# Patient Record
Sex: FEMALE | Race: WHITE | Employment: OTHER | URBAN - METROPOLITAN AREA
[De-identification: names, ages, dates, MRNs, and addresses within clinical notes are randomized per-mention and may not be internally consistent; named-entity substitution may affect disease eponyms.]

---

## 2017-01-02 DIAGNOSIS — E03.8 OTHER SPECIFIED HYPOTHYROIDISM: ICD-10-CM

## 2017-01-02 DIAGNOSIS — E03.9 HYPOTHYROIDISM, UNSPECIFIED TYPE: Primary | ICD-10-CM

## 2017-01-05 DIAGNOSIS — C10.9 MALIGNANT NEOPLASM OF OROPHARYNX (H): Primary | ICD-10-CM

## 2017-03-08 ENCOUNTER — ONCOLOGY VISIT (OUTPATIENT)
Dept: ONCOLOGY | Facility: CLINIC | Age: 72
End: 2017-03-08
Attending: INTERNAL MEDICINE
Payer: MEDICARE

## 2017-03-08 ENCOUNTER — OFFICE VISIT (OUTPATIENT)
Dept: OTOLARYNGOLOGY | Facility: CLINIC | Age: 72
End: 2017-03-08

## 2017-03-08 ENCOUNTER — APPOINTMENT (OUTPATIENT)
Dept: LAB | Facility: CLINIC | Age: 72
End: 2017-03-08
Attending: INTERNAL MEDICINE
Payer: MEDICARE

## 2017-03-08 VITALS
WEIGHT: 174.3 LBS | OXYGEN SATURATION: 94 % | BODY MASS INDEX: 29.04 KG/M2 | SYSTOLIC BLOOD PRESSURE: 119 MMHG | HEIGHT: 65 IN | DIASTOLIC BLOOD PRESSURE: 79 MMHG | HEART RATE: 76 BPM

## 2017-03-08 VITALS
SYSTOLIC BLOOD PRESSURE: 121 MMHG | HEART RATE: 64 BPM | WEIGHT: 174 LBS | DIASTOLIC BLOOD PRESSURE: 72 MMHG | TEMPERATURE: 97.8 F | OXYGEN SATURATION: 95 % | BODY MASS INDEX: 29.34 KG/M2

## 2017-03-08 DIAGNOSIS — C09.9 MALIGNANT NEOPLASM OF TONSIL (H): Chronic | ICD-10-CM

## 2017-03-08 DIAGNOSIS — E03.9 HYPOTHYROIDISM, UNSPECIFIED TYPE: ICD-10-CM

## 2017-03-08 DIAGNOSIS — E03.8 OTHER SPECIFIED HYPOTHYROIDISM: ICD-10-CM

## 2017-03-08 DIAGNOSIS — C10.9 MALIGNANT NEOPLASM OF OROPHARYNX (H): Primary | ICD-10-CM

## 2017-03-08 DIAGNOSIS — E78.5 HYPERLIPIDEMIA LDL GOAL <130: Chronic | ICD-10-CM

## 2017-03-08 LAB
ALBUMIN SERPL-MCNC: 3.6 G/DL (ref 3.4–5)
ALP SERPL-CCNC: 83 U/L (ref 40–150)
ALT SERPL W P-5'-P-CCNC: 37 U/L (ref 0–50)
ANION GAP SERPL CALCULATED.3IONS-SCNC: 8 MMOL/L (ref 3–14)
AST SERPL W P-5'-P-CCNC: 30 U/L (ref 0–45)
BASOPHILS # BLD AUTO: 0 10E9/L (ref 0–0.2)
BASOPHILS NFR BLD AUTO: 0.7 %
BILIRUB SERPL-MCNC: 0.5 MG/DL (ref 0.2–1.3)
BUN SERPL-MCNC: 13 MG/DL (ref 7–30)
CALCIUM SERPL-MCNC: 8.5 MG/DL (ref 8.5–10.1)
CHLORIDE SERPL-SCNC: 107 MMOL/L (ref 94–109)
CO2 SERPL-SCNC: 28 MMOL/L (ref 20–32)
CREAT SERPL-MCNC: 0.62 MG/DL (ref 0.52–1.04)
DIFFERENTIAL METHOD BLD: NORMAL
EOSINOPHIL # BLD AUTO: 0.2 10E9/L (ref 0–0.7)
EOSINOPHIL NFR BLD AUTO: 4 %
ERYTHROCYTE [DISTWIDTH] IN BLOOD BY AUTOMATED COUNT: 14.7 % (ref 10–15)
GFR SERPL CREATININE-BSD FRML MDRD: ABNORMAL ML/MIN/1.7M2
GLUCOSE SERPL-MCNC: 78 MG/DL (ref 70–99)
HCT VFR BLD AUTO: 42.2 % (ref 35–47)
HGB BLD-MCNC: 13.4 G/DL (ref 11.7–15.7)
IMM GRANULOCYTES # BLD: 0 10E9/L (ref 0–0.4)
IMM GRANULOCYTES NFR BLD: 0.5 %
LYMPHOCYTES # BLD AUTO: 0.8 10E9/L (ref 0.8–5.3)
LYMPHOCYTES NFR BLD AUTO: 18.4 %
MCH RBC QN AUTO: 29 PG (ref 26.5–33)
MCHC RBC AUTO-ENTMCNC: 31.8 G/DL (ref 31.5–36.5)
MCV RBC AUTO: 91 FL (ref 78–100)
MONOCYTES # BLD AUTO: 0.4 10E9/L (ref 0–1.3)
MONOCYTES NFR BLD AUTO: 8.7 %
NEUTROPHILS # BLD AUTO: 2.9 10E9/L (ref 1.6–8.3)
NEUTROPHILS NFR BLD AUTO: 67.7 %
NRBC # BLD AUTO: 0 10*3/UL
NRBC BLD AUTO-RTO: 0 /100
PLATELET # BLD AUTO: 188 10E9/L (ref 150–450)
POTASSIUM SERPL-SCNC: 4 MMOL/L (ref 3.4–5.3)
PROT SERPL-MCNC: 6.7 G/DL (ref 6.8–8.8)
RBC # BLD AUTO: 4.62 10E12/L (ref 3.8–5.2)
SODIUM SERPL-SCNC: 142 MMOL/L (ref 133–144)
T4 FREE SERPL-MCNC: 1.06 NG/DL (ref 0.76–1.46)
TSH SERPL DL<=0.005 MIU/L-ACNC: 4.72 MU/L (ref 0.4–4)
TSH SERPL DL<=0.05 MIU/L-ACNC: 4.72 MU/L (ref 0.4–4)
WBC # BLD AUTO: 4.3 10E9/L (ref 4–11)

## 2017-03-08 PROCEDURE — 80053 COMPREHEN METABOLIC PANEL: CPT | Performed by: NURSE PRACTITIONER

## 2017-03-08 PROCEDURE — 84443 ASSAY THYROID STIM HORMONE: CPT | Performed by: NURSE PRACTITIONER

## 2017-03-08 PROCEDURE — 84443 ASSAY THYROID STIM HORMONE: CPT | Mod: 91 | Performed by: NURSE PRACTITIONER

## 2017-03-08 PROCEDURE — 85025 COMPLETE CBC W/AUTO DIFF WBC: CPT | Performed by: NURSE PRACTITIONER

## 2017-03-08 PROCEDURE — 99212 OFFICE O/P EST SF 10 MIN: CPT

## 2017-03-08 PROCEDURE — 99214 OFFICE O/P EST MOD 30 MIN: CPT | Mod: ZP | Performed by: INTERNAL MEDICINE

## 2017-03-08 PROCEDURE — 84439 ASSAY OF FREE THYROXINE: CPT | Performed by: NURSE PRACTITIONER

## 2017-03-08 PROCEDURE — 36415 COLL VENOUS BLD VENIPUNCTURE: CPT

## 2017-03-08 RX ORDER — LEVOTHYROXINE SODIUM 75 UG/1
75 TABLET ORAL DAILY
Qty: 90 TABLET | Refills: 3 | Status: SHIPPED | OUTPATIENT
Start: 2017-03-08 | End: 2018-01-19

## 2017-03-08 ASSESSMENT — PAIN SCALES - GENERAL
PAINLEVEL: NO PAIN (0)
PAINLEVEL: NO PAIN (0)

## 2017-03-08 NOTE — NURSING NOTE
Chief Complaint   Patient presents with     Blood Draw     labs collected and sent, vitals taken and pt checked in for next appt.     Jamee Ledbetter

## 2017-03-08 NOTE — LETTER
3/8/2017       RE: Ingrid Powell  [address]  [place] [address]     Dear Colleague,    Thank you for referring your patient, Ingrid Powell, to the [facility] EAR NOSE AND THROAT at [facility]. Please see a copy of my visit note below.    Dear Dr. Shepherd:    I had the pleasure of meeting Ingrid Powell in follow-up today at the [facility] Ot[facility].     History of Present Illness:   Ms Powell is a 71 year old woman with a history of [id number] p16+ SCC of the oropharynx. She initially presented with a right neck mass and then found to have a mass of the right palatine tonsil. She was treated with concurrent chemoradiation from February 2012 to April 2012. She was last seen 11/9/2016 and was doing well; I had checked her TSH which was high and her synthroid was increased. Since that time her PCP did again decrease her synthroid; she doesn't think her level has been checked recently. She is otherwise doing well with no new complaints. She continues to have dry mouth. She continues to have dry eyes which she treats with rystasis and eye drops. She has stable dysphagia. She has no otalgia, hemoptysis, weight loss, hoarseness, sore throat. She is not currently smoking. She did have a neck and chest CT done today with formal read pending.    MEDICATIONS:     Current Outpatient Prescriptions   Medication Sig Dispense Refill     gabapentin (NEURONTIN) 300 MG capsule Take 1-2 capsules (300-600 mg) by mouth every evening as needed 180 capsule 3     MAGNESIUM OXIDE PO Take 250 mg by mouth daily        zinc sulfate (ZINCATE) 220 MG capsule Take 220 mg by mouth daily       guaiFENesin-codeine (ROBITUSSIN AC) 100-10 MG/5ML SOLN solution Take 5 mLs by mouth every 4 hours as needed for cough 120 mL 0     benzonatate (TESSALON) 200 MG capsule Take 1 capsule (200 mg) by mouth 3 times daily as needed for cough 21 capsule 0     levothyroxine  (SYNTHROID, LEVOTHROID) 88 MCG tablet Take 1 tablet (88 mcg) by mouth daily 30 tablet 3     simvastatin (ZOCOR) 40 MG tablet Take 1 tablet (40 mg) by mouth At Bedtime 90 tablet 3     PARoxetine (PAXIL) 20 MG tablet Take 1 tablet (20 mg) by mouth At Bedtime 90 tablet 3     traZODone (DESYREL) 100 MG tablet Take 1 tablet (100 mg) by mouth nightly as needed for sleep 90 tablet 2     RESTASIS 0.05 % ophthalmic emulsion Place 1 drop into both eyes 2 times daily       ORDER FOR DME Respironics Remstar 60 series Auto CPAP 6-15 cm H2O       B Complex Vitamins (VITAMIN  B COMPLEX) tablet Take 1 tablet by mouth daily.         VITAMIN D, CHOLECALCIFEROL, PO Take 2,000 Units by mouth daily.           ALLERGIES:    Allergies   Allergen Reactions     Dilaudid [Hydromorphone Hcl] Other (See Comments)     hallucinations     Fosamax [Alendronate Sodium] Rash            Norvasc [Amlodipine Besylate] Hives and Rash     Tegretol [Carbamazepine] Hives and Rash       HABITS/SOCIAL HISTORY:   Quit smoking    PAST MEDICAL HISTORY:   Past Medical History   Diagnosis Date     Arthritis      Depressive disorder      Gastroesophageal reflux disease      History of lumbar surgery 2015     Hoarseness      Oxygen dependent      at night     Reduced vision      Sleep apnea         FAMILY HISTORY:    Family History   Problem Relation Age of Onset     Breast Cancer Mother      Arthritis Mother      CANCER Mother      HEART DISEASE Father       at 72 of heart failure     Arthritis Brother      Arthritis Sister      CANCER Other      uncle pancreatic/grandmother- colon/aunt ? mat or pat  breast cancer     CANCER Sister        REVIEW OF SYSTEMS:  12 point ROS was negative other than the symptoms noted above in the HPI.  Patient Supplied Answers to Review of Systems  [facility] ROS 3/8/2017   Constitutional Problems with sleep   Neurology Numbness   Eyes -   Ears, Nose, Throat Trouble swallowing, Hoarseness   Cardiopulmonary -  "  Gastrointestinal/Genitourinary Heartburn/indigestion   Musculoskeletal Sore or stiff joints, Back pain, Neck pain   Endocrine Frequent urination         PHYSICAL EXAMINATION:   /79  Pulse 76  Ht 1.64 m (5' 4.57\")  Wt 79.1 kg (174 lb 4.8 oz)  SpO2 94%  BMI 29.4 kg/m2   Appearance:   normal; NAD, age-appropriate appearance, well-developed, normal habitus   Communication:   normal; communicates verbally, normal voice quality   Head/Face:   inspection -  Normal; no scars or visible lesions   Salivary glands -  Normal size, no tenderness, swelling, or palpable masses   Facial strength -  Normal and symmetric bilateral; H/B I/VI   Skin:  normal, no rash   Ocular Motility:  normal occular movements   Ears:  auricle (AD) -  normal  EAC (AD) -  normal  TM (AD) -  No effusion  auricle (AS) -  normal  EAC (AS) -  normal  TM (AS) -  Normal, no effusion  Normal clinical speech reception   Nose:  Ext. inspection -  Normal  Internal Inspection -  Normal mucosa, septum, and turbinates   Nasopharynx:  normal mucosa, no masses; prominent telengectasias   Oral Cavity:  lips -  Normal mucosa, oral competence, and stoma size   Age-appropriate dentition, healthy gingival mucosa   Hard palate, buccal, floor of mouth mucosa dry    Tongue - normal movement, no lesions, normal sensation; no palpable masses in BOT   Oropharynx:  mucosa -  Normal, no lesions  soft palate -  No asymmetry or masses; radiation changes to the mucosa  No palpable masses in tonsillar fossa, no mucosal lesions   Hypopharynx:  Pyriform sinus and pharyngeal wall mucosa with post radiation changes, no masses   No pooled secretions   Larynx:  Epiglottis, false vocal cord, true vocal cord normal in appearance, bilaterally mobile cords   Telengectasias along the true vocal cord   Neck: No visible mass or asymmetry   Radiation fibrosis  Normal range of motion   Lymphatic:  no abnormal nodes   Cardiovascular:  warm, pink, well-perfused extremities without swelling, " tenderness, or edema   Respiratory:  Normal respiratory effort, no stridor   Neuro/Psych.:  mood/affect -  normal  mental status -  normal  cranial nerves -  normal        PROCEDURES:   Flexible fiberoptic laryngoscopy: Verbal consent was obtained. The nasal cavity was prepped with an aerosolized solution of topical anesthetic and vasoconstrictive agent. The scope was passed through the anterior nasal cavity and advanced. Inspection of the nasopharynx revealed no gross abnormality with telengectasias along the mucosa. Inspection of the larynx revealed bilaterally mobile vocal cords with prominent vasculature along the cords. Pyriform sinuses are symmetric. No intra-arytenoid irregularities noted. The epiglottis, aryepiglottic folds, vallecula and base of tongue are unremarkable with just radiation changes to the mucosa. The airway is patent. Procedure tolerated well with no immediate complications noted.    IMPRESSION AND PLAN:   Ms Powell is a 71 year old woman with a history of T2N2a SCC of the oropharynx. She is just under 5 years from her treatment and is doing well. I reviewed the images from her CT today and her neck appears clear. There is a small nodule near the fissure on the right lung that may be insignificant; we will await final read from the radiologist and contact her with the results. I will see her back in a year pending her final imaging. We will recheck her TSH today.     Thank you very much for the opportunity to participate in the care of your patient.      Mayda Jack M.D.  Otolaryngology- Head & Neck Surgery      CC:  Baldomero Mosqueda MD  Department of Hematology & Oncology      Bessie Shepherd MD  Department of [facility]    Tejinder Tracy MD  Department of Oncology  [facility]

## 2017-03-08 NOTE — LETTER
3/8/2017       RE: Ingrid Powell  [address]  [place] [address]     Dear Colleague,    Thank you for referring your patient, Ingrid Powell, to the [facility] CANCER [facility]. Please see a copy of my visit note below.    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 Presents for annual follow up    HPI:  She was diagnosed with R tonsillar SCC with associated R neck node, p16+. She was given weekly carbo/taxol and XRT and completed therapy in 4/2012.     Interval Hx:   Ingrid returns to clinic today for f/u. She is doing quite well. She still has dry mouth and dry eyes. For her xerostomia, she has tried a number of different things and nothing seems to help outside of water.  Her taste is permanently affected, but she is able to chew and eat with minimal limitation. No new lumps/bumps, mouth lesions, mouth or throat pain.  Denies HA, n/t, cough, congestion, chest pain, SOB, or LOZADA. Her voice is usually a bit hoarse at baseline. She does not drink alcohol or smoke.      ROS: See interval hx. Denies fevers, chills, HA, congestion, cough, sore throat, CP, SOB, abdominal pain, N/V, diarrhea, changes in urination, bleeding, bruising, rash, dizziness.     Medications:  Current Outpatient Prescriptions   Medication Sig     levothyroxine (SYNTHROID/LEVOTHROID) 75 MCG tablet Take 1 tablet (75 mcg) by mouth daily     gabapentin (NEURONTIN) 300 MG capsule Take 1-2 capsules (300-600 mg) by mouth every evening as needed     MAGNESIUM OXIDE PO Take 250 mg by mouth daily      zinc sulfate (ZINCATE) 220 MG capsule Take 220 mg by mouth daily     simvastatin (ZOCOR) 40 MG tablet Take 1 tablet (40 mg) by mouth At Bedtime     PARoxetine (PAXIL) 20 MG tablet Take 1 tablet (20 mg) by mouth At Bedtime     traZODone (DESYREL) 100 MG tablet Take 1 tablet (100 mg) by mouth nightly as needed for sleep     RESTASIS 0.05 % ophthalmic emulsion Place 1 drop into both eyes 2 times  daily     ORDER FOR [facility] Respironics Remstar 60 series Auto CPAP 6-15 cm H2O     B Complex Vitamins (VITAMIN  B COMPLEX) tablet Take 1 tablet by mouth daily.       VITAMIN D, CHOLECALCIFEROL, PO Take 2,000 Units by mouth daily.       [DISCONTINUED] levothyroxine (SYNTHROID, LEVOTHROID) 88 MCG tablet Take 1 tablet (88 mcg) by mouth daily     No current facility-administered medications for this visit.       Allergies, PMHx, PSx, and Social Hx reviewed per EPIC.    Physical Exam:  /72  Pulse 64  Temp 97.8  F (36.6  C) (Oral)  Wt 78.9 kg (174 lb)  SpO2 95%  BMI 29.34 kg/m2  Wt Readings from Last 10 Encounters:   03/08/17 78.9 kg (174 lb)   03/08/17 79.1 kg (174 lb 4.8 oz)   12/30/16 78.9 kg (174 lb)   11/09/16 79.4 kg (175 lb)   10/24/16 79.2 kg (174 lb 9.6 oz)   09/07/16 78.7 kg (173 lb 9.6 oz)   04/27/16 77.6 kg (171 lb)   03/22/16 77.3 kg (170 lb 6.4 oz)   02/01/16 78.2 kg (172 lb 6.4 oz)   10/23/15 78.4 kg (172 lb 12.8 oz)     General: AAOx3, pleasant, no acute distress  HEENT: PERRL, EOMI, oropharynx moist and pink, without lesions or thrush.   Neck: no cervical or suprclavicular adenopathy  Heart: RRR, S1 and S2, no murmurs, clicks, or rubs  Lungs: CTA bilaterally, no wheezes, crackles, rhonchi, no use of accessory muscles  Abdomen: BS present, soft, non-tender, non-distended, no hepatosplenomegaly, no masses  Neuro: CN2-12 grossly intact, motor and sensation grossly intact  Skin: a few bruises on her lower legs  Extremities: trace lower extremity edema    Labs:  Recent Labs   Lab Test  03/08/17   1253  01/29/16   1219  10/12/15   0650  10/11/15   0630  10/10/15   0135  08/24/15   1225  07/23/15   0945   NA  142  143   --    --   139  140  142   POTASSIUM  4.0  4.2   --    --   3.7  3.8  3.9   CHLORIDE  107  108   --    --   107  107  109   CO2  28  29   --    --   27  31  29   ANIONGAP  8  6   --    --   5  2*  5   BUN  13  19   --    --   17  16  25   CR  0.62  0.68   --    --   0.71  0.76  0.80   GLC  78   81  86  110*  121*  79  79   JOSÉ MIGUEL  8.5  9.0   --    --   9.1  8.8  9.2     Recent Labs   Lab Test  03/25/13   1626  07/30/12   1253  06/26/12   1141  05/30/12   1331  05/21/12   0730   05/18/12   1010  05/17/12   1949   02/13/12   0737  02/11/12   0355  02/10/12   0656   MAG  1.3*  1.8  1.7  1.4*  1.6   < >  1.6  1.6   < >  1.3*  1.6  1.4*   PHOS   --    --    --    --    --    --   4.2  4.2   --   4.0  2.0*  5.6*    < > = values in this interval not displayed.     Recent Labs   Lab Test  03/08/17   1253  01/29/16   1219  10/12/15   0650  10/11/15   0630  10/10/15   0135  08/24/15   1225   WBC  4.3  5.0  7.4  9.3  6.3  3.5*   HGB  13.4  13.2  11.2*  10.7*  13.5  13.5   PLT  188  218  164  155  207  185   MCV  91  91  93  93  91  92   NEUTROPHIL  67.7  59.4   --   86.0  78.6  63.8     Recent Labs   Lab Test  03/08/17   1253  01/29/16   1219  10/10/15   0135   BILITOTAL  0.5  0.3  0.5   ALKPHOS  83  80  70   ALT  37  22  17   AST  30  17  14   ALBUMIN  3.6  3.9  3.6     TSH   Date Value Ref Range Status   03/08/2017 4.72 (H) 0.40 - 4.00 mU/L Final   03/08/2017 4.72 (H) 0.40 - 4.00 mU/L Final   12/30/2016 3.25 0.40 - 4.00 mU/L Final     No results for input(s): CEA in the last 09748 hours.  Results for orders placed or performed during the hospital encounter of 10/26/16   US Breast Left    Narrative    [facility] DIAGNOSTIC DIGITAL BILATERAL, US BREAST LEFT LIMITED 1-3 QUADRANTS  10/26/2016 2:07 PM    HISTORY:  Pain and lump in upper outer left breast.    COMPARISON:  9/23/2015, 8/27/2014, 8/29/2013, 8/13/2012    TECHNIQUE:  Bilateral digital mammography with CAD is performed as  well as directed left breast ultrasound.    BREAST DENSITY: Heterogeneously dense.    BILATERAL MAMMOGRAM: Stable. No new or suspicious findings of  malignancy.    LEFT BREAST ULTRASOUND:  Directed sonography to the upper outer  quadrant of the left breast from 12:00 to 3:00 shows no focal finding.  Any clinically significant palpable finding should be  treated on its  own merit.      Impression    IMPRESSION: BI-RADS CATEGORY: 1 -  NEGATIVE.    RECOMMENDED FOLLOW-UP: Annual Mammography.    JARED HOLT MD           Assessment/Plan:  1. Hx of R tonsillar basaloid SCC: with R neck mass as well. P16+. She was treated with weekly carbo/taxol followed by XRT and completed therapy in 4/2012. She has been without recurrence since that time. This past January there was suspicious enhancement on her scan in the L posterior hypopharynx.     I have reviewed actual images from her CT scans. The official read was pending at the time of visit. I see a new lesion in her right lung posteriorly. I spoke to the radiology resident who has been reviewing the scan and he agrees. Obviously our concern would be a new lung primary, metastasis or infectious etiology. I would favor repeat imaging in 3 months.     She is nearing 5 years from treatment completion - metastatic disease would be extremely unlikely and this is good news as both of the other options would be easily manageable.     2. Hypothyroidism: remains on levothyroxine. TSH is wnl today.     3. Lymphedema: encouraged continued exercises.    4. Dry eyes: on restasis.    5. Dry mouth: continue hydration.      Again, thank you for allowing me to participate in the care of your patient.      Sincerely,    Tejinder Tracy MD

## 2017-03-08 NOTE — PROGRESS NOTES
Dear Dr. Shepherd:    I had the pleasure of meeting Ingrid Powell in follow-up today at the [facility] Otolaryngology Clinic.     History of Present Illness:   Ms Powell is a 71 year old woman with a history of [id number] p16+ SCC of the oropharynx. She initially presented with a right neck mass and then found to have a mass of the right palatine tonsil. She was treated with concurrent chemoradiation from February 2012 to April 2012. She was last seen 11/9/2016 and was doing well; I had checked her TSH which was high and her synthroid was increased. Since that time her PCP did again decrease her synthroid; she doesn't think her level has been checked recently. She is otherwise doing well with no new complaints. She continues to have dry mouth. She continues to have dry eyes which she treats with rystasis and eye drops. She has stable dysphagia. She has no otalgia, hemoptysis, weight loss, hoarseness, sore throat. She is not currently smoking. She did have a neck and chest CT done today with formal read pending.    MEDICATIONS:     Current Outpatient Prescriptions   Medication Sig Dispense Refill     gabapentin (NEURONTIN) 300 MG capsule Take 1-2 capsules (300-600 mg) by mouth every evening as needed 180 capsule 3     MAGNESIUM OXIDE PO Take 250 mg by mouth daily        zinc sulfate (ZINCATE) 220 MG capsule Take 220 mg by mouth daily       guaiFENesin-codeine (ROBITUSSIN AC) 100-10 MG/5ML SOLN solution Take 5 mLs by mouth every 4 hours as needed for cough 120 mL 0     benzonatate (TESSALON) 200 MG capsule Take 1 capsule (200 mg) by mouth 3 times daily as needed for cough 21 capsule 0     levothyroxine (SYNTHROID, LEVOTHROID) 88 MCG tablet Take 1 tablet (88 mcg) by mouth daily 30 tablet 3     simvastatin (ZOCOR) 40 MG tablet Take 1 tablet (40 mg) by mouth At Bedtime 90 tablet 3     PARoxetine (PAXIL) 20 MG tablet Take 1 tablet (20 mg) by mouth At Bedtime 90 tablet 3     traZODone (DESYREL) 100 MG tablet  "Take 1 tablet (100 mg) by mouth nightly as needed for sleep 90 tablet 2     RESTASIS 0.05 % ophthalmic emulsion Place 1 drop into both eyes 2 times daily       ORDER FOR DME Respironics Remstar 60 series Auto CPAP 6-15 cm H2O       B Complex Vitamins (VITAMIN  B COMPLEX) tablet Take 1 tablet by mouth daily.         VITAMIN D, CHOLECALCIFEROL, PO Take 2,000 Units by mouth daily.           ALLERGIES:    Allergies   Allergen Reactions     Dilaudid [Hydromorphone Hcl] Other (See Comments)     hallucinations     Fosamax [Alendronate Sodium] Rash            Norvasc [Amlodipine Besylate] Hives and Rash     Tegretol [Carbamazepine] Hives and Rash       HABITS/SOCIAL HISTORY:   Quit smoking    PAST MEDICAL HISTORY:   Past Medical History   Diagnosis Date     Arthritis      Depressive disorder      Gastroesophageal reflux disease      History of lumbar surgery 2015     Hoarseness      Oxygen dependent      at night     Reduced vision      Sleep apnea         FAMILY HISTORY:    Family History   Problem Relation Age of Onset     Breast Cancer Mother      Arthritis Mother      CANCER Mother      HEART DISEASE Father       at 72 of heart failure     Arthritis Brother      Arthritis Sister      CANCER Other      uncle pancreatic/grandmother- colon/aunt ? mat or pat  breast cancer     CANCER Sister        REVIEW OF SYSTEMS:  12 point ROS was negative other than the symptoms noted above in the HPI.  Patient Supplied Answers to Review of Systems  [facility] ROS 3/8/2017   Constitutional Problems with sleep   Neurology Numbness   Eyes -   Ears, Nose, Throat Trouble swallowing, Hoarseness   Cardiopulmonary -   Gastrointestinal/Genitourinary Heartburn/indigestion   Musculoskeletal Sore or stiff joints, Back pain, Neck pain   Endocrine Frequent urination         PHYSICAL EXAMINATION:   /79  Pulse 76  Ht 1.64 m (5' 4.57\")  Wt 79.1 kg (174 lb 4.8 oz)  SpO2 94%  BMI 29.4 kg/m2   Appearance:   normal; NAD, age-appropriate " appearance, well-developed, normal habitus   Communication:   normal; communicates verbally, normal voice quality   Head/Face:   inspection -  Normal; no scars or visible lesions   Salivary glands -  Normal size, no tenderness, swelling, or palpable masses   Facial strength -  Normal and symmetric bilateral; H/B I/VI   Skin:  normal, no rash   Ocular Motility:  normal occular movements   Ears:  auricle (AD) -  normal  EAC (AD) -  normal  TM (AD) -  No effusion  auricle (AS) -  normal  EAC (AS) -  normal  TM (AS) -  Normal, no effusion  Normal clinical speech reception   Nose:  Ext. inspection -  Normal  Internal Inspection -  Normal mucosa, septum, and turbinates   Nasopharynx:  normal mucosa, no masses; prominent telengectasias   Oral Cavity:  lips -  Normal mucosa, oral competence, and stoma size   Age-appropriate dentition, healthy gingival mucosa   Hard palate, buccal, floor of mouth mucosa dry    Tongue - normal movement, no lesions, normal sensation; no palpable masses in BOT   Oropharynx:  mucosa -  Normal, no lesions  soft palate -  No asymmetry or masses; radiation changes to the mucosa  No palpable masses in tonsillar fossa, no mucosal lesions   Hypopharynx:  Pyriform sinus and pharyngeal wall mucosa with post radiation changes, no masses   No pooled secretions   Larynx:  Epiglottis, false vocal cord, true vocal cord normal in appearance, bilaterally mobile cords   Telengectasias along the true vocal cord   Neck: No visible mass or asymmetry   Radiation fibrosis  Normal range of motion   Lymphatic:  no abnormal nodes   Cardiovascular:  warm, pink, well-perfused extremities without swelling, tenderness, or edema   Respiratory:  Normal respiratory effort, no stridor   Neuro/Psych.:  mood/affect -  normal  mental status -  normal  cranial nerves -  normal        PROCEDURES:   Flexible fiberoptic laryngoscopy: Verbal consent was obtained. The nasal cavity was prepped with an aerosolized solution of topical  anesthetic and vasoconstrictive agent. The scope was passed through the anterior nasal cavity and advanced. Inspection of the nasopharynx revealed no gross abnormality with telengectasias along the mucosa. Inspection of the larynx revealed bilaterally mobile vocal cords with prominent vasculature along the cords. Pyriform sinuses are symmetric. No intra-arytenoid irregularities noted. The epiglottis, aryepiglottic folds, vallecula and base of tongue are unremarkable with just radiation changes to the mucosa. The airway is patent. Procedure tolerated well with no immediate complications noted.    IMPRESSION AND PLAN:   Ms Powell is a 71 year old woman with a history of T2N2a SCC of the oropharynx. She is just under 5 years from her treatment and is doing well. I reviewed the images from her CT today and her neck appears clear. There is a small nodule near the fissure on the right lung that may be insignificant; we will await final read from the radiologist and contact her with the results. I will see her back in a year pending her final imaging. We will recheck her TSH today.     Thank you very much for the opportunity to participate in the care of your patient.      Mayda Jack M.D.  Otolaryngology- Head & Neck Surgery      CC:  Baldomero Mosqueda MD  Department of Hematology & Oncology      Bessie Shepherd MD  Department of Family Practice    Tejinder Tracy MD  Department of Oncology  [facility]

## 2017-03-08 NOTE — PROGRESS NOTES
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 Presents for annual follow up    HPI:  She was diagnosed with R tonsillar SCC with associated R neck node, p16+. She was given weekly carbo/taxol and XRT and completed therapy in 4/2012.     Interval Hx:   Ingrid returns to clinic today for f/u. She is doing quite well. She still has dry mouth and dry eyes. For her xerostomia, she has tried a number of different things and nothing seems to help outside of water.  Her taste is permanently affected, but she is able to chew and eat with minimal limitation. No new lumps/bumps, mouth lesions, mouth or throat pain.  Denies HA, n/t, cough, congestion, chest pain, SOB, or LOZADA. Her voice is usually a bit hoarse at baseline. She does not drink alcohol or smoke.      ROS: See interval hx. Denies fevers, chills, HA, congestion, cough, sore throat, CP, SOB, abdominal pain, N/V, diarrhea, changes in urination, bleeding, bruising, rash, dizziness.     Medications:  Current Outpatient Prescriptions   Medication Sig     levothyroxine (SYNTHROID/LEVOTHROID) 75 MCG tablet Take 1 tablet (75 mcg) by mouth daily     gabapentin (NEURONTIN) 300 MG capsule Take 1-2 capsules (300-600 mg) by mouth every evening as needed     MAGNESIUM OXIDE PO Take 250 mg by mouth daily      zinc sulfate (ZINCATE) 220 MG capsule Take 220 mg by mouth daily     simvastatin (ZOCOR) 40 MG tablet Take 1 tablet (40 mg) by mouth At Bedtime     PARoxetine (PAXIL) 20 MG tablet Take 1 tablet (20 mg) by mouth At Bedtime     traZODone (DESYREL) 100 MG tablet Take 1 tablet (100 mg) by mouth nightly as needed for sleep     RESTASIS 0.05 % ophthalmic emulsion Place 1 drop into both eyes 2 times daily     ORDER FOR DME Respironics Remstar 60 series Auto CPAP 6-15 cm H2O     B Complex Vitamins (VITAMIN  B COMPLEX) tablet Take 1 tablet by mouth daily.       VITAMIN D, CHOLECALCIFEROL, PO Take 2,000 Units by mouth daily.       [DISCONTINUED]  levothyroxine (SYNTHROID, LEVOTHROID) 88 MCG tablet Take 1 tablet (88 mcg) by mouth daily     No current facility-administered medications for this visit.       Allergies, PMHx, PSx, and Social Hx reviewed per EPIC.    Physical Exam:  /72  Pulse 64  Temp 97.8  F (36.6  C) (Oral)  Wt 78.9 kg (174 lb)  SpO2 95%  BMI 29.34 kg/m2  Wt Readings from Last 10 Encounters:   03/08/17 78.9 kg (174 lb)   03/08/17 79.1 kg (174 lb 4.8 oz)   12/30/16 78.9 kg (174 lb)   11/09/16 79.4 kg (175 lb)   10/24/16 79.2 kg (174 lb 9.6 oz)   09/07/16 78.7 kg (173 lb 9.6 oz)   04/27/16 77.6 kg (171 lb)   03/22/16 77.3 kg (170 lb 6.4 oz)   02/01/16 78.2 kg (172 lb 6.4 oz)   10/23/15 78.4 kg (172 lb 12.8 oz)     General: AAOx3, pleasant, no acute distress  HEENT: PERRL, EOMI, oropharynx moist and pink, without lesions or thrush.   Neck: no cervical or suprclavicular adenopathy  Heart: RRR, S1 and S2, no murmurs, clicks, or rubs  Lungs: CTA bilaterally, no wheezes, crackles, rhonchi, no use of accessory muscles  Abdomen: BS present, soft, non-tender, non-distended, no hepatosplenomegaly, no masses  Neuro: CN2-12 grossly intact, motor and sensation grossly intact  Skin: a few bruises on her lower legs  Extremities: trace lower extremity edema    Labs:  Recent Labs   Lab Test  03/08/17   1253  01/29/16   1219  10/12/15   0650  10/11/15   0630  10/10/15   0135  08/24/15   1225  07/23/15   0945   NA  142  143   --    --   139  140  142   POTASSIUM  4.0  4.2   --    --   3.7  3.8  3.9   CHLORIDE  107  108   --    --   107  107  109   CO2  28  29   --    --   27  31  29   ANIONGAP  8  6   --    --   5  2*  5   BUN  13  19   --    --   17  16  25   CR  0.62  0.68   --    --   0.71  0.76  0.80   GLC  78  81  86  110*  121*  79  79   JOSÉ MIGUEL  8.5  9.0   --    --   9.1  8.8  9.2     Recent Labs   Lab Test  03/25/13   1626  07/30/12   1253  06/26/12   1141  05/30/12   1331  05/21/12   0730   05/18/12   1010  05/17/12   1949   02/13/12   0737  02/11/12    0355  02/10/12   0656   MAG  1.3*  1.8  1.7  1.4*  1.6   < >  1.6  1.6   < >  1.3*  1.6  1.4*   PHOS   --    --    --    --    --    --   4.2  4.2   --   4.0  2.0*  5.6*    < > = values in this interval not displayed.     Recent Labs   Lab Test  03/08/17   1253  01/29/16   1219  10/12/15   0650  10/11/15   0630  10/10/15   0135  08/24/15   1225   WBC  4.3  5.0  7.4  9.3  6.3  3.5*   HGB  13.4  13.2  11.2*  10.7*  13.5  13.5   PLT  188  218  164  155  207  185   MCV  91  91  93  93  91  92   NEUTROPHIL  67.7  59.4   --   86.0  78.6  63.8     Recent Labs   Lab Test  03/08/17   1253  01/29/16   1219  10/10/15   0135   BILITOTAL  0.5  0.3  0.5   ALKPHOS  83  80  70   ALT  37  22  17   AST  30  17  14   ALBUMIN  3.6  3.9  3.6     TSH   Date Value Ref Range Status   03/08/2017 4.72 (H) 0.40 - 4.00 mU/L Final   03/08/2017 4.72 (H) 0.40 - 4.00 mU/L Final   12/30/2016 3.25 0.40 - 4.00 mU/L Final     No results for input(s): CEA in the last 05101 hours.  Results for orders placed or performed during the hospital encounter of 10/26/16   US Breast Left    Narrative    MA DIAGNOSTIC DIGITAL BILATERAL, US BREAST LEFT LIMITED 1-3 QUADRANTS  10/26/2016 2:07 PM    HISTORY:  Pain and lump in upper outer left breast.    COMPARISON:  9/23/2015, 8/27/2014, 8/29/2013, 8/13/2012    TECHNIQUE:  Bilateral digital mammography with CAD is performed as  well as directed left breast ultrasound.    BREAST DENSITY: Heterogeneously dense.    BILATERAL MAMMOGRAM: Stable. No new or suspicious findings of  malignancy.    LEFT BREAST ULTRASOUND:  Directed sonography to the upper outer  quadrant of the left breast from 12:00 to 3:00 shows no focal finding.  Any clinically significant palpable finding should be treated on its  own merit.      Impression    IMPRESSION: BI-RADS CATEGORY: 1 -  NEGATIVE.    RECOMMENDED FOLLOW-UP: Annual Mammography.    JARED HOLT MD           Assessment/Plan:  1. Hx of R tonsillar basaloid SCC: with R neck mass as well.  P16+. She was treated with weekly carbo/taxol followed by XRT and completed therapy in 4/2012. She has been without recurrence since that time. This past January there was suspicious enhancement on her scan in the L posterior hypopharynx.     I have reviewed actual images from her CT scans. The official read was pending at the time of visit. I see a new lesion in her right lung posteriorly. I spoke to the radiology resident who has been reviewing the scan and he agrees. Obviously our concern would be a new lung primary, metastasis or infectious etiology. I would favor repeat imaging in 3 months.     She is nearing 5 years from treatment completion - metastatic disease would be extremely unlikely and this is good news as both of the other options would be easily manageable.     2. Hypothyroidism: remains on levothyroxine. TSH is wnl today.     3. Lymphedema: encouraged continued exercises.    4. Dry eyes: on restasis.    5. Dry mouth: continue hydration.

## 2017-03-08 NOTE — MR AVS SNAPSHOT
After Visit Summary   3/8/2017    Ingrid Powell    MRN: [id number]           Patient Information     Date Of Birth          1945        Visit Information        Provider Department      3/8/2017 1:45 PM Tejinder Tracy MD [facility]        Today's Diagnoses     Hypothyroidism, unspecified type        HX of MALIGNANT NEOPL TONSIL        Hyperlipidemia LDL goal <130        Other specified hypothyroidism           Follow-ups after your visit        Follow-up notes from your care team     Return in about 3 months (around 6/8/2017).      Your next 10 appointments already scheduled     Jun 07, 2017 11:20 AM CDT   (Arrive by 11:05 AM)   CT CHEST W CONTRAST with UCCT1   [facility] [place] [facility] ([facility])    [address]  [address] [facility] [address] [phone]           Please bring any scans or X-rays taken at other hospitals, if similar tests were done. Also bring a list of your medicines, including vitamins, minerals and over-the-counter drugs. It is safest to leave personal items at home.  Be sure to tell your doctor:   If you have any allergies.   If there s any chance you are pregnant.   If you are breastfeeding.   If you have any special needs.  You will have contrast for this exam. To prepare:   Do not eat or drink for 2 hours before your exam. If you need to take medicine, you may take it with small sips of water. (We may ask you to take liquid medicine as well.)   The day before your exam, drink extra fluids at least six 8-ounce glasses (unless your doctor tells you to restrict your fluids).  Patients over 70 or patients with diabetes or kidney problems:   If you haven t had a blood test (creatinine test) within the last 30 days, go to your clinic or Diagnostic Imaging Department for this test.  If you have diabetes:   If your kidney function is normal, continue taking your metformin (Avandamet, Glucophage, Glucovance,  Metaglip) on the day of your exam.   If your kidney function is abnormal, wait 48 hours before restarting this medicine.  Please wear loose clothing, such as a sweat suit or jogging clothes. Avoid snaps, zippers and other metal. We may ask you to undress and put on a hospital gown.  If you have any questions, please call the Imaging Department where you will have your exam.            Jun 07, 2017 11:40 AM CDT   (Arrive by 11:25 AM)   CT SOFT TISSUE NECK W CONTRAST with UCCT1   [facility] [place] [address] ([facility])    [address]  1st [address]  [facility] [address] [phone]           Please bring any scans or X-rays taken at other hospitals, if similar tests were done. Also bring a list of your medicines, including vitamins, minerals and over-the-counter drugs. It is safest to leave personal items at home.  Be sure to tell your doctor:   If you have any allergies.   If there s any chance you are pregnant.   If you are breastfeeding.   If you have any special needs.  You will have contrast for this exam. To prepare:   Do not eat or drink for 2 hours before your exam. If you need to take medicine, you may take it with small sips of water. (We may ask you to take liquid medicine as well.)   The day before your exam, drink extra fluids at least six 8-ounce glasses (unless your doctor tells you to restrict your fluids).  Patients over 70 or patients with diabetes or kidney problems:   If you haven t had a blood test (creatinine test) within the last 30 days, go to your clinic or Diagnostic Imaging Department for this test.  If you have diabetes:   If your kidney function is normal, continue taking your metformin (Avandamet, Glucophage, Glucovance, Metaglip) on the day of your exam.   If your kidney function is abnormal, wait 48 hours before restarting this medicine.  Please wear loose clothing, such as a sweat suit or jogging clothes. Avoid snaps, zippers and other metal. We  may ask you to undress and put on a hospital gown.  If you have any questions, please call the Imaging Department where you will have your exam.            Jun 07, 2017  2:15 PM CDT   [facility] Lab Draw with  [facility] LAB DRAW   [facility] ([facility])    [address] [id number] [phone]            Jun 07, 2017  2:45 PM CDT   (Arrive by 2:30 PM)   Return Visit with Tejinder Tracy MD   [facility] ([facility])    [address] [place] [address]  [address] [facility] [address] [phone]              Future tests that were ordered for you today     Open Future Orders        Priority Expected Expires Ordered    CBC with platelets differential Routine 5/3/2017 9/8/2017 3/8/2017    Comprehensive metabolic panel Routine 5/3/2017 9/8/2017 3/8/2017    CT Chest w Contrast Routine 5/3/2017 9/8/2017 3/8/2017    TSH with free T4 reflex Routine 5/3/2017 9/8/2017 3/8/2017    CT Soft Tissue Neck w Contrast Routine 5/3/2017 9/8/2017 3/8/2017            Who to contact     If you have questions or need follow up information about today's clinic visit or your schedule please contact [facility] directly at [phone].  Normal or non-critical lab and imaging results will be communicated to you by [employer], letter or phone within 4 business days after the clinic has received the results. If you do not hear from us within 7 days, please contact the clinic through [employer] or phone. If you have a critical or abnormal lab result, we will notify you by phone as soon as possible.  Submit refill requests through [employer] or call your pharmacy and they will forward the refill request to us. Please allow 3 business days for your refill to be completed.          Additional Information About Your Visit        Footwayhart Information     [employer] lets you send messages to your doctor, view your test results,  "renew your prescriptions, schedule appointments and more. To sign up, go to [email or web address] . Click on \"Log in\" on the left side of the screen, which will take you to the Welcome page. Then click on \"Sign up Now\" on the right side of the page.     You will be asked to enter the access code listed below, as well as some personal information. Please follow the directions to create your username and password.     Your access code is: [email or web address]  Expires: 3/30/2017 10:47 AM     Your access code will  in 90 days. If you need help or a new code, please call your [place] clinic or [phone].        Care EveryWhere ID     This is your Care EveryWhere ID. This could be used by other organizations to access your [place] medical records  [id number]        Your Vitals Were     Pulse Temperature Pulse Oximetry BMI (Body Mass Index)          64 97.8  F (36.6  C) (Oral) 95% 29.34 kg/m2         Blood Pressure from Last 3 Encounters:   17 121/72   17 119/79   16 124/72    Weight from Last 3 Encounters:   17 78.9 kg (174 lb)   17 79.1 kg (174 lb 4.8 oz)   16 78.9 kg (174 lb)              We Performed the Following     CBC with platelets differential     Comprehensive metabolic panel     T4 free     TSH with free T4 reflex     TSH          Today's Medication Changes          These changes are accurate as of: 3/8/17  2:47 PM.  If you have any questions, ask your nurse or doctor.               These medicines have changed or have updated prescriptions.        Dose/Directions    levothyroxine 75 MCG tablet   Commonly known as:  SYNTHROID/LEVOTHROID   This may have changed:    - medication strength  - how much to take   Used for:  Other specified hypothyroidism, Hypothyroidism, unspecified type, Malignant neoplasm of tonsil (H), Hyperlipidemia LDL goal <130   Changed by:  Tejinder Tracy MD        Dose:  75 mcg   Take 1 tablet (75 mcg) by mouth daily   Quantity:  90 tablet "   Refills:  3         Stop taking these medicines if you haven't already. Please contact your care team if you have questions.     benzonatate 200 MG capsule   Commonly known as:  TESSALON   Stopped by:  Tejinder Tracy MD           guaiFENesin-codeine 100-10 MG/5ML Soln solution   Commonly known as:  ROBITUSSIN AC   Stopped by:  Tejinder Tracy MD                Where to get your medicines      These medications were sent to [facility] - [place], [place] - [address]  [address] [facility] [address]     Phone:  [phone]     levothyroxine 75 MCG tablet                Primary Care Provider Office Phone # Fax #    Tila GuillermoNORA [facility] [phone]       [place] [address] [address] [place] [id number]        Thank you!     Thank you for choosing [facility]  for your care. Our goal is always to provide you with excellent care. Hearing back from our patients is one way we can continue to improve our services. Please take a few minutes to complete the written survey that you may receive in the mail after your visit with us. Thank you!             Your Updated Medication List - Protect others around you: Learn how to safely use, store and throw away your medicines at www.disposemymeds.org.          This list is accurate as of: 3/8/17  2:47 PM.  Always use your most recent med list.                   Brand Name Dispense Instructions for use    gabapentin 300 MG capsule    NEURONTIN    180 capsule    Take 1-2 capsules (300-600 mg) by mouth every evening as needed       levothyroxine 75 MCG tablet    SYNTHROID/LEVOTHROID    90 tablet    Take 1 tablet (75 mcg) by mouth daily       MAGNESIUM OXIDE PO      Take 250 mg by mouth daily       order for DME      Respironics Remstar 60 series Auto CPAP 6-15 cm H2O       PARoxetine 20 MG tablet    PAXIL    90 tablet    Take 1 tablet (20 mg) by mouth At Bedtime       RESTASIS 0.05 %  ophthalmic emulsion   Generic drug:  cycloSPORINE      Place 1 drop into both eyes 2 times daily       simvastatin 40 MG tablet    ZOCOR    90 tablet    Take 1 tablet (40 mg) by mouth At Bedtime       traZODone 100 MG tablet    DESYREL    90 tablet    Take 1 tablet (100 mg) by mouth nightly as needed for sleep       vitamin B complex with vitamin C Tabs tablet    STRESS TAB     Take 1 tablet by mouth daily.       VITAMIN D (CHOLECALCIFEROL) PO      Take 2,000 Units by mouth daily.       zinc sulfate 220 MG capsule    ZINCATE     Take 220 mg by mouth daily

## 2017-03-08 NOTE — MR AVS SNAPSHOT
After Visit Summary   3/8/2017    Ingrid Powell    MRN: [id number]           Patient Information     Date Of Birth          1945        Visit Information        Provider Department      3/8/2017 11:40 AM Mayda Jack MD [facility]        Today's Diagnoses     Malignant neoplasm of oropharynx (H)    -  1      Care Instructions    Follow up with Dr. Jack in 1 year  Call me if ?'s arise [phone]  Yamel Noland RN        Follow-ups after your visit        Your next 10 appointments already scheduled     Jun 07, 2017 11:20 AM CDT   (Arrive by 11:05 AM)   CT CHEST W CONTRAST with UCCT1   [facility] [place] [facility] ([facility])    [address]  1st Floor  [facility] [address] [phone]           Please bring any scans or X-rays taken at other hospitals, if similar tests were done. Also bring a list of your medicines, including vitamins, minerals and over-the-counter drugs. It is safest to leave personal items at home.  Be sure to tell your doctor:   If you have any allergies.   If there s any chance you are pregnant.   If you are breastfeeding.   If you have any special needs.  You will have contrast for this exam. To prepare:   Do not eat or drink for 2 hours before your exam. If you need to take medicine, you may take it with small sips of water. (We may ask you to take liquid medicine as well.)   The day before your exam, drink extra fluids at least six 8-ounce glasses (unless your doctor tells you to restrict your fluids).  Patients over 70 or patients with diabetes or kidney problems:   If you haven t had a blood test (creatinine test) within the last 30 days, go to your clinic or Diagnostic Imaging Department for this test.  If you have diabetes:   If your kidney function is normal, continue taking your metformin (Avandamet, Glucophage, Glucovance, Metaglip) on the day of your exam.   If your kidney function is abnormal, wait 48  hours before restarting this medicine.  Please wear loose clothing, such as a sweat suit or jogging clothes. Avoid snaps, zippers and other metal. We may ask you to undress and put on a hospital gown.  If you have any questions, please call the Imaging Department where you will have your exam.            Jun 07, 2017 11:40 AM CDT   (Arrive by 11:25 AM)   CT SOFT TISSUE NECK W CONTRAST with UCCT1   [facility] [place] ([facility] [place])    [address]  1st Floor  [facility] [address] [phone]           Please bring any scans or X-rays taken at other hospitals, if similar tests were done. Also bring a list of your medicines, including vitamins, minerals and over-the-counter drugs. It is safest to leave personal items at home.  Be sure to tell your doctor:   If you have any allergies.   If there s any chance you are pregnant.   If you are breastfeeding.   If you have any special needs.  You will have contrast for this exam. To prepare:   Do not eat or drink for 2 hours before your exam. If you need to take medicine, you may take it with small sips of water. (We may ask you to take liquid medicine as well.)   The day before your exam, drink extra fluids at least six 8-ounce glasses (unless your doctor tells you to restrict your fluids).  Patients over 70 or patients with diabetes or kidney problems:   If you haven t had a blood test (creatinine test) within the last 30 days, go to your clinic or Diagnostic Imaging Department for this test.  If you have diabetes:   If your kidney function is normal, continue taking your metformin (Avandamet, Glucophage, Glucovance, Metaglip) on the day of your exam.   If your kidney function is abnormal, wait 48 hours before restarting this medicine.  Please wear loose clothing, such as a sweat suit or jogging clothes. Avoid snaps, zippers and other metal. We may ask you to undress and put on a hospital gown.  If you have any questions,  please call the Imaging Department where you will have your exam.            Jun 07, 2017  2:15 PM CDT   [facility] Draw with  [facility] LAB DRAW   [facility] [facility])    [address] [place] [address] [phone]            Jun 07, 2017  2:45 PM CDT   (Arrive by 2:30 PM)   Return Visit with Tejinder Tracy MD   [facility] ([place] [facility])    [address] [place] [address] [phone]              Future tests that were ordered for you today     Open Future Orders        Priority Expected Expires Ordered    CBC with platelets differential Routine 5/3/2017 9/8/2017 3/8/2017    Comprehensive metabolic panel Routine 5/3/2017 9/8/2017 3/8/2017    CT Chest w Contrast Routine 5/3/2017 9/8/2017 3/8/2017    TSH with free T4 reflex Routine 5/3/2017 9/8/2017 3/8/2017    CT Soft Tissue Neck w Contrast Routine 5/3/2017 9/8/2017 3/8/2017            Who to contact     Please call your clinic at [phone] to:    Ask questions about your health    Make or cancel appointments    Discuss your medicines    Learn about your test results    Speak to your doctor   If you have compliments or concerns about an experience at your clinic, or if you wish to file a complaint, please contact [facility] Patient Relations at [phone] or email us at [email or web address]         Additional Information About Your Visit        [employer] Information     [employer] is an electronic gateway that provides easy, online access to your medical records. With [employer], you can request a clinic appointment, read your test results, renew a prescription or communicate with your care team.     To sign up for [employer] visit the website at [email or web address]   You will be asked to enter the access code listed below, as well as some personal information. Please follow  "the directions to create your username and password.     Your access code is: [email or web address]  Expires: 3/30/2017 10:47 AM     Your access code will  in 90 days. If you need help or a new code, please contact your [facility] or call [phone] for assistance.        Care EveryWhere ID     This is your Care EveryWhere ID. This could be used by other organizations to access your [place] medical records  [id number]        Your Vitals Were     Pulse Height Pulse Oximetry BMI (Body Mass Index)          76 1.64 m (5' 4.57\") 94% 29.4 kg/m2         Blood Pressure from Last 3 Encounters:   17 121/72   17 119/79   16 124/72    Weight from Last 3 Encounters:   17 78.9 kg (174 lb)   17 79.1 kg (174 lb 4.8 oz)   16 78.9 kg (174 lb)              Today, you had the following     No orders found for display         Today's Medication Changes          These changes are accurate as of: 3/8/17  9:06 PM.  If you have any questions, ask your nurse or doctor.               These medicines have changed or have updated prescriptions.        Dose/Directions    levothyroxine 75 MCG tablet   Commonly known as:  SYNTHROID/LEVOTHROID   This may have changed:    - medication strength  - how much to take   Used for:  Other specified hypothyroidism, Hypothyroidism, unspecified type, Malignant neoplasm of tonsil (H), Hyperlipidemia LDL goal <130   Changed by:  Tejinder Tracy MD        Dose:  75 mcg   Take 1 tablet (75 mcg) by mouth daily   Quantity:  90 tablet   Refills:  3         Stop taking these medicines if you haven't already. Please contact your care team if you have questions.     benzonatate 200 MG capsule   Commonly known as:  TESSALON   Stopped by:  Tejinder Tracy MD           guaiFENesin-codeine 100-10 MG/5ML Soln solution   Commonly known as:  ROBITUSSIN AC   Stopped by:  Tejinder Tracy MD                Where to get your medicines      These " medications were sent to [facility] - [place], [place] - [address]  [address] [address]     Phone:  [phone]     levothyroxine 75 MCG tablet                Primary Care Provider Office Phone # Fax #    NORA Kaplan [facility] [phone]       [place] [address] [address] [place] [address]        Thank you!     Thank you for choosing [facility]  for your care. Our goal is always to provide you with excellent care. Hearing back from our patients is one way we can continue to improve our services. Please take a few minutes to complete the written survey that you may receive in the mail after your visit with us. Thank you!             Your Updated Medication List - Protect others around you: Learn how to safely use, store and throw away your medicines at www.disposemymeds.org.          This list is accurate as of: 3/8/17  9:06 PM.  Always use your most recent med list.                   Brand Name Dispense Instructions for use    gabapentin 300 MG capsule    NEURONTIN    180 capsule    Take 1-2 capsules (300-600 mg) by mouth every evening as needed       levothyroxine 75 MCG tablet    SYNTHROID/LEVOTHROID    90 tablet    Take 1 tablet (75 mcg) by mouth daily       MAGNESIUM OXIDE PO      Take 250 mg by mouth daily       order for DME      Respironics Remstar 60 series Auto CPAP 6-15 cm H2O       PARoxetine 20 MG tablet    PAXIL    90 tablet    Take 1 tablet (20 mg) by mouth At Bedtime       RESTASIS 0.05 % ophthalmic emulsion   Generic drug:  cycloSPORINE      Place 1 drop into both eyes 2 times daily       simvastatin 40 MG tablet    ZOCOR    90 tablet    Take 1 tablet (40 mg) by mouth At Bedtime       traZODone 100 MG tablet    DESYREL    90 tablet    Take 1 tablet (100 mg) by mouth nightly as needed for sleep       vitamin B complex with vitamin C Tabs tablet    STRESS TAB     Take 1 tablet by mouth daily.        VITAMIN D (CHOLECALCIFEROL) PO      Take 2,000 Units by mouth daily.       zinc sulfate 220 MG capsule    ZINCATE     Take 220 mg by mouth daily

## 2017-03-08 NOTE — NURSING NOTE
"Ingrid Powell is a 71 year old female who presents for:  Chief Complaint   Patient presents with     Blood Draw     labs collected and sent, vitals taken and pt checked in for next appt.     Oncology Clinic Visit     Return for Tonsil Ca , scan lab results        Initial Vitals:  /72  Pulse 64  Temp 97.8  F (36.6  C) (Oral)  Wt 78.9 kg (174 lb)  SpO2 95%  BMI 29.34 kg/m2 Estimated body mass index is 29.34 kg/(m^2) as calculated from the following:    Height as of an earlier encounter on 3/8/17: 1.64 m (5' 4.57\").    Weight as of this encounter: 78.9 kg (174 lb).. Body surface area is 1.9 meters squared. BP completed using cuff size: NA (Not Taken)  No Pain (0) No LMP recorded. Patient is postmenopausal. Allergies and medications reviewed.     Medications: Medication refills not needed today.  Pharmacy name entered into [employer]:    [facility] - [address] PHARMACY MAIL DELIVERY - [facility] [address]    Comments:     6  minutes for nursing intake (face to face time)   Raisa Zimmer MA        "

## 2017-06-07 ENCOUNTER — APPOINTMENT (OUTPATIENT)
Dept: LAB | Facility: CLINIC | Age: 72
End: 2017-06-07
Attending: INTERNAL MEDICINE
Payer: MEDICARE

## 2017-06-07 ENCOUNTER — ONCOLOGY VISIT (OUTPATIENT)
Dept: ONCOLOGY | Facility: CLINIC | Age: 72
End: 2017-06-07
Attending: INTERNAL MEDICINE
Payer: MEDICARE

## 2017-06-07 VITALS
RESPIRATION RATE: 16 BRPM | HEIGHT: 65 IN | TEMPERATURE: 97.4 F | DIASTOLIC BLOOD PRESSURE: 70 MMHG | OXYGEN SATURATION: 99 % | WEIGHT: 172.8 LBS | SYSTOLIC BLOOD PRESSURE: 118 MMHG | BODY MASS INDEX: 28.79 KG/M2 | HEART RATE: 65 BPM

## 2017-06-07 DIAGNOSIS — E03.8 OTHER SPECIFIED HYPOTHYROIDISM: ICD-10-CM

## 2017-06-07 DIAGNOSIS — E03.9 HYPOTHYROIDISM, UNSPECIFIED TYPE: ICD-10-CM

## 2017-06-07 DIAGNOSIS — E78.5 HYPERLIPIDEMIA LDL GOAL <130: Chronic | ICD-10-CM

## 2017-06-07 DIAGNOSIS — C09.9 MALIGNANT NEOPLASM OF TONSIL (H): Chronic | ICD-10-CM

## 2017-06-07 LAB
ALBUMIN SERPL-MCNC: 3.5 G/DL (ref 3.4–5)
ALP SERPL-CCNC: 86 U/L (ref 40–150)
ALT SERPL W P-5'-P-CCNC: 32 U/L (ref 0–50)
ANION GAP SERPL CALCULATED.3IONS-SCNC: 5 MMOL/L (ref 3–14)
AST SERPL W P-5'-P-CCNC: 25 U/L (ref 0–45)
BASOPHILS # BLD AUTO: 0 10E9/L (ref 0–0.2)
BASOPHILS NFR BLD AUTO: 0.7 %
BILIRUB SERPL-MCNC: 0.5 MG/DL (ref 0.2–1.3)
BUN SERPL-MCNC: 26 MG/DL (ref 7–30)
CALCIUM SERPL-MCNC: 8.4 MG/DL (ref 8.5–10.1)
CHLORIDE SERPL-SCNC: 104 MMOL/L (ref 94–109)
CO2 SERPL-SCNC: 28 MMOL/L (ref 20–32)
CREAT SERPL-MCNC: 0.7 MG/DL (ref 0.52–1.04)
DIFFERENTIAL METHOD BLD: ABNORMAL
EOSINOPHIL # BLD AUTO: 0.2 10E9/L (ref 0–0.7)
EOSINOPHIL NFR BLD AUTO: 4.6 %
ERYTHROCYTE [DISTWIDTH] IN BLOOD BY AUTOMATED COUNT: 14.1 % (ref 10–15)
GFR SERPL CREATININE-BSD FRML MDRD: 82 ML/MIN/1.7M2
GLUCOSE SERPL-MCNC: 77 MG/DL (ref 70–99)
HCT VFR BLD AUTO: 38.5 % (ref 35–47)
HGB BLD-MCNC: 12 G/DL (ref 11.7–15.7)
IMM GRANULOCYTES # BLD: 0 10E9/L (ref 0–0.4)
IMM GRANULOCYTES NFR BLD: 0.5 %
LYMPHOCYTES # BLD AUTO: 0.8 10E9/L (ref 0.8–5.3)
LYMPHOCYTES NFR BLD AUTO: 20.5 %
MCH RBC QN AUTO: 29.1 PG (ref 26.5–33)
MCHC RBC AUTO-ENTMCNC: 31.2 G/DL (ref 31.5–36.5)
MCV RBC AUTO: 93 FL (ref 78–100)
MONOCYTES # BLD AUTO: 0.4 10E9/L (ref 0–1.3)
MONOCYTES NFR BLD AUTO: 9.5 %
NEUTROPHILS # BLD AUTO: 2.6 10E9/L (ref 1.6–8.3)
NEUTROPHILS NFR BLD AUTO: 64.2 %
NRBC # BLD AUTO: 0 10*3/UL
NRBC BLD AUTO-RTO: 0 /100
PLATELET # BLD AUTO: 167 10E9/L (ref 150–450)
POTASSIUM SERPL-SCNC: 3.9 MMOL/L (ref 3.4–5.3)
PROT SERPL-MCNC: 6.7 G/DL (ref 6.8–8.8)
RBC # BLD AUTO: 4.13 10E12/L (ref 3.8–5.2)
SODIUM SERPL-SCNC: 137 MMOL/L (ref 133–144)
TSH SERPL DL<=0.005 MIU/L-ACNC: 1.72 MU/L (ref 0.4–4)
WBC # BLD AUTO: 4.1 10E9/L (ref 4–11)

## 2017-06-07 PROCEDURE — 80053 COMPREHEN METABOLIC PANEL: CPT | Performed by: INTERNAL MEDICINE

## 2017-06-07 PROCEDURE — 36415 COLL VENOUS BLD VENIPUNCTURE: CPT | Performed by: INTERNAL MEDICINE

## 2017-06-07 PROCEDURE — 85025 COMPLETE CBC W/AUTO DIFF WBC: CPT | Performed by: INTERNAL MEDICINE

## 2017-06-07 PROCEDURE — 99212 OFFICE O/P EST SF 10 MIN: CPT | Mod: ZF

## 2017-06-07 PROCEDURE — 84443 ASSAY THYROID STIM HORMONE: CPT | Performed by: INTERNAL MEDICINE

## 2017-06-07 PROCEDURE — 99214 OFFICE O/P EST MOD 30 MIN: CPT | Mod: ZP | Performed by: INTERNAL MEDICINE

## 2017-06-07 ASSESSMENT — PAIN SCALES - GENERAL: PAINLEVEL: NO PAIN (0)

## 2017-06-07 NOTE — MR AVS SNAPSHOT
After Visit Summary   6/7/2017    Ingrid Powell    MRN: [id number]           Patient Information     Date Of Birth          1945        Visit Information        Provider Department      6/7/2017 2:45 PM Tejinder Tracy MD [facility]        Today's Diagnoses     Hypothyroidism, unspecified type        HX of MALIGNANT NEOPL TONSIL        Hyperlipidemia LDL goal <130        Other specified hypothyroidism           Follow-ups after your visit        Your next 10 appointments already scheduled     Jun 07, 2017  2:45 PM CDT   (Arrive by 2:30 PM)   Return Visit with Tejinder Tracy MD   [facility] ([facility])    [address]  2nd Floor  [facility] [id number] [phone]            Mar 07, 2018 10:20 AM CST   (Arrive by 10:05 AM)   CT SOFT TISSUE NECK W CONTRAST with UCCT1   [facility] CT ([facility])    [address]  [address] [facility] [id number] [phone]           Please bring any scans or X-rays taken at other hospitals, if similar tests were done. Also bring a list of your medicines, including vitamins, minerals and over-the-counter drugs. It is safest to leave personal items at home.  Be sure to tell your doctor:   If you have any allergies.   If there s any chance you are pregnant.   If you are breastfeeding.   If you have any special needs.  You will have contrast for this exam. To prepare:   Do not eat or drink for 2 hours before your exam. If you need to take medicine, you may take it with small sips of water. (We may ask you to take liquid medicine as well.)   The day before your exam, drink extra fluids at least six 8-ounce glasses (unless your doctor tells you to restrict your fluids).  Patients over 70 or patients with diabetes or kidney problems:   If you haven t had a blood test (creatinine test) within the last 30 days, go to your clinic or  Diagnostic Imaging Department for this test.  If you have diabetes:   If your kidney function is normal, continue taking your metformin (Avandamet, Glucophage, Glucovance, Metaglip) on the day of your exam.   If your kidney function is abnormal, wait 48 hours before restarting this medicine.  Please wear loose clothing, such as a sweat suit or jogging clothes. Avoid snaps, zippers and other metal. We may ask you to undress and put on a hospital gown.  If you have any questions, please call the Imaging Department where you will have your exam.            Mar 07, 2018 10:40 AM CST   (Arrive by 10:25 AM)   CT CHEST W CONTRAST with UCCT1   [facility] [place] [facility] ([facility] [place])    [address]  1st Floor  [facility] [address] [phone]           Please bring any scans or X-rays taken at other hospitals, if similar tests were done. Also bring a list of your medicines, including vitamins, minerals and over-the-counter drugs. It is safest to leave personal items at home.  Be sure to tell your doctor:   If you have any allergies.   If there s any chance you are pregnant.   If you are breastfeeding.   If you have any special needs.  You will have contrast for this exam. To prepare:   Do not eat or drink for 2 hours before your exam. If you need to take medicine, you may take it with small sips of water. (We may ask you to take liquid medicine as well.)   The day before your exam, drink extra fluids at least six 8-ounce glasses (unless your doctor tells you to restrict your fluids).  Patients over 70 or patients with diabetes or kidney problems:   If you haven t had a blood test (creatinine test) within the last 30 days, go to your clinic or Diagnostic Imaging Department for this test.  If you have diabetes:   If your kidney function is normal, continue taking your metformin (Avandamet, Glucophage, Glucovance, Metaglip) on the day of your exam.   If your kidney function is abnormal,  wait 48 hours before restarting this medicine.  Please wear loose clothing, such as a sweat suit or jogging clothes. Avoid snaps, zippers and other metal. We may ask you to undress and put on a hospital gown.  If you have any questions, please call the Imaging Department where you will have your exam.            Mar 07, 2018 11:00 AM CST   Lab with  LAB   [facility] ([facility])    [address] [place] [address]  1st Floor  [facility] [address] [phone]            Mar 07, 2018  1:15 PM CST   (Arrive by 1:00 PM)   Return Visit with Tejinder Tracy MD   [facility] ([facility])    [address] [place] [address] [phone]              Future tests that were ordered for you today     Open Future Orders        Priority Expected Expires Ordered    CBC with platelets differential Routine 2/7/2018 6/7/2018 6/7/2017    Comprehensive metabolic panel Routine 2/7/2018 6/7/2018 6/7/2017    CT Chest w Contrast Routine 2/7/2018 6/7/2018 6/7/2017    TSH with free T4 reflex Routine 2/7/2018 6/7/2018 6/7/2017    CT Soft Tissue Neck w Contrast Routine 2/7/2018 6/7/2018 6/7/2017            Who to contact     If you have questions or need follow up information about today's clinic visit or your schedule please contact [facility] directly at [phone].  Normal or non-critical lab and imaging results will be communicated to you by MyChart, letter or phone within 4 business days after the clinic has received the results. If you do not hear from us within 7 days, please contact the clinic through [email or web address] or phone. If you have a critical or abnormal lab result, we will notify you by phone as soon as possible.  Submit refill requests through [employer] or call your pharmacy and they will forward the refill request to us. Please allow 3 business days for your refill to be completed.          Additional Information  "About Your Visit        MyChart Information     [employer] lets you send messages to your doctor, view your test results, renew your prescriptions, schedule appointments and more. To sign up, go to [email or web address] . Click on \"Log in\" on the left side of the screen, which will take you to the Welcome page. Then click on \"Sign up Now\" on the right side of the page.     You will be asked to enter the access code listed below, as well as some personal information. Please follow the directions to create your username and password.     Your access code is: [email or web address]  Expires: 2017  6:30 AM     Your access code will  in 90 days. If you need help or a new code, please call your [place] clinic or [phone].        Care EveryWhere ID     This is your Care EveryWhere ID. This could be used by other organizations to access your [place] medical records  [id number]        Your Vitals Were     Pulse Temperature Respirations Height Pulse Oximetry BMI (Body Mass Index)    65 97.4  F (36.3  C) (Oral) 16 1.64 m (5' 4.57\") 99% 29.14 kg/m2       Blood Pressure from Last 3 Encounters:   17 118/70   17 121/72   17 119/79    Weight from Last 3 Encounters:   17 78.4 kg (172 lb 12.8 oz)   17 78.9 kg (174 lb)   17 79.1 kg (174 lb 4.8 oz)              We Performed the Following     CBC with platelets differential     Comprehensive metabolic panel     TSH with free T4 reflex        Primary Care Provider Office Phone # Fax #    NORA Kaplan [facility] [phone]       [place] [address] [address] [place] [address]        Thank you!     Thank you for choosing [facility]  for your care. Our goal is always to provide you with excellent care. Hearing back from our patients is one way we can continue to improve our services. Please take a few minutes to complete the written survey that you may receive in the mail after your visit with " us. Thank you!             Your Updated Medication List - Protect others around you: Learn how to safely use, store and throw away your medicines at www.disposemymeds.org.          This list is accurate as of: 6/7/17  2:01 PM.  Always use your most recent med list.                   Brand Name Dispense Instructions for use    gabapentin 300 MG capsule    NEURONTIN    180 capsule    Take 1-2 capsules (300-600 mg) by mouth every evening as needed       levothyroxine 75 MCG tablet    SYNTHROID/LEVOTHROID    90 tablet    Take 1 tablet (75 mcg) by mouth daily       MAGNESIUM OXIDE PO      Take 250 mg by mouth daily       order for DME      Respironics Remstar 60 series Auto CPAP 6-15 cm H2O       PARoxetine 20 MG tablet    PAXIL    90 tablet    Take 1 tablet (20 mg) by mouth At Bedtime       RESTASIS 0.05 % ophthalmic emulsion   Generic drug:  cycloSPORINE      Place 1 drop into both eyes 2 times daily       simvastatin 40 MG tablet    ZOCOR    90 tablet    Take 1 tablet (40 mg) by mouth At Bedtime       traZODone 100 MG tablet    DESYREL    90 tablet    Take 1 tablet (100 mg) by mouth nightly as needed for sleep       vitamin B complex with vitamin C Tabs tablet    STRESS TAB     Take 1 tablet by mouth daily.       VITAMIN D (CHOLECALCIFEROL) PO      Take 2,000 Units by mouth daily.       zinc sulfate 220 (50 ZN) MG capsule    ZINCATE     Take 220 mg by mouth daily

## 2017-06-07 NOTE — NURSING NOTE
Chief Complaint   Patient presents with     Blood Draw     labs drawn from IV by rn.  vs taken.     Labs drawn from IV by rn.  vs taken.  Pt checked in to next appointment.  Mandy Melgoza RN

## 2017-06-07 NOTE — LETTER
6/7/2017       RE: Ingrid Powell  [address], [place] [address]  [facility] [address]     Dear Colleague,    Thank you for referring your patient, Ingrid Powell, to the [facility]. Please see a copy of my visit note below.    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 Presents for annual follow up    HPI:  She was diagnosed with R tonsillar SCC with associated R neck node, p16+. She was given weekly carbo/taxol and XRT and completed therapy in 4/2012.     Interval Hx:   Ingrid returns to clinic today for f/u. She is accompanied by her sister.    She has some discomfort in her scalp on the right side in the temple area. She does have neuropathic pain on touch.     She is doing quite well otherwise. She still has dry mouth and dry eyes. For her xerostomia, she has tried a number of different things and nothing seems to help outside of water.  Her taste is permanently affected, but she is able to chew and eat with minimal limitation. No new lumps/bumps, mouth lesions, mouth or throat pain.  Denies HA, n/t, cough, congestion, chest pain, SOB, or LOZADA. Her voice is usually a bit hoarse at baseline. She does not drink alcohol or smoke.      ROS: See interval hx. Denies fevers, chills, HA, congestion, cough, sore throat, CP, SOB, abdominal pain, N/V, diarrhea, changes in urination, bleeding, bruising, rash, dizziness.     Medications:  Current Outpatient Prescriptions   Medication Sig     levothyroxine (SYNTHROID/LEVOTHROID) 75 MCG tablet Take 1 tablet (75 mcg) by mouth daily     gabapentin (NEURONTIN) 300 MG capsule Take 1-2 capsules (300-600 mg) by mouth every evening as needed     MAGNESIUM OXIDE PO Take 250 mg by mouth daily      zinc sulfate (ZINCATE) 220 MG capsule Take 220 mg by mouth daily     simvastatin (ZOCOR) 40 MG tablet Take 1 tablet (40 mg) by mouth At Bedtime     PARoxetine (PAXIL) 20 MG tablet Take 1 tablet (20 mg) by mouth At Bedtime      "traZODone (DESYREL) 100 MG tablet Take 1 tablet (100 mg) by mouth nightly as needed for sleep     RESTASIS 0.05 % ophthalmic emulsion Place 1 drop into both eyes 2 times daily     ORDER FOR DME Respironics Remstar 60 series Auto CPAP 6-15 cm H2O     B Complex Vitamins (VITAMIN  B COMPLEX) tablet Take 1 tablet by mouth daily.       VITAMIN D, CHOLECALCIFEROL, PO Take 2,000 Units by mouth daily.       No current facility-administered medications for this visit.       Allergies, PMHx, PSx, and Social Hx reviewed per EPIC.    Physical Exam:  /70 (BP Location: Right arm, Patient Position: Chair, Cuff Size: Adult Regular)  Pulse 65  Temp 97.4  F (36.3  C) (Oral)  Resp 16  Ht 1.64 m (5' 4.57\")  Wt 78.4 kg (172 lb 12.8 oz)  SpO2 99%  BMI 29.14 kg/m2  Wt Readings from Last 10 Encounters:   06/07/17 78.4 kg (172 lb 12.8 oz)   03/08/17 78.9 kg (174 lb)   03/08/17 79.1 kg (174 lb 4.8 oz)   12/30/16 78.9 kg (174 lb)   11/09/16 79.4 kg (175 lb)   10/24/16 79.2 kg (174 lb 9.6 oz)   09/07/16 78.7 kg (173 lb 9.6 oz)   04/27/16 77.6 kg (171 lb)   03/22/16 77.3 kg (170 lb 6.4 oz)   02/01/16 78.2 kg (172 lb 6.4 oz)     General: AAOx3, pleasant, no acute distress  HEENT: PERRL, EOMI, oropharynx moist and pink, without lesions or thrush.   Neck: no cervical or suprclavicular adenopathy  Heart: RRR, S1 and S2, no murmurs, clicks, or rubs  Lungs: CTA bilaterally, no wheezes, crackles, rhonchi, no use of accessory muscles  Abdomen: BS present, soft, non-tender, non-distended, no hepatosplenomegaly, no masses  Neuro: CN2-12 grossly intact, motor and sensation grossly intact  Skin: a few bruises on her lower legs  Extremities: trace lower extremity edema    Labs:  Recent Labs   Lab Test  06/07/17   1141  03/08/17   1253  01/29/16   1219  10/12/15   0650  10/11/15   0630  10/10/15   0135  08/24/15   1225   NA  137  142  143   --    --   139  140   POTASSIUM  3.9  4.0  4.2   --    --   3.7  3.8   CHLORIDE  104  107  108   --    --  "  107  107   CO2  28  28  29   --    --   27  31   ANIONGAP  5  8  6   --    --   5  2*   BUN  26  13  19   --    --   17  16   CR  0.70  0.62  0.68   --    --   0.71  0.76   GLC  77  78  81  86  110*  121*  79   JOSÉ MIGUEL  8.4*  8.5  9.0   --    --   9.1  8.8     Recent Labs   Lab Test  03/25/13   1626  07/30/12   1253  06/26/12   1141  05/30/12   1331  05/21/12   0730   05/18/12   1010  05/17/12   1949   02/13/12   0737  02/11/12   0355  02/10/12   0656   MAG  1.3*  1.8  1.7  1.4*  1.6   < >  1.6  1.6   < >  1.3*  1.6  1.4*   PHOS   --    --    --    --    --    --   4.2  4.2   --   4.0  2.0*  5.6*    < > = values in this interval not displayed.     Recent Labs   Lab Test  06/07/17   1141  03/08/17   1253  01/29/16   1219  10/12/15   0650  10/11/15   0630  10/10/15   0135   WBC  4.1  4.3  5.0  7.4  9.3  6.3   HGB  12.0  13.4  13.2  11.2*  10.7*  13.5   PLT  167  188  218  164  155  207   MCV  93  91  91  93  93  91   NEUTROPHIL  64.2  67.7  59.4   --   86.0  78.6     Recent Labs   Lab Test  06/07/17   1141  03/08/17   1253  01/29/16   1219   BILITOTAL  0.5  0.5  0.3   ALKPHOS  86  83  80   ALT  32  37  22   AST  25  30  17   ALBUMIN  3.5  3.6  3.9     TSH   Date Value Ref Range Status   06/07/2017 1.72 0.40 - 4.00 mU/L Final   03/08/2017 4.72 (H) 0.40 - 4.00 mU/L Final   03/08/2017 4.72 (H) 0.40 - 4.00 mU/L Final     No results for input(s): CEA in the last 56410 hours.  Results for orders placed or performed in visit on 06/07/17   CT Soft Tissue Neck w Contrast    Narrative    CT SOFT TISSUE NECK W CONTRAST 6/7/2017 11:25 AM    History:  Hypothyroidism, unspecified, Malignant neoplasm of tonsil,  unspecified, Hyperlipidemia, unspecified, Other specified  hypothyroidism      Comparison:  CT neck 3/8/2017     Technique: Following intravenous administration of nonionic iodinated  contrast medium, thin section helical CT images were obtained from the  skull base down to the level of the aortic arch.  Axial, coronal  and  sagittal reformations were performed with 2-3 mm slice thickness  reconstruction. Images were reviewed in soft tissue, lung and bone  windows.    Contrast: Isovue 370 85cc    Findings:   Stable postradiation changes in the neck with edema within the deep  neck tissues with loss of fascial planes, thickening of the epiglottis  and aryepiglottic folds, thickening of the platsyma muscle.     Post radiation atrophy of the submandibular glands. The parotid glands  are normal.     The thyroid gland appears normal.    There is no evident cervical lymphadenopathy. The fascial spaces in  the neck are intact bilaterally. The major vascular structures in the  neck appear unremarkable.    Evaluation of the osseous structures demonstrate no worrisome lytic or  sclerotic lesion. Multilevel cervical spondylosis with most pronounced  finding being at C5-6 with mild to moderate bilateral neural foraminal  stenosis. Mild scattered paranasal sinus mucosal thickening. The  mastoid air cells are clear.     Unchanged 4 mm nodule in the right upper lung lobe.      Impression    Impression:  Stable post treatment changes in the neck with no definite mass  recurrence. No cervical lymphadenopathy.    I have personally reviewed the examination and initial interpretation  and I agree with the findings.    TEMO SLOAN MD           Assessment/Plan:  1. Hx of R tonsillar basaloid SCC: with R neck mass as well. P16+. She was treated with weekly carbo/taxol followed by XRT and completed therapy in 4/2012. She has been without recurrence since that time.    I have reviewed actual images from her CT scans with her. The official read was pending at the time of visit. The new lesion in her right lung posteriorly from last evaluation is significantly improved. Her CT neck appears to be stable as in the past. Essentially she has nothing to suggest her disease.     I do not see anything obvious lesion in her scalp in the area of her symptoms. It is  possible that some of the region would be higher up from the level of CT neck. However there is nothing in area evalauted. I have asked her to call us back or her PCP if she has persistent symptoms.     She is 5 years from treatment completion - metastatic disease would be extremely unlikely.     I will see her in 9 months with labs and restaging scans. I will check with Dr. Jack as to when she would like to follow up with Ingrid.     2. Hypothyroidism: remains on levothyroxine. TSH is wnl today.     3. Lymphedema: encouraged continued exercises.    4. Dry eyes: on restasis.    5. Dry mouth: continue hydration.      Again, thank you for allowing me to participate in the care of your patient.      Sincerely,    Tejinder Tracy MD

## 2017-06-07 NOTE — PROGRESS NOTES
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 Presents for annual follow up    HPI:  She was diagnosed with R tonsillar SCC with associated R neck node, p16+. She was given weekly carbo/taxol and XRT and completed therapy in 4/2012.     Interval Hx:   Ingrid returns to clinic today for f/u. She is accompanied by her sister.    She has some discomfort in her scalp on the right side in the temple area. She does have neuropathic pain on touch.     She is doing quite well otherwise. She still has dry mouth and dry eyes. For her xerostomia, she has tried a number of different things and nothing seems to help outside of water.  Her taste is permanently affected, but she is able to chew and eat with minimal limitation. No new lumps/bumps, mouth lesions, mouth or throat pain.  Denies HA, n/t, cough, congestion, chest pain, SOB, or LOZADA. Her voice is usually a bit hoarse at baseline. She does not drink alcohol or smoke.      ROS: See interval hx. Denies fevers, chills, HA, congestion, cough, sore throat, CP, SOB, abdominal pain, N/V, diarrhea, changes in urination, bleeding, bruising, rash, dizziness.     Medications:  Current Outpatient Prescriptions   Medication Sig     levothyroxine (SYNTHROID/LEVOTHROID) 75 MCG tablet Take 1 tablet (75 mcg) by mouth daily     gabapentin (NEURONTIN) 300 MG capsule Take 1-2 capsules (300-600 mg) by mouth every evening as needed     MAGNESIUM OXIDE PO Take 250 mg by mouth daily      zinc sulfate (ZINCATE) 220 MG capsule Take 220 mg by mouth daily     simvastatin (ZOCOR) 40 MG tablet Take 1 tablet (40 mg) by mouth At Bedtime     PARoxetine (PAXIL) 20 MG tablet Take 1 tablet (20 mg) by mouth At Bedtime     traZODone (DESYREL) 100 MG tablet Take 1 tablet (100 mg) by mouth nightly as needed for sleep     RESTASIS 0.05 % ophthalmic emulsion Place 1 drop into both eyes 2 times daily     ORDER FOR DME Respironics Remstar 60 series Auto CPAP 6-15 cm H2O     B  "Complex Vitamins (VITAMIN  B COMPLEX) tablet Take 1 tablet by mouth daily.       VITAMIN D, CHOLECALCIFEROL, PO Take 2,000 Units by mouth daily.       No current facility-administered medications for this visit.       Allergies, PMHx, PSx, and Social Hx reviewed per EPIC.    Physical Exam:  /70 (BP Location: Right arm, Patient Position: Chair, Cuff Size: Adult Regular)  Pulse 65  Temp 97.4  F (36.3  C) (Oral)  Resp 16  Ht 1.64 m (5' 4.57\")  Wt 78.4 kg (172 lb 12.8 oz)  SpO2 99%  BMI 29.14 kg/m2  Wt Readings from Last 10 Encounters:   06/07/17 78.4 kg (172 lb 12.8 oz)   03/08/17 78.9 kg (174 lb)   03/08/17 79.1 kg (174 lb 4.8 oz)   12/30/16 78.9 kg (174 lb)   11/09/16 79.4 kg (175 lb)   10/24/16 79.2 kg (174 lb 9.6 oz)   09/07/16 78.7 kg (173 lb 9.6 oz)   04/27/16 77.6 kg (171 lb)   03/22/16 77.3 kg (170 lb 6.4 oz)   02/01/16 78.2 kg (172 lb 6.4 oz)     General: AAOx3, pleasant, no acute distress  HEENT: PERRL, EOMI, oropharynx moist and pink, without lesions or thrush.   Neck: no cervical or suprclavicular adenopathy  Heart: RRR, S1 and S2, no murmurs, clicks, or rubs  Lungs: CTA bilaterally, no wheezes, crackles, rhonchi, no use of accessory muscles  Abdomen: BS present, soft, non-tender, non-distended, no hepatosplenomegaly, no masses  Neuro: CN2-12 grossly intact, motor and sensation grossly intact  Skin: a few bruises on her lower legs  Extremities: trace lower extremity edema    Labs:  Recent Labs   Lab Test  06/07/17   1141  03/08/17   1253  01/29/16   1219  10/12/15   0650  10/11/15   0630  10/10/15   0135  08/24/15   1225   NA  137  142  143   --    --   139  140   POTASSIUM  3.9  4.0  4.2   --    --   3.7  3.8   CHLORIDE  104  107  108   --    --   107  107   CO2  28  28  29   --    --   27  31   ANIONGAP  5  8  6   --    --   5  2*   BUN  26  13  19   --    --   17  16   CR  0.70  0.62  0.68   --    --   0.71  0.76   GLC  77  78  81  86  110*  121*  79   JOSÉ MIGUEL  8.4*  8.5  9.0   --    --   9.1  " 8.8     Recent Labs   Lab Test  03/25/13   1626  07/30/12   1253  06/26/12   1141  05/30/12   1331  05/21/12   0730   05/18/12   1010  05/17/12   1949   02/13/12   0737  02/11/12   0355  02/10/12   0656   MAG  1.3*  1.8  1.7  1.4*  1.6   < >  1.6  1.6   < >  1.3*  1.6  1.4*   PHOS   --    --    --    --    --    --   4.2  4.2   --   4.0  2.0*  5.6*    < > = values in this interval not displayed.     Recent Labs   Lab Test  06/07/17   1141  03/08/17   1253  01/29/16   1219  10/12/15   0650  10/11/15   0630  10/10/15   0135   WBC  4.1  4.3  5.0  7.4  9.3  6.3   HGB  12.0  13.4  13.2  11.2*  10.7*  13.5   PLT  167  188  218  164  155  207   MCV  93  91  91  93  93  91   NEUTROPHIL  64.2  67.7  59.4   --   86.0  78.6     Recent Labs   Lab Test  06/07/17   1141  03/08/17   1253  01/29/16   1219   BILITOTAL  0.5  0.5  0.3   ALKPHOS  86  83  80   ALT  32  37  22   AST  25  30  17   ALBUMIN  3.5  3.6  3.9     TSH   Date Value Ref Range Status   06/07/2017 1.72 0.40 - 4.00 mU/L Final   03/08/2017 4.72 (H) 0.40 - 4.00 mU/L Final   03/08/2017 4.72 (H) 0.40 - 4.00 mU/L Final     No results for input(s): CEA in the last 50978 hours.  Results for orders placed or performed in visit on 06/07/17   CT Soft Tissue Neck w Contrast    Narrative    CT SOFT TISSUE NECK W CONTRAST 6/7/2017 11:25 AM    History:  Hypothyroidism, unspecified, Malignant neoplasm of tonsil,  unspecified, Hyperlipidemia, unspecified, Other specified  hypothyroidism      Comparison:  CT neck 3/8/2017     Technique: Following intravenous administration of nonionic iodinated  contrast medium, thin section helical CT images were obtained from the  skull base down to the level of the aortic arch.  Axial, coronal and  sagittal reformations were performed with 2-3 mm slice thickness  reconstruction. Images were reviewed in soft tissue, lung and bone  windows.    Contrast: Isovue 370 85cc    Findings:   Stable postradiation changes in the neck with edema within the  deep  neck tissues with loss of fascial planes, thickening of the epiglottis  and aryepiglottic folds, thickening of the platsyma muscle.     Post radiation atrophy of the submandibular glands. The parotid glands  are normal.     The thyroid gland appears normal.    There is no evident cervical lymphadenopathy. The fascial spaces in  the neck are intact bilaterally. The major vascular structures in the  neck appear unremarkable.    Evaluation of the osseous structures demonstrate no worrisome lytic or  sclerotic lesion. Multilevel cervical spondylosis with most pronounced  finding being at C5-6 with mild to moderate bilateral neural foraminal  stenosis. Mild scattered paranasal sinus mucosal thickening. The  mastoid air cells are clear.     Unchanged 4 mm nodule in the right upper lung lobe.      Impression    Impression:  Stable post treatment changes in the neck with no definite mass  recurrence. No cervical lymphadenopathy.    I have personally reviewed the examination and initial interpretation  and I agree with the findings.    TEMO SLOAN MD           Assessment/Plan:  1. Hx of R tonsillar basaloid SCC: with R neck mass as well. P16+. She was treated with weekly carbo/taxol followed by XRT and completed therapy in 4/2012. She has been without recurrence since that time.    I have reviewed actual images from her CT scans with her. The official read was pending at the time of visit. The new lesion in her right lung posteriorly from last evaluation is significantly improved. Her CT neck appears to be stable as in the past. Essentially she has nothing to suggest her disease.     I do not see anything obvious lesion in her scalp in the area of her symptoms. It is possible that some of the region would be higher up from the level of CT neck. However there is nothing in area evalauted. I have asked her to call us back or her PCP if she has persistent symptoms.     She is 5 years from treatment completion -  metastatic disease would be extremely unlikely.     I will see her in 9 months with labs and restaging scans. I will check with Dr. Jack as to when she would like to follow up with Ingrid.     2. Hypothyroidism: remains on levothyroxine. TSH is wnl today.     3. Lymphedema: encouraged continued exercises.    4. Dry eyes: on restasis.    5. Dry mouth: continue hydration.

## 2017-06-07 NOTE — NURSING NOTE
"Oncology Rooming Note    June 7, 2017 1:19 PM   Ingrid Powell is a 71 year old female who presents for:    Chief Complaint   Patient presents with     Blood Draw     labs drawn from IV by rn.  vs taken.     Oncology Clinic Visit     return patient visit CT scan results for follow up related to HX of MALIGNANT NEOPL TONSIL     Initial Vitals: /70 (BP Location: Right arm, Patient Position: Chair, Cuff Size: Adult Regular)  Pulse 65  Temp 97.4  F (36.3  C) (Oral)  Resp 16  Ht 1.64 m (5' 4.57\")  Wt 78.4 kg (172 lb 12.8 oz)  SpO2 99%  BMI 29.14 kg/m2 Estimated body mass index is 29.14 kg/(m^2) as calculated from the following:    Height as of this encounter: 1.64 m (5' 4.57\").    Weight as of this encounter: 78.4 kg (172 lb 12.8 oz). Body surface area is 1.89 meters squared.  No Pain (0) Comment: Data Unavailable   No LMP recorded. Patient is postmenopausal.  Allergies reviewed: Yes  Medications reviewed: Yes    Medications: Medication refills not needed today.  Pharmacy name entered into [facility]:    [id number] - [place], [place] - [address] [place] [address] PHARMACY MAIL DELIVERY - [facility] [id number]    Clinical concerns: none charlene was notified.    5 minutes for nursing intake (face to face time)     Yaniv Alejandro CMA              "

## 2017-06-28 DIAGNOSIS — F51.01 PRIMARY INSOMNIA: ICD-10-CM

## 2017-06-28 RX ORDER — TRAZODONE HYDROCHLORIDE 100 MG/1
TABLET ORAL
Qty: 90 TABLET | Refills: 1 | Status: SHIPPED | OUTPATIENT
Start: 2017-06-28 | End: 2017-12-20

## 2017-06-28 NOTE — TELEPHONE ENCOUNTER
traZODone (DESYREL) 100 MG tablet       Last Written Prescription Date: 09/07/2016  Last Fill Quantity: 90 tablet; # refills: 2  Last Office Visit with [facility], [facility] or [facility] prescribing provider:  12/30/2016        Last PHQ-9 score on record=   PHQ-9 SCORE 9/7/2016   Total Score -   Total Score 2       Lab Results   Component Value Date    AST 25 06/07/2017     Lab Results   Component Value Date    ALT 32 06/07/2017

## 2017-07-19 ENCOUNTER — OFFICE VISIT (OUTPATIENT)
Dept: FAMILY MEDICINE | Facility: CLINIC | Age: 72
End: 2017-07-19
Payer: COMMERCIAL

## 2017-07-19 VITALS
HEART RATE: 82 BPM | WEIGHT: 169 LBS | TEMPERATURE: 97.3 F | OXYGEN SATURATION: 94 % | BODY MASS INDEX: 28.5 KG/M2 | RESPIRATION RATE: 14 BRPM | SYSTOLIC BLOOD PRESSURE: 132 MMHG | DIASTOLIC BLOOD PRESSURE: 72 MMHG

## 2017-07-19 DIAGNOSIS — B34.9 VIRAL SYNDROME: Primary | ICD-10-CM

## 2017-07-19 PROCEDURE — 99213 OFFICE O/P EST LOW 20 MIN: CPT | Performed by: FAMILY MEDICINE

## 2017-07-19 NOTE — PROGRESS NOTES
SUBJECTIVE:                                                    Ingrid Powell is a 71 year old female who presents to clinic today for the following health issues:      RESPIRATORY SYMPTOMS      Duration: 3 days     Description  nasal congestion, rhinorrhea, facial pain/pressure, cough, wheezing and stomach ache    Severity: moderate    Accompanying signs and symptoms: None    History (predisposing factors):  COPD    Precipitating or alleviating factors: None    Therapies tried and outcome:  None              S: Ingrid Powell is a 71 year old female with cough, ST, diarrhea.  2 days.  No fever.  No urine changes, no rash or CP/SOB.     No contacts or travel.  Lives alone    Problem list, med list, additional histories reviewed and updated, as indicated.      O:/72  Pulse 82  Temp 97.3  F (36.3  C) (Tympanic)  Resp 14  Wt 169 lb (76.7 kg)  SpO2 94%  BMI 28.5 kg/m2  GEN: Alert and oriented, in no acute distress  CV: RRR, no murmur  RESP: lungs clear bilaterally, good effort  EXT: no edema or lesions noted in lower extremities  ENT: oropharynx clear, no exudate or palate/tonsil asymmetry    A; viral syndrome    P: should improve over next few days.  Fluids, rest, time.  Will f/u if worsening.

## 2017-07-19 NOTE — MR AVS SNAPSHOT
After Visit Summary   7/19/2017    Ingrid Powell    MRN: [id number]           Patient Information     Date Of Birth          1945        Visit Information        Provider Department      7/19/2017 4:20 PM Jose Brothers MD [facility]        Today's Diagnoses     Viral syndrome    -  1       Follow-ups after your visit        Your next 10 appointments already scheduled     Aug 01, 2017 11:00 AM CDT   SHORT with NORA Kaplan CNP   [facility] ([facility]    [address] [facility] [address]   [phone]            Mar 07, 2018 10:20 AM CST   (Arrive by 10:05 AM)   CT SOFT TISSUE NECK W CONTRAST with UCCT1   [facility] ([facility])    [address]  1st Floor  [facility] [address] [phone]           Please bring any scans or X-rays taken at other hospitals, if similar tests were done. Also bring a list of your medicines, including vitamins, minerals and over-the-counter drugs. It is safest to leave personal items at home.  Be sure to tell your doctor:   If you have any allergies.   If there s any chance you are pregnant.   If you are breastfeeding.   If you have any special needs.  You will have contrast for this exam. To prepare:   Do not eat or drink for 2 hours before your exam. If you need to take medicine, you may take it with small sips of water. (We may ask you to take liquid medicine as well.)   The day before your exam, drink extra fluids at least six 8-ounce glasses (unless your doctor tells you to restrict your fluids).  Patients over 70 or patients with diabetes or kidney problems:   If you haven t had a blood test (creatinine test) within the last 30 days, go to your clinic or Diagnostic Imaging Department for this test.  If you have diabetes:   If your kidney function is normal, continue taking your metformin (Avandamet, Glucophage, Glucovance, Metaglip) on the  day of your exam.   If your kidney function is abnormal, wait 48 hours before restarting this medicine.  Please wear loose clothing, such as a sweat suit or jogging clothes. Avoid snaps, zippers and other metal. We may ask you to undress and put on a hospital gown.  If you have any questions, please call the Imaging Department where you will have your exam.            Mar 07, 2018 10:40 AM CST   (Arrive by 10:25 AM)   CT CHEST W CONTRAST with UCCT1   [facility] ([facility])    [address]  1st Floor  [facility] [address] [phone]           Please bring any scans or X-rays taken at other hospitals, if similar tests were done. Also bring a list of your medicines, including vitamins, minerals and over-the-counter drugs. It is safest to leave personal items at home.  Be sure to tell your doctor:   If you have any allergies.   If there s any chance you are pregnant.   If you are breastfeeding.   If you have any special needs.  You will have contrast for this exam. To prepare:   Do not eat or drink for 2 hours before your exam. If you need to take medicine, you may take it with small sips of water. (We may ask you to take liquid medicine as well.)   The day before your exam, drink extra fluids at least six 8-ounce glasses (unless your doctor tells you to restrict your fluids).  Patients over 70 or patients with diabetes or kidney problems:   If you haven t had a blood test (creatinine test) within the last 30 days, go to your clinic or Diagnostic Imaging Department for this test.  If you have diabetes:   If your kidney function is normal, continue taking your metformin (Avandamet, Glucophage, Glucovance, Metaglip) on the day of your exam.   If your kidney function is abnormal, wait 48 hours before restarting this medicine.  Please wear loose clothing, such as a sweat suit or jogging clothes. Avoid snaps, zippers and other metal. We may ask you to undress and put  "on a hospital gown.  If you have any questions, please call the Imaging Department where you will have your exam.            Mar 07, 2018 11:00 AM CST   Lab with  LAB   [facility])    [address] [place] [address]  [address] [facility] [id number] [phone]            Mar 07, 2018  1:15 PM CST   (Arrive by 1:00 PM)   Return Visit with Tejinder Tracy MD   [facility] ([facility])    [address] [place] [id number] [phone]              Who to contact     If you have questions or need follow up information about today's clinic visit or your schedule please contact [facility] directly at [phone].  Normal or non-critical lab and imaging results will be communicated to you by [employer], letter or phone within 4 business days after the clinic has received the results. If you do not hear from us within 7 days, please contact the clinic through MyChart or phone. If you have a critical or abnormal lab result, we will notify you by phone as soon as possible.  Submit refill requests through [employer] or call your pharmacy and they will forward the refill request to us. Please allow 3 business days for your refill to be completed.          Additional Information About Your Visit        [employer] Information     Sp[employer]it lets you send messages to your doctor, view your test results, renew your prescriptions, schedule appointments and more. To sign up, go to [email or web address] . Click on \"Log in\" on the left side of the screen, which will take you to the Welcome page. Then click on \"Sign up Now\" on the right side of the page.     You will be asked to enter the access code listed below, as well as some personal information. Please follow the directions to create your username and password.     Your access code is: [email or web address]  Expires: 2017  6:30 AM     Your access code will  in " 90 days. If you need help or a new code, please call your [facility] clinic or [phone].        Care EveryWhere ID     This is your Care EveryWhere ID. This could be used by other organizations to access your [facility] medical records  [id number]        Your Vitals Were     Pulse Temperature Respirations Pulse Oximetry BMI (Body Mass Index)       82 97.3  F (36.3  C) (Tympanic) 14 94% 28.5 kg/m2        Blood Pressure from Last 3 Encounters:   07/19/17 132/72   06/07/17 118/70   03/08/17 121/72    Weight from Last 3 Encounters:   07/19/17 169 lb (76.7 kg)   06/07/17 172 lb 12.8 oz (78.4 kg)   03/08/17 174 lb (78.9 kg)              Today, you had the following     No orders found for display       Primary Care Provider Office Phone # Fax #    Tila Dooley NORA Tyson [facility] [phone]       [place] [address] [place] [address]        Equal Access to Services     [facility]: Hadii aad ku hadasho Soomaali, waaxda luqadaha, qaybta kaalmada adeegyada, waxay idiin hayjean-claude rodriguez . So [facility] [phone].    ATENCIÓN: Si habla español, tiene a ervin disposición servicios gratuitos de asistencia lingüística. Llame al [phone].    We comply with applicable federal civil rights laws and [place] laws. We do not discriminate on the basis of race, color, national origin, age, disability sex, sexual orientation or gender identity.            Thank you!     Thank you for choosing [facility]  for your care. Our goal is always to provide you with excellent care. Hearing back from our patients is one way we can continue to improve our services. Please take a few minutes to complete the written survey that you may receive in the mail after your visit with us. Thank you!             Your Updated Medication List - Protect others around you: Learn how to safely use, store and throw away your medicines at www.disposemymeds.org.          This list is accurate as of:  7/19/17  4:48 PM.  Always use your most recent med list.                   Brand Name Dispense Instructions for use Diagnosis    gabapentin 300 MG capsule    NEURONTIN    180 capsule    Take 1-2 capsules (300-600 mg) by mouth every evening as needed    Chronic bilateral low back pain, with sciatica presence unspecified       levothyroxine 75 MCG tablet    SYNTHROID/LEVOTHROID    90 tablet    Take 1 tablet (75 mcg) by mouth daily    Other specified hypothyroidism, Hypothyroidism, unspecified type, Malignant neoplasm of tonsil (H), Hyperlipidemia LDL goal <130       MAGNESIUM OXIDE PO      Take 250 mg by mouth daily        order for [place]      Respironics Remstar 60 series Auto CPAP 6-15 cm H2O    MARGIE (obstructive sleep apnea)       PARoxetine 20 MG tablet    PAXIL    90 tablet    Take 1 tablet (20 mg) by mouth At Bedtime    Major depressive disorder, recurrent episode, moderate (H)       RESTASIS 0.05 % ophthalmic emulsion   Generic drug:  cycloSPORINE      Place 1 drop into both eyes 2 times daily        simvastatin 40 MG tablet    ZOCOR    90 tablet    Take 1 tablet (40 mg) by mouth At Bedtime    Hyperlipidemia LDL goal <130       traZODone 100 MG tablet    DESYREL    90 tablet    TAKE 1 TABLET (100 MG) NIGHTLY AS NEEDED FOR SLEEP    Primary insomnia       vitamin B complex with vitamin C Tabs tablet    STRESS TAB     Take 1 tablet by mouth daily.        VITAMIN D (CHOLECALCIFEROL) PO      Take 2,000 Units by mouth daily.        zinc sulfate 220 (50 ZN) MG capsule    ZINCATE     Take 220 mg by mouth daily

## 2017-07-19 NOTE — NURSING NOTE
"Chief Complaint   Patient presents with     Sinus Problem     3 days     Choking     off and on        Initial There were no vitals taken for this visit. Estimated body mass index is 29.14 kg/(m^2) as calculated from the following:    Height as of 6/7/17: 5' 4.57\" (1.64 m).    Weight as of 6/7/17: 172 lb 12.8 oz (78.4 kg).  Medication Reconciliation: complete    Health Maintenance that is potentially due pending provider review:  NONE    n/a    "

## 2017-08-01 ENCOUNTER — OFFICE VISIT (OUTPATIENT)
Dept: FAMILY MEDICINE | Facility: CLINIC | Age: 72
End: 2017-08-01
Payer: COMMERCIAL

## 2017-08-01 VITALS
TEMPERATURE: 96.5 F | OXYGEN SATURATION: 97 % | SYSTOLIC BLOOD PRESSURE: 124 MMHG | BODY MASS INDEX: 28.13 KG/M2 | WEIGHT: 166.8 LBS | RESPIRATION RATE: 24 BRPM | DIASTOLIC BLOOD PRESSURE: 66 MMHG | HEART RATE: 87 BPM

## 2017-08-01 DIAGNOSIS — R10.30 LOWER ABDOMINAL PAIN: ICD-10-CM

## 2017-08-01 DIAGNOSIS — G47.33 OSA (OBSTRUCTIVE SLEEP APNEA): ICD-10-CM

## 2017-08-01 DIAGNOSIS — F33.1 MAJOR DEPRESSIVE DISORDER, RECURRENT EPISODE, MODERATE (H): Primary | ICD-10-CM

## 2017-08-01 DIAGNOSIS — R32 URINARY INCONTINENCE, UNSPECIFIED TYPE: ICD-10-CM

## 2017-08-01 LAB
ALBUMIN UR-MCNC: 30 MG/DL
APPEARANCE UR: CLEAR
BASOPHILS # BLD AUTO: 0 10E9/L (ref 0–0.2)
BASOPHILS NFR BLD AUTO: 0.3 %
BILIRUB UR QL STRIP: ABNORMAL
COLOR UR AUTO: ABNORMAL
DIFFERENTIAL METHOD BLD: ABNORMAL
EOSINOPHIL # BLD AUTO: 0.1 10E9/L (ref 0–0.7)
EOSINOPHIL NFR BLD AUTO: 0.9 %
ERYTHROCYTE [DISTWIDTH] IN BLOOD BY AUTOMATED COUNT: 14.2 % (ref 10–15)
GLUCOSE UR STRIP-MCNC: NEGATIVE MG/DL
HCT VFR BLD AUTO: 41.6 % (ref 35–47)
HGB BLD-MCNC: 13.2 G/DL (ref 11.7–15.7)
HGB UR QL STRIP: NEGATIVE
HYALINE CASTS #/AREA URNS LPF: ABNORMAL /LPF (ref 0–2)
KETONES UR STRIP-MCNC: 15 MG/DL
LEUKOCYTE ESTERASE UR QL STRIP: NEGATIVE
LYMPHOCYTES # BLD AUTO: 0.8 10E9/L (ref 0.8–5.3)
LYMPHOCYTES NFR BLD AUTO: 7.9 %
MCH RBC QN AUTO: 29.3 PG (ref 26.5–33)
MCHC RBC AUTO-ENTMCNC: 31.7 G/DL (ref 31.5–36.5)
MCV RBC AUTO: 92 FL (ref 78–100)
MONOCYTES # BLD AUTO: 0.5 10E9/L (ref 0–1.3)
MONOCYTES NFR BLD AUTO: 5.1 %
MUCOUS THREADS #/AREA URNS LPF: PRESENT /LPF
NEUTROPHILS # BLD AUTO: 9 10E9/L (ref 1.6–8.3)
NEUTROPHILS NFR BLD AUTO: 85.8 %
NITRATE UR QL: NEGATIVE
NON-SQ EPI CELLS #/AREA URNS LPF: ABNORMAL /LPF
PH UR STRIP: 6 PH (ref 5–7)
PLATELET # BLD AUTO: 237 10E9/L (ref 150–450)
RBC # BLD AUTO: 4.51 10E12/L (ref 3.8–5.2)
RBC #/AREA URNS AUTO: ABNORMAL /HPF (ref 0–2)
SP GR UR STRIP: 1.02 (ref 1–1.03)
URN SPEC COLLECT METH UR: ABNORMAL
UROBILINOGEN UR STRIP-ACNC: 1 EU/DL (ref 0.2–1)
WBC # BLD AUTO: 10.5 10E9/L (ref 4–11)
WBC #/AREA URNS AUTO: ABNORMAL /HPF (ref 0–2)

## 2017-08-01 PROCEDURE — 81001 URINALYSIS AUTO W/SCOPE: CPT | Performed by: NURSE PRACTITIONER

## 2017-08-01 PROCEDURE — 99214 OFFICE O/P EST MOD 30 MIN: CPT | Performed by: NURSE PRACTITIONER

## 2017-08-01 PROCEDURE — 36415 COLL VENOUS BLD VENIPUNCTURE: CPT | Performed by: NURSE PRACTITIONER

## 2017-08-01 PROCEDURE — 80053 COMPREHEN METABOLIC PANEL: CPT | Performed by: NURSE PRACTITIONER

## 2017-08-01 PROCEDURE — 85025 COMPLETE CBC W/AUTO DIFF WBC: CPT | Performed by: NURSE PRACTITIONER

## 2017-08-01 RX ORDER — PAROXETINE 20 MG/1
20 TABLET, FILM COATED ORAL AT BEDTIME
Qty: 90 TABLET | Refills: 3 | Status: SHIPPED | OUTPATIENT
Start: 2017-08-01 | End: 2018-08-05

## 2017-08-01 ASSESSMENT — ANXIETY QUESTIONNAIRES
2. NOT BEING ABLE TO STOP OR CONTROL WORRYING: SEVERAL DAYS
3. WORRYING TOO MUCH ABOUT DIFFERENT THINGS: MORE THAN HALF THE DAYS
GAD7 TOTAL SCORE: 4
5. BEING SO RESTLESS THAT IT IS HARD TO SIT STILL: NOT AT ALL
1. FEELING NERVOUS, ANXIOUS, OR ON EDGE: NOT AT ALL
7. FEELING AFRAID AS IF SOMETHING AWFUL MIGHT HAPPEN: NOT AT ALL
6. BECOMING EASILY ANNOYED OR IRRITABLE: NOT AT ALL

## 2017-08-01 ASSESSMENT — PATIENT HEALTH QUESTIONNAIRE - PHQ9: 5. POOR APPETITE OR OVEREATING: SEVERAL DAYS

## 2017-08-01 NOTE — PATIENT INSTRUCTIONS
Schedule your abdominal US by calling the main clinic number- they will transfer you to radiology    Schedule your appointment with the Sleep clinic.

## 2017-08-01 NOTE — PROGRESS NOTES
SUBJECTIVE:                                                    Ingrid Powell is a 71 year old female who presents to clinic today for the following health issues:      Medication Followup of Cpap    Taking Medication as prescribed: NO-patient can't stand to wear it so has not been using    Side Effects:  Not using    Medication Helping Symptoms:  not applicable       Depression Followup    Status since last visit: Stable     See PHQ-9 for current symptoms.  Other associated symptoms: fatigue and constipation(per patient the constipation has been a lifetime)    Complicating factors:   Significant life event:  No   Current substance abuse:  None  Anxiety or Panic symptoms:  No    PHQ-9  English  PHQ-9   Any Language      Amount of exercise or physical activity: occasional walking    Problems taking medications regularly: No    Medication side effects: none  Diet: regular (no restrictions)    Gastrointestinal symptoms  Vitals:    08/01/17 1058   Weight: 166 lb 12.8 oz (75.7 kg)     Wt Readings from Last 4 Encounters:   08/01/17 166 lb 12.8 oz (75.7 kg)   07/19/17 169 lb (76.7 kg)   06/07/17 172 lb 12.8 oz (78.4 kg)   03/08/17 174 lb (78.9 kg)       Duration: 2 months    Description:   ABDOMINAL PAIN - mid upper abd, right and left  lower quadrant and ishan-umbilical area.          Intensity:  moderate    Accompanying signs and symptoms:  constipation and urine leakage    History  Previous {similar problem: YES- chronic constipation most of time.   Never has regular bowel movement , small amts when urinates. If takes laxative, then empties.   Previous evaluation:  colonoscopy    Aggravating factors: none    Alleviating factors: takes a laxative and then has more cramping but has BM    Other Therapies tried: laxative after 1 week of no good BM    Pain is like menstrual cramps, aching. intermittent during day. Does not find it tied to any specific foods,   Pain upon arising, then resolves with breakfast. Then prior to  lunch acts up. Doesn't always resolve with food.   Wonders if some stool comes out through urethra- urine brown color.   Having urinary incontinence on occasion  Choked on meat x2 this summer. Needed heimlich to dislodge.   Grandmother rip colon and rectal ca    Musculoskeletal problem/pain      Duration: 6 months    Description  Location: all of joints    Intensity:  moderate    Accompanying signs and symptoms: none    History  Previous similar problem: YES- had osteoarthritis in knees before has kinee replacements  Previous evaluation:  none    Precipitating or alleviating factors:  Trauma or overuse: no   Aggravating factors include: lifting  Therapies tried and outcome: heat only some help      Problem list and histories reviewed & adjusted, as indicated.  Additional history: as documented    Reviewed and updated as needed this visit by clinical staffTobacco  Allergies  Meds  Problems       Reviewed and updated as needed this visit by Provider  Allergies  Meds  Problems         ROS:  Constitutional, HEENT, cardiovascular, pulmonary, GI, , musculoskeletal, neuro, skin, endocrine and psych systems are negative, except as otherwise noted.      OBJECTIVE:   /66 (BP Location: Right arm, Patient Position: Chair, Cuff Size: Adult Regular)  Pulse 87  Temp 96.5  F (35.8  C)  Resp 24  Wt 166 lb 12.8 oz (75.7 kg)  SpO2 97%  BMI 28.13 kg/m2  Body mass index is 28.13 kg/(m^2).  GENERAL: healthy, alert and no distress  NECK: no adenopathy, no asymmetry, masses, or scars and thyroid normal to palpation  RESP: lungs clear to auscultation - no rales, rhonchi or wheezes  CV: regular rate and rhythm, normal S1 S2, no S3 or S4, no murmur, click or rub, no peripheral edema and peripheral pulses strong  ABDOMEN: no pain with deep palpation. soft, nontender, no hepatosplenomegaly, no masses and bowel sounds normal  MS: no gross musculoskeletal defects noted, no edema  PSYCH: mentation appears normal, affect  normal/bright    Diagnostic Test Results:  Results for orders placed or performed in visit on 08/01/17   *UA reflex to Microscopic and Culture ([place] and [facility] (except [place] and [place])   Result Value Ref Range    Color Urine Orange     Appearance Urine Clear     Glucose Urine Negative NEG mg/dL    Bilirubin Urine (A) NEG     Small  This is an unconfirmed screening test result. A positive result may be false.      Ketones Urine 15 (A) NEG mg/dL    Specific Gravity Urine 1.025 1.003 - 1.035    Blood Urine Negative NEG    pH Urine 6.0 5.0 - 7.0 pH    Protein Albumin Urine 30 (A) NEG mg/dL    Urobilinogen Urine 1.0 0.2 - 1.0 EU/dL    Nitrite Urine Negative NEG    Leukocyte Esterase Urine Negative NEG    Source Midstream Urine    Urine Microscopic   Result Value Ref Range    WBC Urine O - 2 0 - 2 /HPF    RBC Urine O - 2 0 - 2 /HPF    Hyaline Casts 5-10 (A) 0 - 2 /LPF    Squamous Epithelial /LPF Urine Few FEW /LPF    Mucous Urine Present (A) NEG /LPF   CBC with platelets differential   Result Value Ref Range    WBC 10.5 4.0 - 11.0 10e9/L    RBC Count 4.51 3.8 - 5.2 10e12/L    Hemoglobin 13.2 11.7 - 15.7 g/dL    Hematocrit 41.6 35.0 - 47.0 %    MCV 92 78 - 100 fl    MCH 29.3 26.5 - 33.0 pg    MCHC 31.7 31.5 - 36.5 g/dL    RDW 14.2 10.0 - 15.0 %    Platelet Count 237 150 - 450 10e9/L    Diff Method Automated Method     % Neutrophils 85.8 %    % Lymphocytes 7.9 %    % Monocytes 5.1 %    % Eosinophils 0.9 %    % Basophils 0.3 %    Absolute Neutrophil 9.0 (H) 1.6 - 8.3 10e9/L    Absolute Lymphocytes 0.8 0.8 - 5.3 10e9/L    Absolute Monocytes 0.5 0.0 - 1.3 10e9/L    Absolute Eosinophils 0.1 0.0 - 0.7 10e9/L    Absolute Basophils 0.0 0.0 - 0.2 10e9/L   Comprehensive metabolic panel (BMP + Alb, Alk Phos, ALT, AST, Total. Bili, TP)   Result Value Ref Range    Sodium 138 133 - 144 mmol/L    Potassium 4.8 3.4 - 5.3 mmol/L    Chloride 103 94 - 109 mmol/L    Carbon Dioxide 30 20 - 32 mmol/L    Anion Gap 5 3 - 14  mmol/L    Glucose 89 70 - 99 mg/dL    Urea Nitrogen 18 7 - 30 mg/dL    Creatinine 0.78 0.52 - 1.04 mg/dL    GFR Estimate 73 >60 mL/min/1.7m2    GFR Estimate If Black 88 >60 mL/min/1.7m2    Calcium 9.7 8.5 - 10.1 mg/dL    Bilirubin Total 0.3 0.2 - 1.3 mg/dL    Albumin 3.8 3.4 - 5.0 g/dL    Protein Total 7.3 6.8 - 8.8 g/dL    Alkaline Phosphatase 95 40 - 150 U/L    ALT 27 0 - 50 U/L    AST 25 0 - 45 U/L       ASSESSMENT/PLAN:     ASSESSMENT:  1. Major depressive disorder, recurrent episode, moderate (H)    2. Urinary incontinence    3. Lower abdominal pain    4. MARGIE (obstructive sleep apnea)        PLAN:  Orders Placed This Encounter     US Abdomen Complete     *UA reflex to Microscopic and Culture ([place] and [facility] (except [place] and [place])     Urine Microscopic     CBC with platelets differential     Comprehensive metabolic panel (BMP + Alb, Alk Phos, ALT, AST, Total. Bili, TP)     SLEEP EVALUATION & MANAGEMENT REFERRAL - ADULT     PARoxetine (PAXIL) 20 MG tablet       Patient Instructions   Schedule your abdominal US by calling the main clinic number- they will transfer you to radiology    Schedule your appointment with the Sleep clinic.    Tila Tyson, NP, APRN [facility]

## 2017-08-01 NOTE — MR AVS SNAPSHOT
After Visit Summary   8/1/2017    Ingrid Powell    MRN: [id number]           Patient Information     Date Of Birth          1945        Visit Information        Provider Department      8/1/2017 11:00 AM Tila Tyson APRN CNP [facility]        Today's Diagnoses     Urinary incontinence    -  1    Major depressive disorder, recurrent episode, moderate (H)        Lower abdominal pain        MARGIE (obstructive sleep apnea)          Care Instructions    Schedule your abdominal US by calling the main clinic number- they will transfer you to radiology    Schedule your appointment with the Sleep clinic.          Follow-ups after your visit        Additional Services     SLEEP EVALUATION & MANAGEMENT REFERRAL - ADULT       Please be aware that coverage of these services is subject to the terms and limitations of your health insurance plan.  Call member services at your health plan with any benefit or coverage questions.      Please bring the following to your appointment:    >>   List of current medications   >>   This referral request   >>   Any documents/labs given to you for this referral    [facility] / Lab  Ph [phone] (Age 2 and up)                  Your next 10 appointments already scheduled     Mar 07, 2018 10:20 AM CST   (Arrive by 10:05 AM)   CT SOFT TISSUE NECK W CONTRAST with UCCT1   [facility] ([facility])    [address] [address] [phone]           Please bring any scans or X-rays taken at other hospitals, if similar tests were done. Also bring a list of your medicines, including vitamins, minerals and over-the-counter drugs. It is safest to leave personal items at home.  Be sure to tell your doctor:   If you have any allergies.   If there s any chance you are pregnant.   If you are breastfeeding.   If you have any special needs.  You will have contrast for this  exam. To prepare:   Do not eat or drink for 2 hours before your exam. If you need to take medicine, you may take it with small sips of water. (We may ask you to take liquid medicine as well.)   The day before your exam, drink extra fluids at least six 8-ounce glasses (unless your doctor tells you to restrict your fluids).  Patients over 70 or patients with diabetes or kidney problems:   If you haven t had a blood test (creatinine test) within the last 30 days, go to your clinic or Diagnostic Imaging Department for this test.  If you have diabetes:   If your kidney function is normal, continue taking your metformin (Avandamet, Glucophage, Glucovance, Metaglip) on the day of your exam.   If your kidney function is abnormal, wait 48 hours before restarting this medicine.  Please wear loose clothing, such as a sweat suit or jogging clothes. Avoid snaps, zippers and other metal. We may ask you to undress and put on a hospital gown.  If you have any questions, please call the Imaging Department where you will have your exam.            Mar 07, 2018 10:40 AM CST   (Arrive by 10:25 AM)   CT CHEST W CONTRAST with UCCT1   [facility] CT ([facility])    [address] [facility]  [address] [facility] [address] [phone]           Please bring any scans or X-rays taken at other hospitals, if similar tests were done. Also bring a list of your medicines, including vitamins, minerals and over-the-counter drugs. It is safest to leave personal items at home.  Be sure to tell your doctor:   If you have any allergies.   If there s any chance you are pregnant.   If you are breastfeeding.   If you have any special needs.  You will have contrast for this exam. To prepare:   Do not eat or drink for 2 hours before your exam. If you need to take medicine, you may take it with small sips of water. (We may ask you to take liquid medicine as well.)   The day before your exam, drink extra fluids at  least six 8-ounce glasses (unless your doctor tells you to restrict your fluids).  Patients over 70 or patients with diabetes or kidney problems:   If you haven t had a blood test (creatinine test) within the last 30 days, go to your clinic or Diagnostic Imaging Department for this test.  If you have diabetes:   If your kidney function is normal, continue taking your metformin (Avandamet, Glucophage, Glucovance, Metaglip) on the day of your exam.   If your kidney function is abnormal, wait 48 hours before restarting this medicine.  Please wear loose clothing, such as a sweat suit or jogging clothes. Avoid snaps, zippers and other metal. We may ask you to undress and put on a hospital gown.  If you have any questions, please call the Imaging Department where you will have your exam.            Mar 07, 2018 11:00 AM CST   Lab with  LAB   [facility] ([facility])    [address] [place] [id number] [phone]            Mar 07, 2018  1:15 PM CST   (Arrive by 1:00 PM)   Return Visit with Tejinder Tracy MD   [facility] ([facility])    [address] [place] [id number] [phone]              Future tests that were ordered for you today     Open Future Orders        Priority Expected Expires Ordered    SLEEP EVALUATION & MANAGEMENT REFERRAL - ADULT Routine  8/1/2018 8/1/2017    US Abdomen Complete Routine  8/1/2018 8/1/2017            Who to contact     If you have questions or need follow up information about today's clinic visit or your schedule please contact [facility] directly at [phone].  Normal or non-critical lab and imaging results will be communicated to you by MyChart, letter or phone within 4 business days after the clinic has received the results. If you do not hear from us within 7 days, please contact the clinic through MyChart or phone. If you have a  "critical or abnormal lab result, we will notify you by phone as soon as possible.  Submit refill requests through [employer] or call your pharmacy and they will forward the refill request to us. Please allow 3 business days for your refill to be completed.          Additional Information About Your Visit        MyChart Information     [employer] lets you send messages to your doctor, view your test results, renew your prescriptions, schedule appointments and more. To sign up, go to [email or web address] . Click on \"Log in\" on the left side of the screen, which will take you to the Welcome page. Then click on \"Sign up Now\" on the right side of the page.     You will be asked to enter the access code listed below, as well as some personal information. Please follow the directions to create your username and password.     Your access code is: [email or web address]  Expires: 2017  6:30 AM     Your access code will  in 90 days. If you need help or a new code, please call your [facility] clinic or [phone].        Care EveryWhere ID     This is your Care EveryWhere ID. This could be used by other organizations to access your [facility] medical records  [id number]        Your Vitals Were     Pulse Temperature Respirations Pulse Oximetry BMI (Body Mass Index)       87 96.5  F (35.8  C) 24 97% 28.13 kg/m2        Blood Pressure from Last 3 Encounters:   17 124/66   17 132/72   17 118/70    Weight from Last 3 Encounters:   17 166 lb 12.8 oz (75.7 kg)   17 169 lb (76.7 kg)   17 172 lb 12.8 oz (78.4 kg)              We Performed the Following     *UA reflex to Microscopic and Culture ([facility] and [facility] (except [place] and [place])     CBC with platelets differential     Comprehensive metabolic panel (BMP + Alb, Alk Phos, ALT, AST, Total. Bili, TP)     Urine Microscopic          Where to get your medicines      These medications were sent to [facility] [place], [place] - [address]  [address], [facility] [address]     Phone:  [phone]     PARoxetine 20 MG tablet          Primary Care Provider Office Phone # Fax #    NORA Kaplan [facility] [phone]       [facility] [address] [facility] [address]        Equal Access to Services     KO CHAVEZ : Hadii aad ku hadasho Soomaali, waaxda luqadaha, qaybta kaalmada adeegyada, waxay idiin hayaan adeeg kharash la'aan ah. So [facility] [phone].    ATENCIÓN: Si habla español, tiene a ervin disposición servicios gratuitos de asistencia lingüística. Scot al [phone].    We comply with applicable federal civil rights laws and [place] laws. We do not discriminate on the basis of race, color, national origin, age, disability sex, sexual orientation or gender identity.            Thank you!     Thank you for choosing [facility]  for your care. Our goal is always to provide you with excellent care. Hearing back from our patients is one way we can continue to improve our services. Please take a few minutes to complete the written survey that you may receive in the mail after your visit with us. Thank you!             Your Updated Medication List - Protect others around you: Learn how to safely use, store and throw away your medicines at www.disposemymeds.org.          This list is accurate as of: 8/1/17 12:27 PM.  Always use your most recent med list.                   Brand Name Dispense Instructions for use Diagnosis    gabapentin 300 MG capsule    NEURONTIN    180 capsule    Take 1-2 capsules (300-600 mg) by mouth every evening as needed    Chronic bilateral low back pain, with sciatica presence unspecified       levothyroxine 75 MCG tablet    SYNTHROID/LEVOTHROID    90 tablet    Take 1 tablet (75 mcg) by mouth daily    Other specified hypothyroidism, Hypothyroidism, unspecified type, Malignant neoplasm of tonsil (H), Hyperlipidemia LDL goal <130        MAGNESIUM OXIDE PO      Take 250 mg by mouth daily        order for DME      Respironics Remstar 60 series Auto CPAP 6-15 cm H2O    MARGIE (obstructive sleep apnea)       PARoxetine 20 MG tablet    PAXIL    90 tablet    Take 1 tablet (20 mg) by mouth At Bedtime    Major depressive disorder, recurrent episode, moderate (H)       RESTASIS 0.05 % ophthalmic emulsion   Generic drug:  cycloSPORINE      Place 1 drop into both eyes 2 times daily        simvastatin 40 MG tablet    ZOCOR    90 tablet    Take 1 tablet (40 mg) by mouth At Bedtime    Hyperlipidemia LDL goal <130       traZODone 100 MG tablet    DESYREL    90 tablet    TAKE 1 TABLET (100 MG) NIGHTLY AS NEEDED FOR SLEEP    Primary insomnia       vitamin B complex with vitamin C Tabs tablet    STRESS TAB     Take 1 tablet by mouth daily.        VITAMIN D (CHOLECALCIFEROL) PO      Take 2,000 Units by mouth daily.        zinc sulfate 220 (50 ZN) MG capsule    ZINCATE     Take 220 mg by mouth daily

## 2017-08-01 NOTE — NURSING NOTE
"Chief Complaint   Patient presents with     Gastrointestinal Problem     Joint Pain       Initial There were no vitals taken for this visit. Estimated body mass index is 28.5 kg/(m^2) as calculated from the following:    Height as of 6/7/17: 5' 4.57\" (1.64 m).    Weight as of 7/19/17: 169 lb (76.7 kg).  Medication Reconciliation: complete      Health Maintenance that is potentially due pending provider review:  PHQ9    Possibly completing today per provider review.  "

## 2017-08-02 LAB
ALBUMIN SERPL-MCNC: 3.8 G/DL (ref 3.4–5)
ALP SERPL-CCNC: 95 U/L (ref 40–150)
ALT SERPL W P-5'-P-CCNC: 27 U/L (ref 0–50)
ANION GAP SERPL CALCULATED.3IONS-SCNC: 5 MMOL/L (ref 3–14)
AST SERPL W P-5'-P-CCNC: 25 U/L (ref 0–45)
BILIRUB SERPL-MCNC: 0.3 MG/DL (ref 0.2–1.3)
BUN SERPL-MCNC: 18 MG/DL (ref 7–30)
CALCIUM SERPL-MCNC: 9.7 MG/DL (ref 8.5–10.1)
CHLORIDE SERPL-SCNC: 103 MMOL/L (ref 94–109)
CO2 SERPL-SCNC: 30 MMOL/L (ref 20–32)
CREAT SERPL-MCNC: 0.78 MG/DL (ref 0.52–1.04)
GFR SERPL CREATININE-BSD FRML MDRD: 73 ML/MIN/1.7M2
GLUCOSE SERPL-MCNC: 89 MG/DL (ref 70–99)
POTASSIUM SERPL-SCNC: 4.8 MMOL/L (ref 3.4–5.3)
PROT SERPL-MCNC: 7.3 G/DL (ref 6.8–8.8)
SODIUM SERPL-SCNC: 138 MMOL/L (ref 133–144)

## 2017-08-02 ASSESSMENT — PATIENT HEALTH QUESTIONNAIRE - PHQ9: SUM OF ALL RESPONSES TO PHQ QUESTIONS 1-9: 10

## 2017-08-02 ASSESSMENT — ANXIETY QUESTIONNAIRES: GAD7 TOTAL SCORE: 4

## 2017-08-03 ENCOUNTER — RADIANT APPOINTMENT (OUTPATIENT)
Dept: ULTRASOUND IMAGING | Facility: CLINIC | Age: 72
End: 2017-08-03
Attending: NURSE PRACTITIONER
Payer: COMMERCIAL

## 2017-08-03 DIAGNOSIS — R10.30 LOWER ABDOMINAL PAIN: ICD-10-CM

## 2017-08-03 PROCEDURE — 76700 US EXAM ABDOM COMPLETE: CPT

## 2017-08-08 ENCOUNTER — TELEPHONE (OUTPATIENT)
Dept: FAMILY MEDICINE | Facility: CLINIC | Age: 72
End: 2017-08-08

## 2017-08-08 NOTE — TELEPHONE ENCOUNTER
Patient stopped into clinic and states, never got any results. [facility] was rooming scheduled patients and did not have the time to look up tests at the time. Please call patient with test lab and imaging results. (including US report) Marybeth Tyson NP has not commented on US yet.

## 2017-08-09 NOTE — TELEPHONE ENCOUNTER
8/8/17. Patient called with results of US and labs. Patient tells me she has not had a BM in almost 2 weeks. She did not tell me this at last appointment. When she takes a laxative she is able to empty but has been hesitant to take over past several days as she has had daytime social commitments and is nervous about lax. Instructed to take lax and if no relief to call me. Tila Tyson RNC, NP  [facility]

## 2017-08-11 ENCOUNTER — TELEPHONE (OUTPATIENT)
Dept: FAMILY MEDICINE | Facility: CLINIC | Age: 72
End: 2017-08-11

## 2017-08-11 ENCOUNTER — APPOINTMENT (OUTPATIENT)
Dept: GENERAL RADIOLOGY | Facility: CLINIC | Age: 72
End: 2017-08-11
Attending: EMERGENCY MEDICINE
Payer: MEDICARE

## 2017-08-11 ENCOUNTER — HOSPITAL ENCOUNTER (EMERGENCY)
Facility: CLINIC | Age: 72
Discharge: HOME | End: 2017-08-11
Attending: EMERGENCY MEDICINE | Admitting: EMERGENCY MEDICINE
Payer: MEDICARE

## 2017-08-11 VITALS — TEMPERATURE: 97.8 F | OXYGEN SATURATION: 95 % | DIASTOLIC BLOOD PRESSURE: 75 MMHG | SYSTOLIC BLOOD PRESSURE: 117 MMHG

## 2017-08-11 DIAGNOSIS — K59.00 CONSTIPATION, UNSPECIFIED CONSTIPATION TYPE: ICD-10-CM

## 2017-08-11 PROCEDURE — 74020 XR ABDOMEN 2 VW: CPT

## 2017-08-11 PROCEDURE — 99283 EMERGENCY DEPT VISIT LOW MDM: CPT

## 2017-08-11 PROCEDURE — 99283 EMERGENCY DEPT VISIT LOW MDM: CPT | Performed by: EMERGENCY MEDICINE

## 2017-08-11 ASSESSMENT — ENCOUNTER SYMPTOMS
CONSTIPATION: 1
RESPIRATORY NEGATIVE: 1
CARDIOVASCULAR NEGATIVE: 1
ENDOCRINE NEGATIVE: 1
NEUROLOGICAL NEGATIVE: 1
ALLERGIC/IMMUNOLOGIC NEGATIVE: 1
MUSCULOSKELETAL NEGATIVE: 1
CONSTITUTIONAL NEGATIVE: 1
HEMATOLOGIC/LYMPHATIC NEGATIVE: 1
PSYCHIATRIC NEGATIVE: 1
EYES NEGATIVE: 1

## 2017-08-11 NOTE — ED NOTES
Pt is back from x-ray, she is a/o x 4, has no discomfort or distress. Sister at bedside, call light in reach monitor

## 2017-08-11 NOTE — ED NOTES
Has not had a normal BM in two weeks, has only been passing small amounts on a daily base. Started with Maalox last noc with no help, also has been taking a stool softener with no help. Pt has HX of constipation. Pt denies abd pain, n/v, fevers. Was DX with prolapse colon 8 years ago with no complications. Pt is eating and drinking normal, has no distress.

## 2017-08-11 NOTE — ED PROVIDER NOTES
History     Chief Complaint   Patient presents with     Constipation     no real BM for 2 weeks, concerned is due to prolapsed bowel     HPI  Ingrid Powell is a 71 year old female who has a history of constipation, prolapsed rectum s/p surgical intervention in 2010, adenomatous polyp in colon, diverticulitis of the sigmoid colon, hypertension, hyperlipidemia, and internal hemorrhoids who presents to the ED for evaluation of constipation. Patient reports that she has not had a bowel movement in 2 weeks. She has been using stool softeners and Maalox daily without a bowel movement. She has had a history of constipation. She has never had a disimpaction of stool. Her last colonoscopy was on 09/07/2016 at [facility] that showed diverticulitis of the sigmoid colon, otherwise normal. She is currently taking paroxetine, simvastatin, levothyroxine, and gabapentin.    Social History: She lives in  [place], [place]. She is here in the ED via private car with her sister, Radha.     Past Medical History:  Past Medical History:   Diagnosis Date     Arthritis      Depressive disorder      Gastroesophageal reflux disease      History of lumbar surgery 7/28/2015     Hoarseness      Oxygen dependent     at night     Reduced vision      Sleep apnea        Medications:  Current Outpatient Prescriptions   Medication Sig Dispense Refill     TURMERIC PO Take 1 tablet by mouth daily       PARoxetine (PAXIL) 20 MG tablet Take 1 tablet (20 mg) by mouth At Bedtime 90 tablet 3     traZODone (DESYREL) 100 MG tablet TAKE 1 TABLET (100 MG) NIGHTLY AS NEEDED FOR SLEEP 90 tablet 1     levothyroxine (SYNTHROID/LEVOTHROID) 75 MCG tablet Take 1 tablet (75 mcg) by mouth daily 90 tablet 3     gabapentin (NEURONTIN) 300 MG capsule Take 1-2 capsules (300-600 mg) by mouth every evening as needed 180 capsule 3     MAGNESIUM OXIDE PO Take 250 mg by mouth daily        zinc sulfate (ZINCATE) 220 MG capsule Take 220 mg by mouth daily       simvastatin  (ZOCOR) 40 MG tablet Take 1 tablet (40 mg) by mouth At Bedtime 90 tablet 3     RESTASIS 0.05 % ophthalmic emulsion Place 1 drop into both eyes 2 times daily       B Complex Vitamins (VITAMIN  B COMPLEX) tablet Take 1 tablet by mouth daily.         VITAMIN D, CHOLECALCIFEROL, PO Take 2,000 Units by mouth daily.         ORDER FOR [facility] Respironics Remstar 60 series Auto CPAP 6-15 cm H2O         Allergies:  Allergies   Allergen Reactions     Dilaudid [Hydromorphone Hcl] Other (See Comments)     hallucinations     Fosamax [Alendronate Sodium] Rash            Norvasc [Amlodipine Besylate] Hives and Rash     Tegretol [Carbamazepine] Hives and Rash       I have reviewed the Medications, Allergies, Past Medical and Surgical History, and Social History in the Epic system.    Review of Systems   Constitutional: Negative.    HENT: Negative.    Eyes: Negative.    Respiratory: Negative.    Cardiovascular: Negative.    Gastrointestinal: Positive for constipation.   Endocrine: Negative.    Genitourinary: Negative.    Musculoskeletal: Negative.    Skin: Negative.    Allergic/Immunologic: Negative.    Neurological: Negative.    Hematological: Negative.    Psychiatric/Behavioral: Negative.        Physical Exam   BP: 114/90  Heart Rate: 64  Temp: 97.8  F (36.6  C)  SpO2: 97 %  Physical Exam   Constitutional: She is oriented to person, place, and time. She appears well-developed and well-nourished. No distress.   HENT:   Head: Normocephalic and atraumatic.   Eyes: Conjunctivae and EOM are normal. Pupils are equal, round, and reactive to light. Right eye exhibits no discharge. Left eye exhibits no discharge. No scleral icterus.   Neck: Normal range of motion. Neck supple.   Cardiovascular: Normal rate and regular rhythm.  Exam reveals no gallop and no friction rub.    No murmur heard.  Pulmonary/Chest: Effort normal and breath sounds normal. She has no wheezes. She has no rales. She exhibits no tenderness.   Abdominal: Soft.    Neurological: She is alert and oriented to person, place, and time. She displays normal reflexes. No cranial nerve deficit. She exhibits normal muscle tone. Coordination normal.   Skin: No rash noted. She is not diaphoretic. No erythema. No pallor.   Psychiatric: She has a normal mood and affect. Her behavior is normal. Judgment and thought content normal.       ED Course     ED Course     Procedures             Critical Care time:  none               Labs Ordered and Resulted from Time of ED Arrival Up to the Time of Departure from the ED - No data to display  ED Medications: none      ED Vitals:  Vitals:    08/11/17 1124 08/11/17 1215 08/11/17 1230 08/11/17 1245   BP: 114/90 117/76 114/76 117/75   Temp: 97.8  F (36.6  C)      TempSrc: Oral      SpO2: 97% 95% 95% 95%       ED Labs and Imaging:  Results for orders placed or performed during the hospital encounter of 08/11/17 (from the past 24 hour(s))   Abdomen, flat/upright (2 views)    Narrative    XR ABDOMEN 2 VW 8/11/2017 12:01 PM    HISTORY: Constipation.    COMPARISON: None.    FINDINGS: Moderate stool is present in the colon. Upright view shows  one or two air-fluid levels in the right colon. No significantly  dilated small bowel loops are seen. There is no free peritoneal air.  Enlargement of the cardio-pericardial silhouette is present, probably  related to a pericardial effusion seen on recent CT.      Impression    IMPRESSION: Moderate stool in colon. Otherwise, nonspecific bowel gas  pattern.    JARED HOLT MD     11:28 AM Patient assessed.    Assessments & Plan (with Decision Making)   Clinical Impression: Pleasant 71-year-old female who presented for concern for constipation.  She reports she has not had a solid bowel movement for about 2 weeks.  She has trouble with constipation for a long time.  She reports she underwent a colonoscopy as an outpatient last year which does not show any acute process.  She does not recall where her colonoscopy was  completed but reports he was in the [facility].  She tells me is a family history of colorectal problems including colon cancer.  She did have surgery for rectal prolapse at [facility] about 8 years ago.  She tells me she is tried to take stool softeners as well as Maalox without significant bowel movement.  She is on simvastatin, Synthroid and paroxetine.  Chaperoned abdominal and rectal exam with Brunilda Florez RN-no rectal prolapse, no fecal impaction, and normal rectal tone.       ED Course and Plan:   X-ray of the abdomen was obtained to evaluate for stool ball in versus any obstructive process.  Digital rectal exam- did not show any concern for fecal impaction, normal rectal tone and no rectal prolapse appreciated.  X-ray of the abdomen was reviewed independently.  Please see radiologist's interpretation in detail reported above.  X-ray did show moderate amount of stool consistent with constipation but no other bowel gas abnormality.  She is discharged to home with a trial of magnesium citrate and/or milk of magnesia.  Reviewed appropriate dosing and usage.  I did recommend she return if she does not have good stool results in the next 72 hours.  We also discussed and reviewed reasons that would warrant emergent evaluation and assessment she expressed understanding.        Disclaimer: This note consists of symbols derived from keyboarding, dictation and/or voice recognition software. As a result, there may be errors in the script that have gone undetected. Please consider this when interpreting information found in this chart.      I have reviewed the nursing notes.    I have reviewed the findings, diagnosis, plan and need for follow up with the patient.       New Prescriptions    No medications on file       Final diagnoses:   Constipation, unspecified constipation type     This document serves as a record of the services and decisions personally performed and made by Devaughn Vazquez, *. The  HPI was created on his behalf by   Crystal Miranda, a trained medical scribe. The creation of this document is based the provider's statements to the medical scribe.  Crystal Miranda 11:27 AM 8/11/2017    Provider:   The information in this document, created by the medical scribe for me, accurately reflects the services I personally performed and the decisions made by me. I have reviewed and approved this document for accuracy prior to leaving the patient care area.  Devaughn Vazquez, Joe 11:27 AM 8/11/2017 8/11/2017   [facility] EMERGENCY DEPARTMENT     Devaughn Vazquez MD  08/11/17 1322

## 2017-08-11 NOTE — TELEPHONE ENCOUNTER
"Spoke to pt and discussed note below, pt has not had a BM, denies pain, or abd distention, pt is \" worried because I have colon cancer in my family I think I'm just going to go to the ER \" pt may end up going in today.  Vandana Tyson, Tila Dooley, APRN CNP        12:17 PM   Note      8/8/17. Patient called with results of US and labs. Patient tells me she has not had a BM in almost 2 weeks. She did not tell me this at last appointment. When she takes a laxative she is able to empty but has been hesitant to take over past several days as she has had daytime social commitments and is nervous about lax. Instructed to take lax and if no relief to call me. Tila Tyson RNC, NP  [facility]           "

## 2017-08-11 NOTE — ED AVS SNAPSHOT
[facility] Emergency Department    [address] [facility] [address]    Phone:  [phone]    Fax:  [phone]                                       Ingrid Powell   MRN: [id number]    Department:  [facility] Emergency Department   Date of Visit:  8/11/2017           Patient Information     Date Of Birth          1945        Your diagnoses for this visit were:     Constipation, unspecified constipation type        You were seen by Devaughn Vazquez MD.      Follow-up Information     Follow up with [facility] Emergency [facility].    Specialty:  EMERGENCY MEDICINE    Why:  Take a bottle of magnesium citrate for constipation. if no result after 24 hours take 30-60ml of milk of magnesia. If no result return to the ED    Contact information:    [address] [place] [address] [phone]    Additional information:    The medical center is located at   [address] [place] [address] (between [facility] and   [address], [place] north   of [place]).        Follow up with [facility] Emergency Department.    Specialty:  EMERGENCY MEDICINE    Why:  If symptoms worsen- including no result with bowel movements after trying one bottle of magnesium citrate and a bottle of milk of magnesia    Contact information:    [address] [facility] [address] [phone]    Additional information:    The medical center is located at   [address] [place] [address] (between [facility] and   [address], [place] north   of [place]).        Discharge Instructions         Treating Constipation    Constipation is a common and often uncomfortable problem. Constipation means you have bowel movements fewer than 3 times per week, or strain to pass hard, dry stool. It can last a short time. Or it can be a problem that never seems to go away. The good news is that it can often be treated and controlled.  Eat more fiber  One of the best ways to help treat constipation is  to increase your fiber intake. You can do this either through diet or by using fiber supplements. Fiber (in whole grains, fruits, and vegetables) adds bulk and absorbs water to soften the stool. This helps the stool pass through the colon more easily. When you increase your fiber intake, do it slowly to avoid side effects such as bloating. Also increase the amount of water that you drink. Eating more of the following foods can add fiber to your diet.    High-fiber cereals    Whole grains, bran, and brown rice    Vegetables such as carrots, broccoli, and greens    Fresh fruits (especially apples, pears, and dried fruits like raisins and apricots)    Nuts and legumes (especially beans such as lentils, kidney beans, and lima beans)  Get physically active  Exercise helps improve the working of your colon which helps ease constipation. Try to get some physical activity every day. If you haven t been active for a while, talk to your healthcare provider before starting again.  Laxatives  Your healthcare provider may suggest an over-the-counter product to help ease your constipation. He or she may suggest the use of bulk-forming agents or laxatives. The use of laxatives, if used as directed, is common and safe. Follow directions carefully when using them. See your healthcare provider for new-onset constipation, or long-term constipation, to rule out other causes such as medicines or thyroid disease.  Date Last Reviewed: 7/1/2016 2000-2017 [employer]. [address] [place] [address], [place] [address]. All rights reserved. This information is not intended as a substitute for professional medical care. Always follow your healthcare professional's instructions.          Eating a High-Fiber Diet  Fiber is what gives strength and structure to plants. Most grains, beans, vegetables, and fruits contain fiber. Foods rich in fiber are often low in calories and fat, and they fill you up more. They may also reduce your risks  for certain health problems. To find out the amount of fiber in canned, packaged, or frozen foods, read the Nutrition Facts label. It tells you how much fiber is in a serving.    Types of fiber and their benefits  There are two types of fiber: insoluble and soluble. They both aid digestion and help you maintain a healthy weight.    Insoluble fiber. This is found in whole grains, cereals, certain fruits and vegetables such as apple skin, corn, and carrots. Insoluble fiber may prevent constipation and reduce the risk for certain types of cancer.    Soluble fiber. This type of fiber is in oats, beans, and certain fruits and vegetables such as strawberries and peas. Soluble fiber can reduce cholesterol, which may help lower the risk for heart disease. It also helps control blood sugar levels.  Look for high-fiber foods  Try these foods to add fiber to your diet:    Whole-grain breads and cereals. Try to eat 6 to 8 ounces a day. Include wheat and oat bran cereals, whole-wheat muffins or toast, and corn tortillas in your meals.    Fruits. Try to eat 2 cups a day. Apples, oranges, strawberries, pears, and bananas are good sources. (Note: Fruit juice is low in fiber.)    Vegetables. Try to eat at least 2.5 cups a day. Add asparagus, carrots, broccoli, peas, and corn to your meals.    Beans. One cup of cooked lentils gives you over 15 grams of fiber. Try navy beans, lentils, and chickpeas.    Seeds. A small handful of seeds gives you about 3 grams of fiber. Try sunflower seeds.  Keep track of your fiber  Keep track of how much fiber you eat. Start by reading food labels. Then eat a variety of foods high in fiber. As you begin to eat more fiber, ask your healthcare provider how much water you should be drinking to keep your digestive system working smoothly.  You should aim for a certain amount of fiber in your diet each day. If you are a woman, that amount is between 25 and 28 grams per day. Men should aim for 30 to 33 grams  per day. After age 50, your daily fiber needs drop to 22 grams for women and 28 grams for men.  Before you reach for the fiber supplements, think about this. Fiber is found naturally in healthy whole foods. It gives you that feeling of fullness after you eat. Taking fiber supplements or eating fiber-enriched foods will not give you this full feeling.  Your fiber intake is a good measure for the quality of your overall diet. If you are missing out on your daily amount of fiber, you may be lacking other important nutrients as well.  Date Last Reviewed: 5/11/2015 2000-2017 [employer]. [address] [place] [address]. All rights reserved. This information is not intended as a substitute for professional medical care. Always follow your healthcare professional's instructions.          Constipation (Adult)  Constipation means that you have bowel movements that are less frequent than usual. Stools often become very hard and difficult to pass.  Constipation is very common. At some point in life it affects almost everyone. Since everyone's bowel habits are different, what is constipation to one person may not be to another. Your healthcare provider may do tests to diagnose constipation. It depends on what he or she finds when evaluating you.    Symptoms of constipation include:    Abdominal pain    Bloating    Vomiting    Painful bowel movements    Itching, swelling, bleeding, or pain around the anus  Causes  Constipation can have many causes. These include:    Diet low in fiber    Too much dairy    Not drinking enough liquids    Lack of exercise or physical activity. This is especially true for older adults.    Changes in lifestyle or daily routine, including pregnancy, aging, work, and travel    Frequent use or misuse of laxatives    Ignoring the urge to have a bowel movement or delaying it until later    Medicines, such as certain prescription pain medicines, iron supplements, antacids, certain  antidepressants, and calcium supplements    Diseases like irritable bowel syndrome, bowel obstructions, stroke, diabetes, thyroid disease, Parkinson disease, hemorrhoids, and colon cancer  Complications  Potential complications of constipation can include:    Hemorrhoids    Rectal bleeding from hemorrhoids or anal fissures (skin tears)    Hernias    Dependency on laxatives    Chronic constipation    Fecal impaction    Bowel obstruction or perforation  Home care  All treatment should be done after talking with your healthcare provider. This is especially true if you have another medical problems, are taking prescription medicines, or are an older adult. Treatment most often involves lifestyle changes. You may also need medicines. Your healthcare provider will tell you which will work best for you. Follow the advice below to help avoid this problem in the future.  Lifestyle changes  These lifestyle changes can help prevent constipation:    Diet. Eat a high-fiber diet, with fresh fruit and vegetables, and reduce dairy intake, meats, and processed foods    Fluids. It's important to get enough fluids each day. Drink plenty of water when you eat more fiber. If you are on diet that limits the amount of fluid you can have, talk about this with your healthcare provider.    Regular exercise. Check with your healthcare provider first.  Medications  Take any medicines as directed. Some laxatives are safe to use only every now and then. Others can be taken on a regular basis. Talk with your doctor or pharmacist if you have questions.  Prescription pain medicines can cause constipation. If you are taking this kind of medicine, ask your healthcare provider if you should also take a stool softener.  Medicines you may take to treat constipation include:    Fiber supplements    Stool softeners    Laxatives    Enemas    Rectal suppositories  Follow-up care  Follow up with your healthcare provider if symptoms don't get better in the  next few days. You may need to have more tests or see a specialist.  Call 911  Call 911 if any of these occur:    Trouble breathing    Stiff, rigid abdomen that is severely painful to touch    Confusion    Fainting or loss of consciousness    Rapid heart rate    Chest pain  When to seek medical advice  Call your healthcare provider right away if any of these occur:    Fever over 100.4 F (38 C)    Failure to resume normal bowel movements    Pain in your abdomen or back gets worse    Nausea or vomiting    Swelling in your abdomen    Blood in the stool    Black, tarry stool    Involuntary weight loss    Weakness  Date Last Reviewed: 12/30/2015 2000-2017 [employer]. [address] [place], [place] [id number]. All rights reserved. This information is not intended as a substitute for professional medical care. Always follow your healthcare professional's instructions.          Future Appointments        Provider Department Dept Phone Center    9/1/2017 4:00 PM Oliver Alejandre MD, MD [facility] [phone] [facility]    3/7/2018 10:20 AM [facility] CT ROOM [address] [place] [address] [phone] [facility]    3/7/2018 10:40 AM [facility] CT ROOM [address] [place] [address] [phone] [facility]    3/7/2018 11:00 AM [facility] [phone] [facility]    3/7/2018 1:15 PM Tejinder Tracy MD [facility] [phone] [facility]      24 Hour Appointment Hotline       To make an appointment at any [facility], call [phone] ([phone]). If you don't have a family doctor or clinic, we will help you find one. [facility] are conveniently located to serve the needs of you and your family.             Review of your medicines      Our records show that you are taking the medicines listed below. If these are incorrect, please call your family doctor or clinic.        Dose / Directions Last dose taken    gabapentin 300 MG capsule    Commonly known as:  NEURONTIN   Dose:  300-600 mg   Quantity:  180 capsule        Take 1-2 capsules (300-600 mg) by mouth every evening as needed   Refills:  3        levothyroxine 75 MCG tablet   Commonly known as:  SYNTHROID/LEVOTHROID   Dose:  75 mcg   Quantity:  90 tablet        Take 1 tablet (75 mcg) by mouth daily   Refills:  3        MAGNESIUM OXIDE PO   Dose:  250 mg        Take 250 mg by mouth daily   Refills:  0        order for DME        Respironics Remstar 60 series Auto CPAP 6-15 cm H2O   Refills:  0        PARoxetine 20 MG tablet   Commonly known as:  PAXIL   Dose:  20 mg   Quantity:  90 tablet        Take 1 tablet (20 mg) by mouth At Bedtime   Refills:  3        RESTASIS 0.05 % ophthalmic emulsion   Dose:  1 drop   Generic drug:  cycloSPORINE        Place 1 drop into both eyes 2 times daily   Refills:  0        simvastatin 40 MG tablet   Commonly known as:  ZOCOR   Dose:  40 mg   Quantity:  90 tablet        Take 1 tablet (40 mg) by mouth At Bedtime   Refills:  3        traZODone 100 MG tablet   Commonly known as:  DESYREL   Quantity:  90 tablet        TAKE 1 TABLET (100 MG) NIGHTLY AS NEEDED FOR SLEEP   Refills:  1        TURMERIC PO   Dose:  1 tablet        Take 1 tablet by mouth daily   Refills:  0        vitamin B complex with vitamin C Tabs tablet   Commonly known as:  STRESS TAB   Dose:  1 tablet        Take 1 tablet by mouth daily.   Refills:  0        VITAMIN D (CHOLECALCIFEROL) PO   Dose:  2000 Units        Take 2,000 Units by mouth daily.   Refills:  0        zinc sulfate 220 (50 ZN) MG capsule   Commonly known as:  ZINCATE   Dose:  220 mg        Take 220 mg by mouth daily   Refills:  0                Procedures and tests performed during your visit     Abdomen, flat/upright (2 views)      Orders Needing Specimen Collection     None      Pending Results     No orders found from 8/9/2017 to 8/12/2017.            Pending Culture Results     No orders found from 8/9/2017 to 8/12/2017.             Pending Results Instructions     If you had any lab results that were not finalized at the time of your Discharge, you can call the ED Lab Result RN at [phone]. You will be contacted by this team for any positive Lab results or changes in treatment. The nurses are available 7 days a week from 10A to 6:30P.  You can leave a message 24 hours per day and they will return your call.        Test Results From Your Hospital Stay        8/11/2017 12:47 PM      Narrative     XR ABDOMEN 2 VW 8/11/2017 12:01 PM    HISTORY: Constipation.    COMPARISON: None.    FINDINGS: Moderate stool is present in the colon. Upright view shows  one or two air-fluid levels in the right colon. No significantly  dilated small bowel loops are seen. There is no free peritoneal air.  Enlargement of the cardio-pericardial silhouette is present, probably  related to a pericardial effusion seen on recent CT.        Impression     IMPRESSION: Moderate stool in colon. Otherwise, nonspecific bowel gas  pattern.    JARED HOLT MD                Thank you for choosing [facility]       Thank you for choosing [facility] for your care. Our goal is always to provide you with excellent care. Hearing back from our patients is one way we can continue to improve our services. Please take a few minutes to complete the written survey that you may receive in the mail after you visit with us. Thank you!        [employer] Information     [employer] gives you secure access to your electronic health record. If you see a primary care provider, you can also send messages to your care team and make appointments. If you have questions, please call your primary care clinic.  If you do not have a primary care provider, please call [phone] and they will assist you.        Care EveryWhere ID     This is your Care EveryWhere ID. This could be used by other organizations to access your [facility] medical records  [id number]        Equal Access to Services     KO CHAVEZ AH:  Hadii estephania Segovia, waluis alfredo porter, qaalessandrata kaalkristal cardenas. So [facility] [phone].    ATENCIÓN: Si habla español, tiene a ervin disposición servicios gratuitos de asistencia lingüística. Llame al [phone].    We comply with applicable federal civil rights laws and [place] laws. We do not discriminate on the basis of race, color, national origin, age, disability sex, sexual orientation or gender identity.            After Visit Summary       This is your record. Keep this with you and show to your community pharmacist(s) and doctor(s) at your next visit.

## 2017-08-11 NOTE — DISCHARGE INSTRUCTIONS
Treating Constipation    Constipation is a common and often uncomfortable problem. Constipation means you have bowel movements fewer than 3 times per week, or strain to pass hard, dry stool. It can last a short time. Or it can be a problem that never seems to go away. The good news is that it can often be treated and controlled.  Eat more fiber  One of the best ways to help treat constipation is to increase your fiber intake. You can do this either through diet or by using fiber supplements. Fiber (in whole grains, fruits, and vegetables) adds bulk and absorbs water to soften the stool. This helps the stool pass through the colon more easily. When you increase your fiber intake, do it slowly to avoid side effects such as bloating. Also increase the amount of water that you drink. Eating more of the following foods can add fiber to your diet.    High-fiber cereals    Whole grains, bran, and brown rice    Vegetables such as carrots, broccoli, and greens    Fresh fruits (especially apples, pears, and dried fruits like raisins and apricots)    Nuts and legumes (especially beans such as lentils, kidney beans, and lima beans)  Get physically active  Exercise helps improve the working of your colon which helps ease constipation. Try to get some physical activity every day. If you haven t been active for a while, talk to your healthcare provider before starting again.  Laxatives  Your healthcare provider may suggest an over-the-counter product to help ease your constipation. He or she may suggest the use of bulk-forming agents or laxatives. The use of laxatives, if used as directed, is common and safe. Follow directions carefully when using them. See your healthcare provider for new-onset constipation, or long-term constipation, to rule out other causes such as medicines or thyroid disease.  Date Last Reviewed: 7/1/2016 2000-2017 [employer]. [address] [place] [address], [place] [address]. All rights reserved.  This information is not intended as a substitute for professional medical care. Always follow your healthcare professional's instructions.          Eating a High-Fiber Diet  Fiber is what gives strength and structure to plants. Most grains, beans, vegetables, and fruits contain fiber. Foods rich in fiber are often low in calories and fat, and they fill you up more. They may also reduce your risks for certain health problems. To find out the amount of fiber in canned, packaged, or frozen foods, read the Nutrition Facts label. It tells you how much fiber is in a serving.    Types of fiber and their benefits  There are two types of fiber: insoluble and soluble. They both aid digestion and help you maintain a healthy weight.    Insoluble fiber. This is found in whole grains, cereals, certain fruits and vegetables such as apple skin, corn, and carrots. Insoluble fiber may prevent constipation and reduce the risk for certain types of cancer.    Soluble fiber. This type of fiber is in oats, beans, and certain fruits and vegetables such as strawberries and peas. Soluble fiber can reduce cholesterol, which may help lower the risk for heart disease. It also helps control blood sugar levels.  Look for high-fiber foods  Try these foods to add fiber to your diet:    Whole-grain breads and cereals. Try to eat 6 to 8 ounces a day. Include wheat and oat bran cereals, whole-wheat muffins or toast, and corn tortillas in your meals.    Fruits. Try to eat 2 cups a day. Apples, oranges, strawberries, pears, and bananas are good sources. (Note: Fruit juice is low in fiber.)    Vegetables. Try to eat at least 2.5 cups a day. Add asparagus, carrots, broccoli, peas, and corn to your meals.    Beans. One cup of cooked lentils gives you over 15 grams of fiber. Try navy beans, lentils, and chickpeas.    Seeds. A small handful of seeds gives you about 3 grams of fiber. Try sunflower seeds.  Keep track of your fiber  Keep track of how much fiber  you eat. Start by reading food labels. Then eat a variety of foods high in fiber. As you begin to eat more fiber, ask your healthcare provider how much water you should be drinking to keep your digestive system working smoothly.  You should aim for a certain amount of fiber in your diet each day. If you are a woman, that amount is between 25 and 28 grams per day. Men should aim for 30 to 33 grams per day. After age 50, your daily fiber needs drop to 22 grams for women and 28 grams for men.  Before you reach for the fiber supplements, think about this. Fiber is found naturally in healthy whole foods. It gives you that feeling of fullness after you eat. Taking fiber supplements or eating fiber-enriched foods will not give you this full feeling.  Your fiber intake is a good measure for the quality of your overall diet. If you are missing out on your daily amount of fiber, you may be lacking other important nutrients as well.  Date Last Reviewed: 5/11/2015 2000-2017 [employer]. [address] [place] [address], [place] [address]. All rights reserved. This information is not intended as a substitute for professional medical care. Always follow your healthcare professional's instructions.          Constipation (Adult)  Constipation means that you have bowel movements that are less frequent than usual. Stools often become very hard and difficult to pass.  Constipation is very common. At some point in life it affects almost everyone. Since everyone's bowel habits are different, what is constipation to one person may not be to another. Your healthcare provider may do tests to diagnose constipation. It depends on what he or she finds when evaluating you.    Symptoms of constipation include:    Abdominal pain    Bloating    Vomiting    Painful bowel movements    Itching, swelling, bleeding, or pain around the anus  Causes  Constipation can have many causes. These include:    Diet low in fiber    Too much dairy    Not  drinking enough liquids    Lack of exercise or physical activity. This is especially true for older adults.    Changes in lifestyle or daily routine, including pregnancy, aging, work, and travel    Frequent use or misuse of laxatives    Ignoring the urge to have a bowel movement or delaying it until later    Medicines, such as certain prescription pain medicines, iron supplements, antacids, certain antidepressants, and calcium supplements    Diseases like irritable bowel syndrome, bowel obstructions, stroke, diabetes, thyroid disease, Parkinson disease, hemorrhoids, and colon cancer  Complications  Potential complications of constipation can include:    Hemorrhoids    Rectal bleeding from hemorrhoids or anal fissures (skin tears)    Hernias    Dependency on laxatives    Chronic constipation    Fecal impaction    Bowel obstruction or perforation  Home care  All treatment should be done after talking with your healthcare provider. This is especially true if you have another medical problems, are taking prescription medicines, or are an older adult. Treatment most often involves lifestyle changes. You may also need medicines. Your healthcare provider will tell you which will work best for you. Follow the advice below to help avoid this problem in the future.  Lifestyle changes  These lifestyle changes can help prevent constipation:    Diet. Eat a high-fiber diet, with fresh fruit and vegetables, and reduce dairy intake, meats, and processed foods    Fluids. It's important to get enough fluids each day. Drink plenty of water when you eat more fiber. If you are on diet that limits the amount of fluid you can have, talk about this with your healthcare provider.    Regular exercise. Check with your healthcare provider first.  Medications  Take any medicines as directed. Some laxatives are safe to use only every now and then. Others can be taken on a regular basis. Talk with your doctor or pharmacist if you have  questions.  Prescription pain medicines can cause constipation. If you are taking this kind of medicine, ask your healthcare provider if you should also take a stool softener.  Medicines you may take to treat constipation include:    Fiber supplements    Stool softeners    Laxatives    Enemas    Rectal suppositories  Follow-up care  Follow up with your healthcare provider if symptoms don't get better in the next few days. You may need to have more tests or see a specialist.  Call 911  Call 911 if any of these occur:    Trouble breathing    Stiff, rigid abdomen that is severely painful to touch    Confusion    Fainting or loss of consciousness    Rapid heart rate    Chest pain  When to seek medical advice  Call your healthcare provider right away if any of these occur:    Fever over 100.4 F (38 C)    Failure to resume normal bowel movements    Pain in your abdomen or back gets worse    Nausea or vomiting    Swelling in your abdomen    Blood in the stool    Black, tarry stool    Involuntary weight loss    Weakness  Date Last Reviewed: 12/30/2015 2000-2017 [employer]. [address] [place] [address], [place] [address]. All rights reserved. This information is not intended as a substitute for professional medical care. Always follow your healthcare professional's instructions.

## 2017-08-11 NOTE — ED AVS SNAPSHOT
[facility] Emergency Department    [address] [facility], [place] [address]    Phone:  [phone]    Fax:  [phone]                                       Ingrid Powell   MRN: [id number]    Department:  [facility] Emergency Department   Date of Visit:  8/11/2017           After Visit Summary Signature Page     I have received my discharge instructions, and my questions have been answered. I have discussed any challenges I see with this plan with the nurse or doctor.    ..........................................................................................................................................  Patient/Patient Representative Signature      ..........................................................................................................................................  Patient Representative Print Name and Relationship to Patient    ..................................................               ................................................  Date                                            Time    ..........................................................................................................................................  Reviewed by Signature/Title    ...................................................              ..............................................  Date                                                            Time

## 2017-09-10 ENCOUNTER — APPOINTMENT (OUTPATIENT)
Dept: GENERAL RADIOLOGY | Facility: CLINIC | Age: 72
End: 2017-09-10
Attending: EMERGENCY MEDICINE
Payer: MEDICARE

## 2017-09-10 ENCOUNTER — HOSPITAL ENCOUNTER (EMERGENCY)
Facility: CLINIC | Age: 72
Discharge: HOME | End: 2017-09-10
Attending: EMERGENCY MEDICINE | Admitting: EMERGENCY MEDICINE
Payer: MEDICARE

## 2017-09-10 VITALS
SYSTOLIC BLOOD PRESSURE: 134 MMHG | TEMPERATURE: 97.9 F | RESPIRATION RATE: 20 BRPM | DIASTOLIC BLOOD PRESSURE: 77 MMHG | OXYGEN SATURATION: 100 % | HEART RATE: 55 BPM

## 2017-09-10 DIAGNOSIS — J20.9 ACUTE BRONCHITIS, UNSPECIFIED ORGANISM: ICD-10-CM

## 2017-09-10 DIAGNOSIS — R05.9 COUGH: ICD-10-CM

## 2017-09-10 PROCEDURE — 99283 EMERGENCY DEPT VISIT LOW MDM: CPT | Mod: 25

## 2017-09-10 PROCEDURE — 99284 EMERGENCY DEPT VISIT MOD MDM: CPT | Performed by: EMERGENCY MEDICINE

## 2017-09-10 PROCEDURE — 71020 XR CHEST 2 VW: CPT

## 2017-09-10 RX ORDER — AZITHROMYCIN 250 MG/1
TABLET, FILM COATED ORAL
Qty: 6 TABLET | Refills: 0 | Status: SHIPPED | OUTPATIENT
Start: 2017-09-10 | End: 2017-09-13

## 2017-09-10 RX ORDER — BENZONATATE 200 MG/1
200 CAPSULE ORAL 3 TIMES DAILY PRN
Qty: 21 CAPSULE | Refills: 0 | Status: SHIPPED | OUTPATIENT
Start: 2017-09-10 | End: 2018-01-10

## 2017-09-10 ASSESSMENT — ENCOUNTER SYMPTOMS
SINUS PRESSURE: 1
COUGH: 1
CHILLS: 1

## 2017-09-10 NOTE — ED PROVIDER NOTES
History     Chief Complaint   Patient presents with     Cough     HPI  Ingrid Powell is a 71 year old female with a past medical history of hypertension, COPD, anxiety, obstructive sleep apnea, and malignant neoplasm of oropharynx who presents to the ED for evaluation of a cough. Symptoms began on 9/2/17 when patient developed a productive cough a green sputum associated with sinus pressure and chills. She denies any chest pain.  Patient is a former smoker; 0.5 packs daily for 35 years. She has been producing green sputum for the past week. Is only having pain with coughing. Does not think she is wheezing.     Patient Active Problem List   Diagnosis     Hyperlipidemia LDL goal <130     HTN (hypertension)     HX of MALIGNANT NEOPL TONSIL     COPD, mild (H)     zAdvanced directives, counseling/discussion     Major depressive disorder, recurrent episode, moderate (HCC)     Anxiety     Hypothyroidism     MARGIE (obstructive sleep apnea), Severe     Disturbance of salivary secretion (XEROSTOMIA)     Renal cyst     Raynaud's syndrome     History of lumbar surgery     Primary insomnia     Lumbosacral radiculopathy at L3     DDD (degenerative disc disease), lumbar     Adenomatous colon polyp     Diverticulitis of colon     Benign neoplasm of gastrointestinal tract     Hemorrhoids, internal     Rectocele     Tortuous colon     Family history of breast cancer in mother     Malignant neoplasm of oropharynx (H)     Current Outpatient Prescriptions   Medication Sig Dispense Refill     TURMERIC PO Take 1 tablet by mouth daily       PARoxetine (PAXIL) 20 MG tablet Take 1 tablet (20 mg) by mouth At Bedtime 90 tablet 3     traZODone (DESYREL) 100 MG tablet TAKE 1 TABLET (100 MG) NIGHTLY AS NEEDED FOR SLEEP 90 tablet 1     levothyroxine (SYNTHROID/LEVOTHROID) 75 MCG tablet Take 1 tablet (75 mcg) by mouth daily 90 tablet 3     gabapentin (NEURONTIN) 300 MG capsule Take 1-2 capsules (300-600 mg) by mouth every evening as needed 180  capsule 3     MAGNESIUM OXIDE PO Take 250 mg by mouth daily        zinc sulfate (ZINCATE) 220 MG capsule Take 220 mg by mouth daily       simvastatin (ZOCOR) 40 MG tablet Take 1 tablet (40 mg) by mouth At Bedtime 90 tablet 3     RESTASIS 0.05 % ophthalmic emulsion Place 1 drop into both eyes 2 times daily       ORDER FOR DME Respironics Remstar 60 series Auto CPAP 6-15 cm H2O       B Complex Vitamins (VITAMIN  B COMPLEX) tablet Take 1 tablet by mouth daily.         VITAMIN D, CHOLECALCIFEROL, PO Take 2,000 Units by mouth daily.         Allergies   Allergen Reactions     Dilaudid [Hydromorphone Hcl] Other (See Comments)     hallucinations     Fosamax [Alendronate Sodium] Rash            Norvasc [Amlodipine Besylate] Hives and Rash     Tegretol [Carbamazepine] Hives and Rash     I have reviewed the Medications, Allergies, Past Medical and Surgical History, and Social History in the Epic system.    Allergies:   Allergies   Allergen Reactions     Dilaudid [Hydromorphone Hcl] Other (See Comments)     hallucinations     Fosamax [Alendronate Sodium] Rash            Norvasc [Amlodipine Besylate] Hives and Rash     Tegretol [Carbamazepine] Hives and Rash         No current facility-administered medications on file prior to encounter.   Current Outpatient Prescriptions on File Prior to Encounter:  TURMERIC PO Take 1 tablet by mouth daily   PARoxetine (PAXIL) 20 MG tablet Take 1 tablet (20 mg) by mouth At Bedtime   traZODone (DESYREL) 100 MG tablet TAKE 1 TABLET (100 MG) NIGHTLY AS NEEDED FOR SLEEP   levothyroxine (SYNTHROID/LEVOTHROID) 75 MCG tablet Take 1 tablet (75 mcg) by mouth daily   gabapentin (NEURONTIN) 300 MG capsule Take 1-2 capsules (300-600 mg) by mouth every evening as needed   MAGNESIUM OXIDE PO Take 250 mg by mouth daily    zinc sulfate (ZINCATE) 220 MG capsule Take 220 mg by mouth daily   simvastatin (ZOCOR) 40 MG tablet Take 1 tablet (40 mg) by mouth At Bedtime   RESTASIS 0.05 % ophthalmic emulsion Place 1  drop into both eyes 2 times daily   B Complex Vitamins (VITAMIN  B COMPLEX) tablet Take 1 tablet by mouth daily.     VITAMIN D, CHOLECALCIFEROL, PO Take 2,000 Units by mouth daily.     ORDER FOR DME Respironics Remstar 60 series Auto CPAP 6-15 cm H2O       Patient Active Problem List   Diagnosis     Hyperlipidemia LDL goal <130     HTN (hypertension)     HX of MALIGNANT NEOPL TONSIL     COPD, mild (H)     zAdvanced directives, counseling/discussion     Major depressive disorder, recurrent episode, moderate (HCC)     Anxiety     Hypothyroidism     MARGIE (obstructive sleep apnea), Severe     Disturbance of salivary secretion (XEROSTOMIA)     Renal cyst     Raynaud's syndrome     History of lumbar surgery     Primary insomnia     Lumbosacral radiculopathy at L3     DDD (degenerative disc disease), lumbar     Adenomatous colon polyp     Diverticulitis of colon     Benign neoplasm of gastrointestinal tract     Hemorrhoids, internal     Rectocele     Tortuous colon     Family history of breast cancer in mother     Malignant neoplasm of oropharynx (H)       Past Surgical History:   Procedure Laterality Date     BACK SURGERY       CARPAL TUNNEL RELEASE RT/LT      ?side     CHOLECYSTECTOMY       EYE SURGERY      cataracts     HERNIA REPAIR       INCISION AND DRAINAGE TRUNK, COMBINED  5/18/2012    Procedure:COMBINED INCISION AND DRAINAGE TRUNK; Incision and Drainage Abdominal Wall Abscess ; Surgeon:ALEXSANDER HANNON; Location:[facility] OR     INSERT PORT VASCULAR ACCESS  2/8/2012    Procedure:INSERT PORT VASCULAR ACCESS; Surgeon:MARY JANE GUAN; Location:[facility] OR     JOINT REPLACEMTN, KNEE RT/LT      bilateral     KNEE SURGERY  1995    left- total     KNEE SURGERY  2000    right- total     LAPAROSCOPIC APPENDECTOMY N/A 10/10/2015    Procedure: LAPAROSCOPIC APPENDECTOMY;  Surgeon: Daniel Chamberlain MD;  Location: [facility]     LARYNGOSCOPY, ESOPHAGOSCOPY,  BIOPSY, COMBINED  2/8/2012    Procedure:COMBINED LARYNGOSCOPY,  "ESOPHAGOSCOPY,  BIOPSY; Direct Laryngoscopy, Esophagoscopy with Biopsy, Stealth Assisted Left Frontal Sinus Biopsy via Vidal Incision, 8 Vietnamese Power Port in right subclavian & Percutaneous Endoscopic Gastrostomy Placement * Latex Safe*; Surgeon:LIBORIO CAZARES; Location:[facility] OR     left ankle fusion       OPTICAL TRACKING SYSTEM ENDOSCOPIC SINUS SURGERY  2/8/2012    Procedure:OPTICAL TRACKING SYSTEM ENDOSCOPIC SINUS SURGERY; Surgeon:LIBORIO CAZARES; Location:[facility] OR     RECTAL SURGERY      ? type     REMOVE PORT VASCULAR ACCESS  8/21/2012    Procedure: REMOVE PORT VASCULAR ACCESS;  Remove Port Vascular Access ;  Surgeon: Phillip Ye MD;  Location: [facility] OR       Social History   Substance Use Topics     Smoking status: Former Smoker     Packs/day: 0.50     Years: 35.00     Types: Cigarettes     Quit date: 10/3/1995     Smokeless tobacco: Never Used     Alcohol use Yes      Comment: rare       Most Recent Immunizations   Administered Date(s) Administered     Influenza (High Dose) 3 valent vaccine 09/07/2016     Influenza (IIV3) 10/18/2013     Pneumococcal (PCV 13) 09/07/2016     Pneumococcal 23 valent 11/01/2010     TDAP Vaccine (Adacel) 10/18/2013       BMI: Estimated body mass index is 28.13 kg/(m^2) as calculated from the following:    Height as of 6/7/17: 1.64 m (5' 4.57\").    Weight as of 8/1/17: 75.7 kg (166 lb 12.8 oz).        Review of Systems   Constitutional: Positive for chills and fatigue. Negative for activity change, appetite change and fever.   HENT: Positive for congestion and sinus pressure. Negative for sore throat and trouble swallowing.    Respiratory: Positive for cough. Negative for shortness of breath and wheezing.    Cardiovascular: Negative for chest pain, palpitations and leg swelling.   Gastrointestinal: Negative for abdominal pain, nausea and vomiting.   Genitourinary: Negative for decreased urine volume and dysuria.   Musculoskeletal: Negative for back pain " and neck pain.   Skin: Negative for rash.   Neurological: Negative for dizziness, weakness, light-headedness, numbness and headaches.   Hematological: Does not bruise/bleed easily.   Psychiatric/Behavioral: Negative for confusion.   All other systems reviewed and are negative.      Physical Exam   BP: 134/77  Pulse: 55  Temp: 97.9  F (36.6  C)  Resp: 20  SpO2: 100 %  Physical Exam   Constitutional: She appears well-developed and well-nourished. No distress.   Psychiatric: She has a normal mood and affect.   Nursing note and vitals reviewed.    HENT: Oral mucosa moist. No lesions.  Neck: Supple  Pulmonary/Chest: Lungs are clear to auscultation bilaterally. Decreased at bases, right greater than left.   Cardiovascular: Heart is regular rate and rhythm. No murmur.  Abdomen: Soft, non-distended, non-tender.   Musculoskeletal: Moving all extremities well. No peripheral edema.   Neurological: Alert. No focal neurologic deficit.   Skin: No rash.    ED Course     ED Course     Procedures             Critical Care time:  none                 Results for orders placed or performed during the hospital encounter of 09/10/17   Chest XR,  PA & LAT    Narrative    CHEST TWO VIEWS  9/10/2017 12:17 PM     HISTORY: Shortness of breath.     COMPARISON: 9/13/2016.      Impression    IMPRESSION: No significant interval change. Hyperinflation both lungs.  Mild cardiac enlargement. Pulmonary vascularity is within normal  limits. Lungs clear. Aortic calcification. Degenerative changes in the  thoracolumbar spine.    MEG URIBE MD         Medications - No data to display    11:53 AM Patient assessed.     Assessments & Plan (with Medical Decision Making)CXR was obtained and revealed copd changes without evidence of infiltrate. Findings discussed with patient. Due to her copd with symptoms lasting over two weeks and is a smoker I am going to treat the patient for a bronchitis with zithromax. She will also be given tessalon perles. She  should return if symptoms worsen or new symptoms develop.      I have reviewed the nursing notes.    I have reviewed the findings, diagnosis, plan and need for follow up with the patient.       Discharge Medication List as of 9/10/2017  1:23 PM      START taking these medications    Details   azithromycin (ZITHROMAX Z-JAYESH) 250 MG tablet Two tablets on the first day, then one tablet daily for the next 4 days, Disp-6 tablet, R-0, Local Print      benzonatate (TESSALON) 200 MG capsule Take 1 capsule (200 mg) by mouth 3 times daily as needed for cough, Disp-21 capsule, R-0, Local Print             Final diagnoses:   Cough   Acute bronchitis, unspecified organism     This document serves as a record of the services and decisions personally performed and made by Stan Segal MD. It was created on HIS/HER behalf by Clau Ghotra, a trained medical scribe. The creation of this document is based the provider's statements to the medical scribe.  Clau Ghotra 11:52 AM 9/10/2017    Provider:   The information in this document, created by the medical scribe for me, accurately reflects the services I personally performed and the decisions made by me. I have reviewed and approved this document for accuracy prior to leaving the patient care area.  Stan Segal MD 11:52 AM 9/10/2017    9/10/2017   [facility] EMERGENCY DEPARTMENT     Stan Segal MD  09/11/17 [id number]

## 2017-09-10 NOTE — ED AVS SNAPSHOT
[facility] Emergency Department    [address] [facility] [address]    Phone:  [phone]    Fax:  [phone]                                       Ingrid Powell   MRN: [id number]    Department:  [facility] Emergency Department   Date of Visit:  9/10/2017           After Visit Summary Signature Page     I have received my discharge instructions, and my questions have been answered. I have discussed any challenges I see with this plan with the nurse or doctor.    ..........................................................................................................................................  Patient/Patient Representative Signature      ..........................................................................................................................................  Patient Representative Print Name and Relationship to Patient    ..................................................               ................................................  Date                                            Time    ..........................................................................................................................................  Reviewed by Signature/Title    ...................................................              ..............................................  Date                                                            Time

## 2017-09-10 NOTE — DISCHARGE INSTRUCTIONS
Return if symptoms worsen or new symptoms develop.  Follow-up with primary care physician next available.  Drink plenty of fluids.  If increased cough, fever, shortness of breath or other symptoms present please return for further evaluation and care.  Acute Bronchitis  Your healthcare provider has told you that you have acute bronchitis. Bronchitis is infection or inflammation of the bronchial tubes (airways in the lungs). Normally, air moves easily in and out of the airways. Bronchitis narrows the airways, making it harder for air to flow in and out of the lungs. This causes symptoms such as shortness of breath, coughing up yellow or green mucus, and wheezing. Bronchitis can be acute or chronic. Acute means the condition comes on quickly and goes away in a short time, usually within 3 to 10 days. Chronic means a condition lasts a long time and often comes back.    What causes acute bronchitis?  Acute bronchitis almost always starts as a viral respiratory infection, such as a cold or the flu. Certain factors make it more likely for a cold or flu to turn into bronchitis. These include being very young, being elderly, having a heart or lung problem, or having a weak immune system. Cigarette smoking also makes bronchitis more likely.  When bronchitis develops, the airways become swollen. The airways may also become infected with bacteria. This is known as a secondary infection.  Diagnosing acute bronchitis  Your healthcare provider will examine you and ask about your symptoms and health history. You may also have a sputum culture to test the fluid in your lungs. Chest X-rays may be done to look for infection in the lungs.  Treating acute bronchitis  Bronchitis usually clears up as the cold or flu goes away. You can help feel better faster by doing the following:    Take medicine as directed. You may be told to take ibuprofen or other over-the-counter medicines. These help relieve inflammation in your bronchial  tubes. Your healthcare provider may prescribe an inhaler to help open up the bronchial tubes. Most of the time, acute bronchitis is caused by a viral infection. Antibiotics are usually not prescribed for viral infections.    Drink plenty of fluids, such as water, juice, or warm soup. Fluids loosen mucus so that you can cough it up. This helps you breathe more easily. Fluids also prevent dehydration.    Make sure you get plenty of rest.    Do not smoke. Do not allow anyone else to smoke in your home.  Recovery and follow-up  Follow up with your doctor as you are told. You will likely feel better in a week or two. But a dry cough can linger beyond that time. Let your doctor know if you still have symptoms (other than a dry cough) after 2 weeks, or if you re prone to getting bronchial infections. Take steps to protect yourself from future infections. These steps include stopping smoking and avoiding tobacco smoke, washing your hands often, and getting a yearly flu shot.  When to call your healthcare provider  Call the healthcare provider if you have any of the following:    Fever of 100.4 F (38.0 C) or higher, or as advised    Symptoms that get worse, or new symptoms    Trouble breathing    Symptoms that don t start to improve within a week, or within 3 days of taking antibiotics   Date Last Reviewed: 12/1/2016 2000-2017 [employer]. [address] [place] [address], [place] [address]. All rights reserved. This information is not intended as a substitute for professional medical care. Always follow your healthcare professional's instructions.

## 2017-09-10 NOTE — ED AVS SNAPSHOT
[facility] Emergency Department    [address] [facility] [address]    Phone:  [phone]    Fax:  [phone]                                       Ingrid Powell   MRN: [id number]    Department:  [facility] Emergency Department   Date of Visit:  9/10/2017           Patient Information     Date Of Birth          1945        Your diagnoses for this visit were:     Cough     Acute bronchitis, unspecified organism        You were seen by Stan Segal MD.      Follow-up Information     Follow up with Tila Tyson APRN CNP.    Specialty:  Nurse Practitioner - Gerontology    Why:  As needed    Contact information:    [address] [facility] [address]  [phone]          Follow up with [facility] Emergency Department.    Specialty:  EMERGENCY MEDICINE    Why:  If symptoms worsen    Contact information:    [address] [place] [address]  [phone]    Additional information:    The medical center is located at   [address] [facility] ([address] in [place], four miles north   of [place]).        Discharge Instructions         Return if symptoms worsen or new symptoms develop.  Follow-up with primary care physician next available.  Drink plenty of fluids.  If increased cough, fever, shortness of breath or other symptoms present please return for further evaluation and care.  Acute Bronchitis  Your healthcare provider has told you that you have acute bronchitis. Bronchitis is infection or inflammation of the bronchial tubes (airways in the lungs). Normally, air moves easily in and out of the airways. Bronchitis narrows the airways, making it harder for air to flow in and out of the lungs. This causes symptoms such as shortness of breath, coughing up yellow or green mucus, and wheezing. Bronchitis can be acute or chronic. Acute means the condition comes on quickly and goes away in a short time, usually within 3 to 10 days. Chronic  means a condition lasts a long time and often comes back.    What causes acute bronchitis?  Acute bronchitis almost always starts as a viral respiratory infection, such as a cold or the flu. Certain factors make it more likely for a cold or flu to turn into bronchitis. These include being very young, being elderly, having a heart or lung problem, or having a weak immune system. Cigarette smoking also makes bronchitis more likely.  When bronchitis develops, the airways become swollen. The airways may also become infected with bacteria. This is known as a secondary infection.  Diagnosing acute bronchitis  Your healthcare provider will examine you and ask about your symptoms and health history. You may also have a sputum culture to test the fluid in your lungs. Chest X-rays may be done to look for infection in the lungs.  Treating acute bronchitis  Bronchitis usually clears up as the cold or flu goes away. You can help feel better faster by doing the following:    Take medicine as directed. You may be told to take ibuprofen or other over-the-counter medicines. These help relieve inflammation in your bronchial tubes. Your healthcare provider may prescribe an inhaler to help open up the bronchial tubes. Most of the time, acute bronchitis is caused by a viral infection. Antibiotics are usually not prescribed for viral infections.    Drink plenty of fluids, such as water, juice, or warm soup. Fluids loosen mucus so that you can cough it up. This helps you breathe more easily. Fluids also prevent dehydration.    Make sure you get plenty of rest.    Do not smoke. Do not allow anyone else to smoke in your home.  Recovery and follow-up  Follow up with your doctor as you are told. You will likely feel better in a week or two. But a dry cough can linger beyond that time. Let your doctor know if you still have symptoms (other than a dry cough) after 2 weeks, or if you re prone to getting bronchial infections. Take steps to  protect yourself from future infections. These steps include stopping smoking and avoiding tobacco smoke, washing your hands often, and getting a yearly flu shot.  When to call your healthcare provider  Call the healthcare provider if you have any of the following:    Fever of 100.4 F (38.0 C) or higher, or as advised    Symptoms that get worse, or new symptoms    Trouble breathing    Symptoms that don t start to improve within a week, or within 3 days of taking antibiotics   Date Last Reviewed: 12/1/2016 2000-2017 [employer]. [address] [place] [address]. All rights reserved. This information is not intended as a substitute for professional medical care. Always follow your healthcare professional's instructions.          Future Appointments        Provider Department Dept Phone Center    3/7/2018 10:20 AM [facility] CT ROOM [address] [place] [address] [phone] [facility]    3/7/2018 10:40 AM [facility] CT ROOM [address] [place] [address] [phone] [facility]    3/7/2018 11:00 AM [facility] [phone] [facility]    3/7/2018 1:15 PM Tejinder Tracy MD [facility] [phone] [facility]      24 Hour Appointment Hotline       To make an appointment at any [facility], call [phone] ([phone]). If you don't have a family doctor or clinic, we will help you find one. [facility] clinics are conveniently located to serve the needs of you and your family.             Review of your medicines      START taking        Dose / Directions Last dose taken    azithromycin 250 MG tablet   Commonly known as:  ZITHROMAX Z-JAYESH   Quantity:  6 tablet        Two tablets on the first day, then one tablet daily for the next 4 days   Refills:  0        benzonatate 200 MG capsule   Commonly known as:  TESSALON   Dose:  200 mg   Quantity:  21 capsule        Take 1 capsule (200 mg) by mouth 3 times daily as needed for cough   Refills:  0          Our  records show that you are taking the medicines listed below. If these are incorrect, please call your family doctor or clinic.        Dose / Directions Last dose taken    dextromethorphan-guaiFENesin  MG per 12 hr tablet   Commonly known as:  MUCINEX DM   Dose:  1 tablet        Take 1 tablet by mouth daily as needed for cough   Refills:  0        gabapentin 300 MG capsule   Commonly known as:  NEURONTIN   Dose:  300-600 mg   Quantity:  180 capsule        Take 1-2 capsules (300-600 mg) by mouth every evening as needed   Refills:  3        levothyroxine 75 MCG tablet   Commonly known as:  SYNTHROID/LEVOTHROID   Dose:  75 mcg   Quantity:  90 tablet        Take 1 tablet (75 mcg) by mouth daily   Refills:  3        MAGNESIUM OXIDE PO   Dose:  250 mg        Take 250 mg by mouth daily   Refills:  0        order for [facility]        Respironics Remstar 60 series Auto CPAP 6-15 cm H2O   Refills:  0        PARoxetine 20 MG tablet   Commonly known as:  PAXIL   Dose:  20 mg   Quantity:  90 tablet        Take 1 tablet (20 mg) by mouth At Bedtime   Refills:  3        RESTASIS 0.05 % ophthalmic emulsion   Dose:  1 drop   Generic drug:  cycloSPORINE        Place 1 drop into both eyes 2 times daily   Refills:  0        simvastatin 40 MG tablet   Commonly known as:  ZOCOR   Dose:  40 mg   Quantity:  90 tablet        Take 1 tablet (40 mg) by mouth At Bedtime   Refills:  3        traZODone 100 MG tablet   Commonly known as:  DESYREL   Quantity:  90 tablet        TAKE 1 TABLET (100 MG) NIGHTLY AS NEEDED FOR SLEEP   Refills:  1        TURMERIC PO   Dose:  1 tablet        Take 1 tablet by mouth daily   Refills:  0        VICKS NYQUIL MULTI-SYMPTOM PO        Refills:  0        vitamin B complex with vitamin C Tabs tablet   Commonly known as:  STRESS TAB   Dose:  1 tablet        Take 1 tablet by mouth daily.   Refills:  0        VITAMIN D (CHOLECALCIFEROL) PO   Dose:  2000 Units        Take 2,000 Units by mouth daily.   Refills:  0         zinc sulfate 220 (50 ZN) MG capsule   Commonly known as:  ZINCATE   Dose:  220 mg        Take 220 mg by mouth daily   Refills:  0                Prescriptions were sent or printed at these locations (2 Prescriptions)                   [facility] [place] [place] [facility] [address] [facility]   [address] , [facility] [address]    Telephone:  [phone]   Fax:  [phone]   Hours:                  Printed at Department/Unit printer (2 of 2)         azithromycin (ZITHROMAX Z-JAYESH) 250 MG tablet               benzonatate (TESSALON) 200 MG capsule                Procedures and tests performed during your visit     Chest XR,  PA & LAT      Orders Needing Specimen Collection     None      Pending Results     No orders found from 9/8/2017 to 9/11/2017.            Pending Culture Results     No orders found from 9/8/2017 to 9/11/2017.            Pending Results Instructions     If you had any lab results that were not finalized at the time of your Discharge, you can call the ED Lab Result RN at [phone]. You will be contacted by this team for any positive Lab results or changes in treatment. The nurses are available 7 days a week from 10A to 6:30P.  You can leave a message 24 hours per day and they will return your call.        Test Results From Your Hospital Stay        9/10/2017 12:20 PM      Narrative     CHEST TWO VIEWS  9/10/2017 12:17 PM     HISTORY: Shortness of breath.     COMPARISON: 9/13/2016.        Impression     IMPRESSION: No significant interval change. Hyperinflation both lungs.  Mild cardiac enlargement. Pulmonary vascularity is within normal  limits. Lungs clear. Aortic calcification. Degenerative changes in the  thoracolumbar spine.    MEG URIBE MD                Thank you for choosing [facility]       Thank you for choosing [facility] for your care. Our goal is always to provide you with excellent care. Hearing back from our patients is one way we can continue to improve our services. Please  take a few minutes to complete the written survey that you may receive in the mail after you visit with us. Thank you!        [employer] Information     [employer] gives you secure access to your electronic health record. If you see a primary care provider, you can also send messages to your care team and make appointments. If you have questions, please call your primary care clinic.  If you do not have a primary care provider, please call [phone] and they will assist you.        Care EveryWhere ID     This is your Care EveryWhere ID. This could be used by other organizations to access your [facility] medical records  [id number]        Equal Access to Services     [facility] : Dania Segovia, deshawn porter, beck beckford, kristal rodriguez . So [facility] [phone].    ATENCIÓN: Si habla español, tiene a ervin disposición servicios gratuitos de asistencia lingüística. Llame al [phone].    We comply with applicable federal civil rights laws and [place] laws. We do not discriminate on the basis of race, color, national origin, age, disability sex, sexual orientation or gender identity.            After Visit Summary       This is your record. Keep this with you and show to your community pharmacist(s) and doctor(s) at your next visit.

## 2017-09-10 NOTE — ED NOTES
Cough x 3 weeks with greenish/reddish sputum and sinus pressure.   Lung sounds clear throughout.

## 2017-09-11 ASSESSMENT — ENCOUNTER SYMPTOMS
ABDOMINAL PAIN: 0
WEAKNESS: 0
TROUBLE SWALLOWING: 0
LIGHT-HEADEDNESS: 0
NUMBNESS: 0
NECK PAIN: 0
FEVER: 0
SORE THROAT: 0
ACTIVITY CHANGE: 0
BACK PAIN: 0
SHORTNESS OF BREATH: 0
APPETITE CHANGE: 0
FATIGUE: 1
DIZZINESS: 0
DYSURIA: 0
CONFUSION: 0
WHEEZING: 0
VOMITING: 0
BRUISES/BLEEDS EASILY: 0
NAUSEA: 0
HEADACHES: 0
PALPITATIONS: 0

## 2017-09-13 ENCOUNTER — OFFICE VISIT (OUTPATIENT)
Dept: FAMILY MEDICINE | Facility: CLINIC | Age: 72
End: 2017-09-13
Payer: COMMERCIAL

## 2017-09-13 VITALS
HEIGHT: 65 IN | OXYGEN SATURATION: 96 % | WEIGHT: 167.6 LBS | TEMPERATURE: 98.4 F | DIASTOLIC BLOOD PRESSURE: 80 MMHG | SYSTOLIC BLOOD PRESSURE: 130 MMHG | HEART RATE: 66 BPM | BODY MASS INDEX: 27.92 KG/M2

## 2017-09-13 DIAGNOSIS — K57.32 DIVERTICULITIS OF COLON WITHOUT HEMORRHAGE: ICD-10-CM

## 2017-09-13 DIAGNOSIS — J20.9 ACUTE BRONCHITIS, UNSPECIFIED ORGANISM: Primary | ICD-10-CM

## 2017-09-13 DIAGNOSIS — R10.84 ABDOMINAL PAIN, GENERALIZED: ICD-10-CM

## 2017-09-13 DIAGNOSIS — K59.04 CHRONIC IDIOPATHIC CONSTIPATION: ICD-10-CM

## 2017-09-13 PROCEDURE — 99214 OFFICE O/P EST MOD 30 MIN: CPT | Performed by: NURSE PRACTITIONER

## 2017-09-13 RX ORDER — POLYETHYLENE GLYCOL 3350 17 G/17G
1 POWDER, FOR SOLUTION ORAL DAILY
Qty: 510 G | Refills: 1 | Status: SHIPPED | OUTPATIENT
Start: 2017-09-13 | End: 2018-01-10

## 2017-09-13 RX ORDER — AMOXICILLIN 875 MG
875 TABLET ORAL 2 TIMES DAILY
Qty: 20 TABLET | Refills: 0 | Status: SHIPPED | OUTPATIENT
Start: 2017-09-13 | End: 2018-01-10

## 2017-09-13 NOTE — PATIENT INSTRUCTIONS
Take Miralax daily    Take the Sen[place]ot 1 tablet in the evening    Schedule your appointment with [facility]    Schedule your CT scan at the [facility]

## 2017-09-13 NOTE — NURSING NOTE
"Chief Complaint   Patient presents with     ER F/U       Initial LMP 02/24/2017 (Exact Date) Estimated body mass index is 23.49 kg/(m^2) as calculated from the following:    Height as of 4/4/17: 5' 7\" (1.702 m).    Weight as of 4/4/17: 150 lb (68 kg).  Medication Reconciliation: complete     Tiffanie Price, DAMION   "

## 2017-09-15 RX ORDER — IOPAMIDOL 755 MG/ML
82 INJECTION, SOLUTION INTRAVASCULAR ONCE
Status: COMPLETED | OUTPATIENT
Start: 2017-09-18 | End: 2017-09-18

## 2017-09-18 ENCOUNTER — HOSPITAL ENCOUNTER (OUTPATIENT)
Dept: CT IMAGING | Facility: CLINIC | Age: 72
Discharge: HOME | End: 2017-09-18
Attending: NURSE PRACTITIONER | Admitting: NURSE PRACTITIONER
Payer: MEDICARE

## 2017-09-18 DIAGNOSIS — K57.32 DIVERTICULITIS OF COLON WITHOUT HEMORRHAGE: ICD-10-CM

## 2017-09-18 DIAGNOSIS — K59.04 CHRONIC IDIOPATHIC CONSTIPATION: ICD-10-CM

## 2017-09-18 DIAGNOSIS — R10.84 ABDOMINAL PAIN, GENERALIZED: ICD-10-CM

## 2017-09-18 LAB
CREAT BLD-MCNC: 0.7 MG/DL (ref 0.52–1.04)
GFR SERPL CREATININE-BSD FRML MDRD: 82 ML/MIN/1.7M2

## 2017-09-18 PROCEDURE — 82565 ASSAY OF CREATININE: CPT

## 2017-09-18 PROCEDURE — 25000125 ZZHC RX 250: Performed by: NURSE PRACTITIONER

## 2017-09-18 PROCEDURE — 25000128 H RX IP 250 OP 636: Performed by: NURSE PRACTITIONER

## 2017-09-18 PROCEDURE — 74177 CT ABD & PELVIS W/CONTRAST: CPT

## 2017-09-18 RX ADMIN — SODIUM CHLORIDE 61 ML: 9 INJECTION, SOLUTION INTRAVENOUS at 15:35

## 2017-09-18 RX ADMIN — IOPAMIDOL 82 ML: 755 INJECTION, SOLUTION INTRAVENOUS at 15:35

## 2017-09-21 ENCOUNTER — TELEPHONE (OUTPATIENT)
Dept: FAMILY MEDICINE | Facility: CLINIC | Age: 72
End: 2017-09-21

## 2017-09-21 NOTE — TELEPHONE ENCOUNTER
Would like her to come in to review- hard to summarize in a note. Could she come in tomorrow? french

## 2017-09-21 NOTE — TELEPHONE ENCOUNTER
Pt requesting her results of her CT 9/18/17. Please Advise.  Sima [facility]

## 2017-09-21 NOTE — TELEPHONE ENCOUNTER
Patient called and notified, appointment scheduled for tomorrow to review the CT results.  RUPAL Conteh

## 2017-09-22 ENCOUNTER — OFFICE VISIT (OUTPATIENT)
Dept: FAMILY MEDICINE | Facility: CLINIC | Age: 72
End: 2017-09-22
Payer: COMMERCIAL

## 2017-09-22 VITALS
SYSTOLIC BLOOD PRESSURE: 130 MMHG | TEMPERATURE: 97 F | DIASTOLIC BLOOD PRESSURE: 62 MMHG | BODY MASS INDEX: 27.62 KG/M2 | WEIGHT: 166 LBS | OXYGEN SATURATION: 97 % | RESPIRATION RATE: 18 BRPM | HEART RATE: 65 BPM

## 2017-09-22 DIAGNOSIS — C09.9 MALIGNANT NEOPLASM OF TONSIL (H): Chronic | ICD-10-CM

## 2017-09-22 DIAGNOSIS — J98.4 DISORDER OF LUNG PARENCHYMA: Primary | ICD-10-CM

## 2017-09-22 PROCEDURE — 99214 OFFICE O/P EST MOD 30 MIN: CPT | Performed by: NURSE PRACTITIONER

## 2017-09-22 NOTE — MR AVS SNAPSHOT
After Visit Summary   9/22/2017    Ingrid Powell    MRN: [id number]           Patient Information     Date Of Birth          1945        Visit Information        Provider Department      9/22/2017 2:40 PM Tila Tyson APRN [facility]        Today's Diagnoses     Disorder of lung parenchyma    -  1    Hypothyroidism, unspecified type        HX of MALIGNANT NEOPL TONSIL        Hyperlipidemia LDL goal <130        Other specified hypothyroidism          Care Instructions    Schedule your CT scan. We will call you with the results.    I'll send a copy of today's note to Dr. Tracy.          Follow-ups after your visit        Your next 10 appointments already scheduled     Mar 07, 2018 10:20 AM CST   (Arrive by 10:05 AM)   CT SOFT TISSUE NECK W CONTRAST with UCCT1   [facility] ([facility])    [address]  [address] [facility] [address] [phone]           Please bring any scans or X-rays taken at other hospitals, if similar tests were done. Also bring a list of your medicines, including vitamins, minerals and over-the-counter drugs. It is safest to leave personal items at home.  Be sure to tell your doctor:   If you have any allergies.   If there s any chance you are pregnant.   If you are breastfeeding.   If you have any special needs.  You will have contrast for this exam. To prepare:   Do not eat or drink for 2 hours before your exam. If you need to take medicine, you may take it with small sips of water. (We may ask you to take liquid medicine as well.)   The day before your exam, drink extra fluids at least six 8-ounce glasses (unless your doctor tells you to restrict your fluids).  Patients over 70 or patients with diabetes or kidney problems:   If you haven t had a blood test (creatinine test) within the last 30 days, go to your clinic or Diagnostic Imaging Department for this test.  If you have diabetes:   If  your kidney function is normal, continue taking your metformin (Avandamet, Glucophage, Glucovance, Metaglip) on the day of your exam.   If your kidney function is abnormal, wait 48 hours before restarting this medicine.  Please wear loose clothing, such as a sweat suit or jogging clothes. Avoid snaps, zippers and other metal. We may ask you to undress and put on a hospital gown.  If you have any questions, please call the Imaging Department where you will have your exam.            Mar 07, 2018 11:00 AM CST   Lab with  LAB   [facility] ([facility])    [address] [place] [id number] [phone]            Mar 07, 2018  1:15 PM CST   (Arrive by 1:00 PM)   Return Visit with Tejinder Tracy MD   [facility] ([facility])    [address] [place] [id number] [phone]              Future tests that were ordered for you today     Open Future Orders        Priority Expected Expires Ordered    CT Chest w Contrast Routine 12/22/2017 9/22/2018 9/22/2017            Who to contact     If you have questions or need follow up information about today's clinic visit or your schedule please contact [facility] directly at [phone].  Normal or non-critical lab and imaging results will be communicated to you by [employer], letter or phone within 4 business days after the clinic has received the results. If you do not hear from us within 7 days, please contact the clinic through [employer]io[employer] or phone. If you have a critical or abnormal lab result, we will notify you by phone as soon as possible.  Submit refill requests through [employer] or call your pharmacy and they will forward the refill request to us. Please allow 3 business days for your refill to be completed.          Additional Information About Your Visit        [employer] Information     [employer] gives you secure access to your  electronic health record. If you see a primary care provider, you can also send messages to your care team and make appointments. If you have questions, please call your primary care clinic.  If you do not have a primary care provider, please call [phone] and they will assist you.        Care EveryWhere ID     This is your Care EveryWhere ID. This could be used by other organizations to access your [facility] medical records  [id number]        Your Vitals Were     Pulse Temperature Respirations Pulse Oximetry BMI (Body Mass Index)       65 97  F (36.1  C) (Tympanic) 18 97% 27.62 kg/m2        Blood Pressure from Last 3 Encounters:   09/22/17 130/62   09/13/17 130/80   09/10/17 134/77    Weight from Last 3 Encounters:   09/22/17 166 lb (75.3 kg)   09/13/17 167 lb 9.6 oz (76 kg)   08/01/17 166 lb 12.8 oz (75.7 kg)               Primary Care Provider Office Phone # Fax #    Tila Guillermonhan Tyson, APRN [facility] [phone]       [address] [place] [id number]        Equal Access to Services     [facility] : Hadii aad ku hadasho Soomaali, waaxda luqadaha, qaybta kaalmada adeegyada, kristal carver haypreetn del rodriguez . So [facility] [phone].    ATENCIÓN: Si habla español, tiene a ervin disposición servicios gratuitos de asistencia lingüística. LlKettering Health [facility]burg [phone].    We comply with applicable federal civil rights laws and [place] laws. We do not discriminate on the basis of race, color, national origin, age, disability sex, sexual orientation or gender identity.            Thank you!     Thank you for choosing [facility]  for your care. Our goal is always to provide you with excellent care. Hearing back from our patients is one way we can continue to improve our services. Please take a few minutes to complete the written survey that you may receive in the mail after your visit with us. Thank you!             Your Updated Medication List - Protect others around you: Learn how to safely  use, store and throw away your medicines at www.disposemymeds.org.          This list is accurate as of: 9/22/17  3:38 PM.  Always use your most recent med list.                   Brand Name Dispense Instructions for use Diagnosis    amoxicillin 875 MG tablet    AMOXIL    20 tablet    Take 1 tablet (875 mg) by mouth 2 times daily    Acute bronchitis, unspecified organism       benzonatate 200 MG capsule    TESSALON    21 capsule    Take 1 capsule (200 mg) by mouth 3 times daily as needed for cough        dextromethorphan-guaiFENesin  MG per 12 hr tablet    MUCINEX DM     Take 1 tablet by mouth daily as needed for cough        gabapentin 300 MG capsule    NEURONTIN    180 capsule    Take 1-2 capsules (300-600 mg) by mouth every evening as needed    Chronic bilateral low back pain, with sciatica presence unspecified       levothyroxine 75 MCG tablet    SYNTHROID/LEVOTHROID    90 tablet    Take 1 tablet (75 mcg) by mouth daily    Other specified hypothyroidism, Hypothyroidism, unspecified type, Malignant neoplasm of tonsil (H), Hyperlipidemia LDL goal <130       MAGNESIUM OXIDE PO      Take 250 mg by mouth daily        order for DME      RespirHeartbeats Remstar 60 series Auto CPAP 6-15 cm H2O    MARGIE (obstructive sleep apnea)       PARoxetine 20 MG tablet    PAXIL    90 tablet    Take 1 tablet (20 mg) by mouth At Bedtime    Major depressive disorder, recurrent episode, moderate (H)       polyethylene glycol powder    MIRALAX    510 g    Take 17 g (1 capful) by mouth daily    Chronic idiopathic constipation       RESTASIS 0.05 % ophthalmic emulsion   Generic drug:  cycloSPORINE      Place 1 drop into both eyes 2 times daily        simvastatin 40 MG tablet    ZOCOR    90 tablet    Take 1 tablet (40 mg) by mouth At Bedtime    Hyperlipidemia LDL goal <130       traZODone 100 MG tablet    DESYREL    90 tablet    TAKE 1 TABLET (100 MG) NIGHTLY AS NEEDED FOR SLEEP    Primary insomnia       TURMERIC PO      Take 1 tablet by  mouth daily        VICKS NYQUIL MULTI-SYMPTOM PO           vitamin B complex with vitamin C Tabs tablet    STRESS TAB     Take 1 tablet by mouth daily.        VITAMIN D (CHOLECALCIFEROL) PO      Take 2,000 Units by mouth daily.        zinc sulfate 220 (50 ZN) MG capsule    ZINCATE     Take 220 mg by mouth daily

## 2017-09-22 NOTE — PROGRESS NOTES
SUBJECTIVE:   Ingrid Powell is a 72 year old female who presents to clinic today for the following health issues:    Patient is here to review CT abdomen results:  CT scan was done due to chronic constipation, sluggish bowels (up to 2 weeks without bowel movement) abdominal pain.  Findings include left lower lobe nodular component measures 1.3 cm.      Review CT results    CT ABDOMEN PELVIS W CONTRAST 9/18/2017 3:44 PM     HISTORY: Lower abdominal pain.     CONTRAST:  82 mL Isovue 370.     TECHNIQUE: CT of the abdomen and pelvis is performed with IV contrast.     Routine assessed structures include the liver, spleen, pancreas,  adrenal glands, and kidneys. Other assessed structures include the  retroperitoneum, abdominal aorta, visualized gastrointestinal tract,  and abdominal wall. Intrapelvic anatomy is also assessed.     Radiation dose for this scan is reduced using automated exposure  control, adjustment of the mA and/or kV according to patient size, or  iterative reconstruction technique.     COMPARISON: 10/10/2015.     FINDINGS:     Abdomen: A mild-moderate pericardial effusion is similar to the prior  study. There is new patchy and nodular infiltrate in the left lower  lobe on image #1. In addition, there is a larger area of a bandlike  and nodular parenchymal abnormality which is new in the left lower  lobe on images 3 and 5. The largest nodular component measures 1.3 cm.  Fatty infiltration of the liver is present. A few small cysts in the  right kidney are present. There is a moderate-large amount of stool in  the mid and distal colon.     Pelvis:  No significant abnormality is demonstrated.         IMPRESSION:    1. There is new patchy and nodular infiltrate in the left lower lobe  which is most likely inflammatory and/or infectious. In addition,  there is some new bandlike parenchymal abnormality with a nodular  component in the left lower lobe. Follow-up chest CT in 3 months is  recommended in  reassessment.  2. There is a moderate-large amount of stool in the colon, as  described above. No acute inflammatory process or mass is  demonstrated.     JARED HOLT MD     Constipation  Much improved improved with  Miralax. Passing large amounts of typically soft stool, occasionally loose. Using prn.       Problem list and histories reviewed & adjusted, as indicated.  Additional history: as documented    Patient Active Problem List   Diagnosis     Hyperlipidemia LDL goal <130     HTN (hypertension)     HX of MALIGNANT NEOPL TONSIL     COPD, mild (H)     zAdvanced directives, counseling/discussion     Major depressive disorder, recurrent episode, moderate (HCC)     Anxiety     Hypothyroidism     MARGIE (obstructive sleep apnea), Severe     Disturbance of salivary secretion (XEROSTOMIA)     Renal cyst     Raynaud's syndrome     History of lumbar surgery     Primary insomnia     Lumbosacral radiculopathy at L3     DDD (degenerative disc disease), lumbar     Adenomatous colon polyp     Diverticulitis of colon     Benign neoplasm of gastrointestinal tract     Hemorrhoids, internal     Rectocele     Tortuous colon     Family history of breast cancer in mother     Malignant neoplasm of oropharynx (H)     Past Surgical History:   Procedure Laterality Date     BACK SURGERY       CARPAL TUNNEL RELEASE RT/LT      ?side     CHOLECYSTECTOMY       EYE SURGERY      cataracts     HERNIA REPAIR       INCISION AND DRAINAGE TRUNK, COMBINED  5/18/2012    Procedure:COMBINED INCISION AND DRAINAGE TRUNK; Incision and Drainage Abdominal Wall Abscess ; Surgeon:ALEXSANDER HANNON; Location:[facility] OR     INSERT PORT VASCULAR ACCESS  2/8/2012    Procedure:INSERT PORT VASCULAR ACCESS; Surgeon:MARY JANE GUAN; Location:[facility] OR     JOINT REPLACEMTN, KNEE RT/LT      bilateral     KNEE SURGERY  1995    left- total     KNEE SURGERY  2000    right- total     LAPAROSCOPIC APPENDECTOMY N/A 10/10/2015    Procedure: LAPAROSCOPIC APPENDECTOMY;  Surgeon:  Daniel Chamberlain MD;  Location: [facility]     LARYNGOSCOPY, ESOPHAGOSCOPY,  BIOPSY, COMBINED  2012    Procedure:COMBINED LARYNGOSCOPY, ESOPHAGOSCOPY,  BIOPSY; Direct Laryngoscopy, Esophagoscopy with Biopsy, Stealth Assisted Left Frontal Sinus Biopsy via Vidal Incision, 8 french Power Port in right subclavian & Percutaneous Endoscopic Gastrostomy Placement * Latex Safe*; Surgeon:LIBORIO CAZARES; Location: OR     left ankle fusion       OPTICAL TRACKING SYSTEM ENDOSCOPIC SINUS SURGERY  2012    Procedure:OPTICAL TRACKING SYSTEM ENDOSCOPIC SINUS SURGERY; Surgeon:LIBORIO CAZARES; Location:[facility]     RECTAL SURGERY      ? type     REMOVE PORT VASCULAR ACCESS  2012    Procedure: REMOVE PORT VASCULAR ACCESS;  Remove Port Vascular Access ;  Surgeon: Phillip Ye MD;  Location:  OR       Social History   Substance Use Topics     Smoking status: Former Smoker     Packs/day: 0.50     Years: 35.00     Types: Cigarettes     Quit date: 10/3/1995     Smokeless tobacco: Never Used     Alcohol use Yes      Comment: rare     Family History   Problem Relation Age of Onset     Breast Cancer Mother      Arthritis Mother      CANCER Mother      HEART DISEASE Father       at 72 of heart failure     Arthritis Brother      Arthritis Sister      CANCER Other      uncle pancreatic/grandmother- colon/aunt ? mat or pat  breast cancer     CANCER Sister              Reviewed and updated as needed this visit by clinical staffTobacco  Allergies  Meds  Problems  Med Hx  Surg Hx  Fam Hx  Soc Hx        Reviewed and updated as needed this visit by Provider  Allergies  Meds  Problems         ROS:  Constitutional, HEENT, cardiovascular, pulmonary, GI, , musculoskeletal, neuro, skin, endocrine and psych systems are negative, except as otherwise noted.      OBJECTIVE:   /62 (BP Location: Left arm, Patient Position: Chair, Cuff Size: Adult Regular)  Pulse 65  Temp 97  F (36.1  C)  (Tympanic)  Resp 18  Wt 166 lb (75.3 kg)  SpO2 97%  BMI 27.62 kg/m2  Body mass index is 27.62 kg/(m^2).  GENERAL: healthy, alert and no distress  RESP: normal work of breathing   PSYCH: mentation appears normal, affect normal/bright    Diagnostic Test Results:  none     ASSESSMENT/PLAN:       (J98.4) Disorder of lung parenchyma  (primary encounter diagnosis)  Comment: abnormality noted on abdominal CT scan . Especially in light of history of tonsillar cancer will do chest CT  Plan: CT Chest w Contrast            (C09.9) HX of MALIGNANT NEOPL TONSIL  Comment: Followed by Dr. Tracy. Completed chemo and XRT in 4/2012.   Plan: will copy note to Dr. Tracy       Patient Instructions   Schedule your CT scan. We will call you with the results.    I'll send a copy of today's note to Dr. Tracy.      Tila Tyson, RADHA, APRN CNP  [facility]

## 2017-09-22 NOTE — Clinical Note
Dr. Tracy, See findings of 9/18 abd CT.   9/26 Chest CT scan was a new 6 mm peripheral spiculated nodule in the right upper lobe and two new nodules in the right lower lobe inferolaterally measuring about 3 mm each.

## 2017-09-22 NOTE — NURSING NOTE
"Chief Complaint   Patient presents with     CT Results       Initial There were no vitals taken for this visit. Estimated body mass index is 27.89 kg/(m^2) as calculated from the following:    Height as of 9/13/17: 5' 5\" (1.651 m).    Weight as of 9/13/17: 167 lb 9.6 oz (76 kg).  Medication Reconciliation: complete      Health Maintenance that is potentially due pending provider review:  NONE    N/a  Here with sister Ranulfo  "

## 2017-09-22 NOTE — PATIENT INSTRUCTIONS
Schedule your CT scan. We will call you with the results.    I'll send a copy of today's note to Dr. Tracy.

## 2017-09-25 RX ORDER — IOPAMIDOL 755 MG/ML
80 INJECTION, SOLUTION INTRAVASCULAR ONCE
Status: COMPLETED | OUTPATIENT
Start: 2017-09-26 | End: 2017-09-26

## 2017-09-26 ENCOUNTER — HOSPITAL ENCOUNTER (OUTPATIENT)
Dept: CT IMAGING | Facility: CLINIC | Age: 72
Discharge: HOME | End: 2017-09-26
Attending: NURSE PRACTITIONER | Admitting: NURSE PRACTITIONER
Payer: MEDICARE

## 2017-09-26 DIAGNOSIS — J98.4 DISORDER OF LUNG PARENCHYMA: ICD-10-CM

## 2017-09-26 PROCEDURE — 25000128 H RX IP 250 OP 636: Performed by: RADIOLOGY

## 2017-09-26 PROCEDURE — 71260 CT THORAX DX C+: CPT

## 2017-09-26 PROCEDURE — 25000125 ZZHC RX 250: Performed by: RADIOLOGY

## 2017-09-26 RX ADMIN — IOPAMIDOL 80 ML: 755 INJECTION, SOLUTION INTRAVENOUS at 11:12

## 2017-09-26 RX ADMIN — SODIUM CHLORIDE 81 ML: 9 INJECTION, SOLUTION INTRAVENOUS at 11:11

## 2017-09-27 ENCOUNTER — TELEPHONE (OUTPATIENT)
Dept: FAMILY MEDICINE | Facility: CLINIC | Age: 72
End: 2017-09-27

## 2017-09-27 NOTE — TELEPHONE ENCOUNTER
Reviewed findings of yesterday's 9/26/17 chest CT scan with Dr. Tracy. He would like to see patient in clinic and will ask his staff to schedule appointment. I then spoke with patient and updated her on the CT scan results and my conversation with Dr. Tracy. She voiced understanding and will wait to hear from Dr. Tracy's office to schedule appointment.

## 2017-10-04 ENCOUNTER — TELEPHONE (OUTPATIENT)
Dept: ONCOLOGY | Facility: CLINIC | Age: 72
End: 2017-10-04

## 2017-10-04 DIAGNOSIS — J98.4 DISORDER OF LUNG PARENCHYMA: Primary | ICD-10-CM

## 2017-10-04 DIAGNOSIS — C10.9 MALIGNANT NEOPLASM OF OROPHARYNX (H): ICD-10-CM

## 2017-10-04 NOTE — TELEPHONE ENCOUNTER
Called pt and LMVM that her recent CT chest was reviewed at tumor conference and Dr. Tracy would like to see her again in 3 months with labs (CBC, CMP) and CT chest. Message sent to scheduling to arrange appointments. Encouraged pt to call back with any questions.

## 2017-10-04 NOTE — TELEPHONE ENCOUNTER
----- Message from NORA Vazquez sent at 9/29/2017  8:51 AM CDT -----  Regarding: RE: Pulmonary nodule  Hey,    The group feels this nodule is unlikely to represent a malignancy given that it showed up after a 3 month interval.    The recommendation is short term follow up with repeat imaging in 3 months.    Thanks  H  ----- Message -----     From: Tejinder Tracy MD     Sent: 9/27/2017   1:37 PM       To: NORA Vazquez, #  Subject: Pulmonary nodule                                 Deo Kim,    Could you review koko at the Pulmonary nodule tumor board.     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 This is extremely unlikely to be metastatic disease. She has 1/2 pk per day for 35 yrs ex smoking history.     Tejinder

## 2017-11-06 ENCOUNTER — RADIANT APPOINTMENT (OUTPATIENT)
Dept: MAMMOGRAPHY | Facility: CLINIC | Age: 72
End: 2017-11-06
Attending: NURSE PRACTITIONER
Payer: COMMERCIAL

## 2017-11-06 DIAGNOSIS — Z12.31 VISIT FOR SCREENING MAMMOGRAM: ICD-10-CM

## 2017-11-06 PROCEDURE — G0202 SCR MAMMO BI INCL CAD: HCPCS | Mod: TC

## 2017-11-17 ENCOUNTER — TELEPHONE (OUTPATIENT)
Dept: FAMILY MEDICINE | Facility: CLINIC | Age: 72
End: 2017-11-17

## 2017-11-17 NOTE — TELEPHONE ENCOUNTER
Reason for Call:  Form, our goal is to have forms completed with 72 hours, however, some forms may require a visit or additional information.    Type of letter, form or note:  medical    Who is the form from?: [employer] (if other please explain)    Where did the form come from: form was faxed in    What clinic location was the form placed at?: [place]    Where the form was placed: [facility]               Call taken on 11/17/2017 at 11:57 AM by Myra Lew

## 2017-11-27 DIAGNOSIS — E78.5 HYPERLIPIDEMIA LDL GOAL <130: ICD-10-CM

## 2017-11-28 RX ORDER — SIMVASTATIN 40 MG
40 TABLET ORAL AT BEDTIME
Qty: 90 TABLET | Refills: 0 | Status: SHIPPED | OUTPATIENT
Start: 2017-11-28 | End: 2018-02-24

## 2017-12-20 DIAGNOSIS — F51.01 PRIMARY INSOMNIA: ICD-10-CM

## 2017-12-20 RX ORDER — TRAZODONE HYDROCHLORIDE 100 MG/1
TABLET ORAL
Qty: 90 TABLET | Refills: 1 | Status: SHIPPED | OUTPATIENT
Start: 2017-12-20 | End: 2018-06-04

## 2017-12-20 NOTE — TELEPHONE ENCOUNTER
traZODone (DESYREL) 100 MG tablet       Last Written Prescription Date: 6/29/17  Last Fill Quantity: 90; # refills: 1  Last Office Visit with [facility], [facility] or [facility] prescribing provider:  9/22/17         Lab Results   Component Value Date    AST 25 08/01/2017     Lab Results   Component Value Date    ALT 27 08/01/2017     PHQ-9 SCORE 4/27/2016 9/7/2016 8/1/2017   Total Score - - -   Total Score 6 2 10     JAIME-7 SCORE 4/27/2016 9/7/2016 8/1/2017   Total Score - - -   Total Score 1 2 4

## 2018-01-10 ENCOUNTER — RADIANT APPOINTMENT (OUTPATIENT)
Dept: CT IMAGING | Facility: CLINIC | Age: 73
End: 2018-01-10
Attending: INTERNAL MEDICINE
Payer: COMMERCIAL

## 2018-01-10 ENCOUNTER — ONCOLOGY VISIT (OUTPATIENT)
Dept: ONCOLOGY | Facility: CLINIC | Age: 73
End: 2018-01-10
Attending: INTERNAL MEDICINE
Payer: MEDICARE

## 2018-01-10 VITALS
BODY MASS INDEX: 28.76 KG/M2 | WEIGHT: 172.84 LBS | HEART RATE: 78 BPM | RESPIRATION RATE: 16 BRPM | OXYGEN SATURATION: 96 % | TEMPERATURE: 97 F | DIASTOLIC BLOOD PRESSURE: 71 MMHG | SYSTOLIC BLOOD PRESSURE: 105 MMHG

## 2018-01-10 DIAGNOSIS — C10.9 MALIGNANT NEOPLASM OF OROPHARYNX (H): ICD-10-CM

## 2018-01-10 DIAGNOSIS — C10.9 MALIGNANT NEOPLASM OF OROPHARYNX (H): Primary | ICD-10-CM

## 2018-01-10 DIAGNOSIS — R13.19 ESOPHAGEAL DYSPHAGIA: ICD-10-CM

## 2018-01-10 DIAGNOSIS — E50.7 XEROPHTHALMIA: ICD-10-CM

## 2018-01-10 DIAGNOSIS — J98.4 DISORDER OF LUNG PARENCHYMA: ICD-10-CM

## 2018-01-10 LAB
ALBUMIN SERPL-MCNC: 3 G/DL (ref 3.4–5)
ALP SERPL-CCNC: 73 U/L (ref 40–150)
ALT SERPL W P-5'-P-CCNC: 38 U/L (ref 0–50)
ANION GAP SERPL CALCULATED.3IONS-SCNC: 7 MMOL/L (ref 3–14)
AST SERPL W P-5'-P-CCNC: 31 U/L (ref 0–45)
BASOPHILS # BLD AUTO: 0 10E9/L (ref 0–0.2)
BASOPHILS NFR BLD AUTO: 1 %
BILIRUB SERPL-MCNC: 0.3 MG/DL (ref 0.2–1.3)
BUN SERPL-MCNC: 20 MG/DL (ref 7–30)
CALCIUM SERPL-MCNC: 8.2 MG/DL (ref 8.5–10.1)
CHLORIDE SERPL-SCNC: 104 MMOL/L (ref 94–109)
CO2 SERPL-SCNC: 29 MMOL/L (ref 20–32)
CREAT SERPL-MCNC: 0.73 MG/DL (ref 0.52–1.04)
DIFFERENTIAL METHOD BLD: NORMAL
EOSINOPHIL # BLD AUTO: 0.2 10E9/L (ref 0–0.7)
EOSINOPHIL NFR BLD AUTO: 5 %
ERYTHROCYTE [DISTWIDTH] IN BLOOD BY AUTOMATED COUNT: 14.6 % (ref 10–15)
GFR SERPL CREATININE-BSD FRML MDRD: 77 ML/MIN/1.7M2
GLUCOSE SERPL-MCNC: 86 MG/DL (ref 70–99)
HCT VFR BLD AUTO: 36.5 % (ref 35–47)
HGB BLD-MCNC: 11.8 G/DL (ref 11.7–15.7)
IMM GRANULOCYTES # BLD: 0 10E9/L (ref 0–0.4)
IMM GRANULOCYTES NFR BLD: 0.2 %
LYMPHOCYTES # BLD AUTO: 0.8 10E9/L (ref 0.8–5.3)
LYMPHOCYTES NFR BLD AUTO: 20.4 %
MCH RBC QN AUTO: 29.6 PG (ref 26.5–33)
MCHC RBC AUTO-ENTMCNC: 32.3 G/DL (ref 31.5–36.5)
MCV RBC AUTO: 92 FL (ref 78–100)
MONOCYTES # BLD AUTO: 0.4 10E9/L (ref 0–1.3)
MONOCYTES NFR BLD AUTO: 9.5 %
NEUTROPHILS # BLD AUTO: 2.6 10E9/L (ref 1.6–8.3)
NEUTROPHILS NFR BLD AUTO: 63.9 %
NRBC # BLD AUTO: 0 10*3/UL
NRBC BLD AUTO-RTO: 0 /100
PLATELET # BLD AUTO: 152 10E9/L (ref 150–450)
POTASSIUM SERPL-SCNC: 4 MMOL/L (ref 3.4–5.3)
PROT SERPL-MCNC: 6 G/DL (ref 6.8–8.8)
RBC # BLD AUTO: 3.99 10E12/L (ref 3.8–5.2)
SODIUM SERPL-SCNC: 140 MMOL/L (ref 133–144)
WBC # BLD AUTO: 4 10E9/L (ref 4–11)

## 2018-01-10 PROCEDURE — 36415 COLL VENOUS BLD VENIPUNCTURE: CPT | Performed by: INTERNAL MEDICINE

## 2018-01-10 PROCEDURE — 82565 ASSAY OF CREATININE: CPT

## 2018-01-10 PROCEDURE — G0463 HOSPITAL OUTPT CLINIC VISIT: HCPCS | Mod: ZF

## 2018-01-10 PROCEDURE — 99215 OFFICE O/P EST HI 40 MIN: CPT | Mod: ZP | Performed by: INTERNAL MEDICINE

## 2018-01-10 PROCEDURE — 80053 COMPREHEN METABOLIC PANEL: CPT | Performed by: INTERNAL MEDICINE

## 2018-01-10 PROCEDURE — 85025 COMPLETE CBC W/AUTO DIFF WBC: CPT | Performed by: INTERNAL MEDICINE

## 2018-01-10 RX ORDER — IOPAMIDOL 755 MG/ML
81 INJECTION, SOLUTION INTRAVASCULAR ONCE
Status: COMPLETED | OUTPATIENT
Start: 2018-01-10 | End: 2018-01-10

## 2018-01-10 RX ADMIN — IOPAMIDOL 81 ML: 755 INJECTION, SOLUTION INTRAVASCULAR at 10:04

## 2018-01-10 ASSESSMENT — PAIN SCALES - GENERAL: PAINLEVEL: MODERATE PAIN (4)

## 2018-01-10 NOTE — MR AVS SNAPSHOT
After Visit Summary   1/10/2018    Ingrid Powell    MRN: [id number]           Patient Information     Date Of Birth          1945        Visit Information        Provider Department      1/10/2018 1:15 PM Tejinder Tracy MD [facility]         Follow-ups after your visit        Your next 10 appointments already scheduled     Ranulfo 10, 2018 10:20 AM CST   (Arrive by 10:05 AM)   CT CHEST W CONTRAST with UCCT2   [facility] [place] [facility] ([facility])    [address] [address] [phone]           Please bring any scans or X-rays taken at other hospitals, if similar tests were done. Also bring a list of your medicines, including vitamins, minerals and over-the-counter drugs. It is safest to leave personal items at home.  Be sure to tell your doctor:   If you have any allergies.   If there s any chance you are pregnant.   If you are breastfeeding.   If you have any special needs.  You will have contrast for this exam. To prepare:   Do not eat or drink for 2 hours before your exam. If you need to take medicine, you may take it with small sips of water. (We may ask you to take liquid medicine as well.)   The day before your exam, drink extra fluids at least six 8-ounce glasses (unless your doctor tells you to restrict your fluids).  Patients over 70 or patients with diabetes or kidney problems:   If you haven t had a blood test (creatinine test) within the last 30 days, go to your clinic or Diagnostic Imaging Department for this test.  If you have diabetes:   If your kidney function is normal, continue taking your metformin (Avandamet, Glucophage, Glucovance, Metaglip) on the day of your exam.   If your kidney function is abnormal, wait 48 hours before restarting this medicine.  Please wear loose clothing, such as a sweat suit or jogging clothes. Avoid snaps, zippers and other metal. We may ask you to undress and  put on a hospital gown.  If you have any questions, please call the Imaging Department where you will have your exam.            Ranulfo 10, 2018 12:45 PM CST   Lab with UC LAB   [facility] ([facility])    [address] [place] [address]  [address] [facility] [address]   [phone]            Ranulfo 10, 2018  1:15 PM CST   (Arrive by 1:00 PM)   Return Visit with Tejinder Tracy MD   [facility] ([facility])    [address] [place] [address]  [address] [facility] [address]   [phone]            Mar 07, 2018 10:20 AM CST   (Arrive by 10:05 AM)   CT SOFT TISSUE NECK W CONTRAST with UCCT1   [facility] ([facility])    [address] [place] [id number] [phone]           Please bring any scans or X-rays taken at other hospitals, if similar tests were done. Also bring a list of your medicines, including vitamins, minerals and over-the-counter drugs. It is safest to leave personal items at home.  Be sure to tell your doctor:   If you have any allergies.   If there s any chance you are pregnant.   If you are breastfeeding.   If you have any special needs.  You will have contrast for this exam. To prepare:   Do not eat or drink for 2 hours before your exam. If you need to take medicine, you may take it with small sips of water. (We may ask you to take liquid medicine as well.)   The day before your exam, drink extra fluids at least six 8-ounce glasses (unless your doctor tells you to restrict your fluids).  Patients over 70 or patients with diabetes or kidney problems:   If you haven t had a blood test (creatinine test) within the last 30 days, go to your clinic or Diagnostic Imaging Department for this test.  If you have diabetes:   If your kidney function is normal, continue taking your metformin (Avandamet, Glucophage, Glucovance, Metaglip) on the day of your exam.   If your kidney  function is abnormal, wait 48 hours before restarting this medicine.  Please wear loose clothing, such as a sweat suit or jogging clothes. Avoid snaps, zippers and other metal. We may ask you to undress and put on a hospital gown.  If you have any questions, please call the Imaging Department where you will have your exam.            Mar 07, 2018 11:00 AM CST   Lab with UC LAB   [facility] ([facility])    [address] [place] [address] [phone]            Mar 07, 2018  1:15 PM CST   (Arrive by 1:00 PM)   Return Visit with Tejinder Tracy MD   [facility] ([facility])    [address] [place] [address] [phone]              Future tests that were ordered for you today     Open Future Orders        Priority Expected Expires Ordered    CT Chest w contrast Routine 12/27/2017 1/27/2018 10/4/2017    *CBC with platelets differential Routine 12/27/2017 1/27/2018 10/4/2017    Comprehensive metabolic panel Routine 12/27/2017 1/27/2018 10/4/2017            Who to contact     If you have questions or need follow up information about today's clinic visit or your schedule please contact [facility] directly at [phone].  Normal or non-critical lab and imaging results will be communicated to you by MyChart, letter or phone within 4 business days after the clinic has received the results. If you do not hear from us within 7 days, please contact the clinic through [employer] or phone. If you have a critical or abnormal lab result, we will notify you by phone as soon as possible.  Submit refill requests through [employer] or call your pharmacy and they will forward the refill request to us. Please allow 3 business days for your refill to be completed.          Additional Information About Your Visit        [employer] Information     [employer] gives you secure access to your  electronic health record. If you see a primary care provider, you can also send messages to your care team and make appointments. If you have questions, please call your primary care clinic.  If you do not have a primary care provider, please call [phone] and they will assist you.        Care EveryWhere ID     This is your Care EveryWhere ID. This could be used by other organizations to access your [facility] medical records  [id number]         Blood Pressure from Last 3 Encounters:   09/22/17 130/62   09/13/17 130/80   09/10/17 134/77    Weight from Last 3 Encounters:   09/22/17 75.3 kg (166 lb)   09/13/17 76 kg (167 lb 9.6 oz)   08/01/17 75.7 kg (166 lb 12.8 oz)              Today, you had the following     No orders found for display       Primary Care Provider Office Phone # Fax #    Tila Dooley Jah, APRN [facility] [phone]       [address] [place] [id number]        Equal Access to Services     CAMILLA CHAVEZ : Hadii aad ku hadasho Soomaali, waaxda luqadaha, qaybta kaalmada adeegyada, waxay idiin haypreetn del rodriguez . So [facility] [phone].    ATENCIÓN: Si habla español, tiene a ervin disposición servicios gratuitos de asistencia lingüística. Llame al [phone].    We comply with applicable federal civil rights laws and [place] laws. We do not discriminate on the basis of race, color, national origin, age, disability, sex, sexual orientation, or gender identity.            Thank you!     Thank you for choosing [facility]  for your care. Our goal is always to provide you with excellent care. Hearing back from our patients is one way we can continue to improve our services. Please take a few minutes to complete the written survey that you may receive in the mail after your visit with us. Thank you!             Your Updated Medication List - Protect others around you: Learn how to safely use, store and throw away your medicines at www.disposemymeds.org.           This list is accurate as of: 10/5/17  2:46 PM.  Always use your most recent med list.                   Brand Name Dispense Instructions for use Diagnosis    amoxicillin 875 MG tablet    AMOXIL    20 tablet    Take 1 tablet (875 mg) by mouth 2 times daily    Acute bronchitis, unspecified organism       benzonatate 200 MG capsule    TESSALON    21 capsule    Take 1 capsule (200 mg) by mouth 3 times daily as needed for cough        dextromethorphan-guaiFENesin  MG per 12 hr tablet    MUCINEX DM     Take 1 tablet by mouth daily as needed for cough        gabapentin 300 MG capsule    NEURONTIN    180 capsule    Take 1-2 capsules (300-600 mg) by mouth every evening as needed    Chronic bilateral low back pain, with sciatica presence unspecified       levothyroxine 75 MCG tablet    SYNTHROID/LEVOTHROID    90 tablet    Take 1 tablet (75 mcg) by mouth daily    Other specified hypothyroidism, Hypothyroidism, unspecified type, Malignant neoplasm of tonsil (H), Hyperlipidemia LDL goal <130       MAGNESIUM OXIDE PO      Take 250 mg by mouth daily        order for DME      Respironics Remstar 60 series Auto CPAP 6-15 cm H2O    MARGIE (obstructive sleep apnea)       PARoxetine 20 MG tablet    PAXIL    90 tablet    Take 1 tablet (20 mg) by mouth At Bedtime    Major depressive disorder, recurrent episode, moderate (H)       polyethylene glycol powder    MIRALAX    510 g    Take 17 g (1 capful) by mouth daily    Chronic idiopathic constipation       RESTASIS 0.05 % ophthalmic emulsion   Generic drug:  cycloSPORINE      Place 1 drop into both eyes 2 times daily        simvastatin 40 MG tablet    ZOCOR    90 tablet    Take 1 tablet (40 mg) by mouth At Bedtime    Hyperlipidemia LDL goal <130       traZODone 100 MG tablet    DESYREL    90 tablet    TAKE 1 TABLET (100 MG) NIGHTLY AS NEEDED FOR SLEEP    Primary insomnia       TURMERIC PO      Take 1 tablet by mouth daily        VICKS NYQUIL MULTI-SYMPTOM PO           vitamin B  complex with vitamin C Tabs tablet    STRESS TAB     Take 1 tablet by mouth daily.        VITAMIN D (CHOLECALCIFEROL) PO      Take 2,000 Units by mouth daily.        zinc sulfate 220 (50 ZN) MG capsule    ZINCATE     Take 220 mg by mouth daily

## 2018-01-10 NOTE — LETTER
January 12, 2018      Ingrid Powell  [address] [place] [address]  [facility] [address]        Dear ,    We are writing to inform you of your test results.    Your test results fall within the expected range(s) or remain unchanged from previous results.  Please continue with current treatment plan.    Resulted Orders   *CBC with platelets differential   Result Value Ref Range    WBC 4.0 4.0 - 11.0 10e9/L    RBC Count 3.99 3.8 - 5.2 10e12/L    Hemoglobin 11.8 11.7 - 15.7 g/dL    Hematocrit 36.5 35.0 - 47.0 %    MCV 92 78 - 100 fl    MCH 29.6 26.5 - 33.0 pg    MCHC 32.3 31.5 - 36.5 g/dL    RDW 14.6 10.0 - 15.0 %    Platelet Count 152 150 - 450 10e9/L    Diff Method Automated Method     % Neutrophils 63.9 %    % Lymphocytes 20.4 %    % Monocytes 9.5 %    % Eosinophils 5.0 %    % Basophils 1.0 %    % Immature Granulocytes 0.2 %    Nucleated RBCs 0 0 /100    Absolute Neutrophil 2.6 1.6 - 8.3 10e9/L    Absolute Lymphocytes 0.8 0.8 - 5.3 10e9/L    Absolute Monocytes 0.4 0.0 - 1.3 10e9/L    Absolute Eosinophils 0.2 0.0 - 0.7 10e9/L    Absolute Basophils 0.0 0.0 - 0.2 10e9/L    Abs Immature Granulocytes 0.0 0 - 0.4 10e9/L    Absolute Nucleated RBC 0.0    Comprehensive metabolic panel   Result Value Ref Range    Sodium 140 133 - 144 mmol/L    Potassium 4.0 3.4 - 5.3 mmol/L    Chloride 104 94 - 109 mmol/L    Carbon Dioxide 29 20 - 32 mmol/L    Anion Gap 7 3 - 14 mmol/L    Glucose 86 70 - 99 mg/dL    Urea Nitrogen 20 7 - 30 mg/dL    Creatinine 0.73 0.52 - 1.04 mg/dL    GFR Estimate 77 >60 mL/min/1.7m2      Comment:      Non  GFR Calc    GFR Estimate If Black >90 >60 mL/min/1.7m2      Comment:       GFR Calc    Calcium 8.2 (L) 8.5 - 10.1 mg/dL    Bilirubin Total 0.3 0.2 - 1.3 mg/dL    Albumin 3.0 (L) 3.4 - 5.0 g/dL    Protein Total 6.0 (L) 6.8 - 8.8 g/dL    Alkaline Phosphatase 73 40 - 150 U/L    ALT 38 0 - 50 U/L    AST 31 0 - 45 U/L   Creatinine POCT   Result Value Ref Range     Creatinine 0.8 0.52 - 1.04 mg/dL    GFR Estimate 70 >60 mL/min/1.7m2    GFR Estimate If Black 85 >60 mL/min/1.7m2       If you have any questions or concerns, please call the clinic at the number listed above.       Sincerely,        Lab/dw

## 2018-01-10 NOTE — PROGRESS NOTES
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 Presents for annual follow up    HPI:  She was diagnosed with R tonsillar SCC with associated R neck node, p16+. She was given weekly carbo/taxol and XRT and completed therapy in 4/2012.     Interval Hx:   Ingrid returns to clinic today for f/u. She is accompanied by her sister.    She has dry eye which is very bothersome for her. She has followed up with opthalmology. She continues to struggle with her dry eyes as it is affecting her vision.    She continues to have dry mouth. She has difficulty swallowing. She had food obstructed and her sister had to perform Heimlich maneuver performed.     She has pain in her left wrist and left shoulder. She has numbness in her left fingers other than thumb.     She is doing quite well otherwise. She still has dry mouth and dry eyes. For her xerostomia, she has tried a number of different things and nothing seems to help outside of water.  Her taste is permanently affected, but she is able to chew and eat with minimal limitation. No new lumps/bumps, mouth lesions, mouth or throat pain.  Denies HA, n/t, cough, congestion, chest pain, SOB, or LOZADA. Her voice is usually a bit hoarse at baseline. She does not drink alcohol or smoke.      ROS: See interval hx. Denies fevers, chills, HA, congestion, cough, sore throat, CP, SOB, abdominal pain, N/V, diarrhea, changes in urination, bleeding, bruising, rash, dizziness.     Medications:  Current Outpatient Prescriptions   Medication Sig     traZODone (DESYREL) 100 MG tablet TAKE 1 TABLET EVERY NIGHT AS NEEDED FOR SLEEP     simvastatin (ZOCOR) 40 MG tablet Take 1 tablet (40 mg) by mouth At Bedtime Must have fasting lab done now!     PARoxetine (PAXIL) 20 MG tablet Take 1 tablet (20 mg) by mouth At Bedtime     levothyroxine (SYNTHROID/LEVOTHROID) 75 MCG tablet Take 1 tablet (75 mcg) by mouth daily     gabapentin (NEURONTIN) 300 MG capsule Take 1-2 capsules  (300-600 mg) by mouth every evening as needed     RESTASIS 0.05 % ophthalmic emulsion Place 1 drop into both eyes 2 times daily     ORDER FOR DME Respironics Remstar 60 series Auto CPAP 6-15 cm H2O     B Complex Vitamins (VITAMIN  B COMPLEX) tablet Take 1 tablet by mouth daily.       VITAMIN D, CHOLECALCIFEROL, PO Take 2,000 Units by mouth daily.       TURMERIC PO Take 1 tablet by mouth daily     No current facility-administered medications for this visit.       Allergies, PMHx, PSx, and Social Hx reviewed per EPIC.    Physical Exam:  /71  Pulse 78  Temp 97  F (36.1  C) (Oral)  Resp 16  Wt 78.4 kg (172 lb 13.5 oz)  SpO2 96%  BMI 28.76 kg/m2  Wt Readings from Last 10 Encounters:   01/10/18 78.4 kg (172 lb 13.5 oz)   09/22/17 75.3 kg (166 lb)   09/13/17 76 kg (167 lb 9.6 oz)   08/01/17 75.7 kg (166 lb 12.8 oz)   07/19/17 76.7 kg (169 lb)   06/07/17 78.4 kg (172 lb 12.8 oz)   03/08/17 78.9 kg (174 lb)   03/08/17 79.1 kg (174 lb 4.8 oz)   12/30/16 78.9 kg (174 lb)   11/09/16 79.4 kg (175 lb)     General: AAOx3, pleasant, no acute distress  HEENT: PERRL, EOMI, oropharynx moist and pink, without lesions or thrush.   Neck: no cervical or suprclavicular adenopathy  Heart: RRR, S1 and S2, no murmurs, clicks, or rubs  Lungs: CTA bilaterally, no wheezes, crackles, rhonchi, no use of accessory muscles  Abdomen: BS present, soft, non-tender, non-distended, no hepatosplenomegaly, no masses  Neuro: CN2-12 grossly intact, motor and sensation grossly intact  Skin: a few bruises on her lower legs  Extremities: trace lower extremity edema  Negative for Tinel or Phalen's sign. She has some tenderness with Watkins Mayo sign    Labs:  Recent Labs   Lab Test  01/10/18   1010  08/01/17   1204  06/07/17   1141  03/08/17   1253  01/29/16   1219   NA  140  138  137  142  143   POTASSIUM  4.0  4.8  3.9  4.0  4.2   CHLORIDE  104  103  104  107  108   CO2  29  30  28  28  29   ANIONGAP  7  5  5  8  6   BUN  20  18  26  13  19   CR   0.73  0.78  0.70  0.62  0.68   GLC  86  89  77  78  81   JOSÉ MIGUEL  8.2*  9.7  8.4*  8.5  9.0     Recent Labs   Lab Test  03/25/13   1626  07/30/12   1253  06/26/12   1141  05/30/12   1331  05/21/12   0730   05/18/12   1010  05/17/12   1949   02/13/12   0737  02/11/12   0355  02/10/12   0656   MAG  1.3*  1.8  1.7  1.4*  1.6   < >  1.6  1.6   < >  1.3*  1.6  1.4*   PHOS   --    --    --    --    --    --   4.2  4.2   --   4.0  2.0*  5.6*    < > = values in this interval not displayed.     Recent Labs   Lab Test  01/10/18   1010  08/01/17   1204  06/07/17   1141  03/08/17   1253  01/29/16   1219   WBC  4.0  10.5  4.1  4.3  5.0   HGB  11.8  13.2  12.0  13.4  13.2   PLT  152  237  167  188  218   MCV  92  92  93  91  91   NEUTROPHIL  63.9  85.8  64.2  67.7  59.4     Recent Labs   Lab Test  01/10/18   1010  08/01/17   1204  06/07/17   1141   BILITOTAL  0.3  0.3  0.5   ALKPHOS  73  95  86   ALT  38  27  32   AST  31  25  25   ALBUMIN  3.0*  3.8  3.5     TSH   Date Value Ref Range Status   06/07/2017 1.72 0.40 - 4.00 mU/L Final   03/08/2017 4.72 (H) 0.40 - 4.00 mU/L Final   03/08/2017 4.72 (H) 0.40 - 4.00 mU/L Final       Assessment/Plan:  1. Hx of R tonsillar basaloid SCC: with R neck mass as well. P16+. She was treated with weekly carbo/taxol followed by XRT and completed therapy in 4/2012. She has been without recurrence since that time.    I have reviewed actual images from her CT scans with her. The official read was pending at the time of visit. The new lesion in her right lung from last evaluation seems to have resolved.  Essentially she has nothing to suggest her disease.     She is nearly 6 years from treatment completion. She is non smoker and would not benefit from scheduled follow up scans and visits.    I will not schedule a follow up for her. She can always call and schedule a follow up if there is change in her health status or there are any new concerns.     2. Hypothyroidism: remains on levothyroxine.     3.  Dry eyes: on restasis.  She has seen 3 different ophthalmologist but it has not improved. She would like my recommendation.   I would recommend that she follow up with one of our specialists. I will enter a referral for her.     4. Dysphagia: She had food stuck in her throat on a couple of occasions and her sister had to perform Heimlich's maneuver to dislodge the food. I would refer her back to speech and swallow therapist.     5. Dry mouth: continue hydration.    6. 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     Over 45 min of direct face to face time spent with patient with more than 50% time spent in counseling and coordinating care.

## 2018-01-10 NOTE — LETTER
1/10/2018       RE: Ingrid Powell  [address]  [place] [address]     Dear Colleague,    Thank you for referring your patient, Ingrid Powell, to the [facility]. Please see a copy of my visit note below.    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 Presents for annual follow up    HPI:  She was diagnosed with R tonsillar SCC with associated R neck node, p16+. She was given weekly carbo/taxol and XRT and completed therapy in 4/2012.     Interval Hx:   Ingrid returns to clinic today for f/u. She is accompanied by her sister.    She has dry eye which is very bothersome for her. She has followed up with opthalmology. She continues to struggle with her dry eyes as it is affecting her vision.    She continues to have dry mouth. She has difficulty swallowing. She had food obstructed and her sister had to perform Heimlich maneuver performed.     She has pain in her left wrist and left shoulder. She has numbness in her left fingers other than thumb.     She is doing quite well otherwise. She still has dry mouth and dry eyes. For her xerostomia, she has tried a number of different things and nothing seems to help outside of water.  Her taste is permanently affected, but she is able to chew and eat with minimal limitation. No new lumps/bumps, mouth lesions, mouth or throat pain.  Denies HA, n/t, cough, congestion, chest pain, SOB, or LOZADA. Her voice is usually a bit hoarse at baseline. She does not drink alcohol or smoke.      ROS: See interval hx. Denies fevers, chills, HA, congestion, cough, sore throat, CP, SOB, abdominal pain, N/V, diarrhea, changes in urination, bleeding, bruising, rash, dizziness.     Medications:  Current Outpatient Prescriptions   Medication Sig     traZODone (DESYREL) 100 MG tablet TAKE 1 TABLET EVERY NIGHT AS NEEDED FOR SLEEP     simvastatin (ZOCOR) 40 MG tablet Take 1 tablet (40 mg) by mouth At Bedtime Must have  fasting lab done now!     PARoxetine (PAXIL) 20 MG tablet Take 1 tablet (20 mg) by mouth At Bedtime     levothyroxine (SYNTHROID/LEVOTHROID) 75 MCG tablet Take 1 tablet (75 mcg) by mouth daily     gabapentin (NEURONTIN) 300 MG capsule Take 1-2 capsules (300-600 mg) by mouth every evening as needed     RESTASIS 0.05 % ophthalmic emulsion Place 1 drop into both eyes 2 times daily     ORDER FOR DME Respironics Remstar 60 series Auto CPAP 6-15 cm H2O     B Complex Vitamins (VITAMIN  B COMPLEX) tablet Take 1 tablet by mouth daily.       VITAMIN D, CHOLECALCIFEROL, PO Take 2,000 Units by mouth daily.       TURMERIC PO Take 1 tablet by mouth daily     No current facility-administered medications for this visit.       Allergies, PMHx, PSx, and Social Hx reviewed per EPIC.    Physical Exam:  /71  Pulse 78  Temp 97  F (36.1  C) (Oral)  Resp 16  Wt 78.4 kg (172 lb 13.5 oz)  SpO2 96%  BMI 28.76 kg/m2  Wt Readings from Last 10 Encounters:   01/10/18 78.4 kg (172 lb 13.5 oz)   09/22/17 75.3 kg (166 lb)   09/13/17 76 kg (167 lb 9.6 oz)   08/01/17 75.7 kg (166 lb 12.8 oz)   07/19/17 76.7 kg (169 lb)   06/07/17 78.4 kg (172 lb 12.8 oz)   03/08/17 78.9 kg (174 lb)   03/08/17 79.1 kg (174 lb 4.8 oz)   12/30/16 78.9 kg (174 lb)   11/09/16 79.4 kg (175 lb)     General: AAOx3, pleasant, no acute distress  HEENT: PERRL, EOMI, oropharynx moist and pink, without lesions or thrush.   Neck: no cervical or suprclavicular adenopathy  Heart: RRR, S1 and S2, no murmurs, clicks, or rubs  Lungs: CTA bilaterally, no wheezes, crackles, rhonchi, no use of accessory muscles  Abdomen: BS present, soft, non-tender, non-distended, no hepatosplenomegaly, no masses  Neuro: CN2-12 grossly intact, motor and sensation grossly intact  Skin: a few bruises on her lower legs  Extremities: trace lower extremity edema  Negative for Tinel or Phalen's sign. She has some tenderness with Watkins Mayo sign    Labs:  Recent Labs   Lab Test  01/10/18   1010   08/01/17   1204  06/07/17   1141  03/08/17   1253  01/29/16   1219   NA  140  138  137  142  143   POTASSIUM  4.0  4.8  3.9  4.0  4.2   CHLORIDE  104  103  104  107  108   CO2  29  30  28  28  29   ANIONGAP  7  5  5  8  6   BUN  20  18  26  13  19   CR  0.73  0.78  0.70  0.62  0.68   GLC  86  89  77  78  81   JOSÉ MIGUEL  8.2*  9.7  8.4*  8.5  9.0     Recent Labs   Lab Test  03/25/13   1626  07/30/12   1253  06/26/12   1141  05/30/12   1331  05/21/12   0730   05/18/12   1010  05/17/12   1949   02/13/12   0737  02/11/12   0355  02/10/12   0656   MAG  1.3*  1.8  1.7  1.4*  1.6   < >  1.6  1.6   < >  1.3*  1.6  1.4*   PHOS   --    --    --    --    --    --   4.2  4.2   --   4.0  2.0*  5.6*    < > = values in this interval not displayed.     Recent Labs   Lab Test  01/10/18   1010  08/01/17   1204  06/07/17   1141  03/08/17   1253  01/29/16   1219   WBC  4.0  10.5  4.1  4.3  5.0   HGB  11.8  13.2  12.0  13.4  13.2   PLT  152  237  167  188  218   MCV  92  92  93  91  91   NEUTROPHIL  63.9  85.8  64.2  67.7  59.4     Recent Labs   Lab Test  01/10/18   1010  08/01/17   1204  06/07/17   1141   BILITOTAL  0.3  0.3  0.5   ALKPHOS  73  95  86   ALT  38  27  32   AST  31  25  25   ALBUMIN  3.0*  3.8  3.5     TSH   Date Value Ref Range Status   06/07/2017 1.72 0.40 - 4.00 mU/L Final   03/08/2017 4.72 (H) 0.40 - 4.00 mU/L Final   03/08/2017 4.72 (H) 0.40 - 4.00 mU/L Final       Assessment/Plan:  1. Hx of R tonsillar basaloid SCC: with R neck mass as well. P16+. She was treated with weekly carbo/taxol followed by XRT and completed therapy in 4/2012. She has been without recurrence since that time.    I have reviewed actual images from her CT scans with her. The official read was pending at the time of visit. The new lesion in her right lung from last evaluation seems to have resolved.  Essentially she has nothing to suggest her disease.     She is nearly 6 years from treatment completion. She is non smoker and would not benefit from  scheduled follow up scans and visits.    I will not schedule a follow up for her. She can always call and schedule a follow up if there is change in her health status or there are any new concerns.     2. Hypothyroidism: remains on levothyroxine.     3. Dry eyes: on restasis.  She has seen 3 different ophthalmologist but it has not improved. She would like my recommendation.   I would recommend that she follow up with one of our specialists. I will enter a referral for her.     4. Dysphagia: She had food stuck in her throat on a couple of occasions and her sister had to perform Heimlich's maneuver to dislodge the food. I would refer her back to speech and swallow therapist.     5. Dry mouth: continue hydration.    6. 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     Over 45 min of direct face to face time spent with patient with more than 50% time spent in counseling and coordinating care.        Again, thank you for allowing me to participate in the care of your patient.      Sincerely,    Tejinder Tracy MD

## 2018-01-10 NOTE — NURSING NOTE
"Oncology Rooming Note    January 10, 2018 11:35 AM   Ingrid Powell is a 72 year old female who presents for:    Chief Complaint   Patient presents with     Oncology Clinic Visit     Return for Tonsil Ca , CT     Initial Vitals: /71  Pulse 78  Temp 97  F (36.1  C) (Oral)  Resp 16  Wt 78.4 kg (172 lb 13.5 oz)  SpO2 96%  BMI 28.76 kg/m2 Estimated body mass index is 28.76 kg/(m^2) as calculated from the following:    Height as of 9/13/17: 1.651 m (5' 5\").    Weight as of this encounter: 78.4 kg (172 lb 13.5 oz). Body surface area is 1.9 meters squared.  Moderate Pain (4) Comment: Data Unavailable   No LMP recorded. Patient is postmenopausal.  Allergies reviewed: Yes  Medications reviewed: Yes    Medications: Medication refills not needed today.  Pharmacy name entered into Tile:    [facility] - [place], [place] - [address] [place] [address] PHARMACY MAIL DELIVERY - [facility] [address]    Clinical concerns: CT results  [facility]  was notified.    8 minutes for nursing intake (face to face time)     Raisa Zimmer MA              "

## 2018-01-11 DIAGNOSIS — R13.10 SWALLOWING DIFFICULTY: ICD-10-CM

## 2018-01-11 DIAGNOSIS — C10.9: Primary | ICD-10-CM

## 2018-01-11 NOTE — LETTER
[facility]  [address] [facility] [address]  Phone: [phone]        January 11, 2018      Ingrid Powell                                                                                                                                [address]  [place] [address]            Dear Ms. Powell,    Dr. Tracy recommended that you see a speech therapist for your swallowing difficulty.    I have enclosed a copy of the speech therapy referral.     You may call them to schedule an appointment if you have not heard from them yet.   [phone].      Thank you,      Marybeth Tyson NP/ [place]

## 2018-01-11 NOTE — PROGRESS NOTES
Tila Tyson, APRN CNP  P  Marybeth Tyson Care Team Pool                     Please call Ingrid.  We should get her in to see speech therapy per Dr. Tracy's recommendation due to her swallowing difficulty..       Speech therapy referral placed. They should call her to schedule. Also letter and referral mailed to patient

## 2018-01-12 LAB
CREAT BLD-MCNC: 0.8 MG/DL (ref 0.52–1.04)
GFR SERPL CREATININE-BSD FRML MDRD: 70 ML/MIN/1.7M2

## 2018-01-19 ENCOUNTER — RADIANT APPOINTMENT (OUTPATIENT)
Dept: GENERAL RADIOLOGY | Facility: CLINIC | Age: 73
End: 2018-01-19
Attending: NURSE PRACTITIONER
Payer: COMMERCIAL

## 2018-01-19 ENCOUNTER — OFFICE VISIT (OUTPATIENT)
Dept: FAMILY MEDICINE | Facility: CLINIC | Age: 73
End: 2018-01-19
Payer: COMMERCIAL

## 2018-01-19 VITALS
OXYGEN SATURATION: 96 % | DIASTOLIC BLOOD PRESSURE: 80 MMHG | RESPIRATION RATE: 16 BRPM | HEART RATE: 70 BPM | TEMPERATURE: 97.2 F | BODY MASS INDEX: 29.71 KG/M2 | SYSTOLIC BLOOD PRESSURE: 122 MMHG | HEIGHT: 64 IN | WEIGHT: 174 LBS

## 2018-01-19 DIAGNOSIS — M25.532 LEFT WRIST PAIN: ICD-10-CM

## 2018-01-19 DIAGNOSIS — E78.5 HYPERLIPIDEMIA LDL GOAL <130: Chronic | ICD-10-CM

## 2018-01-19 DIAGNOSIS — M25.532 LEFT WRIST PAIN: Primary | ICD-10-CM

## 2018-01-19 DIAGNOSIS — C09.9 MALIGNANT NEOPLASM OF TONSIL (H): Chronic | ICD-10-CM

## 2018-01-19 DIAGNOSIS — R91.8 PULMONARY NODULES: ICD-10-CM

## 2018-01-19 DIAGNOSIS — E03.9 HYPOTHYROIDISM, UNSPECIFIED TYPE: ICD-10-CM

## 2018-01-19 PROCEDURE — 36415 COLL VENOUS BLD VENIPUNCTURE: CPT | Performed by: NURSE PRACTITIONER

## 2018-01-19 PROCEDURE — 73110 X-RAY EXAM OF WRIST: CPT | Mod: LT

## 2018-01-19 PROCEDURE — 84443 ASSAY THYROID STIM HORMONE: CPT | Performed by: NURSE PRACTITIONER

## 2018-01-19 PROCEDURE — 84439 ASSAY OF FREE THYROXINE: CPT | Performed by: NURSE PRACTITIONER

## 2018-01-19 PROCEDURE — 99215 OFFICE O/P EST HI 40 MIN: CPT | Performed by: NURSE PRACTITIONER

## 2018-01-19 RX ORDER — LEVOTHYROXINE SODIUM 75 UG/1
75 TABLET ORAL DAILY
Qty: 90 TABLET | Refills: 3 | Status: SHIPPED | OUTPATIENT
Start: 2018-01-19 | End: 2018-06-04

## 2018-01-19 NOTE — MR AVS SNAPSHOT
After Visit Summary   1/19/2018    Ingrid Powell    MRN: [id number]           Patient Information     Date Of Birth          1945        Visit Information        Provider Department      1/19/2018 1:00 PM Tila Tyson APRN CNP [facility]        Today's Diagnoses     Left wrist pain    -  1    Other specified hypothyroidism        Hypothyroidism, unspecified type        HX of MALIGNANT NEOPL TONSIL        Hyperlipidemia LDL goal <130          Care Instructions    We will call you with the results of your x ray and your labs          Follow-ups after your visit        Who to contact     If you have questions or need follow up information about today's clinic visit or your schedule please contact [facility] directly at [phone].  Normal or non-critical lab and imaging results will be communicated to you by MyChart, letter or phone within 4 business days after the clinic has received the results. If you do not hear from us within 7 days, please contact the clinic through [employer] or phone. If you have a critical or abnormal lab result, we will notify you by phone as soon as possible.  Submit refill requests through "[employer] or call your pharmacy and they will forward the refill request to us. Please allow 3 business days for your refill to be completed.          Additional Information About Your Visit        MyChart Information     "[employer] gives you secure access to your electronic health record. If you see a primary care provider, you can also send messages to your care team and make appointments. If you have questions, please call your primary care clinic.  If you do not have a primary care provider, please call [phone] and they will assist you.        Care EveryWhere ID     This is your Care EveryWhere ID. This could be used by other organizations to access your [facility] medical records  [id number]        Your Vitals Were     Pulse Temperature  "Respirations Height Pulse Oximetry BMI (Body Mass Index)    70 97.2  F (36.2  C) (Tympanic) 16 5' 3.5\" (1.613 m) 96% 30.34 kg/m2       Blood Pressure from Last 3 Encounters:   01/19/18 122/80   01/10/18 105/71   09/22/17 130/62    Weight from Last 3 Encounters:   01/19/18 174 lb (78.9 kg)   01/10/18 172 lb 13.5 oz (78.4 kg)   09/22/17 166 lb (75.3 kg)              We Performed the Following     TSH with free T4 reflex          Where to get your medicines      These medications were sent to [facility] - [place], [place] - [address]  [address] [facility] [address]     Phone:  [phone]     levothyroxine 75 MCG tablet          Primary Care Provider Office Phone # Fax #    Tila Tyson, APRN [facility] [phone]       [address] [place] [address]        Equal Access to Services     [facility] Center[facility] : Hadii estephania ku hadasho Soomaali, waaxda luqadaha, qaybta kaalmada adeegyada, kristal carver hayjean-claude rodriguez . So [facility] [phone].    ATENCIÓN: Si habla español, tiene a ervin disposición servicios gratuitos de asistencia lingüística. Llame al [phone].    We comply with applicable federal civil rights laws and [place] laws. We do not discriminate on the basis of race, color, national origin, age, disability, sex, sexual orientation, or gender identity.            Thank you!     Thank you for choosing [facility]  for your care. Our goal is always to provide you with excellent care. Hearing back from our patients is one way we can continue to improve our services. Please take a few minutes to complete the written survey that you may receive in the mail after your visit with us. Thank you!             Your Updated Medication List - Protect others around you: Learn how to safely use, store and throw away your medicines at www.disposemymeds.org.          This list is accurate as of: 1/19/18  1:54 PM.  Always use your most recent med list.       "             Brand Name Dispense Instructions for use Diagnosis    gabapentin 300 MG capsule    NEURONTIN    180 capsule    Take 1-2 capsules (300-600 mg) by mouth every evening as needed    Chronic bilateral low back pain, with sciatica presence unspecified       levothyroxine 75 MCG tablet    SYNTHROID/LEVOTHROID    90 tablet    Take 1 tablet (75 mcg) by mouth daily    Other specified hypothyroidism, Hypothyroidism, unspecified type, Malignant neoplasm of tonsil (H), Hyperlipidemia LDL goal <130       order for DME      Respironics Remstar 60 series Auto CPAP 6-15 cm H2O    MARGIE (obstructive sleep apnea)       PARoxetine 20 MG tablet    PAXIL    90 tablet    Take 1 tablet (20 mg) by mouth At Bedtime    Major depressive disorder, recurrent episode, moderate (H)       RESTASIS 0.05 % ophthalmic emulsion   Generic drug:  cycloSPORINE      Place 1 drop into both eyes 2 times daily        simvastatin 40 MG tablet    ZOCOR    90 tablet    Take 1 tablet (40 mg) by mouth At Bedtime Must have fasting lab done now!    Hyperlipidemia LDL goal <130       traZODone 100 MG tablet    DESYREL    90 tablet    TAKE 1 TABLET EVERY NIGHT AS NEEDED FOR SLEEP    Primary insomnia       TURMERIC PO      Take 1 tablet by mouth daily        vitamin B complex with vitamin C Tabs tablet    STRESS TAB     Take 1 tablet by mouth daily.        VITAMIN D (CHOLECALCIFEROL) PO      Take 2,000 Units by mouth daily.

## 2018-01-19 NOTE — PROGRESS NOTES
SUBJECTIVE:   Ingrid Powell is a 72 year old female who presents to clinic today for the following health issues:    Musculoskeletal problem/pain      Duration: 1 month    Description  Location: left wrist    Intensity:  Moderate swelling    Accompanying signs and symptoms: swelling    History  Previous similar problem: no   Previous evaluation:  none    Precipitating or alleviating factors:  Trauma or overuse: no   Aggravating factors include: overuse and to touch or turn it    Therapies tried and outcome: rest/inactivity        Can't use it functionally.  Very tender and painful to touch or when she attempts to rotate the hand..  Has noticed for the past month.  Does not recall any injury.          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  Patient completed chemo and XRT in April 2012.    Recently seen by her oncologist Dr. Tracy on 1/10/2018.  As she has been disease-free and is nearly 6 years out from treatment completion, Dr. Tracy does not feel that she needs to continue follow-up with him.  Follow-up needed only if there are any new concerns.  Patient is understandably very excited that she has done so well.  She continues to have issues with xerostomia.    Pulmonary nodules  The end of September 2017 a left lower lobe nodule was noted incidentally on abdominal CT scan.  A follow-up pulmonary CT scan was then completed on 9/26/2017.  It showed a spiculated 6 mm nodule in the right upper lobe.   Her case was presented at tumor conference on 9/29/2017 and the  recommendation was short-term follow-up with another CT scan in 3 months.  The skin was completed and per Dr. Corbett note 1/10/2018, the lesion in the right lung have resolved.  He felt that she had nothing to suggest new disease.        Problem list and histories reviewed & adjusted, as indicated.  Additional history: as documented    Patient Active Problem List   Diagnosis     Hyperlipidemia LDL goal <130     HTN (hypertension)     HX of MALIGNANT NEOPL TONSIL     COPD, mild (H)     zAdvanced directives, counseling/discussion     Major depressive disorder, recurrent episode, moderate (HCC)     Anxiety     Hypothyroidism     MARGIE (obstructive sleep apnea), Severe     Disturbance of salivary secretion (XEROSTOMIA)     Renal cyst     Raynaud's syndrome     History of lumbar surgery     Primary insomnia     Lumbosacral radiculopathy at L3     DDD (degenerative disc disease), lumbar     Adenomatous colon polyp     Diverticulitis of colon     Benign neoplasm of gastrointestinal tract     Hemorrhoids, internal     Rectocele     Tortuous colon     Family history of breast cancer in mother     Malignant neoplasm of oropharynx (H)     Past Surgical History:   Procedure Laterality Date     BACK SURGERY       CARPAL TUNNEL RELEASE RT/LT      ?side     CHOLECYSTECTOMY       EYE SURGERY      cataracts     HERNIA REPAIR       INCISION AND DRAINAGE TRUNK, COMBINED  5/18/2012    Procedure:COMBINED INCISION AND DRAINAGE TRUNK; Incision and Drainage Abdominal Wall Abscess ; Surgeon:ALEXSANDER HANNON; Location:[facility]     INSERT PORT VASCULAR ACCESS  2/8/2012    Procedure:INSERT PORT VASCULAR ACCESS; Surgeon:MARY JANE GUAN; Location:[facility]     JOINT REPLACEMTN, KNEE RT/LT      bilateral     KNEE SURGERY  1995    left- total     KNEE SURGERY  2000    right- total     LAPAROSCOPIC APPENDECTOMY N/A 10/10/2015    Procedure: LAPAROSCOPIC APPENDECTOMY;  Surgeon: Daniel Chamberlain MD;  Location: [facility]     LARYNGOSCOPY,  ESOPHAGOSCOPY,  BIOPSY, COMBINED  2012    Procedure:COMBINED LARYNGOSCOPY, ESOPHAGOSCOPY,  BIOPSY; Direct Laryngoscopy, Esophagoscopy with Biopsy, Stealth Assisted Left Frontal Sinus Biopsy via Vidal Incision, 8 french Power Port in right subclavian & Percutaneous Endoscopic Gastrostomy Placement * Latex Safe*; Surgeon:LIBORIO CAZARES; Location:[facility] OR     left ankle fusion       OPTICAL TRACKING SYSTEM ENDOSCOPIC SINUS SURGERY  2012    Procedure:OPTICAL TRACKING SYSTEM ENDOSCOPIC SINUS SURGERY; Surgeon:LIBORIO CAZARES; Location:[facility]     RECTAL SURGERY      ? type     REMOVE PORT VASCULAR ACCESS  2012    Procedure: REMOVE PORT VASCULAR ACCESS;  Remove Port Vascular Access ;  Surgeon: Phillip Ye MD;  Location:  OR       Social History   Substance Use Topics     Smoking status: Former Smoker     Packs/day: 0.50     Years: 35.00     Types: Cigarettes     Quit date: 10/3/1995     Smokeless tobacco: Never Used     Alcohol use Yes      Comment: rare     Family History   Problem Relation Age of Onset     Breast Cancer Mother      Arthritis Mother      CANCER Mother      HEART DISEASE Father       at 72 of heart failure     Arthritis Brother      Arthritis Sister      CANCER Other      uncle pancreatic/grandmother- colon/aunt ? mat or pat  breast cancer     CANCER Sister          BP Readings from Last 3 Encounters:   18 122/80   01/10/18 105/71   17 130/62    Wt Readings from Last 3 Encounters:   18 174 lb (78.9 kg)   01/10/18 172 lb 13.5 oz (78.4 kg)   17 166 lb (75.3 kg)                  Labs reviewed in EPIC    Reviewed and updated as needed this visit by clinical staffTobacco  Allergies  Meds  Problems  Med Hx  Surg Hx  Fam Hx  Soc Hx        Reviewed and updated as needed this visit by Provider  Allergies  Meds  Problems         ROS:  Constitutional, HEENT, cardiovascular, pulmonary, GI, , musculoskeletal, neuro, skin, endocrine  "and psych systems are negative, except as otherwise noted.    OBJECTIVE:     /80  Pulse 70  Temp 97.2  F (36.2  C) (Tympanic)  Resp 16  Ht 5' 3.5\" (1.613 m)  Wt 174 lb (78.9 kg)  SpO2 96%  BMI 30.34 kg/m2  Body mass index is 30.34 kg/(m^2).  GENERAL: healthy, alert and no distress  EYES: Eyes grossly normal to inspection, PERRL and conjunctivae and sclerae normal  HENT: ear canals and TM's normal, nose and mouth without ulcers or lesions  NECK: no adenopathy, no asymmetry, masses, or scars and thyroid normal to palpation  RESP: lungs clear to auscultation - no rales, rhonchi or wheezes  CV: regular rate and rhythm, normal S1 S2, no S3 or S4, no murmur, click or rub, no peripheral edema and peripheral pulses strong  MS: Left wrist swelling on dorsal surface extends from proximal wrist down to thumb. Pain with palpation to very light touch  PSYCH: mentation appears normal, affect normal/bright    Diagnostic Test Results:  Results for orders placed or performed in visit on 01/19/18   TSH with free T4 reflex   Result Value Ref Range    TSH 4.58 (H) 0.40 - 4.00 mU/L   T4 free   Result Value Ref Range    T4 Free 0.96 0.76 - 1.46 ng/dL     Xray - WRIST LEFT THREE OR MORE VIEWS    1/19/2018 1:46 PM      HISTORY: Pain and swelling extending from base of thumb to proximal  wrist. No known injury. One month history of left wrist pain.     COMPARISON: None.       FINDINGS: Mild joint space loss and osteophyte formation are seen at  the scaphotrapezium trapezoid and at the first carpometacarpal joints.  There is narrowing at the radiolunate joint with subchondral  eburnation. There is widening of the scapholunate space indicating  scapholunate ligament injury. There is diffuse osteopenia. No acute  fracture or malalignment is otherwise seen.         IMPRESSION:  1. Degenerative changes of the wrist as described.  2. Scapholunate ligament tear. 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  3. Diffuse osteopenia.  4. No fracture.     GALINA DAVIS MD    ASSESSMENT/PLAN:     ASSESSMENT:  1. Left wrist pain .  X-ray results above.  Patient called and it was recommended that she see Ortho hand specialist.  Referral is placed.   2. Hypothyroidism, unspecified type.  Stable no change change in plan of care   3. HX of MALIGNANT NEOPL TONSIL.  Patient is disease-free.  No further follow-up with oncology unless issues or concerns arise.   4. Pulmonary nodules.  Resolved   5. Hyperlipidemia LDL goal <130        PLAN:  Orders Placed This Encounter     XR Wrist Left G/E 3 Views     TSH with free T4 reflex     T4 free     levothyroxine (SYNTHROID/LEVOTHROID) 75 MCG tablet     order for DME       Patient Instructions   We will call you with the results of your x ray and your labs  Patient agrees with plan of care as outlined  This note consists of symbols derived from keyboarding, dictation and/or voice recognition software. As a result, there may be errors in the script that have gone undetected. Please consider this when interpreting information found in this chart.    TT spent: 55 minutes of which 55 minutes were spent in direct face to face contact with patient/family.  This included 2 visits with patient's, one prior to x-ray and one following to recent review results.  Patient teaching done regarding: Follow-up needed.. Greater than 50% of time spent counseling and/or coordinating care.       Tila Tyson, RADHA, APRN [facility]

## 2018-01-19 NOTE — NURSING NOTE
"Chief Complaint   Patient presents with     Musculoskeletal Problem     left wrist pain x 1 month     Thyroid Problem     refill meds       Initial /80  Pulse 70  Temp 97.2  F (36.2  C) (Tympanic)  Resp 16  Ht 5' 3.5\" (1.613 m)  Wt 174 lb (78.9 kg)  SpO2 96%  BMI 30.34 kg/m2 Estimated body mass index is 30.34 kg/(m^2) as calculated from the following:    Height as of this encounter: 5' 3.5\" (1.613 m).    Weight as of this encounter: 174 lb (78.9 kg).  Medication Reconciliation: complete    Health Maintenance that is potentially due pending provider review:  NONE    n/a    Is there anyone who you would like to be able to receive your results? No  If yes have patient fill out ELIZABETH    "

## 2018-01-20 LAB
T4 FREE SERPL-MCNC: 0.96 NG/DL (ref 0.76–1.46)
TSH SERPL DL<=0.005 MIU/L-ACNC: 4.58 MU/L (ref 0.4–4)

## 2018-01-22 DIAGNOSIS — S63.8X2S TEAR OF LEFT SCAPHOLUNATE LIGAMENT, SEQUELA: Primary | ICD-10-CM

## 2018-01-22 NOTE — PROGRESS NOTES
Patient called with results of left wrist XR.  XR showed tear in scapholunate ligament tear. Will refer to Ortho hand specialist. Tila Tyson RNC, NP  [facility]

## 2018-01-30 ENCOUNTER — TRANSFERRED RECORDS (OUTPATIENT)
Dept: HEALTH INFORMATION MANAGEMENT | Facility: CLINIC | Age: 73
End: 2018-01-30

## 2018-02-26 DIAGNOSIS — E78.5 HYPERLIPIDEMIA LDL GOAL <130: Primary | Chronic | ICD-10-CM

## 2018-02-26 DIAGNOSIS — E78.5 HYPERLIPIDEMIA LDL GOAL <130: Chronic | ICD-10-CM

## 2018-02-26 LAB
CHOLEST SERPL-MCNC: 157 MG/DL
HDLC SERPL-MCNC: 62 MG/DL
LDLC SERPL CALC-MCNC: 66 MG/DL
NONHDLC SERPL-MCNC: 95 MG/DL
TRIGL SERPL-MCNC: 145 MG/DL

## 2018-02-26 PROCEDURE — 80061 LIPID PANEL: CPT | Performed by: NURSE PRACTITIONER

## 2018-02-26 PROCEDURE — 36415 COLL VENOUS BLD VENIPUNCTURE: CPT | Performed by: NURSE PRACTITIONER

## 2018-03-28 ENCOUNTER — OFFICE VISIT (OUTPATIENT)
Dept: FAMILY MEDICINE | Facility: CLINIC | Age: 73
End: 2018-03-28
Payer: COMMERCIAL

## 2018-03-28 VITALS
RESPIRATION RATE: 16 BRPM | TEMPERATURE: 98.6 F | SYSTOLIC BLOOD PRESSURE: 113 MMHG | OXYGEN SATURATION: 96 % | DIASTOLIC BLOOD PRESSURE: 72 MMHG | HEIGHT: 64 IN | HEART RATE: 76 BPM | BODY MASS INDEX: 30.87 KG/M2 | WEIGHT: 180.8 LBS

## 2018-03-28 DIAGNOSIS — N94.9 FEMALE GENITAL SYMPTOMS: ICD-10-CM

## 2018-03-28 DIAGNOSIS — L30.9 ECZEMA, UNSPECIFIED TYPE: ICD-10-CM

## 2018-03-28 DIAGNOSIS — R21 RASH: Primary | ICD-10-CM

## 2018-03-28 DIAGNOSIS — N39.41 URGE INCONTINENCE OF URINE: ICD-10-CM

## 2018-03-28 DIAGNOSIS — R30.0 DYSURIA: ICD-10-CM

## 2018-03-28 LAB
ALBUMIN UR-MCNC: NEGATIVE MG/DL
APPEARANCE UR: CLEAR
BILIRUB UR QL STRIP: NEGATIVE
COLOR UR AUTO: YELLOW
GLUCOSE UR STRIP-MCNC: NEGATIVE MG/DL
HGB UR QL STRIP: NEGATIVE
KETONES UR STRIP-MCNC: ABNORMAL MG/DL
LEUKOCYTE ESTERASE UR QL STRIP: ABNORMAL
NITRATE UR QL: NEGATIVE
NON-SQ EPI CELLS #/AREA URNS LPF: NORMAL /LPF
PH UR STRIP: 5.5 PH (ref 5–7)
RBC #/AREA URNS AUTO: NORMAL /HPF
SOURCE: ABNORMAL
SP GR UR STRIP: 1.01 (ref 1–1.03)
SPECIMEN SOURCE: NORMAL
UROBILINOGEN UR STRIP-ACNC: 0.2 EU/DL (ref 0.2–1)
WBC #/AREA URNS AUTO: NORMAL /HPF
WET PREP SPEC: NORMAL

## 2018-03-28 PROCEDURE — 81001 URINALYSIS AUTO W/SCOPE: CPT | Performed by: NURSE PRACTITIONER

## 2018-03-28 PROCEDURE — 99214 OFFICE O/P EST MOD 30 MIN: CPT | Performed by: NURSE PRACTITIONER

## 2018-03-28 PROCEDURE — 87210 SMEAR WET MOUNT SALINE/INK: CPT | Performed by: NURSE PRACTITIONER

## 2018-03-28 RX ORDER — PIMECROLIMUS 10 MG/G
CREAM TOPICAL 2 TIMES DAILY
Qty: 60 G | Refills: 0 | Status: SHIPPED | OUTPATIENT
Start: 2018-03-28 | End: 2018-05-07

## 2018-03-28 RX ORDER — PIMECROLIMUS 10 MG/G
CREAM TOPICAL 2 TIMES DAILY
Qty: 60 G | Refills: 0 | Status: SHIPPED | OUTPATIENT
Start: 2018-03-28 | End: 2018-03-28

## 2018-03-28 NOTE — PATIENT INSTRUCTIONS
Apply a thin layer of Elidel twice daily to rash.     The rehab department will call you to schedule an appointment.

## 2018-03-28 NOTE — NURSING NOTE
"Chief Complaint   Patient presents with     Derm Problem       Initial Breastfeeding? No Estimated body mass index is 30.34 kg/(m^2) as calculated from the following:    Height as of 1/19/18: 5' 3.5\" (1.613 m).    Weight as of 1/19/18: 174 lb (78.9 kg).      Health Maintenance that is potentially due pending provider review:  NONE    n/a        "

## 2018-03-28 NOTE — NURSING NOTE
"Chief Complaint   Patient presents with     Derm Problem       Initial /72  Pulse 76  Temp 98.6  F (37  C) (Tympanic)  Resp 16  Ht 5' 3.5\" (1.613 m)  Wt 180 lb 12.8 oz (82 kg)  SpO2 96%  Breastfeeding? No  BMI 31.52 kg/m2 Estimated body mass index is 31.52 kg/(m^2) as calculated from the following:    Height as of this encounter: 5' 3.5\" (1.613 m).    Weight as of this encounter: 180 lb 12.8 oz (82 kg).  Medication Reconciliation: complete    "

## 2018-03-28 NOTE — MR AVS SNAPSHOT
"              After Visit Summary   3/28/2018    Ingrid Powell    MRN: [id number]           Patient Information     Date Of Birth          1945        Visit Information        Provider Department      3/28/2018 1:00 PM Tila Tyson APRN [facility]        Today's Diagnoses     Rash    -  1    Eczema, unspecified type        Dysuria        Female genital symptoms        Urge incontinence of urine          Care Instructions    Apply a thin layer of Elidel twice daily to rash.     The rehab department will call you to schedule an appointment.          Follow-ups after your visit        Additional Services     PHYSICAL THERAPY REFERRAL       *This therapy referral will be filtered to a centralized scheduling office at [facility] and the patient will receive a call to schedule an appointment at a [place] location most convenient for them. *     [facility] provides Physical Therapy evaluation and treatment and many specialty services across the [place] system.  If requesting a specialty program, please choose from the list below.    If you have not heard from the scheduling office within 2 business days, please call [phone] for all locations, with the exception of [place], please call [phone] and [facility], please call [phone]  Treatment: Evaluation & Treatment  Special Instructions/Modalities: urinary incontinence, frequency and urgency  Special Programs: Incontinence Pelvic Floor Program    Please be aware that coverage of these services is subject to the terms and limitations of your health insurance plan.  Call member services at your health plan with any benefit or coverage questions.      **Note to Provider:  If you are referring outside of [place] for the therapy appointment, please list the name of the location in the \"special instructions\" above, print the referral and give to the patient to schedule the appointment.  " "                Who to contact     If you have questions or need follow up information about today's clinic visit or your schedule please contact [facility] directly at [phone].  Normal or non-critical lab and imaging results will be communicated to you by [employer], letter or phone within 4 business days after the clinic has received the results. If you do not hear from us within 7 days, please contact the clinic through [employer] or phone. If you have a critical or abnormal lab result, we will notify you by phone as soon as possible.  Submit refill requests through [employer] or call your pharmacy and they will forward the refill request to us. Please allow 3 business days for your refill to be completed.          Additional Information About Your Visit        [employer] Information     [employer] gives you secure access to your electronic health record. If you see a primary care provider, you can also send messages to your care team and make appointments. If you have questions, please call your primary care clinic.  If you do not have a primary care provider, please call [phone] and they will assist you.        Care EveryWhere ID     This is your Care EveryWhere ID. This could be used by other organizations to access your [facility] medical records  [id number]        Your Vitals Were     Pulse Temperature Respirations Height Pulse Oximetry Breastfeeding?    76 98.6  F (37  C) (Tympanic) 16 5' 3.5\" (1.613 m) 96% No    BMI (Body Mass Index)                   31.52 kg/m2            Blood Pressure from Last 3 Encounters:   03/28/18 113/72   01/19/18 122/80   01/10/18 105/71    Weight from Last 3 Encounters:   03/28/18 180 lb 12.8 oz (82 kg)   01/19/18 174 lb (78.9 kg)   01/10/18 172 lb 13.5 oz (78.4 kg)              We Performed the Following     PHYSICAL THERAPY REFERRAL     UA reflex to Microscopic and Culture     Urine Microscopic     Wet prep          Today's Medication Changes          These changes " are accurate as of 3/28/18  2:02 PM.  If you have any questions, ask your nurse or doctor.               Start taking these medicines.        Dose/Directions    pimecrolimus 1 % cream   Commonly known as:  ELIDEL   Used for:  Rash, Eczema, unspecified type   Started by:  Tila Tyson APRN CNP        Apply topically 2 times daily Apply to rash on face twice daily until cleared   Quantity:  60 g   Refills:  0            Where to get your medicines      These medications were sent to [facility] [address] [facility] [address] [facility]  [address] [facility] [address]     Phone:  [phone]     pimecrolimus 1 % cream                Primary Care Provider Office Phone # Fax #    NORA Kaplan [phone]       [address] [facility] [address]        Equal Access to Services     KO CHAVEZ : Hadii estephania phoenix hadasho Soomaali, waaxda luqadaha, qaybta kaalmada adeegyada, kristal rodriguez . So [facility] [phone].    ATENCIÓN: Si habla español, tiene a ervin disposición servicios gratuitos de asistencia lingüística. Scot al [phone].    We comply with applicable federal civil rights laws and [place] laws. We do not discriminate on the basis of race, color, national origin, age, disability, sex, sexual orientation, or gender identity.            Thank you!     Thank you for choosing [facility]  for your care. Our goal is always to provide you with excellent care. Hearing back from our patients is one way we can continue to improve our services. Please take a few minutes to complete the written survey that you may receive in the mail after your visit with us. Thank you!             Your Updated Medication List - Protect others around you: Learn how to safely use, store and throw away your medicines at www.disposemymeds.org.          This list is accurate as of 3/28/18  2:02 PM.  Always use your most recent med list.                    Brand Name Dispense Instructions for use Diagnosis    gabapentin 300 MG capsule    NEURONTIN    180 capsule    TAKE ONE TO TWO CAPSULES BY MOUTH ONCE DAILY IN THE EVENING AS NEEDED    Chronic bilateral low back pain, with sciatica presence unspecified       levothyroxine 75 MCG tablet    SYNTHROID/LEVOTHROID    90 tablet    Take 1 tablet (75 mcg) by mouth daily    Hypothyroidism, unspecified type, Malignant neoplasm of tonsil (H), Hyperlipidemia LDL goal <130       order for [facility]      Respironics Remstar 60 series Auto CPAP 6-15 cm H2O    MARGIE (obstructive sleep apnea)       PARoxetine 20 MG tablet    PAXIL    90 tablet    Take 1 tablet (20 mg) by mouth At Bedtime    Major depressive disorder, recurrent episode, moderate (H)       pimecrolimus 1 % cream    ELIDEL    60 g    Apply topically 2 times daily Apply to rash on face twice daily until cleared    Rash, Eczema, unspecified type       RESTASIS 0.05 % ophthalmic emulsion   Generic drug:  cycloSPORINE      Place 1 drop into both eyes 2 times daily        simvastatin 40 MG tablet    ZOCOR    90 tablet    TAKE 1 TABLET (40 MG) BY MOUTH AT BEDTIME. MUST HAVE FASTING LAB DONE NOW.    Hyperlipidemia LDL goal <130       traZODone 100 MG tablet    DESYREL    90 tablet    TAKE 1 TABLET EVERY NIGHT AS NEEDED FOR SLEEP    Primary insomnia       TURMERIC PO      Take 1 tablet by mouth daily        vitamin B complex with vitamin C Tabs tablet    STRESS TAB     Take 1 tablet by mouth daily.        VITAMIN D (CHOLECALCIFEROL) PO      Take 2,000 Units by mouth daily.

## 2018-03-28 NOTE — PROGRESS NOTES
SUBJECTIVE:   Ingrid Powell is a 72 year old female who presents to clinic today for the following health issues:      Rash      Duration: x 2- 3 month    Description  Location: Cheeks and forehead  Itching: moderate    Intensity:  No pain    Accompanying signs and symptoms: Flakes and red    History (similar episodes/previous evaluation): Had eczema when she was younger.       Precipitating or alleviating factors:  New exposures:  None  Recent travel: no      Therapies tried and outcome: Vaseline, OTC psoriasis cream- nothing helped    Itches, dry.   Present a few months  Not related to foods.  No new detergents, soaps, lotions.      URINARY TRACT SYMPTOMS      Duration: Worse over the past 2 weeks    Description  frequency, urgency and up at night.  up at least 2-3 times at night.    Intensity:  moderate    Accompanying signs and symptoms:  Fever/chills: no   Flank pain no   Nausea and vomiting: no   Vaginal symptoms: discharge brown, no odor  Abdominal/Pelvic Pain: no     History  History of frequent UTI's: no   History of kidney stones: no   Sexually Active: no   Possibility of pregnancy: No    Precipitating or alleviating factors: None    Therapies tried and outcome: none   Outcome: 0        Problem list and histories reviewed & adjusted, as indicated.  Additional history: as documented    Patient Active Problem List   Diagnosis     Hyperlipidemia LDL goal <130     HTN (hypertension)     HX of MALIGNANT NEOPL TONSIL     COPD, mild (H)     zAdvanced directives, counseling/discussion     Major depressive disorder, recurrent episode, moderate (HCC)     Anxiety     Hypothyroidism     MARGIE (obstructive sleep apnea), Severe     Disturbance of salivary secretion (XEROSTOMIA)     Renal cyst     Raynaud's syndrome     History of lumbar surgery     Primary insomnia     Lumbosacral radiculopathy at L3     DDD (degenerative disc disease), lumbar     Adenomatous colon polyp     Diverticulitis of colon     Benign neoplasm  of gastrointestinal tract     Hemorrhoids, internal     Rectocele     Tortuous colon     Family history of breast cancer in mother     Malignant neoplasm of oropharynx (H)     Past Surgical History:   Procedure Laterality Date     BACK SURGERY       CARPAL TUNNEL RELEASE RT/LT      ?side     CHOLECYSTECTOMY       EYE SURGERY      cataracts     HERNIA REPAIR       INCISION AND DRAINAGE TRUNK, COMBINED  5/18/2012    Procedure:COMBINED INCISION AND DRAINAGE TRUNK; Incision and Drainage Abdominal Wall Abscess ; Surgeon:ALEXSANDER HANNON; Location:[facility]     INSERT PORT VASCULAR ACCESS  2/8/2012    Procedure:INSERT PORT VASCULAR ACCESS; Surgeon:MARY JANE GUAN; Location[facility]     JOINT REPLACEMTN, KNEE RT/LT      bilateral     KNEE SURGERY  1995    left- total     KNEE SURGERY  2000    right- total     LAPAROSCOPIC APPENDECTOMY N/A 10/10/2015    Procedure: LAPAROSCOPIC APPENDECTOMY;  Surgeon: Daniel Chamberlain MD;  Location: [facility]     LARYNGOSCOPY, ESOPHAGOSCOPY,  BIOPSY, COMBINED  2/8/2012    Procedure:COMBINED LARYNGOSCOPY, ESOPHAGOSCOPY,  BIOPSY; Direct Laryngoscopy, Esophagoscopy with Biopsy, Stealth Assisted Left Frontal Sinus Biopsy via Vidal Incision, 8 [place] Power Port in right subclavian & Percutaneous Endoscopic Gastrostomy Placement * Latex Safe*; Surgeon:LIBORIO CAZARES; Location:[facility]     left ankle fusion       OPTICAL TRACKING SYSTEM ENDOSCOPIC SINUS SURGERY  2/8/2012    Procedure:OPTICAL TRACKING SYSTEM ENDOSCOPIC SINUS SURGERY; Surgeon:LIBORIO CAZARES; Location[facility]     RECTAL SURGERY      ? type     REMOVE PORT VASCULAR ACCESS  8/21/2012    Procedure: REMOVE PORT VASCULAR ACCESS;  Remove Port Vascular Access ;  Surgeon: Alexsander Hannon MD;  Location: [facility] OR       Social History   Substance Use Topics     Smoking status: Former Smoker     Packs/day: 0.50     Years: 35.00     Types: Cigarettes     Quit date: 10/3/1995     Smokeless tobacco: Never Used      "Alcohol use Yes      Comment: rare     Family History   Problem Relation Age of Onset     Breast Cancer Mother      Arthritis Mother      CANCER Mother      HEART DISEASE Father       at 72 of heart failure     Arthritis Brother      Pancreatic Cancer Brother      Arthritis Sister      CANCER Other      uncle pancreatic/grandmother- colon/aunt ? mat or pat  breast cancer     CANCER Sister          BP Readings from Last 3 Encounters:   18 113/72   18 122/80   01/10/18 105/71    Wt Readings from Last 3 Encounters:   18 180 lb 12.8 oz (82 kg)   18 174 lb (78.9 kg)   01/10/18 172 lb 13.5 oz (78.4 kg)                    Reviewed and updated as needed this visit by clinical staff  Tobacco  Allergies  Meds  Problems  Med Hx  Surg Hx  Fam Hx  Soc Hx        Reviewed and updated as needed this visit by Provider  Allergies  Meds  Problems         ROS:  Constitutional, HEENT, cardiovascular, pulmonary, gi and gu systems are negative, except as otherwise noted.    OBJECTIVE:     /72  Pulse 76  Temp 98.6  F (37  C) (Tympanic)  Resp 16  Ht 5' 3.5\" (1.613 m)  Wt 180 lb 12.8 oz (82 kg)  SpO2 96%  Breastfeeding? No  BMI 31.52 kg/m2  Body mass index is 31.52 kg/(m^2).  GENERAL: healthy, alert, no distress and elderly  EYES: Eyes grossly normal to inspection, conjunctivae and sclerae normal and wearing glasses  NECK: no adenopathy, no asymmetry, masses, or scars and thyroid normal to palpation  RESP: lungs clear to auscultation - no rales, rhonchi or wheezes  CV: regular rate and rhythm, normal S1 S2, no S3 or S4, no murmur, click or rub, no peripheral edema and peripheral pulses strong  ABDOMEN: soft, nontender, no hepatosplenomegaly, no masses and bowel sounds normal   (female): normal female external genitalia, normal urethral meatus , vaginal discharge - scant and cream-colored and vaginal mucosal atrophy.  Wet prep obtained  MS: no gross musculoskeletal defects noted, no " edema  SKIN: Dry, pink, rash with a dry scaly surface noted surrounding nose.  Left side of nose has a fair amount which extends minimally past left alar surface.  On the right alar surface there is a small amount of rash      Diagnostic Test Results:  Results for orders placed or performed in visit on 03/28/18   UA reflex to Microscopic and Culture   Result Value Ref Range    Color Urine Yellow     Appearance Urine Clear     Glucose Urine Negative NEG^Negative mg/dL    Bilirubin Urine Negative NEG^Negative    Ketones Urine Trace (A) NEG^Negative mg/dL    Specific Gravity Urine 1.015 1.003 - 1.035    Blood Urine Negative NEG^Negative    pH Urine 5.5 5.0 - 7.0 pH    Protein Albumin Urine Negative NEG^Negative mg/dL    Urobilinogen Urine 0.2 0.2 - 1.0 EU/dL    Nitrite Urine Negative NEG^Negative    Leukocyte Esterase Urine Trace (A) NEG^Negative    Source Midstream Urine    Urine Microscopic   Result Value Ref Range    WBC Urine 0 - 5 OTO5^0 - 5 /HPF    RBC Urine O - 2 OTO2^O - 2 /HPF    Squamous Epithelial /LPF Urine Few FEW^Few /LPF   Wet prep   Result Value Ref Range    Specimen Description Vagina     Wet Prep       No Trichomonas seen  No clue cells seen  No yeast seen         ASSESSMENT/PLAN:     ASSESSMENT:  1. Rash.  Consistent with eczema.  Will provide Elidel cream.   2. Dysuria.  UA is negative.  Await for culture.   3. Female genital symptoms.  Wet prep negative.   4. Urge incontinence of urine.  Discussed a referral to physical therapy for their urinary incontinence program.  Patient appeared interested.  She would rather try this than any medication to help with the urge incontinence.  Referral placed.       PLAN:  Orders Placed This Encounter     UA reflex to Microscopic and Culture     Urine Microscopic     PHYSICAL THERAPY REFERRAL     pimecrolimus (ELIDEL) 1 % cream       Patient Instructions   Apply a thin layer of Elidel twice daily to rash.     The rehab department will call you to schedule an  appointment.    Patient agrees with plan of care as outlined. Call or return to the clinic with any worsening of symptoms or no resolution. Patient/Parent verbalized understanding and is in agreement. Medication side effects reviewed.    Chart documentation with Dragon Voice recognition Software. Although reviewed after completion, some words and grammatical errors may remain.        Tila Tyson NP, APRN [facility]

## 2018-03-29 DIAGNOSIS — L30.9 ECZEMA, UNSPECIFIED TYPE: ICD-10-CM

## 2018-03-29 DIAGNOSIS — R21 RASH: ICD-10-CM

## 2018-04-09 ENCOUNTER — TELEPHONE (OUTPATIENT)
Dept: FAMILY MEDICINE | Facility: CLINIC | Age: 73
End: 2018-04-09

## 2018-04-09 NOTE — TELEPHONE ENCOUNTER
Prior Authorization Not Needed per Insurance    Medication: elidel  Insurance Company: [employer] - Phone [phone] Fax [phone]  Expected CoPay: $252.00    Pharmacy Filling the Rx: [facility] [address] [place] [address]  Pharmacy Notified: Yes  Patient Notified: Yes

## 2018-04-09 NOTE — TELEPHONE ENCOUNTER
Prior Authorization Retail Medication Request    Medication/Dose: grace  ICD code (if different than what is on RX): Eczema, unspecified type [L30.9]   Previously Tried and Failed:    Vaseline, OTC psoriasis cream- nothing helped  Rationale:   Rash       Duration: x 2- 3 month    Description  Location: Cheeks and forehead  Itching: moderate    Intensity:  No pain    Accompanying signs and symptoms: Flakes and red    History (similar episodes/previous evaluation): Had eczema when she was younger.       Precipitating or alleviating factors:  New exposures:  None  Recent travel: no         Itches, dry.   Present a few months  Not related to foods.  No new detergents, soaps, lotions.    Insurance Name:  [employer]  Insurance ID:  [id number]      Pharmacy Information (if different than what is on RX)  Name:  [facility]  Phone:

## 2018-04-24 ENCOUNTER — TRANSFERRED RECORDS (OUTPATIENT)
Dept: HEALTH INFORMATION MANAGEMENT | Facility: CLINIC | Age: 73
End: 2018-04-24

## 2018-05-07 ENCOUNTER — OFFICE VISIT (OUTPATIENT)
Dept: FAMILY MEDICINE | Facility: CLINIC | Age: 73
End: 2018-05-07
Payer: COMMERCIAL

## 2018-05-07 VITALS
HEIGHT: 64 IN | TEMPERATURE: 98.9 F | HEART RATE: 78 BPM | DIASTOLIC BLOOD PRESSURE: 64 MMHG | WEIGHT: 185 LBS | SYSTOLIC BLOOD PRESSURE: 118 MMHG | BODY MASS INDEX: 31.58 KG/M2 | RESPIRATION RATE: 18 BRPM | OXYGEN SATURATION: 97 %

## 2018-05-07 DIAGNOSIS — G47.33 OSA (OBSTRUCTIVE SLEEP APNEA): Chronic | ICD-10-CM

## 2018-05-07 DIAGNOSIS — E03.9 ACQUIRED HYPOTHYROIDISM: Chronic | ICD-10-CM

## 2018-05-07 DIAGNOSIS — J44.9 COPD, MILD (H): Chronic | ICD-10-CM

## 2018-05-07 DIAGNOSIS — M65.4 RADIAL STYLOID TENOSYNOVITIS: ICD-10-CM

## 2018-05-07 DIAGNOSIS — Z01.818 PREOP GENERAL PHYSICAL EXAM: Primary | ICD-10-CM

## 2018-05-07 DIAGNOSIS — I10 ESSENTIAL HYPERTENSION: Chronic | ICD-10-CM

## 2018-05-07 PROCEDURE — 99214 OFFICE O/P EST MOD 30 MIN: CPT | Performed by: FAMILY MEDICINE

## 2018-05-07 NOTE — MR AVS SNAPSHOT
After Visit Summary   5/7/2018    Ingrid Powell    MRN: [id number]           Patient Information     Date Of Birth          1945        Visit Information        Provider Department      5/7/2018 1:00 PM Jose Manuel Sutton MD [facility]        Today's Diagnoses     Preop general physical exam    -  1      Care Instructions      Before Your Surgery      Call your surgeon if there is any change in your health. This includes signs of a cold or flu (such as a sore throat, runny nose, cough, rash or fever).    Do not smoke, drink alcohol or take over the counter medicine (unless your surgeon or primary care doctor tells you to) for the 24 hours before and after surgery.    If you take prescribed drugs: Follow your doctor s orders about which medicines to take and which to stop until after surgery.    Eating and drinking prior to surgery: follow the instructions from your surgeon    Take a shower or bath the night before surgery. Use the soap your surgeon gave you to gently clean your skin. If you do not have soap from your surgeon, use your regular soap. Do not shave or scrub the surgery site.  Wear clean pajamas and have clean sheets on your bed.           Follow-ups after your visit        Your next 10 appointments already scheduled     May 11, 2018   Procedure with Ronak Biggs MD   [facility] (--)    [address] [facility] [address] [phone]           The medical center is located at [address]. (between [address] and [address] in [place], [place] miles north of [place]).              Who to contact     If you have questions or need follow up information about today's clinic visit or your schedule please contact [facility] directly at [phone].  Normal or non-critical lab and imaging results will be communicated to you by MyChart, letter or phone within 4 business days after the clinic has received the results. If you  "do not hear from us within 7 days, please contact the clinic through [employer] or phone. If you have a critical or abnormal lab result, we will notify you by phone as soon as possible.  Submit refill requests through [employer] or call your pharmacy and they will forward the refill request to us. Please allow 3 business days for your refill to be completed.          Additional Information About Your Visit        [employer] Information     [employer] gives you secure access to your electronic health record. If you see a primary care provider, you can also send messages to your care team and make appointments. If you have questions, please call your primary care clinic.  If you do not have a primary care provider, please call [phone] and they will assist you.        Care EveryWhere ID     This is your Care EveryWhere ID. This could be used by other organizations to access your [place] medical records  [id number]        Your Vitals Were     Pulse Temperature Respirations Height Pulse Oximetry Breastfeeding?    78 98.9  F (37.2  C) (Tympanic) 18 5' 3.5\" (1.613 m) 97% No    BMI (Body Mass Index)                   32.26 kg/m2            Blood Pressure from Last 3 Encounters:   05/07/18 118/64   03/28/18 113/72   01/19/18 122/80    Weight from Last 3 Encounters:   05/07/18 185 lb (83.9 kg)   03/28/18 180 lb 12.8 oz (82 kg)   01/19/18 174 lb (78.9 kg)              Today, you had the following     No orders found for display         Today's Medication Changes          These changes are accurate as of 5/7/18  1:36 PM.  If you have any questions, ask your nurse or doctor.               Stop taking these medicines if you haven't already. Please contact your care team if you have questions.     pimecrolimus 1 % cream   Commonly known as:  ELIDEL   Stopped by:  Jose Manuel Sutton MD                    Primary Care Provider Office Phone # Fax #    Jose Manuel Sutton [phone]       [address] [facility] " [id number]        Equal Access to Services     [facility] Center[facility]IA : Hadii estephania phoenix manjitfili Sachaali, waasifda luqkarenha, qaybta aracelilisaronny beckford, kristal hernandez. So [facility] [phone].    ATENCIÓN: Si habla español, tiene a ervin disposición servicios gratuitos de asistencia lingüística. Melissaame al [phone].    We comply with applicable federal civil rights laws and [place] laws. We do not discriminate on the basis of race, color, national origin, age, disability, sex, sexual orientation, or gender identity.            Thank you!     Thank you for choosing [facility]  for your care. Our goal is always to provide you with excellent care. Hearing back from our patients is one way we can continue to improve our services. Please take a few minutes to complete the written survey that you may receive in the mail after your visit with us. Thank you!             Your Updated Medication List - Protect others around you: Learn how to safely use, store and throw away your medicines at www.disposemymeds.org.          This list is accurate as of 5/7/18  1:36 PM.  Always use your most recent med list.                   Brand Name Dispense Instructions for use Diagnosis    gabapentin 300 MG capsule    NEURONTIN    180 capsule    TAKE ONE TO TWO CAPSULES BY MOUTH ONCE DAILY IN THE EVENING AS NEEDED    Chronic bilateral low back pain, with sciatica presence unspecified       levothyroxine 75 MCG tablet    SYNTHROID/LEVOTHROID    90 tablet    Take 1 tablet (75 mcg) by mouth daily    Hypothyroidism, unspecified type, Malignant neoplasm of tonsil (H), Hyperlipidemia LDL goal <130       order for DME      Respironics Remstar 60 series Auto CPAP 6-15 cm H2O    MARGIE (obstructive sleep apnea)       PARoxetine 20 MG tablet    PAXIL    90 tablet    Take 1 tablet (20 mg) by mouth At Bedtime    Major depressive disorder, recurrent episode, moderate (H)       RESTASIS 0.05 % ophthalmic emulsion   Generic drug:   cycloSPORINE      Place 1 drop into both eyes 2 times daily        simvastatin 40 MG tablet    ZOCOR    90 tablet    TAKE 1 TABLET (40 MG) BY MOUTH AT BEDTIME. MUST HAVE FASTING LAB DONE NOW.    Hyperlipidemia LDL goal <130       traZODone 100 MG tablet    DESYREL    90 tablet    TAKE 1 TABLET EVERY NIGHT AS NEEDED FOR SLEEP    Primary insomnia       TURMERIC PO      Take 1 tablet by mouth daily        vitamin B complex with vitamin C Tabs tablet    STRESS TAB     Take 1 tablet by mouth daily.        VITAMIN D (CHOLECALCIFEROL) PO      Take 2,000 Units by mouth daily.

## 2018-05-07 NOTE — NURSING NOTE
"Chief Complaint   Patient presents with     Pre-Op Exam       Initial /64 (Cuff Size: Adult Large)  Pulse 78  Temp 98.9  F (37.2  C) (Tympanic)  Resp 18  Ht 5' 3.5\" (1.613 m)  Wt 185 lb (83.9 kg)  SpO2 97%  Breastfeeding? No  BMI 32.26 kg/m2 Estimated body mass index is 32.26 kg/(m^2) as calculated from the following:    Height as of this encounter: 5' 3.5\" (1.613 m).    Weight as of this encounter: 185 lb (83.9 kg).      Health Maintenance that is potentially due pending provider review:  NONE    n/a    Is there anyone who you would like to be able to receive your results? Not Applicable  If yes have patient fill out ELIZABETH    "

## 2018-05-07 NOTE — PROGRESS NOTES
[address] [address]  [phone]  Dept: [phone]    PRE-OP EVALUATION:  Today's date: 2018    Ingrid Powell (: 1945) presents for pre-operative evaluation assessment as requested by Dr. Biggs.  She requires evaluation and anesthesia risk assessment prior to undergoing surgery/procedure for treatment of Left wrist .    Proposed Surgery/ Procedure: Left 1st Distal compartment release  Date of Surgery/ Procedure: 2018  Time of Surgery/ Procedure: 12:35pm  Hospital/Surgical Facility: [facility]     Primary Physician: Tila Tyson  Type of Anesthesia Anticipated: Local with MAC    Patient has a Health Care Directive or Living Will:  YES - on file with [facility]    1. NO - Do you have a history of heart attack, stroke, stent, bypass or surgery on an artery in the head, neck, heart or legs?  2. NO - Do you ever have any pain or discomfort in your chest?  3. NO - Do you have a history of  Heart Failure?  4. NO - Are you troubled by shortness of breath when: walking on the level, up a slight hill or at night?  5. NO - Do you currently have a cold, bronchitis or other respiratory infection?  6. NO - Do you have a cough, shortness of breath or wheezing?  7. NO - Do you sometimes get pains in the calves of your legs when you walk?  8. NO - Do you or anyone in your family have previous history of blood clots?  9. NO - Do you or does anyone in your family have a serious bleeding problem such as prolonged bleeding following surgeries or cuts?  10. NO - Have you ever had problems with anemia or been told to take iron pills?  11. NO - Have you had any abnormal blood loss such as black, tarry or bloody stools, or abnormal vaginal bleeding?  12. NO - Have you ever had a blood transfusion?  13. NO - Have you or any of your relatives ever had problems with anesthesia?  14. YES - DO YOU HAVE SLEEP APNEA, EXCESSIVE SNORING OR DAYTIME DROWSINESS?  CPAP  15. NO - Do you have any prosthetic heart valves?  16. YES - DO YOU HAVE PROSTHETIC JOINTS? Bilateral TKR  17. NO - Is there any chance that you may be pregnant?      HPI:     HPI related to upcoming procedure:   72-year-old female presents for preop physical exam.  Patient is scheduled to have left first distal compartment release on May 11, 2018.  Patient requires evaluation and anesthesia risk assessment prior to undergoing surgery/procedure.  Past medical history significant for COPD, hypertension, diverticulosis, obstructive sleep apnea and osteoarthritis.  Patient denies any chest pain, palpitation, shortness of breath, cough, bowel/bladder or other relevant systemic symptoms      MEDICAL HISTORY:     Patient Active Problem List    Diagnosis Date Noted     COPD, mild (H) 02/01/2012     Priority: High     8/16/13 Mild Obstruction on PFT       HX of MALIGNANT NEOPL TONSIL 01/26/2012     Priority: High     12/2014: 3 years out. Sees Dr. Senior ENT at [facility]       HTN (hypertension) 10/03/2011     Priority: High     Malignant neoplasm of oropharynx (H) 11/09/2016     Priority: Medium     Family history of breast cancer in mother 10/24/2016     Priority: Medium     Diverticulitis of colon 10/10/2016     Priority: Medium     Colonoscopy 3/10/2011       Benign neoplasm of gastrointestinal tract 10/10/2016     Priority: Medium     Polyp rectum        Hemorrhoids, internal 10/10/2016     Priority: Medium     Rectocele 10/10/2016     Priority: Medium     Tortuous colon 10/10/2016     Priority: Medium     Colonoscopy done 3/10/2011 by [facility]       Adenomatous colon polyp 09/07/2016     Priority: Medium     History of lumbar surgery 04/27/2016     Priority: Medium     Primary insomnia 04/27/2016     Priority: Medium     Lumbosacral radiculopathy at L3 04/27/2016     Priority: Medium     DDD (degenerative disc disease), lumbar 04/27/2016     Priority: Medium     Renal cyst 08/11/2015     Priority:  Medium     2012, 13, on ct   Then on lumbar mri , 2 cm largest         Raynaud's syndrome 08/11/2015     Priority: Medium     Disturbance of salivary secretion (XEROSTOMIA) 07/23/2014     Priority: Medium     s/p cancer treatment- stable       MARGIE (obstructive sleep apnea), Severe 10/31/2013     Priority: Medium     12/10/13 CPAP 6-15 cm H2O         Hypothyroidism 08/13/2013     Priority: Medium     Major depressive disorder, recurrent episode, moderate (HCC) 08/24/2012     Priority: Medium     Anxiety 08/24/2012     Priority: Medium     zAdvanced directives, counseling/discussion 02/09/2012     Priority: Medium     Advance Directive Problem List Overview:   Name Relationship Phone    Primary Health Care Agent Lida Farias daughter [phone]         Alternative Health Care Agent Neville Powell son [phone]       Advance Directive received and scanned. Click on Code in the patient header to view. 2/9/2012        Hyperlipidemia LDL goal <130 10/03/2011     Priority: Medium      Past Medical History:   Diagnosis Date     Arthritis      Depressive disorder      Gastroesophageal reflux disease      History of lumbar surgery 7/28/2015     Hoarseness      Oxygen dependent     at night     Reduced vision      Sleep apnea      Past Surgical History:   Procedure Laterality Date     BACK SURGERY       CARPAL TUNNEL RELEASE RT/LT      ?side     CHOLECYSTECTOMY       EYE SURGERY      cataracts     HERNIA REPAIR       INCISION AND DRAINAGE TRUNK, COMBINED  5/18/2012    Procedure:COMBINED INCISION AND DRAINAGE TRUNK; Incision and Drainage Abdominal Wall Abscess ; Surgeon:ALEXSANDER HANNON; Location:[facility] OR     INSERT PORT VASCULAR ACCESS  2/8/2012    Procedure:INSERT PORT VASCULAR ACCESS; Surgeon:MARY JANE GUAN; Location:[facility] OR     JOINT REPLACEMTN, KNEE RT/LT      bilateral     KNEE SURGERY  1995    left- total     KNEE SURGERY  2000    right- total     LAPAROSCOPIC APPENDECTOMY N/A 10/10/2015     Procedure: LAPAROSCOPIC APPENDECTOMY;  Surgeon: Daniel Chamberlain MD;  Location: [facility] OR     LARYNGOSCOPY, ESOPHAGOSCOPY,  BIOPSY, COMBINED  2/8/2012    Procedure:COMBINED LARYNGOSCOPY, ESOPHAGOSCOPY,  BIOPSY; Direct Laryngoscopy, Esophagoscopy with Biopsy, Stealth Assisted Left Frontal Sinus Biopsy via Vdial Incision, 8 french Power Port in right subclavian & Percutaneous Endoscopic Gastrostomy Placement * Latex Safe*; Surgeon:LIBORIO CAZARES; Location:[facility] OR     left ankle fusion       OPTICAL TRACKING SYSTEM ENDOSCOPIC SINUS SURGERY  2/8/2012    Procedure:OPTICAL TRACKING SYSTEM ENDOSCOPIC SINUS SURGERY; Surgeon:LIBORIO CAZARES; Location:[facility] OR     RECTAL SURGERY      ? type     REMOVE PORT VASCULAR ACCESS  8/21/2012    Procedure: REMOVE PORT VASCULAR ACCESS;  Remove Port Vascular Access ;  Surgeon: Phillip Ye MD;  Location: [facility] OR     Current Outpatient Prescriptions   Medication Sig Dispense Refill     B Complex Vitamins (VITAMIN  B COMPLEX) tablet Take 1 tablet by mouth daily.         gabapentin (NEURONTIN) 300 MG capsule TAKE ONE TO TWO CAPSULES BY MOUTH ONCE DAILY IN THE EVENING AS NEEDED 180 capsule 0     levothyroxine (SYNTHROID/LEVOTHROID) 75 MCG tablet Take 1 tablet (75 mcg) by mouth daily 90 tablet 3     ORDER FOR DME Respironics Remstar 60 series Auto CPAP 6-15 cm H2O       PARoxetine (PAXIL) 20 MG tablet Take 1 tablet (20 mg) by mouth At Bedtime 90 tablet 3     RESTASIS 0.05 % ophthalmic emulsion Place 1 drop into both eyes 2 times daily       simvastatin (ZOCOR) 40 MG tablet TAKE 1 TABLET (40 MG) BY MOUTH AT BEDTIME. MUST HAVE FASTING LAB DONE NOW. 90 tablet 0     traZODone (DESYREL) 100 MG tablet TAKE 1 TABLET EVERY NIGHT AS NEEDED FOR SLEEP 90 tablet 1     TURMERIC PO Take 1 tablet by mouth daily       VITAMIN D, CHOLECALCIFEROL, PO Take 2,000 Units by mouth daily.         OTC products: None, except as noted above    Allergies   Allergen Reactions     Dilaudid  "[Hydromorphone Hcl] Other (See Comments)     hallucinations     Fosamax [Alendronate Sodium] Rash            Norvasc [Amlodipine Besylate] Hives and Rash     Tegretol [Carbamazepine] Hives and Rash      Latex Allergy: NO    Social History   Substance Use Topics     Smoking status: Former Smoker     Packs/day: 0.50     Years: 35.00     Types: Cigarettes     Quit date: 10/3/1995     Smokeless tobacco: Never Used     Alcohol use Yes      Comment: rare     History   Drug Use No       REVIEW OF SYSTEMS:   Constitutional, neuro, ENT, endocrine, pulmonary, cardiac, gastrointestinal, genitourinary, musculoskeletal, integument and psychiatric systems are negative, except as otherwise noted.    EXAM:   /64 (Cuff Size: Adult Large)  Pulse 78  Temp 98.9  F (37.2  C) (Tympanic)  Resp 18  Ht 5' 3.5\" (1.613 m)  Wt 185 lb (83.9 kg)  SpO2 97%  Breastfeeding? No  BMI 32.26 kg/m2    GENERAL APPEARANCE: healthy, alert and no distress     EYES: EOMI, PERRL     HENT: ear canals and TM's normal and nose and mouth without ulcers or lesions     NECK: no adenopathy, no asymmetry, masses, or scars and thyroid normal to palpation     RESP: lungs clear to auscultation - no rales, rhonchi or wheezes     CV: regular rates and rhythm, normal S1 S2, no S3 or S4 and no murmur, click or rub     ABDOMEN:  soft, nontender, no HSM or masses and bowel sounds normal     MS: Chronic osteoarthritic changes involving multiple joints     SKIN: no suspicious lesions or rashes     NEURO: Normal strength and tone, sensory exam grossly normal, mentation intact and speech normal     PSYCH: mentation appears normal. and affect normal/bright     LYMPHATICS: No cervical adenopathy    DIAGNOSTICS:   No labs or EKG required for low risk surgery (cataract, skin procedure, breast biopsy, etc)    Recent Labs   Lab Test  01/10/18   1010  08/01/17   1204   05/17/12   1949   02/10/12   0656   HGB  11.8  13.2   < >  11.1*   < >  13.3   PLT  152  237   < >  208   " < >  205   INR   --    --    --   1.10   --   1.03   NA  140  138   < >  138   < >  140   POTASSIUM  4.0  4.8   < >  3.7   < >  4.9   CR  0.73  0.78   < >  0.58   < >  0.64    < > = values in this interval not displayed.        IMPRESSION:   Reason for surgery/procedure: Left deQuervain tenosynovitis  Diagnosis/reason for consult: Requires evaluation and anesthesia risk assessment prior to undergoing surgery/procedure      The proposed surgical procedure is considered LOW risk.    REVISED CARDIAC RISK INDEX  The patient has the following serious cardiovascular risks for perioperative complications such as (MI, PE, VFib and 3  AV Block):  No serious cardiac risks  INTERPRETATION: 0 risks: Class I (very low risk - 0.4% complication rate)    The patient has the following additional risks for perioperative complications:    ICD-10-CM    1. Preop general physical exam Z01.818    2. Radial styloid tenosynovitis M65.4    3. Essential hypertension I10    4. COPD, mild (H) J44.9    5. Acquired hypothyroidism E03.9    6. MARGIE (obstructive sleep apnea), Severe G47.33        RECOMMENDATIONS:     APPROVAL GIVEN to proceed with proposed procedure, without further diagnostic evaluation       Signed Electronically by: Jose Manuel Sutton MD    Copy of this evaluation report is provided to requesting physician.    [facility] Preop Guidelines    Revised Cardiac Risk Index

## 2018-05-10 ENCOUNTER — ANESTHESIA EVENT (OUTPATIENT)
Dept: SURGERY | Facility: CLINIC | Age: 73
End: 2018-05-10
Payer: MEDICARE

## 2018-05-11 ENCOUNTER — ANESTHESIA (OUTPATIENT)
Dept: SURGERY | Facility: CLINIC | Age: 73
End: 2018-05-11
Payer: MEDICARE

## 2018-05-11 ENCOUNTER — SURGERY (OUTPATIENT)
Age: 73
End: 2018-05-11

## 2018-05-11 ENCOUNTER — HOSPITAL ENCOUNTER (OUTPATIENT)
Facility: CLINIC | Age: 73
Discharge: HOME | End: 2018-05-11
Attending: ORTHOPAEDIC SURGERY | Admitting: ORTHOPAEDIC SURGERY
Payer: MEDICARE

## 2018-05-11 VITALS
WEIGHT: 185 LBS | BODY MASS INDEX: 32.78 KG/M2 | HEIGHT: 63 IN | SYSTOLIC BLOOD PRESSURE: 114 MMHG | DIASTOLIC BLOOD PRESSURE: 68 MMHG | HEART RATE: 75 BPM | OXYGEN SATURATION: 97 % | RESPIRATION RATE: 16 BRPM | TEMPERATURE: 98.6 F

## 2018-05-11 DIAGNOSIS — M65.4 RADIAL STYLOID TENOSYNOVITIS: Primary | ICD-10-CM

## 2018-05-11 PROCEDURE — 25000125 ZZHC RX 250: Performed by: NURSE ANESTHETIST, CERTIFIED REGISTERED

## 2018-05-11 PROCEDURE — 27210995 ZZH RX 272: Performed by: ORTHOPAEDIC SURGERY

## 2018-05-11 PROCEDURE — 25000132 ZZH RX MED GY IP 250 OP 250 PS 637: Mod: GY | Performed by: PHYSICIAN ASSISTANT

## 2018-05-11 PROCEDURE — 71000027 ZZH RECOVERY PHASE 2 EACH 15 MINS: Performed by: ORTHOPAEDIC SURGERY

## 2018-05-11 PROCEDURE — 25000125 ZZHC RX 250: Performed by: ORTHOPAEDIC SURGERY

## 2018-05-11 PROCEDURE — 25000128 H RX IP 250 OP 636: Performed by: NURSE ANESTHETIST, CERTIFIED REGISTERED

## 2018-05-11 PROCEDURE — 25000128 H RX IP 250 OP 636: Performed by: ORTHOPAEDIC SURGERY

## 2018-05-11 PROCEDURE — 40000305 ZZH STATISTIC PRE PROC ASSESS I: Performed by: ORTHOPAEDIC SURGERY

## 2018-05-11 PROCEDURE — 36000050 ZZH SURGERY LEVEL 2 1ST 30 MIN: Performed by: ORTHOPAEDIC SURGERY

## 2018-05-11 PROCEDURE — 37000008 ZZH ANESTHESIA TECHNICAL FEE, 1ST 30 MIN: Performed by: ORTHOPAEDIC SURGERY

## 2018-05-11 PROCEDURE — A9270 NON-COVERED ITEM OR SERVICE: HCPCS | Mod: GY | Performed by: PHYSICIAN ASSISTANT

## 2018-05-11 PROCEDURE — 27210794 ZZH OR GENERAL SUPPLY STERILE: Performed by: ORTHOPAEDIC SURGERY

## 2018-05-11 RX ORDER — HYDROCODONE BITARTRATE AND ACETAMINOPHEN 5; 325 MG/1; MG/1
1-2 TABLET ORAL EVERY 4 HOURS PRN
Qty: 8 TABLET | Refills: 0
Start: 2018-05-11 | End: 2018-10-01

## 2018-05-11 RX ORDER — ONDANSETRON 4 MG/1
4 TABLET, ORALLY DISINTEGRATING ORAL EVERY 30 MIN PRN
Status: DISCONTINUED | OUTPATIENT
Start: 2018-05-11 | End: 2018-05-11 | Stop reason: HOSPADM

## 2018-05-11 RX ORDER — SODIUM CHLORIDE, SODIUM LACTATE, POTASSIUM CHLORIDE, CALCIUM CHLORIDE 600; 310; 30; 20 MG/100ML; MG/100ML; MG/100ML; MG/100ML
INJECTION, SOLUTION INTRAVENOUS CONTINUOUS
Status: DISCONTINUED | OUTPATIENT
Start: 2018-05-11 | End: 2018-05-11 | Stop reason: HOSPADM

## 2018-05-11 RX ORDER — ACETAMINOPHEN 325 MG/1
650 TABLET ORAL
Status: DISCONTINUED | OUTPATIENT
Start: 2018-05-11 | End: 2018-05-11 | Stop reason: HOSPADM

## 2018-05-11 RX ORDER — ONDANSETRON 2 MG/ML
4 INJECTION INTRAMUSCULAR; INTRAVENOUS EVERY 30 MIN PRN
Status: DISCONTINUED | OUTPATIENT
Start: 2018-05-11 | End: 2018-05-11 | Stop reason: HOSPADM

## 2018-05-11 RX ORDER — ACETAMINOPHEN 325 MG/1
975 TABLET ORAL ONCE
Status: COMPLETED | OUTPATIENT
Start: 2018-05-11 | End: 2018-05-11

## 2018-05-11 RX ORDER — FENTANYL CITRATE 50 UG/ML
25-50 INJECTION, SOLUTION INTRAMUSCULAR; INTRAVENOUS
Status: DISCONTINUED | OUTPATIENT
Start: 2018-05-11 | End: 2018-05-11 | Stop reason: HOSPADM

## 2018-05-11 RX ORDER — PROPOFOL 10 MG/ML
INJECTION, EMULSION INTRAVENOUS CONTINUOUS PRN
Status: DISCONTINUED | OUTPATIENT
Start: 2018-05-11 | End: 2018-05-11

## 2018-05-11 RX ORDER — GABAPENTIN 300 MG/1
300 CAPSULE ORAL
Status: COMPLETED | OUTPATIENT
Start: 2018-05-11 | End: 2018-05-11

## 2018-05-11 RX ORDER — MEPERIDINE HYDROCHLORIDE 50 MG/ML
12.5 INJECTION INTRAMUSCULAR; INTRAVENOUS; SUBCUTANEOUS
Status: DISCONTINUED | OUTPATIENT
Start: 2018-05-11 | End: 2018-05-11 | Stop reason: HOSPADM

## 2018-05-11 RX ORDER — LIDOCAINE HYDROCHLORIDE 10 MG/ML
INJECTION, SOLUTION INFILTRATION; PERINEURAL PRN
Status: DISCONTINUED | OUTPATIENT
Start: 2018-05-11 | End: 2018-05-11 | Stop reason: HOSPADM

## 2018-05-11 RX ORDER — NALOXONE HYDROCHLORIDE 0.4 MG/ML
.1-.4 INJECTION, SOLUTION INTRAMUSCULAR; INTRAVENOUS; SUBCUTANEOUS
Status: DISCONTINUED | OUTPATIENT
Start: 2018-05-11 | End: 2018-05-11 | Stop reason: HOSPADM

## 2018-05-11 RX ORDER — HYDROCODONE BITARTRATE AND ACETAMINOPHEN 5; 325 MG/1; MG/1
1 TABLET ORAL
Status: DISCONTINUED | OUTPATIENT
Start: 2018-05-11 | End: 2018-05-11 | Stop reason: HOSPADM

## 2018-05-11 RX ORDER — BUPIVACAINE HYDROCHLORIDE 5 MG/ML
INJECTION, SOLUTION PERINEURAL PRN
Status: DISCONTINUED | OUTPATIENT
Start: 2018-05-11 | End: 2018-05-11 | Stop reason: HOSPADM

## 2018-05-11 RX ORDER — LIDOCAINE 40 MG/G
CREAM TOPICAL
Status: DISCONTINUED | OUTPATIENT
Start: 2018-05-11 | End: 2018-05-11 | Stop reason: HOSPADM

## 2018-05-11 RX ORDER — CLINDAMYCIN PHOSPHATE 900 MG/50ML
900 INJECTION, SOLUTION INTRAVENOUS SEE ADMIN INSTRUCTIONS
Status: DISCONTINUED | OUTPATIENT
Start: 2018-05-11 | End: 2018-05-11 | Stop reason: HOSPADM

## 2018-05-11 RX ORDER — FENTANYL CITRATE 50 UG/ML
INJECTION, SOLUTION INTRAMUSCULAR; INTRAVENOUS PRN
Status: DISCONTINUED | OUTPATIENT
Start: 2018-05-11 | End: 2018-05-11

## 2018-05-11 RX ORDER — MORPHINE SULFATE 2 MG/ML
2-4 INJECTION, SOLUTION INTRAMUSCULAR; INTRAVENOUS EVERY 10 MIN PRN
Status: DISCONTINUED | OUTPATIENT
Start: 2018-05-11 | End: 2018-05-11 | Stop reason: HOSPADM

## 2018-05-11 RX ORDER — CLINDAMYCIN PHOSPHATE 900 MG/50ML
900 INJECTION, SOLUTION INTRAVENOUS
Status: DISCONTINUED | OUTPATIENT
Start: 2018-05-11 | End: 2018-05-11 | Stop reason: HOSPADM

## 2018-05-11 RX ADMIN — GABAPENTIN 300 MG: 300 CAPSULE ORAL at 12:52

## 2018-05-11 RX ADMIN — ACETAMINOPHEN 975 MG: 325 TABLET, FILM COATED ORAL at 12:51

## 2018-05-11 RX ADMIN — BUPIVACAINE HYDROCHLORIDE 2.5 ML: 5 INJECTION, SOLUTION PERINEURAL at 14:59

## 2018-05-11 RX ADMIN — GENTAMICIN SULFATE 100 ML: 40 INJECTION, SOLUTION INTRAMUSCULAR; INTRAVENOUS at 15:17

## 2018-05-11 RX ADMIN — SODIUM CHLORIDE, POTASSIUM CHLORIDE, SODIUM LACTATE AND CALCIUM CHLORIDE: 600; 310; 30; 20 INJECTION, SOLUTION INTRAVENOUS at 13:39

## 2018-05-11 RX ADMIN — SODIUM CHLORIDE, POTASSIUM CHLORIDE, SODIUM LACTATE AND CALCIUM CHLORIDE: 600; 310; 30; 20 INJECTION, SOLUTION INTRAVENOUS at 14:51

## 2018-05-11 RX ADMIN — MIDAZOLAM HYDROCHLORIDE 5 MG: 1 INJECTION, SOLUTION INTRAMUSCULAR; INTRAVENOUS at 14:51

## 2018-05-11 RX ADMIN — FENTANYL CITRATE 100 MCG: 50 INJECTION, SOLUTION INTRAMUSCULAR; INTRAVENOUS at 14:55

## 2018-05-11 RX ADMIN — LIDOCAINE HYDROCHLORIDE 2.5 ML: 10 INJECTION, SOLUTION INFILTRATION; PERINEURAL at 14:59

## 2018-05-11 RX ADMIN — PROPOFOL 50 MCG/KG/MIN: 10 INJECTION, EMULSION INTRAVENOUS at 14:55

## 2018-05-11 RX ADMIN — LIDOCAINE HYDROCHLORIDE 1 ML: 10 INJECTION, SOLUTION INFILTRATION; PERINEURAL at 13:39

## 2018-05-11 ASSESSMENT — COPD QUESTIONNAIRES: COPD: 1

## 2018-05-11 NOTE — ANESTHESIA POSTPROCEDURE EVALUATION
Patient: Ingrid Powell    Procedure(s):  Left 1st Distal compartment release - Wound Class: I-Clean    Diagnosis:Left DeQuervains tenosynovitis  Diagnosis Additional Information: No value filed.    Anesthesia Type:  MAC    Note:  Anesthesia Post Evaluation    Patient location during evaluation: Phase 2 and Bedside  Patient participation: Able to fully participate in evaluation  Level of consciousness: awake and alert  Pain management: adequate  Airway patency: patent  Cardiovascular status: acceptable  Respiratory status: acceptable  Hydration status: acceptable  PONV: none     Anesthetic complications: None          Last vitals:  Vitals:    05/11/18 1227 05/11/18 1524 05/11/18 1530   BP: 130/86 109/61 111/65   Pulse: 75     Resp: 20 16 16   Temp: 37  C (98.6  F)     SpO2: 96% 98% 93%         Electronically Signed By: NORA Lake CRNA  May 11, 2018  3:42 PM

## 2018-05-11 NOTE — ANESTHESIA CARE TRANSFER NOTE
Patient: Ingrid Powell    Procedure(s):  Left 1st Distal compartment release - Wound Class: I-Clean    Diagnosis: Left DeQuervains tenosynovitis  Diagnosis Additional Information: No value filed.    Anesthesia Type:   MAC     Note:  Airway :Face Mask  Patient transferred to:Phase II  Comments: Patient's VSS. Spontaneous respirations. Patient awake and oriented. IV patent. Report to RN.Handoff Report: Identifed the Patient, Identified the Reponsible Provider, Reviewed the pertinent medical history, Discussed the surgical course, Reviewed Intra-OP anesthesia mangement and issues during anesthesia, Set expectations for post-procedure period and Allowed opportunity for questions and acknowledgement of understanding      Vitals: (Last set prior to Anesthesia Care Transfer)    CRNA VITALS  5/11/2018 1446 - 5/11/2018 1516      5/11/2018             Pulse: 58    SpO2: 99 %                Electronically Signed By: NORA Lake CRNA  May 11, 2018  3:16 PM

## 2018-05-11 NOTE — ANESTHESIA PREPROCEDURE EVALUATION
Anesthesia Evaluation     . Pt has had prior anesthetic. Type: General and MAC    No history of anesthetic complications          ROS/MED HX    ENT/Pulmonary:     (+)sleep apnea, COPD, uses CPAP , . .    Neurologic:  - neg neurologic ROS     Cardiovascular: Comment: Raynaud's    (+) Dyslipidemia, hypertension----. : . . . :. .       METS/Exercise Tolerance:  >4 METS   Hematologic:  - neg hematologic  ROS       Musculoskeletal: Comment: History of lumbar surgery  (+) arthritis, , , -       GI/Hepatic:     (+) GERD       Renal/Genitourinary:  - ROS Renal section negative       Endo:     (+) thyroid problem hypothyroidism, .      Psychiatric:     (+) psychiatric history depression      Infectious Disease:         Malignancy:   (+) Malignancy History of Other  Other CA tonsillar, oropharyngeal status post Chemo and Radiation         Other:    - neg other ROS                 Physical Exam  Normal systems: cardiovascular, pulmonary and dental    Airway   Mallampati: II  TM distance: >3 FB  Neck ROM: full    Dental     Cardiovascular   Rhythm and rate: regular and normal      Pulmonary    breath sounds clear to auscultation                    Anesthesia Plan      History & Physical Review  History and physical reviewed and following examination; no interval change.    ASA Status:  3 .    NPO Status:  > 8 hours    Plan for MAC Reason for MAC:  Deep or markedly invasive procedure (G8) and Chronic cardiopulmonary disease (G9)         Postoperative Care      Consents  Anesthetic plan, risks, benefits and alternatives discussed with:  Patient..                          .

## 2018-05-11 NOTE — IP AVS SNAPSHOT
MRN:[id number]                      After Visit Summary   5/11/2018    Ingrid Powell    MRN: [id number]           Thank you!     Thank you for choosing [facility] for your care. Our goal is always to provide you with excellent care. Hearing back from our patients is one way we can continue to improve our services. Please take a few minutes to complete the written survey that you may receive in the mail after you visit with us. Thank you!        Patient Information     Date Of Birth          1945        About your hospital stay     You were admitted on:  May 11, 2018 You last received care in the:  [facility] PreOP/Phase II    You were discharged on:  May 11, 2018       Who to Call     For medical emergencies, please call 911.  For non-urgent questions about your medical care, please call your primary care provider or clinic, [phone]  For questions related to your surgery, please call your surgery clinic        Attending Provider     Provider Specialty    Ronak Biggs MD Hand Surgery       Primary Care Provider Office Phone # Fax #    Jose Manuel Ur MD [facility] [phone]      After Care Instructions      Diet as Tolerated       Return to diet before surgery, unless instructed otherwise.            Discharge Instructions       Review outpatient procedure discharge instructions with patient as directed by Provider            Ice to affected area       Ice pack to surgical site every 15 minutes per hour for 24 hours            No Alcohol       For 24 hours post procedure            No Dressing Change       No dressing change until follow up appointment.            No driving or operating machinery        until the day after procedure            Notify Provider       For signs and symptoms of infection: Fever greater than 101, redness, swelling, heat at site, drainage, pus.            Return to clinic       Return to clinic in 2 weeks            Shower        Cover dressing  if dressing is not going to be changed today            Weight bearing - Partial       5lbs until postop appointment.                  Further instructions from your care team                         Same Day Surgery Discharge Instructions  Special Precautions After Surgery - Adult    1. It is not unusual to feel lightheaded or faint, up to 24 hours after surgery or while taking pain medication.  If you have these symptoms; sit for a few minutes before standing and have someone assist you when getting up.  2. You should rest and relax for the next 24 hours and must have someone stay with you for at least 24 hours after your discharge.  3. DO NOT DRIVE any vehicle or operate mechanical equipment for 24 hours following the end of your surgery.  DO NOT DRIVE while taking narcotic pain medications that have been prescribed by your physician.  If you had a limb operated on, you must be able to use it fully to drive.  4. DO NOT drink alcoholic beverages for 24 hours following surgery or while taking prescription pain medication.  5. Drink clear liquids (apple juice, ginger ale, broth, 7-Up, etc.).  Progress to your regular diet as you feel able.  6. Any questions call your physician and do not make important decisions for 24 hours.      [facility]:  [phone]       Same Day Surgery [phone], Monday thru Friday 6am-9pm.    Break through Bleeding  As instructed per Surgeon or Nurse.    Post Op Infection  Be alert for signs of infection: redness, swelling, heat, drainage of pus, and/or elevated temperature.  Contact your surgeon if these occur.    Nausea   If post op nausea occurs, at first rest your stomach for a few hours by eating nothing solid and sipping only clear liquids.  Call your Surgeon if nausea does not resolve in 24 hours.        Pending Results     No orders found from 5/9/2018 to 5/12/2018.            Admission Information     Date & Time Provider Department Dept. Phone    5/11/2018  "Ronak Biggs MD [facility] PreOP/Phase -[phone]      Your Vitals Were     Blood Pressure Pulse Temperature Respirations Height Weight    109/71 75 98.6  F (37  C) (Oral) 16 1.6 m (5' 3\") 83.9 kg (185 lb)    Pulse Oximetry BMI (Body Mass Index)                97% 32.77 kg/m2          [employer] Information     [employer] gives you secure access to your electronic health record. If you see a primary care provider, you can also send messages to your care team and make appointments. If you have questions, please call your primary care clinic.  If you do not have a primary care provider, please call [phone] and they will assist you.        Care EveryWhere ID     This is your Care EveryWhere ID. This could be used by other organizations to access your [facility] medical records  [id number]        Equal Access to Services     KO CHAVEZ : Dania Segovia, deshawn porter, beck beckford, kristal rodriguez . So [facility] [phone].    ATENCIÓN: Si habla español, tiene a ervin disposición servicios gratuitos de asistencia lingüística. Scot al [phone].    We comply with applicable federal civil rights laws and [place] laws. We do not discriminate on the basis of race, color, national origin, age, disability, sex, sexual orientation, or gender identity.               Review of your medicines      START taking        Dose / Directions    HYDROcodone-acetaminophen 5-325 MG per tablet   Commonly known as:  NORCO   Used for:  Radial styloid tenosynovitis        Dose:  1-2 tablet   Take 1-2 tablets by mouth every 4 hours as needed for other (Moderate to Severe Pain)   Quantity:  8 tablet   Refills:  0         CONTINUE these medicines which have NOT CHANGED        Dose / Directions    gabapentin 300 MG capsule   Commonly known as:  NEURONTIN   Used for:  Chronic bilateral low back pain, with sciatica presence unspecified        TAKE ONE TO TWO CAPSULES BY MOUTH " ONCE DAILY IN THE EVENING AS NEEDED   Quantity:  180 capsule   Refills:  0       levothyroxine 75 MCG tablet   Commonly known as:  SYNTHROID/LEVOTHROID   Used for:  Hypothyroidism, unspecified type, Malignant neoplasm of tonsil (H), Hyperlipidemia LDL goal <130        Dose:  75 mcg   Take 1 tablet (75 mcg) by mouth daily   Quantity:  90 tablet   Refills:  3       order for DME   Used for:  MARGIE (obstructive sleep apnea)        Respironics Remstar 60 series Auto CPAP 6-15 cm H2O   Refills:  0       PARoxetine 20 MG tablet   Commonly known as:  PAXIL   Used for:  Major depressive disorder, recurrent episode, moderate (H)        Dose:  20 mg   Take 1 tablet (20 mg) by mouth At Bedtime   Quantity:  90 tablet   Refills:  3       RESTASIS 0.05 % ophthalmic emulsion   Generic drug:  cycloSPORINE        Dose:  1 drop   Place 1 drop into both eyes 2 times daily   Refills:  0       simvastatin 40 MG tablet   Commonly known as:  ZOCOR   Used for:  Hyperlipidemia LDL goal <130        TAKE 1 TABLET (40 MG) BY MOUTH AT BEDTIME. MUST HAVE FASTING LAB DONE NOW.   Quantity:  90 tablet   Refills:  0       traZODone 100 MG tablet   Commonly known as:  DESYREL   Used for:  Primary insomnia        TAKE 1 TABLET EVERY NIGHT AS NEEDED FOR SLEEP   Quantity:  90 tablet   Refills:  1       TURMERIC PO        Dose:  1 tablet   Take 1 tablet by mouth daily   Refills:  0       vitamin B complex with vitamin C Tabs tablet   Commonly known as:  STRESS TAB        Dose:  1 tablet   Take 1 tablet by mouth daily.   Refills:  0       VITAMIN D (CHOLECALCIFEROL) PO        Dose:  2000 Units   Take 2,000 Units by mouth daily.   Refills:  0            Where to get your medicines      Some of these will need a paper prescription and others can be bought over the counter. Ask your nurse if you have questions.     You don't need a prescription for these medications     HYDROcodone-acetaminophen 5-325 MG per tablet                Protect others around you:  Learn how to safely use, store and throw away your medicines at www.disposemymeds.org.        Information about OPIOIDS     PRESCRIPTION OPIOIDS: WHAT YOU NEED TO KNOW   You have a prescription for an opioid (narcotic) pain medicine. Opioids can cause addiction. If you have a history of chemical dependency of any type, you are at a higher risk of becoming addicted to opioids. Only take this medicine after all other options have been tried. Take it for as short a time and as few doses as possible.     Do not:    Drive. If you drive while taking these medicines, you could be arrested for driving under the influence (DUI).    Operate heavy machinery    Do any other dangerous activities while taking these medicines.     Drink any alcohol while taking these medicines.      Take with any other medicines that contain acetaminophen. Read all labels carefully. Look for the word  acetaminophen  or  Tylenol.  Ask your pharmacist if you have questions or are unsure.    Store your pills in a secure place, locked if possible. We will not replace any lost or stolen medicine. If you don t finish your medicine, please throw away (dispose) as directed by your pharmacist. The [place] Pollution Control Agency has more information about safe disposal: https://www.pca.[facility].mn.us/living-green/managing-unwanted-medications    All opioids tend to cause constipation. Drink plenty of water and eat foods that have a lot of fiber, such as fruits, vegetables, prune juice, apple juice and high-fiber cereal. Take a laxative (Miralax, milk of magnesia, Colace, Senna) if you don t move your bowels at least every other day.              Medication List: This is a list of all your medications and when to take them. Check marks below indicate your daily home schedule. Keep this list as a reference.      Medications           Morning Afternoon Evening Bedtime As Needed    gabapentin 300 MG capsule   Commonly known as:  NEURONTIN   TAKE ONE TO TWO  CAPSULES BY MOUTH ONCE DAILY IN THE EVENING AS NEEDED   Last time this was given:  300 mg on 5/11/2018 12:52 PM                                HYDROcodone-acetaminophen 5-325 MG per tablet   Commonly known as:  NORCO   Take 1-2 tablets by mouth every 4 hours as needed for other (Moderate to Severe Pain)                                levothyroxine 75 MCG tablet   Commonly known as:  SYNTHROID/LEVOTHROID   Take 1 tablet (75 mcg) by mouth daily                                order for DME   Respironics Remstar 60 series Auto CPAP 6-15 cm H2O                                PARoxetine 20 MG tablet   Commonly known as:  PAXIL   Take 1 tablet (20 mg) by mouth At Bedtime                                RESTASIS 0.05 % ophthalmic emulsion   Place 1 drop into both eyes 2 times daily   Generic drug:  cycloSPORINE                                simvastatin 40 MG tablet   Commonly known as:  ZOCOR   TAKE 1 TABLET (40 MG) BY MOUTH AT BEDTIME. MUST HAVE FASTING LAB DONE NOW.                                traZODone 100 MG tablet   Commonly known as:  DESYREL   TAKE 1 TABLET EVERY NIGHT AS NEEDED FOR SLEEP                                TURMERIC PO   Take 1 tablet by mouth daily                                vitamin B complex with vitamin C Tabs tablet   Commonly known as:  STRESS TAB   Take 1 tablet by mouth daily.                                VITAMIN D (CHOLECALCIFEROL) PO   Take 2,000 Units by mouth daily.

## 2018-05-11 NOTE — IP AVS SNAPSHOT
[facility] PreOP/Phase II    [address] [facility] [address]    Phone:  [phone]    Fax:  [phone]                                       After Visit Summary   5/11/2018    Ingrid Powell    MRN: [id number]           After Visit Summary Signature Page     I have received my discharge instructions, and my questions have been answered. I have discussed any challenges I see with this plan with the nurse or doctor.    ..........................................................................................................................................  Patient/Patient Representative Signature      ..........................................................................................................................................  Patient Representative Print Name and Relationship to Patient    ..................................................               ................................................  Date                                            Time    ..........................................................................................................................................  Reviewed by Signature/Title    ...................................................              ..............................................  Date                                                            Time

## 2018-05-11 NOTE — H&P (VIEW-ONLY)
[address] [address]  [phone]  Dept: [phone]    PRE-OP EVALUATION:  Today's date: 2018    Ingrid Powell (: 1945) presents for pre-operative evaluation assessment as requested by Dr. Biggs.  She requires evaluation and anesthesia risk assessment prior to undergoing surgery/procedure for treatment of Left wrist .    Proposed Surgery/ Procedure: Left 1st Distal compartment release  Date of Surgery/ Procedure: 2018  Time of Surgery/ Procedure: 12:35pm  Hospital/Surgical Facility: [facility]     Primary Physician: Tila Tyson  Type of Anesthesia Anticipated: Local with MAC    Patient has a Health Care Directive or Living Will:  YES - on file with [facility]    1. NO - Do you have a history of heart attack, stroke, stent, bypass or surgery on an artery in the head, neck, heart or legs?  2. NO - Do you ever have any pain or discomfort in your chest?  3. NO - Do you have a history of  Heart Failure?  4. NO - Are you troubled by shortness of breath when: walking on the level, up a slight hill or at night?  5. NO - Do you currently have a cold, bronchitis or other respiratory infection?  6. NO - Do you have a cough, shortness of breath or wheezing?  7. NO - Do you sometimes get pains in the calves of your legs when you walk?  8. NO - Do you or anyone in your family have previous history of blood clots?  9. NO - Do you or does anyone in your family have a serious bleeding problem such as prolonged bleeding following surgeries or cuts?  10. NO - Have you ever had problems with anemia or been told to take iron pills?  11. NO - Have you had any abnormal blood loss such as black, tarry or bloody stools, or abnormal vaginal bleeding?  12. NO - Have you ever had a blood transfusion?  13. NO - Have you or any of your relatives ever had problems with anesthesia?  14. YES - DO YOU HAVE SLEEP APNEA, EXCESSIVE SNORING OR DAYTIME DROWSINESS?  CPAP  15. NO - Do you have any prosthetic heart valves?  16. YES - DO YOU HAVE PROSTHETIC JOINTS? Bilateral TKR  17. NO - Is there any chance that you may be pregnant?      HPI:     HPI related to upcoming procedure:   72-year-old female presents for preop physical exam.  Patient is scheduled to have left first distal compartment release on May 11, 2018.  Patient requires evaluation and anesthesia risk assessment prior to undergoing surgery/procedure.  Past medical history significant for COPD, hypertension, diverticulosis, obstructive sleep apnea and osteoarthritis.  Patient denies any chest pain, palpitation, shortness of breath, cough, bowel/bladder or other relevant systemic symptoms      MEDICAL HISTORY:     Patient Active Problem List    Diagnosis Date Noted     COPD, mild (H) 02/01/2012     Priority: High     8/16/13 Mild Obstruction on PFT       HX of MALIGNANT NEOPL TONSIL 01/26/2012     Priority: High     12/2014: 3 years out. Sees Dr. Senior [facility]NT at [facility]       HTN (hypertension) 10/03/2011     Priority: High     Malignant neoplasm of oropharynx (H) 11/09/2016     Priority: Medium     Family history of breast cancer in mother 10/24/2016     Priority: Medium     Diverticulitis of colon 10/10/2016     Priority: Medium     Colonoscopy 3/10/2011       Benign neoplasm of gastrointestinal tract 10/10/2016     Priority: Medium     Polyp rectum        Hemorrhoids, internal 10/10/2016     Priority: Medium     Rectocele 10/10/2016     Priority: Medium     Tortuous colon 10/10/2016     Priority: Medium     Colonoscopy done 3/10/2011 by [facility]       Adenomatous colon polyp 09/07/2016     Priority: Medium     History of lumbar surgery 04/27/2016     Priority: Medium     Primary insomnia 04/27/2016     Priority: Medium     Lumbosacral radiculopathy at L3 04/27/2016     Priority: Medium     DDD (degenerative disc disease), lumbar 04/27/2016     Priority: Medium     Renal cyst 08/11/2015     Priority:  Medium     2012, 13, on ct   Then on lumbar mri , 2 cm largest         Raynaud's syndrome 08/11/2015     Priority: Medium     Disturbance of salivary secretion (XEROSTOMIA) 07/23/2014     Priority: Medium     s/p cancer treatment- stable       MARGIE (obstructive sleep apnea), Severe 10/31/2013     Priority: Medium     12/10/13 CPAP 6-15 cm H2O         Hypothyroidism 08/13/2013     Priority: Medium     Major depressive disorder, recurrent episode, moderate (HCC) 08/24/2012     Priority: Medium     Anxiety 08/24/2012     Priority: Medium     zAdvanced directives, counseling/discussion 02/09/2012     Priority: Medium     Advance Directive Problem List Overview:   Name Relationship Phone    Primary Health Care Agent Lida Farias daughter [phone]         Alternative Health Care Agent Neville Powell son [phone]       Advance Directive received and scanned. Click on Code in the patient header to view. 2/9/2012        Hyperlipidemia LDL goal <130 10/03/2011     Priority: Medium      Past Medical History:   Diagnosis Date     Arthritis      Depressive disorder      Gastroesophageal reflux disease      History of lumbar surgery 7/28/2015     Hoarseness      Oxygen dependent     at night     Reduced vision      Sleep apnea      Past Surgical History:   Procedure Laterality Date     BACK SURGERY       CARPAL TUNNEL RELEASE RT/LT      ?side     CHOLECYSTECTOMY       EYE SURGERY      cataracts     HERNIA REPAIR       INCISION AND DRAINAGE TRUNK, COMBINED  5/18/2012    Procedure:COMBINED INCISION AND DRAINAGE TRUNK; Incision and Drainage Abdominal Wall Abscess ; Surgeon:ALEXSANDER HANNON; Location:[facility] OR     INSERT PORT VASCULAR ACCESS  2/8/2012    Procedure:INSERT PORT VASCULAR ACCESS; Surgeon:MARY JANE GUAN; Location:[facility] OR     JOINT REPLACEMTN, KNEE RT/LT      bilateral     KNEE SURGERY  1995    left- total     KNEE SURGERY  2000    right- total     LAPAROSCOPIC APPENDECTOMY N/A 10/10/2015     Procedure: LAPAROSCOPIC APPENDECTOMY;  Surgeon: Daniel Chamberlain MD;  Location: [facility] OR     LARYNGOSCOPY, ESOPHAGOSCOPY,  BIOPSY, COMBINED  2/8/2012    Procedure:COMBINED LARYNGOSCOPY, ESOPHAGOSCOPY,  BIOPSY; Direct Laryngoscopy, Esophagoscopy with Biopsy, Stealth Assisted Left Frontal Sinus Biopsy via Vidal Incision, 8 french Power Port in right subclavian & Percutaneous Endoscopic Gastrostomy Placement * Latex Safe*; Surgeon:LIBORIO CAZARES; Location:[facility] OR     left ankle fusion       OPTICAL TRACKING SYSTEM ENDOSCOPIC SINUS SURGERY  2/8/2012    Procedure:OPTICAL TRACKING SYSTEM ENDOSCOPIC SINUS SURGERY; Surgeon:LIBORIO CAZARES; Location:[facility] OR     RECTAL SURGERY      ? type     REMOVE PORT VASCULAR ACCESS  8/21/2012    Procedure: REMOVE PORT VASCULAR ACCESS;  Remove Port Vascular Access ;  Surgeon: Phillip Ye MD;  Location: [facility] OR     Current Outpatient Prescriptions   Medication Sig Dispense Refill     B Complex Vitamins (VITAMIN  B COMPLEX) tablet Take 1 tablet by mouth daily.         gabapentin (NEURONTIN) 300 MG capsule TAKE ONE TO TWO CAPSULES BY MOUTH ONCE DAILY IN THE EVENING AS NEEDED 180 capsule 0     levothyroxine (SYNTHROID/LEVOTHROID) 75 MCG tablet Take 1 tablet (75 mcg) by mouth daily 90 tablet 3     ORDER FOR DME Respironics Remstar 60 series Auto CPAP 6-15 cm H2O       PARoxetine (PAXIL) 20 MG tablet Take 1 tablet (20 mg) by mouth At Bedtime 90 tablet 3     RESTASIS 0.05 % ophthalmic emulsion Place 1 drop into both eyes 2 times daily       simvastatin (ZOCOR) 40 MG tablet TAKE 1 TABLET (40 MG) BY MOUTH AT BEDTIME. MUST HAVE FASTING LAB DONE NOW. 90 tablet 0     traZODone (DESYREL) 100 MG tablet TAKE 1 TABLET EVERY NIGHT AS NEEDED FOR SLEEP 90 tablet 1     TURMERIC PO Take 1 tablet by mouth daily       VITAMIN D, CHOLECALCIFEROL, PO Take 2,000 Units by mouth daily.         OTC products: None, except as noted above    Allergies   Allergen Reactions     Dilaudid  "[Hydromorphone Hcl] Other (See Comments)     hallucinations     Fosamax [Alendronate Sodium] Rash            Norvasc [Amlodipine Besylate] Hives and Rash     Tegretol [Carbamazepine] Hives and Rash      Latex Allergy: NO    Social History   Substance Use Topics     Smoking status: Former Smoker     Packs/day: 0.50     Years: 35.00     Types: Cigarettes     Quit date: 10/3/1995     Smokeless tobacco: Never Used     Alcohol use Yes      Comment: rare     History   Drug Use No       REVIEW OF SYSTEMS:   Constitutional, neuro, ENT, endocrine, pulmonary, cardiac, gastrointestinal, genitourinary, musculoskeletal, integument and psychiatric systems are negative, except as otherwise noted.    EXAM:   /64 (Cuff Size: Adult Large)  Pulse 78  Temp 98.9  F (37.2  C) (Tympanic)  Resp 18  Ht 5' 3.5\" (1.613 m)  Wt 185 lb (83.9 kg)  SpO2 97%  Breastfeeding? No  BMI 32.26 kg/m2    GENERAL APPEARANCE: healthy, alert and no distress     EYES: EOMI, PERRL     HENT: ear canals and TM's normal and nose and mouth without ulcers or lesions     NECK: no adenopathy, no asymmetry, masses, or scars and thyroid normal to palpation     RESP: lungs clear to auscultation - no rales, rhonchi or wheezes     CV: regular rates and rhythm, normal S1 S2, no S3 or S4 and no murmur, click or rub     ABDOMEN:  soft, nontender, no HSM or masses and bowel sounds normal     MS: Chronic osteoarthritic changes involving multiple joints     SKIN: no suspicious lesions or rashes     NEURO: Normal strength and tone, sensory exam grossly normal, mentation intact and speech normal     PSYCH: mentation appears normal. and affect normal/bright     LYMPHATICS: No cervical adenopathy    DIAGNOSTICS:   No labs or EKG required for low risk surgery (cataract, skin procedure, breast biopsy, etc)    Recent Labs   Lab Test  01/10/18   1010  08/01/17   1204   05/17/12   1949   02/10/12   0656   HGB  11.8  13.2   < >  11.1*   < >  13.3   PLT  152  237   < >  208   " < >  205   INR   --    --    --   1.10   --   1.03   NA  140  138   < >  138   < >  140   POTASSIUM  4.0  4.8   < >  3.7   < >  4.9   CR  0.73  0.78   < >  0.58   < >  0.64    < > = values in this interval not displayed.        IMPRESSION:   Reason for surgery/procedure: Left deQuervain tenosynovitis  Diagnosis/reason for consult: Requires evaluation and anesthesia risk assessment prior to undergoing surgery/procedure      The proposed surgical procedure is considered LOW risk.    REVISED CARDIAC RISK INDEX  The patient has the following serious cardiovascular risks for perioperative complications such as (MI, PE, VFib and 3  AV Block):  No serious cardiac risks  INTERPRETATION: 0 risks: Class I (very low risk - 0.4% complication rate)    The patient has the following additional risks for perioperative complications:    ICD-10-CM    1. Preop general physical exam Z01.818    2. Radial styloid tenosynovitis M65.4    3. Essential hypertension I10    4. COPD, mild (H) J44.9    5. Acquired hypothyroidism E03.9    6. MARGIE (obstructive sleep apnea), Severe G47.33        RECOMMENDATIONS:     APPROVAL GIVEN to proceed with proposed procedure, without further diagnostic evaluation       Signed Electronically by: Jose Manuel Sutton MD    Copy of this evaluation report is provided to requesting physician.    [facility] Preop Guidelines    Revised Cardiac Risk Index

## 2018-05-11 NOTE — OP NOTE
SURGEON:  Ronak Biggs M.D.    ASSISTANT:  Mabel Mehta PA-C    PREOPERATIVE DIAGNOSES  Left wrist de Quervain's tenosynovitis    POSTOPERATIVE DIAGNOSES  Left wrist de Quervain's tenosynovitis    OPERATIONS  Left wrist 1st dorsal compartment release      ANESTHESIA  Local with monitored anesthetic care    SPECIMENS  None.    DRAINS  None.    COMPLICATIONS  None.    PROCEDURE  After discussing the risks, benefits, and alternatives to the procedure with the patient, the patient consented to proceed with the surgery as described below.  She understands the potential for neurovascular injury, infection, wound healing problems, tendon subluxation, recurrence, decreased range of motion, and a decreased quality of life.    The patient was brought to the operating room and was placed on the operating table in a supine position.  Anesthesia was provided by the anesthesiology staff in the manner described above.  A tourniquet was applied to the left upper extremity, the extremity was exsanguinated and the tourniquet was elevated to 250 mmHg for the duration of the case.  A 50/50 combination of 0.5% marcaine and 1% lidocaine was injected around the intended operative site over the radial styloid region of the left wrist.    A 2-cm oblique incision was made over the radial styloid of the left wrist overlying the distribution of the 1st extensor tendon compartment.  The superficial branch of the radial nerve was identified and was retracted dorsally.  The retinaculum was markedly thickened and a longitudinal incision was made through the dosum of this structure.  No septum was identified.    There were 2 slips of APL tendon and 1 slip of EPB tendon.  The tendons were degenerative and inflamed in appearance. Time was spent debriding the excessive synovial tissue from the tendons.  A volar-based flap of retinaculum was created to prevent volar subluxation of the tendons.    The incision was thoroughly irrigated and the  incision was reapproximated with 4-0 Monocryl suture.  A well-padded thumb spica splint was applied.  The tourniquet was deflated for a total tourniquet time of 10 minutes.    The patient was taken to the recovery room in stable condition.

## 2018-05-11 NOTE — DISCHARGE INSTRUCTIONS
Same Day Surgery Discharge Instructions  Special Precautions After Surgery - Adult    1. It is not unusual to feel lightheaded or faint, up to 24 hours after surgery or while taking pain medication.  If you have these symptoms; sit for a few minutes before standing and have someone assist you when getting up.  2. You should rest and relax for the next 24 hours and must have someone stay with you for at least 24 hours after your discharge.  3. DO NOT DRIVE any vehicle or operate mechanical equipment for 24 hours following the end of your surgery.  DO NOT DRIVE while taking narcotic pain medications that have been prescribed by your physician.  If you had a limb operated on, you must be able to use it fully to drive.  4. DO NOT drink alcoholic beverages for 24 hours following surgery or while taking prescription pain medication.  5. Drink clear liquids (apple juice, ginger ale, broth, 7-Up, etc.).  Progress to your regular diet as you feel able.  6. Any questions call your physician and do not make important decisions for 24 hours.      [facility]:  [phone]       Same Day Surgery [phone], Monday thru Friday 6am-9pm.    Break through Bleeding  As instructed per Surgeon or Nurse.    Post Op Infection  Be alert for signs of infection: redness, swelling, heat, drainage of pus, and/or elevated temperature.  Contact your surgeon if these occur.    Nausea   If post op nausea occurs, at first rest your stomach for a few hours by eating nothing solid and sipping only clear liquids.  Call your Surgeon if nausea does not resolve in 24 hours.

## 2018-05-22 ENCOUNTER — TRANSFERRED RECORDS (OUTPATIENT)
Dept: HEALTH INFORMATION MANAGEMENT | Facility: CLINIC | Age: 73
End: 2018-05-22

## 2018-05-26 DIAGNOSIS — G89.29 CHRONIC BILATERAL LOW BACK PAIN, WITH SCIATICA PRESENCE UNSPECIFIED: ICD-10-CM

## 2018-05-26 DIAGNOSIS — M54.5 CHRONIC BILATERAL LOW BACK PAIN, WITH SCIATICA PRESENCE UNSPECIFIED: ICD-10-CM

## 2018-05-26 NOTE — TELEPHONE ENCOUNTER
gabapentin (NEURONTIN) 300 MG capsule      Last Written Prescription Date:  2/22/18  Last Fill Quantity: 180,   # refills: 0  Last Office Visit: 5/7/18 with JACQUELINE Sutton  Future Office visit:       Routing refill request to provider for review/approval because:  Drug not on the [facility], [facility] or [facility] refill protocol or controlled substance

## 2018-05-31 RX ORDER — GABAPENTIN 300 MG/1
CAPSULE ORAL
Qty: 180 CAPSULE | Refills: 0 | Status: SHIPPED | OUTPATIENT
Start: 2018-05-31 | End: 2018-08-25

## 2018-06-04 DIAGNOSIS — C09.9 MALIGNANT NEOPLASM OF TONSIL (H): Chronic | ICD-10-CM

## 2018-06-04 DIAGNOSIS — E78.5 HYPERLIPIDEMIA LDL GOAL <130: Chronic | ICD-10-CM

## 2018-06-04 DIAGNOSIS — E03.9 HYPOTHYROIDISM, UNSPECIFIED TYPE: ICD-10-CM

## 2018-06-04 DIAGNOSIS — F51.01 PRIMARY INSOMNIA: ICD-10-CM

## 2018-06-04 RX ORDER — TRAZODONE HYDROCHLORIDE 100 MG/1
TABLET ORAL
Qty: 90 TABLET | Refills: 0 | Status: SHIPPED | OUTPATIENT
Start: 2018-06-04 | End: 2018-08-25

## 2018-06-04 RX ORDER — LEVOTHYROXINE SODIUM 75 UG/1
75 TABLET ORAL DAILY
Qty: 90 TABLET | Refills: 0 | Status: SHIPPED | OUTPATIENT
Start: 2018-06-04 | End: 2018-12-09

## 2018-06-04 NOTE — TELEPHONE ENCOUNTER
"Requested Prescriptions   Pending Prescriptions Disp Refills     traZODone (DESYREL) 100 MG tablet 90 tablet 1    Serotonin Modulators Passed    6/4/2018  8:28 AM       Passed - Recent (12 mo) or future (30 days) visit within the authorizing provider's specialty    Patient had office visit in the last 12 months or has a visit in the next 30 days with authorizing provider or within the authorizing provider's specialty.  See \"Patient Info\" tab in inbasket, or \"Choose Columns\" in Meds & Orders section of the refill encounter.           Passed - Patient is age 18 or older       Passed - No active pregnancy on record       Passed - No positive pregnancy test in past 12 months        levothyroxine (SYNTHROID/LEVOTHROID) 75 MCG tablet 90 tablet      Sig: Take 1 tablet (75 mcg) by mouth daily    Thyroid Protocol Failed    6/4/2018  8:28 AM       Failed - Normal TSH on file in past 12 months    Recent Labs   Lab Test  01/19/18   1345   TSH  4.58*             Passed - Patient is 12 years or older       Passed - Recent (12 mo) or future (30 days) visit within the authorizing provider's specialty    Patient had office visit in the last 12 months or has a visit in the next 30 days with authorizing provider or within the authorizing provider's specialty.  See \"Patient Info\" tab in inbasket, or \"Choose Columns\" in Meds & Orders section of the refill encounter.           Passed - No active pregnancy on record    If patient is pregnant or has had a positive pregnancy test, please check TSH.         Passed - No positive pregnancy test in past 12 months    If patient is pregnant or has had a positive pregnancy test, please check TSH.          Merlyn Ramos RT (R) (M)    "

## 2018-06-04 NOTE — TELEPHONE ENCOUNTER
Requested Prescriptions   Pending Prescriptions Disp Refills     traZODone (DESYREL) 100 MG tablet 90 tablet 1    There is no refill protocol information for this order        levothyroxine (SYNTHROID/LEVOTHROID) 75 MCG tablet 90 tablet      Sig: Take 1 tablet (75 mcg) by mouth daily    There is no refill protocol information for this order          SIMVASTATIN  Last Written Prescription Date:  2/26/18  Last Fill Quantity: 90,  # refills: 0   Last office visit: 5/7/2018 with prescribing provider:  5/7/18   Future Office Visit:      TRAZODONE  Last Written Prescription Date:  12/20/17  Last Fill Quantity: 90,  # refills: 1   Last office visit: 5/7/2018 with prescribing provider:  5/7/18   Future Office Visit:

## 2018-06-06 DIAGNOSIS — E78.5 HYPERLIPIDEMIA LDL GOAL <130: ICD-10-CM

## 2018-06-06 RX ORDER — SIMVASTATIN 40 MG
TABLET ORAL
Qty: 90 TABLET | Refills: 0 | OUTPATIENT
Start: 2018-06-06

## 2018-06-08 NOTE — TELEPHONE ENCOUNTER
This medication was requested in a previous encounter but was not filled. Other med on request was filled and encounter closed.  We received another request from [facility] today.  Ana Maria Marrero  CSS

## 2018-06-08 NOTE — TELEPHONE ENCOUNTER
This medication was requested in a previous encounter but was not filled. Other meds on request were filled and encounter closed.  We received another request from [facility] today for this medication.   Ana Maria Marrero  CSS

## 2018-06-12 RX ORDER — SIMVASTATIN 40 MG
40 TABLET ORAL AT BEDTIME
Qty: 90 TABLET | Refills: 2 | Status: SHIPPED | OUTPATIENT
Start: 2018-06-12 | End: 2019-02-09

## 2018-06-12 NOTE — TELEPHONE ENCOUNTER
"Requested Prescriptions   Pending Prescriptions Disp Refills     simvastatin (ZOCOR) 40 MG tablet 90 tablet 0    There is no refill protocol information for this order      Refused Prescriptions Disp Refills     simvastatin (ZOCOR) 40 MG tablet [Pharmacy Med Name: SIMVASTATIN 40 MG Tablet] 90 tablet 0     Sig: TAKE 1 TABLET (40 MG) BY MOUTH AT BEDTIME. MUST HAVE FASTING LAB DONE NOW.    Statins Protocol Passed    6/6/2018  3:24 PM       Passed - LDL on file in past 12 months    Recent Labs   Lab Test  02/26/18   0958   LDL  66            Passed - No abnormal creatine kinase in past 12 months    Recent Labs   Lab Test  03/25/13   1626   CKT  88               Passed - Recent (12 mo) or future (30 days) visit within the authorizing provider's specialty    Patient had office visit in the last 12 months or has a visit in the next 30 days with authorizing provider or within the authorizing provider's specialty.  See \"Patient Info\" tab in inbasket, or \"Choose Columns\" in Meds & Orders section of the refill encounter.           Passed - Patient is age 18 or older       Passed - No active pregnancy on record       Passed - No positive pregnancy test in past 12 months        Last Written Prescription Date:  2/26/18  Last Fill Quantity: 90,  # refills: 0   Last office visit: 5/7/2018 with prescribing provider:     Future Office Visit:      "

## 2018-06-19 ENCOUNTER — TRANSFERRED RECORDS (OUTPATIENT)
Dept: HEALTH INFORMATION MANAGEMENT | Facility: CLINIC | Age: 73
End: 2018-06-19

## 2018-07-24 ENCOUNTER — RADIANT APPOINTMENT (OUTPATIENT)
Dept: GENERAL RADIOLOGY | Facility: CLINIC | Age: 73
End: 2018-07-24
Attending: FAMILY MEDICINE
Payer: COMMERCIAL

## 2018-07-24 ENCOUNTER — OFFICE VISIT (OUTPATIENT)
Dept: FAMILY MEDICINE | Facility: CLINIC | Age: 73
End: 2018-07-24
Payer: COMMERCIAL

## 2018-07-24 VITALS
WEIGHT: 187 LBS | OXYGEN SATURATION: 96 % | HEIGHT: 63 IN | RESPIRATION RATE: 18 BRPM | HEART RATE: 71 BPM | BODY MASS INDEX: 33.13 KG/M2 | SYSTOLIC BLOOD PRESSURE: 120 MMHG | DIASTOLIC BLOOD PRESSURE: 60 MMHG | TEMPERATURE: 98 F

## 2018-07-24 DIAGNOSIS — M24.012 LOOSE BODY OF LEFT SHOULDER: ICD-10-CM

## 2018-07-24 DIAGNOSIS — G89.29 CHRONIC LEFT SHOULDER PAIN: ICD-10-CM

## 2018-07-24 DIAGNOSIS — L98.9 BENIGN SKIN LESION OF FOREHEAD: ICD-10-CM

## 2018-07-24 DIAGNOSIS — M25.512 CHRONIC LEFT SHOULDER PAIN: ICD-10-CM

## 2018-07-24 DIAGNOSIS — L98.9 SKIN LESION: ICD-10-CM

## 2018-07-24 DIAGNOSIS — M19.012 PRIMARY OSTEOARTHRITIS OF LEFT SHOULDER: Primary | ICD-10-CM

## 2018-07-24 PROCEDURE — 99214 OFFICE O/P EST MOD 30 MIN: CPT | Performed by: FAMILY MEDICINE

## 2018-07-24 PROCEDURE — 73030 X-RAY EXAM OF SHOULDER: CPT | Mod: LT

## 2018-07-24 NOTE — NURSING NOTE
"Chief Complaint   Patient presents with     Musculoskeletal Problem     Derm Problem       Initial /60 (Cuff Size: Adult Regular)  Pulse 71  Temp 98  F (36.7  C) (Tympanic)  Resp 18  Ht 5' 3\" (1.6 m)  Wt 187 lb (84.8 kg)  SpO2 96%  Breastfeeding? No  BMI 33.13 kg/m2 Estimated body mass index is 33.13 kg/(m^2) as calculated from the following:    Height as of this encounter: 5' 3\" (1.6 m).    Weight as of this encounter: 187 lb (84.8 kg).      Health Maintenance that is potentially due pending provider review:  NONE    n/a    Is there anyone who you would like to be able to receive your results? Not Applicable  If yes have patient fill out ELIZABETH    "

## 2018-07-24 NOTE — MR AVS SNAPSHOT
After Visit Summary   7/24/2018    Ingrid Powell    MRN: [id number]           Patient Information     Date Of Birth          1945        Visit Information        Provider Department      7/24/2018 3:20 PM Jose Maunel Sutton MD [facility]        Today's Diagnoses     Primary osteoarthritis of left shoulder    -  1    Loose body of left shoulder        Skin lesion        Benign skin lesion of forehead          Care Instructions      Osteoarthritis  Osteoarthritis (also called degenerative joint disease) happens when the cartilage in a joint becomes damaged and worn. This may be due to age, wear and tear, overuse of the joint, or other problems. Osteoarthritis can affect any joint. But it is most common in hands, knees, spine, hips, and feet. Symptoms include joint stiffness, pain, and swelling.  Home care    When a joint is more sore than usual, rest it for a day or two.    Heat can help relieve stiffness. Take a hot bath or apply a heating pad for up to 30 minutes at a time. If symptoms are worse in the morning, using heat just after awakening can help relax the muscle and soothe the joints.     Ice helps relieve pain and swelling. It is often used after activity. Use a cold pack wrapped in a thin cloth on the joint for 10 to 15 minutes at a time.     Alternating hot and cold can also help relieve pain. Try this for 20 minutes at a time, several times per day.    Exercise helps prevent the muscles and ligaments around the joint from becoming weak. It also helps maintain function in the joint.  Be as active as you can. Talk to your healthcare provider about what activity program is best for you.    Excess weight puts a lot of extra strain on weight-bearing joints of the lower back, hips, knees, feet and ankles. If you are overweight, talk to your healthcare provider about a safe and effective weight loss program.    Use anti-inflammatory medicines as prescribed for pain. This  includes acetaminophen or NSAIDs such as ibuprofen or naproxen. If needed, topical or injected medicines may be recommended. Talk to your healthcare provider if these options are not enough to manage your pain.    Talk with your healthcare provider about devices that might help improve your function and reduce pain.  Follow-up care  Follow up with your healthcare provider as advised by our staff.  When to seek medical advice  Call your healthcare provider right away if any of these occur:    Redness or swelling of a painful joint    Discharge or pus from a painful joint    Fever of 100.4 F (38 C) or higher, or as directed by your healthcare provider    Worsening joint pain    Decreased ability to move the joint or bear weight on the joint  Date Last Reviewed: 3/1/2017    3312-6244 [employer]. [address] [place] [address], [place] [address]. All rights reserved. This information is not intended as a substitute for professional medical care. Always follow your healthcare professional's instructions.                Follow-ups after your visit        Additional Services     ORTHO  REFERRAL       [facility] is referring you to the Orthopedic  Services at [facility].       The  Representative will assist you in the coordination of your Orthopedic and Musculoskeletal Care as prescribed by your physician.    The  Representative will call you within 1 business day to help schedule your appointment, or you may contact the  Representative at:    All areas ~ [phone]     Type of Referral : Surgical / Specialist       Timeframe requested: 3 - 5 days    Coverage of these services is subject to the terms and limitations of your health insurance plan.  Please call member services at your health plan with any benefit or coverage questions.      If X-rays, CT or MRI's have been performed, please contact the facility where they were done  "to arrange for , prior to your scheduled appointment.  Please bring this referral request to your appointment and present it to your specialist.                  Who to contact     If you have questions or need follow up information about today's clinic visit or your schedule please contact [facility] directly at [phone].  Normal or non-critical lab and imaging results will be communicated to you by MyChart, letter or phone within 4 business days after the clinic has received the results. If you do not hear from us within 7 days, please contact the clinic through [employer] or phone. If you have a critical or abnormal lab result, we will notify you by phone as soon as possible.  Submit refill requests through [employer] or call your pharmacy and they will forward the refill request to us. Please allow 3 business days for your refill to be completed.          Additional Information About Your Visit        MyChart Information     [employer] gives you secure access to your electronic health record. If you see a primary care provider, you can also send messages to your care team and make appointments. If you have questions, please call your primary care clinic.  If you do not have a primary care provider, please call [phone] and they will assist you.        Care EveryWhere ID     This is your Care EveryWhere ID. This could be used by other organizations to access your [facility] medical records  [id number]        Your Vitals Were     Pulse Temperature Respirations Height Pulse Oximetry Breastfeeding?    71 98  F (36.7  C) (Tympanic) 18 5' 3\" (1.6 m) 96% No    BMI (Body Mass Index)                   33.13 kg/m2            Blood Pressure from Last 3 Encounters:   07/24/18 120/60   05/11/18 114/68   05/07/18 118/64    Weight from Last 3 Encounters:   07/24/18 187 lb (84.8 kg)   05/11/18 185 lb (83.9 kg)   05/07/18 185 lb (83.9 kg)              We Performed the Following     ORTHO  REFERRAL  "       Primary Care Provider Office Phone # Fax #    Jose Manuel Barrios MD [facility] [phone]       [address] [facility] [address]        Equal Access to Services     KO CHAVEZ : Hadii aad ku hadshellifili Segovia, waasifda januarykarenha, qaybta kalisada analy, kristal stallings spensertami arias laNurajean-claude hernandez. So [facility] [phone].    ATENCIÓN: Si habla español, tiene a ervin disposición servicios gratuitos de asistencia lingüística. Llame al [phone].    We comply with applicable federal civil rights laws and [place] laws. We do not discriminate on the basis of race, color, national origin, age, disability, sex, sexual orientation, or gender identity.            Thank you!     Thank you for choosing [facility]  for your care. Our goal is always to provide you with excellent care. Hearing back from our patients is one way we can continue to improve our services. Please take a few minutes to complete the written survey that you may receive in the mail after your visit with us. Thank you!             Your Updated Medication List - Protect others around you: Learn how to safely use, store and throw away your medicines at www.disposemymeds.org.          This list is accurate as of 7/24/18  4:10 PM.  Always use your most recent med list.                   Brand Name Dispense Instructions for use Diagnosis    gabapentin 300 MG capsule    NEURONTIN    180 capsule    TAKE 1 TO 2 CAPSULES BY MOUTH ONCE DAILY IN THE EVENING AS NEEDED    Chronic bilateral low back pain, with sciatica presence unspecified       HYDROcodone-acetaminophen 5-325 MG per tablet    NORCO    8 tablet    Take 1-2 tablets by mouth every 4 hours as needed for other (Moderate to Severe Pain)    Radial styloid tenosynovitis       levothyroxine 75 MCG tablet    SYNTHROID/LEVOTHROID    90 tablet    Take 1 tablet (75 mcg) by mouth daily    Hypothyroidism, unspecified type, Malignant neoplasm of tonsil (H), Hyperlipidemia LDL goal <130        order for DME      Respironics Remstar 60 series Auto CPAP 6-15 cm H2O    MARGIE (obstructive sleep apnea)       PARoxetine 20 MG tablet    PAXIL    90 tablet    Take 1 tablet (20 mg) by mouth At Bedtime    Major depressive disorder, recurrent episode, moderate (H)       RESTASIS 0.05 % ophthalmic emulsion   Generic drug:  cycloSPORINE      Place 1 drop into both eyes 2 times daily        simvastatin 40 MG tablet    ZOCOR    90 tablet    Take 1 tablet (40 mg) by mouth At Bedtime    Hyperlipidemia LDL goal <130       traZODone 100 MG tablet    DESYREL    90 tablet    TAKE 1 TABLET EVERY NIGHT AS NEEDED FOR SLEEP    Primary insomnia       TURMERIC PO      Take 1 tablet by mouth daily        vitamin B complex with vitamin C Tabs tablet    STRESS TAB     Take 1 tablet by mouth daily.        VITAMIN D (CHOLECALCIFEROL) PO      Take 2,000 Units by mouth daily.

## 2018-07-24 NOTE — PROGRESS NOTES
SUBJECTIVE:   Ingrid Powell is a 72 year old female who presents to clinic today for the following health issues:      Musculoskeletal problem/pain      Duration: couple of months    Description  Location: left shoulder     Intensity:  moderate    Accompanying signs and symptoms: radiation of pain to shoulder and up into the neck and swelling in shoulder    History  Previous similar problem: YES- osteo-arthritis   Previous evaluation:  none    Precipitating or alleviating factors: When walking tends to fall to the left at times.  Lump on pointer finger on left hand   Trauma or overuse: no   Aggravating factors include: lifting, exercise and overuse    Therapies tried and outcome: rest/inactivity and heat    Rash      Duration: 3 months     Description  Location: fore-head and left side of face   Itching: no    Intensity:  moderate    Accompanying signs and symptoms: says it looks awful.  Has a sore in her nose as well. Has a spot on her upper left chest area as well.     History (similar episodes/previous evaluation): None    Precipitating or alleviating factors:   New exposures:  Does a lot of gardening   Recent travel: no      Therapies tried and outcome: calamine lotion        Problem list and histories reviewed & adjusted, as indicated.  Additional history: as documented    Patient Active Problem List   Diagnosis     Hyperlipidemia LDL goal <130     HTN (hypertension)     HX of MALIGNANT NEOPL TONSIL     COPD, mild (H)     zAdvanced directives, counseling/discussion     Major depressive disorder, recurrent episode, moderate (HCC)     Anxiety     Hypothyroidism     MARGIE (obstructive sleep apnea), Severe     Disturbance of salivary secretion (XEROSTOMIA)     Renal cyst     Raynaud's syndrome     History of lumbar surgery     Primary insomnia     Lumbosacral radiculopathy at L3     DDD (degenerative disc disease), lumbar     Adenomatous colon polyp     Diverticulitis of colon     Benign neoplasm of  gastrointestinal tract     Hemorrhoids, internal     Rectocele     Tortuous colon     Family history of breast cancer in mother     Malignant neoplasm of oropharynx (H)     Past Surgical History:   Procedure Laterality Date     BACK SURGERY       CARPAL TUNNEL RELEASE RT/LT      ?side     CHOLECYSTECTOMY       EYE SURGERY      cataracts     HERNIA REPAIR       INCISION AND DRAINAGE TRUNK, COMBINED  5/18/2012    Procedure:COMBINED INCISION AND DRAINAGE TRUNK; Incision and Drainage Abdominal Wall Abscess ; Surgeon:ALEXSANDER HANNON; Location:[facility]     INSERT PORT VASCULAR ACCESS  2/8/2012    Procedure:INSERT PORT VASCULAR ACCESS; Surgeon:MARY JANE GUAN; Location:[facility]     JOINT REPLACEMTN, KNEE RT/LT      bilateral     KNEE SURGERY  1995    left- total     KNEE SURGERY  2000    right- total     LAPAROSCOPIC APPENDECTOMY N/A 10/10/2015    Procedure: LAPAROSCOPIC APPENDECTOMY;  Surgeon: Daniel Chamberlain MD;  Location: [facility]     LARYNGOSCOPY, ESOPHAGOSCOPY,  BIOPSY, COMBINED  2/8/2012    Procedure:COMBINED LARYNGOSCOPY, ESOPHAGOSCOPY,  BIOPSY; Direct Laryngoscopy, Esophagoscopy with Biopsy, Stealth Assisted Left Frontal Sinus Biopsy via Vidal Incision, 8 [place] Power Port in right subclavian & Percutaneous Endoscopic Gastrostomy Placement * Latex Safe*; Surgeon:LIBORIO CAZARES; Location:[facility]     left ankle fusion       OPTICAL TRACKING SYSTEM ENDOSCOPIC SINUS SURGERY  2/8/2012    Procedure:OPTICAL TRACKING SYSTEM ENDOSCOPIC SINUS SURGERY; Surgeon:LIBORIO CAZARES; Location:[facility]     RECTAL SURGERY      ? type     RELEASE DEQUERVAINS WRIST Left 5/11/2018    Procedure: RELEASE DEQUERVAINS WRIST;  Left 1st Distal compartment release;  Surgeon: Ronak Biggs MD;  Location: [facility]     REMOVE PORT VASCULAR ACCESS  8/21/2012    Procedure: REMOVE PORT VASCULAR ACCESS;  Remove Port Vascular Access ;  Surgeon: Alexsander Hannon MD;  [facility]       Social History   Substance  Use Topics     Smoking status: Former Smoker     Packs/day: 0.50     Years: 35.00     Types: Cigarettes     Quit date: 10/3/1995     Smokeless tobacco: Never Used     Alcohol use Yes      Comment: rare     Family History   Problem Relation Age of Onset     Breast Cancer Mother      Arthritis Mother      Cancer Mother      HEART DISEASE Father       at 72 of heart failure     Arthritis Brother      Pancreatic Cancer Brother      Arthritis Sister      Cancer Other      uncle pancreatic/grandmother- colon/aunt ? mat or pat  breast cancer     Cancer Sister          Current Outpatient Prescriptions   Medication Sig Dispense Refill     B Complex Vitamins (VITAMIN  B COMPLEX) tablet Take 1 tablet by mouth daily.         gabapentin (NEURONTIN) 300 MG capsule TAKE 1 TO 2 CAPSULES BY MOUTH ONCE DAILY IN THE EVENING AS NEEDED 180 capsule 0     HYDROcodone-acetaminophen (NORCO) 5-325 MG per tablet Take 1-2 tablets by mouth every 4 hours as needed for other (Moderate to Severe Pain) 8 tablet 0     levothyroxine (SYNTHROID/LEVOTHROID) 75 MCG tablet Take 1 tablet (75 mcg) by mouth daily 90 tablet 0     ORDER FOR DME Respironics Remstar 60 series Auto CPAP 6-15 cm H2O       PARoxetine (PAXIL) 20 MG tablet Take 1 tablet (20 mg) by mouth At Bedtime 90 tablet 3     RESTASIS 0.05 % ophthalmic emulsion Place 1 drop into both eyes 2 times daily       simvastatin (ZOCOR) 40 MG tablet Take 1 tablet (40 mg) by mouth At Bedtime 90 tablet 2     traZODone (DESYREL) 100 MG tablet TAKE 1 TABLET EVERY NIGHT AS NEEDED FOR SLEEP 90 tablet 0     TURMERIC PO Take 1 tablet by mouth daily       VITAMIN D, CHOLECALCIFEROL, PO Take 2,000 Units by mouth daily.         Allergies   Allergen Reactions     Dilaudid [Hydromorphone Hcl] Other (See Comments)     hallucinations     Fosamax [Alendronate Sodium] Rash            Norvasc [Amlodipine Besylate] Hives and Rash     Tegretol [Carbamazepine] Hives and Rash     Recent Labs   Lab Test  18   4423   "01/19/18   1345  01/10/18   1010  01/10/18   1003   08/01/17   1204  06/07/17   1141   01/13/16   1310   11/25/14   1120   LDL  66   --    --    --    --    --    --    --   76   --   80   HDL  62   --    --    --    --    --    --    --   70   --   61   TRIG  145   --    --    --    --    --    --    --   77   --   107   ALT   --    --   38   --    --   27  32   < >   --    < >   --    CR   --    --   0.73   --    --   0.78  0.70   < >   --    < >   --    GFRESTIMATED   --    --   77  70   < >  73  82   < >   --    < >   --    GFRESTBLACK   --    --   >90  85   < >  88  >90   GFR Calc     < >   --    < >   --    POTASSIUM   --    --   4.0   --    --   4.8  3.9   < >   --    < >   --    TSH   --   4.58*   --    --    --    --   1.72   < >   --    < >   --     < > = values in this interval not displayed.      BP Readings from Last 3 Encounters:   07/24/18 120/60   05/11/18 114/68   05/07/18 118/64    Wt Readings from Last 3 Encounters:   07/24/18 187 lb (84.8 kg)   05/11/18 185 lb (83.9 kg)   05/07/18 185 lb (83.9 kg)                  Labs reviewed in EPIC    Reviewed and updated as needed this visit by clinical staff       Reviewed and updated as needed this visit by Provider         ROS:   ROS: 10 point ROS neg other than the symptoms noted above in the HPI.    OBJECTIVE:     /60 (Cuff Size: Adult Regular)  Pulse 71  Temp 98  F (36.7  C) (Tympanic)  Resp 18  Ht 5' 3\" (1.6 m)  Wt 187 lb (84.8 kg)  SpO2 96%  Breastfeeding? No  BMI 33.13 kg/m2  Body mass index is 33.13 kg/(m^2).  GENERAL: alert, no distress and elderly  NECK: no adenopathy, no asymmetry, masses, or scars and thyroid normal to palpation  HEENT: Normal exam  RESP: lungs clear to auscultation - no rales, rhonchi or wheezes  CV: regular rate and rhythm, normal S1 S2, no S3 or S4, no murmur, click or rub, no peripheral edema and peripheral pulses strong  ABDOMEN: soft, nontender, no hepatosplenomegaly, no masses and bowel " sounds normal  MS: Limited left shoulder abduction and external rotation, no skin discoloration or joint swelling noted, chronic arthritic changes involving multiple joints  NEURO: Normal strength and tone, mentation intact and speech normal  Skin: Skin lesions over nasal tip and forehead as shown below, nasal tip lesion about 3 mm with central indentation                      LEFT SHOULDER THREE OR MORE VIEWS  7/24/2018 3:57 PM      HISTORY:  Chronic left shoulder pain.     COMPARISON: None.         IMPRESSION: No acute-appearing bony abnormality. Moderately advanced  glenohumeral osteoarthritis with joint space narrowing and humeral  head bony spurring. Mild to moderate hypertrophic degenerative change  at the acromioclavicular joint. There is a 1 x 0.6 cm calcified  density projecting in the expected region of the subcoracoid recess,  likely an osseous loose body. A 0.4 cm vague calcification is seen  projecting inferior to the glenohumeral joint which may be a loose  body in the axillary recess.    ASSESSMENT/PLAN:       1. Primary osteoarthritis of left shoulder  -X-ray finding consistent with moderately advanced left shoulder osteoarthritis with a loose body  -Various treatment options discussed, patient would prefer to get orthopedic appointment, referral placed, suggested to continue topical analgesia/over-the-counter analgesia and activity as tolerated  - XR Shoulder Left G/E 3 Views; Future  - ORTHO  REFERRAL      2. Loose body of left shoulder  -As above      3. Skin lesion  -Skin lesion over nasal tip concerning for basal cell carcinoma, dermatology referral placed for further evaluation  - DERMATOLOGY REFERRAL      4. Benign skin lesion of forehead  -Skin lesions involving forehead appears benign in etiology, reassurance provided, will have dermatology review      Patient Instructions     Osteoarthritis  Osteoarthritis (also called degenerative joint disease) happens when the cartilage in a  joint becomes damaged and worn. This may be due to age, wear and tear, overuse of the joint, or other problems. Osteoarthritis can affect any joint. But it is most common in hands, knees, spine, hips, and feet. Symptoms include joint stiffness, pain, and swelling.  Home care    When a joint is more sore than usual, rest it for a day or two.    Heat can help relieve stiffness. Take a hot bath or apply a heating pad for up to 30 minutes at a time. If symptoms are worse in the morning, using heat just after awakening can help relax the muscle and soothe the joints.     Ice helps relieve pain and swelling. It is often used after activity. Use a cold pack wrapped in a thin cloth on the joint for 10 to 15 minutes at a time.     Alternating hot and cold can also help relieve pain. Try this for 20 minutes at a time, several times per day.    Exercise helps prevent the muscles and ligaments around the joint from becoming weak. It also helps maintain function in the joint.  Be as active as you can. Talk to your healthcare provider about what activity program is best for you.    Excess weight puts a lot of extra strain on weight-bearing joints of the lower back, hips, knees, feet and ankles. If you are overweight, talk to your healthcare provider about a safe and effective weight loss program.    Use anti-inflammatory medicines as prescribed for pain. This includes acetaminophen or NSAIDs such as ibuprofen or naproxen. If needed, topical or injected medicines may be recommended. Talk to your healthcare provider if these options are not enough to manage your pain.    Talk with your healthcare provider about devices that might help improve your function and reduce pain.  Follow-up care  Follow up with your healthcare provider as advised by our staff.  When to seek medical advice  Call your healthcare provider right away if any of these occur:    Redness or swelling of a painful joint    Discharge or pus from a painful  joint    Fever of 100.4 F (38 C) or higher, or as directed by your healthcare provider    Worsening joint pain    Decreased ability to move the joint or bear weight on the joint  Date Last Reviewed: 3/1/2017    1131-8908 [employer]. [address] [place] [id number]. All rights reserved. This information is not intended as a substitute for professional medical care. Always follow your healthcare professional's instructions.            Jose Manuel Sutton MD  [facility]

## 2018-07-24 NOTE — PATIENT INSTRUCTIONS
Osteoarthritis  Osteoarthritis (also called degenerative joint disease) happens when the cartilage in a joint becomes damaged and worn. This may be due to age, wear and tear, overuse of the joint, or other problems. Osteoarthritis can affect any joint. But it is most common in hands, knees, spine, hips, and feet. Symptoms include joint stiffness, pain, and swelling.  Home care    When a joint is more sore than usual, rest it for a day or two.    Heat can help relieve stiffness. Take a hot bath or apply a heating pad for up to 30 minutes at a time. If symptoms are worse in the morning, using heat just after awakening can help relax the muscle and soothe the joints.     Ice helps relieve pain and swelling. It is often used after activity. Use a cold pack wrapped in a thin cloth on the joint for 10 to 15 minutes at a time.     Alternating hot and cold can also help relieve pain. Try this for 20 minutes at a time, several times per day.    Exercise helps prevent the muscles and ligaments around the joint from becoming weak. It also helps maintain function in the joint.  Be as active as you can. Talk to your healthcare provider about what activity program is best for you.    Excess weight puts a lot of extra strain on weight-bearing joints of the lower back, hips, knees, feet and ankles. If you are overweight, talk to your healthcare provider about a safe and effective weight loss program.    Use anti-inflammatory medicines as prescribed for pain. This includes acetaminophen or NSAIDs such as ibuprofen or naproxen. If needed, topical or injected medicines may be recommended. Talk to your healthcare provider if these options are not enough to manage your pain.    Talk with your healthcare provider about devices that might help improve your function and reduce pain.  Follow-up care  Follow up with your healthcare provider as advised by our staff.  When to seek medical advice  Call your healthcare provider right away if  any of these occur:    Redness or swelling of a painful joint    Discharge or pus from a painful joint    Fever of 100.4 F (38 C) or higher, or as directed by your healthcare provider    Worsening joint pain    Decreased ability to move the joint or bear weight on the joint  Date Last Reviewed: 3/1/2017    2743-2301 [employer]. [address] [place] [id number]. All rights reserved. This information is not intended as a substitute for professional medical care. Always follow your healthcare professional's instructions.

## 2018-08-02 ENCOUNTER — TRANSFERRED RECORDS (OUTPATIENT)
Dept: HEALTH INFORMATION MANAGEMENT | Facility: CLINIC | Age: 73
End: 2018-08-02

## 2018-08-05 DIAGNOSIS — F33.1 MAJOR DEPRESSIVE DISORDER, RECURRENT EPISODE, MODERATE (H): ICD-10-CM

## 2018-08-05 NOTE — TELEPHONE ENCOUNTER
"Requested Prescriptions   Pending Prescriptions Disp Refills     PARoxetine (PAXIL) 20 MG tablet   Last Written Prescription Date:  8/1/17  Last Fill Quantity: 90,  # refills: 3   Last office visit: 3/28/2018 with prescribing provider:  ALBERTO Tyson   Future Office Visit:     90 tablet 3     Sig: TAKE 1 TABLET AT BEDTIME    SSRIs Protocol Passed    8/5/2018  1:05 PM       Passed - PHQ-9 score less than 5 in past 6 months    Please review last PHQ-9 score.   PHQ-9 SCORE 9/7/2016 8/1/2017 2/22/2018   Total Score - - -   Total Score 2 10 2     JAIME-7 SCORE 4/27/2016 9/7/2016 8/1/2017   Total Score - - -   Total Score 1 2 4                Passed - Patient is age 18 or older       Passed - No active pregnancy on record       Passed - No positive pregnancy test in last 12 months       Passed - Recent (6 mo) or future (30 days) visit within the authorizing provider's specialty    Patient had office visit in the last 6 months or has a visit in the next 30 days with authorizing provider or within the authorizing provider's specialty.  See \"Patient Info\" tab in inbasket, or \"Choose Columns\" in Meds & Orders section of the refill encounter.              "

## 2018-08-06 RX ORDER — PAROXETINE 20 MG/1
TABLET, FILM COATED ORAL
Qty: 90 TABLET | Refills: 1 | Status: SHIPPED | OUTPATIENT
Start: 2018-08-06 | End: 2019-02-09

## 2018-08-25 DIAGNOSIS — F51.01 PRIMARY INSOMNIA: ICD-10-CM

## 2018-08-25 DIAGNOSIS — G89.29 CHRONIC BILATERAL LOW BACK PAIN, WITH SCIATICA PRESENCE UNSPECIFIED: ICD-10-CM

## 2018-08-25 DIAGNOSIS — M54.5 CHRONIC BILATERAL LOW BACK PAIN, WITH SCIATICA PRESENCE UNSPECIFIED: ICD-10-CM

## 2018-08-26 NOTE — TELEPHONE ENCOUNTER
"Requested Prescriptions   Pending Prescriptions Disp Refills     traZODone (DESYREL) 100 MG tablet   Last Written Prescription Date:  6/4/18  Last Fill Quantity: 90,  # refills: 0   Last office visit: 7/24/2018 with prescribing provider:  JACQUELINE Sutton   Future Office Visit:     90 tablet 0     Sig: TAKE 1 TABLET EVERY NIGHT AS NEEDED FOR SLEEP    Serotonin Modulators Passed    8/25/2018  1:53 PM       Passed - Recent (12 mo) or future (30 days) visit within the authorizing provider's specialty    Patient had office visit in the last 12 months or has a visit in the next 30 days with authorizing provider or within the authorizing provider's specialty.  See \"Patient Info\" tab in inbasket, or \"Choose Columns\" in Meds & Orders section of the refill encounter.           Passed - Patient is age 18 or older       Passed - No active pregnancy on record       Passed - No positive pregnancy test in past 12 months          "

## 2018-08-26 NOTE — TELEPHONE ENCOUNTER
gabapentin (NEURONTIN) 300 MG capsule      Last Written Prescription Date:  5/31/18  Last Fill Quantity: 180,   # refills: 0  Last Office Visit: 7/24/18  Future Office visit:       Routing refill request to provider for review/approval because:  Drug not on the [facility], [facility] or [facility] refill protocol or controlled substance

## 2018-08-27 RX ORDER — TRAZODONE HYDROCHLORIDE 100 MG/1
TABLET ORAL
Qty: 90 TABLET | Refills: 0 | Status: SHIPPED | OUTPATIENT
Start: 2018-08-27 | End: 2018-11-19

## 2018-08-27 RX ORDER — GABAPENTIN 300 MG/1
CAPSULE ORAL
Qty: 180 CAPSULE | Refills: 0 | Status: SHIPPED | OUTPATIENT
Start: 2018-08-27 | End: 2018-11-23

## 2018-08-27 NOTE — TELEPHONE ENCOUNTER
Routing refill request to provider for review/approval because:  Drug not on the [facility] refill protocol   Dea GUTIERREZ RN

## 2018-10-01 ENCOUNTER — RADIANT APPOINTMENT (OUTPATIENT)
Dept: GENERAL RADIOLOGY | Facility: CLINIC | Age: 73
End: 2018-10-01
Attending: FAMILY MEDICINE
Payer: COMMERCIAL

## 2018-10-01 ENCOUNTER — OFFICE VISIT (OUTPATIENT)
Dept: FAMILY MEDICINE | Facility: CLINIC | Age: 73
End: 2018-10-01
Payer: COMMERCIAL

## 2018-10-01 VITALS
OXYGEN SATURATION: 95 % | DIASTOLIC BLOOD PRESSURE: 70 MMHG | HEART RATE: 68 BPM | WEIGHT: 181 LBS | HEIGHT: 63 IN | BODY MASS INDEX: 32.07 KG/M2 | SYSTOLIC BLOOD PRESSURE: 102 MMHG | TEMPERATURE: 97.8 F | RESPIRATION RATE: 18 BRPM

## 2018-10-01 DIAGNOSIS — R07.89 CHEST WALL PAIN: Primary | ICD-10-CM

## 2018-10-01 DIAGNOSIS — R07.89 CHEST WALL PAIN: ICD-10-CM

## 2018-10-01 DIAGNOSIS — Z23 NEED FOR PROPHYLACTIC VACCINATION AND INOCULATION AGAINST INFLUENZA: ICD-10-CM

## 2018-10-01 DIAGNOSIS — E03.9 ACQUIRED HYPOTHYROIDISM: Chronic | ICD-10-CM

## 2018-10-01 LAB — TSH SERPL DL<=0.005 MIU/L-ACNC: 2.95 MU/L (ref 0.4–4)

## 2018-10-01 PROCEDURE — 71046 X-RAY EXAM CHEST 2 VIEWS: CPT | Mod: FY

## 2018-10-01 PROCEDURE — 36415 COLL VENOUS BLD VENIPUNCTURE: CPT | Performed by: FAMILY MEDICINE

## 2018-10-01 PROCEDURE — G0008 ADMIN INFLUENZA VIRUS VAC: HCPCS | Performed by: FAMILY MEDICINE

## 2018-10-01 PROCEDURE — 84443 ASSAY THYROID STIM HORMONE: CPT | Performed by: FAMILY MEDICINE

## 2018-10-01 PROCEDURE — 99213 OFFICE O/P EST LOW 20 MIN: CPT | Mod: 25 | Performed by: FAMILY MEDICINE

## 2018-10-01 PROCEDURE — 90662 IIV NO PRSV INCREASED AG IM: CPT | Performed by: FAMILY MEDICINE

## 2018-10-01 NOTE — PROGRESS NOTES
SUBJECTIVE:   Ingrid Powell is a 73 year old female who presents to clinic today for the following health issues:      Breast pain      Duration: couple weeks     Description (location/character/radiation): all across the chest, not experiencing pain for last 2 days    Intensity:  moderate    Accompanying signs and symptoms: did a self breast exam and didn't find any lumps or bumps     History (similar episodes/previous evaluation): None    Precipitating or alleviating factors: does have a spot in her lung- so not sure if that is what is causing the pain?    Therapies tried and outcome: None         Problem list and histories reviewed & adjusted, as indicated.  Additional history: as documented    Patient Active Problem List   Diagnosis     Hyperlipidemia LDL goal <130     HTN (hypertension)     HX of MALIGNANT NEOPL TONSIL     COPD, mild (H)     zAdvanced directives, counseling/discussion     Major depressive disorder, recurrent episode, moderate (HCC)     Anxiety     Hypothyroidism     MARGIE (obstructive sleep apnea), Severe     Disturbance of salivary secretion (XEROSTOMIA)     Renal cyst     Raynaud's syndrome     History of lumbar surgery     Primary insomnia     Lumbosacral radiculopathy at L3     DDD (degenerative disc disease), lumbar     Adenomatous colon polyp     Diverticulitis of colon     Benign neoplasm of gastrointestinal tract     Hemorrhoids, internal     Rectocele     Tortuous colon     Family history of breast cancer in mother     Malignant neoplasm of oropharynx (H)     Past Surgical History:   Procedure Laterality Date     BACK SURGERY       CARPAL TUNNEL RELEASE RT/LT      ?side     CHOLECYSTECTOMY       EYE SURGERY      cataracts     HERNIA REPAIR       INCISION AND DRAINAGE TRUNK, COMBINED  5/18/2012    Procedure:COMBINED INCISION AND DRAINAGE TRUNK; Incision and Drainage Abdominal Wall Abscess ; Surgeon:ALEXSANDER HANNON; Location:[facility]     INSERT PORT VASCULAR ACCESS  2/8/2012     Procedure:INSERT PORT VASCULAR ACCESS; Surgeon:MARY JANE GUAN; Location:[facility]     JOINT REPLACEMTN, KNEE RT/LT      bilateral     KNEE SURGERY      left- total     KNEE SURGERY      right- total     LAPAROSCOPIC APPENDECTOMY N/A 10/10/2015    Procedure: LAPAROSCOPIC APPENDECTOMY;  Surgeon: Daniel Chamberlain MD;  Location: [facility]     LARYNGOSCOPY, ESOPHAGOSCOPY,  BIOPSY, COMBINED  2012    Procedure:COMBINED LARYNGOSCOPY, ESOPHAGOSCOPY,  BIOPSY; Direct Laryngoscopy, Esophagoscopy with Biopsy, Stealth Assisted Left Frontal Sinus Biopsy via Vidal Incision, 8 [employer] Port in right subclavian & Percutaneous Endoscopic Gastrostomy Placement * Latex Safe*; Surgeon:LIBORIO CAZARES; [facility]     left ankle fusion       OPTICAL TRACKING SYSTEM ENDOSCOPIC SINUS SURGERY  2012    Procedure:OPTICAL TRACKING SYSTEM ENDOSCOPIC SINUS SURGERY; Surgeon:LIBORIO CAZARES; Location: OR     RECTAL SURGERY      ? type     RELEASE DEQUERVAINS WRIST Left 2018    Procedure: RELEASE DEQUERVAINS WRIST;  Left 1st Distal compartment release;  Surgeon: Ronak Biggs MD;  Location: [facility]     REMOVE PORT VASCULAR ACCESS  2012    Procedure: REMOVE PORT VASCULAR ACCESS;  Remove Port Vascular Access ;  Surgeon: Phillip Ye MD;  Location:  OR       Social History   Substance Use Topics     Smoking status: Former Smoker     Packs/day: 0.50     Years: 35.00     Types: Cigarettes     Quit date: 10/3/1995     Smokeless tobacco: Never Used     Alcohol use Yes      Comment: rare     Family History   Problem Relation Age of Onset     Breast Cancer Mother      Arthritis Mother      Cancer Mother      HEART DISEASE Father       at 72 of heart failure     Arthritis Brother      Pancreatic Cancer Brother      Arthritis Sister      Cancer Other      uncle pancreatic/grandmother- colon/aunt ? mat or pat  breast cancer     Cancer Sister          Current Outpatient  Prescriptions   Medication Sig Dispense Refill     B Complex Vitamins (VITAMIN  B COMPLEX) tablet Take 1 tablet by mouth daily.         gabapentin (NEURONTIN) 300 MG capsule TAKE 1 TO 2 CAPSULES BY MOUTH ONCE DAILY IN THE EVENING AS NEEDED 180 capsule 0     levothyroxine (SYNTHROID/LEVOTHROID) 75 MCG tablet Take 1 tablet (75 mcg) by mouth daily 90 tablet 0     ORDER FOR DME Respironics Remstar 60 series Auto CPAP 6-15 cm H2O       PARoxetine (PAXIL) 20 MG tablet TAKE 1 TABLET AT BEDTIME 90 tablet 1     RESTASIS 0.05 % ophthalmic emulsion Place 1 drop into both eyes 2 times daily       simvastatin (ZOCOR) 40 MG tablet Take 1 tablet (40 mg) by mouth At Bedtime 90 tablet 2     traZODone (DESYREL) 100 MG tablet TAKE 1 TABLET EVERY NIGHT AS NEEDED FOR SLEEP 90 tablet 0     TURMERIC PO Take 1 tablet by mouth daily       VITAMIN D, CHOLECALCIFEROL, PO Take 2,000 Units by mouth daily.         Allergies   Allergen Reactions     Dilaudid [Hydromorphone Hcl] Other (See Comments)     hallucinations     Fosamax [Alendronate Sodium] Rash            Norvasc [Amlodipine Besylate] Hives and Rash     Tegretol [Carbamazepine] Hives and Rash     Recent Labs   Lab Test  02/26/18   0958  01/19/18   1345  01/10/18   1010  01/10/18   1003   08/01/17   1204  06/07/17   1141   01/13/16   1310   11/25/14   1120   LDL  66   --    --    --    --    --    --    --   76   --   80   HDL  62   --    --    --    --    --    --    --   70   --   61   TRIG  145   --    --    --    --    --    --    --   77   --   107   ALT   --    --   38   --    --   27  32   < >   --    < >   --    CR   --    --   0.73   --    --   0.78  0.70   < >   --    < >   --    GFRESTIMATED   --    --   77  70   < >  73  82   < >   --    < >   --    GFRESTBLACK   --    --   >90  85   < >  88  >90   GFR Calc     < >   --    < >   --    POTASSIUM   --    --   4.0   --    --   4.8  3.9   < >   --    < >   --    TSH   --   4.58*   --    --    --    --   1.72   < >  "  --    < >   --     < > = values in this interval not displayed.      BP Readings from Last 3 Encounters:   10/01/18 102/70   07/24/18 120/60   05/11/18 114/68    Wt Readings from Last 3 Encounters:   10/01/18 181 lb (82.1 kg)   07/24/18 187 lb (84.8 kg)   05/11/18 185 lb (83.9 kg)                  Labs reviewed in EPIC    Reviewed and updated as needed this visit by clinical staff       Reviewed and updated as needed this visit by Provider         ROS:  Constitutional, HEENT, cardiovascular, pulmonary, gi and gu systems are negative, except as otherwise noted.    OBJECTIVE:     /70 (Cuff Size: Adult Large)  Pulse 68  Temp 97.8  F (36.6  C) (Tympanic)  Resp 18  Ht 5' 3\" (1.6 m)  Wt 181 lb (82.1 kg)  SpO2 95%  Breastfeeding? No  BMI 32.06 kg/m2  Body mass index is 32.06 kg/(m^2).  GENERAL: alert, no distress and elderly  EYES: Eyes grossly normal to inspection, PERRL and conjunctivae and sclerae normal  NECK: no adenopathy, no asymmetry, masses, or scars and thyroid normal to palpation  RESP: lungs clear to auscultation - no rales, rhonchi or wheezes  BREAST: normal without masses, tenderness or nipple discharge and no palpable axillary masses or adenopathy  CV: regular rates and rhythm, normal S1 S2, no S3 or S4 and no murmur, click or rub, mild right upper chest wall tenderness noted, no skin discoloration or swelling  ABDOMEN: soft, nontender  CNS: Grossly intact      TSH   Date Value Ref Range Status   01/19/2018 4.58 (H) 0.40 - 4.00 mU/L Final     ASSESSMENT/PLAN:       1. Chest wall pain  -Chest x-ray came back unremarkable.  Suspect chest wall pain musculoskeletal etiology. Suggested to continue rest, icing and Tylenol.  Will consider mammogram if symptoms persist or worsen  - XR Chest 2 Views; Future      2. Acquired hypothyroidism  -Known to have hypothyroidism, taking levothyroxine 75 mcg, TSH ordered, will titrate the dose accordingly  - TSH      Patient Instructions     *CHEST PAIN, " NONCARDIAC    Based on your visit today, the exact cause of your chest pain is not certain. Your condition does not seem serious and your pain does not appear to be coming from your heart. However, sometimes the signs of a serious problem take more time to appear. Therefore, please watch for the warning signs listed below.  HOME CARE:  1. Rest today and avoid strenuous activity.  2. Take any prescribed medicine as directed.  FOLLOW UP with your doctor in 1-3 days.   GET PROMPT MEDICAL ATTENTION if any of the following occur:    A change in the type of pain: if it feels different, becomes more severe, lasts longer, or begins to spread into your shoulder, arm, neck, jaw or back    Shortness of breath or increased pain with breathing    Cough with blood or dark colored sputum (phlegm)    Weakness, dizziness, or fainting    Fever over 101  F (38.3  C)    Swelling, pain or redness in one leg    7070-0069 [employer]. [address] [place] [address]. All rights reserved. This information is not intended as a substitute for professional medical care. Always follow your healthcare professional's instructions.  This information has been modified by your health care provider with permission from the publisher.        Jose Manuel Sutton MD  [facility]

## 2018-10-01 NOTE — NURSING NOTE
"Chief Complaint   Patient presents with     Breast Pain       Initial /70 (Cuff Size: Adult Large)  Pulse 68  Temp 97.8  F (36.6  C) (Tympanic)  Resp 18  Ht 5' 3\" (1.6 m)  Wt 181 lb (82.1 kg)  SpO2 95%  Breastfeeding? No  BMI 32.06 kg/m2 Estimated body mass index is 32.06 kg/(m^2) as calculated from the following:    Height as of this encounter: 5' 3\" (1.6 m).    Weight as of this encounter: 181 lb (82.1 kg).    Patient presents to the clinic using No DME    Health Maintenance that is potentially due pending provider review:  NONE    n/a    Is there anyone who you would like to be able to receive your results? Not Applicable  If yes have patient fill out ELIZABETH    "

## 2018-10-01 NOTE — MR AVS SNAPSHOT
After Visit Summary   10/1/2018    Ingrid Powell    MRN: [id number]           Patient Information     Date Of Birth          1945        Visit Information        Provider Department      10/1/2018 11:20 AM Jose Manuel Sutton MD [facility]        Today's Diagnoses     Chest wall pain    -  1      Care Instructions      *CHEST PAIN, NONCARDIAC    Based on your visit today, the exact cause of your chest pain is not certain. Your condition does not seem serious and your pain does not appear to be coming from your heart. However, sometimes the signs of a serious problem take more time to appear. Therefore, please watch for the warning signs listed below.  HOME CARE:  1. Rest today and avoid strenuous activity.  2. Take any prescribed medicine as directed.  FOLLOW UP with your doctor in 1-3 days.   GET PROMPT MEDICAL ATTENTION if any of the following occur:    A change in the type of pain: if it feels different, becomes more severe, lasts longer, or begins to spread into your shoulder, arm, neck, jaw or back    Shortness of breath or increased pain with breathing    Cough with blood or dark colored sputum (phlegm)    Weakness, dizziness, or fainting    Fever over 101  F (38.3  C)    Swelling, pain or redness in one leg    0296-4774 [employer]. [address] [place] [address], [place] [address]. All rights reserved. This information is not intended as a substitute for professional medical care. Always follow your healthcare professional's instructions.  This information has been modified by your health care provider with permission from the publisher.            Follow-ups after your visit        Who to contact     If you have questions or need follow up information about today's clinic visit or your schedule please contact [facility] directly at [phone].  Normal or non-critical lab and imaging results will be communicated to you by MyChart, letter or phone  "within 4 business days after the clinic has received the results. If you do not hear from us within 7 days, please contact the clinic through [employer] or phone. If you have a critical or abnormal lab result, we will notify you by phone as soon as possible.  Submit refill requests through [employer] or call your pharmacy and they will forward the refill request to us. Please allow 3 business days for your refill to be completed.          Additional Information About Your Visit        FinderyharActionPlanner Information     [employer] gives you secure access to your electronic health record. If you see a primary care provider, you can also send messages to your care team and make appointments. If you have questions, please call your primary care clinic.  If you do not have a primary care provider, please call [phone] and they will assist you.        Care EveryWhere ID     This is your Care EveryWhere ID. This could be used by other organizations to access your [facility] medical records  [id number]        Your Vitals Were     Pulse Temperature Respirations Height Pulse Oximetry Breastfeeding?    68 97.8  F (36.6  C) (Tympanic) 18 5' 3\" (1.6 m) 95% No    BMI (Body Mass Index)                   32.06 kg/m2            Blood Pressure from Last 3 Encounters:   10/01/18 102/70   07/24/18 120/60   05/11/18 114/68    Weight from Last 3 Encounters:   10/01/18 181 lb (82.1 kg)   07/24/18 187 lb (84.8 kg)   05/11/18 185 lb (83.9 kg)               Primary Care Provider Office Phone # Fax #    Jose Manuel Ur MD [facility] [phone]       [address] [facility] [address]        Equal Access to Services     [facility]: Hadii estephania ku hadasho Soomaali, waaxda luqadaha, qaybta kaalmada kristal be[place]ford. So [facility] [phone].    ATENCIÓN: Si habla español, tiene a ervin disposición servicios gratuitos de asistencia lingüística. Llame al [phone].    We comply with applicable federal civil rights laws and " [place] laws. We do not discriminate on the basis of race, color, national origin, age, disability, sex, sexual orientation, or gender identity.            Thank you!     Thank you for choosing [facility]  for your care. Our goal is always to provide you with excellent care. Hearing back from our patients is one way we can continue to improve our services. Please take a few minutes to complete the written survey that you may receive in the mail after your visit with us. Thank you!             Your Updated Medication List - Protect others around you: Learn how to safely use, store and throw away your medicines at www.disposemymeds.org.          This list is accurate as of 10/1/18 11:52 AM.  Always use your most recent med list.                   Brand Name Dispense Instructions for use Diagnosis    gabapentin 300 MG capsule    NEURONTIN    180 capsule    TAKE 1 TO 2 CAPSULES BY MOUTH ONCE DAILY IN THE EVENING AS NEEDED    Chronic bilateral low back pain, with sciatica presence unspecified       levothyroxine 75 MCG tablet    SYNTHROID/LEVOTHROID    90 tablet    Take 1 tablet (75 mcg) by mouth daily    Hypothyroidism, unspecified type, Malignant neoplasm of tonsil (H), Hyperlipidemia LDL goal <130       order for DME      Respironics Remstar 60 series Auto CPAP 6-15 cm H2O    MARGIE (obstructive sleep apnea)       PARoxetine 20 MG tablet    PAXIL    90 tablet    TAKE 1 TABLET AT BEDTIME    Major depressive disorder, recurrent episode, moderate (H)       RESTASIS 0.05 % ophthalmic emulsion   Generic drug:  cycloSPORINE      Place 1 drop into both eyes 2 times daily        simvastatin 40 MG tablet    ZOCOR    90 tablet    Take 1 tablet (40 mg) by mouth At Bedtime    Hyperlipidemia LDL goal <130       traZODone 100 MG tablet    DESYREL    90 tablet    TAKE 1 TABLET EVERY NIGHT AS NEEDED FOR SLEEP    Primary insomnia       TURMERIC PO      Take 1 tablet by mouth daily        vitamin B complex with vitamin C  Tabs tablet    STRESS TAB     Take 1 tablet by mouth daily.        VITAMIN D (CHOLECALCIFEROL) PO      Take 2,000 Units by mouth daily.

## 2018-10-01 NOTE — PATIENT INSTRUCTIONS
*CHEST PAIN, NONCARDIAC    Based on your visit today, the exact cause of your chest pain is not certain. Your condition does not seem serious and your pain does not appear to be coming from your heart. However, sometimes the signs of a serious problem take more time to appear. Therefore, please watch for the warning signs listed below.  HOME CARE:  1. Rest today and avoid strenuous activity.  2. Take any prescribed medicine as directed.  FOLLOW UP with your doctor in 1-3 days.   GET PROMPT MEDICAL ATTENTION if any of the following occur:    A change in the type of pain: if it feels different, becomes more severe, lasts longer, or begins to spread into your shoulder, arm, neck, jaw or back    Shortness of breath or increased pain with breathing    Cough with blood or dark colored sputum (phlegm)    Weakness, dizziness, or fainting    Fever over 101  F (38.3  C)    Swelling, pain or redness in one leg    9340-8832 [employer]. [address] [place] [address]. All rights reserved. This information is not intended as a substitute for professional medical care. Always follow your healthcare professional's instructions.  This information has been modified by your health care provider with permission from the publisher.

## 2018-10-01 NOTE — LETTER
October 2, 2018      Ingrid Powell  [address]  [place] [address]        Dear ,    We are writing to inform you of your test results.    TSH came back 2.95, within reference range.  Continue levothyroxine 75 mcg. Let us know if there are any questions.    Resulted Orders   TSH   Result Value Ref Range    TSH 2.95 0.40 - 4.00 mU/L       If you have any questions or concerns, please call the clinic at the number listed above.       Sincerely,        Jose Manuel Sutton MD/mm

## 2018-10-29 ENCOUNTER — TELEPHONE (OUTPATIENT)
Dept: FAMILY MEDICINE | Facility: CLINIC | Age: 73
End: 2018-10-29

## 2018-10-29 DIAGNOSIS — N64.4 MASTALGIA: Primary | ICD-10-CM

## 2018-10-29 NOTE — TELEPHONE ENCOUNTER
Dr. Sutton- please clarify if this is to be screening or diagnostic mammo due to breast pain.  PARRISH Vallecillo RN

## 2018-10-29 NOTE — TELEPHONE ENCOUNTER
Reason for Call:  Other call back    Detailed comments: Pt wants to schedule her mammogram on 11/5/18. She discussed with Dr Sutton that she sometimes has pain in breasts. Radiology is questioning type of mammogram wanted. It looks like a screening was ordered. Please advise.    Phone Number Patient can be reached at: Home number on file [phone] (home)    Best Time: any    Can we leave a detailed message on this number?     Call taken on 10/29/2018 at 2:38 PM by Ana Maria Marrero

## 2018-11-12 ENCOUNTER — HOSPITAL ENCOUNTER (OUTPATIENT)
Dept: MAMMOGRAPHY | Facility: CLINIC | Age: 73
Discharge: HOME | End: 2018-11-12
Attending: FAMILY MEDICINE | Admitting: FAMILY MEDICINE
Payer: MEDICARE

## 2018-11-12 DIAGNOSIS — N64.4 MASTALGIA: ICD-10-CM

## 2018-11-12 PROCEDURE — G0279 TOMOSYNTHESIS, MAMMO: HCPCS

## 2018-11-19 DIAGNOSIS — F51.01 PRIMARY INSOMNIA: ICD-10-CM

## 2018-11-19 RX ORDER — TRAZODONE HYDROCHLORIDE 100 MG/1
TABLET ORAL
Qty: 90 TABLET | Refills: 0 | Status: SHIPPED | OUTPATIENT
Start: 2018-11-19 | End: 2019-02-09

## 2018-11-19 NOTE — TELEPHONE ENCOUNTER
"Requested Prescriptions   Pending Prescriptions Disp Refills     traZODone (DESYREL) 100 MG tablet 90 tablet 0    Serotonin Modulators Passed    11/19/2018 11:47 AM       Passed - Recent (12 mo) or future (30 days) visit within the authorizing provider's specialty    Patient had office visit in the last 12 months or has a visit in the next 30 days with authorizing provider or within the authorizing provider's specialty.  See \"Patient Info\" tab in inbasket, or \"Choose Columns\" in Meds & Orders section of the refill encounter.      Last Written Prescription Date:  8/27/18  Last Fill Quantity: 90,  # refills: 0   Last office visit: 10/1/2018 with prescribing provider:     Future Office Visit:               Passed - Patient is age 18 or older       Passed - No active pregnancy on record       Passed - No positive pregnancy test in past 12 months          "

## 2018-11-23 DIAGNOSIS — M54.5 CHRONIC BILATERAL LOW BACK PAIN, WITH SCIATICA PRESENCE UNSPECIFIED: ICD-10-CM

## 2018-11-23 DIAGNOSIS — G89.29 CHRONIC BILATERAL LOW BACK PAIN, WITH SCIATICA PRESENCE UNSPECIFIED: ICD-10-CM

## 2018-11-23 NOTE — TELEPHONE ENCOUNTER
Requested Prescriptions   Pending Prescriptions Disp Refills     gabapentin (NEURONTIN) 300 MG capsule [Pharmacy Med Name: GABAPENTIN 300MG    CAP] 180 capsule 0     Sig: TAKE 1 TO 2 CAPSULES BY MOUTH ONCE DAILY IN THE EVENING AS NEEDED    There is no refill protocol information for this order        gabapentin (NEURONTIN) 300 MG capsule  Last Written Prescription Date:  08/27/2018  Last Fill Quantity: 180 capsule,  # refills: 0   Last office visit: 10/1/2018 with prescribing provider:  LINDSEY Sutton   Future Office Visit:      Merlyn GRULLON (ZOFIA) (M)

## 2018-11-24 RX ORDER — GABAPENTIN 300 MG/1
CAPSULE ORAL
Qty: 180 CAPSULE | Refills: 0 | Status: SHIPPED | OUTPATIENT
Start: 2018-11-24 | End: 2019-02-11

## 2018-12-09 DIAGNOSIS — E78.5 HYPERLIPIDEMIA LDL GOAL <130: Chronic | ICD-10-CM

## 2018-12-09 DIAGNOSIS — C09.9 MALIGNANT NEOPLASM OF TONSIL (H): Chronic | ICD-10-CM

## 2018-12-09 DIAGNOSIS — E03.9 HYPOTHYROIDISM, UNSPECIFIED TYPE: ICD-10-CM

## 2018-12-10 RX ORDER — LEVOTHYROXINE SODIUM 75 UG/1
TABLET ORAL
Qty: 90 TABLET | Refills: 1 | Status: SHIPPED | OUTPATIENT
Start: 2018-12-10 | End: 2019-05-13

## 2018-12-10 NOTE — TELEPHONE ENCOUNTER
TSH   Date Value Ref Range Status   10/01/2018 2.95 0.40 - 4.00 mU/L Final   01/19/2018 4.58 (H) 0.40 - 4.00 mU/L Final   06/07/2017 1.72 0.40 - 4.00 mU/L Final   03/08/2017 4.72 (H) 0.40 - 4.00 mU/L Final   03/08/2017 4.72 (H) 0.40 - 4.00 mU/L Final     Refilled.  PARRISH Vallecillo RN

## 2018-12-10 NOTE — TELEPHONE ENCOUNTER
"Requested Prescriptions   Pending Prescriptions Disp Refills     levothyroxine (SYNTHROID/LEVOTHROID) 75 MCG tablet [Pharmacy Med Name: LEVOTHYROXINE SODIUM 75 MCG Tablet] 90 tablet 0     Sig: TAKE 1 TABLET EVERY DAY    Thyroid Protocol Passed - 12/9/2018  1:44 PM       Passed - Patient is 12 years or older       Passed - Recent (12 mo) or future (30 days) visit within the authorizing provider's specialty    Patient had office visit in the last 12 months or has a visit in the next 30 days with authorizing provider or within the authorizing provider's specialty.  See \"Patient Info\" tab in inbasket, or \"Choose Columns\" in Meds & Orders section of the refill encounter.      Last Written Prescription Date:  6/4/18  Last Fill Quantity: 90,  # refills: 0   Last office visit: 10/1/2018 with prescribing provider:     Future Office Visit:               Passed - Normal TSH on file in past 12 months    Recent Labs   Lab Test 10/01/18  1155   TSH 2.95             Passed - No active pregnancy on record    If patient is pregnant or has had a positive pregnancy test, please check TSH.         Passed - No positive pregnancy test in past 12 months    If patient is pregnant or has had a positive pregnancy test, please check TSH.            "

## 2018-12-13 ENCOUNTER — TRANSFERRED RECORDS (OUTPATIENT)
Dept: HEALTH INFORMATION MANAGEMENT | Facility: CLINIC | Age: 73
End: 2018-12-13

## 2019-02-09 DIAGNOSIS — F33.1 MAJOR DEPRESSIVE DISORDER, RECURRENT EPISODE, MODERATE (H): ICD-10-CM

## 2019-02-09 DIAGNOSIS — E78.5 HYPERLIPIDEMIA LDL GOAL <130: ICD-10-CM

## 2019-02-09 DIAGNOSIS — F51.01 PRIMARY INSOMNIA: ICD-10-CM

## 2019-02-10 NOTE — TELEPHONE ENCOUNTER
"Requested Prescriptions   Pending Prescriptions Disp Refills     PARoxetine (PAXIL) 20 MG tablet   Last Written Prescription Date:  8/6/18  Last Fill Quantity: 90,  # refills: 1   Last office visit: 10/1/2018 with prescribing provider:  JACQUELINE Sutton    90 tablet 1     Sig: TAKE 1 TABLET AT BEDTIME    SSRIs Protocol Failed - 2/9/2019 11:44 AM       Failed - PHQ-9 score less than 5 in past 6 months    Please review last PHQ-9 score.          Passed - Medication is active on med list       Passed - Patient is age 18 or older       Passed - No active pregnancy on record       Passed - No positive pregnancy test in last 12 months       Passed - Recent (6 mo) or future (30 days) visit within the authorizing provider's specialty    Patient had office visit in the last 6 months or has a visit in the next 30 days with authorizing provider or within the authorizing provider's specialty.  See \"Patient Info\" tab in inbasket, or \"Choose Columns\" in Meds & Orders section of the refill encounter.            simvastatin (ZOCOR) 40 MG tablet   Last Written Prescription Date:  6/12/18  Last Fill Quantity: 90,  # refills: 2   Last office visit: 10/1/2018 with prescribing provider:  JACQUELINE Sutton    90 tablet 2     Sig: TAKE 1 TABLET AT BEDTIME    Statins Protocol Passed - 2/9/2019 11:44 AM       Passed - LDL on file in past 12 months    Recent Labs   Lab Test 02/26/18  0958   LDL 66            Passed - No abnormal creatine kinase in past 12 months    Recent Labs   Lab Test 03/25/13  1626   CKT 88               Passed - Recent (12 mo) or future (30 days) visit within the authorizing provider's specialty    Patient had office visit in the last 12 months or has a visit in the next 30 days with authorizing provider or within the authorizing provider's specialty.  See \"Patient Info\" tab in inbasket, or \"Choose Columns\" in Meds & Orders section of the refill encounter.             Passed - Medication is active on med list       Passed - " "Patient is age 18 or older       Passed - No active pregnancy on record       Passed - No positive pregnancy test in past 12 months        traZODone (DESYREL) 100 MG tablet   Last Written Prescription Date:  11/19/18  Last Fill Quantity: 90,  # refills: 0   Last office visit: 10/1/2018 with prescribing provider:  JACQUELINE Sutton   Future Office Visit:     90 tablet 0     Sig: TAKE 1 TABLET EVERY NIGHT AS NEEDED FOR SLEEP    Serotonin Modulators Passed - 2/9/2019 11:44 AM       Passed - Recent (12 mo) or future (30 days) visit within the authorizing provider's specialty    Patient had office visit in the last 12 months or has a visit in the next 30 days with authorizing provider or within the authorizing provider's specialty.  See \"Patient Info\" tab in inbasket, or \"Choose Columns\" in Meds & Orders section of the refill encounter.             Passed - Medication is active on med list       Passed - Patient is age 18 or older       Passed - No active pregnancy on record       Passed - No positive pregnancy test in past 12 months          "

## 2019-02-11 DIAGNOSIS — M54.5 CHRONIC BILATERAL LOW BACK PAIN, WITH SCIATICA PRESENCE UNSPECIFIED: ICD-10-CM

## 2019-02-11 DIAGNOSIS — G89.29 CHRONIC BILATERAL LOW BACK PAIN, WITH SCIATICA PRESENCE UNSPECIFIED: ICD-10-CM

## 2019-02-11 RX ORDER — TRAZODONE HYDROCHLORIDE 100 MG/1
TABLET ORAL
Qty: 90 TABLET | Refills: 0 | Status: SHIPPED | OUTPATIENT
Start: 2019-02-11 | End: 2019-05-13

## 2019-02-11 RX ORDER — GABAPENTIN 300 MG/1
CAPSULE ORAL
Qty: 180 CAPSULE | Refills: 1 | Status: SHIPPED | OUTPATIENT
Start: 2019-02-11 | End: 2019-08-06

## 2019-02-11 RX ORDER — PAROXETINE 20 MG/1
TABLET, FILM COATED ORAL
Qty: 90 TABLET | Refills: 0 | Status: SHIPPED | OUTPATIENT
Start: 2019-02-11 | End: 2019-05-13

## 2019-02-11 RX ORDER — SIMVASTATIN 40 MG
TABLET ORAL
Qty: 90 TABLET | Refills: 0 | Status: SHIPPED | OUTPATIENT
Start: 2019-02-11 | End: 2019-05-13

## 2019-02-11 NOTE — TELEPHONE ENCOUNTER
gabapentin (NEURONTIN) 300 MG capsule      Last Written Prescription Date:  11/24/18  Last Fill Quantity: 180,   # refills: 0  Last Office Visit: 10/1/18  Future Office visit:       Routing refill request to provider for review/approval because:  Drug not on the [facility], [facility] or [facility] refill protocol or controlled substance

## 2019-02-16 DIAGNOSIS — M54.5 CHRONIC BILATERAL LOW BACK PAIN, WITH SCIATICA PRESENCE UNSPECIFIED: ICD-10-CM

## 2019-02-16 DIAGNOSIS — G89.29 CHRONIC BILATERAL LOW BACK PAIN, WITH SCIATICA PRESENCE UNSPECIFIED: ICD-10-CM

## 2019-02-18 NOTE — TELEPHONE ENCOUNTER
Routing refill request to provider for review/approval because:  Was sent to [facility] mail Order 2/11/19; request now coming from Giovani GUTIERREZ RN

## 2019-02-18 NOTE — TELEPHONE ENCOUNTER
gabapentin (NEURONTIN) 300 MG capsule      Last Written Prescription Date:  2/11/19  Last Fill Quantity: 180,   # refills: 0  Last Office Visit: 10/1/18  Future Office visit:       Routing refill request to provider for review/approval because:  Drug not on the [facility], [facility] or [facility] refill protocol or controlled substance

## 2019-02-19 RX ORDER — GABAPENTIN 300 MG/1
CAPSULE ORAL
Qty: 180 CAPSULE | Refills: 0 | Status: SHIPPED | OUTPATIENT
Start: 2019-02-19 | End: 2019-03-19

## 2019-03-19 ENCOUNTER — OFFICE VISIT (OUTPATIENT)
Dept: FAMILY MEDICINE | Facility: CLINIC | Age: 74
End: 2019-03-19
Payer: COMMERCIAL

## 2019-03-19 VITALS
RESPIRATION RATE: 18 BRPM | BODY MASS INDEX: 34.2 KG/M2 | DIASTOLIC BLOOD PRESSURE: 82 MMHG | TEMPERATURE: 97.8 F | SYSTOLIC BLOOD PRESSURE: 130 MMHG | HEIGHT: 63 IN | HEART RATE: 72 BPM | WEIGHT: 193 LBS

## 2019-03-19 DIAGNOSIS — E55.9 VITAMIN D DEFICIENCY: ICD-10-CM

## 2019-03-19 DIAGNOSIS — E03.9 ACQUIRED HYPOTHYROIDISM: Primary | Chronic | ICD-10-CM

## 2019-03-19 DIAGNOSIS — Z13.1 DIABETES MELLITUS SCREENING: ICD-10-CM

## 2019-03-19 DIAGNOSIS — I10 ESSENTIAL HYPERTENSION: Chronic | ICD-10-CM

## 2019-03-19 DIAGNOSIS — L98.9 SKIN LESION: ICD-10-CM

## 2019-03-19 LAB
ALBUMIN SERPL-MCNC: 3.7 G/DL (ref 3.4–5)
ALP SERPL-CCNC: 67 U/L (ref 40–150)
ALT SERPL W P-5'-P-CCNC: 30 U/L (ref 0–50)
ANION GAP SERPL CALCULATED.3IONS-SCNC: 5 MMOL/L (ref 3–14)
AST SERPL W P-5'-P-CCNC: 25 U/L (ref 0–45)
BILIRUB SERPL-MCNC: 0.3 MG/DL (ref 0.2–1.3)
BUN SERPL-MCNC: 17 MG/DL (ref 7–30)
CALCIUM SERPL-MCNC: 9.1 MG/DL (ref 8.5–10.1)
CHLORIDE SERPL-SCNC: 104 MMOL/L (ref 94–109)
CO2 SERPL-SCNC: 32 MMOL/L (ref 20–32)
CREAT SERPL-MCNC: 0.75 MG/DL (ref 0.52–1.04)
ERYTHROCYTE [DISTWIDTH] IN BLOOD BY AUTOMATED COUNT: 14.9 % (ref 10–15)
GFR SERPL CREATININE-BSD FRML MDRD: 78 ML/MIN/{1.73_M2}
GLUCOSE SERPL-MCNC: 81 MG/DL (ref 70–99)
HCT VFR BLD AUTO: 42.1 % (ref 35–47)
HGB BLD-MCNC: 13.5 G/DL (ref 11.7–15.7)
MCH RBC QN AUTO: 30.5 PG (ref 26.5–33)
MCHC RBC AUTO-ENTMCNC: 32.1 G/DL (ref 31.5–36.5)
MCV RBC AUTO: 95 FL (ref 78–100)
PLATELET # BLD AUTO: 179 10E9/L (ref 150–450)
POTASSIUM SERPL-SCNC: 4.5 MMOL/L (ref 3.4–5.3)
PROT SERPL-MCNC: 7 G/DL (ref 6.8–8.8)
RBC # BLD AUTO: 4.42 10E12/L (ref 3.8–5.2)
SODIUM SERPL-SCNC: 141 MMOL/L (ref 133–144)
TSH SERPL DL<=0.005 MIU/L-ACNC: 2.76 MU/L (ref 0.4–4)
WBC # BLD AUTO: 5 10E9/L (ref 4–11)

## 2019-03-19 PROCEDURE — 85027 COMPLETE CBC AUTOMATED: CPT | Performed by: FAMILY MEDICINE

## 2019-03-19 PROCEDURE — 36415 COLL VENOUS BLD VENIPUNCTURE: CPT | Performed by: FAMILY MEDICINE

## 2019-03-19 PROCEDURE — 99214 OFFICE O/P EST MOD 30 MIN: CPT | Performed by: FAMILY MEDICINE

## 2019-03-19 PROCEDURE — 84443 ASSAY THYROID STIM HORMONE: CPT | Performed by: FAMILY MEDICINE

## 2019-03-19 PROCEDURE — 80053 COMPREHEN METABOLIC PANEL: CPT | Performed by: FAMILY MEDICINE

## 2019-03-19 PROCEDURE — 82306 VITAMIN D 25 HYDROXY: CPT | Performed by: FAMILY MEDICINE

## 2019-03-19 RX ORDER — UBIDECARENONE 75 MG
100 CAPSULE ORAL DAILY
Status: ON HOLD | COMMUNITY
End: 2022-04-17

## 2019-03-19 ASSESSMENT — ANXIETY QUESTIONNAIRES
2. NOT BEING ABLE TO STOP OR CONTROL WORRYING: NEARLY EVERY DAY
GAD7 TOTAL SCORE: 7
3. WORRYING TOO MUCH ABOUT DIFFERENT THINGS: SEVERAL DAYS
1. FEELING NERVOUS, ANXIOUS, OR ON EDGE: SEVERAL DAYS
4. TROUBLE RELAXING: NOT AT ALL
6. BECOMING EASILY ANNOYED OR IRRITABLE: SEVERAL DAYS
7. FEELING AFRAID AS IF SOMETHING AWFUL MIGHT HAPPEN: SEVERAL DAYS
5. BEING SO RESTLESS THAT IT IS HARD TO SIT STILL: NOT AT ALL
7. FEELING AFRAID AS IF SOMETHING AWFUL MIGHT HAPPEN: SEVERAL DAYS

## 2019-03-19 ASSESSMENT — MIFFLIN-ST. JEOR: SCORE: 1339.57

## 2019-03-19 ASSESSMENT — PATIENT HEALTH QUESTIONNAIRE - PHQ9
SUM OF ALL RESPONSES TO PHQ QUESTIONS 1-9: 14
10. IF YOU CHECKED OFF ANY PROBLEMS, HOW DIFFICULT HAVE THESE PROBLEMS MADE IT FOR YOU TO DO YOUR WORK, TAKE CARE OF THINGS AT HOME, OR GET ALONG WITH OTHER PEOPLE: NOT DIFFICULT AT ALL
SUM OF ALL RESPONSES TO PHQ QUESTIONS 1-9: 14

## 2019-03-19 NOTE — NURSING NOTE
"Chief Complaint   Patient presents with     Weight Problem       Initial /82 (Cuff Size: Adult Large)   Pulse 72   Temp 97.8  F (36.6  C) (Tympanic)   Resp 18   Ht 1.6 m (5' 3\")   Wt 87.5 kg (193 lb)   Breastfeeding? No   BMI 34.19 kg/m   Estimated body mass index is 34.19 kg/m  as calculated from the following:    Height as of this encounter: 1.6 m (5' 3\").    Weight as of this encounter: 87.5 kg (193 lb).    Patient presents to the clinic using No DME    Health Maintenance that is potentially due pending provider review:  NONE    n/a    Is there anyone who you would like to be able to receive your results? Not Applicable  If yes have patient fill out ELIZABETH    "

## 2019-03-19 NOTE — PROGRESS NOTES
SUBJECTIVE:   Ingrid Powell is a 73 year old female who presents to clinic today for the following health issues:      Weight Gain      Duration: 2 months     Description (location/character/radiation): wants to talk about possible diabetes, and thyroid issues.     Intensity:  moderate    Accompanying signs and symptoms: does have an aunt that had diabetes. Not sure of any thyroid issues with other family members.  Does take levothyroxine 75 mcg daily.     History (similar episodes/previous evaluation): None    Precipitating or alleviating factors: thinks she's gained about 25 pounds since January. Hasn't changed her diet. Doesn't eat more then normal. Increased urination.  Increased fatigue. Gets tired quickly when walking.     Therapies tried and outcome: None       Problem list and histories reviewed & adjusted, as indicated.  Additional history: as documented    Patient Active Problem List   Diagnosis     Hyperlipidemia LDL goal <130     HTN (hypertension)     HX of MALIGNANT NEOPL TONSIL     COPD, mild (H)     zAdvanced directives, counseling/discussion     Major depressive disorder, recurrent episode, moderate (HCC)     Anxiety     Hypothyroidism     MARGIE (obstructive sleep apnea), Severe     Disturbance of salivary secretion (XEROSTOMIA)     Renal cyst     Raynaud's syndrome     History of lumbar surgery     Primary insomnia     Lumbosacral radiculopathy at L3     DDD (degenerative disc disease), lumbar     Adenomatous colon polyp     Diverticulitis of colon     Benign neoplasm of gastrointestinal tract     Hemorrhoids, internal     Rectocele     Tortuous colon     Family history of breast cancer in mother     Malignant neoplasm of oropharynx (H)     Past Surgical History:   Procedure Laterality Date     BACK SURGERY       CARPAL TUNNEL RELEASE RT/LT      ?side     CHOLECYSTECTOMY       EYE SURGERY      cataracts     HERNIA REPAIR       INCISION AND DRAINAGE TRUNK, COMBINED  5/18/2012    Procedure:COMBINED  INCISION AND DRAINAGE TRUNK; Incision and Drainage Abdominal Wall Abscess ; Surgeon:ALEXSANDER HANNON; Location:[facility]     INSERT PORT VASCULAR ACCESS  2012    Procedure:INSERT PORT VASCULAR ACCESS; Surgeon:MARY JANE GUAN; Location[facility]     JOINT REPLACEMTN, KNEE RT/LT      bilateral     KNEE SURGERY      left- total     KNEE SURGERY      right- total     LAPAROSCOPIC APPENDECTOMY N/A 10/10/2015    Procedure: LAPAROSCOPIC APPENDECTOMY;  Surgeon: Daniel Chamberlain MD;  Location: [facility]     LARYNGOSCOPY, ESOPHAGOSCOPY,  BIOPSY, COMBINED  2012    Procedure:COMBINED LARYNGOSCOPY, ESOPHAGOSCOPY,  BIOPSY; Direct Laryngoscopy, Esophagoscopy with Biopsy, Stealth Assisted Left Frontal Sinus Biopsy via Vidal Incision, 8 Macedonian Power Port in right subclavian & Percutaneous Endoscopic Gastrostomy Placement * Latex Safe*; Surgeon:LIBORIO CAZARES; Location:[facility] OR     left ankle fusion       OPTICAL TRACKING SYSTEM ENDOSCOPIC SINUS SURGERY  2012    Procedure:OPTICAL TRACKING SYSTEM ENDOSCOPIC SINUS SURGERY; Surgeon:LIBORIO CAZARES; Location:[facility] OR     RECTAL SURGERY      ? type     RELEASE DEQUERVAINS WRIST Left 2018    Procedure: RELEASE DEQUERVAINS WRIST;  Left 1st Distal compartment release;  Surgeon: Ronak Biggs MD;  Location: [facility]     REMOVE PORT VASCULAR ACCESS  2012    Procedure: REMOVE PORT VASCULAR ACCESS;  Remove Port Vascular Access ;  Surgeon: Alexsander Hannon MD;  Location: [facility] OR       Social History     Tobacco Use     Smoking status: Former Smoker     Packs/day: 0.50     Years: 35.00     Pack years: 17.50     Types: Cigarettes     Last attempt to quit: 10/3/1995     Years since quittin.4     Smokeless tobacco: Never Used   Substance Use Topics     Alcohol use: Yes     Comment: rare     Family History   Problem Relation Age of Onset     Breast Cancer Mother      Arthritis Mother      Cancer Mother      Heart Disease Father           at 72 of heart failure     Arthritis Brother      Pancreatic Cancer Brother      Arthritis Sister      Cancer Other         uncle pancreatic/grandmother- colon/aunt ? mat or pat  breast cancer     Cancer Sister          Current Outpatient Medications   Medication Sig Dispense Refill     B Complex Vitamins (VITAMIN  B COMPLEX) tablet Take 1 tablet by mouth daily.         cyanocobalamin (VITAMIN B-12) 100 MCG tablet Take 100 mcg by mouth daily       gabapentin (NEURONTIN) 300 MG capsule TAKE 1 TO 2 CAPSULES BY MOUTH ONCE DAILY IN THE EVENING AS NEEDED 180 capsule 1     levothyroxine (SYNTHROID/LEVOTHROID) 75 MCG tablet TAKE 1 TABLET EVERY DAY 90 tablet 1     ORDER FOR DME Respironics Remstar 60 series Auto CPAP 6-15 cm H2O       PARoxetine (PAXIL) 20 MG tablet TAKE 1 TABLET AT BEDTIME 90 tablet 0     RESTASIS 0.05 % ophthalmic emulsion Place 1 drop into both eyes 2 times daily       simvastatin (ZOCOR) 40 MG tablet TAKE 1 TABLET AT BEDTIME 90 tablet 0     traZODone (DESYREL) 100 MG tablet TAKE 1 TABLET EVERY NIGHT AS NEEDED FOR SLEEP 90 tablet 0     TURMERIC PO Take 1 tablet by mouth daily       VITAMIN D, CHOLECALCIFEROL, PO Take 2,000 Units by mouth daily.         Allergies   Allergen Reactions     Dilaudid [Hydromorphone Hcl] Other (See Comments)     hallucinations     Fosamax [Alendronate Sodium] Rash            Norvasc [Amlodipine Besylate] Hives and Rash     Tegretol [Carbamazepine] Hives and Rash     Recent Labs   Lab Test 10/01/18  1155 18  0958 18  1345 01/10/18  1010 01/10/18  1003  17  1204 17  1141  16  1310  14  1120   LDL  --  66  --   --   --   --   --   --   --  76  --  80   HDL  --  62  --   --   --   --   --   --   --  70  --  61   TRIG  --  145  --   --   --   --   --   --   --  77  --  107   ALT  --   --   --  38  --   --  27 32   < >  --    < >  --    CR  --   --   --  0.73  --   --  0.78 0.70   < >  --    < >  --    GFRESTIMATED  --   --   --  77 70   < >  "73 82   < >  --    < >  --    GFRESTBLACK  --   --   --  >90 85   < > 88 >90   GFR Calc     < >  --    < >  --    POTASSIUM  --   --   --  4.0  --   --  4.8 3.9   < >  --    < >  --    TSH 2.95  --  4.58*  --   --   --   --  1.72   < >  --    < >  --     < > = values in this interval not displayed.      BP Readings from Last 3 Encounters:   03/19/19 130/82   10/01/18 102/70   07/24/18 120/60    Wt Readings from Last 3 Encounters:   03/19/19 87.5 kg (193 lb)   10/01/18 82.1 kg (181 lb)   07/24/18 84.8 kg (187 lb)                  Labs reviewed in EPIC    Reviewed and updated as needed this visit by clinical staff       Reviewed and updated as needed this visit by Provider         ROS:  Constitutional, HEENT, cardiovascular, pulmonary, GI, , musculoskeletal, neuro, skin, endocrine and psych systems are negative, except as otherwise noted.    OBJECTIVE:     /82 (Cuff Size: Adult Large)   Pulse 72   Temp 97.8  F (36.6  C) (Tympanic)   Resp 18   Ht 1.6 m (5' 3\")   Wt 87.5 kg (193 lb)   Breastfeeding? No   BMI 34.19 kg/m    Body mass index is 34.19 kg/m .  GENERAL: alert and no distress  HENT: normal cephalic/atraumatic, nose and mouth without ulcers or lesions, oropharynx clear and oral mucous membranes moist  NECK: no adenopathy, no asymmetry, masses, or scars and thyroid normal to palpation  RESP: lungs clear to auscultation - no rales, rhonchi or wheezes  CV: regular rates and rhythm, normal S1 S2, no S3 or S4 and no murmur, click or rub  ABDOMEN: soft, nontender, no hepatosplenomegaly, no masses and bowel sounds normal  SKIN: small skin colored papular lesion involving nose as shown below  NEURO: Normal strength and tone, mentation intact and speech normal  PSYCH: mentation appears normal, affect normal/bright                Wt Readings from Last 10 Encounters:   03/19/19 87.5 kg (193 lb)   10/01/18 82.1 kg (181 lb)   07/24/18 84.8 kg (187 lb)   05/11/18 83.9 kg (185 lb)   05/07/18 83.9 " kg (185 lb)   03/28/18 82 kg (180 lb 12.8 oz)   01/19/18 78.9 kg (174 lb)   01/10/18 78.4 kg (172 lb 13.5 oz)   09/22/17 75.3 kg (166 lb)   09/13/17 76 kg (167 lb 9.6 oz)       ASSESSMENT/PLAN:       (E03.9) Acquired hypothyroidism  (primary encounter diagnosis)  Comment: Gained weight about 15-20 pounds during last year, lifestyle pretty sedentary, differentials discussed in detail, will repeat TSH and titrate levothyroxine dose accordingly.  Healthy lifestyle modification stressed including regular exercise, balanced diet  Plan: TSH with free T4 reflex      (I10) Essential hypertension  Comment: Blood pressure stable currently, not on any antihypertensive medication  Plan: Comprehensive metabolic panel (BMP + Alb, Alk         Phos, ALT, AST, Total. Bili, TP), CBC with         platelets          (E55.9) Vitamin D deficiency  Comment: Known to have vitamin D deficiency, taking 2000 units by mouth daily  Plan: Vitamin D Deficiency          (Z13.1) Diabetes mellitus screening  Comment: Patient would like to rule out type 2 diabetes, hemoglobin A1c not covered by insurance, CMP ordered to monitor electrolytes, liver and kidney function      (L98.9) Skin lesion  Comment: Differentials include sebaceous hyperplasia and basal cell carcinoma, dermatology referral placed for further review recommendations  Plan: DERMATOLOGY REFERRAL         Patient understood and in agreement with above plan.  Follow-up in 6 months or earlier if needed.  All questions answered.        Jose Manuel Sutton MD  [facility]

## 2019-03-19 NOTE — LETTER
March 21, 2019      Ingrid Powell  [address]  [place] [address]        Dear ,    The results of your recent lab tests were within normal limits. Enclosed is a copy of these results.  If you have any further questions or problems, please contact our office.   TSH with free T4 reflex   Result Value Ref Range    TSH 2.76 0.40 - 4.00 mU/L   Comprehensive metabolic panel (BMP + Alb, Alk Phos, ALT, AST, Total. Bili, TP)    Sodium 141 133 - 144 mmol/L    Potassium 4.5 3.4 - 5.3 mmol/L    Chloride 104 94 - 109 mmol/L    Carbon Dioxide 32 20 - 32 mmol/L    Anion Gap 5 3 - 14 mmol/L    Glucose 81 70 - 99 mg/dL    Urea Nitrogen 17 7 - 30 mg/dL    Creatinine 0.75 0.52 - 1.04 mg/dL    GFR Estimate 78 >60 mL/min/[1.73_m2]    Calcium 9.1 8.5 - 10.1 mg/dL    Bilirubin Total 0.3 0.2 - 1.3 mg/dL    Albumin 3.7 3.4 - 5.0 g/dL    Protein Total 7.0 6.8 - 8.8 g/dL    Alkaline Phosphatase 67 40 - 150 U/L    ALT 30 0 - 50 U/L    AST 25 0 - 45 U/L   CBC with platelets    WBC 5.0 4.0 - 11.0 10e9/L    RBC Count 4.42 3.8 - 5.2 10e12/L    Hemoglobin 13.5 11.7 - 15.7 g/dL    Hematocrit 42.1 35.0 - 47.0 %    MCV 95 78 - 100 fl    MCH 30.5 26.5 - 33.0 pg    MCHC 32.1 31.5 - 36.5 g/dL    RDW 14.9 10.0 - 15.0 %    Platelet Count 179 150 - 450 10e9/L     Sincerely,  Jose Manuel Sutton MD

## 2019-03-20 ASSESSMENT — ANXIETY QUESTIONNAIRES: GAD7 TOTAL SCORE: 7

## 2019-03-21 LAB — DEPRECATED CALCIDIOL+CALCIFEROL SERPL-MC: 22 UG/L (ref 20–75)

## 2019-03-27 ENCOUNTER — TRANSFERRED RECORDS (OUTPATIENT)
Dept: HEALTH INFORMATION MANAGEMENT | Facility: CLINIC | Age: 74
End: 2019-03-27

## 2019-04-04 ENCOUNTER — OFFICE VISIT (OUTPATIENT)
Dept: DERMATOLOGY | Facility: CLINIC | Age: 74
End: 2019-04-04
Payer: COMMERCIAL

## 2019-04-04 VITALS — SYSTOLIC BLOOD PRESSURE: 101 MMHG | DIASTOLIC BLOOD PRESSURE: 80 MMHG | OXYGEN SATURATION: 96 % | HEART RATE: 55 BPM

## 2019-04-04 DIAGNOSIS — L73.8 SEBACEOUS HYPERPLASIA: ICD-10-CM

## 2019-04-04 DIAGNOSIS — D48.5 NEOPLASM OF UNCERTAIN BEHAVIOR OF SKIN: Primary | ICD-10-CM

## 2019-04-04 PROCEDURE — 99213 OFFICE O/P EST LOW 20 MIN: CPT | Mod: 25 | Performed by: PHYSICIAN ASSISTANT

## 2019-04-04 PROCEDURE — 88305 TISSUE EXAM BY PATHOLOGIST: CPT | Mod: TC | Performed by: PHYSICIAN ASSISTANT

## 2019-04-04 PROCEDURE — 11102 TANGNTL BX SKIN SINGLE LES: CPT | Performed by: PHYSICIAN ASSISTANT

## 2019-04-04 NOTE — PROGRESS NOTES
HPI:   Ingrid Powell is a 73 year old female who presents for evaluation of a spot on the nose    Location: right nasal tip; also has some other bumps on the face   Condition present for:  About 2 years.   Previous treatments include: none  -no history of skin CA: sister with a h/o NMSC    Review Of Systems  Eyes: negative  Ears/Nose/Throat: negative  Respiratory: No shortness of breath, dyspnea on exertion, cough, or hemoptysis  Cardiovascular: negative  Gastrointestinal: negative  Genitourinary: negative  Musculoskeletal: negative  Neurologic: negative  Psychiatric: negative        PHYSICAL EXAM:    /80   Pulse 55   SpO2 96%   Skin exam performed as follows: Type 2 skin. Mood appropriate  Alert and Oriented X 3. Well developed, well nourished in no distress.  General appearance: Normal  Head including face: Normal  Eyes: conjunctiva and lids: Normal  Mouth: Lips, teeth, gums: Normal  Neck: Normal  Chest-breast/axillae: Normal  Back: Normal  Spleen and liver: Normal  Cardiovascular: Exam of peripheral vascular system by observation for swelling, varicosities, edema: Normal  Genitalia: groin, buttocks: Normal  Extremities: digits/nails (clubbing): Normal  Eccrine and Apocrine glands: Normal  Right upper extremity: Normal  Left upper extremity: Normal  Right lower extremity: Normal  Left lower extremity: Normal  Skin: Scalp and body hair: See below    1. 4 mm pink papule on the right nasal tip  2. Multiple pink/yellow papules on the forehead, nose    ASSESSMENT/PLAN:     1. Sebaceous hyperplasia r/o BCC on the right nasal tip. Shave bx in typical fashion .  Area cleaned with betadyne and anesthetized with 1% lidocaine with epi .  Dermablade used to remove the lesion and sent to pathology. Bleeding was cauterized. Pt tolerated procedure well.  2. Numerous sebaceous hyperplasia on the face - advised benign no treatment needed.         Follow-up: pending path/PRN  CC:   Scribed By: Yancy Ware MS, PA-C

## 2019-04-04 NOTE — LETTER
4/4/2019         RE: Ingrid Powell  [address]  \[place]\"" [address]        Dear Colleague,    Thank you for referring your patient, Ingrid Powell, to the [facility]. Please see a copy of my visit note below.    HPI:   Ingrid Powell is a 73 year old female who presents for evaluation of a spot on the nose    Location: right nasal tip; also has some other bumps on the face   Condition present for:  About 2 years.   Previous treatments include: none  -no history of skin CA: sister with a h/o NMSC    Review Of Systems  Eyes: negative  Ears/Nose/Throat: negative  Respiratory: No shortness of breath, dyspnea on exertion, cough, or hemoptysis  Cardiovascular: negative  Gastrointestinal: negative  Genitourinary: negative  Musculoskeletal: negative  Neurologic: negative  Psychiatric: negative        PHYSICAL EXAM:    /80   Pulse 55   SpO2 96%   Skin exam performed as follows: Type 2 skin. Mood appropriate  Alert and Oriented X 3. Well developed, well nourished in no distress.  General appearance: Normal  Head including face: Normal  Eyes: conjunctiva and lids: Normal  Mouth: Lips, teeth, gums: Normal  Neck: Normal  Chest-breast/axillae: Normal  Back: Normal  Spleen and liver: Normal  Cardiovascular: Exam of peripheral vascular system by observation for swelling, varicosities, edema: Normal  Genitalia: groin, buttocks: Normal  Extremities: digits/nails (clubbing): Normal  Eccrine and Apocrine glands: Normal  Right upper extremity: Normal  Left upper extremity: Normal  Right lower extremity: Normal  Left lower extremity: Normal  Skin: Scalp and body hair: See below    1. 4 mm pink papule on the right nasal tip  2. Multiple pink/yellow papules on the forehead, nose    ASSESSMENT/PLAN:     1. Sebaceous hyperplasia r/o BCC on the right nasal tip. Shave bx in typical fashion .  Area cleaned with betadyne and anesthetized with 1% lidocaine with epi .  Dermablade used to remove the  lesion and sent to pathology. Bleeding was cauterized. Pt tolerated procedure well.  2. Numerous sebaceous hyperplasia on the face - advised benign no treatment needed.         Follow-up: pending path/PRN  CC:   Scribed By: Yancy Ware MS, SINDHU      Again, thank you for allowing me to participate in the care of your patient.        Sincerely,        Yancy Ware PA-C

## 2019-04-04 NOTE — PATIENT INSTRUCTIONS
Wound Care Instructions     FOR SUPERFICIAL WOUNDS     [facility] [phone]    [facility] [phone]                       AFTER 24 HOURS YOU SHOULD REMOVE THE BANDAGE AND BEGIN DAILY DRESSING CHANGES AS FOLLOWS:     1) Remove Dressing.     2) Clean and dry the area with tap water using a Q-tip or sterile gauze pad.     3) Apply Vaseline, Aquaphor, Polysporin ointment or Bacitracin ointment over entire wound.  Do NOT use Neosporin ointment.     4) Cover the wound with a band-aid, or a sterile non-stick gauze pad and micropore paper tape      REPEAT THESE INSTRUCTIONS AT LEAST ONCE A DAY UNTIL THE WOUND HAS COMPLETELY HEALED.    It is an old wives tale that a wound heals better when it is exposed to air and allowed to dry out. The wound will heal faster with a better cosmetic result if it is kept moist with ointment and covered with a bandage.    **Do not let the wound dry out.**      Supplies Needed:      *Cotton tipped applicators (Q-tips)    *Polysporin Ointment or Bacitracin Ointment (NOT NEOSPORIN)    *Band-aids or non-stick gauze pads and micropore paper tape.      PATIENT INFORMATION:    During the healing process you will notice a number of changes. All wounds develop a small halo of redness surrounding the wound.  This means healing is occurring. Severe itching with extensive redness usually indicates sensitivity to the ointment or bandage tape used to dress the wound.  You should call our office if this develops.      Swelling  and/or discoloration around your surgical site is common, particularly when performed around the eye.    All wounds normally drain.  The larger the wound the more drainage there will be.  After 7-10 days, you will notice the wound beginning to shrink and new skin will begin to grow.  The wound is healed when you can see skin has formed over the entire area.  A healed wound has a healthy, shiny look to the surface and is red to dark pink in color  to normalize.  Wounds may take approximately 4-6 weeks to heal.  Larger wounds may take 6-8 weeks.  After the wound is healed you may discontinue dressing changes.    You may experience a sensation of tightness as your wound heals. This is normal and will gradually subside.    Your healed wound may be sensitive to temperature changes. This sensitivity improves with time, but if you re having a lot of discomfort, try to avoid temperature extremes.    Patients frequently experience itching after their wound appears to have healed because of the continue healing under the skin.  Plain Vaseline will help relieve the itching.        POSSIBLE COMPLICATIONS    BLEEDIN. Leave the bandage in place.  2. Use tightly rolled up gauze or a cloth to apply direct pressure over the bandage for 30  minutes.  3. Reapply pressure for an additional 30 minutes if necessary  4. Use additional gauze and tape to maintain pressure once the bleeding has stopped.

## 2019-04-08 LAB — COPATH REPORT: NORMAL

## 2019-05-08 ENCOUNTER — TRANSFERRED RECORDS (OUTPATIENT)
Dept: HEALTH INFORMATION MANAGEMENT | Facility: CLINIC | Age: 74
End: 2019-05-08

## 2019-05-10 ENCOUNTER — OFFICE VISIT (OUTPATIENT)
Dept: FAMILY MEDICINE | Facility: CLINIC | Age: 74
End: 2019-05-10
Payer: COMMERCIAL

## 2019-05-10 VITALS
SYSTOLIC BLOOD PRESSURE: 116 MMHG | BODY MASS INDEX: 33.31 KG/M2 | HEART RATE: 64 BPM | RESPIRATION RATE: 18 BRPM | WEIGHT: 188 LBS | DIASTOLIC BLOOD PRESSURE: 82 MMHG | OXYGEN SATURATION: 97 % | TEMPERATURE: 97.8 F | HEIGHT: 63 IN

## 2019-05-10 DIAGNOSIS — R26.81 UNSTEADY GAIT: Primary | ICD-10-CM
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
## 2019-05-10 LAB
ALBUMIN SERPL-MCNC: 3.7 G/DL (ref 3.4–5)
ALP SERPL-CCNC: 72 U/L (ref 40–150)
ALT SERPL W P-5'-P-CCNC: 73 U/L (ref 0–50)
ANION GAP SERPL CALCULATED.3IONS-SCNC: 3 MMOL/L (ref 3–14)
AST SERPL W P-5'-P-CCNC: 48 U/L (ref 0–45)
BILIRUB SERPL-MCNC: 0.3 MG/DL (ref 0.2–1.3)
BUN SERPL-MCNC: 26 MG/DL (ref 7–30)
CALCIUM SERPL-MCNC: 9.8 MG/DL (ref 8.5–10.1)
CHLORIDE SERPL-SCNC: 104 MMOL/L (ref 94–109)
CO2 SERPL-SCNC: 28 MMOL/L (ref 20–32)
CREAT SERPL-MCNC: 0.8 MG/DL (ref 0.52–1.04)
ERYTHROCYTE [DISTWIDTH] IN BLOOD BY AUTOMATED COUNT: 14.3 % (ref 10–15)
GFR SERPL CREATININE-BSD FRML MDRD: 72 ML/MIN/{1.73_M2}
GLUCOSE SERPL-MCNC: 89 MG/DL (ref 70–99)
HCT VFR BLD AUTO: 43.7 % (ref 35–47)
HGB BLD-MCNC: 14.2 G/DL (ref 11.7–15.7)
MCH RBC QN AUTO: 30.7 PG (ref 26.5–33)
MCHC RBC AUTO-ENTMCNC: 32.5 G/DL (ref 31.5–36.5)
MCV RBC AUTO: 95 FL (ref 78–100)
PLATELET # BLD AUTO: 227 10E9/L (ref 150–450)
POTASSIUM SERPL-SCNC: 4.1 MMOL/L (ref 3.4–5.3)
PROT SERPL-MCNC: 7.5 G/DL (ref 6.8–8.8)
RBC # BLD AUTO: 4.62 10E12/L (ref 3.8–5.2)
SODIUM SERPL-SCNC: 135 MMOL/L (ref 133–144)
WBC # BLD AUTO: 7.1 10E9/L (ref 4–11)

## 2019-05-10 PROCEDURE — 36415 COLL VENOUS BLD VENIPUNCTURE: CPT | Performed by: FAMILY MEDICINE

## 2019-05-10 PROCEDURE — 85027 COMPLETE CBC AUTOMATED: CPT | Performed by: FAMILY MEDICINE

## 2019-05-10 PROCEDURE — 99214 OFFICE O/P EST MOD 30 MIN: CPT | Performed by: FAMILY MEDICINE

## 2019-05-10 PROCEDURE — 80053 COMPREHEN METABOLIC PANEL: CPT | Performed by: FAMILY MEDICINE

## 2019-05-10 PROCEDURE — 93000 ELECTROCARDIOGRAM COMPLETE: CPT | Performed by: FAMILY MEDICINE

## 2019-05-10 ASSESSMENT — MIFFLIN-ST. JEOR: SCORE: 1316.89

## 2019-05-10 NOTE — PROGRESS NOTES
SUBJECTIVE:   Ingrid Powell is a 73 year old female who presents to clinic today for the following   health issues:        FALL      Duration: last weekend     Description (location/character/radiation): fell straight backwards- fell onto the grass. Hit the back of her head- lower part of the head and neck are the worse, Shoulders are sore as well.     Intensity:  moderate    Accompanying signs and symptoms: not sure why or how she fell. Has been a little unsteady on her feet- but didn't trip. Was just standing there and then was on the ground. No loss of consciousness. Headaches in the back of the head-neck area.      History (similar episodes/previous evaluation): Sister has noticed that when she walks to often veers to the right.     Precipitating or alleviating factors: None    Therapies tried and outcome: rest, heating pad, aleve          Additional history: as documented    Reviewed  and updated as needed this visit by clinical staff         Reviewed and updated as needed this visit by Provider         Patient Active Problem List   Diagnosis     Hyperlipidemia LDL goal <130     HTN (hypertension)     HX of MALIGNANT NEOPL TONSIL     COPD, mild (H)     zAdvanced directives, counseling/discussion     Major depressive disorder, recurrent episode, moderate (HCC)     Anxiety     Hypothyroidism     MARGIE (obstructive sleep apnea), Severe     Disturbance of salivary secretion (XEROSTOMIA)     Renal cyst     Raynaud's syndrome     History of lumbar surgery     Primary insomnia     Lumbosacral radiculopathy at L3     DDD (degenerative disc disease), lumbar     Adenomatous colon polyp     Diverticulitis of colon     Benign neoplasm of gastrointestinal tract     Hemorrhoids, internal     Rectocele     Tortuous colon     Family history of breast cancer in mother     Malignant neoplasm of oropharynx (H)     Past Surgical History:   Procedure Laterality Date     BACK SURGERY       CARPAL TUNNEL RELEASE RT/LT      ?side      CHOLECYSTECTOMY       EYE SURGERY      cataracts     HERNIA REPAIR       INCISION AND DRAINAGE TRUNK, COMBINED  2012    Procedure:COMBINED INCISION AND DRAINAGE TRUNK; Incision and Drainage Abdominal Wall Abscess ; Surgeon:ALEXSANDER HANNON; Location:[facility]     INSERT PORT VASCULAR ACCESS  2012    Procedure:INSERT PORT VASCULAR ACCESS; Surgeon:MARY JANE GUAN; Location:[facility]     JOINT REPLACEMTN, KNEE RT/LT      bilateral     KNEE SURGERY      left- total     KNEE SURGERY      right- total     LAPAROSCOPIC APPENDECTOMY N/A 10/10/2015    Procedure: LAPAROSCOPIC APPENDECTOMY;  Surgeon: Daniel Chamberlain MD;  Location: [facility]     LARYNGOSCOPY, ESOPHAGOSCOPY,  BIOPSY, COMBINED  2012    Procedure:COMBINED LARYNGOSCOPY, ESOPHAGOSCOPY,  BIOPSY; Direct Laryngoscopy, Esophagoscopy with Biopsy, Stealth Assisted Left Frontal Sinus Biopsy via Vidal Incision, 8 [employer] Port in right subclavian & Percutaneous Endoscopic Gastrostomy Placement * Latex Safe*; Surgeon:LIBORIO CAZARES; Location:[facility]     left ankle fusion       OPTICAL TRACKING SYSTEM ENDOSCOPIC SINUS SURGERY  2012    Procedure:OPTICAL TRACKING SYSTEM ENDOSCOPIC SINUS SURGERY; Surgeon:ILBORIO CAZARES; Location:[facility]     RECTAL SURGERY      ? type     RELEASE DEQUERVAINS WRIST Left 2018    Procedure: RELEASE DEQUERVAINS WRIST;  Left 1st Distal compartment release;  Surgeon: Ronak Biggs MD;  Location: [facility]     REMOVE PORT VASCULAR ACCESS  2012    Procedure: REMOVE PORT VASCULAR ACCESS;  Remove Port Vascular Access ;  Surgeon: Alexsander Hannon MD;  [facility]       Social History     Tobacco Use     Smoking status: Former Smoker     Packs/day: 0.50     Years: 35.00     Pack years: 17.50     Types: Cigarettes     Last attempt to quit: 10/3/1995     Years since quittin.6     Smokeless tobacco: Never Used   Substance Use Topics     Alcohol use: Yes     Comment: rare      Family History   Problem Relation Age of Onset     Breast Cancer Mother      Arthritis Mother      Cancer Mother      Heart Disease Father          at 72 of heart failure     Arthritis Brother      Pancreatic Cancer Brother      Arthritis Sister      Skin Cancer Sister      Cancer Other         uncle pancreatic/grandmother- colon/aunt ? mat or pat  breast cancer     Cancer Sister          Current Outpatient Medications   Medication Sig Dispense Refill     B Complex Vitamins (VITAMIN  B COMPLEX) tablet Take 1 tablet by mouth daily.         cyanocobalamin (VITAMIN B-12) 100 MCG tablet Take 100 mcg by mouth daily       gabapentin (NEURONTIN) 300 MG capsule TAKE 1 TO 2 CAPSULES BY MOUTH ONCE DAILY IN THE EVENING AS NEEDED 180 capsule 1     levothyroxine (SYNTHROID/LEVOTHROID) 75 MCG tablet TAKE 1 TABLET EVERY DAY 90 tablet 1     PARoxetine (PAXIL) 20 MG tablet TAKE 1 TABLET AT BEDTIME 90 tablet 0     RESTASIS 0.05 % ophthalmic emulsion Place 1 drop into both eyes 2 times daily       simvastatin (ZOCOR) 40 MG tablet TAKE 1 TABLET AT BEDTIME 90 tablet 0     traZODone (DESYREL) 100 MG tablet TAKE 1 TABLET EVERY NIGHT AS NEEDED FOR SLEEP 90 tablet 0     TURMERIC PO Take 1 tablet by mouth daily       VITAMIN D, CHOLECALCIFEROL, PO Take 2,000 Units by mouth daily.         ORDER FOR DME Respironics Remstar 60 series Auto CPAP 6-15 cm H2O       Allergies   Allergen Reactions     Dilaudid [Hydromorphone Hcl] Other (See Comments)     hallucinations     Fosamax [Alendronate Sodium] Rash            Norvasc [Amlodipine Besylate] Hives and Rash     Tegretol [Carbamazepine] Hives and Rash     Recent Labs   Lab Test 19  1145 10/01/18  1155 18  0958  01/10/18  1010  17  1204  16  1310  14  1120   LDL  --   --  66  --   --   --   --   --  76  --  80   HDL  --   --  62  --   --   --   --   --  70  --  61   TRIG  --   --  145  --   --   --   --   --  77  --  107   ALT 30  --   --   --  38  --  27   < >  --  "   < >  --    CR 0.75  --   --   --  0.73  --  0.78   < >  --    < >  --    GFRESTIMATED 78  --   --   --  77   < > 73   < >  --    < >  --    GFRESTBLACK >90  --   --   --  >90   < > 88   < >  --    < >  --    POTASSIUM 4.5  --   --   --  4.0  --  4.8   < >  --    < >  --    TSH 2.76 2.95  --    < >  --   --   --    < >  --    < >  --     < > = values in this interval not displayed.      BP Readings from Last 3 Encounters:   05/10/19 116/82   04/04/19 101/80   03/19/19 130/82    Wt Readings from Last 3 Encounters:   05/10/19 85.3 kg (188 lb)   03/19/19 87.5 kg (193 lb)   10/01/18 82.1 kg (181 lb)                  Labs reviewed in EPIC    ROS:  Constitutional, HEENT, cardiovascular, pulmonary, GI, , musculoskeletal, neuro, skin, endocrine and psych systems are negative, except as otherwise noted.    OBJECTIVE:     /82 (Cuff Size: Adult Regular)   Pulse 64   Temp 97.8  F (36.6  C) (Tympanic)   Resp 18   Ht 1.6 m (5' 3\")   Wt 85.3 kg (188 lb)   SpO2 97%   Breastfeeding? No   BMI 33.30 kg/m    Body mass index is 33.3 kg/m .   Lying BP: 139/90, Pulse 72  Sitting BP: 135/91, Pulse 78  Standing BP: 133/78, Pulse 86  GENERAL: alert, no distress and elderly  EYES: Eyes grossly normal to inspection, PERRL and conjunctivae and sclerae normal  HENT: ear canals and TM's normal, nose and mouth without ulcers or lesions  NECK: no adenopathy, no asymmetry, masses, or scars and thyroid normal to palpation  RESP: lungs clear to auscultation - no rales, rhonchi or wheezes  CV: regular rate and rhythm, normal S1 S2, no S3 or S4, no murmur, click or rub, no peripheral edema and peripheral pulses strong  ABDOMEN: soft, nontender, no hepatosplenomegaly, no masses and bowel sounds normal  MS: chronic arthritic changes, no joint swelling or skin discoloration noted  NEURO: Normal strength and tone, mentation intact and speech normal, cranial nerves II to XII intact, normal fundus  PSYCH: mentation appears normal, affect " normal/bright      Wt Readings from Last 10 Encounters:   05/10/19 85.3 kg (188 lb)   03/19/19 87.5 kg (193 lb)   10/01/18 82.1 kg (181 lb)   07/24/18 84.8 kg (187 lb)   05/11/18 83.9 kg (185 lb)   05/07/18 83.9 kg (185 lb)   03/28/18 82 kg (180 lb 12.8 oz)   01/19/18 78.9 kg (174 lb)   01/10/18 78.4 kg (172 lb 13.5 oz)   09/22/17 75.3 kg (166 lb)     ECG: Left bundle branch block, chronic, sinus rhythm, rate 66    ASSESSMENT/PLAN:     Ingrid was seen today for fall.    Diagnoses and all orders for this visit:    Unsteady gait  Comments:  Having unsteady gait for last few weeks, sister mentioned about some confusional episodes, fell about a month ago and then again 5 days ago.  Differentials discussed in detail including intracranial pathology.  Orthostatic blood pressure and ECG unremarkable.  CBC, CMP and MRI brain ordered for further evaluation  Orders:  -     CBC with platelets  -     MR Brain w/o & w Contrast; Future  -     Comprehensive metabolic panel (BMP + Alb, Alk Phos, ALT, AST, Total. Bili, TP)    Fall, initial encounter  -     EKG 12-lead complete w/read - Clinics        Patient Instructions     Patient Education     Fall with Uncertain Cause  You have had a fall today. But the cause of your fall is not certain. Falls can happen due to slipping, tripping or losing your balance. A fall can also happen from a fainting spell or seizure.  While a fall can happen for a simple reason (tripping over something), falls in elderly people are often caused by a combination of things:    Age-related decline in function with worsening balance, stability, vision, and muscle strength    Chronic illness, such as heart arrhythmias, heart valve disease, vascular disease, COPD, diabetes, strokes, or arthritis    Shoes that do not give much support and make you prone to slip or slide    Anemia or low blood pressure     Effects or side effects of medicines    Dehydration or recent use of alcohol    Environmental hazards, such  as uneven or slippery ground, unfamiliar place, obstacles, uneven surfaces, or slippery ground    Situational factors (related to the activity being done, such as rushing to the bathroom)  Because the cause of your fall today is not certain, it is possible that a fainting spell or seizure was the cause. This means that it could happen again, without warning. If you fall again, without a cause, then you should return to this facility promptly to have further tests. Otherwise, follow up with your healthcare provider as explained below.  It is normal to feel sore and tight in your muscles and back the next day, and not just the muscles you initially injured. Remember, all the parts of your body are connected, so while initially one area hurts, the next day another may hurt. Also, when you injure yourself, it causes inflammation, which then causes the muscles to tighten up and hurt more. After the initial worsening, it should gradually improve over the next few days. However, more severe pain should be reported.  Even without a definite head injury, you can still get a concussion. Concussions and even bleeding can still happen, especially if you have had a recent injury or take blood thinner medicine. It is not unusual to have a mild headache and feel tired and even nauseous or dizzy.  Home care    Rest today and resume your normal activities as soon as you are feeling back to normal. It is best to remain with someone who can check on you for the next 24 hours to watch for another episode of falling.    If you were injured during the fall, follow the advice from your healthcare provider regarding care of your injury.    If you become lightheaded or dizzy, lie down right away or sit and lean forward with your head down.    As a precaution, don't drive a car or operate dangerous equipment, don't take a bath alone (use a shower instead), and don't swim alone until you see your healthcare provider. A condition causing  fainting or seizures must be ruled out before resuming these activities.    You may use acetaminophen or ibuprofen to control pain, unless another pain medicine was prescribed. If you have chronic liver or kidney disease or ever had a stomach ulcer or gastrointestinal bleeding, talk with your healthcare provider before using these medicines.    Keep your appointments for any further testing that may have been scheduled for you.  Follow-up care  Follow up with your healthcare provider, or as advised.  If X-rays or CT scan were done, you will be notified if there is a change in the reading, especially if it affects treatment.  Call 911  Call 911 if any of these happen:    Trouble breathing    Confused or difficulty arousing    Fainting or loss of consciousness    Rapid or very slow heart rate    Seizure    Difficulty with speech or vision, weakness of an arm or leg    Difficulty walking or talking, loss of balance, numbness or weakness in one side of your body, facial droop  When to seek medical advice  Call your healthcare provider right away if any of these happen:    Another unexplained fall    Dizziness    Severe headache    Nausea and vomiting    Blood in vomit, stools (black or red color)  Date Last Reviewed: 11/1/2017 2000-2018 [employer]. [address] [place] [id number]. All rights reserved. This information is not intended as a substitute for professional medical care. Always follow your healthcare professional's instructions.               Jose Manuel Sutton MD  [facility]

## 2019-05-10 NOTE — NURSING NOTE
"Chief Complaint   Patient presents with     Fall       Initial /82 (Cuff Size: Adult Regular)   Pulse 64   Temp 97.8  F (36.6  C) (Tympanic)   Resp 18   Ht 1.6 m (5' 3\")   Wt 85.3 kg (188 lb)   SpO2 97%   Breastfeeding? No   BMI 33.30 kg/m   Estimated body mass index is 33.3 kg/m  as calculated from the following:    Height as of this encounter: 1.6 m (5' 3\").    Weight as of this encounter: 85.3 kg (188 lb).    Patient presents to the clinic using No DME        "

## 2019-05-10 NOTE — PATIENT INSTRUCTIONS
Patient Education     Fall with Uncertain Cause  You have had a fall today. But the cause of your fall is not certain. Falls can happen due to slipping, tripping or losing your balance. A fall can also happen from a fainting spell or seizure.  While a fall can happen for a simple reason (tripping over something), falls in elderly people are often caused by a combination of things:    Age-related decline in function with worsening balance, stability, vision, and muscle strength    Chronic illness, such as heart arrhythmias, heart valve disease, vascular disease, COPD, diabetes, strokes, or arthritis    Shoes that do not give much support and make you prone to slip or slide    Anemia or low blood pressure     Effects or side effects of medicines    Dehydration or recent use of alcohol    Environmental hazards, such as uneven or slippery ground, unfamiliar place, obstacles, uneven surfaces, or slippery ground    Situational factors (related to the activity being done, such as rushing to the bathroom)  Because the cause of your fall today is not certain, it is possible that a fainting spell or seizure was the cause. This means that it could happen again, without warning. If you fall again, without a cause, then you should return to this facility promptly to have further tests. Otherwise, follow up with your healthcare provider as explained below.  It is normal to feel sore and tight in your muscles and back the next day, and not just the muscles you initially injured. Remember, all the parts of your body are connected, so while initially one area hurts, the next day another may hurt. Also, when you injure yourself, it causes inflammation, which then causes the muscles to tighten up and hurt more. After the initial worsening, it should gradually improve over the next few days. However, more severe pain should be reported.  Even without a definite head injury, you can still get a concussion. Concussions and even  bleeding can still happen, especially if you have had a recent injury or take blood thinner medicine. It is not unusual to have a mild headache and feel tired and even nauseous or dizzy.  Home care    Rest today and resume your normal activities as soon as you are feeling back to normal. It is best to remain with someone who can check on you for the next 24 hours to watch for another episode of falling.    If you were injured during the fall, follow the advice from your healthcare provider regarding care of your injury.    If you become lightheaded or dizzy, lie down right away or sit and lean forward with your head down.    As a precaution, don't drive a car or operate dangerous equipment, don't take a bath alone (use a shower instead), and don't swim alone until you see your healthcare provider. A condition causing fainting or seizures must be ruled out before resuming these activities.    You may use acetaminophen or ibuprofen to control pain, unless another pain medicine was prescribed. If you have chronic liver or kidney disease or ever had a stomach ulcer or gastrointestinal bleeding, talk with your healthcare provider before using these medicines.    Keep your appointments for any further testing that may have been scheduled for you.  Follow-up care  Follow up with your healthcare provider, or as advised.  If X-rays or CT scan were done, you will be notified if there is a change in the reading, especially if it affects treatment.  Call 911  Call 911 if any of these happen:    Trouble breathing    Confused or difficulty arousing    Fainting or loss of consciousness    Rapid or very slow heart rate    Seizure    Difficulty with speech or vision, weakness of an arm or leg    Difficulty walking or talking, loss of balance, numbness or weakness in one side of your body, facial droop  When to seek medical advice  Call your healthcare provider right away if any of these happen:    Another unexplained fall     Dizziness    Severe headache    Nausea and vomiting    Blood in vomit, stools (black or red color)  Date Last Reviewed: 11/1/2017 2000-2018 [employer]. [address] [place] [address], [place] [address]. All rights reserved. This information is not intended as a substitute for professional medical care. Always follow your healthcare professional's instructions.

## 2019-05-13 DIAGNOSIS — C09.9 MALIGNANT NEOPLASM OF TONSIL (H): Chronic | ICD-10-CM

## 2019-05-13 DIAGNOSIS — R74.8 ABNORMAL LIVER ENZYMES: Primary | ICD-10-CM

## 2019-05-13 DIAGNOSIS — E03.9 HYPOTHYROIDISM, UNSPECIFIED TYPE: ICD-10-CM

## 2019-05-13 DIAGNOSIS — E78.5 HYPERLIPIDEMIA LDL GOAL <130: Chronic | ICD-10-CM

## 2019-05-13 DIAGNOSIS — F51.01 PRIMARY INSOMNIA: ICD-10-CM

## 2019-05-13 DIAGNOSIS — F33.1 MAJOR DEPRESSIVE DISORDER, RECURRENT EPISODE, MODERATE (H): ICD-10-CM

## 2019-05-13 RX ORDER — PAROXETINE 20 MG/1
TABLET, FILM COATED ORAL
Qty: 90 TABLET | Refills: 0 | Status: SHIPPED | OUTPATIENT
Start: 2019-05-13 | End: 2019-08-24

## 2019-05-13 RX ORDER — LEVOTHYROXINE SODIUM 75 UG/1
TABLET ORAL
Qty: 90 TABLET | Refills: 1 | Status: SHIPPED | OUTPATIENT
Start: 2019-05-13 | End: 2019-11-18

## 2019-05-13 RX ORDER — TRAZODONE HYDROCHLORIDE 100 MG/1
TABLET ORAL
Qty: 90 TABLET | Refills: 0 | Status: SHIPPED | OUTPATIENT
Start: 2019-05-13 | End: 2019-08-06

## 2019-05-13 RX ORDER — SIMVASTATIN 40 MG
TABLET ORAL
Qty: 90 TABLET | Refills: 0 | Status: SHIPPED | OUTPATIENT
Start: 2019-05-13 | End: 2019-06-15

## 2019-05-14 ENCOUNTER — HOSPITAL ENCOUNTER (OUTPATIENT)
Dept: MRI IMAGING | Facility: CLINIC | Age: 74
Discharge: HOME | End: 2019-05-14
Attending: FAMILY MEDICINE | Admitting: FAMILY MEDICINE
Payer: MEDICARE

## 2019-05-14 DIAGNOSIS — R26.81 UNSTEADY GAIT: ICD-10-CM

## 2019-05-14 PROCEDURE — A9585 GADOBUTROL INJECTION: HCPCS | Performed by: FAMILY MEDICINE

## 2019-05-14 PROCEDURE — 70553 MRI BRAIN STEM W/O & W/DYE: CPT

## 2019-05-14 PROCEDURE — 25500064 ZZH RX 255 OP 636: Performed by: FAMILY MEDICINE

## 2019-05-14 RX ORDER — GADOBUTROL 604.72 MG/ML
8 INJECTION INTRAVENOUS ONCE
Status: COMPLETED | OUTPATIENT
Start: 2019-05-14 | End: 2019-05-14

## 2019-05-14 RX ADMIN — GADOBUTROL 8 ML: 604.72 INJECTION INTRAVENOUS at 12:20

## 2019-06-14 DIAGNOSIS — R74.8 ABNORMAL LIVER ENZYMES: ICD-10-CM

## 2019-06-14 DIAGNOSIS — E78.5 HYPERLIPIDEMIA LDL GOAL <130: Chronic | ICD-10-CM

## 2019-06-14 LAB
ALBUMIN SERPL-MCNC: 3.7 G/DL (ref 3.4–5)
ALP SERPL-CCNC: 61 U/L (ref 40–150)
ALT SERPL W P-5'-P-CCNC: 97 U/L (ref 0–50)
AST SERPL W P-5'-P-CCNC: 51 U/L (ref 0–45)
BILIRUB DIRECT SERPL-MCNC: 0.2 MG/DL (ref 0–0.2)
BILIRUB SERPL-MCNC: 0.4 MG/DL (ref 0.2–1.3)
CHOLEST SERPL-MCNC: 157 MG/DL
HDLC SERPL-MCNC: 80 MG/DL
LDLC SERPL CALC-MCNC: 68 MG/DL
NONHDLC SERPL-MCNC: 77 MG/DL
PROT SERPL-MCNC: 6.8 G/DL (ref 6.8–8.8)
TRIGL SERPL-MCNC: 45 MG/DL

## 2019-06-14 PROCEDURE — 36415 COLL VENOUS BLD VENIPUNCTURE: CPT | Performed by: FAMILY MEDICINE

## 2019-06-14 PROCEDURE — 80061 LIPID PANEL: CPT | Performed by: FAMILY MEDICINE

## 2019-06-14 PROCEDURE — 80076 HEPATIC FUNCTION PANEL: CPT | Performed by: FAMILY MEDICINE

## 2019-06-15 DIAGNOSIS — R74.8 ABNORMAL LIVER ENZYMES: Primary | ICD-10-CM

## 2019-06-15 DIAGNOSIS — Z11.59 ENCOUNTER FOR SCREENING FOR OTHER VIRAL DISEASES: ICD-10-CM

## 2019-06-15 DIAGNOSIS — E78.5 HYPERLIPIDEMIA LDL GOAL <130: Chronic | ICD-10-CM

## 2019-06-15 RX ORDER — SIMVASTATIN 20 MG
20 TABLET ORAL AT BEDTIME
Qty: 90 TABLET | Refills: 1 | Status: SHIPPED | OUTPATIENT
Start: 2019-06-15 | End: 2019-11-01

## 2019-06-29 ENCOUNTER — HOSPITAL ENCOUNTER (OUTPATIENT)
Dept: ULTRASOUND IMAGING | Facility: CLINIC | Age: 74
Discharge: HOME | End: 2019-06-29
Attending: FAMILY MEDICINE | Admitting: FAMILY MEDICINE
Payer: MEDICARE

## 2019-06-29 DIAGNOSIS — R74.8 ABNORMAL LIVER ENZYMES: ICD-10-CM

## 2019-06-29 PROCEDURE — 76705 ECHO EXAM OF ABDOMEN: CPT

## 2019-07-03 DIAGNOSIS — R74.8 ABNORMAL LIVER ENZYMES: ICD-10-CM

## 2019-07-03 DIAGNOSIS — Z11.59 ENCOUNTER FOR SCREENING FOR OTHER VIRAL DISEASES: ICD-10-CM

## 2019-07-03 DIAGNOSIS — E78.5 HYPERLIPIDEMIA LDL GOAL <130: Chronic | ICD-10-CM

## 2019-07-03 LAB
HAV IGG SER QL IA: REACTIVE
HBV SURFACE AB SERPL IA-ACNC: 0.25 M[IU]/ML
HCV AB SERPL QL IA: NONREACTIVE
LIPASE SERPL-CCNC: 131 U/L (ref 73–393)

## 2019-07-03 PROCEDURE — 86803 HEPATITIS C AB TEST: CPT | Performed by: FAMILY MEDICINE

## 2019-07-03 PROCEDURE — 86708 HEPATITIS A ANTIBODY: CPT | Performed by: FAMILY MEDICINE

## 2019-07-03 PROCEDURE — 83690 ASSAY OF LIPASE: CPT | Performed by: FAMILY MEDICINE

## 2019-07-03 PROCEDURE — 86706 HEP B SURFACE ANTIBODY: CPT | Performed by: FAMILY MEDICINE

## 2019-07-03 PROCEDURE — 36415 COLL VENOUS BLD VENIPUNCTURE: CPT | Performed by: FAMILY MEDICINE

## 2019-08-06 ENCOUNTER — OFFICE VISIT (OUTPATIENT)
Dept: FAMILY MEDICINE | Facility: CLINIC | Age: 74
End: 2019-08-06
Payer: COMMERCIAL

## 2019-08-06 VITALS
HEART RATE: 68 BPM | BODY MASS INDEX: 32.95 KG/M2 | WEIGHT: 193 LBS | SYSTOLIC BLOOD PRESSURE: 110 MMHG | OXYGEN SATURATION: 98 % | DIASTOLIC BLOOD PRESSURE: 78 MMHG | RESPIRATION RATE: 18 BRPM | TEMPERATURE: 97.8 F | HEIGHT: 64 IN

## 2019-08-06 DIAGNOSIS — F51.01 PRIMARY INSOMNIA: ICD-10-CM

## 2019-08-06 DIAGNOSIS — Z00.00 ENCOUNTER FOR MEDICARE ANNUAL WELLNESS EXAM: Primary | ICD-10-CM

## 2019-08-06 DIAGNOSIS — E03.9 HYPOTHYROIDISM, UNSPECIFIED TYPE: ICD-10-CM

## 2019-08-06 DIAGNOSIS — M54.5 CHRONIC BILATERAL LOW BACK PAIN, WITH SCIATICA PRESENCE UNSPECIFIED: ICD-10-CM

## 2019-08-06 DIAGNOSIS — G89.29 CHRONIC BILATERAL LOW BACK PAIN, WITH SCIATICA PRESENCE UNSPECIFIED: ICD-10-CM

## 2019-08-06 LAB — TSH SERPL DL<=0.005 MIU/L-ACNC: 2.56 MU/L (ref 0.4–4)

## 2019-08-06 PROCEDURE — 99213 OFFICE O/P EST LOW 20 MIN: CPT | Mod: 25 | Performed by: FAMILY MEDICINE

## 2019-08-06 PROCEDURE — 36415 COLL VENOUS BLD VENIPUNCTURE: CPT | Performed by: FAMILY MEDICINE

## 2019-08-06 PROCEDURE — 99397 PER PM REEVAL EST PAT 65+ YR: CPT | Performed by: FAMILY MEDICINE

## 2019-08-06 PROCEDURE — 84443 ASSAY THYROID STIM HORMONE: CPT | Performed by: FAMILY MEDICINE

## 2019-08-06 RX ORDER — GABAPENTIN 300 MG/1
300 CAPSULE ORAL 3 TIMES DAILY
Qty: 180 CAPSULE | Refills: 1 | Status: SHIPPED | OUTPATIENT
Start: 2019-08-06 | End: 2020-01-14

## 2019-08-06 RX ORDER — TRAZODONE HYDROCHLORIDE 100 MG/1
100 TABLET ORAL
Qty: 90 TABLET | Refills: 1 | Status: SHIPPED | OUTPATIENT
Start: 2019-08-06 | End: 2020-01-10

## 2019-08-06 ASSESSMENT — MIFFLIN-ST. JEOR: SCORE: 1351.47

## 2019-08-06 NOTE — NURSING NOTE
"Chief Complaint   Patient presents with     Musculoskeletal Problem     Physical     /78 (Cuff Size: Adult Regular)   Pulse 68   Temp 97.8  F (36.6  C) (Tympanic)   Resp 18   Ht 1.619 m (5' 3.75\")   Wt 87.5 kg (193 lb)   SpO2 98%   Breastfeeding? No   BMI 33.39 kg/m   Estimated body mass index is 33.39 kg/m  as calculated from the following:    Height as of this encounter: 1.619 m (5' 3.75\").    Weight as of this encounter: 87.5 kg (193 lb).  Patient presents to the clinic using [facility]      Health Maintenance that is potentially due pending provider review:    Health Maintenance Due   Topic Date Due     ZOSTER IMMUNIZATION (1 of 2) 09/19/1993     ADVANCE CARE PLANNING  02/09/2017                "

## 2019-08-06 NOTE — PROGRESS NOTES
"    SUBJECTIVE   Ingrid Powell is a 73 year old female who presents with     Back Pain       Duration: 6 months         Specific cause: none    Description:   Location of pain: low back both  Character of pain: sharp  Pain radiation:none  New numbness or weakness in legs, not attributed to pain:  no     Intensity: moderate    History:   Pain interferes with job: Not applicable  History of back problems:   Any previous MRI or X-rays: Yes- at [facility].   Sees a specialist for back pain:  Yes, Dr. Roach through pain clinic, contact made with the following plan: Is doing shots. [facility]- can't have another shot for about 2 weeks   Therapies tried without relief: cold, heat and steroid injection    Alleviating factors:   Improved by: arthritis aleve- helps for about 2 hours only      Precipitating factors:  Worsened by: Lifting, Bending, Standing, Sitting, Lying Flat and Walking    Annual Wellness Visit    Are you in the first 12 months of your Medicare Part B coverage?  No    Physical Health:    In general, how would you rate your overall physical health? fair    If you drink alcohol do you typically have >3 drinks per day or >7 drinks per week? No    Do you usually eat at least 4 servings of fruit and vegetables a day, include whole grains & fiber and avoid regularly eating high fat or \"junk\" foods? Yes    Needs assistance for the following daily activities: transportation- sister drives her- doesn't have a car     Which of the following safety concerns are present in your home?  none identified     Hearing impairment: No    In the past 6 months, have you been bothered by leaking of urine? no    Mental Health:    In general, how would you rate your overall mental or emotional health? fair  PHQ-2 Score:      Do you feel safe in your environment? Yes    Do you have a Health Care Directive? Yes: Advance Directive has been received and scanned.    Fall risk:       Cognitive Screenin) Repeat 3 " items (Leader, Season, Table)    2) Clock draw: NORMAL  3) 3 item recall: Recalls 3 objects  Results: 3 items recalled: COGNITIVE IMPAIRMENT LESS LIKELY    Mini-CogTM Copyright S Joni. Licensed by the author for use in [employer]; reprinted with permission (douglas@.[facility]). All rights reserved.      Current providers sharing in care for this patient include:   Patient Care Team:  Jose Manuel Sutton MD as PCP - General (Family Practice)  Yamel Noland, RN as Continuity Care Coordinator (Nurse)  Tejinder Tracy MD as MD (Oncology)  Jose Manuel Sutton MD as Assigned PCP        Do you have sleep apnea, excessive snoring or daytime drowsiness?: no    PCP   Jose Manuel Sutton [phone]    Health Maintenance        Health Maintenance Due   Topic Date Due     ZOSTER IMMUNIZATION (1 of 2) 09/19/1993     ADVANCE CARE PLANNING  02/09/2017       HPI        Patient Active Problem List   Diagnosis     Hyperlipidemia LDL goal <130     HTN (hypertension)     HX of MALIGNANT NEOPL TONSIL     COPD, mild (H)     zAdvanced directives, counseling/discussion     Major depressive disorder, recurrent episode, moderate (HCC)     Anxiety     Hypothyroidism     MARGIE (obstructive sleep apnea), Severe     Disturbance of salivary secretion (XEROSTOMIA)     Renal cyst     Raynaud's syndrome     History of lumbar surgery     Primary insomnia     Lumbosacral radiculopathy at L3     DDD (degenerative disc disease), lumbar     Adenomatous colon polyp     Diverticulitis of colon     Benign neoplasm of gastrointestinal tract     Hemorrhoids, internal     Rectocele     Tortuous colon     Family history of breast cancer in mother     Malignant neoplasm of oropharynx (H)     Current Outpatient Medications   Medication     B Complex Vitamins (VITAMIN  B COMPLEX) tablet     cyanocobalamin (VITAMIN B-12) 100 MCG tablet     gabapentin (NEURONTIN) 300 MG capsule     levothyroxine (SYNTHROID/LEVOTHROID) 75 MCG tablet     PARoxetine (PAXIL) 20 MG  tablet     RESTASIS 0.05 % ophthalmic emulsion     simvastatin (ZOCOR) 20 MG tablet     traZODone (DESYREL) 100 MG tablet     TURMERIC PO     VITAMIN D, CHOLECALCIFEROL, PO     ORDER FOR DME     No current facility-administered medications for this visit.        Patient Active Problem List   Diagnosis     Hyperlipidemia LDL goal <130     HTN (hypertension)     HX of MALIGNANT NEOPL TONSIL     COPD, mild (H)     zAdvanced directives, counseling/discussion     Major depressive disorder, recurrent episode, moderate (HCC)     Anxiety     Hypothyroidism     MARGIE (obstructive sleep apnea), Severe     Disturbance of salivary secretion (XEROSTOMIA)     Renal cyst     Raynaud's syndrome     History of lumbar surgery     Primary insomnia     Lumbosacral radiculopathy at L3     DDD (degenerative disc disease), lumbar     Adenomatous colon polyp     Diverticulitis of colon     Benign neoplasm of gastrointestinal tract     Hemorrhoids, internal     Rectocele     Tortuous colon     Family history of breast cancer in mother     Malignant neoplasm of oropharynx (H)     Past Surgical History:   Procedure Laterality Date     BACK SURGERY       CARPAL TUNNEL RELEASE RT/LT      ?side     CHOLECYSTECTOMY       EYE SURGERY      cataracts     HERNIA REPAIR       INCISION AND DRAINAGE TRUNK, COMBINED  5/18/2012    Procedure:COMBINED INCISION AND DRAINAGE TRUNK; Incision and Drainage Abdominal Wall Abscess ; Surgeon:ALEXSANDER HANNON; Location:[facility]     INSERT PORT VASCULAR ACCESS  2/8/2012    Procedure:INSERT PORT VASCULAR ACCESS; Surgeon:MARY JANE GUAN; Location:[facility]     JOINT REPLACEMTN, KNEE RT/LT      bilateral     KNEE SURGERY  1995    left- total     KNEE SURGERY  2000    right- total     LAPAROSCOPIC APPENDECTOMY N/A 10/10/2015    Procedure: LAPAROSCOPIC APPENDECTOMY;  Surgeon: Daniel Chamberlain MD;  Location: [facility]     LARYNGOSCOPY, ESOPHAGOSCOPY,  BIOPSY, COMBINED  2/8/2012    Procedure:COMBINED LARYNGOSCOPY,  ESOPHAGOSCOPY,  BIOPSY; Direct Laryngoscopy, Esophagoscopy with Biopsy, Stealth Assisted Left Frontal Sinus Biopsy via Vidal Incision, 8 french Power Port in right subclavian & Percutaneous Endoscopic Gastrostomy Placement * Latex Safe*; Surgeon:LIBORIO CAZARES; Location:[facility]     left ankle fusion       OPTICAL TRACKING SYSTEM ENDOSCOPIC SINUS SURGERY  2012    Procedure:OPTICAL TRACKING SYSTEM ENDOSCOPIC SINUS SURGERY; Surgeon:LIBORIO CAZARES; Location:[facility]     RECTAL SURGERY      ? type     RELEASE DEQUERVAINS WRIST Left 2018    Procedure: RELEASE DEQUERVAINS WRIST;  Left 1st Distal compartment release;  Surgeon: Ronak Biggs MD;  Location: [facility]     REMOVE PORT VASCULAR ACCESS  2012    Procedure: REMOVE PORT VASCULAR ACCESS;  Remove Port Vascular Access ;  Surgeon: Phillip Ye MD;  Location: [facility]       Social History     Tobacco Use     Smoking status: Former Smoker     Packs/day: 0.50     Years: 35.00     Pack years: 17.50     Types: Cigarettes     Last attempt to quit: 10/3/1995     Years since quittin.8     Smokeless tobacco: Never Used   Substance Use Topics     Alcohol use: Yes     Comment: rare     Family History   Problem Relation Age of Onset     Breast Cancer Mother      Arthritis Mother      Cancer Mother      Heart Disease Father          at 72 of heart failure     Arthritis Brother      Pancreatic Cancer Brother      Arthritis Sister      Skin Cancer Sister      Cancer Other         uncle pancreatic/grandmother- colon/aunt ? mat or pat  breast cancer     Cancer Sister          Current Outpatient Medications   Medication Sig Dispense Refill     B Complex Vitamins (VITAMIN  B COMPLEX) tablet Take 1 tablet by mouth daily.         cyanocobalamin (VITAMIN B-12) 100 MCG tablet Take 100 mcg by mouth daily       gabapentin (NEURONTIN) 300 MG capsule TAKE 1 TO 2 CAPSULES BY MOUTH ONCE DAILY IN THE EVENING AS NEEDED 180 capsule 1      levothyroxine (SYNTHROID/LEVOTHROID) 75 MCG tablet TAKE 1 TABLET EVERY DAY 90 tablet 1     PARoxetine (PAXIL) 20 MG tablet TAKE 1 TABLET AT BEDTIME 90 tablet 0     RESTASIS 0.05 % ophthalmic emulsion Place 1 drop into both eyes 2 times daily       simvastatin (ZOCOR) 20 MG tablet Take 1 tablet (20 mg) by mouth At Bedtime 90 tablet 1     traZODone (DESYREL) 100 MG tablet Take 1 tablet (100 mg) by mouth nightly as needed for sleep 90 tablet 1     TURMERIC PO Take 1 tablet by mouth daily       VITAMIN D, CHOLECALCIFEROL, PO Take 2,000 Units by mouth daily.         ORDER FOR DME Respironics Remstar 60 series Auto CPAP 6-15 cm H2O       Allergies   Allergen Reactions     Dilaudid [Hydromorphone Hcl] Other (See Comments)     hallucinations     Fosamax [Alendronate Sodium] Rash            Norvasc [Amlodipine Besylate] Hives and Rash     Tegretol [Carbamazepine] Hives and Rash     Recent Labs   Lab Test 06/14/19  1100 05/10/19  1137 03/19/19  1145 10/01/18  1155 02/26/18  0958  01/13/16  1310   LDL 68  --   --   --  66  --  76   HDL 80  --   --   --  62  --  70   TRIG 45  --   --   --  145  --  77   ALT 97* 73* 30  --   --    < >  --    CR  --  0.80 0.75  --   --    < >  --    GFRESTIMATED  --  72 78  --   --    < >  --    GFRESTBLACK  --  83 >90  --   --    < >  --    POTASSIUM  --  4.1 4.5  --   --    < >  --    TSH  --   --  2.76 2.95  --    < >  --     < > = values in this interval not displayed.      BP Readings from Last 3 Encounters:   08/06/19 110/78   05/10/19 116/82   04/04/19 101/80    Wt Readings from Last 3 Encounters:   08/06/19 87.5 kg (193 lb)   05/10/19 85.3 kg (188 lb)   03/19/19 87.5 kg (193 lb)                    Reviewed and updated:  Tobacco  Allergies  Meds  Med Hx  Surg Hx  Fam Hx  Soc Hx     ROS:  Constitutional, neuro, ENT, endocrine, pulmonary, cardiac, gastrointestinal, genitourinary, musculoskeletal, integument and psychiatric systems are negative, except as otherwise noted.    PHYSICAL  "EXAM   /78 (Cuff Size: Adult Regular)   Pulse 68   Temp 97.8  F (36.6  C) (Tympanic)   Resp 18   Ht 1.619 m (5' 3.75\")   Wt 87.5 kg (193 lb)   SpO2 98%   Breastfeeding? No   BMI 33.39 kg/m    Body mass index is 33.39 kg/m .  GENERAL: alert and no distress  EYES: Eyes grossly normal to inspection, PERRL and conjunctivae and sclerae normal  HENT: normal cephalic/atraumatic, nose and mouth without ulcers or lesions, oropharynx clear and oral mucous membranes moist  NECK: no adenopathy, no asymmetry, masses, or scars and thyroid normal to palpation  RESP: lungs clear to auscultation - no rales, rhonchi or wheezes  CV: regular rate and rhythm, normal S1 S2, no S3 or S4, no murmur, click or rub, no peripheral edema and peripheral pulses strong  ABDOMEN: soft, nontender, no hepatosplenomegaly, no masses and bowel sounds normal  MS: No spinal/paraspinal tenderness, SLR positive left-sided, normal lower extremity strength, sensation to touch impression intact  NEURO: Normal strength and tone, mentation intact and speech normal  PSYCH: mentation appears normal, affect normal/bright      Assessment & Plan     (Z00.00) Encounter for Medicare annual wellness exam  (primary encounter diagnosis)  Comment: Overall doing well clinically apart from chronic low back pain, following pain clinic, scheduled to have MRI next week      (F51.01) Primary insomnia  Comment: Continue trazodone  Plan: traZODone (DESYREL) 100 MG tablet           (E03.9) Hypothyroidism, unspecified type  Comment: TSH ordered, will titrate levothyroxine dose accordingly  Plan: TSH with free T4 reflex          (M54.5,  G89.29) Chronic bilateral low back pain with sciatica presence unspecified  Comment: Symptoms are likely secondary to degenerative disc disease, lumbar radiculopathy.  Suggested to increase gabapentin 1 capsule 3 times daily, continue Tylenol, ibuprofen, can try Lidoderm patch as well.  Recommended physical therapy  Plan: gabapentin " (NEURONTIN) 300 MG capsule, PHYSICAL        THERAPY REFERRAL      Follow-up in 3 months or earlier if needed.  Patient understood and in agreement with above plan.  All questions answered.      Patient Instructions     Preventive Health Recommendations    See your health care provider every year to    Review health changes.     Discuss preventive care.      Review your medicines if your doctor has prescribed any.    You no longer need a yearly Pap test unless you've had an abnormal Pap test in the past 10 years. If you have vaginal symptoms, such as bleeding or discharge, be sure to talk with your provider about a Pap test.    Every 1 to 2 years, have a mammogram.  If you are over 69, talk with your health care provider about whether or not you want to continue having screening mammograms.    Every 10 years, have a colonoscopy. Or, have a yearly FIT test (stool test). These exams will check for colon cancer.     Have a cholesterol test every 5 years, or more often if your doctor advises it.     Have a diabetes test (fasting glucose) every three years. If you are at risk for diabetes, you should have this test more often.     At age 65, have a bone density scan (DEXA) to check for osteoporosis (brittle bone disease).    Shots:    Get a flu shot each year.    Get a tetanus shot every 10 years.    Talk to your doctor about your pneumonia vaccines. There are now two you should receive - Pneumovax (PPSV 23) and Prevnar (PCV 13).    Talk to your pharmacist about the shingles vaccine.    Talk to your doctor about the hepatitis B vaccine.    Nutrition:     Eat at least 5 servings of fruits and vegetables each day.    Eat whole-grain bread, whole-wheat pasta and brown rice instead of white grains and rice.    Get adequate Calcium and Vitamin D.     Lifestyle    Exercise at least 150 minutes a week (30 minutes a day, 5 days a week). This will help you control your weight and prevent disease.    Limit alcohol to one drink per  day.    No smoking.     Wear sunscreen to prevent skin cancer.     See your dentist twice a year for an exam and cleaning.    See your eye doctor every 1 to 2 years to screen for conditions such as glaucoma, macular degeneration and cataracts.    Personalized Prevention Plan  You are due for the preventive services outlined below.  Your care team is available to assist you in scheduling these services.  If you have already completed any of these items, please share that information with your care team to update in your medical record.  Health Maintenance Due   Topic Date Due     Zoster (Shingles) Vaccine (1 of 2) 09/19/1993     Annual Wellness Visit  09/19/2008     Discuss Advance Care Planning  02/09/2017       Patient Education   Personalized Prevention Plan  You are due for the preventive services outlined below.  Your care team is available to assist you in scheduling these services.  If you have already completed any of these items, please share that information with your care team to update in your medical record.  Health Maintenance Due   Topic Date Due     Zoster (Shingles) Vaccine (1 of 2) 09/19/1993     Annual Wellness Visit  09/19/2008     Discuss Advance Care Planning  02/09/2017              Jose Manuel Sutton MD  [facility]

## 2019-08-06 NOTE — PATIENT INSTRUCTIONS

## 2019-08-12 ENCOUNTER — TRANSFERRED RECORDS (OUTPATIENT)
Dept: HEALTH INFORMATION MANAGEMENT | Facility: CLINIC | Age: 74
End: 2019-08-12

## 2019-08-24 DIAGNOSIS — F33.1 MAJOR DEPRESSIVE DISORDER, RECURRENT EPISODE, MODERATE (H): ICD-10-CM

## 2019-08-24 NOTE — TELEPHONE ENCOUNTER
"Requested Prescriptions   Pending Prescriptions Disp Refills     PARoxetine (PAXIL) 20 MG tablet [Pharmacy Med Name: PAROXETINE HYDROCHLORIDE 20 MG Tablet] 90 tablet 0     Sig: TAKE 1 TABLET AT BEDTIME       SSRIs Protocol Failed - 8/24/2019  1:51 PM        Failed - PHQ-9 score less than 5 in past 6 months     Please review last PHQ-9 score.           Passed - Medication is active on med list        Passed - Patient is age 18 or older        Passed - No active pregnancy on record        Passed - No positive pregnancy test in last 12 months        Passed - Recent (6 mo) or future (30 days) visit within the authorizing provider's specialty     Patient had office visit in the last 6 months or has a visit in the next 30 days with authorizing provider or within the authorizing provider's specialty.  See \"Patient Info\" tab in inbasket, or \"Choose Columns\" in Meds & Orders section of the refill encounter.            Last Written Prescription Date:  5/13/19  Last Fill Quantity: 90,  # refills: 0   Last office visit: 8/6/2019 with prescribing provider:     Future Office Visit:      "

## 2019-08-26 RX ORDER — PAROXETINE 20 MG/1
TABLET, FILM COATED ORAL
Qty: 90 TABLET | Refills: 0 | Status: SHIPPED | OUTPATIENT
Start: 2019-08-26 | End: 2019-11-18

## 2019-08-26 NOTE — TELEPHONE ENCOUNTER
PHQ-9 SCORE 8/1/2017 2/22/2018 3/19/2019   PHQ-9 Total Score - - -   PHQ-9 Total Score MyChart - - 14 (Moderate depression)   PHQ-9 Total Score 10 2 14     JAIME-7 SCORE 9/7/2016 8/1/2017 3/19/2019   Total Score - - -   Total Score - - 7 (mild anxiety)   Total Score 2 4 7     LM PHQ9/ GAD7 mailed, please complete/ return.  PARRISH Vallecillo, RN

## 2019-09-10 ENCOUNTER — TELEPHONE (OUTPATIENT)
Dept: FAMILY MEDICINE | Facility: CLINIC | Age: 74
End: 2019-09-10

## 2019-09-10 ASSESSMENT — ANXIETY QUESTIONNAIRES
3. WORRYING TOO MUCH ABOUT DIFFERENT THINGS: SEVERAL DAYS
GAD7 TOTAL SCORE: 3
1. FEELING NERVOUS, ANXIOUS, OR ON EDGE: NOT AT ALL
6. BECOMING EASILY ANNOYED OR IRRITABLE: NOT AT ALL
2. NOT BEING ABLE TO STOP OR CONTROL WORRYING: SEVERAL DAYS
7. FEELING AFRAID AS IF SOMETHING AWFUL MIGHT HAPPEN: SEVERAL DAYS
5. BEING SO RESTLESS THAT IT IS HARD TO SIT STILL: NOT AT ALL
IF YOU CHECKED OFF ANY PROBLEMS ON THIS QUESTIONNAIRE, HOW DIFFICULT HAVE THESE PROBLEMS MADE IT FOR YOU TO DO YOUR WORK, TAKE CARE OF THINGS AT HOME, OR GET ALONG WITH OTHER PEOPLE: NOT DIFFICULT AT ALL

## 2019-09-10 ASSESSMENT — PATIENT HEALTH QUESTIONNAIRE - PHQ9
SUM OF ALL RESPONSES TO PHQ QUESTIONS 1-9: 4
5. POOR APPETITE OR OVEREATING: NOT AT ALL

## 2019-09-10 NOTE — TELEPHONE ENCOUNTER
PHQ9/ GAD7 returned.  PHQ-9 SCORE 2/22/2018 3/19/2019 9/10/2019   PHQ-9 Total Score - - -   PHQ-9 Total Score MyChart - 14 (Moderate depression) -   PHQ-9 Total Score 2 14 4     JAIME-7 SCORE 8/1/2017 3/19/2019 9/10/2019   Total Score - - -   Total Score - 7 (mild anxiety) -   Total Score 4 7 3     PARRISH Vallecillo, RN

## 2019-09-11 ASSESSMENT — ANXIETY QUESTIONNAIRES: GAD7 TOTAL SCORE: 3

## 2019-09-23 ENCOUNTER — OFFICE VISIT (OUTPATIENT)
Dept: FAMILY MEDICINE | Facility: CLINIC | Age: 74
End: 2019-09-23
Payer: COMMERCIAL

## 2019-09-23 ENCOUNTER — ANCILLARY PROCEDURE (OUTPATIENT)
Dept: GENERAL RADIOLOGY | Facility: CLINIC | Age: 74
End: 2019-09-23
Attending: FAMILY MEDICINE
Payer: COMMERCIAL

## 2019-09-23 VITALS
DIASTOLIC BLOOD PRESSURE: 62 MMHG | HEIGHT: 64 IN | OXYGEN SATURATION: 96 % | WEIGHT: 191 LBS | HEART RATE: 86 BPM | BODY MASS INDEX: 32.61 KG/M2 | RESPIRATION RATE: 20 BRPM | SYSTOLIC BLOOD PRESSURE: 96 MMHG | TEMPERATURE: 97.9 F

## 2019-09-23 DIAGNOSIS — J44.9 COPD, MILD (H): ICD-10-CM

## 2019-09-23 DIAGNOSIS — R05.9 COUGH: ICD-10-CM

## 2019-09-23 DIAGNOSIS — J20.9 ACUTE BRONCHITIS WITH COEXISTING CONDITION REQUIRING PROPHYLACTIC TREATMENT: Primary | ICD-10-CM

## 2019-09-23 PROCEDURE — 71046 X-RAY EXAM CHEST 2 VIEWS: CPT | Mod: FY

## 2019-09-23 PROCEDURE — 99214 OFFICE O/P EST MOD 30 MIN: CPT | Performed by: FAMILY MEDICINE

## 2019-09-23 RX ORDER — PREDNISONE 20 MG/1
20 TABLET ORAL DAILY
Qty: 5 TABLET | Refills: 0 | Status: SHIPPED | OUTPATIENT
Start: 2019-09-23 | End: 2019-10-07

## 2019-09-23 RX ORDER — ALBUTEROL SULFATE 90 UG/1
2 AEROSOL, METERED RESPIRATORY (INHALATION) EVERY 6 HOURS PRN
Qty: 1 INHALER | Refills: 3 | Status: SHIPPED | OUTPATIENT
Start: 2019-09-23 | End: 2019-10-07

## 2019-09-23 RX ORDER — AZITHROMYCIN 250 MG/1
TABLET, FILM COATED ORAL
Qty: 6 TABLET | Refills: 0 | Status: SHIPPED | OUTPATIENT
Start: 2019-09-23 | End: 2019-10-07

## 2019-09-23 ASSESSMENT — MIFFLIN-ST. JEOR: SCORE: 1337.4

## 2019-09-23 NOTE — PROGRESS NOTES
SUBJECTIVE   Ingrid Powell is a 74 year old female who presents with     RESPIRATORY SYMPTOMS      Duration: 5 days     Description  nasal congestion, rhinorrhea, cough, fever, ear pain left, wheezing and fatigue/malaise    Severity: moderate    Accompanying signs and symptoms: coughing up green phlegm     History (predisposing factors):  COPD    Precipitating or alleviating factors: None    Therapies tried and outcome:  rest and fluids, Mucinex         PCP   Jose Manuel Sutton [phone]    Health Maintenance        Health Maintenance Due   Topic Date Due     ZOSTER IMMUNIZATION (1 of 2) 09/19/1993     ADVANCE CARE PLANNING  02/09/2017     INFLUENZA VACCINE (1) 09/01/2019       HPI        Patient Active Problem List   Diagnosis     Hyperlipidemia LDL goal <130     HTN (hypertension)     HX of MALIGNANT NEOPL TONSIL     COPD, mild (H)     zAdvanced directives, counseling/discussion     Major depressive disorder, recurrent episode, moderate (HCC)     Anxiety     Hypothyroidism     MARGIE (obstructive sleep apnea), Severe     Disturbance of salivary secretion (XEROSTOMIA)     Renal cyst     Raynaud's syndrome     History of lumbar surgery     Primary insomnia     Lumbosacral radiculopathy at L3     DDD (degenerative disc disease), lumbar     Adenomatous colon polyp     Diverticulitis of colon     Benign neoplasm of gastrointestinal tract     Hemorrhoids, internal     Rectocele     Tortuous colon     Family history of breast cancer in mother     Malignant neoplasm of oropharynx (H)     Current Outpatient Medications   Medication     cyanocobalamin (VITAMIN B-12) 100 MCG tablet     gabapentin (NEURONTIN) 300 MG capsule     levothyroxine (SYNTHROID/LEVOTHROID) 75 MCG tablet     PARoxetine (PAXIL) 20 MG tablet     RESTASIS 0.05 % ophthalmic emulsion     simvastatin (ZOCOR) 20 MG tablet     traZODone (DESYREL) 100 MG tablet     VITAMIN D, CHOLECALCIFEROL, PO     No current facility-administered medications for this  visit.        Patient Active Problem List   Diagnosis     Hyperlipidemia LDL goal <130     HTN (hypertension)     HX of MALIGNANT NEOPL TONSIL     COPD, mild (H)     zAdvanced directives, counseling/discussion     Major depressive disorder, recurrent episode, moderate (HCC)     Anxiety     Hypothyroidism     MARGIE (obstructive sleep apnea), Severe     Disturbance of salivary secretion (XEROSTOMIA)     Renal cyst     Raynaud's syndrome     History of lumbar surgery     Primary insomnia     Lumbosacral radiculopathy at L3     DDD (degenerative disc disease), lumbar     Adenomatous colon polyp     Diverticulitis of colon     Benign neoplasm of gastrointestinal tract     Hemorrhoids, internal     Rectocele     Tortuous colon     Family history of breast cancer in mother     Malignant neoplasm of oropharynx (H)     Past Surgical History:   Procedure Laterality Date     BACK SURGERY       CARPAL TUNNEL RELEASE RT/LT      ?side     CHOLECYSTECTOMY       EYE SURGERY      cataracts     HERNIA REPAIR       INCISION AND DRAINAGE TRUNK, COMBINED  5/18/2012    Procedure:COMBINED INCISION AND DRAINAGE TRUNK; Incision and Drainage Abdominal Wall Abscess ; Surgeon:ALEXSANDER HANNON; Location[facility]     INSERT PORT VASCULAR ACCESS  2/8/2012    Procedure:INSERT PORT VASCULAR ACCESS; Surgeon:MARY JANE GUAN; [facility]     JOINT REPLACEMTN, KNEE RT/LT      bilateral     KNEE SURGERY  1995    left- total     KNEE SURGERY  2000    right- total     LAPAROSCOPIC APPENDECTOMY N/A 10/10/2015    Procedure: LAPAROSCOPIC APPENDECTOMY;  Surgeon: Daniel Chamberlain MD;  Location: [facility]     LARYNGOSCOPY, ESOPHAGOSCOPY,  BIOPSY, COMBINED  2/8/2012    Procedure:COMBINED LARYNGOSCOPY, ESOPHAGOSCOPY,  BIOPSY; Direct Laryngoscopy, Esophagoscopy with Biopsy, Stealth Assisted Left Frontal Sinus Biopsy via Vidal Incision, 8 [place] Power Port in right subclavian & Percutaneous Endoscopic Gastrostomy Placement * Latex Safe*; Surgeon:DEBORA  LIBORIO RASHEED; Location:[facility]     left ankle fusion       OPTICAL TRACKING SYSTEM ENDOSCOPIC SINUS SURGERY  2012    Procedure:OPTICAL TRACKING SYSTEM ENDOSCOPIC SINUS SURGERY; Surgeon:LIBORIO CAZARES; Location:[facility]     RECTAL SURGERY      ? type     RELEASE DEQUERVAINS WRIST Left 2018    Procedure: RELEASE DEQUERVAINS WRIST;  Left 1st Distal compartment release;  Surgeon: Ronak Biggs MD;  Location: [facility]     REMOVE PORT VASCULAR ACCESS  2012    Procedure: REMOVE PORT VASCULAR ACCESS;  Remove Port Vascular Access ;  Surgeon: Phillip Ye MD;  Location: [facility]       Social History     Tobacco Use     Smoking status: Former Smoker     Packs/day: 0.50     Years: 35.00     Pack years: 17.50     Types: Cigarettes     Last attempt to quit: 10/3/1995     Years since quittin.9     Smokeless tobacco: Never Used   Substance Use Topics     Alcohol use: Yes     Comment: rare     Family History   Problem Relation Age of Onset     Breast Cancer Mother      Arthritis Mother      Cancer Mother      Heart Disease Father          at 72 of heart failure     Arthritis Brother      Pancreatic Cancer Brother      Arthritis Sister      Skin Cancer Sister      Cancer Other         uncle pancreatic/grandmother- colon/aunt ? mat or pat  breast cancer     Cancer Sister          Current Outpatient Medications   Medication Sig Dispense Refill     cyanocobalamin (VITAMIN B-12) 100 MCG tablet Take 100 mcg by mouth daily       gabapentin (NEURONTIN) 300 MG capsule Take 1 capsule (300 mg) by mouth 3 times daily 180 capsule 1     levothyroxine (SYNTHROID/LEVOTHROID) 75 MCG tablet TAKE 1 TABLET EVERY DAY 90 tablet 1     PARoxetine (PAXIL) 20 MG tablet TAKE 1 TABLET AT BEDTIME 90 tablet 0     RESTASIS 0.05 % ophthalmic emulsion Place 1 drop into both eyes 2 times daily       simvastatin (ZOCOR) 20 MG tablet Take 1 tablet (20 mg) by mouth At Bedtime 90 tablet 1     traZODone (DESYREL) 100 MG  "tablet Take 1 tablet (100 mg) by mouth nightly as needed for sleep 90 tablet 1     VITAMIN D, CHOLECALCIFEROL, PO Take 2,000 Units by mouth daily.         Allergies   Allergen Reactions     Dilaudid [Hydromorphone Hcl] Other (See Comments)     hallucinations     Fosamax [Alendronate Sodium] Rash            Norvasc [Amlodipine Besylate] Hives and Rash     Tegretol [Carbamazepine] Hives and Rash     Recent Labs   Lab Test 08/06/19  1100 06/14/19  1100 05/10/19  1137 03/19/19  1145  02/26/18  0958  01/13/16  1310   LDL  --  68  --   --   --  66  --  76   HDL  --  80  --   --   --  62  --  70   TRIG  --  45  --   --   --  145  --  77   ALT  --  97* 73* 30  --   --    < >  --    CR  --   --  0.80 0.75  --   --    < >  --    GFRESTIMATED  --   --  72 78  --   --    < >  --    GFRESTBLACK  --   --  83 >90  --   --    < >  --    POTASSIUM  --   --  4.1 4.5  --   --    < >  --    TSH 2.56  --   --  2.76   < >  --    < >  --     < > = values in this interval not displayed.      BP Readings from Last 3 Encounters:   09/23/19 96/62   08/06/19 110/78   05/10/19 116/82    Wt Readings from Last 3 Encounters:   09/23/19 86.6 kg (191 lb)   08/06/19 87.5 kg (193 lb)   05/10/19 85.3 kg (188 lb)                    Reviewed and updated:  Tobacco  Allergies  Meds  Med Hx  Surg Hx  Fam Hx  Soc Hx     ROS:  Constitutional, neuro, ENT, endocrine, pulmonary, cardiac, gastrointestinal, genitourinary, musculoskeletal, integument and psychiatric systems are negative, except as otherwise noted.    PHYSICAL EXAM   BP 96/62 (Cuff Size: Adult Regular)   Pulse 86   Temp 97.9  F (36.6  C) (Tympanic)   Resp 20   Ht 1.619 m (5' 3.75\")   Wt 86.6 kg (191 lb)   SpO2 96%   Breastfeeding? No   BMI 33.04 kg/m    Body mass index is 33.04 kg/m .  GENERAL: alert, no distress and elderly  EYES: Eyes grossly normal to inspection, PERRL and conjunctivae and sclerae normal  HENT: normal cephalic/atraumatic, ear canals and TM's normal, nose and mouth " "without ulcers or lesions, oropharynx clear and oral mucous membranes moist  NECK: no adenopathy, no asymmetry, masses, or scars and thyroid normal to palpation  RESP: Few bibasilar crackles, no wheeze or rhonchi auscultated  CV: regular rates and rhythm, normal S1 S2, no S3 or S4 and no murmur, click or rub  ABDOMEN: soft, nontender  MS: no gross musculoskeletal defects noted, no edema  SKIN: no suspicious lesions or rashes    CHEST TWO VIEWS September 23, 2019 12:59 PM      HISTORY: Cough.     COMPARISON: October 1, 2018.                                                                       IMPRESSION: There are no acute infiltrates. The cardiac silhouette is  not enlarged. Pulmonary vasculature is unremarkable.     Assessment & Plan     ICD-10-CM    1. Acute bronchitis with coexisting condition requiring prophylactic treatment J20.9 azithromycin (ZITHROMAX) 250 MG tablet     predniSONE (DELTASONE) 20 MG tablet     albuterol (PROAIR HFA/PROVENTIL HFA/VENTOLIN HFA) 108 (90 Base) MCG/ACT inhaler   2. COPD, mild (H) J44.9    3. Cough R05 XR Chest 2 Views       (J20.9) Acute bronchitis with coexisting condition requiring prophylactic treatment  (primary encounter diagnosis)  Comment: Suspect symptoms secondary to acute bronchitis.  Known to have COPD.  Chest x-ray findings explained which came back unremarkable.  Azithromycin, prednisone and albuterol inhaler prescribed.  Suggested to continue well hydration, warm fluids.  Other medication reviewed and no changes made.  All questions answered.  Plan: azithromycin (ZITHROMAX) 250 MG tablet,         predniSONE (DELTASONE) 20 MG tablet, albuterol         (PROAIR HFA/PROVENTIL HFA/VENTOLIN HFA) 108 (90        Base) MCG/ACT inhaler            (R05) Cough  Comment:   Plan: XR Chest 2 Views               BMI:   Estimated body mass index is 33.04 kg/m  as calculated from the following:    Height as of this encounter: 1.619 m (5' 3.75\").    Weight as of this encounter: 86.6 kg " (191 lb).               No follow-ups on file.    Jose Manuel Sutton MD  [facility]

## 2019-09-23 NOTE — NURSING NOTE
"Chief Complaint   Patient presents with     Cough     BP 96/62 (Cuff Size: Adult Regular)   Pulse 86   Temp 97.9  F (36.6  C) (Tympanic)   Resp 20   Ht 1.619 m (5' 3.75\")   Wt 86.6 kg (191 lb)   SpO2 96%   Breastfeeding? No   BMI 33.04 kg/m   Estimated body mass index is 33.04 kg/m  as calculated from the following:    Height as of this encounter: 1.619 m (5' 3.75\").    Weight as of this encounter: 86.6 kg (191 lb).  Patient presents to the clinic using No DME      Health Maintenance that is potentially due pending provider review:    Health Maintenance Due   Topic Date Due     ZOSTER IMMUNIZATION (1 of 2) 09/19/1993     ADVANCE CARE PLANNING  02/09/2017     INFLUENZA VACCINE (1) 09/01/2019                "

## 2019-09-25 ENCOUNTER — MYC MEDICAL ADVICE (OUTPATIENT)
Dept: FAMILY MEDICINE | Facility: CLINIC | Age: 74
End: 2019-09-25

## 2019-10-07 ENCOUNTER — ANCILLARY PROCEDURE (OUTPATIENT)
Dept: GENERAL RADIOLOGY | Facility: CLINIC | Age: 74
End: 2019-10-07
Attending: FAMILY MEDICINE
Payer: COMMERCIAL

## 2019-10-07 ENCOUNTER — OFFICE VISIT (OUTPATIENT)
Dept: FAMILY MEDICINE | Facility: CLINIC | Age: 74
End: 2019-10-07
Payer: COMMERCIAL

## 2019-10-07 VITALS
HEART RATE: 72 BPM | RESPIRATION RATE: 18 BRPM | SYSTOLIC BLOOD PRESSURE: 90 MMHG | WEIGHT: 191 LBS | DIASTOLIC BLOOD PRESSURE: 64 MMHG | HEIGHT: 64 IN | OXYGEN SATURATION: 94 % | BODY MASS INDEX: 32.61 KG/M2 | TEMPERATURE: 97.7 F

## 2019-10-07 DIAGNOSIS — M19.012 PRIMARY OSTEOARTHRITIS OF LEFT SHOULDER: Primary | ICD-10-CM

## 2019-10-07 DIAGNOSIS — M19.012 PRIMARY OSTEOARTHRITIS OF LEFT SHOULDER: ICD-10-CM

## 2019-10-07 PROCEDURE — 20610 DRAIN/INJ JOINT/BURSA W/O US: CPT | Mod: LT | Performed by: FAMILY MEDICINE

## 2019-10-07 PROCEDURE — 73030 X-RAY EXAM OF SHOULDER: CPT | Mod: LT

## 2019-10-07 RX ORDER — TRIAMCINOLONE ACETONIDE 40 MG/ML
40 INJECTION, SUSPENSION INTRA-ARTICULAR; INTRAMUSCULAR ONCE
Status: COMPLETED | OUTPATIENT
Start: 2019-10-07 | End: 2019-10-07

## 2019-10-07 RX ADMIN — TRIAMCINOLONE ACETONIDE 40 MG: 40 INJECTION, SUSPENSION INTRA-ARTICULAR; INTRAMUSCULAR at 12:15

## 2019-10-07 ASSESSMENT — MIFFLIN-ST. JEOR: SCORE: 1337.4

## 2019-10-07 NOTE — PROGRESS NOTES
SUBJECTIVE   Ingrid Powell is a 74 year old female who presents with     RESPIRATORY SYMPTOMS      Duration: Follow up from 9/23/19    Description  Feeling much better.  No cough or fevers     Severity:     Accompanying signs and symptoms: Stayed at home till was feeling better.     History (predisposing factors):  none    Precipitating or alleviating factors: None    Therapies tried and outcome:  rest and fluids, azithromycin, prednisone, inhaler     Musculoskeletal problem/pain      Duration: Ongoing     Description  Location: left shoulder    Intensity:  moderate    Accompanying signs and symptoms: radiation of pain to shoulder. Can't move it very much.  Thinks it may be dislocated??  Has a bony area that protrudes forward     History  Previous similar problem: YES  Previous evaluation:  x-ray- July 2018    Precipitating or alleviating factors:  Trauma or overuse: no   Aggravating factors include: lifting, exercise and overuse    Therapies tried and outcome: rest/inactivity      PCP   Jose Manuel Sutton [phone]    Health Maintenance        Health Maintenance Due   Topic Date Due     ZOSTER IMMUNIZATION (1 of 2) 09/19/1993     ADVANCE CARE PLANNING  02/09/2017     INFLUENZA VACCINE (1) 09/01/2019       HPI        Patient Active Problem List   Diagnosis     Hyperlipidemia LDL goal <130     HTN (hypertension)     HX of MALIGNANT NEOPL TONSIL     COPD, mild (H)     zAdvanced directives, counseling/discussion     Major depressive disorder, recurrent episode, moderate (HCC)     Anxiety     Hypothyroidism     MARGIE (obstructive sleep apnea), Severe     Disturbance of salivary secretion (XEROSTOMIA)     Renal cyst     Raynaud's syndrome     History of lumbar surgery     Primary insomnia     Lumbosacral radiculopathy at L3     DDD (degenerative disc disease), lumbar     Adenomatous colon polyp     Diverticulitis of colon     Benign neoplasm of gastrointestinal tract     Hemorrhoids, internal     Rectocele      Tortuous colon     Family history of breast cancer in mother     Malignant neoplasm of oropharynx (H)     Current Outpatient Medications   Medication     cyanocobalamin (VITAMIN B-12) 100 MCG tablet     gabapentin (NEURONTIN) 300 MG capsule     levothyroxine (SYNTHROID/LEVOTHROID) 75 MCG tablet     PARoxetine (PAXIL) 20 MG tablet     RESTASIS 0.05 % ophthalmic emulsion     simvastatin (ZOCOR) 20 MG tablet     traZODone (DESYREL) 100 MG tablet     VITAMIN D, CHOLECALCIFEROL, PO     No current facility-administered medications for this visit.        Patient Active Problem List   Diagnosis     Hyperlipidemia LDL goal <130     HTN (hypertension)     HX of MALIGNANT NEOPL TONSIL     COPD, mild (H)     zAdvanced directives, counseling/discussion     Major depressive disorder, recurrent episode, moderate (HCC)     Anxiety     Hypothyroidism     MARGIE (obstructive sleep apnea), Severe     Disturbance of salivary secretion (XEROSTOMIA)     Renal cyst     Raynaud's syndrome     History of lumbar surgery     Primary insomnia     Lumbosacral radiculopathy at L3     DDD (degenerative disc disease), lumbar     Adenomatous colon polyp     Diverticulitis of colon     Benign neoplasm of gastrointestinal tract     Hemorrhoids, internal     Rectocele     Tortuous colon     Family history of breast cancer in mother     Malignant neoplasm of oropharynx (H)     Past Surgical History:   Procedure Laterality Date     BACK SURGERY       CARPAL TUNNEL RELEASE RT/LT      ?side     CHOLECYSTECTOMY       EYE SURGERY      cataracts     HERNIA REPAIR       INCISION AND DRAINAGE TRUNK, COMBINED  5/18/2012    Procedure:COMBINED INCISION AND DRAINAGE TRUNK; Incision and Drainage Abdominal Wall Abscess ; Surgeon:ALEXSANDER HANNON; Location:[facility]     INSERT PORT VASCULAR ACCESS  2/8/2012    Procedure:INSERT PORT VASCULAR ACCESS; Surgeon:MARY JANE GUAN; Location:[facility]     JOINT REPLACEMTN, KNEE RT/LT      bilateral     KNEE SURGERY  1995     left- total     KNEE SURGERY      right- total     LAPAROSCOPIC APPENDECTOMY N/A 10/10/2015    Procedure: LAPAROSCOPIC APPENDECTOMY;  Surgeon: Daniel Chamberlain MD;  Location: [facility]     LARYNGOSCOPY, ESOPHAGOSCOPY,  BIOPSY, COMBINED  2012    Procedure:COMBINED LARYNGOSCOPY, ESOPHAGOSCOPY,  BIOPSY; Direct Laryngoscopy, Esophagoscopy with Biopsy, Stealth Assisted Left Frontal Sinus Biopsy via Vidal Incision, 8 [place] Power Port in right subclavian & Percutaneous Endoscopic Gastrostomy Placement * Latex Safe*; Surgeon:LIBORIO CAZARES; Location:[facility]     left ankle fusion       OPTICAL TRACKING SYSTEM ENDOSCOPIC SINUS SURGERY  2012    Procedure:OPTICAL TRACKING SYSTEM ENDOSCOPIC SINUS SURGERY; Surgeon:LIBORIO CAZARES; [facility]     RECTAL SURGERY      ? type     RELEASE DEQUERVAINS WRIST Left 2018    Procedure: RELEASE DEQUERVAINS WRIST;  Left 1st Distal compartment release;  Surgeon: Ronak Biggs MD;  Location: [facility]     REMOVE PORT VASCULAR ACCESS  2012    Procedure: REMOVE PORT VASCULAR ACCESS;  Remove Port Vascular Access ;  Surgeon: Phillip Ye MD;  Location:  OR       Social History     Tobacco Use     Smoking status: Former Smoker     Packs/day: 0.50     Years: 35.00     Pack years: 17.50     Types: Cigarettes     Last attempt to quit: 10/3/1995     Years since quittin.0     Smokeless tobacco: Never Used   Substance Use Topics     Alcohol use: Yes     Comment: rare     Family History   Problem Relation Age of Onset     Breast Cancer Mother      Arthritis Mother      Cancer Mother      Heart Disease Father          at 72 of heart failure     Arthritis Brother      Pancreatic Cancer Brother      Arthritis Sister      Skin Cancer Sister      Cancer Other         uncle pancreatic/grandmother- colon/aunt ? mat or pat  breast cancer     Cancer Sister          Current Outpatient Medications   Medication Sig Dispense Refill      cyanocobalamin (VITAMIN B-12) 100 MCG tablet Take 100 mcg by mouth daily       gabapentin (NEURONTIN) 300 MG capsule Take 1 capsule (300 mg) by mouth 3 times daily 180 capsule 1     levothyroxine (SYNTHROID/LEVOTHROID) 75 MCG tablet TAKE 1 TABLET EVERY DAY 90 tablet 1     PARoxetine (PAXIL) 20 MG tablet TAKE 1 TABLET AT BEDTIME 90 tablet 0     RESTASIS 0.05 % ophthalmic emulsion Place 1 drop into both eyes 2 times daily       simvastatin (ZOCOR) 20 MG tablet Take 1 tablet (20 mg) by mouth At Bedtime 90 tablet 1     traZODone (DESYREL) 100 MG tablet Take 1 tablet (100 mg) by mouth nightly as needed for sleep 90 tablet 1     VITAMIN D, CHOLECALCIFEROL, PO Take 2,000 Units by mouth daily.         Allergies   Allergen Reactions     Dilaudid [Hydromorphone Hcl] Other (See Comments)     hallucinations     Fosamax [Alendronate Sodium] Rash            Norvasc [Amlodipine Besylate] Hives and Rash     Tegretol [Carbamazepine] Hives and Rash     Recent Labs   Lab Test 08/06/19  1100 06/14/19  1100 05/10/19  1137 03/19/19  1145  02/26/18  0958  01/13/16  1310   LDL  --  68  --   --   --  66  --  76   HDL  --  80  --   --   --  62  --  70   TRIG  --  45  --   --   --  145  --  77   ALT  --  97* 73* 30  --   --    < >  --    CR  --   --  0.80 0.75  --   --    < >  --    GFRESTIMATED  --   --  72 78  --   --    < >  --    GFRESTBLACK  --   --  83 >90  --   --    < >  --    POTASSIUM  --   --  4.1 4.5  --   --    < >  --    TSH 2.56  --   --  2.76   < >  --    < >  --     < > = values in this interval not displayed.      BP Readings from Last 3 Encounters:   10/07/19 90/64   09/23/19 96/62   08/06/19 110/78    Wt Readings from Last 3 Encounters:   10/07/19 86.6 kg (191 lb)   09/23/19 86.6 kg (191 lb)   08/06/19 87.5 kg (193 lb)                    Reviewed and updated:  Tobacco  Allergies  Meds  Med Hx  Surg Hx  Fam Hx  Soc Hx     ROS:  Constitutional, HEENT, cardiovascular, pulmonary, gi and gu systems are negative, except as  "otherwise noted.    PHYSICAL EXAM   BP 90/64 (Cuff Size: Adult Regular)   Pulse 72   Temp 97.7  F (36.5  C) (Tympanic)   Resp 18   Ht 1.619 m (5' 3.75\")   Wt 86.6 kg (191 lb)   SpO2 94%   Breastfeeding? No   BMI 33.04 kg/m    Body mass index is 33.04 kg/m .  GENERAL: alert and no distress  NECK: no adenopathy, no asymmetry, masses, or scars and thyroid normal to palpation  RESP: lungs clear to auscultation - no rales, rhonchi or wheezes  CV: regular rates and rhythm, normal S1 S2, no S3 or S4 and no murmur, click or rub  MS: chronic OA changes involving left shoulder, slightly limited shoulder abduction, no skin discoloration or tenderness noted  SKIN: no suspicious lesions or rashes  NEURO: Normal strength and tone, mentation intact and speech normal    LEFT SHOULDER THREE OR ORE VIEWS   10/7/2019 11:31 AM      HISTORY:  Left shoulder pain. Primary osteoarthritis of left shoulder.     COMPARISON: 7/24/2018     FINDINGS: Note that if there is clinical concern for glenohumeral  dislocation, an axillary view would be considered the view of choice  in this regard.                                                                      IMPRESSION: Prominent glenohumeral osteoarthritis, with at least two  loose bodies.    Assessment & Plan     (M19.012) Primary osteoarthritis of left shoulder  (primary encounter diagnosis)  Comment: X-ray findings reviewed.  Treatment options discussed.  Steroid injection administered in office today.  Follow-up with orthopedic for further evaluation and management.  All questions answered.  Plan: XR Shoulder Left G/E 3 Views, ORTHO          REFERRAL          PROCEDURE: Left shoulder joint injection     After a discussion of risks, benefits and side effects of procedure, informed patient consent was obtained.  The left shoulder was prepped and draped in the usual clean fashion (sterile not required for this procedure).  3 cc of 1 % lidocaine was used for local " analgesia.    ASPIRATION: Not performed.     INJECTION:  Using 3 cc of 1 % lidocaine mixed with 40 mg of Kenalog, the left shoulder was successfully injected without complication.  Band-Aid applied and routine postprocedure care explained.       Jose Manuel Sutton MD  [facility]

## 2019-10-07 NOTE — NURSING NOTE
"Chief Complaint   Patient presents with     URI     BP 90/64 (Cuff Size: Adult Regular)   Pulse 72   Temp 97.7  F (36.5  C) (Tympanic)   Resp 18   Ht 1.619 m (5' 3.75\")   Wt 86.6 kg (191 lb)   SpO2 94%   Breastfeeding? No   BMI 33.04 kg/m   Estimated body mass index is 33.04 kg/m  as calculated from the following:    Height as of this encounter: 1.619 m (5' 3.75\").    Weight as of this encounter: 86.6 kg (191 lb).  Patient presents to the clinic using No DME      Health Maintenance that is potentially due pending provider review:    Health Maintenance Due   Topic Date Due     ZOSTER IMMUNIZATION (1 of 2) 09/19/1993     ADVANCE CARE PLANNING  02/09/2017     INFLUENZA VACCINE (1) 09/01/2019                "

## 2019-10-10 ENCOUNTER — TRANSFERRED RECORDS (OUTPATIENT)
Dept: HEALTH INFORMATION MANAGEMENT | Facility: CLINIC | Age: 74
End: 2019-10-10

## 2019-10-14 ENCOUNTER — HOSPITAL ENCOUNTER (OUTPATIENT)
Dept: MRI IMAGING | Facility: CLINIC | Age: 74
Discharge: HOME | End: 2019-10-14
Attending: ORTHOPAEDIC SURGERY | Admitting: ORTHOPAEDIC SURGERY
Payer: MEDICARE

## 2019-10-14 DIAGNOSIS — M25.512 LEFT SHOULDER PAIN: ICD-10-CM

## 2019-10-14 PROCEDURE — 73221 MRI JOINT UPR EXTREM W/O DYE: CPT | Mod: LT

## 2019-10-17 ENCOUNTER — TRANSFERRED RECORDS (OUTPATIENT)
Dept: HEALTH INFORMATION MANAGEMENT | Facility: CLINIC | Age: 74
End: 2019-10-17

## 2019-10-18 DIAGNOSIS — E78.5 HYPERLIPIDEMIA LDL GOAL <130: Primary | Chronic | ICD-10-CM

## 2019-10-18 RX ORDER — SIMVASTATIN 40 MG
TABLET ORAL
Qty: 90 TABLET | Refills: 2 | Status: SHIPPED | OUTPATIENT
Start: 2019-10-18 | End: 2020-05-26

## 2019-10-21 ENCOUNTER — ALLIED HEALTH/NURSE VISIT (OUTPATIENT)
Dept: FAMILY MEDICINE | Facility: CLINIC | Age: 74
End: 2019-10-21
Payer: COMMERCIAL

## 2019-10-21 DIAGNOSIS — Z23 ENCOUNTER FOR IMMUNIZATION: Primary | ICD-10-CM

## 2019-10-21 PROCEDURE — 90662 IIV NO PRSV INCREASED AG IM: CPT

## 2019-10-21 PROCEDURE — G0008 ADMIN INFLUENZA VIRUS VAC: HCPCS

## 2019-10-21 PROCEDURE — 99207 ZZC NO CHARGE NURSE ONLY: CPT

## 2019-10-30 RX ORDER — CEFAZOLIN SODIUM 1 G/3ML
1 INJECTION, POWDER, FOR SOLUTION INTRAMUSCULAR; INTRAVENOUS SEE ADMIN INSTRUCTIONS
Status: CANCELLED | OUTPATIENT
Start: 2019-10-30

## 2019-10-30 RX ORDER — CELECOXIB 200 MG/1
400 CAPSULE ORAL ONCE
Status: CANCELLED | OUTPATIENT
Start: 2019-10-30 | End: 2019-10-30

## 2019-10-30 RX ORDER — ACETAMINOPHEN 325 MG/1
975 TABLET ORAL ONCE
Status: CANCELLED | OUTPATIENT
Start: 2019-10-30 | End: 2019-10-30

## 2019-10-31 ENCOUNTER — OFFICE VISIT (OUTPATIENT)
Dept: FAMILY MEDICINE | Facility: CLINIC | Age: 74
End: 2019-10-31
Payer: COMMERCIAL

## 2019-10-31 VITALS
HEART RATE: 97 BPM | DIASTOLIC BLOOD PRESSURE: 74 MMHG | OXYGEN SATURATION: 100 % | BODY MASS INDEX: 33.29 KG/M2 | WEIGHT: 195 LBS | SYSTOLIC BLOOD PRESSURE: 108 MMHG | RESPIRATION RATE: 16 BRPM | HEIGHT: 64 IN | TEMPERATURE: 98.1 F

## 2019-10-31 DIAGNOSIS — I10 ESSENTIAL HYPERTENSION: ICD-10-CM

## 2019-10-31 DIAGNOSIS — M19.012 PRIMARY OSTEOARTHRITIS OF LEFT SHOULDER: ICD-10-CM

## 2019-10-31 DIAGNOSIS — G47.33 OSA (OBSTRUCTIVE SLEEP APNEA): ICD-10-CM

## 2019-10-31 DIAGNOSIS — Z01.818 PREOP GENERAL PHYSICAL EXAM: Primary | ICD-10-CM

## 2019-10-31 DIAGNOSIS — J44.9 COPD, MILD (H): ICD-10-CM

## 2019-10-31 DIAGNOSIS — E03.9 HYPOTHYROIDISM, UNSPECIFIED TYPE: ICD-10-CM

## 2019-10-31 LAB
ANION GAP SERPL CALCULATED.3IONS-SCNC: 1 MMOL/L (ref 3–14)
BUN SERPL-MCNC: 17 MG/DL (ref 7–30)
CALCIUM SERPL-MCNC: 9.1 MG/DL (ref 8.5–10.1)
CHLORIDE SERPL-SCNC: 103 MMOL/L (ref 94–109)
CO2 SERPL-SCNC: 33 MMOL/L (ref 20–32)
CREAT SERPL-MCNC: 0.81 MG/DL (ref 0.52–1.04)
ERYTHROCYTE [DISTWIDTH] IN BLOOD BY AUTOMATED COUNT: 14.3 % (ref 10–15)
GFR SERPL CREATININE-BSD FRML MDRD: 71 ML/MIN/{1.73_M2}
GLUCOSE SERPL-MCNC: 83 MG/DL (ref 70–99)
HCT VFR BLD AUTO: 43.6 % (ref 35–47)
HGB BLD-MCNC: 13.6 G/DL (ref 11.7–15.7)
MCH RBC QN AUTO: 29.9 PG (ref 26.5–33)
MCHC RBC AUTO-ENTMCNC: 31.2 G/DL (ref 31.5–36.5)
MCV RBC AUTO: 96 FL (ref 78–100)
PLATELET # BLD AUTO: 204 10E9/L (ref 150–450)
POTASSIUM SERPL-SCNC: 3.9 MMOL/L (ref 3.4–5.3)
RBC # BLD AUTO: 4.55 10E12/L (ref 3.8–5.2)
SODIUM SERPL-SCNC: 137 MMOL/L (ref 133–144)
WBC # BLD AUTO: 5.4 10E9/L (ref 4–11)

## 2019-10-31 PROCEDURE — 80048 BASIC METABOLIC PNL TOTAL CA: CPT | Performed by: FAMILY MEDICINE

## 2019-10-31 PROCEDURE — 85027 COMPLETE CBC AUTOMATED: CPT | Performed by: FAMILY MEDICINE

## 2019-10-31 PROCEDURE — 93000 ELECTROCARDIOGRAM COMPLETE: CPT | Performed by: FAMILY MEDICINE

## 2019-10-31 PROCEDURE — 99214 OFFICE O/P EST MOD 30 MIN: CPT | Performed by: FAMILY MEDICINE

## 2019-10-31 PROCEDURE — 36415 COLL VENOUS BLD VENIPUNCTURE: CPT | Performed by: FAMILY MEDICINE

## 2019-10-31 ASSESSMENT — MIFFLIN-ST. JEOR: SCORE: 1355.54

## 2019-10-31 ASSESSMENT — PAIN SCALES - GENERAL: PAINLEVEL: NO PAIN (0)

## 2019-10-31 NOTE — PROGRESS NOTES
[address] [address]  [phone]  Dept: [phone]    PRE-OP EVALUATION:  Today's date: 10/31/2019    Ingrid Powell (: 1945) presents for pre-operative evaluation assessment as requested by Dr. Velázquez.  She requires evaluation and anesthesia risk assessment prior to undergoing surgery/procedure for treatment of L shoulder surgery .    Fax number for surgical facility:   Primary Physician: Jose Manuel Sutton  Type of Anesthesia Anticipated: General    Patient has a Health Care Directive or Living Will:  YES  - not positive     Preop Questions 10/31/2019   Who is doing your surgery? Dr comfort   What are you having done? shoulder replacement   Date of Surgery/Procedure: 2019   Facility or Hospital where procedure/surgery will be performed: [facility] [place]   1.  Do you have a history of Heart attack, stroke, stent, coronary bypass surgery, or other heart surgery? No   2.  Do you ever have any pain or discomfort in your chest? No   3.  Do you have a history of  Heart Failure? No   4.   Are you troubled by shortness of breath when:  walking on a level surface, or up a slight hill, or at night? YES - COPD related    5.  Do you currently have a cold, bronchitis or other respiratory infection? No   6.  Do you have a cough, shortness of breath, or wheezing? No   7.  Do you sometimes get pains in the calves of your legs when you walk? No   8. Do you or anyone in your family have previous history of blood clots? No   9.  Do you or does anyone in your family have a serious bleeding problem such as prolonged bleeding following surgeries or cuts? No   10. Have you ever had problems with anemia or been told to take iron pills? No   11. Have you had any abnormal blood loss such as black, tarry or bloody stools, or abnormal vaginal bleeding? No   12. Have you ever had a blood transfusion? No   13. Have you or any of your relatives ever had problems with  anesthesia? YES - Self   14. Do you have sleep apnea, excessive snoring or daytime drowsiness? YES - Self, not using CPAP    15. Do you have any prosthetic heart valves? No   16. Do you have prosthetic joints? YES - Both Knees  - screws in L ankle?   17. Is there any chance that you may be pregnant? No         HPI:     HPI related to upcoming procedure:   74-year-old female presents for a preop physical exam.  Patient is scheduled to have left total shoulder arthroplasty on November 6, 2019.  She requires evaluation and anesthesia risk assessment prior to undergoing surgery/procedure.  Past medical history significant for hypertension, hyperlipidemia, COPD, hypothyroidism, obstructive sleep apnea and osteoarthritis.  Patient denies any fever, chills, cough, chest pain, palpitation, bowel/bladder or other relevant systemic symptoms.        MEDICAL HISTORY:     Patient Active Problem List    Diagnosis Date Noted     COPD, mild (H) 02/01/2012     Priority: High     8/16/13 Mild Obstruction on PFT       HX of MALIGNANT NEOPL TONSIL 01/26/2012     Priority: High     12/2014: 3 years out. Sees Dr. Senior ENT at [facility]       HTN (hypertension) 10/03/2011     Priority: High     Malignant neoplasm of oropharynx (H) 11/09/2016     Priority: Medium     Family history of breast cancer in mother 10/24/2016     Priority: Medium     Diverticulitis of colon 10/10/2016     Priority: Medium     Colonoscopy 3/10/2011       Benign neoplasm of gastrointestinal tract 10/10/2016     Priority: Medium     Polyp rectum        Hemorrhoids, internal 10/10/2016     Priority: Medium     Rectocele 10/10/2016     Priority: Medium     Tortuous colon 10/10/2016     Priority: Medium     Colonoscopy done 3/10/2011 by [facility]       Adenomatous colon polyp 09/07/2016     Priority: Medium     History of lumbar surgery 04/27/2016     Priority: Medium     Primary insomnia 04/27/2016     Priority: Medium     Lumbosacral radiculopathy at  L3 04/27/2016     Priority: Medium     DDD (degenerative disc disease), lumbar 04/27/2016     Priority: Medium     Renal cyst 08/11/2015     Priority: Medium     2012, 13, on ct   Then on lumbar mri , 2 cm largest         Raynaud's syndrome 08/11/2015     Priority: Medium     Disturbance of salivary secretion (XEROSTOMIA) 07/23/2014     Priority: Medium     s/p cancer treatment- stable       MARGIE (obstructive sleep apnea), Severe 10/31/2013     Priority: Medium     12/10/13 CPAP 6-15 cm H2O         Hypothyroidism 08/13/2013     Priority: Medium     Major depressive disorder, recurrent episode, moderate (HCC) 08/24/2012     Priority: Medium     Anxiety 08/24/2012     Priority: Medium     zAdvanced directives, counseling/discussion 02/09/2012     Priority: Medium     Advance Directive Problem List Overview:   Name Relationship Phone    Primary Health Care Agent Lida Farias daughter [phone]         Alternative Health Care Agent Neville Powell son [phone]       Advance Directive received and scanned. Click on Code in the patient header to view. 2/9/2012        Hyperlipidemia LDL goal <130 10/03/2011     Priority: Medium      Past Medical History:   Diagnosis Date     Arthritis      Depressive disorder      Gastroesophageal reflux disease      History of lumbar surgery 7/28/2015     Hoarseness      Oxygen dependent     at night     Reduced vision      Sleep apnea      Past Surgical History:   Procedure Laterality Date     BACK SURGERY       CARPAL TUNNEL RELEASE RT/LT      ?side     CHOLECYSTECTOMY       EYE SURGERY      cataracts     HERNIA REPAIR       INCISION AND DRAINAGE TRUNK, COMBINED  5/18/2012    Procedure:COMBINED INCISION AND DRAINAGE TRUNK; Incision and Drainage Abdominal Wall Abscess ; Surgeon:ALEXSANDER HANNON; Location[facility]     INSERT PORT VASCULAR ACCESS  2/8/2012    Procedure:INSERT PORT VASCULAR ACCESS; Surgeon:MARY JANE GUAN; [facility]     JOINT REPLACEMTN,  KNEE RT/LT      bilateral     KNEE SURGERY  1995    left- total     KNEE SURGERY  2000    right- total     LAPAROSCOPIC APPENDECTOMY N/A 10/10/2015    Procedure: LAPAROSCOPIC APPENDECTOMY;  Surgeon: Daniel Chamberlain MD;  Location: [facility]     LARYNGOSCOPY, ESOPHAGOSCOPY,  BIOPSY, COMBINED  2/8/2012    Procedure:COMBINED LARYNGOSCOPY, ESOPHAGOSCOPY,  BIOPSY; Direct Laryngoscopy, Esophagoscopy with Biopsy, Stealth Assisted Left Frontal Sinus Biopsy via Vidal Incision, 8 [place] Power Port in right subclavian & Percutaneous Endoscopic Gastrostomy Placement * Latex Safe*; Surgeon:LIBORIO CAZARES; Location:[facility] OR     left ankle fusion       OPTICAL TRACKING SYSTEM ENDOSCOPIC SINUS SURGERY  2/8/2012    Procedure:OPTICAL TRACKING SYSTEM ENDOSCOPIC SINUS SURGERY; Surgeon:LIBORIO CAZARES; Location[facility]     RECTAL SURGERY      ? type     RELEASE DEQUERVAINS WRIST Left 5/11/2018    Procedure: RELEASE DEQUERVAINS WRIST;  Left 1st Distal compartment release;  Surgeon: Ronak Biggs MD;  Location: [facility]     REMOVE PORT VASCULAR ACCESS  8/21/2012    Procedure: REMOVE PORT VASCULAR ACCESS;  Remove Port Vascular Access ;  Surgeon: Phillip Ye MD;  Location: [facility] OR     Current Outpatient Medications   Medication Sig Dispense Refill     cyanocobalamin (VITAMIN B-12) 100 MCG tablet Take 100 mcg by mouth daily       gabapentin (NEURONTIN) 300 MG capsule Take 1 capsule (300 mg) by mouth 3 times daily 180 capsule 1     levothyroxine (SYNTHROID/LEVOTHROID) 75 MCG tablet TAKE 1 TABLET EVERY DAY 90 tablet 1     PARoxetine (PAXIL) 20 MG tablet TAKE 1 TABLET AT BEDTIME 90 tablet 0     RESTASIS 0.05 % ophthalmic emulsion Place 1 drop into both eyes 2 times daily       simvastatin (ZOCOR) 20 MG tablet Take 1 tablet (20 mg) by mouth At Bedtime 90 tablet 1     simvastatin (ZOCOR) 40 MG tablet TAKE 1 TABLET AT BEDTIME 90 tablet 2     traZODone (DESYREL) 100 MG tablet Take 1 tablet (100 mg) by mouth  "nightly as needed for sleep 90 tablet 1     VITAMIN D, CHOLECALCIFEROL, PO Take 2,000 Units by mouth daily.         OTC products: None, except as noted above    Allergies   Allergen Reactions     Dilaudid [Hydromorphone Hcl] Other (See Comments)     hallucinations     Fosamax [Alendronate Sodium] Rash            Norvasc [Amlodipine Besylate] Hives and Rash     Tegretol [Carbamazepine] Hives and Rash      Latex Allergy: NO    Social History     Tobacco Use     Smoking status: Former Smoker     Packs/day: 0.50     Years: 35.00     Pack years: 17.50     Types: Cigarettes     Last attempt to quit: 10/3/1995     Years since quittin.0     Smokeless tobacco: Never Used   Substance Use Topics     Alcohol use: Yes     Comment: rare     History   Drug Use No       REVIEW OF SYSTEMS:   Constitutional, neuro, ENT, endocrine, pulmonary, cardiac, gastrointestinal, genitourinary, musculoskeletal, integument and psychiatric systems are negative, except as otherwise noted.    EXAM:   /74 (BP Location: Right arm, Patient Position: Sitting, Cuff Size: Adult Large)   Pulse 97   Temp 98.1  F (36.7  C) (Tympanic)   Resp 16   Ht 1.619 m (5' 3.75\")   Wt 88.5 kg (195 lb)   SpO2 100%   Breastfeeding? No   BMI 33.73 kg/m      GENERAL APPEARANCE: alert, active and no distress     EYES: EOMI, PERRL     HENT: ear canals and TM's normal and nose and mouth without ulcers or lesions     NECK: no adenopathy, no asymmetry, masses, or scars and thyroid normal to palpation     RESP: lungs clear to auscultation - no rales, rhonchi or wheezes     CV: regular rates and rhythm, normal S1 S2, no S3 or S4 and no murmur, click or rub     ABDOMEN:  soft, nontender, no HSM or masses and bowel sounds normal     MS: no joint swelling or skin discoloration noted     SKIN: no suspicious lesions or rashes     NEURO: Normal strength and tone, sensory exam grossly normal, mentation intact and speech normal     PSYCH: mentation appears normal. and " affect normal/bright     LYMPHATICS: No cervical adenopathy    DIAGNOSTICS:   EKG: Sinus rhythm, left bundle branch block, chronic, unchanged from previous tracings    Recent Labs   Lab Test 05/10/19  1137 03/19/19  1145  05/17/12  1949  02/10/12  0656   HGB 14.2 13.5   < > 11.1*   < > 13.3    179   < > 208   < > 205   INR  --   --   --  1.10  --  1.03    141   < > 138   < > 140   POTASSIUM 4.1 4.5   < > 3.7   < > 4.9   CR 0.80 0.75   < > 0.58   < > 0.64    < > = values in this interval not displayed.        IMPRESSION:   Reason for surgery/procedure: Primary left shoulder osteoarthritis/left total shoulder arthroplasty  Diagnosis/reason for consult: Preop    The proposed surgical procedure is considered INTERMEDIATE risk.    REVISED CARDIAC RISK INDEX  The patient has the following serious cardiovascular risks for perioperative complications such as (MI, PE, VFib and 3  AV Block):  No serious cardiac risks  INTERPRETATION: 0 risks: Class I (very low risk - 0.4% complication rate)      ICD-10-CM    1. Preop general physical exam Z01.818    2. Primary osteoarthritis of left shoulder M19.012    3. COPD, mild (H) J44.9    4. Hypothyroidism, unspecified type E03.9    5. Essential hypertension I10 EKG 12-lead complete w/read - Clinics     CBC with platelets     Basic metabolic panel   6. MARGIE (obstructive sleep apnea), Severe G47.33          RECOMMENDATIONS:     --Consult hospital rounder / IM to assist post-op medical management      APPROVAL GIVEN to proceed with proposed procedure, without further diagnostic evaluation       Signed Electronically by: Jose Manuel Sutton MD    Copy of this evaluation report is provided to requesting physician.    [facility] Preop Guidelines    Revised Cardiac Risk Index

## 2019-10-31 NOTE — H&P (VIEW-ONLY)
[address] [address]  [phone]  Dept: [phone]    PRE-OP EVALUATION:  Today's date: 10/31/2019    Ingrid Powell (: 1945) presents for pre-operative evaluation assessment as requested by Dr. Velázquez.  She requires evaluation and anesthesia risk assessment prior to undergoing surgery/procedure for treatment of L shoulder surgery .    Fax number for surgical facility:   Primary Physician: Jose Manuel Sutton  Type of Anesthesia Anticipated: General    Patient has a Health Care Directive or Living Will:  YES  - not positive     Preop Questions 10/31/2019   Who is doing your surgery? Dr comfort   What are you having done? shoulder replacement   Date of Surgery/Procedure: 2019   Facility or Hospital where procedure/surgery will be performed: [facility] [place]   1.  Do you have a history of Heart attack, stroke, stent, coronary bypass surgery, or other heart surgery? No   2.  Do you ever have any pain or discomfort in your chest? No   3.  Do you have a history of  Heart Failure? No   4.   Are you troubled by shortness of breath when:  walking on a level surface, or up a slight hill, or at night? YES - COPD related    5.  Do you currently have a cold, bronchitis or other respiratory infection? No   6.  Do you have a cough, shortness of breath, or wheezing? No   7.  Do you sometimes get pains in the calves of your legs when you walk? No   8. Do you or anyone in your family have previous history of blood clots? No   9.  Do you or does anyone in your family have a serious bleeding problem such as prolonged bleeding following surgeries or cuts? No   10. Have you ever had problems with anemia or been told to take iron pills? No   11. Have you had any abnormal blood loss such as black, tarry or bloody stools, or abnormal vaginal bleeding? No   12. Have you ever had a blood transfusion? No   13. Have you or any of your relatives ever had problems with  anesthesia? YES - Self   14. Do you have sleep apnea, excessive snoring or daytime drowsiness? YES - Self, not using CPAP    15. Do you have any prosthetic heart valves? No   16. Do you have prosthetic joints? YES - Both Knees  - screws in L ankle?   17. Is there any chance that you may be pregnant? No         HPI:     HPI related to upcoming procedure:   74-year-old female presents for a preop physical exam.  Patient is scheduled to have left total shoulder arthroplasty on November 6, 2019.  She requires evaluation and anesthesia risk assessment prior to undergoing surgery/procedure.  Past medical history significant for hypertension, hyperlipidemia, COPD, hypothyroidism, obstructive sleep apnea and osteoarthritis.  Patient denies any fever, chills, cough, chest pain, palpitation, bowel/bladder or other relevant systemic symptoms.        MEDICAL HISTORY:     Patient Active Problem List    Diagnosis Date Noted     COPD, mild (H) 02/01/2012     Priority: High     8/16/13 Mild Obstruction on PFT       HX of MALIGNANT NEOPL TONSIL 01/26/2012     Priority: High     12/2014: 3 years out. Sees Dr. Senior ENT at [facility]       HTN (hypertension) 10/03/2011     Priority: High     Malignant neoplasm of oropharynx (H) 11/09/2016     Priority: Medium     Family history of breast cancer in mother 10/24/2016     Priority: Medium     Diverticulitis of colon 10/10/2016     Priority: Medium     Colonoscopy 3/10/2011       Benign neoplasm of gastrointestinal tract 10/10/2016     Priority: Medium     Polyp rectum        Hemorrhoids, internal 10/10/2016     Priority: Medium     Rectocele 10/10/2016     Priority: Medium     Tortuous colon 10/10/2016     Priority: Medium     Colonoscopy done 3/10/2011 by [facility]       Adenomatous colon polyp 09/07/2016     Priority: Medium     History of lumbar surgery 04/27/2016     Priority: Medium     Primary insomnia 04/27/2016     Priority: Medium     Lumbosacral radiculopathy at  L3 04/27/2016     Priority: Medium     DDD (degenerative disc disease), lumbar 04/27/2016     Priority: Medium     Renal cyst 08/11/2015     Priority: Medium     2012, 13, on ct   Then on lumbar mri , 2 cm largest         Raynaud's syndrome 08/11/2015     Priority: Medium     Disturbance of salivary secretion (XEROSTOMIA) 07/23/2014     Priority: Medium     s/p cancer treatment- stable       MARGIE (obstructive sleep apnea), Severe 10/31/2013     Priority: Medium     12/10/13 CPAP 6-15 cm H2O         Hypothyroidism 08/13/2013     Priority: Medium     Major depressive disorder, recurrent episode, moderate (HCC) 08/24/2012     Priority: Medium     Anxiety 08/24/2012     Priority: Medium     zAdvanced directives, counseling/discussion 02/09/2012     Priority: Medium     Advance Directive Problem List Overview:   Name Relationship Phone    Primary Health Care Agent Lida Farias daughter [phone]         Alternative Health Care Agent Neville Powell son [phone]       Advance Directive received and scanned. Click on Code in the patient header to view. 2/9/2012        Hyperlipidemia LDL goal <130 10/03/2011     Priority: Medium      Past Medical History:   Diagnosis Date     Arthritis      Depressive disorder      Gastroesophageal reflux disease      History of lumbar surgery 7/28/2015     Hoarseness      Oxygen dependent     at night     Reduced vision      Sleep apnea      Past Surgical History:   Procedure Laterality Date     BACK SURGERY       CARPAL TUNNEL RELEASE RT/LT      ?side     CHOLECYSTECTOMY       EYE SURGERY      cataracts     HERNIA REPAIR       INCISION AND DRAINAGE TRUNK, COMBINED  5/18/2012    Procedure:COMBINED INCISION AND DRAINAGE TRUNK; Incision and Drainage Abdominal Wall Abscess ; Surgeon:ALEXSANDER HANNON; Location[facility]     INSERT PORT VASCULAR ACCESS  2/8/2012    Procedure:INSERT PORT VASCULAR ACCESS; Surgeon:MARY JANE GUAN; Location[facility]     JOINT REPLACEMTN,  KNEE RT/LT      bilateral     KNEE SURGERY  1995    left- total     KNEE SURGERY  2000    right- total     LAPAROSCOPIC APPENDECTOMY N/A 10/10/2015    Procedure: LAPAROSCOPIC APPENDECTOMY;  Surgeon: Daniel Chamberlain MD;  Location: [facility]     LARYNGOSCOPY, ESOPHAGOSCOPY,  BIOPSY, COMBINED  2/8/2012    Procedure:COMBINED LARYNGOSCOPY, ESOPHAGOSCOPY,  BIOPSY; Direct Laryngoscopy, Esophagoscopy with Biopsy, Stealth Assisted Left Frontal Sinus Biopsy via Vidal Incision, 8 [place] Power Port in right subclavian & Percutaneous Endoscopic Gastrostomy Placement * Latex Safe*; Surgeon:LIBORIO CAZARES; Location:[facility] OR     left ankle fusion       OPTICAL TRACKING SYSTEM ENDOSCOPIC SINUS SURGERY  2/8/2012    Procedure:OPTICAL TRACKING SYSTEM ENDOSCOPIC SINUS SURGERY; Surgeon:LIBORIO CAZARES; Location[facility]     RECTAL SURGERY      ? type     RELEASE DEQUERVAINS WRIST Left 5/11/2018    Procedure: RELEASE DEQUERVAINS WRIST;  Left 1st Distal compartment release;  Surgeon: Ronak Biggs MD;  Location: [facility]     REMOVE PORT VASCULAR ACCESS  8/21/2012    Procedure: REMOVE PORT VASCULAR ACCESS;  Remove Port Vascular Access ;  Surgeon: Phillip Ye MD;  Location: [facility] OR     Current Outpatient Medications   Medication Sig Dispense Refill     cyanocobalamin (VITAMIN B-12) 100 MCG tablet Take 100 mcg by mouth daily       gabapentin (NEURONTIN) 300 MG capsule Take 1 capsule (300 mg) by mouth 3 times daily 180 capsule 1     levothyroxine (SYNTHROID/LEVOTHROID) 75 MCG tablet TAKE 1 TABLET EVERY DAY 90 tablet 1     PARoxetine (PAXIL) 20 MG tablet TAKE 1 TABLET AT BEDTIME 90 tablet 0     RESTASIS 0.05 % ophthalmic emulsion Place 1 drop into both eyes 2 times daily       simvastatin (ZOCOR) 20 MG tablet Take 1 tablet (20 mg) by mouth At Bedtime 90 tablet 1     simvastatin (ZOCOR) 40 MG tablet TAKE 1 TABLET AT BEDTIME 90 tablet 2     traZODone (DESYREL) 100 MG tablet Take 1 tablet (100 mg) by mouth  "nightly as needed for sleep 90 tablet 1     VITAMIN D, CHOLECALCIFEROL, PO Take 2,000 Units by mouth daily.         OTC products: None, except as noted above    Allergies   Allergen Reactions     Dilaudid [Hydromorphone Hcl] Other (See Comments)     hallucinations     Fosamax [Alendronate Sodium] Rash            Norvasc [Amlodipine Besylate] Hives and Rash     Tegretol [Carbamazepine] Hives and Rash      Latex Allergy: NO    Social History     Tobacco Use     Smoking status: Former Smoker     Packs/day: 0.50     Years: 35.00     Pack years: 17.50     Types: Cigarettes     Last attempt to quit: 10/3/1995     Years since quittin.0     Smokeless tobacco: Never Used   Substance Use Topics     Alcohol use: Yes     Comment: rare     History   Drug Use No       REVIEW OF SYSTEMS:   Constitutional, neuro, ENT, endocrine, pulmonary, cardiac, gastrointestinal, genitourinary, musculoskeletal, integument and psychiatric systems are negative, except as otherwise noted.    EXAM:   /74 (BP Location: Right arm, Patient Position: Sitting, Cuff Size: Adult Large)   Pulse 97   Temp 98.1  F (36.7  C) (Tympanic)   Resp 16   Ht 1.619 m (5' 3.75\")   Wt 88.5 kg (195 lb)   SpO2 100%   Breastfeeding? No   BMI 33.73 kg/m      GENERAL APPEARANCE: alert, active and no distress     EYES: EOMI, PERRL     HENT: ear canals and TM's normal and nose and mouth without ulcers or lesions     NECK: no adenopathy, no asymmetry, masses, or scars and thyroid normal to palpation     RESP: lungs clear to auscultation - no rales, rhonchi or wheezes     CV: regular rates and rhythm, normal S1 S2, no S3 or S4 and no murmur, click or rub     ABDOMEN:  soft, nontender, no HSM or masses and bowel sounds normal     MS: no joint swelling or skin discoloration noted     SKIN: no suspicious lesions or rashes     NEURO: Normal strength and tone, sensory exam grossly normal, mentation intact and speech normal     PSYCH: mentation appears normal. and " affect normal/bright     LYMPHATICS: No cervical adenopathy    DIAGNOSTICS:   EKG: Sinus rhythm, left bundle branch block, chronic, unchanged from previous tracings    Recent Labs   Lab Test 05/10/19  1137 03/19/19  1145  05/17/12  1949  02/10/12  0656   HGB 14.2 13.5   < > 11.1*   < > 13.3    179   < > 208   < > 205   INR  --   --   --  1.10  --  1.03    141   < > 138   < > 140   POTASSIUM 4.1 4.5   < > 3.7   < > 4.9   CR 0.80 0.75   < > 0.58   < > 0.64    < > = values in this interval not displayed.        IMPRESSION:   Reason for surgery/procedure: Primary left shoulder osteoarthritis/left total shoulder arthroplasty  Diagnosis/reason for consult: Preop    The proposed surgical procedure is considered INTERMEDIATE risk.    REVISED CARDIAC RISK INDEX  The patient has the following serious cardiovascular risks for perioperative complications such as (MI, PE, VFib and 3  AV Block):  No serious cardiac risks  INTERPRETATION: 0 risks: Class I (very low risk - 0.4% complication rate)      ICD-10-CM    1. Preop general physical exam Z01.818    2. Primary osteoarthritis of left shoulder M19.012    3. COPD, mild (H) J44.9    4. Hypothyroidism, unspecified type E03.9    5. Essential hypertension I10 EKG 12-lead complete w/read - Clinics     CBC with platelets     Basic metabolic panel   6. MARGIE (obstructive sleep apnea), Severe G47.33          RECOMMENDATIONS:     --Consult hospital rounder / IM to assist post-op medical management      APPROVAL GIVEN to proceed with proposed procedure, without further diagnostic evaluation       Signed Electronically by: Jose Manuel Sutton MD    Copy of this evaluation report is provided to requesting physician.    [place] Preop Guidelines    Revised Cardiac Risk Index

## 2019-10-31 NOTE — LETTER
November 5, 2019      Ingrid Powell  [address]  [place] [address]        Dear ,    Lab results came back unremarkable.  Preop clearance note forwarded to your surgeon. Let us know if there are any questions.     CBC with platelets   Result Value Ref Range    WBC 5.4 4.0 - 11.0 10e9/L    RBC Count 4.55 3.8 - 5.2 10e12/L    Hemoglobin 13.6 11.7 - 15.7 g/dL    Hematocrit 43.6 35.0 - 47.0 %    MCV 96 78 - 100 fl    MCH 29.9 26.5 - 33.0 pg    MCHC 31.2 (L) 31.5 - 36.5 g/dL    RDW 14.3 10.0 - 15.0 %    Platelet Count 204 150 - 450 10e9/L   Basic metabolic panel    Sodium 137 133 - 144 mmol/L    Potassium 3.9 3.4 - 5.3 mmol/L    Chloride 103 94 - 109 mmol/L    Carbon Dioxide 33 (H) 20 - 32 mmol/L    Anion Gap 1 (L) 3 - 14 mmol/L    Glucose 83 70 - 99 mg/dL    Urea Nitrogen 17 7 - 30 mg/dL    Creatinine 0.81 0.52 - 1.04 mg/dL    GFR Estimate 71 >60 mL/min/[1.73_m2]    Calcium 9.1 8.5 - 10.1 mg/dL       Sincerely,    Jose Manuel Sutton MD

## 2019-11-05 ENCOUNTER — ANESTHESIA EVENT (OUTPATIENT)
Dept: SURGERY | Facility: CLINIC | Age: 74
DRG: 483 | End: 2019-11-05
Payer: MEDICARE

## 2019-11-05 ASSESSMENT — COPD QUESTIONNAIRES: COPD: 1

## 2019-11-05 ASSESSMENT — LIFESTYLE VARIABLES: TOBACCO_USE: 1

## 2019-11-05 NOTE — ANESTHESIA PREPROCEDURE EVALUATION
Anesthesia Pre-Procedure Evaluation    Patient: Ingrid Powell   MRN: [id number] : 1945          Preoperative Diagnosis: Degenerative joint disease [M19.90]    Procedure(s):  Total shoulder arthroplasty    Past Medical History:   Diagnosis Date     Arthritis      Depressive disorder      Gastroesophageal reflux disease      History of lumbar surgery 2015     Hoarseness      Oxygen dependent     at night     Reduced vision      Sleep apnea      Past Surgical History:   Procedure Laterality Date     BACK SURGERY       CARPAL TUNNEL RELEASE RT/LT      ?side     CHOLECYSTECTOMY       EYE SURGERY      cataracts     HERNIA REPAIR       INCISION AND DRAINAGE TRUNK, COMBINED  2012    Procedure:COMBINED INCISION AND DRAINAGE TRUNK; Incision and Drainage Abdominal Wall Abscess ; Surgeon:ALEXSANDER HANNON; Location:[facility]     INSERT PORT VASCULAR ACCESS  2012    Procedure:INSERT PORT VASCULAR ACCESS; Surgeon:MARY JANE GUAN; Location[facility]     JOINT REPLACEMTN, KNEE RT/LT      bilateral     KNEE SURGERY      left- total     KNEE SURGERY      right- total     LAPAROSCOPIC APPENDECTOMY N/A 10/10/2015    Procedure: LAPAROSCOPIC APPENDECTOMY;  Surgeon: Daniel Chamberlain MD;  Location: [facility]     LARYNGOSCOPY, ESOPHAGOSCOPY,  BIOPSY, COMBINED  2012    Procedure:COMBINED LARYNGOSCOPY, ESOPHAGOSCOPY,  BIOPSY; Direct Laryngoscopy, Esophagoscopy with Biopsy, Stealth Assisted Left Frontal Sinus Biopsy via Vidal Incision, 8 Romansh Power Port in right subclavian & Percutaneous Endoscopic Gastrostomy Placement * Latex Safe*; Surgeon:LIBORIO CAZARES; Location:[facility]     left ankle fusion       OPTICAL TRACKING SYSTEM ENDOSCOPIC SINUS SURGERY  2012    Procedure:OPTICAL TRACKING SYSTEM ENDOSCOPIC SINUS SURGERY; Surgeon:LIBORIO CAZARES; Location:[facility]     RECTAL SURGERY      ? type     RELEASE DEQUERVAINS WRIST Left 2018    Procedure: RELEASE DEQUERVAINS WRIST;  Left  1st Distal compartment release;  Surgeon: Ronak Biggs MD;  Location: [facility]     REMOVE PORT VASCULAR ACCESS  8/21/2012    Procedure: REMOVE PORT VASCULAR ACCESS;  Remove Port Vascular Access ;  Surgeon: Phillip Ye MD;  Location: [facility]       Anesthesia Evaluation     . Pt has had prior anesthetic. 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 .    Neurologic:     (+)other neuro Raynaud's syndrome; memory issues     Cardiovascular:     (+) hypertension----. : . . . :. . Previous cardiac testing date:results:date: results:ECG reviewed date:10/31/19 results:Sinus Rhythm   -Left bundle branch block and left axis.     ABNORMAL date: results:          METS/Exercise Tolerance:  >4 METS   Hematologic:  - neg hematologic  ROS       Musculoskeletal:   (+) arthritis,  other musculoskeletal- DDD; Lumbar surgery      GI/Hepatic:     (+) GERD Asymptomatic on medication, Inflammatory bowel disease,       Renal/Genitourinary:     (+) chronic renal disease,       Endo:     (+) thyroid problem hypothyroidism, Obesity, .      Psychiatric:     (+) psychiatric history depression and anxiety      Infectious Disease:         Malignancy:   (+) Malignancy History of Other  Other CA tonsil cancer status post         Other:    - neg other ROS                      Physical Exam  Normal systems: cardiovascular, pulmonary and dental    Airway   Mallampati: II  TM distance: <3 FB  Neck ROM: full    Dental     Cardiovascular       Pulmonary             Lab Results   Component Value Date    WBC 5.4 10/31/2019    HGB 13.6 10/31/2019    HCT 43.6 10/31/2019     10/31/2019    CRP <5.0 03/25/2013    SED 9 03/25/2013     10/31/2019    POTASSIUM 3.9 10/31/2019    CHLORIDE 103 10/31/2019    CO2 33 (H) 10/31/2019  "   BUN 17 10/31/2019    CR 0.81 10/31/2019    GLC 83 10/31/2019    JOSÉ MIGUEL 9.1 10/31/2019    PHOS 4.2 05/18/2012    MAG 1.3 (L) 03/25/2013    ALBUMIN 3.7 06/14/2019    PROTTOTAL 6.8 06/14/2019    ALT 97 (H) 06/14/2019    AST 51 (H) 06/14/2019    ALKPHOS 61 06/14/2019    BILITOTAL 0.4 06/14/2019    LIPASE 131 07/03/2019    PTT 28 05/17/2012    INR 1.10 05/17/2012    TSH 2.56 08/06/2019    T4 0.96 01/19/2018       Preop Vitals  BP Readings from Last 3 Encounters:   10/31/19 108/74   10/07/19 90/64   09/23/19 96/62    Pulse Readings from Last 3 Encounters:   10/31/19 97   10/07/19 72   09/23/19 86      Resp Readings from Last 3 Encounters:   10/31/19 16   10/07/19 18   09/23/19 20    SpO2 Readings from Last 3 Encounters:   10/31/19 100%   10/07/19 94%   09/23/19 96%      Temp Readings from Last 1 Encounters:   10/31/19 36.7  C (98.1  F) (Tympanic)    Ht Readings from Last 1 Encounters:   10/31/19 1.619 m (5' 3.75\")      Wt Readings from Last 1 Encounters:   10/31/19 88.5 kg (195 lb)    Estimated body mass index is 33.73 kg/m  as calculated from the following:    Height as of 10/31/19: 1.619 m (5' 3.75\").    Weight as of 10/31/19: 88.5 kg (195 lb).       Anesthesia Plan      History & Physical Review  History and physical reviewed and following examination; no interval change.    ASA Status:  3 .    NPO Status:  > 8 hours    Plan for General, ETT and For Post-op pain in coordination with surgeon with Intravenous and Propofol induction. Maintenance will be Balanced.    PONV prophylaxis:  Ondansetron (or other 5HT-3) and Dexamethasone or Solumedrol       Postoperative Care  Postoperative pain management:  IV analgesics and Oral pain medications.      Consents                 NORA Whitaker CRNA  "

## 2019-11-06 ENCOUNTER — ANESTHESIA (OUTPATIENT)
Dept: SURGERY | Facility: CLINIC | Age: 74
DRG: 483 | End: 2019-11-06
Payer: MEDICARE

## 2019-11-06 ENCOUNTER — APPOINTMENT (OUTPATIENT)
Dept: GENERAL RADIOLOGY | Facility: CLINIC | Age: 74
DRG: 483 | End: 2019-11-06
Attending: ORTHOPAEDIC SURGERY
Payer: MEDICARE

## 2019-11-06 ENCOUNTER — HOSPITAL ENCOUNTER (INPATIENT)
Facility: CLINIC | Age: 74
LOS: 2 days | Discharge: HOME | DRG: 483 | End: 2019-11-08
Attending: ORTHOPAEDIC SURGERY | Admitting: ORTHOPAEDIC SURGERY
Payer: MEDICARE

## 2019-11-06 DIAGNOSIS — M19.012 OSTEOARTHRITIS OF LEFT SHOULDER, UNSPECIFIED OSTEOARTHRITIS TYPE: ICD-10-CM

## 2019-11-06 DIAGNOSIS — M19.012 PRIMARY OSTEOARTHRITIS OF LEFT SHOULDER: Primary | ICD-10-CM

## 2019-11-06 PROCEDURE — 25000128 H RX IP 250 OP 636: Performed by: NURSE ANESTHETIST, CERTIFIED REGISTERED

## 2019-11-06 PROCEDURE — 25000125 ZZHC RX 250: Performed by: NURSE ANESTHETIST, CERTIFIED REGISTERED

## 2019-11-06 PROCEDURE — 25800030 ZZH RX IP 258 OP 636: Performed by: NURSE ANESTHETIST, CERTIFIED REGISTERED

## 2019-11-06 PROCEDURE — 40000305 ZZH STATISTIC PRE PROC ASSESS I: Performed by: ORTHOPAEDIC SURGERY

## 2019-11-06 PROCEDURE — 36000063 ZZH SURGERY LEVEL 4 EA 15 ADDTL MIN: Performed by: ORTHOPAEDIC SURGERY

## 2019-11-06 PROCEDURE — 25000132 ZZH RX MED GY IP 250 OP 250 PS 637: Performed by: ORTHOPAEDIC SURGERY

## 2019-11-06 PROCEDURE — 25000566 ZZH SEVOFLURANE, EA 15 MIN: Performed by: ORTHOPAEDIC SURGERY

## 2019-11-06 PROCEDURE — C1776 JOINT DEVICE (IMPLANTABLE): HCPCS | Performed by: ORTHOPAEDIC SURGERY

## 2019-11-06 PROCEDURE — 12000000 ZZH R&B MED SURG/OB

## 2019-11-06 PROCEDURE — 27210794 ZZH OR GENERAL SUPPLY STERILE: Performed by: ORTHOPAEDIC SURGERY

## 2019-11-06 PROCEDURE — 27110028 ZZH OR GENERAL SUPPLY NON-STERILE: Performed by: ORTHOPAEDIC SURGERY

## 2019-11-06 PROCEDURE — 36000093 ZZH SURGERY LEVEL 4 1ST 30 MIN: Performed by: ORTHOPAEDIC SURGERY

## 2019-11-06 PROCEDURE — C1713 ANCHOR/SCREW BN/BN,TIS/BN: HCPCS | Performed by: ORTHOPAEDIC SURGERY

## 2019-11-06 PROCEDURE — 25000128 H RX IP 250 OP 636: Performed by: ORTHOPAEDIC SURGERY

## 2019-11-06 PROCEDURE — 25000132 ZZH RX MED GY IP 250 OP 250 PS 637: Performed by: PHYSICIAN ASSISTANT

## 2019-11-06 PROCEDURE — 71000012 ZZH RECOVERY PHASE 1 LEVEL 1 FIRST HR: Performed by: ORTHOPAEDIC SURGERY

## 2019-11-06 PROCEDURE — 0RRK00Z REPLACEMENT OF LEFT SHOULDER JOINT WITH REVERSE BALL AND SOCKET SYNTHETIC SUBSTITUTE, OPEN APPROACH: ICD-10-PCS | Performed by: ORTHOPAEDIC SURGERY

## 2019-11-06 PROCEDURE — 37000009 ZZH ANESTHESIA TECHNICAL FEE, EACH ADDTL 15 MIN: Performed by: ORTHOPAEDIC SURGERY

## 2019-11-06 PROCEDURE — 25000132 ZZH RX MED GY IP 250 OP 250 PS 637: Performed by: NURSE ANESTHETIST, CERTIFIED REGISTERED

## 2019-11-06 PROCEDURE — 25000125 ZZHC RX 250: Performed by: ORTHOPAEDIC SURGERY

## 2019-11-06 PROCEDURE — 40000986 XR SHOULDER LT PORT G/E 2 VW: Mod: LT

## 2019-11-06 PROCEDURE — 25800030 ZZH RX IP 258 OP 636: Performed by: ORTHOPAEDIC SURGERY

## 2019-11-06 PROCEDURE — 37000008 ZZH ANESTHESIA TECHNICAL FEE, 1ST 30 MIN: Performed by: ORTHOPAEDIC SURGERY

## 2019-11-06 PROCEDURE — 25000128 H RX IP 250 OP 636: Performed by: PHYSICIAN ASSISTANT

## 2019-11-06 DEVICE — IMP SCR LOCKING BIOM REV SHLDR 3.5 HEX 4.75X20MM 180551: Type: IMPLANTABLE DEVICE | Site: SHOULDER | Status: FUNCTIONAL

## 2019-11-06 DEVICE — IMP GLENOSPHERE BIOM REV SHLDR 36MM STD 115310: Type: IMPLANTABLE DEVICE | Site: SHOULDER | Status: FUNCTIONAL

## 2019-11-06 DEVICE — IMP BASEPLATE MINI GLENOSPHERE BIOM REV SHLDR 25MM 010000589: Type: IMPLANTABLE DEVICE | Site: SHOULDER | Status: FUNCTIONAL

## 2019-11-06 DEVICE — IMP SCR LOCKING BIOM REV SHLDR 3.5 HEX 4.75X25MM 180552: Type: IMPLANTABLE DEVICE | Site: SHOULDER | Status: FUNCTIONAL

## 2019-11-06 DEVICE — IMPLANTABLE DEVICE: Type: IMPLANTABLE DEVICE | Site: SHOULDER | Status: FUNCTIONAL

## 2019-11-06 DEVICE — IMP SCR LOCKING BIOM REV SHLDR 3.5 HEX 4.75X15MM 180550: Type: IMPLANTABLE DEVICE | Site: SHOULDER | Status: FUNCTIONAL

## 2019-11-06 RX ORDER — LEVOTHYROXINE SODIUM 75 UG/1
75 TABLET ORAL
Status: DISCONTINUED | OUTPATIENT
Start: 2019-11-07 | End: 2019-11-08 | Stop reason: HOSPADM

## 2019-11-06 RX ORDER — LIDOCAINE HYDROCHLORIDE 10 MG/ML
INJECTION, SOLUTION EPIDURAL; INFILTRATION; INTRACAUDAL; PERINEURAL PRN
Status: DISCONTINUED | OUTPATIENT
Start: 2019-11-06 | End: 2019-11-06

## 2019-11-06 RX ORDER — ONDANSETRON 4 MG/1
4 TABLET, ORALLY DISINTEGRATING ORAL EVERY 6 HOURS PRN
Status: DISCONTINUED | OUTPATIENT
Start: 2019-11-06 | End: 2019-11-08 | Stop reason: HOSPADM

## 2019-11-06 RX ORDER — SIMVASTATIN 40 MG
40 TABLET ORAL AT BEDTIME
Status: DISCONTINUED | OUTPATIENT
Start: 2019-11-06 | End: 2019-11-08 | Stop reason: HOSPADM

## 2019-11-06 RX ORDER — AMOXICILLIN 250 MG
2 CAPSULE ORAL 2 TIMES DAILY
Status: DISCONTINUED | OUTPATIENT
Start: 2019-11-06 | End: 2019-11-08 | Stop reason: HOSPADM

## 2019-11-06 RX ORDER — LIDOCAINE HYDROCHLORIDE AND EPINEPHRINE 10; 10 MG/ML; UG/ML
INJECTION, SOLUTION INFILTRATION; PERINEURAL PRN
Status: DISCONTINUED | OUTPATIENT
Start: 2019-11-06 | End: 2019-11-06 | Stop reason: HOSPADM

## 2019-11-06 RX ORDER — ZOLPIDEM TARTRATE 5 MG/1
5 TABLET ORAL
Status: DISCONTINUED | OUTPATIENT
Start: 2019-11-07 | End: 2019-11-08 | Stop reason: HOSPADM

## 2019-11-06 RX ORDER — FENTANYL CITRATE 50 UG/ML
25-50 INJECTION, SOLUTION INTRAMUSCULAR; INTRAVENOUS
Status: DISCONTINUED | OUTPATIENT
Start: 2019-11-06 | End: 2019-11-06 | Stop reason: HOSPADM

## 2019-11-06 RX ORDER — MULTIVITAMIN WITH IRON
250 TABLET ORAL DAILY
Status: DISCONTINUED | OUTPATIENT
Start: 2019-11-07 | End: 2019-11-06 | Stop reason: RX

## 2019-11-06 RX ORDER — LIDOCAINE 40 MG/G
CREAM TOPICAL
Status: DISCONTINUED | OUTPATIENT
Start: 2019-11-06 | End: 2019-11-08 | Stop reason: HOSPADM

## 2019-11-06 RX ORDER — GABAPENTIN 300 MG/1
300 CAPSULE ORAL ONCE
Status: COMPLETED | OUTPATIENT
Start: 2019-11-06 | End: 2019-11-06

## 2019-11-06 RX ORDER — EPHEDRINE SULFATE 50 MG/ML
INJECTION, SOLUTION INTRAVENOUS PRN
Status: DISCONTINUED | OUTPATIENT
Start: 2019-11-06 | End: 2019-11-06

## 2019-11-06 RX ORDER — ONDANSETRON 2 MG/ML
INJECTION INTRAMUSCULAR; INTRAVENOUS PRN
Status: DISCONTINUED | OUTPATIENT
Start: 2019-11-06 | End: 2019-11-06

## 2019-11-06 RX ORDER — LIDOCAINE HYDROCHLORIDE 10 MG/ML
INJECTION, SOLUTION INFILTRATION; PERINEURAL PRN
Status: DISCONTINUED | OUTPATIENT
Start: 2019-11-06 | End: 2019-11-06

## 2019-11-06 RX ORDER — MEPERIDINE HYDROCHLORIDE 25 MG/ML
12.5 INJECTION INTRAMUSCULAR; INTRAVENOUS; SUBCUTANEOUS EVERY 5 MIN PRN
Status: DISCONTINUED | OUTPATIENT
Start: 2019-11-06 | End: 2019-11-06 | Stop reason: HOSPADM

## 2019-11-06 RX ORDER — SODIUM CHLORIDE, SODIUM LACTATE, POTASSIUM CHLORIDE, CALCIUM CHLORIDE 600; 310; 30; 20 MG/100ML; MG/100ML; MG/100ML; MG/100ML
INJECTION, SOLUTION INTRAVENOUS CONTINUOUS
Status: DISCONTINUED | OUTPATIENT
Start: 2019-11-06 | End: 2019-11-06 | Stop reason: HOSPADM

## 2019-11-06 RX ORDER — DIMENHYDRINATE 50 MG/ML
25 INJECTION, SOLUTION INTRAMUSCULAR; INTRAVENOUS
Status: DISCONTINUED | OUTPATIENT
Start: 2019-11-06 | End: 2019-11-06 | Stop reason: HOSPADM

## 2019-11-06 RX ORDER — GABAPENTIN 300 MG/1
300 CAPSULE ORAL 3 TIMES DAILY
Status: DISCONTINUED | OUTPATIENT
Start: 2019-11-06 | End: 2019-11-08 | Stop reason: HOSPADM

## 2019-11-06 RX ORDER — METOCLOPRAMIDE HYDROCHLORIDE 5 MG/ML
5 INJECTION INTRAMUSCULAR; INTRAVENOUS EVERY 6 HOURS PRN
Status: DISCONTINUED | OUTPATIENT
Start: 2019-11-06 | End: 2019-11-08 | Stop reason: HOSPADM

## 2019-11-06 RX ORDER — SODIUM CHLORIDE, SODIUM LACTATE, POTASSIUM CHLORIDE, CALCIUM CHLORIDE 600; 310; 30; 20 MG/100ML; MG/100ML; MG/100ML; MG/100ML
INJECTION, SOLUTION INTRAVENOUS CONTINUOUS
Status: DISCONTINUED | OUTPATIENT
Start: 2019-11-06 | End: 2019-11-08 | Stop reason: HOSPADM

## 2019-11-06 RX ORDER — DIPHENHYDRAMINE HCL 12.5MG/5ML
12.5 LIQUID (ML) ORAL EVERY 6 HOURS PRN
Status: DISCONTINUED | OUTPATIENT
Start: 2019-11-06 | End: 2019-11-08 | Stop reason: HOSPADM

## 2019-11-06 RX ORDER — CEFAZOLIN SODIUM 2 G/100ML
2 INJECTION, SOLUTION INTRAVENOUS
Status: COMPLETED | OUTPATIENT
Start: 2019-11-06 | End: 2019-11-06

## 2019-11-06 RX ORDER — PHENYLEPHRINE HYDROCHLORIDE 10 MG/ML
INJECTION INTRAVENOUS PRN
Status: DISCONTINUED | OUTPATIENT
Start: 2019-11-06 | End: 2019-11-06

## 2019-11-06 RX ORDER — ONDANSETRON 4 MG/1
4 TABLET, ORALLY DISINTEGRATING ORAL EVERY 30 MIN PRN
Status: DISCONTINUED | OUTPATIENT
Start: 2019-11-06 | End: 2019-11-06 | Stop reason: HOSPADM

## 2019-11-06 RX ORDER — CELECOXIB 200 MG/1
200 CAPSULE ORAL ONCE
Status: COMPLETED | OUTPATIENT
Start: 2019-11-06 | End: 2019-11-06

## 2019-11-06 RX ORDER — ACETAMINOPHEN 325 MG/1
975 TABLET ORAL ONCE
Status: COMPLETED | OUTPATIENT
Start: 2019-11-06 | End: 2019-11-06

## 2019-11-06 RX ORDER — FENTANYL CITRATE 50 UG/ML
INJECTION, SOLUTION INTRAMUSCULAR; INTRAVENOUS PRN
Status: DISCONTINUED | OUTPATIENT
Start: 2019-11-06 | End: 2019-11-06

## 2019-11-06 RX ORDER — ONDANSETRON 2 MG/ML
4 INJECTION INTRAMUSCULAR; INTRAVENOUS EVERY 6 HOURS PRN
Status: DISCONTINUED | OUTPATIENT
Start: 2019-11-06 | End: 2019-11-08 | Stop reason: HOSPADM

## 2019-11-06 RX ORDER — ONDANSETRON 2 MG/ML
4 INJECTION INTRAMUSCULAR; INTRAVENOUS EVERY 30 MIN PRN
Status: DISCONTINUED | OUTPATIENT
Start: 2019-11-06 | End: 2019-11-06 | Stop reason: HOSPADM

## 2019-11-06 RX ORDER — DEXAMETHASONE SODIUM PHOSPHATE 4 MG/ML
INJECTION, SOLUTION INTRA-ARTICULAR; INTRALESIONAL; INTRAMUSCULAR; INTRAVENOUS; SOFT TISSUE PRN
Status: DISCONTINUED | OUTPATIENT
Start: 2019-11-06 | End: 2019-11-06

## 2019-11-06 RX ORDER — PROPOFOL 10 MG/ML
INJECTION, EMULSION INTRAVENOUS PRN
Status: DISCONTINUED | OUTPATIENT
Start: 2019-11-06 | End: 2019-11-06

## 2019-11-06 RX ORDER — AMOXICILLIN 250 MG
1 CAPSULE ORAL 2 TIMES DAILY
Status: DISCONTINUED | OUTPATIENT
Start: 2019-11-06 | End: 2019-11-08 | Stop reason: HOSPADM

## 2019-11-06 RX ORDER — LIDOCAINE HYDROCHLORIDE AND EPINEPHRINE BITARTRATE 20; .01 MG/ML; MG/ML
INJECTION, SOLUTION SUBCUTANEOUS PRN
Status: DISCONTINUED | OUTPATIENT
Start: 2019-11-06 | End: 2019-11-06

## 2019-11-06 RX ORDER — OXYCODONE HCL 5 MG/5 ML
5 SOLUTION, ORAL ORAL EVERY 4 HOURS PRN
Status: DISCONTINUED | OUTPATIENT
Start: 2019-11-06 | End: 2019-11-08 | Stop reason: HOSPADM

## 2019-11-06 RX ORDER — LIDOCAINE 40 MG/G
CREAM TOPICAL
Status: DISCONTINUED | OUTPATIENT
Start: 2019-11-06 | End: 2019-11-06 | Stop reason: HOSPADM

## 2019-11-06 RX ORDER — TRAZODONE HYDROCHLORIDE 100 MG/1
100 TABLET ORAL
Status: DISCONTINUED | OUTPATIENT
Start: 2019-11-06 | End: 2019-11-08 | Stop reason: HOSPADM

## 2019-11-06 RX ORDER — CYCLOSPORINE 0.5 MG/ML
1 EMULSION OPHTHALMIC 2 TIMES DAILY
Status: DISCONTINUED | OUTPATIENT
Start: 2019-11-06 | End: 2019-11-08 | Stop reason: HOSPADM

## 2019-11-06 RX ORDER — ROPIVACAINE HYDROCHLORIDE 7.5 MG/ML
INJECTION, SOLUTION EPIDURAL; PERINEURAL PRN
Status: DISCONTINUED | OUTPATIENT
Start: 2019-11-06 | End: 2019-11-06

## 2019-11-06 RX ORDER — PAROXETINE 20 MG/1
20 TABLET, FILM COATED ORAL AT BEDTIME
Status: DISCONTINUED | OUTPATIENT
Start: 2019-11-06 | End: 2019-11-08 | Stop reason: HOSPADM

## 2019-11-06 RX ORDER — DIPHENHYDRAMINE HYDROCHLORIDE 50 MG/ML
12.5 INJECTION INTRAMUSCULAR; INTRAVENOUS EVERY 6 HOURS PRN
Status: DISCONTINUED | OUTPATIENT
Start: 2019-11-06 | End: 2019-11-08 | Stop reason: HOSPADM

## 2019-11-06 RX ORDER — NALOXONE HYDROCHLORIDE 0.4 MG/ML
.1-.4 INJECTION, SOLUTION INTRAMUSCULAR; INTRAVENOUS; SUBCUTANEOUS
Status: DISCONTINUED | OUTPATIENT
Start: 2019-11-06 | End: 2019-11-06 | Stop reason: HOSPADM

## 2019-11-06 RX ORDER — HYDROMORPHONE HYDROCHLORIDE 1 MG/ML
.3-.5 INJECTION, SOLUTION INTRAMUSCULAR; INTRAVENOUS; SUBCUTANEOUS EVERY 5 MIN PRN
Status: DISCONTINUED | OUTPATIENT
Start: 2019-11-06 | End: 2019-11-06 | Stop reason: HOSPADM

## 2019-11-06 RX ORDER — NAPROXEN 250 MG/1
250 TABLET ORAL EVERY 12 HOURS PRN
Status: DISCONTINUED | OUTPATIENT
Start: 2019-11-06 | End: 2019-11-08 | Stop reason: HOSPADM

## 2019-11-06 RX ORDER — METHOCARBAMOL 500 MG/1
500 TABLET, FILM COATED ORAL 4 TIMES DAILY PRN
Status: DISCONTINUED | OUTPATIENT
Start: 2019-11-06 | End: 2019-11-08 | Stop reason: HOSPADM

## 2019-11-06 RX ORDER — FAMOTIDINE 20 MG/1
20 TABLET, FILM COATED ORAL EVERY 12 HOURS
Status: DISCONTINUED | OUTPATIENT
Start: 2019-11-06 | End: 2019-11-08 | Stop reason: HOSPADM

## 2019-11-06 RX ORDER — METOCLOPRAMIDE 5 MG/1
5 TABLET ORAL EVERY 6 HOURS PRN
Status: DISCONTINUED | OUTPATIENT
Start: 2019-11-06 | End: 2019-11-08 | Stop reason: HOSPADM

## 2019-11-06 RX ORDER — NALOXONE HYDROCHLORIDE 0.4 MG/ML
.1-.4 INJECTION, SOLUTION INTRAMUSCULAR; INTRAVENOUS; SUBCUTANEOUS
Status: DISCONTINUED | OUTPATIENT
Start: 2019-11-06 | End: 2019-11-08 | Stop reason: HOSPADM

## 2019-11-06 RX ORDER — MULTIVITAMIN WITH IRON
1 TABLET ORAL EVERY MORNING
Status: ON HOLD | COMMUNITY
End: 2022-04-17

## 2019-11-06 RX ORDER — NALOXONE HYDROCHLORIDE 0.4 MG/ML
.1-.4 INJECTION, SOLUTION INTRAMUSCULAR; INTRAVENOUS; SUBCUTANEOUS
Status: DISCONTINUED | OUTPATIENT
Start: 2019-11-06 | End: 2019-11-06

## 2019-11-06 RX ORDER — ALBUTEROL SULFATE 0.83 MG/ML
2.5 SOLUTION RESPIRATORY (INHALATION) EVERY 4 HOURS PRN
Status: DISCONTINUED | OUTPATIENT
Start: 2019-11-06 | End: 2019-11-06 | Stop reason: HOSPADM

## 2019-11-06 RX ORDER — CEFAZOLIN SODIUM 2 G/100ML
2 INJECTION, SOLUTION INTRAVENOUS EVERY 8 HOURS
Status: COMPLETED | OUTPATIENT
Start: 2019-11-06 | End: 2019-11-07

## 2019-11-06 RX ORDER — HYDROCODONE BITARTRATE AND ACETAMINOPHEN 5; 325 MG/1; MG/1
1-2 TABLET ORAL EVERY 4 HOURS PRN
Status: DISCONTINUED | OUTPATIENT
Start: 2019-11-06 | End: 2019-11-08 | Stop reason: HOSPADM

## 2019-11-06 RX ADMIN — PHENYLEPHRINE HYDROCHLORIDE 100 MCG: 10 INJECTION INTRAVENOUS at 17:04

## 2019-11-06 RX ADMIN — GABAPENTIN 300 MG: 300 CAPSULE ORAL at 13:03

## 2019-11-06 RX ADMIN — PHENYLEPHRINE HYDROCHLORIDE 100 MCG: 10 INJECTION INTRAVENOUS at 16:29

## 2019-11-06 RX ADMIN — Medication 3 ML: at 14:09

## 2019-11-06 RX ADMIN — SODIUM CHLORIDE, POTASSIUM CHLORIDE, SODIUM LACTATE AND CALCIUM CHLORIDE: 600; 310; 30; 20 INJECTION, SOLUTION INTRAVENOUS at 19:04

## 2019-11-06 RX ADMIN — FAMOTIDINE 20 MG: 20 TABLET ORAL at 19:51

## 2019-11-06 RX ADMIN — PHENYLEPHRINE HYDROCHLORIDE 100 MCG: 10 INJECTION INTRAVENOUS at 16:42

## 2019-11-06 RX ADMIN — EPHEDRINE SULFATE 5 MG: 50 INJECTION, SOLUTION INTRAVENOUS at 16:42

## 2019-11-06 RX ADMIN — LIDOCAINE HYDROCHLORIDE 50 MG: 10 INJECTION, SOLUTION INFILTRATION; PERINEURAL at 15:40

## 2019-11-06 RX ADMIN — ROPIVACAINE HYDROCHLORIDE 30 ML: 7.5 INJECTION, SOLUTION EPIDURAL; PERINEURAL at 14:11

## 2019-11-06 RX ADMIN — PROPOFOL 200 MG: 10 INJECTION, EMULSION INTRAVENOUS at 15:40

## 2019-11-06 RX ADMIN — DEXAMETHASONE SODIUM PHOSPHATE 4 MG: 4 INJECTION, SOLUTION INTRA-ARTICULAR; INTRALESIONAL; INTRAMUSCULAR; INTRAVENOUS; SOFT TISSUE at 14:11

## 2019-11-06 RX ADMIN — EPHEDRINE SULFATE 5 MG: 50 INJECTION, SOLUTION INTRAVENOUS at 17:04

## 2019-11-06 RX ADMIN — CYCLOSPORINE 1 DROP: 0.5 EMULSION OPHTHALMIC at 21:05

## 2019-11-06 RX ADMIN — LIDOCAINE HYDROCHLORIDE 1 ML: 10 INJECTION, SOLUTION EPIDURAL; INFILTRATION; INTRACAUDAL; PERINEURAL at 13:51

## 2019-11-06 RX ADMIN — GABAPENTIN 300 MG: 300 CAPSULE ORAL at 19:51

## 2019-11-06 RX ADMIN — ROCURONIUM BROMIDE 50 MG: 10 INJECTION INTRAVENOUS at 15:40

## 2019-11-06 RX ADMIN — EPHEDRINE SULFATE 5 MG: 50 INJECTION, SOLUTION INTRAVENOUS at 16:37

## 2019-11-06 RX ADMIN — EPHEDRINE SULFATE 5 MG: 50 INJECTION, SOLUTION INTRAVENOUS at 15:53

## 2019-11-06 RX ADMIN — ONDANSETRON 4 MG: 2 INJECTION INTRAMUSCULAR; INTRAVENOUS at 15:40

## 2019-11-06 RX ADMIN — MIDAZOLAM 1 MG: 1 INJECTION INTRAMUSCULAR; INTRAVENOUS at 15:36

## 2019-11-06 RX ADMIN — MIDAZOLAM 1 MG: 1 INJECTION INTRAMUSCULAR; INTRAVENOUS at 13:52

## 2019-11-06 RX ADMIN — SENNOSIDES AND DOCUSATE SODIUM 2 TABLET: 8.6; 5 TABLET ORAL at 19:50

## 2019-11-06 RX ADMIN — CELECOXIB 200 MG: 200 CAPSULE ORAL at 13:03

## 2019-11-06 RX ADMIN — PHENYLEPHRINE HYDROCHLORIDE 100 MCG: 10 INJECTION INTRAVENOUS at 16:15

## 2019-11-06 RX ADMIN — Medication 5 ML: at 14:07

## 2019-11-06 RX ADMIN — LIDOCAINE HYDROCHLORIDE 1 ML: 10 INJECTION, SOLUTION EPIDURAL; INFILTRATION; INTRACAUDAL; PERINEURAL at 14:05

## 2019-11-06 RX ADMIN — DEXAMETHASONE SODIUM PHOSPHATE 8 MG: 4 INJECTION, SOLUTION INTRA-ARTICULAR; INTRALESIONAL; INTRAMUSCULAR; INTRAVENOUS; SOFT TISSUE at 15:40

## 2019-11-06 RX ADMIN — CEFAZOLIN SODIUM 2 G: 2 INJECTION, SOLUTION INTRAVENOUS at 15:35

## 2019-11-06 RX ADMIN — PAROXETINE HYDROCHLORIDE HEMIHYDRATE 20 MG: 20 TABLET, FILM COATED ORAL at 21:05

## 2019-11-06 RX ADMIN — FENTANYL CITRATE 50 MCG: 50 INJECTION, SOLUTION INTRAMUSCULAR; INTRAVENOUS at 13:58

## 2019-11-06 RX ADMIN — PHENYLEPHRINE HYDROCHLORIDE 200 MCG: 10 INJECTION INTRAVENOUS at 16:03

## 2019-11-06 RX ADMIN — ACETAMINOPHEN 975 MG: 325 TABLET, FILM COATED ORAL at 13:03

## 2019-11-06 RX ADMIN — EPHEDRINE SULFATE 5 MG: 50 INJECTION, SOLUTION INTRAVENOUS at 16:31

## 2019-11-06 RX ADMIN — EPHEDRINE SULFATE 5 MG: 50 INJECTION, SOLUTION INTRAVENOUS at 16:08

## 2019-11-06 RX ADMIN — EPHEDRINE SULFATE 5 MG: 50 INJECTION, SOLUTION INTRAVENOUS at 16:00

## 2019-11-06 RX ADMIN — PHENYLEPHRINE HYDROCHLORIDE 100 MCG: 10 INJECTION INTRAVENOUS at 16:38

## 2019-11-06 RX ADMIN — EPHEDRINE SULFATE 5 MG: 50 INJECTION, SOLUTION INTRAVENOUS at 16:26

## 2019-11-06 RX ADMIN — EPHEDRINE SULFATE 5 MG: 50 INJECTION, SOLUTION INTRAVENOUS at 16:19

## 2019-11-06 RX ADMIN — MIDAZOLAM 0.5 MG: 1 INJECTION INTRAMUSCULAR; INTRAVENOUS at 14:09

## 2019-11-06 RX ADMIN — LIDOCAINE HYDROCHLORIDE: 10 INJECTION, SOLUTION EPIDURAL; INFILTRATION; INTRACAUDAL; PERINEURAL at 12:53

## 2019-11-06 RX ADMIN — TRAZODONE HYDROCHLORIDE 100 MG: 100 TABLET ORAL at 22:26

## 2019-11-06 RX ADMIN — SIMVASTATIN 40 MG: 40 TABLET, FILM COATED ORAL at 21:05

## 2019-11-06 RX ADMIN — SODIUM CHLORIDE, POTASSIUM CHLORIDE, SODIUM LACTATE AND CALCIUM CHLORIDE: 600; 310; 30; 20 INJECTION, SOLUTION INTRAVENOUS at 16:31

## 2019-11-06 RX ADMIN — MIDAZOLAM 0.5 MG: 1 INJECTION INTRAMUSCULAR; INTRAVENOUS at 15:40

## 2019-11-06 RX ADMIN — CEFAZOLIN SODIUM 2 G: 2 INJECTION, SOLUTION INTRAVENOUS at 23:55

## 2019-11-06 RX ADMIN — SODIUM CHLORIDE, POTASSIUM CHLORIDE, SODIUM LACTATE AND CALCIUM CHLORIDE: 600; 310; 30; 20 INJECTION, SOLUTION INTRAVENOUS at 12:53

## 2019-11-06 RX ADMIN — FENTANYL CITRATE 50 MCG: 50 INJECTION, SOLUTION INTRAMUSCULAR; INTRAVENOUS at 13:52

## 2019-11-06 RX ADMIN — MIDAZOLAM 1 MG: 1 INJECTION INTRAMUSCULAR; INTRAVENOUS at 13:50

## 2019-11-06 ASSESSMENT — ACTIVITIES OF DAILY LIVING (ADL)
TRANSFERRING: 0-->INDEPENDENT
RETIRED_EATING: 0-->INDEPENDENT
AMBULATION: 0-->INDEPENDENT
FALL_HISTORY_WITHIN_LAST_SIX_MONTHS: NO
RETIRED_COMMUNICATION: 0-->UNDERSTANDS/COMMUNICATES WITHOUT DIFFICULTY
WHICH_OF_THE_ABOVE_FUNCTIONAL_RISKS_HAD_A_RECENT_ONSET_OR_CHANGE?: AMBULATION;TRANSFERRING;TOILETING;BATHING;DRESSING
TOILETING: 0-->INDEPENDENT
BATHING: 0-->INDEPENDENT
COGNITION: 0 - NO COGNITION ISSUES REPORTED
DRESS: 0-->INDEPENDENT
SWALLOWING: 0-->SWALLOWS FOODS/LIQUIDS WITHOUT DIFFICULTY
ADLS_ACUITY_SCORE: 11

## 2019-11-06 ASSESSMENT — MIFFLIN-ST. JEOR: SCORE: 1357.25

## 2019-11-06 NOTE — BRIEF OP NOTE
[facility]    Brief Operative Note    Pre-operative diagnosis: Degenerative joint disease [M19.90]  Post-operative diagnosis Same as pre-operative diagnosis    Procedure: Procedure(s):  Left Reverse Total shoulder arthroplasty  Surgeon: Surgeon(s) and Role:     * Oliver Velázquez MD - Primary     * Martin Kaiser PA-C - Assisting  Anesthesia: Combined General with Block   Estimated blood loss: Less than 100 ml  Drains: None  Specimens: * No specimens in log *  Findings:   None.  Complications: None.  Implants:   Implant Name Type Inv. Item Serial No.  Lot No. LRB No. Used   IMP BASEPLATE MINI GLENOSPHERE BIOM REV SHLDR 25MM [id number] Total Joint Component/Insert IMP BASEPLATE MINI GLENOSPHERE BIOM REV SHLDR 25MM [id number]  [employer] [id number] Left 1   central screw 6.5x30mm    [employer] [id number] Left 1   IMP SCR LOCKING BIOM REV SHLDR 3.5 HEX [id number] Metallic Hardware/Comfort IMP SCR LOCKING BIOM REV SHLDR 3.5 HEX [id number]  [employer] [id number] Left 1   IMP SCR LOCKING BIOM REV SHLDR 3.5 HEX [id number] Total Joint Component/Insert IMP SCR LOCKING BIOM REV SHLDR 3.5 HEX [id number] 603866  [employer] [id number] Left 1   IMP SCR LOCKING BIOM REV SHLDR 3.5 HEX [id number] Metallic Hardware/Comfort IMP SCR LOCKING BIOM REV SHLDR 3.5 HEX [id number] [id number]  [employer] [id number] Left 1   IMP SCR LOCKING BIOM REV SHLDR 3.5 HEX [id number] 638697 Total Joint Component/Insert IMP SCR LOCKING BIOM REV SHLDR 3.5 HEX [id number] 387557  [employer] [id number] Left 1   IMP GLENOSPHERE BIOM REV SHLDR 36MM STD [id number] Total Joint Component/Insert IMP GLENOSPHERE BIOM REV SHLDR 36MM STD [id number]  [employer] [id number] Left 1   Poly Bearing standard 36mm diameter    [employer] [id number] Left 1   IMP STEM MINI HUMERAL BIOM SHLDR 10MM [id number] Total Joint Component/Insert IMP STEM MINI HUMERAL BIOM SHLDR 10MM [id number]  [employer] [id number] Left 1   Mini humeral tray +5mm  thickness, +0mm taper offset, 40mm diameter    [facility] [id number] Left 1

## 2019-11-06 NOTE — ANESTHESIA PROCEDURE NOTES
Peripheral nerve/Neuraxial procedure note : Interscalene  Pre-Procedure  Performed by  Bibi Gabriel APRN CRNA   Location: pre-op    Procedure Times:11/6/2019 1:50 PM and 11/6/2019 2:15 PM  Pre-Anesthestic Checklist: patient identified, IV checked, site marked, risks and benefits discussed, informed consent, monitors and equipment checked, pre-op evaluation, at physician/surgeon's request and post-op pain management    Timeout  Correct Patient: Yes   Correct Procedure: Yes   Correct Site: Yes   Correct Laterality: Yes   Correct Position: Yes   Site Marked: Yes   .   Procedure Documentation    Diagnosis:PAIN.    Procedure: Interscalene, left.   Patient Position:sitting Local skin infiltrated with 2 mL of 1% lidocaine.    Ultrasound used to identify targeted nerve, plexus, or vascular marker and placed a needle adjacent to it., Ultrasound was used to visualize the spread of the anesthetic in close proximity to the above stated nerve. A permanent image is entered into the patient's record.  Patient Prep/Sterile Barriers; mask, sterile gloves, chlorhexidine gluconate and isopropyl alcohol, patient draped.  Nerve Stim: Initial Level 1 mA.  Lowest motor response 0.44 mA..  Needle: insulated   Needle Gauge: 21.    Needle Length (Inches) 2   Insertion Method: Single Shot.        Assessment/Narrative  Paresthesias: No.  Injection made incrementally with aspirations every 5 mL..  The placement was negative for: blood aspirated, painful injection and site bleeding.  Bolus given via needle. No blood aspirated via catheter.   Secured via.   Complications: none. Test dose of 5 mL lidocaine 2% w/ 1:200,000 epinephrine at 14:07.  Test dose negative for signs of intravascular, subdural or intrathecal injection. Comments:  Patient tolerated procedure well.

## 2019-11-06 NOTE — ANESTHESIA CARE TRANSFER NOTE
Patient: Ingrid Powell    Procedure(s):  Left Reverse Total shoulder arthroplasty    Diagnosis: Degenerative joint disease [M19.90]  Diagnosis Additional Information: No value filed.    Anesthesia Type:   General, ETT, For Post-op pain in coordination with surgeon     Note:  Airway :Nasal Cannula  Patient transferred to:PACU  Handoff Report: Identifed the Patient, Identified the Reponsible Provider, Reviewed the pertinent medical history, Discussed the surgical course, Reviewed Intra-OP anesthesia mangement and issues during anesthesia, Set expectations for post-procedure period and Allowed opportunity for questions and acknowledgement of understanding      Vitals: (Last set prior to Anesthesia Care Transfer)    CRNA VITALS  11/6/2019 1701 - 11/6/2019 1737      11/6/2019             Pulse:  92    SpO2:  99 %    Resp Rate (observed):  (!) 5                Electronically Signed By: NORA Velez CRNA  November 6, 2019  5:37 PM

## 2019-11-07 ENCOUNTER — APPOINTMENT (OUTPATIENT)
Dept: OCCUPATIONAL THERAPY | Facility: CLINIC | Age: 74
DRG: 483 | End: 2019-11-07
Attending: ORTHOPAEDIC SURGERY
Payer: MEDICARE

## 2019-11-07 ENCOUNTER — APPOINTMENT (OUTPATIENT)
Dept: PHYSICAL THERAPY | Facility: CLINIC | Age: 74
DRG: 483 | End: 2019-11-07
Attending: ORTHOPAEDIC SURGERY
Payer: MEDICARE

## 2019-11-07 LAB — HGB BLD-MCNC: 11.4 G/DL (ref 11.7–15.7)

## 2019-11-07 PROCEDURE — 97116 GAIT TRAINING THERAPY: CPT | Mod: GP | Performed by: PHYSICAL THERAPIST

## 2019-11-07 PROCEDURE — 99232 SBSQ HOSP IP/OBS MODERATE 35: CPT | Performed by: PHYSICIAN ASSISTANT

## 2019-11-07 PROCEDURE — 97161 PT EVAL LOW COMPLEX 20 MIN: CPT | Mod: GP | Performed by: PHYSICAL THERAPIST

## 2019-11-07 PROCEDURE — 25800030 ZZH RX IP 258 OP 636: Performed by: ORTHOPAEDIC SURGERY

## 2019-11-07 PROCEDURE — 25000132 ZZH RX MED GY IP 250 OP 250 PS 637: Performed by: PHYSICIAN ASSISTANT

## 2019-11-07 PROCEDURE — 12000000 ZZH R&B MED SURG/OB

## 2019-11-07 PROCEDURE — 25000132 ZZH RX MED GY IP 250 OP 250 PS 637: Performed by: ORTHOPAEDIC SURGERY

## 2019-11-07 PROCEDURE — 85018 HEMOGLOBIN: CPT | Performed by: ORTHOPAEDIC SURGERY

## 2019-11-07 PROCEDURE — 97110 THERAPEUTIC EXERCISES: CPT | Mod: GP | Performed by: PHYSICAL THERAPIST

## 2019-11-07 PROCEDURE — 97165 OT EVAL LOW COMPLEX 30 MIN: CPT | Mod: GO

## 2019-11-07 PROCEDURE — 36415 COLL VENOUS BLD VENIPUNCTURE: CPT | Performed by: ORTHOPAEDIC SURGERY

## 2019-11-07 PROCEDURE — 97535 SELF CARE MNGMENT TRAINING: CPT | Mod: GO

## 2019-11-07 PROCEDURE — 25000132 ZZH RX MED GY IP 250 OP 250 PS 637: Performed by: NURSE ANESTHETIST, CERTIFIED REGISTERED

## 2019-11-07 PROCEDURE — 25000128 H RX IP 250 OP 636: Performed by: ORTHOPAEDIC SURGERY

## 2019-11-07 RX ORDER — ASPIRIN 325 MG
325 TABLET, DELAYED RELEASE (ENTERIC COATED) ORAL DAILY
Qty: 42 TABLET | Refills: 0 | Status: SHIPPED | OUTPATIENT
Start: 2019-11-08 | End: 2020-02-17

## 2019-11-07 RX ORDER — HYDROCODONE BITARTRATE AND ACETAMINOPHEN 5; 325 MG/1; MG/1
1-2 TABLET ORAL EVERY 4 HOURS PRN
Qty: 60 TABLET | Refills: 0 | Status: SHIPPED | OUTPATIENT
Start: 2019-11-07 | End: 2019-11-08

## 2019-11-07 RX ORDER — POLYETHYLENE GLYCOL 3350 17 G/17G
1 POWDER, FOR SOLUTION ORAL DAILY PRN
Qty: 1 BOTTLE | Refills: 0 | Status: SHIPPED | OUTPATIENT
Start: 2019-11-07 | End: 2020-01-13

## 2019-11-07 RX ORDER — AMOXICILLIN 250 MG
1-2 CAPSULE ORAL 2 TIMES DAILY
Qty: 60 TABLET | Refills: 0 | Status: SHIPPED | OUTPATIENT
Start: 2019-11-07 | End: 2022-04-26

## 2019-11-07 RX ADMIN — SODIUM CHLORIDE, POTASSIUM CHLORIDE, SODIUM LACTATE AND CALCIUM CHLORIDE: 600; 310; 30; 20 INJECTION, SOLUTION INTRAVENOUS at 02:52

## 2019-11-07 RX ADMIN — SIMVASTATIN 40 MG: 40 TABLET, FILM COATED ORAL at 20:17

## 2019-11-07 RX ADMIN — CYCLOSPORINE 1 DROP: 0.5 EMULSION OPHTHALMIC at 08:08

## 2019-11-07 RX ADMIN — CYCLOSPORINE 1 DROP: 0.5 EMULSION OPHTHALMIC at 20:27

## 2019-11-07 RX ADMIN — FAMOTIDINE 20 MG: 20 TABLET ORAL at 20:16

## 2019-11-07 RX ADMIN — NAPROXEN 250 MG: 250 TABLET ORAL at 09:44

## 2019-11-07 RX ADMIN — PAROXETINE HYDROCHLORIDE HEMIHYDRATE 20 MG: 20 TABLET, FILM COATED ORAL at 20:16

## 2019-11-07 RX ADMIN — TRAZODONE HYDROCHLORIDE 100 MG: 100 TABLET ORAL at 20:26

## 2019-11-07 RX ADMIN — OXYCODONE HYDROCHLORIDE 5 MG: 5 SOLUTION ORAL at 20:26

## 2019-11-07 RX ADMIN — LEVOTHYROXINE SODIUM 75 MCG: 75 TABLET ORAL at 06:37

## 2019-11-07 RX ADMIN — Medication 200 MG: at 08:08

## 2019-11-07 RX ADMIN — GABAPENTIN 300 MG: 300 CAPSULE ORAL at 13:56

## 2019-11-07 RX ADMIN — GABAPENTIN 300 MG: 300 CAPSULE ORAL at 08:08

## 2019-11-07 RX ADMIN — HYDROCODONE BITARTRATE AND ACETAMINOPHEN 2 TABLET: 5; 325 TABLET ORAL at 16:39

## 2019-11-07 RX ADMIN — HYDROCODONE BITARTRATE AND ACETAMINOPHEN 2 TABLET: 5; 325 TABLET ORAL at 12:40

## 2019-11-07 RX ADMIN — GABAPENTIN 300 MG: 300 CAPSULE ORAL at 20:17

## 2019-11-07 RX ADMIN — SENNOSIDES AND DOCUSATE SODIUM 1 TABLET: 8.6; 5 TABLET ORAL at 08:08

## 2019-11-07 RX ADMIN — FAMOTIDINE 20 MG: 20 TABLET ORAL at 08:08

## 2019-11-07 RX ADMIN — SENNOSIDES AND DOCUSATE SODIUM 2 TABLET: 8.6; 5 TABLET ORAL at 20:17

## 2019-11-07 RX ADMIN — ASPIRIN 325 MG: 325 TABLET, COATED ORAL at 08:08

## 2019-11-07 RX ADMIN — HYDROCODONE BITARTRATE AND ACETAMINOPHEN 1 TABLET: 5; 325 TABLET ORAL at 00:03

## 2019-11-07 RX ADMIN — HYDROCODONE BITARTRATE AND ACETAMINOPHEN 2 TABLET: 5; 325 TABLET ORAL at 08:20

## 2019-11-07 RX ADMIN — CEFAZOLIN SODIUM 2 G: 2 INJECTION, SOLUTION INTRAVENOUS at 08:08

## 2019-11-07 RX ADMIN — HYDROCODONE BITARTRATE AND ACETAMINOPHEN 2 TABLET: 5; 325 TABLET ORAL at 04:22

## 2019-11-07 ASSESSMENT — ACTIVITIES OF DAILY LIVING (ADL)
ADLS_ACUITY_SCORE: 11

## 2019-11-07 NOTE — ANESTHESIA POSTPROCEDURE EVALUATION
Patient: Ingrid Powell    Procedure(s):  Left Reverse Total shoulder arthroplasty    Diagnosis:Degenerative joint disease [M19.90]  Diagnosis Additional Information: No value filed.    Anesthesia Type:  General, ETT, For Post-op pain in coordination with surgeon    Note:  Anesthesia Post Evaluation    Patient location during evaluation: Floor  Patient participation: Able to participate in evaluation but full recovery from regional anesthesia has not yet ocurrred but is anticipated to occur within 48 hours  Level of consciousness: awake and alert  Pain management: adequate  Airway patency: patent  Cardiovascular status: acceptable and hemodynamically stable  Respiratory status: acceptable, spontaneous ventilation and nasal cannula  Hydration status: acceptable  PONV: none     Anesthetic complications: None          Last vitals:  Vitals:    11/06/19 1745 11/06/19 1800 11/06/19 1815   BP: 131/77 136/83 136/78   Pulse: 91 92 85   Resp: 12 (!) 0 23   Temp:      SpO2: 95% 97% 97%         Electronically Signed By: NORA Velez CRNA  November 6, 2019  6:34 PM

## 2019-11-07 NOTE — PLAN OF CARE
Discharge Planner OT   Patient plan for discharge: Pt planning on staying with her sister for at least 1 week with home therapy    Current status: Eval complete. Ambulates to/from bathroom with CGA. Slightly unsteady- PT to assess adaptive device needs. SBA for lower body dressing and independent to don/doff sling.     Barriers to return to prior living situation: slightly unsteady today. Would benefit from 1 more OT session tomorrow to address dressing within shoulder precautions    Recommendations for discharge: Home with home therapy and assist from sister.     Rationale for recommendations: See above.        Entered by: Luicla Vallecillo 11/07/2019 10:50 AM

## 2019-11-07 NOTE — PROGRESS NOTES
"SPIRITUAL HEALTH SERVICES  SPIRITUAL ASSESSMENT Progress Note  [facility] - Med/Surg    Referral Source:     Pt request during preop phone call     Primary Focus:     Assessment of emotional/spiritual/Restoration distress    Support for coping    Illness Circumstances:   Reviewed documentation. Reflective conversation shared with patient which integrated elements of illness and family narratives.     Context of Serious Illness/Symptom(s) - Shoulder surgery    Resources for Support - Sister who also lives in [place]; daughter in [place]; son in the [facility]; 9 grandchildren     Distress:     Emotional/Existential/Relational Distress - Ingrid stated that she deals with osteoarthritis and this surgery is one of many that she has had / will have.  She stated that she had just worked with OT and is was a bit daunting to realize what she will have to do for dressing, putting on her socks, and other ADL's.      Spiritual/Jew Distress - None - she stated that God has been there with her through many things in her life and she calls on Him often.    Social/Cultural/Economic Distress - Ingrid stated that her first   at 23; she was left with her two children.  She  again but that marriage \"did not work out.\"  So she has been independent for many years.  And \"I've made it through with God's help.\"    Spiritual / Jew Coping:     Baptist/Ligia - Episcopal    Spiritual Practice(s) - We didn't have time for a prayer because she noticed that her IV port was bleeding so that became the primary focus    Emotional/Existential/Relational Connections - She talked about her daughter being her Medical POA and her son handles the Finances.  She said \"He puts me on an allowance but that's OK, I guess.  He's watching out for me.  He doesn't like it when I go to the [place] but it's working out.\"    Goals of Care:    Goals of Care - Home with sister on discharge    Meaning/Sense-Making - Family and ligia are very " "significant for Ingrid.  She also spoke of her 9 year old grandson, Pankaj, who \"had to come and see Bartolome Marshallcy\" last evening.  He has autism but is very high functioning and \"very special\" to Ingrid.    Plan: Ingrid welcomed a follow up visit tomorrow.    Pascual Wilson M.A., [facility]  Staff [facility]  Office: [phone]  Cell: [phone]  Pager [phone]    "

## 2019-11-07 NOTE — PLAN OF CARE
Discharge Planner PT   Patient plan for discharge: Discharge to her sister's home     Current status: Jesse completed- Pt reporting moderate pain. ; instructed on precautions, sling use.  HEP  Initiated for left shoulder   Pt  amb 125 feet x2 with SEC w/ SBA to CGA/1. Unsteady at times- - with head turns, stops,   In busy hallway-  Pt reports balance problems since 2013 following cancer RX- using a SEC  Prn, but no falls.   Pt also to amb w/ nursing staff In preparation for discharge.      Barriers to return to prior living situation: medical status, mobility status, pain     Recommendations for discharge: home w/ assistance.  Home care PT to address Left shoulder, balance.    Rationale for recommendations: current status        Entered by: Julianna Fuentes 11/07/2019 3:11 PM

## 2019-11-07 NOTE — PHARMACY-ADMISSION MEDICATION HISTORY
Medication list updated.    Patient is taking Gabapentin 600 mg at bedtime. Change in script updated.    List reviewed

## 2019-11-07 NOTE — PROGRESS NOTES
[facility]    Hospital Medicine Progress Note  Date of Service: 11/07/2019    Assessment & Plan   Ingrid Powell is a 74 year old female who presented on 11/6/2019 for scheduled Procedure(s):  Left Reverse Total shoulder arthroplasty by Oliver Velázquez MD and is being followed by the hospital medicine service for co-management of acute and/or chronic perioperative medical problems.    DJD of left shoulder  S/p Procedure(s):  Left Reverse Total shoulder arthroplasty on 11/6/19  1 Day Post-Op    - pain control, wound cares, physical therapy, occupational therapy and DVT prophylaxis per orthopedic surgery service    HTN (hypertension)  Noted per problem list, is not on antihypertensive medications prior to admission. Blood pressure stable post-op.  - Monitor    Hyperlipidemia LDL goal <130  Managed prior to admission with Zocor 40 mg q hs, continue.    Hypothyroidism  Managed prior to admission with levothyroxine 75 mcg daily, continue.    Major depressive disorder, recurrent episode, moderate (HCC)  Anxiety  Stable. Managed prior to admission with Paxil 20 mg q hs, continue.    Primary insomnia  Managed prior to admission with trazodone 100 mg prn, continue as prn medication.     Lumbar radiculopathy  Managed prior to admission with gabapentin 300 mg tid, continue.    MARGIE (obstructive sleep apnea), Severe  Continue use of CPAP with home settings.        DVT Prophylaxis: as per orthopedic surgery service - Pneumatic Compression Devices and aspirin 325 mg daily.  Code Status: Full Code    Lines: Peripheral   Mejía catheter: No    Discussion: Medically, the patient appears to be making adequate post-op progress.    Disposition: Anticipate discharge likely tomorrow to home. No barriers to discharge from internal medicine standpoint.    Attestation:  I have reviewed today's vital signs, notes, medications, labs and imaging.    Serina Moise PA-C  [facility] Hospitalist  Service  Pager: [phone]       Interval History   Pain is worse than patient anticipated, rated 7/10. Has some tingling in left hand after surgery, no weakness.    Tolerating oral intake, denies abdominal pain, nausea, vomiting. No bowel movement since surgery, passing flatus. Urinating without complications.    Gets lightheaded initially when standing, then resolves. No falls or syncope.    Denies chest pain, palpitations, cough, wheeze, shortness of breath, headache, vision changes, or other complaints.    Physical Exam   Temp:  [97.2  F (36.2  C)-98  F (36.7  C)] 97.2  F (36.2  C)  Pulse:  [68-94] 74  Heart Rate:  [85-94] 93  Resp:  [0-23] 20  BP: (100-149)/(53-96) 100/53  SpO2:  [93 %-97 %] 94 %    Weights:   Vitals:    11/06/19 1233   Weight: 86.6 kg (191 lb)    Body mass index is 31.78 kg/m .    General appearance: Awake, alert, and in no apparent distress. Pleasant and conversational, speaking in full sentences.  CV: Regular rhythm & rate, no murmurs. No edema. Peripheral pulses intact.  Respiratory: Moving air well bilaterally, no wheezing, crackles, or rhonchi.  GI: Non-distended, soft, nontender to palpation. No rebound or guarding. Normoactive bowel sounds.  Skin: Warm, dry, no rashes or ecchymoses. No mottling of skin. Dressing present over left shoulder, clean and dry.  Musculoskeletal / extremities: Moves all extremities equally, no obvious abnormalities. Distal CMS intact.  Neurologic: No focal deficits.    Data   Recent Labs   Lab 11/07/19  0533   HGB 11.4*       No results for input(s): GLC, BGM in the last 168 hours.     Unresulted Labs Ordered in the Past 30 Days of this Admission     No orders found for last 31 day(s).           Imaging  Recent Results (from the past 24 hour(s))   XR Shoulder Left Port G/E 2 Views    Narrative    XR SHOULDER LT PORT G/E 2 VW 11/6/2019 6:07 PM     HISTORY: Postop assessment    COMPARISON: 10/7/2019      Impression    IMPRESSION: Reverse total shoulder  arthroplasty. Components are well  seated. Postsurgical air is present. The acromioclavicular joint is  unremarkable.     ALFREDO GARCIA MD        I reviewed all new labs and imaging results over the last 24 hours. I personally reviewed no images or EKG's today.    Medications     lactated ringers Stopped (11/07/19 0946)       aspirin  325 mg Oral Daily     cycloSPORINE  1 drop Both Eyes BID     famotidine  20 mg Oral Q12H    Or     famotidine  20 mg Intravenous Q12H     gabapentin  300 mg Oral TID     levothyroxine  75 mcg Oral QAM AC     magnesium oxide  200 mg Oral Daily     PARoxetine  20 mg Oral At Bedtime     senna-docusate  1 tablet Oral BID    Or     senna-docusate  2 tablet Oral BID     simvastatin  40 mg Oral At Bedtime     sodium chloride (PF)  3 mL Intracatheter Q8H       Serina Moise PA-C  [facility] Hospitalist Service  Pager: [phone]

## 2019-11-07 NOTE — PLAN OF CARE
"Patient ambulates to bathroom with assist of one and IV pole. Norco two tablets given for pain at 0430. Ice applied to shoulder. On capnography. Hard candies given for complaints of scratchy throat. /88 (BP Location: Right arm)   Pulse 82   Temp 98  F (36.7  C) (Oral)   Resp 18   Ht 1.651 m (5' 5\")   Wt 86.6 kg (191 lb)   SpO2 95%   BMI 31.78 kg/m      "

## 2019-11-07 NOTE — PROGRESS NOTES
"[facility] ADMISSION NOTE    Patient admitted to room 2315 at approximately 1850 via cart from surgery. Patient was accompanied by transport tech.     Verbal SBAR report received from Keiry prior to patient arrival.     Patient trasferred to bed via air daisy. Patient alert and oriented X 3. The patient is not having any pain. 0-10 Pain Scale: 4. Admission vital signs: Blood pressure (!) 146/80, pulse 93, temperature 97.6  F (36.4  C), temperature source Oral, resp. rate 18, height 1.651 m (5' 5\"), weight 86.6 kg (191 lb), SpO2 95 %, not currently breastfeeding. Patient was oriented to plan of care, call light, bed controls, tv, telephone, bathroom and visiting hours.     Risk Assessment    The following safety risks were identified during admission: fall. Yellow risk band applied: YES.     Skin Initial Assessment    This writer admitted this patient and completed a full skin assessment and Matthew score in the Adult PCS flowsheet. Appropriate interventions initiated as needed.     Secondary skin check completed by Frank Castro Risk Assessment  Sensory Perception: 4-->no impairment  Moisture: 4-->rarely moist  Activity: 4-->walks frequently  Mobility: 4-->no limitation  Nutrition: 4-->excellent  Friction and Shear: 3-->no apparent problem  Matthew Score: 23    Education    Patient has a [place] to Observation order: No  Observation education completed and documented: N/A      Mara Carroll RN    "

## 2019-11-07 NOTE — PLAN OF CARE
Vitals stable.  Denies pain at this time; declined pain medication before bed.  Patient up with NSTs to bedside commode.  Voided.  CMS intact.  Tolerating oral fluids and toast.  Capnography on.  Plan of care reviewed with patient.

## 2019-11-07 NOTE — PROGRESS NOTES
Skin affirmation note    Admitting nurse completed full skin assessment, Matthew score and Matthew interventions. This writer agrees with the initial skin assessment findings.

## 2019-11-07 NOTE — PROGRESS NOTES
11/07/19 1400   Quick Adds   Type of Visit Initial PT Evaluation   Living Environment   Lives With alone   Living Environment Comment Pt plans on staying w/ her sister- no steps to amb at her sister's home   Self-Care   Activity/Exercise/Self-Care Comment Pt plans on staying w/ her sister   Functional Level Prior   Ambulation 0-->independent   Transferring 0-->independent   Toileting 0-->independent   Bathing 0-->independent   Communication 0-->understands/communicates without difficulty   Swallowing 0-->swallows foods/liquids without difficulty   Cognition 0 - no cognition issues reported   Fall history within last six months no   Prior Functional Level Comment PLOF-Indep.  ambulation using a SEC prn household distances; at all times w/ longer community distances   General Information   Onset of Illness/Injury or Date of Surgery - Date 11/06/19   Referring Physician Comfort   Patient/Family Goals Statement Return home   Pertinent History of Current Problem (include personal factors and/or comorbidities that impact the POC) Pt s/p Left Reverse Total shoulder arthroplasty   Precautions/Limitations fall precautions  (sling- ; see below)   General Observations Pt alert- reports pain at 7/10    General Info Comments Additional precautions- AAROM- FF to 90 degrees. ER- 20 degrees max.   no active IR   Cognitive Status Examination   Orientation orientation to person, place and time   Level of Consciousness alert   Follows Commands and Answers Questions able to follow multistep instructions   Pain Assessment   Patient Currently in Pain Yes, see Vital Sign flowsheet   Integumentary/Edema   Integumentary/Edema Comments ecchymosis L biceps area   Range of Motion (ROM)   ROM Comment L elbow FROM- AAROM in supine. FF to approx 25 degrees. Instructed pt on ROM precautions. Pillow behing LEft UE while supine in bed   Bed Mobility   Bed Mobility Comments SBA   Transfer Skills   Transfer Comments SBA    Gait   Gait Comments Pt  "am in her room w/ SEC, SBA, assisted pt to don her shoes to see if this improves  gait stability. . Pt then amb 125 feet x2 with SEC w/ SBA to CGA/1. Unsteady at times- - with head turns, stops,   In busy hallway- ( discussed looking straight ahead for visual fixation to assist w/ balance). Pt reports balance problems since 2013 following cancer RX- using a SEC  Prn, but no falls.   Pt also to amb w/ nursing staff In preparation for discharge.  Trialed NBQC which did not improve gait quality    Gait Analysis   Gait Deviations Noted   (discontinous steps, unsteady at times)   Impairments Contributing to Gait Deviations impaired balance   Balance   Balance Comments fair / good dynamic standing balance   Sensory Examination   Sensory Perception no deficits were identified   General Therapy Interventions   Planned Therapy Interventions gait training;ROM;risk factor education;home program guidelines   Clinical Impression   Criteria for Skilled Therapeutic Intervention yes, treatment indicated   PT Diagnosis Left Reverse Total shoulder arthroplasty   Influenced by the following impairments LEft shoulder immobilization , no arm swing w/ gait, decreased balance   Functional limitations due to impairments decreased ambulation. decreased indep. w/ reaching. ADLS    Clinical Presentation Stable/Uncomplicated   Clinical Presentation Rationale clinical judgement   Clinical Decision Making (Complexity) Low complexity   Therapy Frequency Daily   Predicted Duration of Therapy Intervention (days/wks) 1 day   Anticipated Equipment Needs at Discharge   (Pt has aSEC for home use)   Anticipated Discharge Disposition Home with Home Therapy- Home PT   Risk & Benefits of therapy have been explained Yes   Patient, Family & other staff in agreement with plan of care Yes   [facility] AM-PAC  \"6 Clicks\" V.2 Basic Mobility Inpatient Short Form   1. Turning from your back to your side while in a flat bed without using bedrails? 4 - None "   2. Moving from lying on your back to sitting on the side of a flat bed without using bedrails? 3 - A Little   3. Moving to and from a bed to a chair (including a wheelchair)? 3 - A Little   4. Standing up from a chair using your arms (e.g., wheelchair, or bedside chair)? 3 - A Little   5. To walk in hospital room? 3 - A Little   6. Climbing 3-5 steps with a railing? 3 - A Little   Basic Mobility Raw Score (Score out of 24.Lower scores equate to lower levels of function) 19   Total Evaluation Time   Total Evaluation Time (Minutes) 10

## 2019-11-07 NOTE — PLAN OF CARE
Pt A&O. VSS. Pain needing to be controlled with ice, sling, naproxen and hydrocodone. Eating regular diet. Tolerating well. No BM but is passing gas. Voiding. Ambulating in room. Dressing C/D/I on shoulder. Pt states she is not ready to go home yet. Plan for DC tomorrow.

## 2019-11-07 NOTE — PROGRESS NOTES
"[facility] Orthopaedics Progress Note      Post-operative Day: 1 Day Post-Op    Procedure(s):  Left Reverse Total shoulder arthroplasty      Subjective:    Pain: minimal - pain is present but states Norco is sufficient to control this  Chest pain, SOB:  No  Passing gas, no BM. States has been almost 1 week since BM    Objective:  Blood pressure 120/66, pulse 68, temperature 97.6  F (36.4  C), temperature source Oral, resp. rate 20, height 1.651 m (5' 5\"), weight 86.6 kg (191 lb), SpO2 95 %, not currently breastfeeding.    Patient Vitals for the past 24 hrs:   BP Temp Temp src Pulse Heart Rate Resp SpO2 Height Weight   11/07/19 0823 120/66 97.6  F (36.4  C) Oral 68 -- 20 95 % -- --   11/07/19 0316 131/88 98  F (36.7  C) Oral 82 -- 18 95 % -- --   11/06/19 2219 (!) 149/76 97.7  F (36.5  C) Oral 78 -- 18 96 % -- --   11/06/19 2100 120/71 -- -- 82 -- -- -- -- --   11/06/19 2030 102/72 -- -- 80 -- -- -- -- --   11/06/19 2000 (!) 143/76 -- -- 80 -- -- -- -- --   11/06/19 1930 (!) 148/88 -- -- 69 -- -- -- -- --   11/06/19 1900 132/78 -- -- 89 -- -- -- -- --   11/06/19 1845 (!) 146/80 97.6  F (36.4  C) Oral 93 93 18 95 % 1.651 m (5' 5\") --   11/06/19 1815 136/78 -- -- 85 85 23 97 % -- --   11/06/19 1800 136/83 -- -- 92 87 (!) 0 97 % -- --   11/06/19 1745 131/77 -- -- 91 91 12 95 % -- --   11/06/19 1735 (!) 135/96 -- -- 94 94 22 93 % -- --   11/06/19 1515 129/81 -- -- 70 70 15 97 % -- --   11/06/19 1500 122/74 -- -- 73 73 23 96 % -- --   11/06/19 1445 106/71 -- -- 76 75 16 94 % -- --   11/06/19 1430 103/76 -- -- 76 74 16 95 % -- --   11/06/19 1420 99/59 -- -- -- 74 16 95 % -- --   11/06/19 1233 132/88 97.5  F (36.4  C) Oral -- 70 16 96 % 1.651 m (5' 5\") 86.6 kg (191 lb)       Wt Readings from Last 4 Encounters:   11/06/19 86.6 kg (191 lb)   10/31/19 88.5 kg (195 lb)   10/07/19 86.6 kg (191 lb)   09/23/19 86.6 kg (191 lb)         Motor function, sensation, and circulation intact   Yes  Wound status: incisions are clean dry " and intact. dressings c/d/i  Calf tenderness: Bilateral  No    Pertinent Labs   Lab Results: personally reviewed.     Recent Labs   Lab Test 11/07/19  0533 10/31/19  1052 05/10/19  1137 03/19/19  1145  03/25/13  1626  05/17/12  1949  02/10/12  0656   INR  --   --   --   --   --   --   --  1.10  --  1.03   HGB 11.4* 13.6 14.2 13.5   < > 12.7   < > 11.1*   < > 13.3   HCT  --  43.6 43.7 42.1   < > 38.7   < > 35.1   < > 42.6   MCV  --  96 95 95   < > 90   < > 93   < > 94   PLT  --  204 227 179   < > 181   < > 208   < > 205   NA  --  137 135 141   < > 139   < > 138   < > 140   CRP  --   --   --   --   --  <5.0  --   --   --   --     < > = values in this interval not displayed.       Plan: Anticoagulation protocol:  mg daily  x 42  days            Pain medications:  norco            Weight bearing status:  NWB LUE, sling on, may remove for hygiene and PT exercises            Disposition:  Home today if passes PT goals and pain controlled. Otherwise home Fri AM             Continue cares and rehabilitation     Report completed by:  Marcelina Garner PA-C  Date: 11/7/2019  Time: 8:53 AM

## 2019-11-07 NOTE — PROGRESS NOTES
"   11/07/19 1000   Quick Adds   Type of Visit Initial Occupational Therapy Evaluation   Living Environment   Lives With alone   Living Environment Comment Pt plans on staying with sister for at least a week. Sister lives in house with no stairs. tub/shower combo.    Functional Level   Ambulation 0-->independent   Transferring 0-->independent   Toileting 0-->independent   Bathing 0-->independent   Dressing 0-->independent   Eating 0-->independent   Communication 0-->understands/communicates without difficulty   Swallowing 0-->swallows foods/liquids without difficulty   Cognition 0 - no cognition issues reported   Fall history within last six months no   Prior Functional Level Comment states she used a SEC at the beginning of the year.    General Information   Onset of Illness/Injury or Date of Surgery - Date 11/06/19   Referring Physician Oliver Velázquez MD   Patient/Family Goals Statement To return home with sisters's assist.    Additional Occupational Profile Info/Pertinent History of Current Problem Left Reverse Total shoulder arthroplasty   Precautions/Limitations   (yes see general info comments for details )   Weight-Bearing Status - LUE nonweight-bearing   General Info Comments sling on except for therapy and bathing. Limit abd to 100 degrees and external rotation to 20 degrees.    Cognitive Status Examination   Orientation orientation to person, place and time   Pain Assessment   Patient Currently in Pain Yes, see Vital Sign flowsheet  (\"7/10\" with movement. )   Transfer Skill: Sit to Stand   Level of [place]: Sit/Stand contact guard   Physical Assist/Nonphysical Assist: Sit/Stand 1 person assist   Transfer Skill: Toilet Transfer   Level of [place]: Toilet contact guard   Physical Assist/Nonphysical Assist: Toilet 1 person assist   Upper Body Dressing   Level of [place]: Dress Upper Body moderate assist (50% patients effort)   Physical Assist/Nonphysical Assist: Dress Upper Body 1 person " "assist   Lower Body Dressing   Level of [place]: Dress Lower Body stand-by assist   Physical Assist/Nonphysical Assist: Dress Lower Body 1 person assist   Instrumental Activities of Daily Living (IADL)   IADL Comments Pt states sister can assist upon discharge. Plans on staying with her sister for at least 1 week.    Activities of Daily Living Analysis   Impairments Contributing to Impaired Activities of Daily Living pain;post surgical precautions   General Therapy Interventions   Planned Therapy Interventions ADL retraining   Clinical Impression   Criteria for Skilled Therapeutic Interventions Met yes, treatment indicated   OT Diagnosis decrased independence with ADLs and functional mobility   Influenced by the following impairments shoulder precautions, limited ROM, pain    Assessment of Occupational Performance 1-3 Performance Deficits   Identified Performance Deficits dressing, showering    Clinical Decision Making (Complexity) Low complexity   Therapy Frequency Daily   Predicted Duration of Therapy Intervention (days/wks) 2 days   Anticipated Discharge Disposition Home with Home Therapy   Risks and Benefits of Treatment have been explained. Yes   Patient, Family & other staff in agreement with plan of care Yes   [facility] AM-PAC  \"6 Clicks\" Daily Activity Inpatient Short Form   1. Putting on and taking off regular lower body clothing? 3 - A Little   2. Bathing (including washing, rinsing, drying)? 3 - A Little   3. Toileting, which includes using toilet, bedpan or urinal? 3 - A Little   4. Putting on and taking off regular upper body clothing? 3 - A Little   5. Taking care of personal grooming such as brushing teeth? 4 - None   6. Eating meals? 4 - None   Daily Activity Raw Score (Score out of 24.Lower scores equate to lower levels of function) 20   Total Evaluation Time   Total Evaluation Time (Minutes) 6     "

## 2019-11-07 NOTE — OP NOTE
Procedure Date: 11/06/2019      PREOPERATIVE DIAGNOSIS:  Left shoulder degenerative arthritis/cuff arthropathy.      POSTOPERATIVE DIAGNOSIS:  Left shoulder degenerative arthritis/cuff arthropathy.      PROCEDURE PERFORMED:  Left reverse total shoulder arthroplasty utilizing a Biomet 10 mm mini humeral stem with 36 mm standard bearing offset plus mini baseplate with 30 mm central screw, the 36 mm standard bearing with +5 humeral tray offset.      SURGEON:  Oliver Velázquez MD      ASSISTANT:  Martin Kaiser PA-C      COMPLICATIONS:  None.      ESTIMATED BLOOD LOSS:  100 mL.      DESCRIPTION OF THE PROCEDURE:  After being informed of the risks, benefits and alternatives, the patient desired to proceed.  She was brought to the operating suite, where she was placed under general anesthesia.  Interscalene block had been previously administered.  She was placed in a low beach chair position.  The left upper extremity was prepped and draped in a manner appropriate for the procedure.  She received 2 gm of Ancef preoperatively.  A timeout verification step was completed.      Martin Kaiser PA-C was present throughout the procedure.  She was critical for patient positioning, prepping, safe progression of the surgery, wound closure, dressing placement and patient transfer.       A deltopectoral approach was taken to the shoulder.  The 3 sisters and venous branches were cauterized and ligated.  The clavipectoral fascia was divided.  A Kolbel retractor was placed, and attention was directed towards the subscapularis.  It was elevated off the lesser tuberosity, and an area of tearing and retraction was appreciated.  The inferior capsule was divided with great care taken to protect neurovascular structures inferiorly.  This allowed for additional exposure.  The supraspinatus and infraspinatus were then divided.  The Biomet resection guide was utilized with a 30-degree retroversion built in, and the cut was completed.  The  protective dome was placed on the humerus.  A Fukuda retractor was placed.  Bankart retractors were placed anteriorly, and a circumferential labrectomy was completed.  The [place] guide was placed, and the guide pin was positioned at the central glenoid.  This was then overreamed.  A slight retroversion was corrected at this time.  The mini baseplate was then advanced, secured with 30 mm central screw, 25, 20 and two 15 mm locking peripheral screws.  Excellent stability was demonstrated.  A 36 mm standard glenosphere with 2 mm of offset was then secured after cleaning the trunnion.  Excellent stability was demonstrated.  Attention was then directed back towards the humerus.  The canal was sequentially reamed up to 10 mm.  The 10 mm trial mini humeral stem was placed.  Trial reductions were carried out, and ultimately excellent motion and stability was demonstrated with a +5 offset and 36 mm standard bearing.  The final stem and bearing surface was assembled.  The humeral stem was secured.  The bearing surface was secured, and excellent stability was demonstrated throughout the range of motion with 90 degrees of internal rotation and 90 degrees of abduction.  Wounds were copiously irrigated.  Deep tissue was closed with 2-0 Vicryl, and the skin was closed with 3-0 Stratafix, and Aquacel dressing was placed.  The patient tolerated the procedure well.  She was returned to the PAR in stable condition.         BRENDAN DIAS MD             D: 2019   T: 2019   MT: DELMIS      Name:     REYES LOUIS   MRN:      [id number]        Account:        [id number]   :      1945           Procedure Date: 2019      Document: [id number]

## 2019-11-07 NOTE — CONSULTS
Care Transition Initial Assessment - RN      Met with: Patient and Family.    DATA  Active Problems:    DJD of left shoulder       Cognitive Status: awake, alert and oriented.        Contact information and PCP information verified: Yes  Lives With: alone                     Insurance concerns: No Insurance issues identified        This writer met with pt, and sisterGayla.  I introduced self and role.  Patient currently lives in a home in [place] alone.  She has no in home services currently.  She is active in the community and independent her ADLS.  Her sister, Gayla lives locally and is supportive.  Patient does not drive.  Gayla provides transportation when needed.  Discussed discharge planning and Medicare guidelines in regards to home care, TCU and LTC.  Patient is interested in home care.  Pt was provided with Medicare certified home care list. Pt chooses to use [facility] ([phone] Fax: [phone]).  Order/referral placed for RN, PT, OT.  Patient's plan is to discharge to her sisterGayla's house initially, then when ready, she'll return to her home independently.  Gayla will provide transportation upon discharge.      PLAN    Home care and family support    Adrianna Tineo RN  Inpatient Care Coordinator  [facility] [phone]  [facility] [phone]    HOME CARE HAND OFF  Patient Name: Ingrid Powell    MRN: [id number]    : 1945    Patient Zip Code: Staying with Gayla holcomb following hospital:  [id number] [place] [address], [facility]    Admit Diagnosis: Degenerative joint disease [M19.90]      Services Pt Needs at Home: RN, PT and OT    Discharge Support: Family/Friend Support    Living Arrangements: With family     or Address Other Than Pt: No-use cell--[phone]    Wound Care: No    Anticipate DC Date: 2019

## 2019-11-08 ENCOUNTER — APPOINTMENT (OUTPATIENT)
Dept: PHYSICAL THERAPY | Facility: CLINIC | Age: 74
DRG: 483 | End: 2019-11-08
Attending: ORTHOPAEDIC SURGERY
Payer: MEDICARE

## 2019-11-08 ENCOUNTER — APPOINTMENT (OUTPATIENT)
Dept: OCCUPATIONAL THERAPY | Facility: CLINIC | Age: 74
DRG: 483 | End: 2019-11-08
Attending: ORTHOPAEDIC SURGERY
Payer: MEDICARE

## 2019-11-08 VITALS
WEIGHT: 191 LBS | HEART RATE: 75 BPM | DIASTOLIC BLOOD PRESSURE: 70 MMHG | TEMPERATURE: 98 F | SYSTOLIC BLOOD PRESSURE: 120 MMHG | RESPIRATION RATE: 16 BRPM | OXYGEN SATURATION: 92 % | HEIGHT: 65 IN | BODY MASS INDEX: 31.82 KG/M2

## 2019-11-08 LAB
GLUCOSE BLDC GLUCOMTR-MCNC: 84 MG/DL (ref 70–99)
HGB BLD-MCNC: 11 G/DL (ref 11.7–15.7)

## 2019-11-08 PROCEDURE — 85018 HEMOGLOBIN: CPT | Performed by: ORTHOPAEDIC SURGERY

## 2019-11-08 PROCEDURE — 00000146 ZZHCL STATISTIC GLUCOSE BY METER IP

## 2019-11-08 PROCEDURE — 25000132 ZZH RX MED GY IP 250 OP 250 PS 637: Performed by: PHYSICIAN ASSISTANT

## 2019-11-08 PROCEDURE — 25000132 ZZH RX MED GY IP 250 OP 250 PS 637: Performed by: NURSE ANESTHETIST, CERTIFIED REGISTERED

## 2019-11-08 PROCEDURE — 97110 THERAPEUTIC EXERCISES: CPT | Mod: GP | Performed by: PHYSICAL THERAPIST

## 2019-11-08 PROCEDURE — 36415 COLL VENOUS BLD VENIPUNCTURE: CPT | Performed by: ORTHOPAEDIC SURGERY

## 2019-11-08 PROCEDURE — 99232 SBSQ HOSP IP/OBS MODERATE 35: CPT | Performed by: PHYSICIAN ASSISTANT

## 2019-11-08 PROCEDURE — 97535 SELF CARE MNGMENT TRAINING: CPT | Mod: GO

## 2019-11-08 PROCEDURE — 25000132 ZZH RX MED GY IP 250 OP 250 PS 637: Performed by: ORTHOPAEDIC SURGERY

## 2019-11-08 PROCEDURE — 97116 GAIT TRAINING THERAPY: CPT | Mod: GP | Performed by: PHYSICAL THERAPIST

## 2019-11-08 RX ORDER — OXYCODONE HYDROCHLORIDE 5 MG/1
5 TABLET ORAL EVERY 4 HOURS PRN
Qty: 50 TABLET | Refills: 0 | Status: SHIPPED | OUTPATIENT
Start: 2019-11-08 | End: 2020-01-13

## 2019-11-08 RX ADMIN — CYCLOSPORINE 1 DROP: 0.5 EMULSION OPHTHALMIC at 09:26

## 2019-11-08 RX ADMIN — OXYCODONE HYDROCHLORIDE 5 MG: 5 SOLUTION ORAL at 12:44

## 2019-11-08 RX ADMIN — OXYCODONE HYDROCHLORIDE 5 MG: 5 SOLUTION ORAL at 00:49

## 2019-11-08 RX ADMIN — GABAPENTIN 300 MG: 300 CAPSULE ORAL at 08:45

## 2019-11-08 RX ADMIN — OXYCODONE HYDROCHLORIDE 5 MG: 5 SOLUTION ORAL at 06:39

## 2019-11-08 RX ADMIN — ASPIRIN 325 MG: 325 TABLET, COATED ORAL at 08:45

## 2019-11-08 RX ADMIN — SENNOSIDES AND DOCUSATE SODIUM 2 TABLET: 8.6; 5 TABLET ORAL at 08:45

## 2019-11-08 RX ADMIN — Medication 200 MG: at 08:45

## 2019-11-08 RX ADMIN — FAMOTIDINE 20 MG: 20 TABLET ORAL at 08:45

## 2019-11-08 RX ADMIN — LEVOTHYROXINE SODIUM 75 MCG: 75 TABLET ORAL at 06:39

## 2019-11-08 ASSESSMENT — ACTIVITIES OF DAILY LIVING (ADL)
ADLS_ACUITY_SCORE: 13

## 2019-11-08 NOTE — PLAN OF CARE
Discharge Planner OT   Patient plan for discharge: Home with home therapy    Current status: Pt observed to have evan sling- not fitting arm properly. Charge RN updated and able to order adult size. Independent for supine to sit. Ambulates to/from bathroom with SBA and SEC- some mild unsteadiness noted. Min A to don shirt, pants and sling- pt states sister can assist with this upon discharge.     Barriers to return to prior living situation: None    Recommendations for discharge: Planning to discharge to sisters home with home therapy. Recommend home OT to continue to address dressing/ADLs as pt plans to eventually return home alone    Rationale for recommendations: needing assist with ADLs- sister can assist upon discharge. Home OT to address ADLs to continue to advance independence within shoulder precautions.     Occupational Therapy Discharge Summary    Reason for therapy discharge:    All goals and outcomes met, no further needs identified.    Progress towards therapy goal(s). See goals on Care Plan in [facility] electronic health record for goal details.  Goals met    Therapy recommendation(s):    Home therapy (PT and OT)            Entered by: Lucila Vallecillo 11/08/2019 11:34 AM

## 2019-11-08 NOTE — PROGRESS NOTES
[facility]    Hospital Medicine Progress Note  Date of Service: 11/08/2019    Assessment & Plan   Ingrid Powell is a 74 year old female who presented on 11/6/2019 for scheduled Procedure(s):  Left Reverse Total shoulder arthroplasty by Oliver Velázquez MD and is being followed by the hospital medicine service for co-management of acute and/or chronic perioperative medical problems.    DJD of left shoulder  S/p Procedure(s):  Left Reverse Total shoulder arthroplasty on 11/6/19  2 Days Post-Op    - pain control, wound cares, physical therapy, occupational therapy and DVT prophylaxis per orthopedic surgery service    HTN (hypertension)  Noted per problem list, is not on antihypertensive medications prior to admission. Blood pressure stable post-op.  - Monitor    Hyperlipidemia LDL goal <130  Managed prior to admission with Zocor 40 mg q hs, continue.    Hypothyroidism  Managed prior to admission with levothyroxine 75 mcg daily, continue.    Major depressive disorder, recurrent episode, moderate (HCC)  Anxiety  Stable. Managed prior to admission with Paxil 20 mg q hs, continue.    Primary insomnia  Managed prior to admission with trazodone 100 mg prn, continue as prn medication.     Lumbar radiculopathy  Managed prior to admission with gabapentin 300 mg tid, continue.    MARGIE (obstructive sleep apnea), Severe  Continue use of CPAP with home settings.        DVT Prophylaxis: as per orthopedic surgery service - Pneumatic Compression Devices and aspirin 325 mg daily.  Code Status: Full Code    Lines: Peripheral   Mejía catheter: No    Discussion: Medically, the patient appears stable for discharge.    Disposition: Anticipate discharge today to sister's house. No barrier to discharge from internal medicine standpoint.    Attestation:  I have reviewed today's vital signs, notes, medications, labs and imaging.    Serina Moise PA-C  [facility] Hospitalist Service  Pager: [phone]        Interval History   Patient feeling better today than yesterday, pain better controlled after switching from Norco to oxycodone. Pain rated 5/10. Still has some tingling of left hand, but improving compared to yesterday.     Tolerating oral intake, denies abdominal pain, nausea, vomiting. No bowel movement since surgery, but passing flatus. Urinating without difficulty.    She complains of increased burping/belching since surgery. No GERD/heartburn symptoms. No pain.    Denies chest pain, palpitations, cough, wheeze, shortness of breath, headache, vision changes, dizziness, or other complaints.    Physical Exam   Temp:  [97.2  F (36.2  C)-98  F (36.7  C)] 98  F (36.7  C)  Pulse:  [73-75] 75  Resp:  [16-20] 16  BP: (100-120)/(53-70) 120/70  SpO2:  [91 %-94 %] 92 %    Weights:   Vitals:    11/06/19 1233   Weight: 86.6 kg (191 lb)    Body mass index is 31.78 kg/m .    General appearance: Awake, alert, and in no apparent distress. Pleasant and conversational, speaking in full sentences.  CV: Regular rhythm & rate, no murmurs. No edema. Peripheral pulses intact.  Respiratory: Moving air well bilaterally, no wheezing, crackles, or rhonchi.  GI: Non-distended, soft, nontender to palpation. No rebound or guarding. Normoactive bowel sounds.  Skin: Warm, dry, no rashes or ecchymoses. No mottling of skin. Dressing on left shoulder clean and dry.  Musculoskeletal / extremities: Moves all extremities equally, no obvious abnormalities.  Distal CMS intact  Neurologic: No focal deficits.      Data   Recent Labs   Lab 11/08/19  0529 11/07/19  0533   HGB 11.0* 11.4*       Recent Labs   Lab 11/08/19  0733   BGM 84        Unresulted Labs Ordered in the Past 30 Days of this Admission     No orders found from 10/7/2019 to 11/7/2019.           Imaging  No results found for this or any previous visit (from the past 24 hour(s)).     I reviewed all new labs and imaging results over the last 24 hours. I personally reviewed no images or  EKG's today.    Medications     lactated ringers Stopped (11/07/19 3168)       aspirin  325 mg Oral Daily     cycloSPORINE  1 drop Both Eyes BID     famotidine  20 mg Oral Q12H    Or     famotidine  20 mg Intravenous Q12H     gabapentin  300 mg Oral TID     levothyroxine  75 mcg Oral QAM AC     magnesium oxide  200 mg Oral Daily     PARoxetine  20 mg Oral At Bedtime     senna-docusate  1 tablet Oral BID    Or     senna-docusate  2 tablet Oral BID     simvastatin  40 mg Oral At Bedtime     sodium chloride (PF)  3 mL Intracatheter Q8H       Serina Moise PA-C  [facility] Hospitalist Service  Pager: [phone]

## 2019-11-08 NOTE — DISCHARGE INSTRUCTIONS
Follow up with 's Team at [facility] in [place], [place] within 10-14 days  to evaluate after surgery.  Call [phone] for appointment or questions. No follow up labs or test are needed.

## 2019-11-08 NOTE — DISCHARGE SUMMARY
[facility] Discharge Summary                                  [facility]     REYES LOUIS [id number]   Age: 74 year old  PCP: Jose Manuel Sutton , [phone] 1945     Date of Admission:  11/6/2019  Date of Discharge::  11/8/2019  Discharge Physician:  Joey Khan PA-C    Code status:  Full Code    Admission Information:  Admission Diagnosis:  Degenerative joint disease [M19.90]    Post-Operative Day: 2 Days Post-Op     Reason for admission:  The patient was admitted for the following:Procedure(s) (LRB):  Left Reverse Total shoulder arthroplasty (Left)    Active Problems:    HTN (hypertension)    Hyperlipidemia LDL goal <130    Major depressive disorder, recurrent episode, moderate (HCC)    Anxiety    Hypothyroidism    MARGIE (obstructive sleep apnea), Severe    Primary insomnia    Lumbosacral radiculopathy at L3    DJD of left shoulder      Allergies:  Dilaudid [hydromorphone hcl]; Fosamax [alendronate sodium]; Norvasc [amlodipine besylate]; and Tegretol [carbamazepine]    Following the procedure noted above the patient was transferred to the post-op floor and started on:    Therapy:  physical therapy  Anticoagulation Plan:  mg daily  for 42 days  Pain Management: oxycodone and tylenol  Weight bearing status: NWB     The patient was followed and co-managed by the hospitalist service during the inpatient treatment course  Complications:  None  Consultations:  None     Pertinent Labs   Lab Results: personally reviewed.     Recent Labs   Lab Test 11/08/19  0529 11/07/19  0533 10/31/19  1052 05/10/19  1137 03/19/19  1145  03/25/13  1626  05/17/12  1949  02/10/12  0656   INR  --   --   --   --   --   --   --   --  1.10  --  1.03   HGB 11.0* 11.4* 13.6 14.2 13.5   < > 12.7   < > 11.1*   < > 13.3   HCT  --   --  43.6 43.7 42.1   < > 38.7   < > 35.1   < > 42.6   MCV  --   --  96 95 95   < > 90   < > 93   < > 94   PLT  --   --  204 227 179   < > 181   < > 208   < > 205   NA   --   --  137 135 141   < > 139   < > 138   < > 140   CRP  --   --   --   --   --   --  <5.0  --   --   --   --     < > = values in this interval not displayed.          Discharge Information:  Condition at discharge: Stable  Discharge destination:  Discharged to home     Medications at discharge:  Current Discharge Medication List      START taking these medications    Details   aspirin (ASA) 325 MG EC tablet Take 1 tablet (325 mg) by mouth daily  Qty: 42 tablet, Refills: 0    Associated Diagnoses: Primary osteoarthritis of left shoulder      oxyCODONE (ROXICODONE) 5 MG tablet Take 1 tablet (5 mg) by mouth every 4 hours as needed for moderate pain or severe pain  Qty: 50 tablet, Refills: 0    Associated Diagnoses: Osteoarthritis of left shoulder, unspecified osteoarthritis type      polyethylene glycol (MIRALAX/GLYCOLAX) powder Take 17 g (1 capful) by mouth daily as needed for constipation  Qty: 1 Bottle, Refills: 0    Associated Diagnoses: Primary osteoarthritis of left shoulder      senna-docusate (SENOKOT-S/PERICOLACE) 8.6-50 MG tablet Take 1-2 tablets by mouth 2 times daily  Qty: 60 tablet, Refills: 0    Associated Diagnoses: Primary osteoarthritis of left shoulder         CONTINUE these medications which have NOT CHANGED    Details   cyanocobalamin (VITAMIN B-12) 100 MCG tablet Take 100 mcg by mouth daily      gabapentin (NEURONTIN) 300 MG capsule Take 1 capsule (300 mg) by mouth 3 times daily  Qty: 180 capsule, Refills: 1    Associated Diagnoses: Chronic bilateral low back pain, with sciatica presence unspecified      levothyroxine (SYNTHROID/LEVOTHROID) 75 MCG tablet TAKE 1 TABLET EVERY DAY  Qty: 90 tablet, Refills: 1    Associated Diagnoses: Hypothyroidism, unspecified type; Malignant neoplasm of tonsil (H); Hyperlipidemia LDL goal <130      magnesium 250 MG tablet Take 1 tablet by mouth every morning       PARoxetine (PAXIL) 20 MG tablet TAKE 1 TABLET AT BEDTIME  Qty: 90 tablet, Refills: 0    Associated  Diagnoses: Major depressive disorder, recurrent episode, moderate (H)      RESTASIS 0.05 % ophthalmic emulsion Place 1 drop into both eyes 2 times daily      simvastatin (ZOCOR) 40 MG tablet TAKE 1 TABLET AT BEDTIME  Qty: 90 tablet, Refills: 2    Associated Diagnoses: Hyperlipidemia LDL goal <130      traZODone (DESYREL) 100 MG tablet Take 1 tablet (100 mg) by mouth nightly as needed for sleep  Qty: 90 tablet, Refills: 1    Associated Diagnoses: Primary insomnia      VITAMIN D, CHOLECALCIFEROL, PO Take 2,000 Units by mouth daily.                          Follow-Up Care:  Patient should be seen in the office in 11-14 days by the Orthopedic Surgeon/Physician Assistant.  Call [phone] for appointment or questions.    SINDHU Sharpe MD

## 2019-11-08 NOTE — PROGRESS NOTES
"[facility] Orthopaedics Progress Note      Post-operative Day: 2 Days Post-Op    Procedure(s):  Left Reverse Total shoulder arthroplasty      Subjective:    Pain: minimal, has supportive family, sister, to help care for her at discharge  Chest pain, SOB:  No      Objective:  Blood pressure 116/62, pulse 73, temperature 97.3  F (36.3  C), temperature source Oral, resp. rate 18, height 1.651 m (5' 5\"), weight 86.6 kg (191 lb), SpO2 91 %, not currently breastfeeding.    Patient Vitals for the past 24 hrs:   BP Temp Temp src Pulse Resp SpO2   11/07/19 2235 116/62 97.3  F (36.3  C) Oral 73 18 91 %   11/07/19 2108 -- -- -- -- 18 --   11/07/19 1724 -- -- -- -- 18 --   11/07/19 1609 100/53 97.2  F (36.2  C) Oral 74 20 94 %   11/07/19 1325 -- -- -- -- 18 --   11/07/19 0905 -- -- -- -- 20 --   11/07/19 0823 120/66 97.6  F (36.4  C) Oral 68 20 95 %       Wt Readings from Last 4 Encounters:   11/06/19 86.6 kg (191 lb)   10/31/19 88.5 kg (195 lb)   10/07/19 86.6 kg (191 lb)   09/23/19 86.6 kg (191 lb)         Motor function, sensation, and circulation intact   Yes  Wound status: incisions are clean dry and intact. Yes  Calf tenderness: Bilateral  No    Pertinent Labs   Lab Results: personally reviewed.     Recent Labs   Lab Test 11/08/19  0529 11/07/19  0533 10/31/19  1052 05/10/19  1137 03/19/19  1145  03/25/13  1626  05/17/12  1949  02/10/12  0656   INR  --   --   --   --   --   --   --   --  1.10  --  1.03   HGB 11.0* 11.4* 13.6 14.2 13.5   < > 12.7   < > 11.1*   < > 13.3   HCT  --   --  43.6 43.7 42.1   < > 38.7   < > 35.1   < > 42.6   MCV  --   --  96 95 95   < > 90   < > 93   < > 94   PLT  --   --  204 227 179   < > 181   < > 208   < > 205   NA  --   --  137 135 141   < > 139   < > 138   < > 140   CRP  --   --   --   --   --   --  <5.0  --   --   --   --     < > = values in this interval not displayed.       Plan: Anticoagulation protocol:  mg daily  x 42  days            Pain medications:  oxycodone and tylenol    "         Weight bearing status:  MADHAVI TERRY            Disposition:  Home today with homecare            Continue cares and rehabilitation     Report completed by:  Joey Khan PA-C  Date: 11/8/2019  Time: 7:43 AM

## 2019-11-08 NOTE — PLAN OF CARE
[facility] DISCHARGE NOTE    Patient discharged to home at 1:05 PM via wheel chair. Accompanied by sister and staff. Discharge instructions reviewed with patient and sibling, opportunity offered to ask questions. Prescriptions sent to patients preferred pharmacy. All belongings sent with patient.    Colleen Alvarez RN

## 2019-11-08 NOTE — PLAN OF CARE
Physical Therapy Discharge Summary    Reason for therapy discharge:    Discharged to home.    Progress towards therapy goal(s). See goals on Care Plan in [facility] electronic health record for goal details.  Goals met     Pt ambulating w/ use of SEC, occas unsteady, but feels comfortable discharging- will have her sister to assist.  HEP issued for Left shoulder     Therapy recommendation(s):    Continued therapy is recommended.  Rationale/Recommendations:  Home care PT to address Left shoulder; balance .

## 2019-11-08 NOTE — PLAN OF CARE
"Oxycodone 5 mg suspension given for pain PRN. Patient states that pain is better controlled with oxy than it was with norco. Ambulates to bathroom with SBA and cane. Shoulder dressing CDI. /62 (BP Location: Right arm)   Pulse 73   Temp 97.3  F (36.3  C) (Oral)   Resp 18   Ht 1.651 m (5' 5\")   Wt 86.6 kg (191 lb)   SpO2 91%   BMI 31.78 kg/m      "

## 2019-11-08 NOTE — PLAN OF CARE
Patient alert and orientated. Vital signs stable. On room air. Afebrile. Up with stand by assist with cane.     POD #1. Dressing to left shoulder is clean, dry, and intact. Sling on. CMS intact. Patient reported norco was not very effective for pain relief. Patient stated the doctor had ordered her something different, but no new orders were seen. PRN oxycodone given. Ice applied.

## 2019-11-11 ENCOUNTER — TELEPHONE (OUTPATIENT)
Dept: FAMILY MEDICINE | Facility: CLINIC | Age: 74
End: 2019-11-11

## 2019-11-11 NOTE — TELEPHONE ENCOUNTER
ED / Discharge Outreach Protocol    Patient Contact    Attempt # 1    Was call answered?  No.  Left message on voicemail with information to call me back.PARRISH Vallecillo RN

## 2019-11-13 ENCOUNTER — TELEPHONE (OUTPATIENT)
Dept: FAMILY MEDICINE | Facility: CLINIC | Age: 74
End: 2019-11-13

## 2019-11-13 NOTE — TELEPHONE ENCOUNTER
Ingrid says she just missed a call from the clinic. She says they didn't leave a message. Pt of Dr Sutton  [phone]

## 2019-11-18 ENCOUNTER — TELEPHONE (OUTPATIENT)
Dept: FAMILY MEDICINE | Facility: CLINIC | Age: 74
End: 2019-11-18

## 2019-11-18 DIAGNOSIS — F33.1 MAJOR DEPRESSIVE DISORDER, RECURRENT EPISODE, MODERATE (H): ICD-10-CM

## 2019-11-18 DIAGNOSIS — E03.9 HYPOTHYROIDISM, UNSPECIFIED TYPE: ICD-10-CM

## 2019-11-18 DIAGNOSIS — C09.9 MALIGNANT NEOPLASM OF TONSIL (H): Chronic | ICD-10-CM

## 2019-11-18 DIAGNOSIS — E78.5 HYPERLIPIDEMIA LDL GOAL <130: Chronic | ICD-10-CM

## 2019-11-18 RX ORDER — PAROXETINE 20 MG/1
TABLET, FILM COATED ORAL
Qty: 90 TABLET | Refills: 0 | Status: SHIPPED | OUTPATIENT
Start: 2019-11-18 | End: 2020-01-17

## 2019-11-18 RX ORDER — LEVOTHYROXINE SODIUM 75 UG/1
TABLET ORAL
Qty: 90 TABLET | Refills: 1 | Status: SHIPPED | OUTPATIENT
Start: 2019-11-18 | End: 2020-02-25

## 2019-11-18 NOTE — TELEPHONE ENCOUNTER
"Requested Prescriptions   Pending Prescriptions Disp Refills     levothyroxine (SYNTHROID/LEVOTHROID) 75 MCG tablet [Pharmacy Med Name: LEVOTHYROXINE SODIUM 75 MCG Tablet] 90 tablet 0     Sig: TAKE 1 TABLET EVERY DAY       Thyroid Protocol Passed - 11/18/2019  9:19 AM        Passed - Patient is 12 years or older        Passed - Recent (12 mo) or future (30 days) visit within the authorizing provider's specialty     Patient has had an office visit with the authorizing provider or a provider within the authorizing providers department within the previous 12 mos or has a future within next 30 days. See \"Patient Info\" tab in inbasket, or \"Choose Columns\" in Meds & Orders section of the refill encounter.      Last Written Prescription Date:  5/13/19  Last Fill Quantity: 90,  # refills: 0   Last office visit: 10/31/2019 with prescribing provider:     Future Office Visit:              Passed - Medication is active on med list        Passed - Normal TSH on file in past 12 months     Recent Labs   Lab Test 08/06/19  1100   TSH 2.56              Passed - No active pregnancy on record     If patient is pregnant or has had a positive pregnancy test, please check TSH.          Passed - No positive pregnancy test in past 12 months     If patient is pregnant or has had a positive pregnancy test, please check TSH.          PARoxetine (PAXIL) 20 MG tablet [Pharmacy Med Name: PAROXETINE HYDROCHLORIDE 20 MG Tablet] 90 tablet 0     Sig: TAKE 1 TABLET AT BEDTIME       SSRIs Protocol Passed - 11/18/2019  9:19 AM        Passed - PHQ-9 score less than 5 in past 6 months     Please review last PHQ-9 score.           Passed - Medication is active on med list        Passed - Patient is age 18 or older        Passed - No active pregnancy on record        Passed - No positive pregnancy test in last 12 months        Passed - Recent (6 mo) or future (30 days) visit within the authorizing provider's specialty     Patient had office visit in the " "last 6 months or has a visit in the next 30 days with authorizing provider or within the authorizing provider's specialty.  See \"Patient Info\" tab in inbasket, or \"Choose Columns\" in Meds & Orders section of the refill encounter.            Last Written Prescription Date:  8/26/19  Last Fill Quantity: 90,  # refills: 0   Last office visit: 10/31/2019 with prescribing provider:     Future Office Visit:      "

## 2019-11-18 NOTE — TELEPHONE ENCOUNTER
[facility] now requests orders and shares plan of care/discharge summaries for some patients through [employer].  Please REPLY TO THIS MESSAGE OR ROUTE BACK TO THE AUTHOR in order to give authorization for orders when needed.  This is considered a verbal order, you will still receive a faxed copy of orders for signature.  Thank you for your assistance in improving collaboration for our patients.    ORDER    Home Occupational Therapy for cognition, ADLs, IADLs, falls prevention, L UE exercise as directed, home safety and energy conservation.     1w1,2w3

## 2019-11-20 ENCOUNTER — TRANSFERRED RECORDS (OUTPATIENT)
Dept: HEALTH INFORMATION MANAGEMENT | Facility: CLINIC | Age: 74
End: 2019-11-20

## 2019-12-16 ENCOUNTER — HOSPITAL ENCOUNTER (OUTPATIENT)
Dept: PHYSICAL THERAPY | Facility: CLINIC | Age: 74
Setting detail: THERAPIES SERIES
End: 2019-12-16
Attending: ORTHOPAEDIC SURGERY
Payer: MEDICARE

## 2019-12-16 PROCEDURE — 97162 PT EVAL MOD COMPLEX 30 MIN: CPT | Mod: GP | Performed by: PHYSICAL MEDICINE & REHABILITATION

## 2019-12-16 PROCEDURE — 97110 THERAPEUTIC EXERCISES: CPT | Mod: GP | Performed by: PHYSICAL MEDICINE & REHABILITATION

## 2019-12-16 PROCEDURE — 97140 MANUAL THERAPY 1/> REGIONS: CPT | Mod: GP | Performed by: PHYSICAL MEDICINE & REHABILITATION

## 2019-12-16 NOTE — PROGRESS NOTES
"   12/16/19 1300   General Information   Type of Visit Initial OP Ortho PT Evaluation   Start of Care Date 12/16/19   Referring Physician Dr. Oliver Velázquez   Patient/Family Goals Statement \"be able to do hair, improve bed mobility and sleeping\"   Orders Evaluate and Treat   Date of Order 11/20/19   Certification Required? Yes   Medical Diagnosis s/p left rev. TSA   Surgical/Medical history reviewed Yes   Precautions/Limitations no known precautions/limitations   General Information Comments PMHx per pt report: cancer, L reverse TSA. PMHx per Epic: HTN, diabetes, COPD, heart disease   Body Part(s)   Body Part(s) Shoulder   Presentation and Etiology   Pertinent history of current problem (include personal factors and/or comorbidities that impact the POC) Pt arrived to PT today for L shoulder pain s/p L reverse TSA. Pt reports pain is located in L shoulder and along medial aspect L upper arm. No sx of numbness, tingling, or burning. Was given table slides, pendulums, scap sets, AAROM flexion, neck ROM, and pulleys at home from home health PT.    Impairments A. Pain;B. Decreased WB tolerance;C. Swelling;D. Decreased ROM;E. Decreased flexibility;F. Decreased strength and endurance   Functional Limitations perform activities of daily living;perform desired leisure / sports activities   Symptom Location L shoulder   How/Where did it occur Other  (s/p L reverse TSA)   Onset date of current episode/exacerbation 11/06/19   Chronicity New   Pain rating (0-10 point scale) Best (/10);Worst (/10)   Best (/10) 2-3   Worst (/10) 6   Pain quality A. Sharp;C. Aching   Frequency of pain/symptoms C. With activity   Pain/symptoms are: Worse during the day   Pain/symptoms exacerbated by E. Rest;G. Certain positions;H. Overhead reach   Pain/symptoms eased by H. Cold   Progression of symptoms since onset: Improved   Prior Level of Function   Prior Level of Function-Mobility has walker that she uses to go down to laundry room   Prior Level " of Function-ADLs independent   Current Level of Function   Current Community Support Family/friend caregiver   Patient role/employment history F. Retired   Living environment Apartment/condo   Home/community accessibility no stairs   Current equipment-Gait/Locomotion None   Fall Risk Screen   Fall screen completed by PT   Have you fallen 2 or more times in the past year? No   Have you fallen and had an injury in the past year? No   Is patient a fall risk? No   Abuse Screen (yes response referral indicated)   Feels Unsafe at Home or Work/School no   Feels Threatened by Someone no   Does Anyone Try to Keep You From Having Contact with Others or Doing Things Outside Your Home? no   Physical Signs of Abuse Present no   Functional Scales   Functional Scales Other   Other Scales  SPADI: % disability    Shoulder Objective Findings   Side (if bilateral, select both right and left) Left   Posture forward head and rounded shoulders   Cervical Screen (ROM, quadrant) flexion: chin to chest, extension: 50% limited, SB R: 50% limited (pain in L shoulder), SB L: 50% limted, Rot R: 25% limited, Rot L: 25% limited   Thoracic Mobility Screen wfl   Thoracic Outlet Syndrom (Augustine, Dean, Madai, Hiutron) NT per MD diagnosis and pt's sx   Shoulder ROM Comment no pain with PROM, muscle guarding and cues to relax throughout testing   Scapulothoracic Rhythm poor   Pec Minor (supine) Flexibility limited   Shoulder Flexibility Comments MMT NT per MD diagnosis and pt's sx with AROM   Shoulder Special Tests Comments Special tests NT per MD diagnosis   Palpation noted increased sx throughout L shoulder soft tissues and bony prominances   Accessory Motion/Joint Mobility Not assessed during evaluation today, will assess at upcoming sessions as appropriate   Left Shoulder Flexion AROM 57* (pain)   Left Shoulder Flexion PROM 90* (muscle guarding)   Left Shoulder Abduction AROM 40* (no pain)   Left Shoulder Abduction PROM shoulder scaption: 90* (muscle  guarding)   Left Shoulder ER AROM at 0* abd: 15* (pain)   Left Shoulder ER PROM in scaption: 30*   Left Shoulder IR AROM at 0* abd: to stomach (no pain)   Left Shoulder IR PROM in scaption: to stomach   Planned Therapy Interventions   Planned Therapy Interventions ADL retraining;IADL retraining;bed mobility training;joint mobilization;manual therapy;motor coordination training;neuromuscular re-education;orthotic fitting/training;ROM;strengthening;stretching;transfer training   Planned Modality Interventions   Planned Modality Interventions Biofeedback;Contrast bath immersion;Cryotherapy;Electrical stimulation;Hot packs;Hydrotherapy;Low level laser therapy;Shortwave diathermy;TENS;Traction;Ultrasound   Planned Modality Interventions Comments only as needed   Clinical Impression   Criteria for Skilled Therapeutic Interventions Met yes, treatment indicated   PT Diagnosis L shoulder pain s/p reverse TSA   Influenced by the following impairments decreased ROM, decreased strength, impaired posture, pain   Functional limitations due to impairments reaching, lifting, carrying, dressing, ADLs   Clinical Presentation Evolving/Changing   Clinical Presentation Rationale comorbidities (see above), ROM, strength, SPADI, clinical judgement   Clinical Decision Making (Complexity) Moderate complexity   Therapy Frequency 2 times/Week   Predicted Duration of Therapy Intervention (days/wks) 12 weeks (decreasing to 1x/week as appropriate)   Risk & Benefits of therapy have been explained Yes   Patient, Family & other staff in agreement with plan of care Yes   Clinical Impression Comments Pt is a 74 y.o. female who presented to the PT clinic today with a rehab diagnosis of L shoulder pain s/p reverse TSA as evidenced by decreased ROM, decreased strength, impaired posture, and pain. Pt is appropriate for skilled PT to address previously listed impairments in order to decrease difficulty with reaching, lifting/carrying, bed mobility, and  performing hair.    Education Assessment   Preferred Learning Style Listening;Reading;Demonstration;Pictures/video   Barriers to Learning No barriers   ORTHO GOALS   PT Ortho Eval Goals 1;2;3;4;5   Ortho Goal 1   Goal Identifier 1   Goal Description Pt will increase L shoulder flexion to 120* in order to decrease difficulty with reaching overhead.    Target Date 01/13/20   Ortho Goal 2   Goal Identifier 2   Goal Description Pt will increase L shoulder strength to 4/5 in order to decrease difficulty with lifting and carrying for ADLs.    Target Date 01/27/20   Ortho Goal 3   Goal Identifier 3   Goal Description Pt will be independent with HEP in order to self manage symptoms.    Target Date 02/10/20   Ortho Goal 4   Goal Identifier 4   Goal Description Pt will be able to perform bed mobility without increase in sx in order to decrease difficulty with sleeing.    Target Date 02/24/20   Ortho Goal 5   Goal Identifier 5   Goal Description Pt will be able to do her own hair without difficulty or increase in pain.    Target Date 03/13/20   Total Evaluation Time   PT Eval, Moderate Complexity Minutes (05100) 20   Therapy Certification   Certification date from 12/16/19   Certification date to 03/13/20   Medical Diagnosis s/p left rev. TSA       Please contact me with any questions or concerns.    Thank you for your referral,     Nighat Farah, PT, DPT  Physical Therapist  [address] [address] [phone]

## 2019-12-16 NOTE — PROGRESS NOTES
[facility]          OUTPATIENT PHYSICAL THERAPY ORTHOPEDIC EVALUATION  PLAN OF TREATMENT FOR OUTPATIENT REHABILITATION  (COMPLETE FOR INITIAL CLAIMS ONLY)  Patient's Last Name, First Name, M.I.  YOB: 1945  Ingrid Poewll       Provider s Name:  [facility]   Medical Record No.  [id number]   Start of Care Date:  12/16/19   Onset Date:  11/06/19   Type:     _X__PT   ___OT   ___SLP Medical Diagnosis:  s/p left rev. TSA     PT Diagnosis:  L shoulder pain s/p reverse TSA   Visits from SOC:  1      _________________________________________________________________________________  Plan of Treatment/Functional Goals:  ADL retraining, IADL retraining, bed mobility training, joint mobilization, manual therapy, motor coordination training, neuromuscular re-education, orthotic fitting/training, ROM, strengthening, stretching, transfer training     Biofeedback, Contrast bath immersion, Cryotherapy, Electrical stimulation, Hot packs, Hydrotherapy, Low level laser therapy, Shortwave diathermy, TENS, Traction, Ultrasound  only as needed  Goals  Goal Identifier: 1  Goal Description: Pt will increase L shoulder flexion to 120* in order to decrease difficulty with reaching overhead.   Target Date: 01/13/20    Goal Identifier: 2  Goal Description: Pt will increase L shoulder strength to 4/5 in order to decrease difficulty with lifting and carrying for ADLs.   Target Date: 01/27/20    Goal Identifier: 3  Goal Description: Pt will be independent with HEP in order to self manage symptoms.   Target Date: 02/10/20    Goal Identifier: 4  Goal Description: Pt will be able to perform bed mobility without increase in sx in order to decrease difficulty with sleeing.   Target Date: 02/24/20    Goal Identifier: 5  Goal Description: Pt will be able to do her own hair without difficulty or increase in pain.   Target Date: 03/13/20      Therapy Frequency:  2 times/Week  Predicted Duration of  Therapy Intervention:  12 weeks (decreasing to 1x/week as appropriate)    Nighat Farah, PT                 I CERTIFY THE NEED FOR THESE SERVICES FURNISHED UNDER        THIS PLAN OF TREATMENT AND WHILE UNDER MY CARE .             Physician Signature               Date    X_____________________________________________________                             Certification Date From:  12/16/19   Certification Date To:  03/13/20    Referring Provider:  Dr. Oliver Velázquez    Initial Assessment        See Epic Evaluation Start of Care Date: 12/16/19

## 2019-12-18 ENCOUNTER — TRANSFERRED RECORDS (OUTPATIENT)
Dept: HEALTH INFORMATION MANAGEMENT | Facility: CLINIC | Age: 74
End: 2019-12-18

## 2019-12-23 ENCOUNTER — HOSPITAL ENCOUNTER (OUTPATIENT)
Dept: PHYSICAL THERAPY | Facility: CLINIC | Age: 74
Setting detail: THERAPIES SERIES
End: 2019-12-23
Attending: ORTHOPAEDIC SURGERY
Payer: MEDICARE

## 2019-12-23 PROCEDURE — 97110 THERAPEUTIC EXERCISES: CPT | Mod: GP | Performed by: PHYSICAL MEDICINE & REHABILITATION

## 2019-12-23 PROCEDURE — 97140 MANUAL THERAPY 1/> REGIONS: CPT | Mod: GP | Performed by: PHYSICAL MEDICINE & REHABILITATION

## 2020-01-03 ENCOUNTER — HOSPITAL ENCOUNTER (OUTPATIENT)
Dept: PHYSICAL THERAPY | Facility: CLINIC | Age: 75
Setting detail: THERAPIES SERIES
End: 2020-01-03
Attending: ORTHOPAEDIC SURGERY
Payer: MEDICARE

## 2020-01-03 PROCEDURE — 97140 MANUAL THERAPY 1/> REGIONS: CPT | Mod: GP | Performed by: PHYSICAL MEDICINE & REHABILITATION

## 2020-01-03 PROCEDURE — 97110 THERAPEUTIC EXERCISES: CPT | Mod: GP | Performed by: PHYSICAL MEDICINE & REHABILITATION

## 2020-01-06 ENCOUNTER — HOSPITAL ENCOUNTER (OUTPATIENT)
Dept: PHYSICAL THERAPY | Facility: CLINIC | Age: 75
Setting detail: THERAPIES SERIES
End: 2020-01-06
Attending: ORTHOPAEDIC SURGERY
Payer: MEDICARE

## 2020-01-06 PROCEDURE — 97110 THERAPEUTIC EXERCISES: CPT | Mod: GP | Performed by: PHYSICAL MEDICINE & REHABILITATION

## 2020-01-06 PROCEDURE — 97140 MANUAL THERAPY 1/> REGIONS: CPT | Mod: GP | Performed by: PHYSICAL MEDICINE & REHABILITATION

## 2020-01-13 ENCOUNTER — HOSPITAL ENCOUNTER (OUTPATIENT)
Dept: PHYSICAL THERAPY | Facility: CLINIC | Age: 75
Setting detail: THERAPIES SERIES
End: 2020-01-13
Attending: ORTHOPAEDIC SURGERY
Payer: MEDICARE

## 2020-01-13 ENCOUNTER — OFFICE VISIT (OUTPATIENT)
Dept: FAMILY MEDICINE | Facility: CLINIC | Age: 75
End: 2020-01-13
Payer: COMMERCIAL

## 2020-01-13 VITALS
BODY MASS INDEX: 32.49 KG/M2 | TEMPERATURE: 98.2 F | OXYGEN SATURATION: 98 % | WEIGHT: 195 LBS | RESPIRATION RATE: 18 BRPM | HEART RATE: 88 BPM | HEIGHT: 65 IN | DIASTOLIC BLOOD PRESSURE: 62 MMHG | SYSTOLIC BLOOD PRESSURE: 110 MMHG

## 2020-01-13 DIAGNOSIS — L98.9 SKIN LESION: Primary | ICD-10-CM

## 2020-01-13 DIAGNOSIS — Z78.0 POST-MENOPAUSAL: ICD-10-CM

## 2020-01-13 PROCEDURE — 99213 OFFICE O/P EST LOW 20 MIN: CPT | Performed by: FAMILY MEDICINE

## 2020-01-13 PROCEDURE — 97110 THERAPEUTIC EXERCISES: CPT | Mod: GP | Performed by: PHYSICAL MEDICINE & REHABILITATION

## 2020-01-13 ASSESSMENT — MIFFLIN-ST. JEOR: SCORE: 1375.39

## 2020-01-13 NOTE — PROGRESS NOTES
SUBJECTIVE   Ingrid Powell is a 74 year old female who presents with     Bump behind ear      Duration: 1.5 months     Description (location/character/radiation): behind right ear     Intensity:  moderate    Accompanying signs and symptoms: not painful unless she lays on it for awhile.     History (similar episodes/previous evaluation): None    Precipitating or alleviating factors: picks at it at times     Therapies tried and outcome: tries to squeeze it- but only gets blood.        PCP   Jose Manuel Sutton [phone]    Health Maintenance        Health Maintenance Due   Topic Date Due     ZOSTER IMMUNIZATION (1 of 2) 09/19/1993     ADVANCE CARE PLANNING  02/09/2017       HPI        Patient Active Problem List   Diagnosis     Hyperlipidemia LDL goal <130     HTN (hypertension)     HX of MALIGNANT NEOPL TONSIL     COPD, mild (H)     zAdvanced directives, counseling/discussion     Major depressive disorder, recurrent episode, moderate (HCC)     Anxiety     Hypothyroidism     MARGIE (obstructive sleep apnea), Severe     Disturbance of salivary secretion (XEROSTOMIA)     Renal cyst     Raynaud's syndrome     History of lumbar surgery     Primary insomnia     Lumbosacral radiculopathy at L3     DDD (degenerative disc disease), lumbar     Adenomatous colon polyp     Diverticulitis of colon     Benign neoplasm of gastrointestinal tract     Hemorrhoids, internal     Rectocele     Tortuous colon     Family history of breast cancer in mother     Malignant neoplasm of oropharynx (H)     DJD of left shoulder     Current Outpatient Medications   Medication     aspirin (ASA) 325 MG EC tablet     cyanocobalamin (VITAMIN B-12) 100 MCG tablet     gabapentin (NEURONTIN) 300 MG capsule     levothyroxine (SYNTHROID/LEVOTHROID) 75 MCG tablet     magnesium 250 MG tablet     PARoxetine (PAXIL) 20 MG tablet     RESTASIS 0.05 % ophthalmic emulsion     senna-docusate (SENOKOT-S/PERICOLACE) 8.6-50 MG tablet     simvastatin (ZOCOR) 40 MG  tablet     traZODone (DESYREL) 100 MG tablet     VITAMIN D, CHOLECALCIFEROL, PO     No current facility-administered medications for this visit.        Patient Active Problem List   Diagnosis     Hyperlipidemia LDL goal <130     HTN (hypertension)     HX of MALIGNANT NEOPL TONSIL     COPD, mild (H)     zAdvanced directives, counseling/discussion     Major depressive disorder, recurrent episode, moderate (HCC)     Anxiety     Hypothyroidism     MARGIE (obstructive sleep apnea), Severe     Disturbance of salivary secretion (XEROSTOMIA)     Renal cyst     Raynaud's syndrome     History of lumbar surgery     Primary insomnia     Lumbosacral radiculopathy at L3     DDD (degenerative disc disease), lumbar     Adenomatous colon polyp     Diverticulitis of colon     Benign neoplasm of gastrointestinal tract     Hemorrhoids, internal     Rectocele     Tortuous colon     Family history of breast cancer in mother     Malignant neoplasm of oropharynx (H)     DJD of left shoulder     Past Surgical History:   Procedure Laterality Date     BACK SURGERY       CARPAL TUNNEL RELEASE RT/LT      ?side     CHOLECYSTECTOMY       EYE SURGERY      cataracts     HERNIA REPAIR       INCISION AND DRAINAGE TRUNK, COMBINED  5/18/2012    Procedure:COMBINED INCISION AND DRAINAGE TRUNK; Incision and Drainage Abdominal Wall Abscess ; Surgeon:ALEXSANDER HANNON; Location:[facility]     INSERT PORT VASCULAR ACCESS  2/8/2012    Procedure:INSERT PORT VASCULAR ACCESS; Surgeon:MARY JANE GUAN; [facility]     JOINT REPLACEMTN, KNEE RT/LT      bilateral     KNEE SURGERY  1995    left- total     KNEE SURGERY  2000    right- total     LAPAROSCOPIC APPENDECTOMY N/A 10/10/2015    Procedure: LAPAROSCOPIC APPENDECTOMY;  Surgeon: Daniel Chamberlain MD;  Location: [facility]     LARYNGOSCOPY, ESOPHAGOSCOPY,  BIOPSY, COMBINED  2/8/2012    Procedure:COMBINED LARYNGOSCOPY, ESOPHAGOSCOPY,  BIOPSY; Direct Laryngoscopy, Esophagoscopy with Biopsy, Stealth Assisted  Left Frontal Sinus Biopsy via Vidal Incision, 8 Turkish Power Port in right subclavian & Percutaneous Endoscopic Gastrostomy Placement * Latex Safe*; Surgeon:LIBORIO CAZARES; Location:[facility]     left ankle fusion       OPTICAL TRACKING SYSTEM ENDOSCOPIC SINUS SURGERY  2012    Procedure:OPTICAL TRACKING SYSTEM ENDOSCOPIC SINUS SURGERY; Surgeon:LIBORIO CAZARES; Location[facility]     RECTAL SURGERY      ? type     RELEASE DEQUERVAINS WRIST Left 2018    Procedure: RELEASE DEQUERVAINS WRIST;  Left 1st Distal compartment release;  Surgeon: Ronak Biggs MD;  Location: [facility]     REMOVE PORT VASCULAR ACCESS  2012    Procedure: REMOVE PORT VASCULAR ACCESS;  Remove Port Vascular Access ;  Surgeon: Phillip Ye MD;  Location: [facility]     REVERSE ARTHROPLASTY SHOULDER Left 2019    Procedure: Left Reverse Total shoulder arthroplasty;  Surgeon: Oliver Velázquez MD;  Location: [facility]       Social History     Tobacco Use     Smoking status: Former Smoker     Packs/day: 0.50     Years: 35.00     Pack years: 17.50     Types: Cigarettes     Last attempt to quit: 10/3/1995     Years since quittin.2     Smokeless tobacco: Never Used   Substance Use Topics     Alcohol use: Yes     Comment: rare     Family History   Problem Relation Age of Onset     Breast Cancer Mother      Arthritis Mother      Cancer Mother      Heart Disease Father          at 72 of heart failure     Arthritis Brother      Pancreatic Cancer Brother      Arthritis Sister      Skin Cancer Sister      Cancer Other         uncle pancreatic/grandmother- colon/aunt ? mat or pat  breast cancer     Cancer Sister          Current Outpatient Medications   Medication Sig Dispense Refill     aspirin (ASA) 325 MG EC tablet Take 1 tablet (325 mg) by mouth daily 42 tablet 0     cyanocobalamin (VITAMIN B-12) 100 MCG tablet Take 100 mcg by mouth daily       gabapentin (NEURONTIN) 300 MG capsule Take 1 capsule (300 mg) by  mouth 3 times daily (Patient taking differently: Take 600 mg by mouth At Bedtime ) 180 capsule 1     levothyroxine (SYNTHROID/LEVOTHROID) 75 MCG tablet TAKE 1 TABLET EVERY DAY 90 tablet 1     magnesium 250 MG tablet Take 1 tablet by mouth every morning        PARoxetine (PAXIL) 20 MG tablet TAKE 1 TABLET AT BEDTIME 90 tablet 0     RESTASIS 0.05 % ophthalmic emulsion Place 1 drop into both eyes 2 times daily       senna-docusate (SENOKOT-S/PERICOLACE) 8.6-50 MG tablet Take 1-2 tablets by mouth 2 times daily 60 tablet 0     simvastatin (ZOCOR) 40 MG tablet TAKE 1 TABLET AT BEDTIME 90 tablet 2     traZODone (DESYREL) 100 MG tablet TAKE 1 TABLET EVERY NIGHT AS NEEDED FOR SLEEP 90 tablet 0     VITAMIN D, CHOLECALCIFEROL, PO Take 2,000 Units by mouth daily.         Allergies   Allergen Reactions     Dilaudid [Hydromorphone Hcl] Other (See Comments)     hallucinations     Fosamax [Alendronate Sodium] Rash            Norvasc [Amlodipine Besylate] Hives and Rash     Tegretol [Carbamazepine] Hives and Rash     Recent Labs   Lab Test 10/31/19  1052 08/06/19  1100 06/14/19  1100 05/10/19  1137 03/19/19  1145  02/26/18  0958  01/13/16  1310   LDL  --   --  68  --   --   --  66  --  76   HDL  --   --  80  --   --   --  62  --  70   TRIG  --   --  45  --   --   --  145  --  77   ALT  --   --  97* 73* 30  --   --    < >  --    CR 0.81  --   --  0.80 0.75  --   --    < >  --    GFRESTIMATED 71  --   --  72 78  --   --    < >  --    GFRESTBLACK 82  --   --  83 >90  --   --    < >  --    POTASSIUM 3.9  --   --  4.1 4.5  --   --    < >  --    TSH  --  2.56  --   --  2.76   < >  --    < >  --     < > = values in this interval not displayed.      BP Readings from Last 3 Encounters:   01/13/20 110/62   11/08/19 120/70   10/31/19 108/74    Wt Readings from Last 3 Encounters:   01/13/20 88.5 kg (195 lb)   11/06/19 86.6 kg (191 lb)   10/31/19 88.5 kg (195 lb)                    Reviewed and updated:  Tobacco  Allergies  Meds  Med Hx   "Surg Hx  Fam Hx  Soc Hx     ROS:  Constitutional, HEENT, cardiovascular, pulmonary, gi and gu systems are negative, except as otherwise noted.    PHYSICAL EXAM   /62 (Cuff Size: Adult Regular)   Pulse 88   Temp 98.2  F (36.8  C) (Tympanic)   Resp 18   Ht 1.651 m (5' 5\")   Wt 88.5 kg (195 lb)   SpO2 98%   Breastfeeding No   BMI 32.45 kg/m    Body mass index is 32.45 kg/m .  GENERAL: alert and no distress  NECK: no adenopathy, no asymmetry, masses, or scars and thyroid normal to palpation  RESP: lungs clear to auscultation - no rales, rhonchi or wheezes  CV: regular rates and rhythm, normal S1 S2, no S3 or S4 and no murmur, click or rub  SKIN: small skin lesion behind right ear with some ulceration as shown below, no bleeding or discharge noted                Assessment & Plan     (L98.9) Skin lesion  (primary encounter diagnosis)  Comment: 74-year-old female presents with nonhealing skin lesion behind right ear, differential discussed in detail including skin cancer.  Dermatology referral placed for further review recommendations  Plan: DERMATOLOGY REFERRAL           (Z78.0) Post-menopausal  Comment: DEXA scan ordered, history of Fosamax allergy, will consider endocrinology referral once DEXA scan result comes back  Plan: DX Hip/Pelvis/Spine               Jose Manuel Sutton MD  [facility]            "

## 2020-01-13 NOTE — NURSING NOTE
"Chief Complaint   Patient presents with     Mass     /62 (Cuff Size: Adult Regular)   Pulse 88   Temp 98.2  F (36.8  C) (Tympanic)   Resp 18   Ht 1.651 m (5' 5\")   Wt 88.5 kg (195 lb)   SpO2 98%   Breastfeeding No   BMI 32.45 kg/m   Estimated body mass index is 32.45 kg/m  as calculated from the following:    Height as of this encounter: 1.651 m (5' 5\").    Weight as of this encounter: 88.5 kg (195 lb).  Patient presents to the clinic using No DME      Health Maintenance that is potentially due pending provider review:    Health Maintenance Due   Topic Date Due     ZOSTER IMMUNIZATION (1 of 2) 09/19/1993     ADVANCE CARE PLANNING  02/09/2017                "

## 2020-01-14 DIAGNOSIS — M54.50 CHRONIC BILATERAL LOW BACK PAIN: ICD-10-CM

## 2020-01-14 DIAGNOSIS — M54.17 LUMBOSACRAL RADICULOPATHY AT L3: Primary | ICD-10-CM

## 2020-01-14 DIAGNOSIS — G89.29 CHRONIC BILATERAL LOW BACK PAIN: ICD-10-CM

## 2020-01-14 RX ORDER — GABAPENTIN 300 MG/1
CAPSULE ORAL
Qty: 180 CAPSULE | Refills: 1 | Status: SHIPPED | OUTPATIENT
Start: 2020-01-14 | End: 2020-06-04

## 2020-01-14 NOTE — TELEPHONE ENCOUNTER
Requested Prescriptions   Pending Prescriptions Disp Refills     gabapentin (NEURONTIN) 300 MG capsule [Pharmacy Med Name: GABAPENTIN 300 MG Capsule] 180 capsule 1     Sig: TAKE 1 CAPSULE THREE TIMES DAILY       There is no refill protocol information for this order        Last Written Prescription Date:  8/6/19  Last Fill Quantity: 180,  # refills: 1   Last office visit: 1/13/2020 with prescribing provider:     Future Office Visit:      Routing refill request to provider for review/approval because:  Drug not on the [facility], [facility] or [facility] refill protocol or controlled substance

## 2020-01-17 DIAGNOSIS — F33.1 MAJOR DEPRESSIVE DISORDER, RECURRENT EPISODE, MODERATE (H): ICD-10-CM

## 2020-01-17 RX ORDER — PAROXETINE 20 MG/1
TABLET, FILM COATED ORAL
Qty: 90 TABLET | Refills: 0 | Status: SHIPPED | OUTPATIENT
Start: 2020-01-17 | End: 2020-05-20

## 2020-01-17 NOTE — TELEPHONE ENCOUNTER
"PAROXETINE HYDROCHLORIDE 20 MG Tablet  Last Written Prescription Date:  11/18/19  Last Fill Quantity: 90,  # refills: 0   Last office visit: 1/13/2020 with prescribing provider:  cierra   Future Office Visit:    Requested Prescriptions   Pending Prescriptions Disp Refills     PARoxetine (PAXIL) 20 MG tablet [Pharmacy Med Name: PAROXETINE HYDROCHLORIDE 20 MG Tablet] 90 tablet 0     Sig: TAKE 1 TABLET AT BEDTIME       SSRIs Protocol Passed - 1/17/2020 12:09 AM        Passed - PHQ-9 score less than 5 in past 6 months     Please review last PHQ-9 score.           Passed - Medication is active on med list        Passed - Patient is age 18 or older        Passed - No active pregnancy on record        Passed - No positive pregnancy test in last 12 months        Passed - Recent (6 mo) or future (30 days) visit within the authorizing provider's specialty     Patient had office visit in the last 6 months or has a visit in the next 30 days with authorizing provider or within the authorizing provider's specialty.  See \"Patient Info\" tab in inbasket, or \"Choose Columns\" in Meds & Orders section of the refill encounter.              "

## 2020-01-20 ENCOUNTER — HOSPITAL ENCOUNTER (OUTPATIENT)
Dept: PHYSICAL THERAPY | Facility: CLINIC | Age: 75
Setting detail: THERAPIES SERIES
End: 2020-01-20
Attending: ORTHOPAEDIC SURGERY
Payer: MEDICARE

## 2020-01-20 PROCEDURE — 97110 THERAPEUTIC EXERCISES: CPT | Mod: GP | Performed by: PHYSICAL MEDICINE & REHABILITATION

## 2020-01-20 PROCEDURE — 97140 MANUAL THERAPY 1/> REGIONS: CPT | Mod: GP | Performed by: PHYSICAL MEDICINE & REHABILITATION

## 2020-01-27 ENCOUNTER — HOSPITAL ENCOUNTER (OUTPATIENT)
Dept: PHYSICAL THERAPY | Facility: CLINIC | Age: 75
Setting detail: THERAPIES SERIES
End: 2020-01-27
Attending: ORTHOPAEDIC SURGERY
Payer: MEDICARE

## 2020-01-27 PROCEDURE — 97110 THERAPEUTIC EXERCISES: CPT | Mod: GP | Performed by: PHYSICAL MEDICINE & REHABILITATION

## 2020-01-27 PROCEDURE — 97140 MANUAL THERAPY 1/> REGIONS: CPT | Mod: GP | Performed by: PHYSICAL MEDICINE & REHABILITATION

## 2020-02-12 ENCOUNTER — HOSPITAL ENCOUNTER (OUTPATIENT)
Dept: BONE DENSITY | Facility: CLINIC | Age: 75
Discharge: HOME | End: 2020-02-12
Attending: FAMILY MEDICINE | Admitting: FAMILY MEDICINE
Payer: MEDICARE

## 2020-02-12 ENCOUNTER — TRANSFERRED RECORDS (OUTPATIENT)
Dept: HEALTH INFORMATION MANAGEMENT | Facility: CLINIC | Age: 75
End: 2020-02-12

## 2020-02-12 DIAGNOSIS — Z78.0 POST-MENOPAUSAL: ICD-10-CM

## 2020-02-12 PROCEDURE — 77080 DXA BONE DENSITY AXIAL: CPT

## 2020-02-17 ENCOUNTER — HOSPITAL ENCOUNTER (OUTPATIENT)
Dept: PHYSICAL THERAPY | Facility: CLINIC | Age: 75
Setting detail: THERAPIES SERIES
End: 2020-02-17
Attending: ORTHOPAEDIC SURGERY
Payer: MEDICARE

## 2020-02-17 ENCOUNTER — OFFICE VISIT (OUTPATIENT)
Dept: FAMILY MEDICINE | Facility: CLINIC | Age: 75
End: 2020-02-17
Payer: COMMERCIAL

## 2020-02-17 VITALS
WEIGHT: 191 LBS | HEART RATE: 71 BPM | OXYGEN SATURATION: 96 % | HEIGHT: 65 IN | TEMPERATURE: 98.1 F | DIASTOLIC BLOOD PRESSURE: 78 MMHG | RESPIRATION RATE: 18 BRPM | SYSTOLIC BLOOD PRESSURE: 124 MMHG | BODY MASS INDEX: 31.82 KG/M2

## 2020-02-17 DIAGNOSIS — R42 LIGHT HEADEDNESS: Primary | ICD-10-CM

## 2020-02-17 DIAGNOSIS — I10 ESSENTIAL HYPERTENSION: ICD-10-CM

## 2020-02-17 PROCEDURE — 99213 OFFICE O/P EST LOW 20 MIN: CPT | Performed by: FAMILY MEDICINE

## 2020-02-17 PROCEDURE — 97110 THERAPEUTIC EXERCISES: CPT | Mod: GP | Performed by: PHYSICAL MEDICINE & REHABILITATION

## 2020-02-17 PROCEDURE — 97140 MANUAL THERAPY 1/> REGIONS: CPT | Mod: GP | Performed by: PHYSICAL MEDICINE & REHABILITATION

## 2020-02-17 ASSESSMENT — MIFFLIN-ST. JEOR: SCORE: 1357.25

## 2020-02-17 NOTE — PROGRESS NOTES
SUBJECTIVE   Ingrid Powell is a 74 year old female who presents with     Dizziness      Duration: 3 weeks     Description   Feeling faint:  no   Feeling like the surroundings are moving: no   Loss of consciousness or falls: no     Intensity:  mild    Accompanying signs and symptoms: Will just be walking and will get a dizzy spell and has to grab onto something or sit down.    Nausea/vomitting: no   Palpitations: no   Weakness in arms or legs: no   Vision or speech changes: no   Ringing in ears (Tinnitus): no   Hearing loss related to dizziness: no   Other (fevers/chills/sweating/dyspnea): no    History (similar episodes/head trauma/previous evaluation/recent bleeding): none    Precipitating or alleviating factors (new meds/chemicals): None  Worse with activity/head movement: no - but has gotten some pains in her head on the left side above her ear.     Therapies tried and outcome: will hold onto something or sit down.    Status post left shoulder surgery recently, having physical therapy currently      PCP   Jose Manuel Sutton [phone]    Health Maintenance        Health Maintenance Due   Topic Date Due     ZOSTER IMMUNIZATION (1 of 2) 09/19/1993     ADVANCE CARE PLANNING  02/09/2017     PHQ-9  03/10/2020       HPI        Patient Active Problem List   Diagnosis     Hyperlipidemia LDL goal <130     HTN (hypertension)     HX of MALIGNANT NEOPL TONSIL     COPD, mild (H)     zAdvanced directives, counseling/discussion     Major depressive disorder, recurrent episode, moderate (HCC)     Anxiety     Hypothyroidism     MARGIE (obstructive sleep apnea), Severe     Disturbance of salivary secretion (XEROSTOMIA)     Renal cyst     Raynaud's syndrome     History of lumbar surgery     Primary insomnia     Lumbosacral radiculopathy at L3     DDD (degenerative disc disease), lumbar     Adenomatous colon polyp     Diverticulitis of colon     Benign neoplasm of gastrointestinal tract     Hemorrhoids, internal     Rectocele      Tortuous colon     Family history of breast cancer in mother     Malignant neoplasm of oropharynx (H)     DJD of left shoulder     Current Outpatient Medications   Medication     aspirin (ASA) 325 MG EC tablet     cyanocobalamin (VITAMIN B-12) 100 MCG tablet     gabapentin (NEURONTIN) 300 MG capsule     levothyroxine (SYNTHROID/LEVOTHROID) 75 MCG tablet     magnesium 250 MG tablet     PARoxetine (PAXIL) 20 MG tablet     RESTASIS 0.05 % ophthalmic emulsion     senna-docusate (SENOKOT-S/PERICOLACE) 8.6-50 MG tablet     simvastatin (ZOCOR) 40 MG tablet     traZODone (DESYREL) 100 MG tablet     VITAMIN D, CHOLECALCIFEROL, PO     No current facility-administered medications for this visit.        Patient Active Problem List   Diagnosis     Hyperlipidemia LDL goal <130     HTN (hypertension)     HX of MALIGNANT NEOPL TONSIL     COPD, mild (H)     zAdvanced directives, counseling/discussion     Major depressive disorder, recurrent episode, moderate (HCC)     Anxiety     Hypothyroidism     MARGIE (obstructive sleep apnea), Severe     Disturbance of salivary secretion (XEROSTOMIA)     Renal cyst     Raynaud's syndrome     History of lumbar surgery     Primary insomnia     Lumbosacral radiculopathy at L3     DDD (degenerative disc disease), lumbar     Adenomatous colon polyp     Diverticulitis of colon     Benign neoplasm of gastrointestinal tract     Hemorrhoids, internal     Rectocele     Tortuous colon     Family history of breast cancer in mother     Malignant neoplasm of oropharynx (H)     DJD of left shoulder     Past Surgical History:   Procedure Laterality Date     BACK SURGERY       CARPAL TUNNEL RELEASE RT/LT      ?side     CHOLECYSTECTOMY       EYE SURGERY      cataracts     HERNIA REPAIR       INCISION AND DRAINAGE TRUNK, COMBINED  5/18/2012    Procedure:COMBINED INCISION AND DRAINAGE TRUNK; Incision and Drainage Abdominal Wall Abscess ; Surgeon:ALEXSANDER HANNON; Location[facility]     INSERT PORT VASCULAR ACCESS   2012    Procedure:INSERT PORT VASCULAR ACCESS; Surgeon:MARY JANE GUAN; Location[facility]     JOINT REPLACEMTN, KNEE RT/LT      bilateral     KNEE SURGERY      left- total     KNEE SURGERY      right- total     LAPAROSCOPIC APPENDECTOMY N/A 10/10/2015    Procedure: LAPAROSCOPIC APPENDECTOMY;  Surgeon: Daniel Chamberlain MD;  Location: [facility]     LARYNGOSCOPY, ESOPHAGOSCOPY,  BIOPSY, COMBINED  2012    Procedure:COMBINED LARYNGOSCOPY, ESOPHAGOSCOPY,  BIOPSY; Direct Laryngoscopy, Esophagoscopy with Biopsy, Stealth Assisted Left Frontal Sinus Biopsy via Viadl Incision, 8 Lebanese Power Port in right subclavian & Percutaneous Endoscopic Gastrostomy Placement * Latex Safe*; Surgeon:LIBORIO CAZARES; Location: OR     left ankle fusion       OPTICAL TRACKING SYSTEM ENDOSCOPIC SINUS SURGERY  2012    Procedure:OPTICAL TRACKING SYSTEM ENDOSCOPIC SINUS SURGERY; Surgeon:LIBORIO CAZARES; Location[facility]     RECTAL SURGERY      ? type     RELEASE DEQUERVAINS WRIST Left 2018    Procedure: RELEASE DEQUERVAINS WRIST;  Left 1st Distal compartment release;  Surgeon: Ronak Biggs MD;  Location: [facility]     REMOVE PORT VASCULAR ACCESS  2012    Procedure: REMOVE PORT VASCULAR ACCESS;  Remove Port Vascular Access ;  Surgeon: Phillip Ye MD;  Location:  OR     REVERSE ARTHROPLASTY SHOULDER Left 2019    Procedure: Left Reverse Total shoulder arthroplasty;  Surgeon: Oliver Velázquez MD;  Location: [facility]       Social History     Tobacco Use     Smoking status: Former Smoker     Packs/day: 0.50     Years: 35.00     Pack years: 17.50     Types: Cigarettes     Last attempt to quit: 10/3/1995     Years since quittin.3     Smokeless tobacco: Never Used   Substance Use Topics     Alcohol use: Yes     Comment: rare     Family History   Problem Relation Age of Onset     Breast Cancer Mother      Arthritis Mother      Cancer Mother      Heart Disease Father           at 72 of heart failure     Arthritis Brother      Pancreatic Cancer Brother      Arthritis Sister      Skin Cancer Sister      Cancer Other         uncle pancreatic/grandmother- colon/aunt ? mat or pat  breast cancer     Cancer Sister          Current Outpatient Medications   Medication Sig Dispense Refill     aspirin (ASA) 325 MG EC tablet Take 1 tablet (325 mg) by mouth daily 42 tablet 0     cyanocobalamin (VITAMIN B-12) 100 MCG tablet Take 100 mcg by mouth daily       gabapentin (NEURONTIN) 300 MG capsule TAKE 1 CAPSULE THREE TIMES DAILY 180 capsule 1     levothyroxine (SYNTHROID/LEVOTHROID) 75 MCG tablet TAKE 1 TABLET EVERY DAY 90 tablet 1     magnesium 250 MG tablet Take 1 tablet by mouth every morning        PARoxetine (PAXIL) 20 MG tablet TAKE 1 TABLET AT BEDTIME 90 tablet 0     RESTASIS 0.05 % ophthalmic emulsion Place 1 drop into both eyes 2 times daily       senna-docusate (SENOKOT-S/PERICOLACE) 8.6-50 MG tablet Take 1-2 tablets by mouth 2 times daily 60 tablet 0     simvastatin (ZOCOR) 40 MG tablet TAKE 1 TABLET AT BEDTIME 90 tablet 2     traZODone (DESYREL) 100 MG tablet TAKE 1 TABLET EVERY NIGHT AS NEEDED FOR SLEEP 90 tablet 0     VITAMIN D, CHOLECALCIFEROL, PO Take 2,000 Units by mouth daily.         Allergies   Allergen Reactions     Dilaudid [Hydromorphone Hcl] Other (See Comments)     hallucinations     Fosamax [Alendronate Sodium] Rash            Norvasc [Amlodipine Besylate] Hives and Rash     Tegretol [Carbamazepine] Hives and Rash     Recent Labs   Lab Test 10/31/19  1052 19  1100 19  1100 05/10/19  1137 19  1145  18  0958  16  1310   LDL  --   --  68  --   --   --  66  --  76   HDL  --   --  80  --   --   --  62  --  70   TRIG  --   --  45  --   --   --  145  --  77   ALT  --   --  97* 73* 30  --   --    < >  --    CR 0.81  --   --  0.80 0.75  --   --    < >  --    GFRESTIMATED 71  --   --  72 78  --   --    < >  --    GFRESTBLACK 82  --   --  83 >90  --   --   "  < >  --    POTASSIUM 3.9  --   --  4.1 4.5  --   --    < >  --    TSH  --  2.56  --   --  2.76   < >  --    < >  --     < > = values in this interval not displayed.      BP Readings from Last 3 Encounters:   02/17/20 124/78   01/13/20 110/62   11/08/19 120/70    Wt Readings from Last 3 Encounters:   02/17/20 86.6 kg (191 lb)   01/13/20 88.5 kg (195 lb)   11/06/19 86.6 kg (191 lb)                    Reviewed and updated:  Tobacco  Allergies  Meds  Med Hx  Surg Hx  Fam Hx  Soc Hx     ROS:  Constitutional, neuro, ENT, endocrine, pulmonary, cardiac, gastrointestinal, genitourinary, musculoskeletal, integument and psychiatric systems are negative, except as otherwise noted.    PHYSICAL EXAM   /78 (Cuff Size: Adult Regular)   Pulse 71   Temp 98.1  F (36.7  C) (Tympanic)   Resp 18   Ht 1.651 m (5' 5\")   Wt 86.6 kg (191 lb)   SpO2 96%   Breastfeeding No   BMI 31.78 kg/m    Body mass index is 31.78 kg/m .  GENERAL: alert and no distress  EYES: Eyes grossly normal to inspection, PERRL and conjunctivae and sclerae normal  NECK: no adenopathy, no asymmetry, masses, or scars and thyroid normal to palpation  RESP: lungs clear to auscultation - no rales, rhonchi or wheezes  CV: regular rate and rhythm, normal S1 S2, no S3 or S4, no murmur, click or rub, no peripheral edema and peripheral pulses strong  ABDOMEN: soft, nontender, no hepatosplenomegaly, no masses and bowel sounds normal  MS: no gross musculoskeletal defects noted, no edema  NEURO: Normal strength and tone, sensory exam grossly normal, mentation intact, speech normal, cranial nerves 2-12 intact, DTR's normal and symmetric bilaterally and rapid alternating movements normal  PSYCH: mentation appears normal, affect normal/bright    Assessment & Plan     (R42) Light headedness  (primary encounter diagnosis)  Comment: Patient experienced few lightheaded episodes during last 3 weeks, denies any headache, visual disturbance, speech difficulty, chest " pain, palpitation, paresthesia/limb weakness, abdominal vaginal/rectal bleeding or other relevant systemic symptoms.  Physical examination unremarkable.  Reassurance provided.  Differential discussed in detail including medication side effect, shared decision made to continue well hydration and current medications.  Return criteria discussed in detail      (I10) Essential hypertension  Comment: Suggested to take aspirin 81 mg rather than 325 mg considering risk and benefits in keeping in view current recommendations  Plan: aspirin (ASA) 81 MG tablet        Jose Manuel Sutton MD  [facility]

## 2020-02-17 NOTE — NURSING NOTE
"Chief Complaint   Patient presents with     Dizziness     /78 (Cuff Size: Adult Regular)   Pulse 71   Temp 98.1  F (36.7  C) (Tympanic)   Resp 18   Ht 1.651 m (5' 5\")   Wt 86.6 kg (191 lb)   SpO2 96%   Breastfeeding No   BMI 31.78 kg/m   Estimated body mass index is 31.78 kg/m  as calculated from the following:    Height as of this encounter: 1.651 m (5' 5\").    Weight as of this encounter: 86.6 kg (191 lb).  Patient presents to the clinic using No DME      Health Maintenance that is potentially due pending provider review:    Health Maintenance Due   Topic Date Due     ZOSTER IMMUNIZATION (1 of 2) 09/19/1993     ADVANCE CARE PLANNING  02/09/2017     PHQ-9  03/10/2020                "

## 2020-02-24 ENCOUNTER — HOSPITAL ENCOUNTER (OUTPATIENT)
Dept: PHYSICAL THERAPY | Facility: CLINIC | Age: 75
Setting detail: THERAPIES SERIES
End: 2020-02-24
Attending: ORTHOPAEDIC SURGERY
Payer: MEDICARE

## 2020-02-24 PROCEDURE — 97110 THERAPEUTIC EXERCISES: CPT | Mod: GP | Performed by: PHYSICAL MEDICINE & REHABILITATION

## 2020-02-24 PROCEDURE — 97140 MANUAL THERAPY 1/> REGIONS: CPT | Mod: GP | Performed by: PHYSICAL MEDICINE & REHABILITATION

## 2020-02-25 DIAGNOSIS — E78.5 HYPERLIPIDEMIA LDL GOAL <130: Chronic | ICD-10-CM

## 2020-02-25 DIAGNOSIS — C09.9 MALIGNANT NEOPLASM OF TONSIL (H): Chronic | ICD-10-CM

## 2020-02-25 DIAGNOSIS — E03.9 HYPOTHYROIDISM, UNSPECIFIED TYPE: ICD-10-CM

## 2020-02-25 RX ORDER — LEVOTHYROXINE SODIUM 75 UG/1
TABLET ORAL
Qty: 90 TABLET | Refills: 0 | Status: SHIPPED | OUTPATIENT
Start: 2020-02-25 | End: 2020-05-13

## 2020-02-25 NOTE — TELEPHONE ENCOUNTER
"Requested Prescriptions   Pending Prescriptions Disp Refills     levothyroxine (SYNTHROID/LEVOTHROID) 75 MCG tablet [Pharmacy Med Name: LEVOTHYROXINE SODIUM 75 MCG Tablet] 90 tablet 1     Sig: TAKE 1 TABLET EVERY DAY       Thyroid Protocol Passed - 2/25/2020  9:37 AM        Passed - Patient is 12 years or older        Passed - Recent (12 mo) or future (30 days) visit within the authorizing provider's specialty     Patient has had an office visit with the authorizing provider or a provider within the authorizing providers department within the previous 12 mos or has a future within next 30 days. See \"Patient Info\" tab in inbasket, or \"Choose Columns\" in Meds & Orders section of the refill encounter.              Passed - Medication is active on med list        Passed - Normal TSH on file in past 12 months     Recent Labs   Lab Test 08/06/19  1100   TSH 2.56              Passed - No active pregnancy on record     If patient is pregnant or has had a positive pregnancy test, please check TSH.          Passed - No positive pregnancy test in past 12 months     If patient is pregnant or has had a positive pregnancy test, please check TSH.          levothyroxine (SYNTHROID/LEVOTHROID) 75 MCG tablet  Last Written Prescription Date:  11/318/2019  Last Fill Quantity: 90 tablet,  # refills: 1   Last office visit: 2/17/2020 with prescribing provider:  LINDSEY Sutton   Future Office Visit:      Merlyn GRULLON (R) (M)    "

## 2020-02-25 NOTE — TELEPHONE ENCOUNTER
Prescription approved per [facility] Refill Protocol.    Dea GUTIERREZ RN

## 2020-03-02 ENCOUNTER — HOSPITAL ENCOUNTER (OUTPATIENT)
Dept: PHYSICAL THERAPY | Facility: CLINIC | Age: 75
Setting detail: THERAPIES SERIES
End: 2020-03-02
Attending: ORTHOPAEDIC SURGERY
Payer: MEDICARE

## 2020-03-02 PROCEDURE — 97110 THERAPEUTIC EXERCISES: CPT | Mod: GP | Performed by: PHYSICAL MEDICINE & REHABILITATION

## 2020-03-02 NOTE — PROGRESS NOTES
[facility]      OUTPATIENT PHYSICAL THERAPY  PLAN OF TREATMENT FOR OUTPATIENT REHABILITATION    Patient's Last Name, First Name, M.I.                YOB: 1945  Ingrid Powell                           Provider's Name  [facility] Medical Record No.  [id number]                               Onset Date: 11/06/2019   Start of Care Date: 12/16/2019   Type:     _X_PT   ___OT   ___SLP Medical Diagnosis: s/p left rev. TSA                       PT Diagnosis: L shoulder pain s/p reverse TSA      _________________________________________________________________________________  Plan of Treatment:  ADL retraining, IADL retraining, bed mobility training, joint mobilization, manual therapy, motor coordination training, neuromuscular re-education, orthotic fitting/training, ROM, strengthening, stretching, transfer training     Cryotherapy, Electrical stimulation, Hot packs, TENS, Traction, Ultrasound  only as needed    Frequency/Duration: 1x/week for 6 weeks     Goals:  Goal Identifier 1   Goal Description Pt will increase L shoulder flexion to 120* in order to decrease difficulty with reaching overhead.    Target Date 01/13/20   Date Met  01/27/20   Progress:     Goal Identifier 2   Goal Description Pt will increase L shoulder strength to 4/5 in order to decrease difficulty with lifting and carrying for ADLs.    Target Date 04/17/20   Date Met      Progress: (nearly met, see objective measure section)     Goal Identifier 3   Goal Description Pt will be independent with HEP in order to self manage symptoms.    Target Date 02/10/20   Date Met  01/27/20   Progress:     Goal Identifier 4   Goal Description Pt will be able to perform bed mobility without increase in sx in order to decrease difficulty with sleeing.    Target Date 02/24/20   Date Met  01/13/20   Progress:     Goal Identifier 5    Goal Description Pt will be able to do her own hair without difficulty or increase in pain.    Target Date 03/13/20   Date Met  03/02/20   Progress:     Progress Toward Goals:   Progress this reporting period: Pt has attended 10 PT sessions, achieving 4/5 short term and long term goals. Pt had regression in PT (decreased ROM, decreased strength) following 3 week hiatus and self discharge. Since returning, pt has regained lost ROM and has been able to initiate strengthening. Progression of HEP is slow due to pt's poor body awarenss despite multiple verbal, visual and tactile cues. Pt is appropriate for continued skilled PT in order to improve residual strength deficits, in order to decrease difficulty with grocery shopping and carrying grocery bags.     Certification date from 3/2/2020 to 4/17/2020.    Nighat Farah, PT          I CERTIFY THE NEED FOR THESE SERVICES FURNISHED UNDER        THIS PLAN OF TREATMENT AND WHILE UNDER MY CARE .             Physician Signature               Date    X_____________________________________________________                      Referring Provider: Dr. Oliver Velázquez

## 2020-03-02 NOTE — PROGRESS NOTES
"Outpatient Physical Therapy Progress Note     Patient: Ingrid Powell  : 1945    Beginning/End Dates of Reporting Period:  2019 to 3/2/2020    Referring Provider: Dr. Boyd Comfort    Therapy Diagnosis: L shoulder pain s/p reverse TSA     Client Self Report: Pt reports L shoulder feels \"not too bad.\" Reports HEP going okay, notes she performs exercises daily.     Objective Measurements:  Objective Measure: L shoulder AROM  Details: flexion: 130*; abd: 140*; IR in scaption: to stomach; ER in scaption: 46*  Objective Measure: L shoulder strength  Details: flexion: 4-/5, abd: 4+/5, extension: 5/5, IR: 4-/5, ER: 4-/5     Outcome Measures (most recent score):  SPADI: 30% disability today (39.23% at initial eval)    Goals:  Goal Identifier 1   Goal Description Pt will increase L shoulder flexion to 120* in order to decrease difficulty with reaching overhead.    Target Date 20   Date Met  20   Progress:     Goal Identifier 2   Goal Description Pt will increase L shoulder strength to 4/5 in order to decrease difficulty with lifting and carrying for ADLs.    Target Date 20   Date Met      Progress: (nearly met, see objective measure section)     Goal Identifier 3   Goal Description Pt will be independent with HEP in order to self manage symptoms.    Target Date 02/10/20   Date Met  20   Progress:     Goal Identifier 4   Goal Description Pt will be able to perform bed mobility without increase in sx in order to decrease difficulty with sleeing.    Target Date 20   Date Met  20   Progress:     Goal Identifier 5   Goal Description Pt will be able to do her own hair without difficulty or increase in pain.    Target Date 20   Date Met  20   Progress:     Progress Toward Goals:   Progress this reporting period: Pt has attended 10 PT sessions, achieving 4/5 short term and long term goals. Pt had regression in PT (decreased ROM, decreased strength) following 3 week " hiatus and self discharge. Since returning, pt has regained lost ROM and has been able to initiate strengthening. Progression of HEP is slow due to pt's poor body awarenss despite multiple verbal, visual and tactile cues. Pt is appropriate for continued skilled PT in order to improve residual strength deficits, in order to decrease difficulty with grocery shopping and carrying grocery bags.     Plan:  Changes to therapy plan of care: 1x/week for 6 weeks--date extension 6 weeks from today to meet remaining 1 goal.  Changes to goals: date extension for remaining 1 goal.    Discharge:  No    Please contact me with any questions or concerns.    Thank you for your referral,     Nighat Farah, PT, DPT  Physical Therapist  [address] [address]  [phone]

## 2020-03-09 ENCOUNTER — HOSPITAL ENCOUNTER (OUTPATIENT)
Dept: PHYSICAL THERAPY | Facility: CLINIC | Age: 75
Setting detail: THERAPIES SERIES
End: 2020-03-09
Attending: ORTHOPAEDIC SURGERY
Payer: MEDICARE

## 2020-03-09 PROCEDURE — 97110 THERAPEUTIC EXERCISES: CPT | Mod: GP | Performed by: PHYSICAL MEDICINE & REHABILITATION

## 2020-03-25 NOTE — PROGRESS NOTES
Outpatient Physical Therapy Discharge Note     Patient: Ingrid Powell  : 1945    Beginning/End Dates of Reporting Period:  2019 to 3/9/2020    Referring Provider: Dr. Oliver Velázquez    Therapy Diagnosis: L shoulder pain s/p reverse TSA    Subjective: PT spoke with pt on the phone 3/25/2020. Pt reports doing well at home and minimal to no discomfort in L shoulder at this time. Reports HEP going well, feels comfortable continuing exercises independently at home. Pt not returning to PT due to [place] current state of [place].     Objective: Patient did not return for follow up treatments as directed.  Goal status and current objective information is therefore unknown.  Discharge from PT services at this time for this episode of treatment. Please see attached documentation under this episode of care for further information including dates of service, start of care date, referring physician, Dx, treatment plan, treatments, etc.    Please contact me with any questions or concerns.    Thank you for your referral.    Nighat Farah, PT, DPT  Physical Therapist   [address] [address]  [phone]

## 2020-04-01 DIAGNOSIS — F51.01 PRIMARY INSOMNIA: ICD-10-CM

## 2020-04-01 RX ORDER — TRAZODONE HYDROCHLORIDE 100 MG/1
TABLET ORAL
Qty: 90 TABLET | Refills: 0 | Status: SHIPPED | OUTPATIENT
Start: 2020-04-01 | End: 2020-06-29

## 2020-04-01 NOTE — TELEPHONE ENCOUNTER
"Requested Prescriptions   Pending Prescriptions Disp Refills     traZODone (DESYREL) 100 MG tablet [Pharmacy Med Name: TRAZODONE HYDROCHLORIDE 100 MG Tablet] 90 tablet 0     Sig: TAKE 1 TABLET EVERY NIGHT AS NEEDED FOR SLEEP       Serotonin Modulators Passed - 4/1/2020 10:35 AM        Passed - Recent (12 mo) or future (30 days) visit within the authorizing provider's specialty     Patient has had an office visit with the authorizing provider or a provider within the authorizing providers department within the previous 12 mos or has a future within next 30 days. See \"Patient Info\" tab in inbasket, or \"Choose Columns\" in Meds & Orders section of the refill encounter.              Passed - Medication is active on med list        Passed - Patient is age 18 or older        Passed - No active pregnancy on record        Passed - No positive pregnancy test in past 12 months         Last Written Prescription Date:  1/10/20  Last Fill Quantity: 90,  # refills: 0   Last office visit: 2/17/2020 with prescribing provider:     Future Office Visit:    "

## 2020-05-13 DIAGNOSIS — E78.5 HYPERLIPIDEMIA LDL GOAL <130: Chronic | ICD-10-CM

## 2020-05-13 DIAGNOSIS — C09.9 MALIGNANT NEOPLASM OF TONSIL (H): Chronic | ICD-10-CM

## 2020-05-13 DIAGNOSIS — E03.9 HYPOTHYROIDISM, UNSPECIFIED TYPE: ICD-10-CM

## 2020-05-13 RX ORDER — LEVOTHYROXINE SODIUM 75 UG/1
TABLET ORAL
Qty: 90 TABLET | Refills: 0 | Status: SHIPPED | OUTPATIENT
Start: 2020-05-13 | End: 2020-07-20

## 2020-05-18 DIAGNOSIS — F33.1 MAJOR DEPRESSIVE DISORDER, RECURRENT EPISODE, MODERATE (H): ICD-10-CM

## 2020-05-20 RX ORDER — PAROXETINE 20 MG/1
TABLET, FILM COATED ORAL
Qty: 90 TABLET | Refills: 0 | Status: SHIPPED | OUTPATIENT
Start: 2020-05-20 | End: 2020-09-01

## 2020-05-23 DIAGNOSIS — E78.5 HYPERLIPIDEMIA LDL GOAL <130: Chronic | ICD-10-CM

## 2020-05-26 RX ORDER — SIMVASTATIN 40 MG
TABLET ORAL
Qty: 90 TABLET | Refills: 2 | Status: SHIPPED | OUTPATIENT
Start: 2020-05-26 | End: 2021-04-15

## 2020-05-27 ENCOUNTER — TELEPHONE (OUTPATIENT)
Dept: FAMILY MEDICINE | Facility: CLINIC | Age: 75
End: 2020-05-27

## 2020-05-27 ENCOUNTER — VIRTUAL VISIT (OUTPATIENT)
Dept: FAMILY MEDICINE | Facility: CLINIC | Age: 75
End: 2020-05-27
Payer: COMMERCIAL

## 2020-05-27 DIAGNOSIS — R05.9 COUGH: ICD-10-CM

## 2020-05-27 DIAGNOSIS — J20.9 ACUTE BRONCHITIS WITH COEXISTING CONDITION REQUIRING PROPHYLACTIC TREATMENT: Primary | ICD-10-CM

## 2020-05-27 PROCEDURE — 99213 OFFICE O/P EST LOW 20 MIN: CPT | Mod: 95 | Performed by: FAMILY MEDICINE

## 2020-05-27 RX ORDER — AZITHROMYCIN 250 MG/1
TABLET, FILM COATED ORAL
Qty: 6 TABLET | Refills: 0 | Status: SHIPPED | OUTPATIENT
Start: 2020-05-27 | End: 2020-06-24

## 2020-05-27 RX ORDER — BENZONATATE 100 MG/1
100 CAPSULE ORAL 3 TIMES DAILY PRN
Qty: 42 CAPSULE | Refills: 0 | Status: SHIPPED | OUTPATIENT
Start: 2020-05-27 | End: 2020-10-19

## 2020-05-27 NOTE — TELEPHONE ENCOUNTER
Patient states she has a history of bronchitis and is now coughing up greenish/yellow. She denies a fever or troubles with breathing. She denies travel in the last 30 days or being exposed to a person with known COVID. She will have a telephone visit this morning with Dr. Sutton.    Ana Maria Grider RN

## 2020-05-27 NOTE — TELEPHONE ENCOUNTER
Reason for call:  Patient reporting a symptom    Symptom or request: Coughing up a lot of Green & Yellow matter. Not feeling good. No Temp. Pt has had Bronchitis in the past. This started over the weekend.  Please Advise    Phone Number patient can be reached at:  Home number on file [phone] (home)    Best Time:  Any Time      Can we leave a detailed message on this number:  YES    Call taken on 5/27/2020 at 10:07 AM by Sima Mendoza

## 2020-05-27 NOTE — TELEPHONE ENCOUNTER
I have attempted to contact this patient by phone with the following results: busy signal.    Ana Maria Grider RN

## 2020-05-27 NOTE — PROGRESS NOTES
"Ingrid Powell is a 74 year old female who is being evaluated via a billable telephone visit.      The patient has been notified of following:     \"This telephone visit will be conducted via a call between you and your physician/provider. We have found that certain health care needs can be provided without the need for a physical exam.  This service lets us provide the care you need with a short phone conversation.  If a prescription is necessary we can send it directly to your pharmacy.  If lab work is needed we can place an order for that and you can then stop by our lab to have the test done at a later time.    Telephone visits are billed at different rates depending on your insurance coverage. During this emergency period, for some insurers they may be billed the same as an in-person visit.  Please reach out to your insurance provider with any questions.    If during the course of the call the physician/provider feels a telephone visit is not appropriate, you will not be charged for this service.\"    Patient has given verbal consent for Telephone visit?  Yes    What phone number would you like to be contacted at? [phone]    How would you like to obtain your AVS? MyChart    Subjective     Ingrid Powell is a 74 year old female who presents via phone visit today for the following health issues:    HPI  ENT Symptoms             Symptoms: cc Present Absent Comment   Fever/Chills   x    Fatigue  x     Muscle Aches   x    Eye Irritation  x  water   Sneezing  x     Nasal Ho/Drg  x     Sinus Pressure/Pain   x    Loss of smell   x    Dental pain   x    Sore Throat   x    Swollen Glands   x    Ear Pain/Fullness   x    Cough  x     Wheeze  x     Chest Pain   x    Shortness of breath  x     Rash   x    Other   x Denies GI sx     Symptom duration:  3-4 days   Symptom severity:  moderate   Treatments tried:  tylenol has not helped   Contacts:  no known exposures         Patient Active Problem List   Diagnosis     " Hyperlipidemia LDL goal <130     HTN (hypertension)     HX of MALIGNANT NEOPL TONSIL     COPD, mild (H)     zAdvanced directives, counseling/discussion     Major depressive disorder, recurrent episode, moderate (HCC)     Anxiety     Hypothyroidism     MARGIE (obstructive sleep apnea), Severe     Disturbance of salivary secretion (XEROSTOMIA)     Renal cyst     Raynaud's syndrome     History of lumbar surgery     Primary insomnia     Lumbosacral radiculopathy at L3     DDD (degenerative disc disease), lumbar     Adenomatous colon polyp     Diverticulitis of colon     Benign neoplasm of gastrointestinal tract     Hemorrhoids, internal     Rectocele     Tortuous colon     Family history of breast cancer in mother     Malignant neoplasm of oropharynx (H)     DJD of left shoulder     Past Surgical History:   Procedure Laterality Date     BACK SURGERY       CARPAL TUNNEL RELEASE RT/LT      ?side     CHOLECYSTECTOMY       EYE SURGERY      cataracts     HERNIA REPAIR       INCISION AND DRAINAGE TRUNK, COMBINED  5/18/2012    Procedure:COMBINED INCISION AND DRAINAGE TRUNK; Incision and Drainage Abdominal Wall Abscess ; Surgeon:ALEXSANDER HANNON; Location: OR     INSERT PORT VASCULAR ACCESS  2/8/2012    Procedure:INSERT PORT VASCULAR ACCESS; Surgeon:MARY JANE GUAN; Location: OR     JOINT REPLACEMTN, KNEE RT/LT      bilateral     KNEE SURGERY  1995    left- total     KNEE SURGERY  2000    right- total     LAPAROSCOPIC APPENDECTOMY N/A 10/10/2015    Procedure: LAPAROSCOPIC APPENDECTOMY;  Surgeon: Daniel Chamberlain MD;  Location: [facility] OR     LARYNGOSCOPY, ESOPHAGOSCOPY,  BIOPSY, COMBINED  2/8/2012    Procedure:COMBINED LARYNGOSCOPY, ESOPHAGOSCOPY,  BIOPSY; Direct Laryngoscopy, Esophagoscopy with Biopsy, Stealth Assisted Left Frontal Sinus Biopsy via Vidal Incision, 8 Khmer Power Port in right subclavian & Percutaneous Endoscopic Gastrostomy Placement * Latex Safe*; Surgeon:LIBORIO CAZARES; Location: OR      left ankle fusion       OPTICAL TRACKING SYSTEM ENDOSCOPIC SINUS SURGERY  2012    Procedure:OPTICAL TRACKING SYSTEM ENDOSCOPIC SINUS SURGERY; Surgeon:LIBORIO CAZARES; Location:[facility] OR     RECTAL SURGERY      ? type     RELEASE DEQUERVAINS WRIST Left 2018    Procedure: RELEASE DEQUERVAINS WRIST;  Left 1st Distal compartment release;  Surgeon: Ronak Biggs MD;  Location: [facility] OR     REMOVE PORT VASCULAR ACCESS  2012    Procedure: REMOVE PORT VASCULAR ACCESS;  Remove Port Vascular Access ;  Surgeon: Phillip Ye MD;  Location: [facility] OR     REVERSE ARTHROPLASTY SHOULDER Left 2019    Procedure: Left Reverse Total shoulder arthroplasty;  Surgeon: Oliver Velázquez MD;  Location: [facility] OR       Social History     Tobacco Use     Smoking status: Former Smoker     Packs/day: 0.50     Years: 35.00     Pack years: 17.50     Types: Cigarettes     Last attempt to quit: 10/3/1995     Years since quittin.6     Smokeless tobacco: Never Used   Substance Use Topics     Alcohol use: Yes     Comment: rare     Family History   Problem Relation Age of Onset     Breast Cancer Mother      Arthritis Mother      Cancer Mother      Heart Disease Father          at 72 of heart failure     Arthritis Brother      Pancreatic Cancer Brother      Arthritis Sister      Skin Cancer Sister      Cancer Other         uncle pancreatic/grandmother- colon/aunt ? mat or pat  breast cancer     Cancer Sister          Current Outpatient Medications   Medication Sig Dispense Refill     aspirin (ASA) 81 MG tablet Take 1 tablet (81 mg) by mouth daily 90 tablet 1     azithromycin (ZITHROMAX) 250 MG tablet Two tablets first day, then one tablet daily for four days. 6 tablet 0     benzonatate (TESSALON) 100 MG capsule Take 1 capsule (100 mg) by mouth 3 times daily as needed 42 capsule 0     cyanocobalamin (VITAMIN B-12) 100 MCG tablet Take 100 mcg by mouth daily       gabapentin (NEURONTIN) 300 MG capsule TAKE 1  CAPSULE THREE TIMES DAILY 180 capsule 1     levothyroxine (SYNTHROID/LEVOTHROID) 75 MCG tablet TAKE 1 TABLET EVERY DAY 90 tablet 0     magnesium 250 MG tablet Take 1 tablet by mouth every morning        PARoxetine (PAXIL) 20 MG tablet TAKE 1 TABLET AT BEDTIME 90 tablet 0     RESTASIS 0.05 % ophthalmic emulsion Place 1 drop into both eyes 2 times daily       senna-docusate (SENOKOT-S/PERICOLACE) 8.6-50 MG tablet Take 1-2 tablets by mouth 2 times daily 60 tablet 0     simvastatin (ZOCOR) 40 MG tablet TAKE 1 TABLET AT BEDTIME 90 tablet 2     traZODone (DESYREL) 100 MG tablet TAKE 1 TABLET EVERY NIGHT AS NEEDED FOR SLEEP 90 tablet 0     VITAMIN D, CHOLECALCIFEROL, PO Take 2,000 Units by mouth daily.         Allergies   Allergen Reactions     Dilaudid [Hydromorphone Hcl] Other (See Comments)     hallucinations     Fosamax [Alendronate Sodium] Rash            Norvasc [Amlodipine Besylate] Hives and Rash     Tegretol [Carbamazepine] Hives and Rash     Recent Labs   Lab Test 10/31/19  1052 08/06/19  1100 06/14/19  1100 05/10/19  1137 03/19/19  1145  02/26/18  0958  01/13/16  1310   LDL  --   --  68  --   --   --  66  --  76   HDL  --   --  80  --   --   --  62  --  70   TRIG  --   --  45  --   --   --  145  --  77   ALT  --   --  97* 73* 30  --   --    < >  --    CR 0.81  --   --  0.80 0.75  --   --    < >  --    GFRESTIMATED 71  --   --  72 78  --   --    < >  --    GFRESTBLACK 82  --   --  83 >90  --   --    < >  --    POTASSIUM 3.9  --   --  4.1 4.5  --   --    < >  --    TSH  --  2.56  --   --  2.76   < >  --    < >  --     < > = values in this interval not displayed.      BP Readings from Last 3 Encounters:   02/17/20 124/78   01/13/20 110/62   11/08/19 120/70    Wt Readings from Last 3 Encounters:   02/17/20 86.6 kg (191 lb)   01/13/20 88.5 kg (195 lb)   11/06/19 86.6 kg (191 lb)                    Reviewed and updated as needed this visit by Provider         Review of Systems   Constitutional, HEENT, cardiovascular,  pulmonary, GI, , musculoskeletal, neuro, skin, endocrine and psych systems are negative, except as otherwise noted.       Objective   Reported vitals:  There were no vitals taken for this visit.   alert and no distress  PSYCH: Alert and oriented times 3; coherent speech, normal   rate and volume, able to articulate logical thoughts, able   to abstract reason, no tangential thoughts, no hallucinations   or delusions  Her affect is normal  RESP: No cough, no audible wheezing, able to talk in full sentences  Remainder of exam unable to be completed due to telephone visits          Assessment/Plan:1. Cough  (J20.9) Acute bronchitis with coexisting condition requiring prophylactic treatment  (primary encounter diagnosis)  Comment: Suspect symptoms secondary to acute bronchitis.  Cannot rule out COVID-19 completely.  Shared decision made to start antibiotic considering history of COPD.  Azithromycin prescribed.  Suggested to use Tessalon for cough control.  We will do COVID-19 PCR test as well as per [facility] guidelines.  Follow-up in 1 week or earlier if needed.  All question answered.  Plan: azithromycin (ZITHROMAX) 250 MG tablet      (R05) Cough  Comment: Tessalon prescribed   Plan: benzonatate (TESSALON) 100 MG capsule,         Symptomatic COVID-19 Virus (Coronavirus) by PCR             Phone call duration:  12 minutes      Jose Manuel Sutton MD

## 2020-05-30 DIAGNOSIS — R05.9 COUGH: ICD-10-CM

## 2020-05-30 PROCEDURE — 99207 ZZC NO CHARGE NURSE ONLY: CPT

## 2020-05-30 PROCEDURE — U0003 INFECTIOUS AGENT DETECTION BY NUCLEIC ACID (DNA OR RNA); SEVERE ACUTE RESPIRATORY SYNDROME CORONAVIRUS 2 (SARS-COV-2) (CORONAVIRUS DISEASE [COVID-19]), AMPLIFIED PROBE TECHNIQUE, MAKING USE OF HIGH THROUGHPUT TECHNOLOGIES AS DESCRIBED BY CMS-2020-01-R: HCPCS | Mod: 90 | Performed by: FAMILY MEDICINE

## 2020-05-30 PROCEDURE — 99000 SPECIMEN HANDLING OFFICE-LAB: CPT | Performed by: FAMILY MEDICINE

## 2020-05-31 LAB
SARS-COV-2 RNA SPEC QL NAA+PROBE: NOT DETECTED
SPECIMEN SOURCE: NORMAL

## 2020-06-04 DIAGNOSIS — M54.17 LUMBOSACRAL RADICULOPATHY AT L3: ICD-10-CM

## 2020-06-05 RX ORDER — GABAPENTIN 300 MG/1
CAPSULE ORAL
Qty: 180 CAPSULE | Refills: 1 | Status: SHIPPED | OUTPATIENT
Start: 2020-06-05 | End: 2020-11-11

## 2020-06-10 ENCOUNTER — TELEPHONE (OUTPATIENT)
Dept: FAMILY MEDICINE | Facility: CLINIC | Age: 75
End: 2020-06-10

## 2020-06-10 DIAGNOSIS — M54.17 LUMBOSACRAL RADICULOPATHY AT L3: ICD-10-CM

## 2020-06-24 ENCOUNTER — ANCILLARY PROCEDURE (OUTPATIENT)
Dept: GENERAL RADIOLOGY | Facility: CLINIC | Age: 75
End: 2020-06-24
Attending: FAMILY MEDICINE
Payer: COMMERCIAL

## 2020-06-24 ENCOUNTER — OFFICE VISIT (OUTPATIENT)
Dept: FAMILY MEDICINE | Facility: CLINIC | Age: 75
End: 2020-06-24
Payer: COMMERCIAL

## 2020-06-24 VITALS
DIASTOLIC BLOOD PRESSURE: 60 MMHG | TEMPERATURE: 98.7 F | HEART RATE: 82 BPM | OXYGEN SATURATION: 97 % | SYSTOLIC BLOOD PRESSURE: 105 MMHG

## 2020-06-24 DIAGNOSIS — R74.8 ABNORMAL LIVER ENZYMES: ICD-10-CM

## 2020-06-24 DIAGNOSIS — M25.552 LEFT HIP PAIN: Primary | ICD-10-CM

## 2020-06-24 DIAGNOSIS — M25.552 LEFT HIP PAIN: ICD-10-CM

## 2020-06-24 LAB
ALBUMIN SERPL-MCNC: 3.4 G/DL (ref 3.4–5)
ALP SERPL-CCNC: 82 U/L (ref 40–150)
ALT SERPL W P-5'-P-CCNC: 15 U/L (ref 0–50)
AST SERPL W P-5'-P-CCNC: 19 U/L (ref 0–45)
BILIRUB DIRECT SERPL-MCNC: <0.1 MG/DL (ref 0–0.2)
BILIRUB SERPL-MCNC: 0.3 MG/DL (ref 0.2–1.3)
PROT SERPL-MCNC: 6.9 G/DL (ref 6.8–8.8)

## 2020-06-24 PROCEDURE — 80076 HEPATIC FUNCTION PANEL: CPT | Performed by: FAMILY MEDICINE

## 2020-06-24 PROCEDURE — 99214 OFFICE O/P EST MOD 30 MIN: CPT | Performed by: FAMILY MEDICINE

## 2020-06-24 PROCEDURE — 36415 COLL VENOUS BLD VENIPUNCTURE: CPT | Performed by: FAMILY MEDICINE

## 2020-06-24 PROCEDURE — 73502 X-RAY EXAM HIP UNI 2-3 VIEWS: CPT

## 2020-06-24 NOTE — PROGRESS NOTES
SUBJECTIVE   Ingrid Powell is a 74 year old female who presents with     Musculoskeletal problem/pain      Duration: February    Description  Location: left hip    Intensity:  moderate    Accompanying signs and symptoms: pain in left hip    History  Previous similar problem: no   Previous evaluation:  none    Precipitating or alleviating factors:  Trauma or overuse: no   Aggravating factors include: walking    Therapies tried and outcome: Ibuprofen, NSAID - aleve and gabapentin - helps for about 3 hours      PCP   Jose Manuel Sutton [phone]    Health Maintenance        Health Maintenance Due   Topic Date Due     ZOSTER IMMUNIZATION (1 of 2) 09/19/1993     ADVANCE CARE PLANNING  02/09/2017     PHQ-9  03/10/2020     FALL RISK ASSESSMENT  03/19/2020       HPI        Patient Active Problem List   Diagnosis     Hyperlipidemia LDL goal <130     HTN (hypertension)     HX of MALIGNANT NEOPL TONSIL     COPD, mild (H)     zAdvanced directives, counseling/discussion     Major depressive disorder, recurrent episode, moderate (HCC)     Anxiety     Hypothyroidism     MARGIE (obstructive sleep apnea), Severe     Disturbance of salivary secretion (XEROSTOMIA)     Renal cyst     Raynaud's syndrome     History of lumbar surgery     Primary insomnia     Lumbosacral radiculopathy at L3     DDD (degenerative disc disease), lumbar     Adenomatous colon polyp     Diverticulitis of colon     Benign neoplasm of gastrointestinal tract     Hemorrhoids, internal     Rectocele     Tortuous colon     Family history of breast cancer in mother     Malignant neoplasm of oropharynx (H)     DJD of left shoulder     Current Outpatient Medications   Medication     aspirin (ASA) 81 MG tablet     benzonatate (TESSALON) 100 MG capsule     cyanocobalamin (VITAMIN B-12) 100 MCG tablet     gabapentin (NEURONTIN) 300 MG capsule     levothyroxine (SYNTHROID/LEVOTHROID) 75 MCG tablet     magnesium 250 MG tablet     PARoxetine (PAXIL) 20 MG tablet      RESTASIS 0.05 % ophthalmic emulsion     senna-docusate (SENOKOT-S/PERICOLACE) 8.6-50 MG tablet     simvastatin (ZOCOR) 40 MG tablet     traZODone (DESYREL) 100 MG tablet     VITAMIN D, CHOLECALCIFEROL, PO     No current facility-administered medications for this visit.        Patient Active Problem List   Diagnosis     Hyperlipidemia LDL goal <130     HTN (hypertension)     HX of MALIGNANT NEOPL TONSIL     COPD, mild (H)     zAdvanced directives, counseling/discussion     Major depressive disorder, recurrent episode, moderate (HCC)     Anxiety     Hypothyroidism     MARGIE (obstructive sleep apnea), Severe     Disturbance of salivary secretion (XEROSTOMIA)     Renal cyst     Raynaud's syndrome     History of lumbar surgery     Primary insomnia     Lumbosacral radiculopathy at L3     DDD (degenerative disc disease), lumbar     Adenomatous colon polyp     Diverticulitis of colon     Benign neoplasm of gastrointestinal tract     Hemorrhoids, internal     Rectocele     Tortuous colon     Family history of breast cancer in mother     Malignant neoplasm of oropharynx (H)     DJD of left shoulder     Past Surgical History:   Procedure Laterality Date     BACK SURGERY       CARPAL TUNNEL RELEASE RT/LT      ?side     CHOLECYSTECTOMY       EYE SURGERY      cataracts     HERNIA REPAIR       INCISION AND DRAINAGE TRUNK, COMBINED  5/18/2012    Procedure:COMBINED INCISION AND DRAINAGE TRUNK; Incision and Drainage Abdominal Wall Abscess ; Surgeon:ALEXSANDER HANNON; Location:[facility]     INSERT PORT VASCULAR ACCESS  2/8/2012    Procedure:INSERT PORT VASCULAR ACCESS; Surgeon:MARY JANE GUAN; Location:[facility]     JOINT REPLACEMTN, KNEE RT/LT      bilateral     KNEE SURGERY  1995    left- total     KNEE SURGERY  2000    right- total     LAPAROSCOPIC APPENDECTOMY N/A 10/10/2015    Procedure: LAPAROSCOPIC APPENDECTOMY;  Surgeon: Daniel Chamberlain MD;  Location: [facility]     LARYNGOSCOPY, ESOPHAGOSCOPY,  BIOPSY, COMBINED  2/8/2012     Procedure:COMBINED LARYNGOSCOPY, ESOPHAGOSCOPY,  BIOPSY; Direct Laryngoscopy, Esophagoscopy with Biopsy, Stealth Assisted Left Frontal Sinus Biopsy via Vidal Incision, 8 Bengali Power Port in right subclavian & Percutaneous Endoscopic Gastrostomy Placement * Latex Safe*; Surgeon:LIBORIO CAZARES; Location:[facility]     left ankle fusion       OPTICAL TRACKING SYSTEM ENDOSCOPIC SINUS SURGERY  2012    Procedure:OPTICAL TRACKING SYSTEM ENDOSCOPIC SINUS SURGERY; Surgeon:LIBORIO CAZARES; Location:[facility]     RECTAL SURGERY      ? type     RELEASE DEQUERVAINS WRIST Left 2018    Procedure: RELEASE DEQUERVAINS WRIST;  Left 1st Distal compartment release;  Surgeon: Ronak Biggs MD;  Location: [facility]     REMOVE PORT VASCULAR ACCESS  2012    Procedure: REMOVE PORT VASCULAR ACCESS;  Remove Port Vascular Access ;  Surgeon: Phillip Ye MD;  Location: [facility]     REVERSE ARTHROPLASTY SHOULDER Left 2019    Procedure: Left Reverse Total shoulder arthroplasty;  Surgeon: Oliver Velázquez MD;  Location: [facility]       Social History     Tobacco Use     Smoking status: Former Smoker     Packs/day: 0.50     Years: 35.00     Pack years: 17.50     Types: Cigarettes     Last attempt to quit: 10/3/1995     Years since quittin.7     Smokeless tobacco: Never Used   Substance Use Topics     Alcohol use: Yes     Comment: rare     Family History   Problem Relation Age of Onset     Breast Cancer Mother      Arthritis Mother      Cancer Mother      Heart Disease Father          at 72 of heart failure     Arthritis Brother      Pancreatic Cancer Brother      Arthritis Sister      Skin Cancer Sister      Cancer Other         uncle pancreatic/grandmother- colon/aunt ? mat or pat  breast cancer     Cancer Sister          Current Outpatient Medications   Medication Sig Dispense Refill     aspirin (ASA) 81 MG tablet Take 1 tablet (81 mg) by mouth daily 90 tablet 1     benzonatate (TESSALON) 100  MG capsule Take 1 capsule (100 mg) by mouth 3 times daily as needed 42 capsule 0     cyanocobalamin (VITAMIN B-12) 100 MCG tablet Take 100 mcg by mouth daily       gabapentin (NEURONTIN) 300 MG capsule TAKE 1 CAPSULE THREE TIMES DAILY 180 capsule 1     levothyroxine (SYNTHROID/LEVOTHROID) 75 MCG tablet TAKE 1 TABLET EVERY DAY 90 tablet 0     magnesium 250 MG tablet Take 1 tablet by mouth every morning        PARoxetine (PAXIL) 20 MG tablet TAKE 1 TABLET AT BEDTIME 90 tablet 0     RESTASIS 0.05 % ophthalmic emulsion Place 1 drop into both eyes 2 times daily       senna-docusate (SENOKOT-S/PERICOLACE) 8.6-50 MG tablet Take 1-2 tablets by mouth 2 times daily 60 tablet 0     simvastatin (ZOCOR) 40 MG tablet TAKE 1 TABLET AT BEDTIME 90 tablet 2     traZODone (DESYREL) 100 MG tablet TAKE 1 TABLET EVERY NIGHT AS NEEDED FOR SLEEP 90 tablet 0     VITAMIN D, CHOLECALCIFEROL, PO Take 2,000 Units by mouth daily.         Allergies   Allergen Reactions     Dilaudid [Hydromorphone Hcl] Other (See Comments)     hallucinations     Fosamax [Alendronate Sodium] Rash            Norvasc [Amlodipine Besylate] Hives and Rash     Tegretol [Carbamazepine] Hives and Rash     Recent Labs   Lab Test 10/31/19  1052 08/06/19  1100 06/14/19  1100 05/10/19  1137 03/19/19  1145  02/26/18  0958  01/13/16  1310   LDL  --   --  68  --   --   --  66  --  76   HDL  --   --  80  --   --   --  62  --  70   TRIG  --   --  45  --   --   --  145  --  77   ALT  --   --  97* 73* 30  --   --    < >  --    CR 0.81  --   --  0.80 0.75  --   --    < >  --    GFRESTIMATED 71  --   --  72 78  --   --    < >  --    GFRESTBLACK 82  --   --  83 >90  --   --    < >  --    POTASSIUM 3.9  --   --  4.1 4.5  --   --    < >  --    TSH  --  2.56  --   --  2.76   < >  --    < >  --     < > = values in this interval not displayed.      BP Readings from Last 3 Encounters:   06/24/20 105/60   02/17/20 124/78   01/13/20 110/62    Wt Readings from Last 3 Encounters:   02/17/20 86.6  kg (191 lb)   01/13/20 88.5 kg (195 lb)   11/06/19 86.6 kg (191 lb)                    Reviewed and updated:  Tobacco  Allergies  Meds  Med Hx  Surg Hx  Fam Hx  Soc Hx     ROS:  Constitutional, neuro, ENT, endocrine, pulmonary, cardiac, gastrointestinal, genitourinary, musculoskeletal, integument and psychiatric systems are negative, except as otherwise noted.    PHYSICAL EXAM   /60   Pulse 82   Temp 98.7  F (37.1  C)   SpO2 97%   There is no height or weight on file to calculate BMI.  GENERAL: alert, no distress and elderly  EYES: Eyes grossly normal to inspection, PERRL and conjunctivae and sclerae normal  NECK: no adenopathy, no asymmetry, masses, or scars and thyroid normal to palpation  RESP: lungs clear to auscultation - no rales, rhonchi or wheezes  CV: regular rates and rhythm, normal S1 S2, no S3 or S4 and no murmur, click or rub  ABDOMEN: soft, nontender  MS: mildly painful left hip external and internal rotation, no skin discoloration or swelling noted, SLR negative bilaterally, pedal pulses 2+, Homans sign negative, reflexes 2+ bilaterally  NEURO: Normal strength and tone, mentation intact and speech normal  PSYCH: mentation appears normal, affect normal/bright    Results for orders placed or performed in visit on 06/24/20   XR Pelvis and Hip Left 2 Views     Status: None (Preliminary result)    Narrative    PELVIS AND LEFT HIP TWO VIEWS  6/24/2020 2:59 PM     HISTORY: Left hip pain.    COMPARISON: 8/11/2017.      Impression    IMPRESSION: No acute bony abnormality. Moderate left hip  osteoarthritis has progressed slightly. Mild right hip osteoarthritis  is stable. Bilateral hip chondrocalcinosis without change.  Osteoarthritis at the pubic symphysis and sacroiliac joints  bilaterally with multilevel advanced degenerative disc disease in the  visualized lower lumbar spine.       Assessment & Plan     (M25.552) Left hip pain  (primary encounter diagnosis)  Comment: Suspect symptoms  secondary to moderate osteoarthritis.  X-ray findings reviewed independently, consistent with osteoarthritis along with chondrocalcinosis.  Suggested continue Tylenol, topical analgesia.  Orthopedic referral placed for further review and recommendations.  Plan: XR Pelvis and Hip Left 2 Views, Orthopedic &         Spine  Referral            (R74.8) Abnormal liver enzymes  Comment: History of mildly elevated ALT and AST.  Ultrasound in June 2019 was unremarkable.  Previous history of cholecystectomy, LFTs ordered to monitor liver function.  Plan: Hepatic panel (Albumin, ALT, AST, Bili, Alk         Phos, TP)                 Jose Manuel Sutton MD  [facility]

## 2020-06-24 NOTE — LETTER
June 25, 2020      Ingrid Powell  [address], [place] [address]  [place] [address]        Dear ,    Liver function tests came back normal. Let us know if there are any questions.     Hepatic panel (Albumin, ALT, AST, Bili, Alk Phos, TP)   Result Value Ref Range    Bilirubin Direct <0.1 0.0 - 0.2 mg/dL    Bilirubin Total 0.3 0.2 - 1.3 mg/dL    Albumin 3.4 3.4 - 5.0 g/dL    Protein Total 6.9 6.8 - 8.8 g/dL    Alkaline Phosphatase 82 40 - 150 U/L    ALT 15 0 - 50 U/L    AST 19 0 - 45 U/L         Sincerely,      Jose Manuel Sutton MD

## 2020-06-26 ENCOUNTER — OFFICE VISIT (OUTPATIENT)
Dept: ORTHOPEDICS | Facility: CLINIC | Age: 75
End: 2020-06-26
Attending: FAMILY MEDICINE
Payer: COMMERCIAL

## 2020-06-26 VITALS — SYSTOLIC BLOOD PRESSURE: 118 MMHG | DIASTOLIC BLOOD PRESSURE: 80 MMHG | HEIGHT: 65 IN | BODY MASS INDEX: 31.78 KG/M2

## 2020-06-26 DIAGNOSIS — M25.552 LEFT HIP PAIN: ICD-10-CM

## 2020-06-26 DIAGNOSIS — M16.12 ARTHRITIS OF LEFT HIP: Primary | ICD-10-CM

## 2020-06-26 PROCEDURE — 20611 DRAIN/INJ JOINT/BURSA W/US: CPT | Mod: LT | Performed by: FAMILY MEDICINE

## 2020-06-26 PROCEDURE — 99203 OFFICE O/P NEW LOW 30 MIN: CPT | Mod: 25 | Performed by: FAMILY MEDICINE

## 2020-06-26 RX ORDER — ROPIVACAINE HYDROCHLORIDE 5 MG/ML
2 INJECTION, SOLUTION EPIDURAL; INFILTRATION; PERINEURAL
Status: DISCONTINUED | OUTPATIENT
Start: 2020-06-26 | End: 2021-01-11

## 2020-06-26 RX ORDER — BETAMETHASONE SODIUM PHOSPHATE AND BETAMETHASONE ACETATE 3; 3 MG/ML; MG/ML
6 INJECTION, SUSPENSION INTRA-ARTICULAR; INTRALESIONAL; INTRAMUSCULAR; SOFT TISSUE
Status: DISCONTINUED | OUTPATIENT
Start: 2020-06-26 | End: 2021-01-11

## 2020-06-26 RX ADMIN — BETAMETHASONE SODIUM PHOSPHATE AND BETAMETHASONE ACETATE 6 MG: 3; 3 INJECTION, SUSPENSION INTRA-ARTICULAR; INTRALESIONAL; INTRAMUSCULAR; SOFT TISSUE at 12:05

## 2020-06-26 RX ADMIN — ROPIVACAINE HYDROCHLORIDE 2 ML: 5 INJECTION, SOLUTION EPIDURAL; INFILTRATION; PERINEURAL at 12:05

## 2020-06-26 NOTE — PROGRESS NOTES
ASSESSMENT & PLAN  Ingrid was seen today for pain.    Diagnoses and all orders for this visit:    Arthritis of left hip  -     Large Joint Injection/Arthocentesis: L hip joint    Left hip pain  -     Orthopedic & Spine  Referral      Patient is a 74 year old female presenting for evaluation of   Chief Complaint   Patient presents with     Left Hip - Pain      # Left Hip Arthritis: Pain notable over the past 4 mon mainly lateral hip radiating to groin.  No radicular sx.  Pt has mild TTP over GT, sig pain over ant groin with pos FADIR.  Previous XR showing moderate b/l hip OA likely cause of pain.  Counseled patient on nature of condition and treatment options.  Given this plan as below, follow-up as needed  Treatment: Activities as tolerated  Physical Therapy HEP given today, if not improved can refer for formal PT  Injection Left hip steroid injection as below with improved pain afterwards  Medications  Limited NSAIDs/Tylenol    Concerning signs/sx that would warrant urgent evaluation were discussed.  All questions were answered, patient understands and agrees with plan.      Return if symptoms worsen or fail to improve.    -----    SUBJECTIVE  Ingrid Powell is a/an 74 year old female who is seen in consultation at the request of  Jose Manuel Sutton M.D. for evaluation of left hip pain. The patient is seen with their sister.    Onset: 4 month(s) ago. Reports insidious onset without acute precipitating event. Saw PCP 6/24/20 and xrays were performed.   Location of Pain: left lateral hip, occasionally radiating left buttock and groin    Rating of Pain at worst: 8-9/10  Rating of Pain Currently: 8-9/10  Worsened by: walking, turning   Better with: nothing   Treatments tried: ice and heat Ibuprofen, Aleve, Gabapentin, DEXA scan 2/12/20   Quality: aching, dull  Associated symptoms: no distal numbness or tingling; denies swelling or warmth  Orthopedic history: NO  Relevant surgical history: NO  Past Medical  History:   Diagnosis Date     Arthritis      COPD (chronic obstructive pulmonary disease) (H)      Depressive disorder      Gastroesophageal reflux disease      History of lumbar surgery 2015     Hoarseness      Hypertension      Oxygen dependent     at night     Reduced vision      Sleep apnea      Social History     Socioeconomic History     Marital status: Single     Spouse name: None     Number of children: None     Years of education: None     Highest education level: None   Occupational History     None   Social Needs     Financial resource strain: None     Food insecurity     Worry: None     Inability: None     Transportation needs     Medical: None     Non-medical: None   Tobacco Use     Smoking status: Former Smoker     Packs/day: 0.50     Years: 35.00     Pack years: 17.50     Types: Cigarettes     Last attempt to quit: 10/3/1995     Years since quittin.7     Smokeless tobacco: Never Used   Substance and Sexual Activity     Alcohol use: Yes     Comment: rare     Drug use: No     Sexual activity: Not Currently     Partners: Male   Lifestyle     Physical activity     Days per week: None     Minutes per session: None     Stress: None   Relationships     Social connections     Talks on phone: None     Gets together: None     Attends Rastafarian service: None     Active member of club or organization: None     Attends meetings of clubs or organizations: None     Relationship status: None     Intimate partner violence     Fear of current or ex partner: None     Emotionally abused: None     Physically abused: None     Forced sexual activity: None   Other Topics Concern     Parent/sibling w/ CABG, MI or angioplasty before 65F 55M? Not Asked   Social History Narrative     None         Patient's past medical, surgical, social, and family histories were reviewed today and no changes are noted.  No family history pertinent to the patient's problem today'   REVIEW OF SYSTEMS:  10 point ROS is negative other  "than symptoms noted above in HPI, Past Medical History or as stated below  Constitutional: NEGATIVE for fever, chills, change in weight  Skin: NEGATIVE for worrisome rashes, moles or lesions  GI/: NEGATIVE for bowel or bladder changes  Neuro: NEGATIVE for weakness, dizziness or paresthesias    OBJECTIVE:  /80   Ht 1.651 m (5' 5\")   BMI 31.78 kg/m     General: healthy, alert and in no distress  HEENT: no scleral icterus or conjunctival erythema  Skin: no suspicious lesions or rash. No jaundice.  CV: no pedal edema  Resp: normal respiratory effort without conversational dyspnea   Psych: normal mood and affect  Gait: normal steady gait with appropriate coordination and balance  Neuro: Normal light sensory exam of lower extremity  MSK:  LEFT HIP  Inspection:    No obvious deformity or asymmetry, level pelvis  Palpation:    Tender about the anterior groin/joint line and greater trochanteric region. Otherwise all other landmarks are nontender.  Active Range of Motion:     Flexion limited slightly by pain, IR limited substantially by pain, ER  limited slightly by pain  Strength:    Flexion 5/5, adduction 5/5, abduction 5/5  Special Tests:    Positive: anterior impingement (FADIR)    Negative: ARELY Nino    Independent visualization of the below image:  No results found for this or any previous visit (from the past 24 hour(s)).     PELVIS AND LEFT HIP TWO VIEWS  6/24/2020 2:59 PM      HISTORY: Left hip pain.     COMPARISON: 8/11/2017.                                                                      IMPRESSION: No acute bony abnormality. Moderate left hip  osteoarthritis has progressed slightly. Mild right hip osteoarthritis  is stable. Bilateral hip chondrocalcinosis without change.  Osteoarthritis at the pubic symphysis and sacroiliac joints  bilaterally with multilevel advanced degenerative disc disease in the  visualized lower lumbar spine.     TERELL FORBES MD    Large Joint Injection/Arthocentesis: " L hip joint    Date/Time: 6/26/2020 12:05 PM  Performed by: Lyle Major MD  Authorized by: Lyle Major MD     Indications:  Pain and osteoarthritis  Needle Size:  22 G  Guidance: ultrasound    Approach:  Anterior  Location:  Hip      Site:  L hip joint  Medications:  6 mg betamethasone acet & sod phos 6 (3-3) MG/ML; 2 mL ropivacaine 5 MG/ML  Outcome:  Tolerated well, no immediate complications  Procedure discussed: discussed risks, benefits, and alternatives    Consent Given by:  Patient  Timeout: timeout called immediately prior to procedure    Prep: patient was prepped and draped in usual sterile fashion     Patient reported significant improvement of pain after left hip steroid injection.  Aftercare instructions given to patient.  Plan to follow-up as discussed above.     Lyle Major MD [facility]               Lyle Major MD, [facility]

## 2020-06-26 NOTE — PATIENT INSTRUCTIONS
Diagnosis: Left Hip Arthritis  Image Findings: Moderate hip arthritis  Treatment: Left hip arthritis  Medications: Limited tylenol/ibuprofen for pain for 1-2 weeks  Follow-up: As needed for repeat steroid injection 3-4 months if symptoms worsen    It was great seeing you today!    Lyle Major      [facility] Injection Discharge Instructions    Procedure: Left hip steroid injection      You may shower, however avoid swimming, tub baths or hot tubs for 24 hours following your procedure    You may have a mild to moderate increase in pain for several days following the injection.    It may take up to 14 days for the steroid medication to start working although you may feel the effect as early as a few days after the procedure.    You may use ice packs for 10-15 minutes, 3 to 4 times a day at the injection site for comfort    You may use anti-inflammatory medications (such as Ibuprofen or Aleve or Advil) or Tylenol for pain control if necessary    If you were fasting, you may resume your normal diet and medications after the procedure    If you have diabetes, check your blood sugar more frequently than usual as your blood sugar may be higher than normal for 10-14 days following a steroid injection. Contact your doctor who manages your diabetes if your blood sugar is higher than usual      If you experience any of the following, call [facility] @ [phone] or [phone]  -Fever over 100 degree F  -Swelling, bleeding, redness, drainage, warmth at the injection site  - New or worsening pain

## 2020-06-26 NOTE — LETTER
6/26/2020         RE: Ingrid Powell  [address]  \[facility] [place] [facility]\"" [id number]        Dear Colleague,    Thank you for referring your patient, Ingrid Powell, to the [facility]. Please see a copy of my visit note below.    ASSESSMENT & PLAN  Ingrid was seen today for pain.    Diagnoses and all orders for this visit:    Arthritis of left hip  -     Large Joint Injection/Arthocentesis: L hip joint    Left hip pain  -     Orthopedic & Spine  Referral      Patient is a 74 year old female presenting for evaluation of   Chief Complaint   Patient presents with     Left Hip - Pain      # Left Hip Arthritis: Pain notable over the past 4 mon mainly lateral hip radiating to groin.  No radicular sx.  Pt has mild TTP over GT, sig pain over ant groin with pos FADIR.  Previous XR showing moderate b/l hip OA likely cause of pain.  Counseled patient on nature of condition and treatment options.  Given this plan as below, follow-up as needed  Treatment: Activities as tolerated  Physical Therapy HEP given today, if not improved can refer for formal PT  Injection Left hip steroid injection as below with improved pain afterwards  Medications  Limited NSAIDs/Tylenol    Concerning signs/sx that would warrant urgent evaluation were discussed.  All questions were answered, patient understands and agrees with plan.      Return if symptoms worsen or fail to improve.    -----    SUBJECTIVE  Ingrid Powell is a/an 74 year old female who is seen in consultation at the request of  Jose Manuel Sutton M.D. for evaluation of left hip pain. The patient is seen with their sister.    Onset: 4 month(s) ago. Reports insidious onset without acute precipitating event. Saw PCP 6/24/20 and xrays were performed.   Location of Pain: left lateral hip, occasionally radiating left buttock and groin    Rating of Pain at worst: 8-9/10  Rating of Pain Currently: 8-9/10  Worsened by: walking, turning   Better  with: nothing   Treatments tried: ice and heat Ibuprofen, Aleve, Gabapentin, DEXA scan 20   Quality: aching, dull  Associated symptoms: no distal numbness or tingling; denies swelling or warmth  Orthopedic history: NO  Relevant surgical history: NO  Past Medical History:   Diagnosis Date     Arthritis      COPD (chronic obstructive pulmonary disease) (H)      Depressive disorder      Gastroesophageal reflux disease      History of lumbar surgery 2015     Hoarseness      Hypertension      Oxygen dependent     at night     Reduced vision      Sleep apnea      Social History     Socioeconomic History     Marital status: Single     Spouse name: None     Number of children: None     Years of education: None     Highest education level: None   Occupational History     None   Social Needs     Financial resource strain: None     Food insecurity     Worry: None     Inability: None     Transportation needs     Medical: None     Non-medical: None   Tobacco Use     Smoking status: Former Smoker     Packs/day: 0.50     Years: 35.00     Pack years: 17.50     Types: Cigarettes     Last attempt to quit: 10/3/1995     Years since quittin.7     Smokeless tobacco: Never Used   Substance and Sexual Activity     Alcohol use: Yes     Comment: rare     Drug use: No     Sexual activity: Not Currently     Partners: Male   Lifestyle     Physical activity     Days per week: None     Minutes per session: None     Stress: None   Relationships     Social connections     Talks on phone: None     Gets together: None     Attends Latter-day service: None     Active member of club or organization: None     Attends meetings of clubs or organizations: None     Relationship status: None     Intimate partner violence     Fear of current or ex partner: None     Emotionally abused: None     Physically abused: None     Forced sexual activity: None   Other Topics Concern     Parent/sibling w/ CABG, MI or angioplasty before 65F 55M? Not Asked  "  Social History Narrative     None         Patient's past medical, surgical, social, and family histories were reviewed today and no changes are noted.  No family history pertinent to the patient's problem today'   REVIEW OF SYSTEMS:  10 point ROS is negative other than symptoms noted above in HPI, Past Medical History or as stated below  Constitutional: NEGATIVE for fever, chills, change in weight  Skin: NEGATIVE for worrisome rashes, moles or lesions  GI/: NEGATIVE for bowel or bladder changes  Neuro: NEGATIVE for weakness, dizziness or paresthesias    OBJECTIVE:  /80   Ht 1.651 m (5' 5\")   BMI 31.78 kg/m     General: healthy, alert and in no distress  HEENT: no scleral icterus or conjunctival erythema  Skin: no suspicious lesions or rash. No jaundice.  CV: no pedal edema  Resp: normal respiratory effort without conversational dyspnea   Psych: normal mood and affect  Gait: normal steady gait with appropriate coordination and balance  Neuro: Normal light sensory exam of lower extremity  MSK:  LEFT HIP  Inspection:    No obvious deformity or asymmetry, level pelvis  Palpation:    Tender about the anterior groin/joint line and greater trochanteric region. Otherwise all other landmarks are nontender.  Active Range of Motion:     Flexion limited slightly by pain, IR limited substantially by pain, ER  limited slightly by pain  Strength:    Flexion 5/5, adduction 5/5, abduction 5/5  Special Tests:    Positive: anterior impingement (FADIR)    Negative: ARELY Nino    Independent visualization of the below image:  No results found for this or any previous visit (from the past 24 hour(s)).     PELVIS AND LEFT HIP TWO VIEWS  6/24/2020 2:59 PM      HISTORY: Left hip pain.     COMPARISON: 8/11/2017.                                                                      IMPRESSION: No acute bony abnormality. Moderate left hip  osteoarthritis has progressed slightly. Mild right hip osteoarthritis  is stable. " Bilateral hip chondrocalcinosis without change.  Osteoarthritis at the pubic symphysis and sacroiliac joints  bilaterally with multilevel advanced degenerative disc disease in the  visualized lower lumbar spine.     TERELL FORBES MD    Large Joint Injection/Arthocentesis: L hip joint    Date/Time: 6/26/2020 12:05 PM  Performed by: Lyle Major MD  Authorized by: Lyle Major MD     Indications:  Pain and osteoarthritis  Needle Size:  22 G  Guidance: ultrasound    Approach:  Anterior  Location:  Hip      Site:  L hip joint  Medications:  6 mg betamethasone acet & sod phos 6 (3-3) MG/ML; 2 mL ropivacaine 5 MG/ML  Outcome:  Tolerated well, no immediate complications  Procedure discussed: discussed risks, benefits, and alternatives    Consent Given by:  Patient  Timeout: timeout called immediately prior to procedure    Prep: patient was prepped and draped in usual sterile fashion     Patient reported significant improvement of pain after left hip steroid injection.  Aftercare instructions given to patient.  Plan to follow-up as discussed above.     Lyle Major MD [facility]               Lyle Major MD, [facility]      Again, thank you for allowing me to participate in the care of your patient.        Sincerely,        Lyle Major MD

## 2020-06-29 DIAGNOSIS — F51.01 PRIMARY INSOMNIA: ICD-10-CM

## 2020-06-29 RX ORDER — TRAZODONE HYDROCHLORIDE 100 MG/1
TABLET ORAL
Qty: 90 TABLET | Refills: 3 | Status: SHIPPED | OUTPATIENT
Start: 2020-06-29 | End: 2021-05-26

## 2020-07-20 DIAGNOSIS — E78.5 HYPERLIPIDEMIA LDL GOAL <130: Chronic | ICD-10-CM

## 2020-07-20 DIAGNOSIS — C09.9 MALIGNANT NEOPLASM OF TONSIL (H): Chronic | ICD-10-CM

## 2020-07-20 DIAGNOSIS — E03.9 HYPOTHYROIDISM, UNSPECIFIED TYPE: ICD-10-CM

## 2020-07-20 RX ORDER — LEVOTHYROXINE SODIUM 75 UG/1
TABLET ORAL
Qty: 90 TABLET | Refills: 0 | Status: SHIPPED | OUTPATIENT
Start: 2020-07-20 | End: 2021-03-19

## 2020-07-20 NOTE — TELEPHONE ENCOUNTER
"Routing refill request to provider for review/approval because: Due to have TSH checked in ~ 3 weeks    Requested Prescriptions   Pending Prescriptions Disp Refills     levothyroxine (SYNTHROID/LEVOTHROID) 75 MCG tablet [Pharmacy Med Name: LEVOTHYROXINE SODIUM 75 MCG Tablet] 90 tablet 0     Sig: TAKE 1 TABLET EVERY DAY       Thyroid Protocol Passed - 7/20/2020  4:32 PM        Passed - Patient is 12 years or older        Passed - Recent (12 mo) or future (30 days) visit within the authorizing provider's specialty     Patient has had an office visit with the authorizing provider or a provider within the authorizing providers department within the previous 12 mos or has a future within next 30 days. See \"Patient Info\" tab in inbasket, or \"Choose Columns\" in Meds & Orders section of the refill encounter.              Passed - Medication is active on med list        Passed - Normal TSH on file in past 12 months     Recent Labs   Lab Test 08/06/19  1100   TSH 2.56              Passed - No active pregnancy on record     If patient is pregnant or has had a positive pregnancy test, please check TSH.          Passed - No positive pregnancy test in past 12 months     If patient is pregnant or has had a positive pregnancy test, please check TSH.             Dea GUTIERREZ, RN, BSN      "

## 2020-07-31 ENCOUNTER — OFFICE VISIT (OUTPATIENT)
Dept: ORTHOPEDICS | Facility: CLINIC | Age: 75
End: 2020-07-31
Payer: COMMERCIAL

## 2020-07-31 VITALS — SYSTOLIC BLOOD PRESSURE: 124 MMHG | BODY MASS INDEX: 31.78 KG/M2 | HEIGHT: 65 IN | DIASTOLIC BLOOD PRESSURE: 68 MMHG

## 2020-07-31 DIAGNOSIS — M53.3 PAIN OF LEFT SACROILIAC JOINT: Primary | ICD-10-CM

## 2020-07-31 PROCEDURE — 20611 DRAIN/INJ JOINT/BURSA W/US: CPT | Mod: LT | Performed by: FAMILY MEDICINE

## 2020-07-31 PROCEDURE — 99213 OFFICE O/P EST LOW 20 MIN: CPT | Mod: 25 | Performed by: FAMILY MEDICINE

## 2020-07-31 RX ADMIN — BETAMETHASONE SODIUM PHOSPHATE AND BETAMETHASONE ACETATE 6 MG: 3; 3 INJECTION, SUSPENSION INTRA-ARTICULAR; INTRALESIONAL; INTRAMUSCULAR; SOFT TISSUE at 11:45

## 2020-07-31 RX ADMIN — ROPIVACAINE HYDROCHLORIDE 2 ML: 5 INJECTION, SOLUTION EPIDURAL; INFILTRATION; PERINEURAL at 11:45

## 2020-07-31 NOTE — PATIENT INSTRUCTIONS
Diagnosis: Left Posterior Hip pain more concerning for SI joint arthritis   Image Findings: Lumbar Sacroiliac arthritis, lumbar arthritis   Treatment: left SI joint steroid injection, home exercises    Medications: Tylenol 1000 mg three times per day, can continue gabapentin per primary care provider  Follow-up: As needed.     Please call [phone]   Ask for my team if you have any questions or concerns    It was great seeing you today!    Lyle Major    [facility] Injection Discharge Instructions    Procedure: Left SI Joint Steroid Injection      You may shower, however avoid swimming, tub baths or hot tubs for 24 hours following your procedure    You may have a mild to moderate increase in pain for several days following the injection.    It may take up to 14 days for the steroid medication to start working although you may feel the effect as early as a few days after the procedure.    You may use ice packs for 10-15 minutes, 3 to 4 times a day at the injection site for comfort    You may use anti-inflammatory medications (such as Ibuprofen or Aleve or Advil) or Tylenol for pain control if necessary    If you were fasting, you may resume your normal diet and medications after the procedure    If you have diabetes, check your blood sugar more frequently than usual as your blood sugar may be higher than normal for 10-14 days following a steroid injection. Contact your doctor who manages your diabetes if your blood sugar is higher than usual      If you experience any of the following, call [facility] @ [phone] or [phone]  -Fever over 100 degree F  -Swelling, bleeding, redness, drainage, warmth at the injection site  - New or worsening pain

## 2020-07-31 NOTE — LETTER
7/31/2020         RE: Ingrid Powell  [address]  [facility] [address]        Dear Colleague,    Thank you for referring your patient, Ingrid Powell, to the [facility]. Please see a copy of my visit note below.    ASSESSMENT & PLAN  Ingrid was seen today for follow up.    Diagnoses and all orders for this visit:    Pain of left sacroiliac joint  -     Large Joint Injection/Arthocentesis      Patient is a 74 year old female presenting for evaluation of   Chief Complaint   Patient presents with     Left Hip - Follow Up      # Left SI Joint Arthritis: Patient was last seen on 6/26/2020 for 4-month history of left hip arthritis status post steroid injection at that time.  She did get significant relief of the groin pain but now has posterior hip pain worse over the SI joint on affected side.  Symptoms worse with tenderness to palpation and positive Jayant testing on examination today.  It does appear that her pain related to the hip arthritis is still improved from the steroid injection but now having SI joint pain.  X-rays previously taken showing moderate SI joint osteoarthritis likely cause of her symptoms.  Given this a steroid injection was completed today with significant provement pain afterwards in the SI joint.  Counseled patient on nature of condition and treatment options.  Given this plan as below, follow-up as needed  Treatment: Activities as tolerated  Physical Therapy HEP given today, if not improved can refer for formal PT  Injection Left SI joint steroid injection as below with improved pain afterwards  Medications  Limited NSAIDs/Tylenol    Concerning signs/sx that would warrant urgent evaluation were discussed.  All questions were answered, patient understands and agrees with plan.      Return if symptoms worsen or fail to improve.    -----    SUBJECTIVE  Ingrid Powell is a/an 74 year old female who is seen in consultation at the request of   Jose Manuel Sutton M.D. for evaluation of left hip pain. The patient is seen with their sister, Gayla.    Onset: 4 month(s) ago. Reports insidious onset without acute precipitating event. Saw PCP 20 and xrays were performed.   Location of Pain: left lateral hip, occasionally radiating left buttock and groin    Rating of Pain at worst: 8-9/10  Rating of Pain Currently: 8-9/10  Worsened by: walking, turning   Better with: nothing   Treatments tried: ice and heat Ibuprofen, Aleve, Gabapentin, DEXA scan 20   Quality: aching, dull  Associated symptoms: no distal numbness or tingling; denies swelling or warmth  Orthopedic history: NO  Relevant surgical history: NO    Interim History - 2020  Since last visit on 2020 patient has left low back pain and posterior hip.  Steroid injection completed 5 weeks ago in left hip joint provided minimal relief. No interim injury.  Pain with transition from sit to stand, worse with walking. No medications currently for symptoms.      Past Medical History:   Diagnosis Date     Arthritis      COPD (chronic obstructive pulmonary disease) (H)      Depressive disorder      Gastroesophageal reflux disease      History of lumbar surgery 2015     Hoarseness      Hypertension      Oxygen dependent     at night     Reduced vision      Sleep apnea      Social History     Socioeconomic History     Marital status: Single     Spouse name: None     Number of children: None     Years of education: None     Highest education level: None   Occupational History     None   Social Needs     Financial resource strain: None     Food insecurity     Worry: None     Inability: None     Transportation needs     Medical: None     Non-medical: None   Tobacco Use     Smoking status: Former Smoker     Packs/day: 0.50     Years: 35.00     Pack years: 17.50     Types: Cigarettes     Last attempt to quit: 10/3/1995     Years since quittin.7     Smokeless tobacco: Never Used   Substance and  "Sexual Activity     Alcohol use: Yes     Comment: rare     Drug use: No     Sexual activity: Not Currently     Partners: Male   Lifestyle     Physical activity     Days per week: None     Minutes per session: None     Stress: None   Relationships     Social connections     Talks on phone: None     Gets together: None     Attends Orthodox service: None     Active member of club or organization: None     Attends meetings of clubs or organizations: None     Relationship status: None     Intimate partner violence     Fear of current or ex partner: None     Emotionally abused: None     Physically abused: None     Forced sexual activity: None   Other Topics Concern     Parent/sibling w/ CABG, MI or angioplasty before 65F 55M? Not Asked   Social History Narrative     None         Patient's past medical, surgical, social, and family histories were reviewed today and no changes are noted.  No family history pertinent to the patient's problem today     REVIEW OF SYSTEMS:  10 point ROS is negative other than symptoms noted above in HPI, Past Medical History or as stated below  Constitutional: NEGATIVE for fever, chills, change in weight  Skin: NEGATIVE for worrisome rashes, moles or lesions  GI/: NEGATIVE for bowel or bladder changes  Neuro: NEGATIVE for weakness, dizziness or paresthesias    OBJECTIVE:  /68   Ht 1.651 m (5' 5\")   BMI 31.78 kg/m     General: healthy, alert and in no distress  HEENT: no scleral icterus or conjunctival erythema  Skin: no suspicious lesions or rash. No jaundice.  CV: no pedal edema  Resp: normal respiratory effort without conversational dyspnea   Psych: normal mood and affect  Gait: normal steady gait with appropriate coordination and balance  Neuro: Normal light sensory exam of lower extremity  MSK:  LEFT HIP  Inspection:    No obvious deformity or asymmetry, level pelvis  Palpation:    Tender about the SI joint. Otherwise all other landmarks are nontender.  Active Range of Motion:    "  Flexion full, IR full, ER  limited slightly by pain  Strength:    Flexion 5/5, adduction 5/5, abduction 5/5  Special Tests:    Positive: ARELY    Negative: Logroll, anterior impingement (FADIR)    Independent visualization of the below image:  No results found for this or any previous visit (from the past 24 hour(s)).     PELVIS AND LEFT HIP TWO VIEWS  6/24/2020 2:59 PM      HISTORY: Left hip pain.     COMPARISON: 8/11/2017.                                                                      IMPRESSION: No acute bony abnormality. Moderate left hip  osteoarthritis has progressed slightly. Mild right hip osteoarthritis  is stable. Bilateral hip chondrocalcinosis without change.  Osteoarthritis at the pubic symphysis and sacroiliac joints  bilaterally with multilevel advanced degenerative disc disease in the  visualized lower lumbar spine.     TERELL FORBES MD      Large Joint Injection/Arthocentesis    Date/Time: 7/31/2020 11:45 AM  Performed by: Lyle Major MD  Authorized by: Lyle Major MD     Indications:  Pain  Needle Size:  22 G  Guidance: ultrasound    Approach:  Posterior  Location:  Hip   Location comment:  Left SI joint       Medications:  6 mg betamethasone acet & sod phos 6 (3-3) MG/ML; 2 mL ropivacaine 5 MG/ML  Outcome:  Tolerated well, no immediate complications  Procedure discussed: discussed risks, benefits, and alternatives    Consent Given by:  Patient  Timeout: timeout called immediately prior to procedure    Prep: patient was prepped and draped in usual sterile fashion     Patient counseled on risks of infection related to steroids and COVID-19.  Patient reported significant improvement of pain after left SI joint steroid injection.  Aftercare instructions given to patient.  Plan to follow-up as discussed above.     Lyle Major MD [facility]            Lyle Major MD, [facility]      Again, thank you for  allowing me to participate in the care of your patient.        Sincerely,        Lyle Major MD

## 2020-07-31 NOTE — PROGRESS NOTES
ASSESSMENT & PLAN  Ingrid was seen today for follow up.    Diagnoses and all orders for this visit:    Pain of left sacroiliac joint  -     Large Joint Injection/Arthocentesis      Patient is a 74 year old female presenting for evaluation of   Chief Complaint   Patient presents with     Left Hip - Follow Up      # Left SI Joint Arthritis: Patient was last seen on 6/26/2020 for 4-month history of left hip arthritis status post steroid injection at that time.  She did get significant relief of the groin pain but now has posterior hip pain worse over the SI joint on affected side.  Symptoms worse with tenderness to palpation and positive Jayant testing on examination today.  It does appear that her pain related to the hip arthritis is still improved from the steroid injection but now having SI joint pain.  X-rays previously taken showing moderate SI joint osteoarthritis likely cause of her symptoms.  Given this a steroid injection was completed today with significant provement pain afterwards in the SI joint.  Counseled patient on nature of condition and treatment options.  Given this plan as below, follow-up as needed  Treatment: Activities as tolerated  Physical Therapy HEP given today, if not improved can refer for formal PT  Injection Left SI joint steroid injection as below with improved pain afterwards  Medications  Limited NSAIDs/Tylenol    Concerning signs/sx that would warrant urgent evaluation were discussed.  All questions were answered, patient understands and agrees with plan.      Return if symptoms worsen or fail to improve.    -----    SUBJECTIVE  Ingrid Powell is a/an 74 year old female who is seen in consultation at the request of  Jose Manuel Sutton M.D. for evaluation of left hip pain. The patient is seen with their sister, Gayla.    Onset: 4 month(s) ago. Reports insidious onset without acute precipitating event. Saw PCP 6/24/20 and xrays were performed.   Location of Pain: left lateral hip,  occasionally radiating left buttock and groin    Rating of Pain at worst: 8-9/10  Rating of Pain Currently: 8-9/10  Worsened by: walking, turning   Better with: nothing   Treatments tried: ice and heat Ibuprofen, Aleve, Gabapentin, DEXA scan 20   Quality: aching, dull  Associated symptoms: no distal numbness or tingling; denies swelling or warmth  Orthopedic history: NO  Relevant surgical history: NO    Interim History - 2020  Since last visit on 2020 patient has left low back pain and posterior hip.  Steroid injection completed 5 weeks ago in left hip joint provided minimal relief. No interim injury.  Pain with transition from sit to stand, worse with walking. No medications currently for symptoms.      Past Medical History:   Diagnosis Date     Arthritis      COPD (chronic obstructive pulmonary disease) (H)      Depressive disorder      Gastroesophageal reflux disease      History of lumbar surgery 2015     Hoarseness      Hypertension      Oxygen dependent     at night     Reduced vision      Sleep apnea      Social History     Socioeconomic History     Marital status: Single     Spouse name: None     Number of children: None     Years of education: None     Highest education level: None   Occupational History     None   Social Needs     Financial resource strain: None     Food insecurity     Worry: None     Inability: None     Transportation needs     Medical: None     Non-medical: None   Tobacco Use     Smoking status: Former Smoker     Packs/day: 0.50     Years: 35.00     Pack years: 17.50     Types: Cigarettes     Last attempt to quit: 10/3/1995     Years since quittin.7     Smokeless tobacco: Never Used   Substance and Sexual Activity     Alcohol use: Yes     Comment: rare     Drug use: No     Sexual activity: Not Currently     Partners: Male   Lifestyle     Physical activity     Days per week: None     Minutes per session: None     Stress: None   Relationships     Social  "connections     Talks on phone: None     Gets together: None     Attends Baptism service: None     Active member of club or organization: None     Attends meetings of clubs or organizations: None     Relationship status: None     Intimate partner violence     Fear of current or ex partner: None     Emotionally abused: None     Physically abused: None     Forced sexual activity: None   Other Topics Concern     Parent/sibling w/ CABG, MI or angioplasty before 65F 55M? Not Asked   Social History Narrative     None         Patient's past medical, surgical, social, and family histories were reviewed today and no changes are noted.  No family history pertinent to the patient's problem today     REVIEW OF SYSTEMS:  10 point ROS is negative other than symptoms noted above in HPI, Past Medical History or as stated below  Constitutional: NEGATIVE for fever, chills, change in weight  Skin: NEGATIVE for worrisome rashes, moles or lesions  GI/: NEGATIVE for bowel or bladder changes  Neuro: NEGATIVE for weakness, dizziness or paresthesias    OBJECTIVE:  /68   Ht 1.651 m (5' 5\")   BMI 31.78 kg/m     General: healthy, alert and in no distress  HEENT: no scleral icterus or conjunctival erythema  Skin: no suspicious lesions or rash. No jaundice.  CV: no pedal edema  Resp: normal respiratory effort without conversational dyspnea   Psych: normal mood and affect  Gait: normal steady gait with appropriate coordination and balance  Neuro: Normal light sensory exam of lower extremity  MSK:  LEFT HIP  Inspection:    No obvious deformity or asymmetry, level pelvis  Palpation:    Tender about the SI joint. Otherwise all other landmarks are nontender.  Active Range of Motion:     Flexion full, IR full, ER  limited slightly by pain  Strength:    Flexion 5/5, adduction 5/5, abduction 5/5  Special Tests:    Positive: ARELY    Negative: Logroll, anterior impingement (FADIR)    Independent visualization of the below image:  No results " found for this or any previous visit (from the past 24 hour(s)).     PELVIS AND LEFT HIP TWO VIEWS  6/24/2020 2:59 PM      HISTORY: Left hip pain.     COMPARISON: 8/11/2017.                                                                      IMPRESSION: No acute bony abnormality. Moderate left hip  osteoarthritis has progressed slightly. Mild right hip osteoarthritis  is stable. Bilateral hip chondrocalcinosis without change.  Osteoarthritis at the pubic symphysis and sacroiliac joints  bilaterally with multilevel advanced degenerative disc disease in the  visualized lower lumbar spine.     TERELL FORBES MD      Large Joint Injection/Arthocentesis    Date/Time: 7/31/2020 11:45 AM  Performed by: Lyle Major MD  Authorized by: Lyle Major MD     Indications:  Pain  Needle Size:  22 G  Guidance: ultrasound    Approach:  Posterior  Location:  Hip   Location comment:  Left SI joint       Medications:  6 mg betamethasone acet & sod phos 6 (3-3) MG/ML; 2 mL ropivacaine 5 MG/ML  Outcome:  Tolerated well, no immediate complications  Procedure discussed: discussed risks, benefits, and alternatives    Consent Given by:  Patient  Timeout: timeout called immediately prior to procedure    Prep: patient was prepped and draped in usual sterile fashion     Patient counseled on risks of infection related to steroids and COVID-19.  Patient reported significant improvement of pain after left SI joint steroid injection.  Aftercare instructions given to patient.  Plan to follow-up as discussed above.     Lyle Major MD [facility]            Lyle Major MD, [facility]

## 2020-08-03 RX ORDER — BETAMETHASONE SODIUM PHOSPHATE AND BETAMETHASONE ACETATE 3; 3 MG/ML; MG/ML
6 INJECTION, SUSPENSION INTRA-ARTICULAR; INTRALESIONAL; INTRAMUSCULAR; SOFT TISSUE
Status: DISCONTINUED | OUTPATIENT
Start: 2020-07-31 | End: 2021-01-11

## 2020-08-03 RX ORDER — ROPIVACAINE HYDROCHLORIDE 5 MG/ML
2 INJECTION, SOLUTION EPIDURAL; INFILTRATION; PERINEURAL
Status: DISCONTINUED | OUTPATIENT
Start: 2020-07-31 | End: 2021-01-11

## 2020-08-28 DIAGNOSIS — F33.1 MAJOR DEPRESSIVE DISORDER, RECURRENT EPISODE, MODERATE (H): ICD-10-CM

## 2020-09-01 RX ORDER — PAROXETINE 20 MG/1
TABLET, FILM COATED ORAL
Qty: 90 TABLET | Refills: 1 | Status: SHIPPED | OUTPATIENT
Start: 2020-09-01 | End: 2021-02-22

## 2020-09-14 ENCOUNTER — OFFICE VISIT (OUTPATIENT)
Dept: ORTHOPEDICS | Facility: CLINIC | Age: 75
End: 2020-09-14
Payer: COMMERCIAL

## 2020-09-14 VITALS — HEIGHT: 65 IN | SYSTOLIC BLOOD PRESSURE: 130 MMHG | BODY MASS INDEX: 31.78 KG/M2 | DIASTOLIC BLOOD PRESSURE: 78 MMHG

## 2020-09-14 DIAGNOSIS — M53.3 PAIN OF LEFT SACROILIAC JOINT: Primary | ICD-10-CM

## 2020-09-14 DIAGNOSIS — M16.12 ARTHRITIS OF LEFT HIP: ICD-10-CM

## 2020-09-14 PROCEDURE — 99214 OFFICE O/P EST MOD 30 MIN: CPT | Performed by: FAMILY MEDICINE

## 2020-09-14 RX ORDER — CYCLOBENZAPRINE HCL 5 MG
5 TABLET ORAL
Qty: 30 TABLET | Refills: 0 | Status: SHIPPED | OUTPATIENT
Start: 2020-09-14 | End: 2021-01-11

## 2020-09-14 RX ORDER — NABUMETONE 500 MG/1
500 TABLET, FILM COATED ORAL 2 TIMES DAILY
Qty: 30 TABLET | Refills: 0 | Status: SHIPPED | OUTPATIENT
Start: 2020-09-14 | End: 2020-11-13

## 2020-09-14 NOTE — LETTER
9/14/2020         RE: Ingrid Powell  [address]  [place] [address]        Dear Colleague,    Thank you for referring your patient, Ingrid Powell, to the [facility]. Please see a copy of my visit note below.    ASSESSMENT & PLAN  Ingrid was seen today for follow up.    Diagnoses and all orders for this visit:    Pain of left sacroiliac joint  -     nabumetone (RELAFEN) 500 MG tablet; Take 1 tablet (500 mg) by mouth 2 times daily  -     cyclobenzaprine (FLEXERIL) 5 MG tablet; Take 1 tablet (5 mg) by mouth nightly as needed for muscle spasms  -     PHYSICAL THERAPY REFERRAL (Internal); Future    Arthritis of left hip  -     nabumetone (RELAFEN) 500 MG tablet; Take 1 tablet (500 mg) by mouth 2 times daily  -     cyclobenzaprine (FLEXERIL) 5 MG tablet; Take 1 tablet (5 mg) by mouth nightly as needed for muscle spasms  -     PHYSICAL THERAPY REFERRAL (Internal); Future      Patient is a 74 year old female presenting for evaluation of   Chief Complaint   Patient presents with     Left Hip - Follow Up      # Left hip/SI Joint Arthritis: Patient was last seen on 6/26/2020 for 4-month history of left hip arthritis status post steroid injection at that time.  Pt has relief from left hip injection on 6/26/20 and SI joint injection on 7/31/20 that provided one mon of relief.  Pt still having pain over groin and posterior hip pos FADIR and ARELY.   Previous XR showing OA over hip and SI joints.  Pt has tried HEP with no lasting improvement.  It is too soon for repeat steroid injections.  No radicular sx today.  Counseled patient on nature of condition and treatment options.  Given this plan as below, follow-up one month    Treatment: Activities as tolerated  Physical Therapy: HEP given today, if not improved can refer for formal PT  Injection: Defer for now can repeat on f/u  Medications: Relafen during the day, Flexeril at night    Concerning signs/sx that would  warrant urgent evaluation were discussed.  All questions were answered, patient understands and agrees with plan.      Return in about 4 weeks (around 10/12/2020).    -----    SUBJECTIVE  Ingrid Powell is a/an 74 year old female who is seen in consultation at the request of  Jose Manuel Sutton M.D. for evaluation of left hip pain. The patient is seen with their sister, Gayla.    Onset: 4 month(s) ago. Reports insidious onset without acute precipitating event. Saw PCP 6/24/20 and xrays were performed.   Location of Pain: left lateral hip, occasionally radiating left buttock and groin    Rating of Pain at worst: 8-9/10  Rating of Pain Currently: 8-9/10  Worsened by: walking, turning   Better with: nothing   Treatments tried: ice and heat Ibuprofen, Aleve, Gabapentin, DEXA scan 2/12/20   Quality: aching, dull  Associated symptoms: no distal numbness or tingling; denies swelling or warmth  Orthopedic history: NO  Relevant surgical history: NO    Interim History - July 31, 2020  Since last visit on 6/26/2020 patient has left low back pain and posterior hip.  Steroid injection completed 5 weeks ago in left hip joint provided minimal relief. No interim injury.  Pain with transition from sit to stand, worse with walking. No medications currently for symptoms.      Interim History - September 14, 2020  Since last visit on 7/31/2020 patient has returning left low back pain.  Left SI joint injection provided good relief for one month . No interim injury.       Past Medical History:   Diagnosis Date     Arthritis      COPD (chronic obstructive pulmonary disease) (H)      Depressive disorder      Gastroesophageal reflux disease      History of lumbar surgery 7/28/2015     Hoarseness      Hypertension      Oxygen dependent     at night     Reduced vision      Sleep apnea      Social History     Socioeconomic History     Marital status: Single     Spouse name: None     Number of children: None     Years of education: None      "Highest education level: None   Occupational History     None   Social Needs     Financial resource strain: None     Food insecurity     Worry: None     Inability: None     Transportation needs     Medical: None     Non-medical: None   Tobacco Use     Smoking status: Former Smoker     Packs/day: 0.50     Years: 35.00     Pack years: 17.50     Types: Cigarettes     Last attempt to quit: 10/3/1995     Years since quittin.7     Smokeless tobacco: Never Used   Substance and Sexual Activity     Alcohol use: Yes     Comment: rare     Drug use: No     Sexual activity: Not Currently     Partners: Male   Lifestyle     Physical activity     Days per week: None     Minutes per session: None     Stress: None   Relationships     Social connections     Talks on phone: None     Gets together: None     Attends Roman Catholic service: None     Active member of club or organization: None     Attends meetings of clubs or organizations: None     Relationship status: None     Intimate partner violence     Fear of current or ex partner: None     Emotionally abused: None     Physically abused: None     Forced sexual activity: None   Other Topics Concern     Parent/sibling w/ CABG, MI or angioplasty before 65F 55M? Not Asked   Social History Narrative     None     Patient's past medical, surgical, social, and family histories were reviewed today and no changes are noted.  No family history pertinent to the patient's problem today     REVIEW OF SYSTEMS:  10 point ROS is negative other than symptoms noted above in HPI, Past Medical History or as stated below  Constitutional: NEGATIVE for fever, chills, change in weight  Skin: NEGATIVE for worrisome rashes, moles or lesions  GI/: NEGATIVE for bowel or bladder changes  Neuro: NEGATIVE for weakness, dizziness or paresthesias    OBJECTIVE:  /78   Ht 1.651 m (5' 5\")   BMI 31.78 kg/m     General: healthy, alert and in no distress  HEENT: no scleral icterus or conjunctival erythema  Skin: " no suspicious lesions or rash. No jaundice.  CV: no pedal edema  Resp: normal respiratory effort without conversational dyspnea   Psych: normal mood and affect  Gait: normal steady gait with appropriate coordination and balance  Neuro: Normal light sensory exam of lower extremity  MSK:  LEFT HIP  Inspection:    No obvious deformity or asymmetry, level pelvis  Palpation:    Tender about the SI joint. Otherwise all other landmarks are nontender.  Active Range of Motion:     Flexion full, IR mild pain, ER  limited slightly by pain  Strength:    Flexion 5/5, adduction 5/5, abduction 5/5  Special Tests:    Positive: ARELYFRANCISCO    Negative: Logroll     Independent visualization of the below image:  No results found for this or any previous visit (from the past 24 hour(s)).     PELVIS AND LEFT HIP TWO VIEWS  6/24/2020 2:59 PM      HISTORY: Left hip pain.     COMPARISON: 8/11/2017.                                                                      IMPRESSION: No acute bony abnormality. Moderate left hip  osteoarthritis has progressed slightly. Mild right hip osteoarthritis  is stable. Bilateral hip chondrocalcinosis without change.  Osteoarthritis at the pubic symphysis and sacroiliac joints  bilaterally with multilevel advanced degenerative disc disease in the  visualized lower lumbar spine.     MD Lyle HURD MD, [facility]      Again, thank you for allowing me to participate in the care of your patient.        Sincerely,        Lyle Major MD

## 2020-09-14 NOTE — PROGRESS NOTES
ASSESSMENT & PLAN  Ingrid was seen today for follow up.    Diagnoses and all orders for this visit:    Pain of left sacroiliac joint  -     nabumetone (RELAFEN) 500 MG tablet; Take 1 tablet (500 mg) by mouth 2 times daily  -     cyclobenzaprine (FLEXERIL) 5 MG tablet; Take 1 tablet (5 mg) by mouth nightly as needed for muscle spasms  -     PHYSICAL THERAPY REFERRAL (Internal); Future    Arthritis of left hip  -     nabumetone (RELAFEN) 500 MG tablet; Take 1 tablet (500 mg) by mouth 2 times daily  -     cyclobenzaprine (FLEXERIL) 5 MG tablet; Take 1 tablet (5 mg) by mouth nightly as needed for muscle spasms  -     PHYSICAL THERAPY REFERRAL (Internal); Future      Patient is a 74 year old female presenting for evaluation of   Chief Complaint   Patient presents with     Left Hip - Follow Up      # Left hip/SI Joint Arthritis: Patient was last seen on 6/26/2020 for 4-month history of left hip arthritis status post steroid injection at that time.  Pt has relief from left hip injection on 6/26/20 and SI joint injection on 7/31/20 that provided one mon of relief.  Pt still having pain over groin and posterior hip pos FADIR and ARELY.   Previous XR showing OA over hip and SI joints.  Pt has tried HEP with no lasting improvement.  It is too soon for repeat steroid injections.  No radicular sx today.  Counseled patient on nature of condition and treatment options.  Given this plan as below, follow-up one month    Treatment: Activities as tolerated  Physical Therapy: HEP given today, if not improved can refer for formal PT  Injection: Defer for now can repeat on f/u  Medications: Relafen during the day, Flexeril at night    Concerning signs/sx that would warrant urgent evaluation were discussed.  All questions were answered, patient understands and agrees with plan.      Return in about 4 weeks (around 10/12/2020).    -----    SUBJECTIVE  Ingrid Powell is a/an 74 year old female who is seen in consultation at the request of   Jose Manuel Sutton M.D. for evaluation of left hip pain. The patient is seen with their sister, Gayla.    Onset: 4 month(s) ago. Reports insidious onset without acute precipitating event. Saw PCP 6/24/20 and xrays were performed.   Location of Pain: left lateral hip, occasionally radiating left buttock and groin    Rating of Pain at worst: 8-9/10  Rating of Pain Currently: 8-9/10  Worsened by: walking, turning   Better with: nothing   Treatments tried: ice and heat Ibuprofen, Aleve, Gabapentin, DEXA scan 2/12/20   Quality: aching, dull  Associated symptoms: no distal numbness or tingling; denies swelling or warmth  Orthopedic history: NO  Relevant surgical history: NO    Interim History - July 31, 2020  Since last visit on 6/26/2020 patient has left low back pain and posterior hip.  Steroid injection completed 5 weeks ago in left hip joint provided minimal relief. No interim injury.  Pain with transition from sit to stand, worse with walking. No medications currently for symptoms.      Interim History - September 14, 2020  Since last visit on 7/31/2020 patient has returning left low back pain.  Left SI joint injection provided good relief for one month . No interim injury.       Past Medical History:   Diagnosis Date     Arthritis      COPD (chronic obstructive pulmonary disease) (H)      Depressive disorder      Gastroesophageal reflux disease      History of lumbar surgery 7/28/2015     Hoarseness      Hypertension      Oxygen dependent     at night     Reduced vision      Sleep apnea      Social History     Socioeconomic History     Marital status: Single     Spouse name: None     Number of children: None     Years of education: None     Highest education level: None   Occupational History     None   Social Needs     Financial resource strain: None     Food insecurity     Worry: None     Inability: None     Transportation needs     Medical: None     Non-medical: None   Tobacco Use     Smoking status: Former Smoker  "    Packs/day: 0.50     Years: 35.00     Pack years: 17.50     Types: Cigarettes     Last attempt to quit: 10/3/1995     Years since quittin.7     Smokeless tobacco: Never Used   Substance and Sexual Activity     Alcohol use: Yes     Comment: rare     Drug use: No     Sexual activity: Not Currently     Partners: Male   Lifestyle     Physical activity     Days per week: None     Minutes per session: None     Stress: None   Relationships     Social connections     Talks on phone: None     Gets together: None     Attends Zoroastrian service: None     Active member of club or organization: None     Attends meetings of clubs or organizations: None     Relationship status: None     Intimate partner violence     Fear of current or ex partner: None     Emotionally abused: None     Physically abused: None     Forced sexual activity: None   Other Topics Concern     Parent/sibling w/ CABG, MI or angioplasty before 65F 55M? Not Asked   Social History Narrative     None     Patient's past medical, surgical, social, and family histories were reviewed today and no changes are noted.  No family history pertinent to the patient's problem today     REVIEW OF SYSTEMS:  10 point ROS is negative other than symptoms noted above in HPI, Past Medical History or as stated below  Constitutional: NEGATIVE for fever, chills, change in weight  Skin: NEGATIVE for worrisome rashes, moles or lesions  GI/: NEGATIVE for bowel or bladder changes  Neuro: NEGATIVE for weakness, dizziness or paresthesias    OBJECTIVE:  /78   Ht 1.651 m (5' 5\")   BMI 31.78 kg/m     General: healthy, alert and in no distress  HEENT: no scleral icterus or conjunctival erythema  Skin: no suspicious lesions or rash. No jaundice.  CV: no pedal edema  Resp: normal respiratory effort without conversational dyspnea   Psych: normal mood and affect  Gait: normal steady gait with appropriate coordination and balance  Neuro: Normal light sensory exam of lower " extremity  MSK:  LEFT HIP  Inspection:    No obvious deformity or asymmetry, level pelvis  Palpation:    Tender about the SI joint. Otherwise all other landmarks are nontender.  Active Range of Motion:     Flexion full, IR mild pain, ER  limited slightly by pain  Strength:    Flexion 5/5, adduction 5/5, abduction 5/5  Special Tests:    Positive: FRANCISCO MCGEE    Negative: Logroll     Independent visualization of the below image:  No results found for this or any previous visit (from the past 24 hour(s)).     PELVIS AND LEFT HIP TWO VIEWS  6/24/2020 2:59 PM      HISTORY: Left hip pain.     COMPARISON: 8/11/2017.                                                                      IMPRESSION: No acute bony abnormality. Moderate left hip  osteoarthritis has progressed slightly. Mild right hip osteoarthritis  is stable. Bilateral hip chondrocalcinosis without change.  Osteoarthritis at the pubic symphysis and sacroiliac joints  bilaterally with multilevel advanced degenerative disc disease in the  visualized lower lumbar spine.     MD Lyle HURD MD, [facility]

## 2020-09-14 NOTE — PATIENT INSTRUCTIONS
Diagnosis: Left SI joint and hip arthritis  Image Findings: SI joint and hip arthritis  Treatment: physical therapy ordered  Medications: Relafen during the day don't take with ibuprofen or aleve, flexeril at night  Follow-up: 4 weeks    Please call [phone]   Ask for my team if you have any questions or concerns    It was great seeing you again today!    Lyle Major

## 2020-09-22 ENCOUNTER — ALLIED HEALTH/NURSE VISIT (OUTPATIENT)
Dept: FAMILY MEDICINE | Facility: CLINIC | Age: 75
End: 2020-09-22
Payer: COMMERCIAL

## 2020-09-22 DIAGNOSIS — Z23 NEED FOR PROPHYLACTIC VACCINATION AND INOCULATION AGAINST INFLUENZA: Primary | ICD-10-CM

## 2020-09-22 PROCEDURE — G0008 ADMIN INFLUENZA VIRUS VAC: HCPCS

## 2020-09-22 PROCEDURE — 99207 ZZC NO CHARGE NURSE ONLY: CPT

## 2020-09-22 PROCEDURE — 90662 IIV NO PRSV INCREASED AG IM: CPT

## 2020-09-30 ENCOUNTER — HOSPITAL ENCOUNTER (OUTPATIENT)
Dept: PHYSICAL THERAPY | Facility: CLINIC | Age: 75
Setting detail: THERAPIES SERIES
End: 2020-09-30
Attending: FAMILY MEDICINE
Payer: MEDICARE

## 2020-09-30 DIAGNOSIS — M53.3 PAIN OF LEFT SACROILIAC JOINT: ICD-10-CM

## 2020-09-30 DIAGNOSIS — M16.12 ARTHRITIS OF LEFT HIP: ICD-10-CM

## 2020-09-30 PROCEDURE — 97110 THERAPEUTIC EXERCISES: CPT | Mod: GP | Performed by: PHYSICAL MEDICINE & REHABILITATION

## 2020-09-30 PROCEDURE — 97161 PT EVAL LOW COMPLEX 20 MIN: CPT | Mod: GP | Performed by: PHYSICAL MEDICINE & REHABILITATION

## 2020-09-30 NOTE — PROGRESS NOTES
"   09/30/20 1300   General Information   Type of Visit Initial OP Ortho PT Evaluation   Start of Care Date 09/30/20   Referring Physician Dr. Lyle Major   Patient/Family Goals Statement improve pain with walking, picking up groceries/lifting   Orders Evaluate and Treat   Orders Comment \" left SI joint and hip arthritis \"   Date of Order 09/14/20   Certification Required? Yes  (MC)   Medical Diagnosis Pain of left sacroiliac joint; Arthritis of left hip    Surgical/Medical history reviewed Yes   Precautions/Limitations no known precautions/limitations   General Information Comments PMHx per pt report: cancer, L reverse TSA. PMHx per Epic: HTN, diabetes, COPD, heart disease   Body Part(s)   Body Part(s) Hip   Presentation and Etiology   Pertinent history of current problem (include personal factors and/or comorbidities that impact the POC) Pt arrived to PT today for L hip pain that started \"quite awhile ago.\" Notes pain located in posterior hip, near SIJ. Reports L shoulder pain still from reverse TSA and has B knee pain as well. No numbness, tingling in L LE. Pain present the majority of the time, with little relief. Notes the only time it feels good is following injection.   Impairments A. Pain   Functional Limitations perform activities of daily living;perform desired leisure / sports activities   Symptom Location L hip   How/Where did it occur Other  (OA)   Onset date of current episode/exacerbation 09/30/18  (\"started a couple years ago\")   Chronicity Chronic   Pain rating (0-10 point scale) Best (/10);Worst (/10)   Best (/10) 2-3 (notes this is only after injection)   Worst (/10) 7-8   Pain quality A. Sharp;C. Aching   Frequency of pain/symptoms B. Intermittent   Pain/symptoms are: Worse during the day   Pain/symptoms exacerbated by B. Walking;C. Lifting;D. Carrying   Pain/symptoms eased by A. Sitting;H. Cold   Progression of symptoms since onset: Other   Pain progression comment only feels better after " injections   Current / Previous Interventions   Diagnostic Tests: X-ray   X-ray Results Results   X-ray results Moderate left hip osteoarthritis has progressed slightly. Bilateral hip chondrocalcinosis without change.   Prior Level of Function   Prior Level of Function-Mobility uses walker for long distances (laundry mat or bring garbage out)   Prior Level of Function-ADLs independent   Current Level of Function   Current Community Support Family/friend caregiver   Patient role/employment history F. Retired   Living environment Apartment/condo   Home/community accessibility no stairs   Current equipment-Gait/Locomotion Walker  (4ww)   Fall Risk Screen   Fall screen completed by PT   Have you fallen 2 or more times in the past year? No   Have you fallen and had an injury in the past year? No   Is patient a fall risk? No   Abuse Screen (yes response referral indicated)   Feels Unsafe at Home or Work/School no   Feels Threatened by Someone no   Does Anyone Try to Keep You From Having Contact with Others or Doing Things Outside Your Home? no   Physical Signs of Abuse Present no   Functional Scales   Functional Scales Other   Other Scales  LEFS: 34/80   Hip Objective Findings   Side (if bilateral, select both right and left) Left   Posture forward head, rounded shoulders   Gait/Locomotion hip drop on R (weak L glute med)   Lumbar ROM wfl   Pelvic Screen anterior L innom, posterior R innom   Hip/Knee Strength Comments R hip general strength 4/5. Pt reports no increase in L hip pain with LE MMT   Straight Leg Raise Test -   Scour Test -   ARELY Test +   FADIR Test +   Neurological Testing Comments noted decreased sensation of L3 dermatome on L compared to R with dull touch   Palpation min pain with mod palpation over L PSIS, SIJ   Accessory Motion/Joint Mobility hypomobility in L hip   Left Hip Flexion PROM wnl   Left Hip Abduction PROM wnl, pain   Left Hip Adduction PROM wnl, min pain   Left Hip ER PROM wnl   Left Hip IR  PROM limited, min pain   Left Hip Flexion Strength 4-/5   Left Hip Abduction Strength seated: 4/5   Left Hip Extension Strength seated: 4/5   Left Hip IR Strength 4-/5   Left Hip ER Strength 4-/5   Left Knee Flexion Strength 4-/5   Left Knee Extension Strength 4-/5   Oliver Flexibility Test wnl   Left Hamstring Flexibility wnl, pain   Left Piriformis Flexibility wnl, no pain   Planned Therapy Interventions   Planned Therapy Interventions ADL retraining;balance training;bed mobility training;gait training;joint mobilization;manual therapy;motor coordination training;neuromuscular re-education;ROM;strengthening;stretching;transfer training   Planned Modality Interventions   Planned Modality Interventions Cryotherapy;Electrical stimulation;Hot packs;TENS;Traction;Ultrasound   Planned Modality Interventions Comments only as needed   Clinical Impression   Criteria for Skilled Therapeutic Interventions Met yes, treatment indicated   PT Diagnosis L hip pain secondary to OA   Influenced by the following impairments decreased ROM, decreased strength, impaired gait, pain   Functional limitations due to impairments walking, carrying   Clinical Presentation Stable/Uncomplicated   Clinical Presentation Rationale PLOF, ROM, strength, LEFS, comorbidities, clinical judgement   Clinical Decision Making (Complexity) Low complexity   Therapy Frequency 1 time/week   Predicted Duration of Therapy Intervention (days/wks) 8 weeks   Risk & Benefits of therapy have been explained Yes   Patient, Family & other staff in agreement with plan of care Yes   Clinical Impression Comments Pt is a 75 y.o. female who presented the PT clinic today with a rehab diagnosis of L hip pain secondary to OA as evidenced by decreased ROM, decreased strength, impaired gait, and pain. Pt is appropriate for skilled PT to address previously listed impairments in order to decrease difficulty with walking and carrrying.    Education Assessment   Preferred Learning  Style Listening;Reading;Demonstration;Pictures/video   Barriers to Learning No barriers   ORTHO GOALS   PT Ortho Eval Goals 1;2;3   Ortho Goal 1   Goal Identifier 1   Goal Description Pt will be able to amb 2 blocks with only min increase in sx in order to decrease difficulty with walking to mailbox and laundry mat.    Target Date 10/28/20   Ortho Goal 2   Goal Identifier 2   Goal Description Pt will be able to demonstrate 4+/5 L hip strength in order to decrease difficulty with carrying.    Target Date 11/27/20   Ortho Goal 3   Goal Identifier 3   Goal Description Pt will be independent with HEP in order to self manage symptoms.    Target Date 11/27/20   Total Evaluation Time   PT Nyasiaal, Low Complexity Minutes (08006) 25   Therapy Certification   Certification date from 09/30/20   Certification date to 11/27/20   Medical Diagnosis Pain of left sacroiliac joint; Arthritis of left hip        Please contact me with any questions or concerns.    Thank you for your referral,     Nighat Farah, PT, DPT  Physical Therapist  [address] [address] [phone]

## 2020-09-30 NOTE — PROGRESS NOTES
"[facility]          OUTPATIENT PHYSICAL THERAPY ORTHOPEDIC EVALUATION  PLAN OF TREATMENT FOR OUTPATIENT REHABILITATION  (COMPLETE FOR INITIAL CLAIMS ONLY)  Patient's Last Name, First Name, M.I.  YOB: 1945  AndreIngrid       Provider s Name:  [facility]   Medical Record No.  [id number]   Start of Care Date:  09/30/20   Onset Date:  09/30/18(\"started a couple years ago\")   Type:     _X__PT   ___OT   ___SLP Medical Diagnosis:  Pain of left sacroiliac joint; Arthritis of left hip      PT Diagnosis:  L hip pain secondary to OA   Visits from SOC:  1      _________________________________________________________________________________  Plan of Treatment/Functional Goals:  ADL retraining, balance training, bed mobility training, gait training, joint mobilization, manual therapy, motor coordination training, neuromuscular re-education, ROM, strengthening, stretching, transfer training     Cryotherapy, Electrical stimulation, Hot packs, TENS, Traction, Ultrasound  only as needed  Goals  Goal Identifier: 1  Goal Description: Pt will be able to amb 2 blocks with only min increase in sx in order to decrease difficulty with walking to mailbox and laundry mat.   Target Date: 10/28/20    Goal Identifier: 2  Goal Description: Pt will be able to demonstrate 4+/5 L hip strength in order to decrease difficulty with carrying.   Target Date: 11/27/20    Goal Identifier: 3  Goal Description: Pt will be independent with HEP in order to self manage symptoms.   Target Date: 11/27/20           Therapy Frequency:  1 time/week  Predicted Duration of Therapy Intervention:  8 weeks    Nighat Farah PT                 I CERTIFY THE NEED FOR THESE SERVICES FURNISHED UNDER        THIS PLAN OF TREATMENT AND WHILE UNDER MY CARE     (Physician co-signature of this document indicates review and certification of the therapy plan).                       Certification Date From:  09/30/20 "   Certification Date To:  11/27/20    Referring Provider:  Dr. Lyle Major    Initial Assessment        See Epic Evaluation Start of Care Date: 09/30/20

## 2020-10-06 ENCOUNTER — HOSPITAL ENCOUNTER (OUTPATIENT)
Dept: PHYSICAL THERAPY | Facility: CLINIC | Age: 75
Setting detail: THERAPIES SERIES
End: 2020-10-06
Attending: FAMILY MEDICINE
Payer: MEDICARE

## 2020-10-06 PROCEDURE — 97110 THERAPEUTIC EXERCISES: CPT | Mod: GP | Performed by: PHYSICAL MEDICINE & REHABILITATION

## 2020-10-19 ENCOUNTER — VIRTUAL VISIT (OUTPATIENT)
Dept: FAMILY MEDICINE | Facility: CLINIC | Age: 75
End: 2020-10-19
Payer: COMMERCIAL

## 2020-10-19 DIAGNOSIS — C09.9 MALIGNANT NEOPLASM OF TONSIL (H): ICD-10-CM

## 2020-10-19 DIAGNOSIS — J20.9 ACUTE BRONCHITIS WITH COEXISTING CONDITION REQUIRING PROPHYLACTIC TREATMENT: Primary | ICD-10-CM

## 2020-10-19 DIAGNOSIS — J44.9 COPD, MILD (H): ICD-10-CM

## 2020-10-19 PROCEDURE — 99214 OFFICE O/P EST MOD 30 MIN: CPT | Mod: 95 | Performed by: NURSE PRACTITIONER

## 2020-10-19 RX ORDER — AZITHROMYCIN 250 MG/1
TABLET, FILM COATED ORAL
Qty: 6 TABLET | Refills: 0 | Status: SHIPPED | OUTPATIENT
Start: 2020-10-19 | End: 2020-10-24

## 2020-10-19 RX ORDER — BENZONATATE 100 MG/1
100 CAPSULE ORAL 3 TIMES DAILY PRN
Qty: 42 CAPSULE | Refills: 0 | Status: SHIPPED | OUTPATIENT
Start: 2020-10-19 | End: 2021-01-11

## 2020-10-19 NOTE — PROGRESS NOTES
"Ingrid Powell is a 75 year old female who is being evaluated via a billable video visit.      The patient has been notified of following:     \"This video visit will be conducted via a call between you and your physician/provider. We have found that certain health care needs can be provided without the need for an in-person physical exam.  This service lets us provide the care you need with a video conversation.  If a prescription is necessary we can send it directly to your pharmacy.  If lab work is needed we can place an order for that and you can then stop by our lab to have the test done at a later time.    Video visits are billed at different rates depending on your insurance coverage.  Please reach out to your insurance provider with any questions.    If during the course of the call the physician/provider feels a video visit is not appropriate, you will not be charged for this service.\"    Patient has given verbal consent for Video visit? Yes  How would you like to obtain your AVS? Mail a copy  If you are dropped from the video visit, the video invite should be resent to: Text to cell phone: [phone]  Will anyone else be joining your video visit? No      Subjective     Ingrid Powell is a 75 year old female who presents today via video visit for the following health issues:    HPI     Acute Illness  Acute illness concerns: URI  Onset/Duration: 2 weeks   Symptoms:  Fever: no  Chills/Sweats: no  Headache (location?): no  Sinus Pressure: unknown but her eyes are watering and bothering her  Conjunctivitis:  no  Ear Pain: no  Rhinorrhea: YES  Congestion: YES  Sore Throat: YES  Cough: YES - productive with green sputum  Wheeze: YES  Decreased Appetite: no  Nausea: no  Vomiting: no  Diarrhea: no  Dysuria/Freq.: no  Dysuria or Hematuria: no  Fatigue/Achiness: YES  Sick/Strep Exposure: no  Therapies tried and outcome: OTC mucinex         Video Start Time: 11:05 AM        Review of Systems   Constitutional, " "HEENT, cardiovascular, pulmonary, gi and gu systems are negative, except as otherwise noted.      Objective           Vitals:  No vitals were obtained today due to virtual visit.    Physical Exam     GENERAL: Healthy, alert and no distress  EYES: Eyes grossly normal to inspection.  No discharge or erythema, or obvious scleral/conjunctival abnormalities.  RESP: No audible wheeze, cough, or visible cyanosis.  No visible retractions or increased work of breathing.    SKIN: Visible skin clear. No significant rash, abnormal pigmentation or lesions.  NEURO: Cranial nerves grossly intact.  Mentation and speech appropriate for age.  PSYCH: Mentation appears normal, affect normal/bright, judgement and insight intact, normal speech and appearance well-groomed.      Diagnostic Test Results:  none          Assessment & Plan     1. Acute bronchitis with coexisting condition requiring prophylactic treatment  Acute, symptoms x 2 weeks. Afebrile. No known COVID contacts. History of bronchitis, PMH of COPD. Due to underlying COPD and symptom severity and length will treat with antibiotics and tessalon for cough. Advise to return to clinic if symptoms not improving or worsening.   - benzonatate (TESSALON) 100 MG capsule; Take 1 capsule (100 mg) by mouth 3 times daily as needed  Dispense: 42 capsule; Refill: 0  - azithromycin (ZITHROMAX) 250 MG tablet; Take 2 tablets (500 mg) by mouth daily for 1 day, THEN 1 tablet (250 mg) daily for 4 days.  Dispense: 6 tablet; Refill: 0    2. COPD, mild (H)  Chronic, stable.     3. Malignant neoplasm of tonsil (H)  Chronic, stable.         BMI:   Estimated body mass index is 31.78 kg/m  as calculated from the following:    Height as of 9/14/20: 1.651 m (5' 5\").    Weight as of 2/17/20: 86.6 kg (191 lb).          See Patient Instructions    No follow-ups on file.    NORA Ortez [facility]      Video-Visit Details    Type of service:  Video Visit    Video End " Time:11:13 AM    Originating Location (pt. Location): Home    Distant Location (provider location):  [facility]     Platform used for Video Visit: Jeremy

## 2020-10-19 NOTE — PATIENT INSTRUCTIONS
1. Acute bronchitis with coexisting condition requiring prophylactic treatment  Acute, symptoms x 2 weeks. Afebrile. No known COVID contacts. History of bronchitis, PMH of COPD. Due to underlying COPD and symptom severity and length will treat with antibiotics and tessalon for cough. Advise to return to clinic if symptoms not improving or worsening.   - benzonatate (TESSALON) 100 MG capsule; Take 1 capsule (100 mg) by mouth 3 times daily as needed  Dispense: 42 capsule; Refill: 0  - azithromycin (ZITHROMAX) 250 MG tablet; Take 2 tablets (500 mg) by mouth daily for 1 day, THEN 1 tablet (250 mg) daily for 4 days.  Dispense: 6 tablet; Refill: 0    2. COPD, mild (H)  Chronic, stable.     3. Malignant neoplasm of tonsil (H)  Chronic, stable.

## 2020-11-02 ENCOUNTER — TELEPHONE (OUTPATIENT)
Dept: ORTHOPEDICS | Facility: CLINIC | Age: 75
End: 2020-11-02

## 2020-11-02 NOTE — TELEPHONE ENCOUNTER
Received request for nabumetone from [facility] pharmacy.    Called and LVM for patient to have her follow up in clinic to discuss refill and re evaluations.    Glendy Florez, ATC

## 2020-11-11 DIAGNOSIS — M54.17 LUMBOSACRAL RADICULOPATHY AT L3: ICD-10-CM

## 2020-11-11 RX ORDER — GABAPENTIN 300 MG/1
CAPSULE ORAL
Qty: 180 CAPSULE | Refills: 1 | Status: SHIPPED | OUTPATIENT
Start: 2020-11-11 | End: 2021-05-05

## 2020-11-11 NOTE — TELEPHONE ENCOUNTER
Requested Prescriptions   Pending Prescriptions Disp Refills     gabapentin (NEURONTIN) 300 MG capsule [Pharmacy Med Name: GABAPENTIN 300 MG Capsule] 180 capsule 1     Sig: TAKE 1 CAPSULE THREE TIMES DAILY       There is no refill protocol information for this order        Last Written Prescription Date:  6/5/20  Last Fill Quantity: 180,  # refills: 1   Last office visit: 6/24/2020 with prescribing provider:     Future Office Visit:   Next 5 appointments (look out 90 days)    Nov 13, 2020  1:20 PM  Return Visit with Lyle Major MD  [facility] ([facility]) [address]  [address]  [place] [id number] [phone]

## 2020-11-13 ENCOUNTER — OFFICE VISIT (OUTPATIENT)
Dept: ORTHOPEDICS | Facility: CLINIC | Age: 75
End: 2020-11-13
Payer: COMMERCIAL

## 2020-11-13 VITALS — DIASTOLIC BLOOD PRESSURE: 76 MMHG | BODY MASS INDEX: 31.78 KG/M2 | HEIGHT: 65 IN | SYSTOLIC BLOOD PRESSURE: 118 MMHG

## 2020-11-13 DIAGNOSIS — M67.952 TENDINOPATHY OF LEFT GLUTEAL REGION: Primary | ICD-10-CM

## 2020-11-13 DIAGNOSIS — M16.12 ARTHRITIS OF LEFT HIP: ICD-10-CM

## 2020-11-13 DIAGNOSIS — M25.552 LEFT HIP PAIN: ICD-10-CM

## 2020-11-13 DIAGNOSIS — M53.3 PAIN OF LEFT SACROILIAC JOINT: ICD-10-CM

## 2020-11-13 PROCEDURE — 20611 DRAIN/INJ JOINT/BURSA W/US: CPT | Mod: LT | Performed by: FAMILY MEDICINE

## 2020-11-13 PROCEDURE — 99213 OFFICE O/P EST LOW 20 MIN: CPT | Mod: 25 | Performed by: FAMILY MEDICINE

## 2020-11-13 RX ORDER — NABUMETONE 500 MG/1
500 TABLET, FILM COATED ORAL 2 TIMES DAILY
Qty: 30 TABLET | Refills: 0 | Status: SHIPPED | OUTPATIENT
Start: 2020-11-13 | End: 2021-10-12

## 2020-11-13 RX ADMIN — BETAMETHASONE SODIUM PHOSPHATE AND BETAMETHASONE ACETATE 6 MG: 3; 3 INJECTION, SUSPENSION INTRA-ARTICULAR; INTRALESIONAL; INTRAMUSCULAR; SOFT TISSUE at 14:30

## 2020-11-13 RX ADMIN — ROPIVACAINE HYDROCHLORIDE 2 ML: 5 INJECTION, SOLUTION EPIDURAL; INFILTRATION; PERINEURAL at 14:30

## 2020-11-13 NOTE — PROGRESS NOTES
ASSESSMENT & PLAN  Ingrid was seen today for follow up.    Diagnoses and all orders for this visit:    Tendinopathy of left gluteal region  -     nabumetone (RELAFEN) 500 MG tablet; Take 1 tablet (500 mg) by mouth 2 times daily    Left hip pain    Pain of left sacroiliac joint  -     nabumetone (RELAFEN) 500 MG tablet; Take 1 tablet (500 mg) by mouth 2 times daily    Arthritis of left hip  -     nabumetone (RELAFEN) 500 MG tablet; Take 1 tablet (500 mg) by mouth 2 times daily      Patient is a 74 year old female presenting for evaluation of   Chief Complaint   Patient presents with     Left Hip - Follow Up     # Left Glute Tendinopathy: Notable over the past couple months with pain now over the iliac crest as well as the greater trochanter.  She does have some pain over the left SI joint as well but not as severe as it was prior to her steroid injection.  Patient has tenderness to palpation over the greater trochanter as well as symptoms worse with Jayant over the greater trochanter.  Previous x-ray showing arthrosis in the SI joint as well as hip joint.  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  Counseled patient on nature of condition and treatment options.  Given this plan as below, follow-up as needed       Treatment: Activities as tolerated  Physical Therapy: HEP given today, if not improved can refer for formal PT  Injection:  Left greater trochanter steroid injection improved pain and ambulation afterwards  Medications: Relafen during the day, Flexeril at night    Concerning signs/sx that would warrant urgent evaluation were discussed.  All questions were answered, patient understands and agrees with plan.      Return if symptoms worsen or fail to improve.    -----    SUBJECTIVE  Ingrid Powell is a/an 74 year old female who is seen in consultation at the request of  Jose Manuel Sutton M.D. for evaluation of left hip pain. The patient is seen with their sister, Gayla.    Onset: 4 month(s) ago.  "Reports insidious onset without acute precipitating event. Saw PCP 6/24/20 and xrays were performed.   Location of Pain: left lateral hip, occasionally radiating left buttock and groin    Rating of Pain at worst: 8-9/10  Rating of Pain Currently: 8-9/10  Worsened by: walking, turning   Better with: nothing   Treatments tried: ice and heat Ibuprofen, Aleve, Gabapentin, DEXA scan 2/12/20   Quality: aching, dull  Associated symptoms: no distal numbness or tingling; denies swelling or warmth  Orthopedic history: NO  Relevant surgical history: NO    Interim History - July 31, 2020  Since last visit on 6/26/2020 patient has left low back pain and posterior hip.  Steroid injection completed 5 weeks ago in left hip joint provided minimal relief. No interim injury.  Pain with transition from sit to stand, worse with walking. No medications currently for symptoms.      Interim History - September 14, 2020  Since last visit on 7/31/2020 patient has returning left low back pain.  Left SI joint injection provided good relief for one month . No interim injury.       Interim History - November 13, 2020  Since last visit on 11/2/2020 patient has persisting left \"waist line\". Notes increased pain with forward flexion.  2 visits of PT were completed.  PCP started patient on gabapentin. Would like to discuss refill of Relafen. Denies radicular symptoms, numbness and tingling. No interim injury. Presents today with sister.        Past Medical History:   Diagnosis Date     Arthritis      COPD (chronic obstructive pulmonary disease) (H)      Depressive disorder      Gastroesophageal reflux disease      History of lumbar surgery 7/28/2015     Hoarseness      Hypertension      Oxygen dependent     at night     Reduced vision      Sleep apnea      Social History     Socioeconomic History     Marital status: Single     Spouse name: None     Number of children: None     Years of education: None     Highest education level: None   Occupational " "History     None   Social Needs     Financial resource strain: None     Food insecurity     Worry: None     Inability: None     Transportation needs     Medical: None     Non-medical: None   Tobacco Use     Smoking status: Former Smoker     Packs/day: 0.50     Years: 35.00     Pack years: 17.50     Types: Cigarettes     Last attempt to quit: 10/3/1995     Years since quittin.7     Smokeless tobacco: Never Used   Substance and Sexual Activity     Alcohol use: Yes     Comment: rare     Drug use: No     Sexual activity: Not Currently     Partners: Male   Lifestyle     Physical activity     Days per week: None     Minutes per session: None     Stress: None   Relationships     Social connections     Talks on phone: None     Gets together: None     Attends Latter day service: None     Active member of club or organization: None     Attends meetings of clubs or organizations: None     Relationship status: None     Intimate partner violence     Fear of current or ex partner: None     Emotionally abused: None     Physically abused: None     Forced sexual activity: None   Other Topics Concern     Parent/sibling w/ CABG, MI or angioplasty before 65F 55M? Not Asked   Social History Narrative     None     Patient's past medical, surgical, social, and family histories were reviewed today and no changes are noted.  No family history pertinent to the patient's problem today     REVIEW OF SYSTEMS:  10 point ROS is negative other than symptoms noted above in HPI, Past Medical History or as stated below  Constitutional: NEGATIVE for fever, chills, change in weight  Skin: NEGATIVE for worrisome rashes, moles or lesions  GI/: NEGATIVE for bowel or bladder changes  Neuro: NEGATIVE for weakness, dizziness or paresthesias    OBJECTIVE:  BP (P) 118/76   Ht 1.651 m (5' 5\")   BMI 31.78 kg/m     General: healthy, alert and in no distress  HEENT: no scleral icterus or conjunctival erythema  Skin: no suspicious lesions or rash. No " jaundice.  CV: no pedal edema  Resp: normal respiratory effort without conversational dyspnea   Psych: normal mood and affect  Gait: normal steady gait with appropriate coordination and balance  Neuro: Normal light sensory exam of lower extremity  MSK:  LEFT HIP  Inspection:    No obvious deformity or asymmetry, level pelvis  Palpation:    Tender about the GT. Otherwise all other landmarks are nontender.  Active Range of Motion:     Flexion full, IR mild pain, ER  limited slightly by pain  Strength:    Flexion 5/5, adduction 5/5, abduction 5/5  Special Tests:    Positive: ARELY over lateral hip    Negative: Logroll, FADIR      Independent visualization of the below image:  No results found for this or any previous visit (from the past 24 hour(s)).     PELVIS AND LEFT HIP TWO VIEWS  6/24/2020 2:59 PM      HISTORY: Left hip pain.     COMPARISON: 8/11/2017.                                                                      IMPRESSION: No acute bony abnormality. Moderate left hip  osteoarthritis has progressed slightly. Mild right hip osteoarthritis  is stable. Bilateral hip chondrocalcinosis without change.  Osteoarthritis at the pubic symphysis and sacroiliac joints  bilaterally with multilevel advanced degenerative disc disease in the  visualized lower lumbar spine.     TERELL FORBES MD    Large Joint Injection/Arthocentesis: L greater trochanteric bursa    Date/Time: 11/13/2020 2:30 PM  Performed by: Lyle Major MD  Authorized by: Lyle Major MD     Indications:  Pain  Needle Size:  22 G  Guidance: ultrasound    Approach:  Lateral  Location:  Hip      Site:  L greater trochanteric bursa  Medications:  6 mg betamethasone acet & sod phos 6 (3-3) MG/ML; 2 mL ropivacaine 5 MG/ML  Outcome:  Tolerated well, no immediate complications  Procedure discussed: discussed risks, benefits, and alternatives    Consent Given by:  Patient  Timeout: timeout called immediately prior to procedure    Prep: patient  was prepped and draped in usual sterile fashion     Patient counseled on risks of infection related to steroids and COVID-19.  Patient reported significant improvement of pain after left GT steroid injection.  Aftercare instructions given to patient.  Plan to follow-up as discussed above.     Lyle Major MD [facility]            Lyle Major MD, [facility]

## 2020-11-13 NOTE — PATIENT INSTRUCTIONS
Diagnosis: Left gluteal tendon irritation  Image Findings: known left hip arthritis and left SI joint arthritis   Treatment: Continue physical therapy, home exercises  Medications: Relafen  Follow-up: In one month if symptoms do not improve, sooner if worsening      Please call [phone]   Ask for my team if you have any questions or concerns    It was great seeing you again today!    Lyle Major MD, CAQSM    [facility]    Procedure: left greater trochanter steroid injection       You may shower, however avoid swimming, tub baths or hot tubs for 24 hours following your procedure    You may have a mild to moderate increase in pain for several days following the injection.    It may take up to 14 days for the steroid medication to start working although you may feel the effect as early as a few days after the procedure.    You may use ice packs for 10-15 minutes, 3 to 4 times a day at the injection site for comfort    You may use anti-inflammatory medications (such as Ibuprofen or Aleve or Advil) or Tylenol for pain control if necessary    If you were fasting, you may resume your normal diet and medications after the procedure    If you have diabetes, check your blood sugar more frequently than usual as your blood sugar may be higher than normal for 10-14 days following a steroid injection. Contact your doctor who manages your diabetes if your blood sugar is higher than usual      If you experience any of the following, [place] [facility] [place] [phone] or [phone]  -Fever over 100 degree F  -Swelling, bleeding, redness, drainage, warmth at the injection site  - New or worsening pain

## 2020-11-13 NOTE — LETTER
11/13/2020         RE: Ingrid Powell  [address]  [place] [address]        Dear Colleague,    Thank you for referring your patient, Ingrid Powell, to the [facility] [place]. Please see a copy of my visit note below.    ASSESSMENT & PLAN  Ingrid was seen today for follow up.    Diagnoses and all orders for this visit:    Tendinopathy of left gluteal region  -     nabumetone (RELAFEN) 500 MG tablet; Take 1 tablet (500 mg) by mouth 2 times daily    Left hip pain    Pain of left sacroiliac joint  -     nabumetone (RELAFEN) 500 MG tablet; Take 1 tablet (500 mg) by mouth 2 times daily    Arthritis of left hip  -     nabumetone (RELAFEN) 500 MG tablet; Take 1 tablet (500 mg) by mouth 2 times daily      Patient is a 74 year old female presenting for evaluation of   Chief Complaint   Patient presents with     Left Hip - Follow Up     # Left Glute Tendinopathy: Notable over the past couple months with pain now over the iliac crest as well as the greater trochanter.  She does have some pain over the left SI joint as well but not as severe as it was prior to her steroid injection.  Patient has tenderness to palpation over the greater trochanter as well as symptoms worse with Jayant over the greater trochanter.  Previous x-ray showing arthrosis in the SI joint as well as hip joint.  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  Counseled patient on nature of condition and treatment options.  Given this plan as below, follow-up as needed       Treatment: Activities as tolerated  Physical Therapy: HEP given today, if not improved can refer for formal PT  Injection:  Left greater trochanter steroid injection improved pain and ambulation afterwards  Medications: Relafen during the day, Flexeril at night    Concerning signs/sx that would warrant urgent evaluation were discussed.  All questions were answered, patient understands and agrees with plan.      Return  "if symptoms worsen or fail to improve.    -----    SUBJECTIVE  Ingrid Powell is a/an 74 year old female who is seen in consultation at the request of  Jose Manuel Sutton M.D. for evaluation of left hip pain. The patient is seen with their sister, Gayla.    Onset: 4 month(s) ago. Reports insidious onset without acute precipitating event. Saw PCP 6/24/20 and xrays were performed.   Location of Pain: left lateral hip, occasionally radiating left buttock and groin    Rating of Pain at worst: 8-9/10  Rating of Pain Currently: 8-9/10  Worsened by: walking, turning   Better with: nothing   Treatments tried: ice and heat Ibuprofen, Aleve, Gabapentin, DEXA scan 2/12/20   Quality: aching, dull  Associated symptoms: no distal numbness or tingling; denies swelling or warmth  Orthopedic history: NO  Relevant surgical history: NO    Interim History - July 31, 2020  Since last visit on 6/26/2020 patient has left low back pain and posterior hip.  Steroid injection completed 5 weeks ago in left hip joint provided minimal relief. No interim injury.  Pain with transition from sit to stand, worse with walking. No medications currently for symptoms.      Interim History - September 14, 2020  Since last visit on 7/31/2020 patient has returning left low back pain.  Left SI joint injection provided good relief for one month . No interim injury.       Interim History - November 13, 2020  Since last visit on 11/2/2020 patient has persisting left \"waist line\". Notes increased pain with forward flexion.  2 visits of PT were completed.  PCP started patient on gabapentin. Would like to discuss refill of Relafen. Denies radicular symptoms, numbness and tingling. No interim injury. Presents today with sister.        Past Medical History:   Diagnosis Date     Arthritis      COPD (chronic obstructive pulmonary disease) (H)      Depressive disorder      Gastroesophageal reflux disease      History of lumbar surgery 7/28/2015     Hoarseness      " Hypertension      Oxygen dependent     at night     Reduced vision      Sleep apnea      Social History     Socioeconomic History     Marital status: Single     Spouse name: None     Number of children: None     Years of education: None     Highest education level: None   Occupational History     None   Social Needs     Financial resource strain: None     Food insecurity     Worry: None     Inability: None     Transportation needs     Medical: None     Non-medical: None   Tobacco Use     Smoking status: Former Smoker     Packs/day: 0.50     Years: 35.00     Pack years: 17.50     Types: Cigarettes     Last attempt to quit: 10/3/1995     Years since quittin.7     Smokeless tobacco: Never Used   Substance and Sexual Activity     Alcohol use: Yes     Comment: rare     Drug use: No     Sexual activity: Not Currently     Partners: Male   Lifestyle     Physical activity     Days per week: None     Minutes per session: None     Stress: None   Relationships     Social connections     Talks on phone: None     Gets together: None     Attends [employer] service: None     Active member of club or organization: None     Attends meetings of clubs or organizations: None     Relationship status: None     Intimate partner violence     Fear of current or ex partner: None     Emotionally abused: None     Physically abused: None     Forced sexual activity: None   Other Topics Concern     Parent/sibling w/ CABG, MI or angioplasty before 65F 55M? Not Asked   Social History Narrative     None     Patient's past medical, surgical, social, and family histories were reviewed today and no changes are noted.  No family history pertinent to the patient's problem today     REVIEW OF SYSTEMS:  10 point ROS is negative other than symptoms noted above in HPI, Past Medical History or as stated below  Constitutional: NEGATIVE for fever, chills, change in weight  Skin: NEGATIVE for worrisome rashes, moles or lesions  GI/: NEGATIVE for bowel or  "bladder changes  Neuro: NEGATIVE for weakness, dizziness or paresthesias    OBJECTIVE:  BP (P) 118/76   Ht 1.651 m (5' 5\")   BMI 31.78 kg/m     General: healthy, alert and in no distress  HEENT: no scleral icterus or conjunctival erythema  Skin: no suspicious lesions or rash. No jaundice.  CV: no pedal edema  Resp: normal respiratory effort without conversational dyspnea   Psych: normal mood and affect  Gait: normal steady gait with appropriate coordination and balance  Neuro: Normal light sensory exam of lower extremity  MSK:  LEFT HIP  Inspection:    No obvious deformity or asymmetry, level pelvis  Palpation:    Tender about the GT. Otherwise all other landmarks are nontender.  Active Range of Motion:     Flexion full, IR mild pain, ER  limited slightly by pain  Strength:    Flexion 5/5, adduction 5/5, abduction 5/5  Special Tests:    Positive: ARELY over lateral hip    Negative: Logroll, FADIR      Independent visualization of the below image:  No results found for this or any previous visit (from the past 24 hour(s)).     PELVIS AND LEFT HIP TWO VIEWS  6/24/2020 2:59 PM      HISTORY: Left hip pain.     COMPARISON: 8/11/2017.                                                                      IMPRESSION: No acute bony abnormality. Moderate left hip  osteoarthritis has progressed slightly. Mild right hip osteoarthritis  is stable. Bilateral hip chondrocalcinosis without change.  Osteoarthritis at the pubic symphysis and sacroiliac joints  bilaterally with multilevel advanced degenerative disc disease in the  visualized lower lumbar spine.     TERELL FORBES MD    Large Joint Injection/Arthocentesis: L greater trochanteric bursa    Date/Time: 11/13/2020 2:30 PM  Performed by: Lyle Major MD  Authorized by: Lyle Major MD     Indications:  Pain  Needle Size:  22 G  Guidance: ultrasound    Approach:  Lateral  Location:  Hip      Site:  L greater trochanteric bursa  Medications:  6 mg betamethasone " acet & sod phos 6 (3-3) MG/ML; 2 mL ropivacaine 5 MG/ML  Outcome:  Tolerated well, no immediate complications  Procedure discussed: discussed risks, benefits, and alternatives    Consent Given by:  Patient  Timeout: timeout called immediately prior to procedure    Prep: patient was prepped and draped in usual sterile fashion     Patient counseled on risks of infection related to steroids and COVID-19.  Patient reported significant improvement of pain after left GT steroid injection.  Aftercare instructions given to patient.  Plan to follow-up as discussed above.     Lyle Major MD [facility]            Lyle Major MD, [facility]        Again, thank you for allowing me to participate in the care of your patient.        Sincerely,        Lyle Major MD

## 2020-11-17 RX ORDER — BETAMETHASONE SODIUM PHOSPHATE AND BETAMETHASONE ACETATE 3; 3 MG/ML; MG/ML
6 INJECTION, SUSPENSION INTRA-ARTICULAR; INTRALESIONAL; INTRAMUSCULAR; SOFT TISSUE
Status: DISCONTINUED | OUTPATIENT
Start: 2020-11-13 | End: 2021-01-11

## 2020-11-17 RX ORDER — ROPIVACAINE HYDROCHLORIDE 5 MG/ML
2 INJECTION, SOLUTION EPIDURAL; INFILTRATION; PERINEURAL
Status: DISCONTINUED | OUTPATIENT
Start: 2020-11-13 | End: 2021-01-11

## 2020-11-18 NOTE — PROGRESS NOTES
Outpatient Physical Therapy Discharge Note     Patient: Ingrid Powell  : 1945    Beginning/End Dates of Reporting Period:  20 to 10/6/20    Referring Provider: Dr. Lyle Major    Therapy Diagnosis: L hip pain secondary to OA    Patient did not return for follow up treatments as directed.  Goal status and current objective information is therefore unknown.  Discharge from PT services at this time for this episode of treatment. Please see attached documentation under this episode of care for further information including dates of service, start of care date, referring physician, Dx, treatment plan, treatments, etc.    Please contact me with any questions or concerns.    Thank you for your referral.    Nighat Farah, PT, DPT  Physical Therapist   [address] [address] [phone]

## 2020-12-14 ENCOUNTER — HEALTH MAINTENANCE LETTER (OUTPATIENT)
Age: 75
End: 2020-12-14

## 2021-01-11 ENCOUNTER — OFFICE VISIT (OUTPATIENT)
Dept: FAMILY MEDICINE | Facility: CLINIC | Age: 76
End: 2021-01-11
Payer: COMMERCIAL

## 2021-01-11 ENCOUNTER — ANCILLARY PROCEDURE (OUTPATIENT)
Dept: GENERAL RADIOLOGY | Facility: CLINIC | Age: 76
End: 2021-01-11
Attending: FAMILY MEDICINE
Payer: COMMERCIAL

## 2021-01-11 VITALS
RESPIRATION RATE: 18 BRPM | DIASTOLIC BLOOD PRESSURE: 78 MMHG | BODY MASS INDEX: 34.82 KG/M2 | HEART RATE: 68 BPM | WEIGHT: 209 LBS | SYSTOLIC BLOOD PRESSURE: 116 MMHG | TEMPERATURE: 97.8 F | HEIGHT: 65 IN

## 2021-01-11 DIAGNOSIS — M54.2 CERVICALGIA: Primary | ICD-10-CM

## 2021-01-11 DIAGNOSIS — M54.2 CERVICALGIA: ICD-10-CM

## 2021-01-11 PROCEDURE — 99214 OFFICE O/P EST MOD 30 MIN: CPT | Performed by: FAMILY MEDICINE

## 2021-01-11 PROCEDURE — 72040 X-RAY EXAM NECK SPINE 2-3 VW: CPT | Performed by: RADIOLOGY

## 2021-01-11 RX ORDER — LIDOCAINE 50 MG/G
1 PATCH TOPICAL EVERY 24 HOURS
Qty: 30 PATCH | Refills: 1 | Status: SHIPPED | OUTPATIENT
Start: 2021-01-11 | End: 2021-12-01

## 2021-01-11 ASSESSMENT — MIFFLIN-ST. JEOR: SCORE: 1433.9

## 2021-01-11 NOTE — NURSING NOTE
"Chief Complaint   Patient presents with     Musculoskeletal Problem     /78 (Cuff Size: Adult Regular)   Pulse 68   Temp 97.8  F (36.6  C) (Tympanic)   Resp 18   Ht 1.651 m (5' 5\")   Wt 94.8 kg (209 lb)   Breastfeeding No   BMI 34.78 kg/m   Estimated body mass index is 34.78 kg/m  as calculated from the following:    Height as of this encounter: 1.651 m (5' 5\").    Weight as of this encounter: 94.8 kg (209 lb).  Patient presents to the clinic using No DME      Health Maintenance that is potentially due pending provider review:    Health Maintenance Due   Topic Date Due     ZOSTER IMMUNIZATION (1 of 2) 09/19/1993     ADVANCE CARE PLANNING  02/09/2017     PHQ-9  03/10/2020     FALL RISK ASSESSMENT  03/19/2020     MEDICARE ANNUAL WELLNESS VISIT  08/06/2020                "

## 2021-01-11 NOTE — PATIENT INSTRUCTIONS
Patient Education     Nonsurgical Treatment of Cervical Spine Problems  Cervical spine problems can often be treated without surgery. Choices may include rest, medicines, or injections. Your healthcare provider may also suggest physical therapy or certain exercises. These treatments may all help to relieve your symptoms and are often successful. If your symptoms don t subside, talk with your healthcare provider who may recommend surgery as your best treatment option.   Relieving your symptoms  Your healthcare provider may recommend:    Medicines. These help to reduce pain and inflammation.    Epidural steroid injections. These are injections into the spinal canal near the spinal cord. They may relieve severe pain and reduce inflammation.    Restricting aggravating activities. This can help to give your cervical spine time to heal.    A soft cervical collar. This can help to support and immobilize your neck while keeping your cervical spine aligned.    Traction. This may help relieve pressure on the irritated nerves.  Restoring mobility and strength  Your healthcare provider may recommend that you work with a physical therapist, an osteopathic doctor, or a chiropractor. This can help you regain mobility and strength in your neck. Physical therapy may last for 4 to 8 weeks. It may include:     Exercises to improve your neck s range of motion and strength.    Evaluation and correction of posture and body movements. This can correct problems that affect your cervical spine.    Heat, massage, and traction to help relieve your symptoms.  Self-care  You ll take an active role in your own therapy. To protect your neck from further injury:     Follow any exercise program given to you by your healthcare provider or physical therapist.    Practice good posture while sitting, standing, or moving.    Have your workspace evaluated. Rearrange it if needed.    When lying down, support your neck. You can use a special cervical  pillow or rolled-up towel.  [employer] last reviewed this educational content on 5/1/2018 2000-2020 [employer] [address] [place] [address], [place] [address]. All rights reserved. This information is not intended as a substitute for professional medical care. Always follow your healthcare professional's instructions.           Patient Education     Neck Pain     Neck pain has several possible causes when there is no injury:    You can get a minor ligament sprain or muscle strain from a sudden minor neck movement. Sleeping with your neck in an awkward position can also cause this.    Some people respond to emotional stress by tensing the muscles of their neck, shoulders, and upper back. Chronic spasm in these muscles can cause neck pain and sometimes headaches.    Gradual wear and tear of the joints in the spine can cause degenerative arthritis. This can be a source of occasional or chronic neck pain.    The spinal disks may bulge and put pressure on a nearby spinal nerve. This can happen as a natural result of aging or repeated small injuries to the neck. The spinal disks are the cushions between each spinal bone. This causes tingling, pain, or numbness that spreads from the neck to the shoulder, arm, or hand on one side.  Acute neck pain usually gets better in 1 to 2 weeks. Neck pain related to disk disease, arthritis in the spinal joints, or spinal stenosis can become chronic and last for months or years. Spinal stenosis is narrowing of the spinal canal.  X-rays are usually not ordered for the initial evaluation of neck pain. But X-rays may be done if you had a forceful physical injury, such as a car accident or fall. If pain continues and doesn t respond to medical treatment, X-rays and other tests may be done at a later time.  Home care    Rest and relax the muscles. Use a comfortable pillow that supports the head. It should also help keep the spine in a neutral position. The position of the head  should not be tilted forward or backward. A rolled up towel may help for a custom fit.    A soft cervical collar can help pain, especially pain with head movement. Your doctor can tell you if this is appropriate for your condition.    Some people find relief with heat. Heat can be applied with either a warm shower or bath or a moist towel heated in the microwave and massage. Others prefer cold packs. You can make an ice pack by filling a plastic bag that seals at the top with ice cubes or crushed ice and then wrapping it with a thin towel. Try both and use the method that feels best for 15 to 20 minutes, several times a day.    Whether using ice or heat, be careful that you don't injure your skin. Never put ice directly on the skin. Always wrap the ice in a towel or other type of cloth. This is very important, especially in people with poor skin sensations.     Try to reduce your stress level. Emotional stress can lead to neck muscle tension and get in the way of or delay the healing process.    You may use over-the-counter pain medicine to control pain, unless another medicine was prescribed. If you have chronic liver or kidney disease or ever had a stomach ulcer or digestive bleeding, talk with your healthcare provider before using these medicines.    Follow-up care  Follow up with your healthcare provider if your symptoms don't show signs of improvement after one week. Physical therapy or further tests may be needed.  If X-rays, CT scans, or MRI scans were taken, you will be told of any new findings that may affect your care.  Call 911  Call 911 if you have:    Sudden weakness or numbness in one or both arms    Neck swelling, difficulty or painful swallowing    Trouble breathing    Chest pain  When to seek medical advice  Call your healthcare provider right away if any of these occur:    Pain becomes worse or spreads into one or both arm    Increasing headache    Fever of 100.4 F (38 C) or higher, or as directed  by your healthcare provider  [employer] last reviewed this educational content on 11/1/2019 2000-2020 [employer] [address] [facility], [place] [address]. All rights reserved. This information is not intended as a substitute for professional medical care. Always follow your healthcare professional's instructions.

## 2021-01-11 NOTE — PROGRESS NOTES
Assessment & Plan     Cervicalgia  Differentials discussed in detail including cervical spine degenerative disc disease.  X-ray cervical spine findings reviewed independently, consistent with arthritis, no bony lesion or fracture noted.  Lidoderm patch described and suggested to use Tylenol, ibuprofen, heat/ice and activity as tolerated.  Recommended to start physical therapy and follow-up with orthopedic as well.  Reminded to schedule appointment for routine physical.  Patient understood and in agreement with above plan.  All question answered.  - PHYSICAL THERAPY REFERRAL; Future  - XR Cervical Spine 2/3 Views; Future  - Orthopedic & Spine  Referral; Future  - lidocaine (LIDODERM) 5 % patch; Place 1 patch onto the skin every 24 hours To prevent lidocaine toxicity, patient should be patch free for 12 hrs daily.      30 minutes spent on the date of the encounter doing chart review, review of test results, interpretation of tests, patient visit and documentation          Patient Instructions     Patient Education     Nonsurgical Treatment of Cervical Spine Problems  Cervical spine problems can often be treated without surgery. Choices may include rest, medicines, or injections. Your healthcare provider may also suggest physical therapy or certain exercises. These treatments may all help to relieve your symptoms and are often successful. If your symptoms don t subside, talk with your healthcare provider who may recommend surgery as your best treatment option.   Relieving your symptoms  Your healthcare provider may recommend:    Medicines. These help to reduce pain and inflammation.    Epidural steroid injections. These are injections into the spinal canal near the spinal cord. They may relieve severe pain and reduce inflammation.    Restricting aggravating activities. This can help to give your cervical spine time to heal.    A soft cervical collar. This can help to support and immobilize your neck while  keeping your cervical spine aligned.    Traction. This may help relieve pressure on the irritated nerves.  Restoring mobility and strength  Your healthcare provider may recommend that you work with a physical therapist, an osteopathic doctor, or a chiropractor. This can help you regain mobility and strength in your neck. Physical therapy may last for 4 to 8 weeks. It may include:     Exercises to improve your neck s range of motion and strength.    Evaluation and correction of posture and body movements. This can correct problems that affect your cervical spine.    Heat, massage, and traction to help relieve your symptoms.  Self-care  You ll take an active role in your own therapy. To protect your neck from further injury:     Follow any exercise program given to you by your healthcare provider or physical therapist.    Practice good posture while sitting, standing, or moving.    Have your workspace evaluated. Rearrange it if needed.    When lying down, support your neck. You can use a special cervical pillow or rolled-up towel.  P[employer] last reviewed this educational content on 5/1/2018 2000-2020 [employer]. [address] [place] [address]. All rights reserved. This information is not intended as a substitute for professional medical care. Always follow your healthcare professional's instructions.           Patient Education     Neck Pain     Neck pain has several possible causes when there is no injury:    You can get a minor ligament sprain or muscle strain from a sudden minor neck movement. Sleeping with your neck in an awkward position can also cause this.    Some people respond to emotional stress by tensing the muscles of their neck, shoulders, and upper back. Chronic spasm in these muscles can cause neck pain and sometimes headaches.    Gradual wear and tear of the joints in the spine can cause degenerative arthritis. This can be a source of occasional or chronic neck  pain.    The spinal disks may bulge and put pressure on a nearby spinal nerve. This can happen as a natural result of aging or repeated small injuries to the neck. The spinal disks are the cushions between each spinal bone. This causes tingling, pain, or numbness that spreads from the neck to the shoulder, arm, or hand on one side.  Acute neck pain usually gets better in 1 to 2 weeks. Neck pain related to disk disease, arthritis in the spinal joints, or spinal stenosis can become chronic and last for months or years. Spinal stenosis is narrowing of the spinal canal.  X-rays are usually not ordered for the initial evaluation of neck pain. But X-rays may be done if you had a forceful physical injury, such as a car accident or fall. If pain continues and doesn t respond to medical treatment, X-rays and other tests may be done at a later time.  Home care    Rest and relax the muscles. Use a comfortable pillow that supports the head. It should also help keep the spine in a neutral position. The position of the head should not be tilted forward or backward. A rolled up towel may help for a custom fit.    A soft cervical collar can help pain, especially pain with head movement. Your doctor can tell you if this is appropriate for your condition.    Some people find relief with heat. Heat can be applied with either a warm shower or bath or a moist towel heated in the microwave and massage. Others prefer cold packs. You can make an ice pack by filling a plastic bag that seals at the top with ice cubes or crushed ice and then wrapping it with a thin towel. Try both and use the method that feels best for 15 to 20 minutes, several times a day.    Whether using ice or heat, be careful that you don't injure your skin. Never put ice directly on the skin. Always wrap the ice in a towel or other type of cloth. This is very important, especially in people with poor skin sensations.     Try to reduce your stress level. Emotional stress  can lead to neck muscle tension and get in the way of or delay the healing process.    You may use over-the-counter pain medicine to control pain, unless another medicine was prescribed. If you have chronic liver or kidney disease or ever had a stomach ulcer or digestive bleeding, talk with your healthcare provider before using these medicines.    Follow-up care  Follow up with your healthcare provider if your symptoms don't show signs of improvement after one week. Physical therapy or further tests may be needed.  If X-rays, CT scans, or MRI scans were taken, you will be told of any new findings that may affect your care.  Call 911  Call 911 if you have:    Sudden weakness or numbness in one or both arms    Neck swelling, difficulty or painful swallowing    Trouble breathing    Chest pain  When to seek medical advice  Call your healthcare provider right away if any of these occur:    Pain becomes worse or spreads into one or both arm    Increasing headache    Fever of 100.4 F (38 C) or higher, or as directed by your healthcare provider  StayWell last reviewed this educational content on 11/1/2019 2000-2020 [employer]. [address] [place] [address]. All rights reserved. This information is not intended as a substitute for professional medical care. Always follow your healthcare professional's instructions.               Jose Manuel Sutton MD  [facility]    Km Quintero is a 75 year old who presents to clinic today for the following health issues     HPI       Neck Pain  Onset/Duration: over a week   Description:   Location: left side neck, middle of neck   Radiation: into the left neck and nto the left shoulder  Intensity: moderate  Progression of Symptoms:  worsening  Accompanying Signs & Symptoms:  Burning, tingling, prickly sensation in arm(s): no  Numbness in arm(s): no  Weakness in arm(s):  no  Fever: no  Headache: YES- slightly   Nausea and/or  "vomiting: no  History:   Trauma: no  Previous neck pain: no  Previous surgery or injections: no  Previous Imaging (MRI,X ray): no  Precipitating or alleviating factors: None  Does movement impact the pain:  YES  Therapies tried and outcome: rest/inactivity, heat, ice, massage, stretching, acetaminophen and Ibuprofen      Review of Systems   Constitutional, HEENT, cardiovascular, pulmonary, gi and gu systems are negative, except as otherwise noted.      Objective    /78 (Cuff Size: Adult Regular)   Pulse 68   Temp 97.8  F (36.6  C) (Tympanic)   Resp 18   Ht 1.651 m (5' 5\")   Wt 94.8 kg (209 lb)   Breastfeeding No   BMI 34.78 kg/m    Body mass index is 34.78 kg/m .  Physical Exam   GENERAL: alert and no distress  NECK: no adenopathy, no asymmetry, masses, or scars and thyroid normal to palpation  RESP: lungs clear to auscultation - no rales, rhonchi or wheezes  CV: regular rates and rhythm, normal S1 S2, no S3 or S4 and no murmur, click or rub  ABDOMEN: soft, nontender  MS: Subjective left cervical paraspinal pain, no spinal/paraspinal tenderness, skin discoloration or swelling noted, limited shoulder replacement bilaterally, normal upper extremity strength, radial pulses 3+  NEURO: Normal strength and tone, mentation intact and speech normal      ----- Ambulatory Services Attestations for Billing on Time -----        "

## 2021-01-12 ENCOUNTER — TELEPHONE (OUTPATIENT)
Dept: URGENT CARE | Facility: URGENT CARE | Age: 76
End: 2021-01-12

## 2021-01-13 NOTE — TELEPHONE ENCOUNTER
Prior Authorization Retail Medication Request    Medication/Dose: lidocaine 5% patch  ICD code (if different than what is on RX):  Cervicalgia [M54.2]  - Primary  Previously Tried and Failed:  nsaids-stomach upset  Rationale:       Insurance Name:  Covermymeds [id number]  Insurance ID:         Pharmacy Information (if different than what is on RX)  Name:  [facility]  Phone:

## 2021-01-13 NOTE — TELEPHONE ENCOUNTER
Central Prior Authorization Team   Phone: [phone]    PA Initiation    Medication: lidocaine  Insurance Company: [employer] - Phone [phone] Fax [phone]  Pharmacy Filling the Rx: [facility] [address] [place] [address]  Filling Pharmacy Phone: [phone]  Filling Pharmacy Fax: [phone]  Start Date: 1/13/2021

## 2021-01-13 NOTE — TELEPHONE ENCOUNTER
Prior Authorization Approval    Authorization Effective Date: 1/13/2021  Authorization Expiration Date: 12/31/2021  Medication: lidocaine-APPROVED  Approved Dose/Quantity:    Reference #:     Insurance Company: [employer]AY - Phone [phone] Fax [phone]  Expected CoPay:       CoPay Card Available:      Foundation Assistance Needed:    Which Pharmacy is filling the prescription (Not needed for infusion/clinic administered): [facility] [address] [place] [address]  Pharmacy Notified: Yes  Patient Notified: Yes  PATIENT MUST HAVE DEDUCTIBLE.  COPAY WAS HIGH.  PHARMACIST STATES SHE IS GOING TO TRY OTC LIDOCAINE 4%.

## 2021-01-26 ENCOUNTER — HOSPITAL ENCOUNTER (OUTPATIENT)
Dept: PHYSICAL THERAPY | Facility: CLINIC | Age: 76
Setting detail: THERAPIES SERIES
End: 2021-01-26
Attending: FAMILY MEDICINE
Payer: MEDICARE

## 2021-01-26 DIAGNOSIS — M54.2 CERVICALGIA: ICD-10-CM

## 2021-01-26 PROCEDURE — 97162 PT EVAL MOD COMPLEX 30 MIN: CPT | Mod: GP | Performed by: PHYSICAL THERAPIST

## 2021-01-26 PROCEDURE — 97110 THERAPEUTIC EXERCISES: CPT | Mod: GP | Performed by: PHYSICAL THERAPIST

## 2021-01-26 NOTE — PROGRESS NOTES
"   Physical Therapy Evaluation  01/26/21 1500   General Information   Type of Visit Initial OP Ortho PT Evaluation   Start of Care Date 01/26/21   Referring Physician Dr. Sutton   Patient/Family Goals Statement \"to be able to look up and decrease pain\"   Date of Order 01/11/21   Certification Required? Yes   Medical Diagnosis Cervicalgia M54.2    Surgical/Medical history reviewed Yes   General Information Comments L reverse TSA as of 11/2019   Body Part(s)   Body Part(s) Cervical Spine   Presentation and Etiology   Pertinent history of current problem (include personal factors and/or comorbidities that impact the POC) Pt reports that she has had pain in the back of her neck.  Reports this has been going on for about a month and a half and that look up increases her pain.  By the end of the day her pain is worse in her neck.  Wonders if her L shoulder weakness from reverse TSA.   Impairments A. Pain;E. Decreased flexibility   Functional Limitations perform activities of daily living;perform desired leisure / sports activities   Symptom Location Posterior neck   How/Where did it occur From insidious onset;At home   Onset date of current episode/exacerbation 11/26/20   Chronicity New   Pain rating (0-10 point scale) Best (/10);Worst (/10)   Best (/10) 5   Worst (/10) 7   Pain quality C. Aching   Frequency of pain/symptoms B. Intermittent   Pain/symptoms are: The same all the time   Pain/symptoms exacerbated by A. Sitting   Pain/symptoms eased by C. Rest   Progression of symptoms since onset: Unchanged   Current / Previous Interventions   Diagnostic Tests: X-ray   X-ray Results Results   X-ray results Moderate degenerative changes to cervical spine C3-C6   Prior Level of Function   Prior Level of Function-Mobility independent   Prior Level of Function-ADLs independent   Functional Level Prior Comment independent   Current Level of Function   Patient role/employment history F. Retired   Living environment Apartment/condo "   Home/community accessibility ground floor   Current equipment-Gait/Locomotion Walker   Current equipment-ADL None   Fall Risk Screen   Fall screen completed by PT   Have you fallen 2 or more times in the past year? No   Have you fallen and had an injury in the past year? No   Is patient a fall risk? No   Abuse Screen (yes response referral indicated)   Feels Unsafe at Home or Work/School no   Feels Threatened by Someone no   Does Anyone Try to Keep You From Having Contact with Others or Doing Things Outside Your Home? no   Physical Signs of Abuse Present no   Functional Scales   Functional Scales Other   Other Scales  NDI: 6   Cervical Spine   Posture Forward head, rounded shoulders, L shoulder elevated   Cervical Flexion ROM 30*   Cervical Extension ROM 40* - painful   Cervical Right Side Bending ROM 20* - pulling   Cervical Left Side Bending ROM 18* - pulling pain   Cervical Right Rotation ROM 58* - tight on R side   Cervical Left Rotation ROM 50* - tight on L   Thoracic Flexion ROM 75%   Thoracic Extension ROM 50%   Thoracic Right Side Bending ROM WNL   Thoracic Left Sidebending ROM  WNL   Thoracic Right Rotation 75%   Thoracic Left Rotation 75%   Shoulder AROM Screen L shoulder flexion: visually 95* and abduction visually 90*; R shoulder visually about 140* for flexion and abduction   Shoulder Shrug (C2-C4) Strength 4/5 B   Shoulder Abd (C5) Strength R: 3+/5; L: 3/5   Shoulder Add (C7) Strength R: 3+/5; L: 3+/5   Shoulder ER (C5, C6) Strength R: 4-/5; L: 3-/5   Shoulder IR (C5, C6) Strength R: 4-/5; L: 3-/5   Elbow Flexion (C5, C6) Strength R: 4/5; L: 4-/5   Elbow Extension (C7) Strength R: 4/5; L: 3+/5   Wrist Extension (C6) Strength R: 4-/5; L: 4-/5   Wrist Flexion (C7) Strength R: 4-/5; L: 4-/5   Thumb Abd (C8) Strength R: 4/5; L: 4-/5   5th Finger Add (T1) Strength R: 4-/5; L: 4-/5   Shoulder/Wrist/Hand Strength Comments Pt demonstrates overactivation of L UT, scalenes, and LS with L arm movements;  Poor  scapular stability   Upper Trapezius Flexibility Restricted L > R   Levator Scapula Flexibility Restricted L > R   Scalene Flexibility Restircted L > R   Pectoralis Minor Flexibility Restricted L > R   Vertebral Artery Test negative   Alar Ligament Test negative   Transverse Ligament Test negative   Segmental Mobility-Cervical hypomobility C3-C7   Segmental Mobility-Thoracic hypomobility T1-T5   Palpation TTP L scalenes, UT, LS, cervical parapsinal   Dermatome/Sensory Testing denies numbness and tinlging   Planned Therapy Interventions   Planned Therapy Interventions manual therapy;joint mobilization;neuromuscular re-education;ROM;strengthening;stretching   Planned Modality Interventions   Planned Modality Interventions Cryotherapy;Electrical stimulation;Hot packs;TENS;Ultrasound;Traction   Clinical Impression   Criteria for Skilled Therapeutic Interventions Met yes, treatment indicated   PT Diagnosis Neck pain   Influenced by the following impairments Decreased ROM, cervical weaknness, scapular weakness, and L shoulder weakness, decreased flexiblity   Functional limitations due to impairments difficulty sitting, difficulty looking up, difficulty looking over her shoulders   Clinical Presentation Stable/Uncomplicated   Clinical Presentation Rationale hx of L reverse TSA, hx cancer, hx of arthritis   Clinical Decision Making (Complexity) Moderate complexity   Therapy Frequency 1 time/week   Predicted Duration of Therapy Intervention (days/wks) 10 weeks   Risk & Benefits of therapy have been explained Yes   Patient, Family & other staff in agreement with plan of care Yes   Clinical Impression Comments Pt is a 74 yo female who attends physical therapy with complaints of neck pain.  Pt demonstrates reduced neck ROM, weakness, decreased flexibility, decreased ROM and strength in L shoulder following reverse TSA.  Feel not being able to complete therapy for L reverse TSA due to COVID-19 restrictions has caused the patient  to use poor movement patterns that has affected her neck pain.  Pt would benefit from continued physical therapy to address above deficits and promote function.   Education Assessment   Preferred Learning Style Listening;Demonstration   Barriers to Learning No barriers   ORTHO GOALS   PT Ortho Eval Goals 1;2;3;4   Ortho Goal 1   Goal Identifier Sitting   Goal Description Pt will tolerate sitting for 15 minutes without needing to support her head wth a pillow without an increase in symptoms.   Target Date 02/23/21   Ortho Goal 2   Goal Identifier Cervical ROM   Goal Description Pt will demonstrate cervical rotation ROM to 55* in order to look over her shoulders while walking without an increase in symptoms.   Target Date 03/09/21   Ortho Goal 3   Goal Identifier Shoulder flexion   Goal Description Pt will demonstrate shoulder flexion with 50% reduction in activation of cervical muscluature and no increase in symptoms.   Target Date 04/06/21   Ortho Goal 4   Goal Identifier Lifting   Goal Description Pt will tolerate lifting 8# (gallon jug) without an increase in neck symptoms in order to carry a gallon jug.   Target Date 04/06/21   Total Evaluation Time   PT Eval, Moderate Complexity Minutes (00459) 25   Therapy Certification   Certification date from 01/26/21   Certification date to 04/06/21   Medical Diagnosis Cervicalgia

## 2021-02-08 NOTE — PROGRESS NOTES
[facility]          OUTPATIENT PHYSICAL THERAPY ORTHOPEDIC EVALUATION  PLAN OF TREATMENT FOR OUTPATIENT REHABILITATION  (COMPLETE FOR INITIAL CLAIMS ONLY)  Patient's Last Name, First Name, M.I.  YOB: 1945  Ingrid Powell    Provider s Name:  [facility]   Medical Record No.  [id number]   Start of Care Date:  01/26/21   Onset Date:  11/26/20   Type:     _X__PT   ___OT   ___SLP Medical Diagnosis:  Cervicalgia     PT Diagnosis:  Neck pain   Visits from SOC:  1      _________________________________________________________________________________  Plan of Treatment/Functional Goals:  manual therapy, joint mobilization, neuromuscular re-education, ROM, strengthening, stretching     Cryotherapy, Electrical stimulation, Hot packs, TENS, Ultrasound, Traction     Goals  Goal Identifier: Sitting  Goal Description: Pt will tolerate sitting for 15 minutes without needing to support her head wth a pillow without an increase in symptoms.  Target Date: 02/23/21    Goal Identifier: Cervical ROM  Goal Description: Pt will demonstrate cervical rotation ROM to 55* in order to look over her shoulders while walking without an increase in symptoms.  Target Date: 03/09/21    Goal Identifier: Shoulder flexion  Goal Description: Pt will demonstrate shoulder flexion with 50% reduction in activation of cervical muscluature and no increase in symptoms.  Target Date: 04/06/21    Goal Identifier: Lifting  Goal Description: Pt will tolerate lifting 8# (gallon jug) without an increase in neck symptoms in order to carry a gallon jug.  Target Date: 04/06/21    Therapy Frequency:  1 time/week  Predicted Duration of Therapy Intervention:  10 weeks    Zo Kemp, PT                 I CERTIFY THE NEED FOR THESE SERVICES FURNISHED UNDER        THIS PLAN OF TREATMENT AND WHILE UNDER MY CARE     (Physician co-signature of this document indicates review and  certification of the therapy plan).                       Certification Date From:  01/26/21   Certification Date To:  04/06/21    Referring Provider:  Dr. Sutton    Initial Assessment        See Epic Evaluation Start of Care Date: 01/26/21

## 2021-02-10 ENCOUNTER — HOSPITAL ENCOUNTER (OUTPATIENT)
Dept: PHYSICAL THERAPY | Facility: CLINIC | Age: 76
Setting detail: THERAPIES SERIES
End: 2021-02-10
Attending: FAMILY MEDICINE
Payer: MEDICARE

## 2021-02-10 PROCEDURE — 97110 THERAPEUTIC EXERCISES: CPT | Mod: GP | Performed by: PHYSICAL THERAPIST

## 2021-02-10 PROCEDURE — 97140 MANUAL THERAPY 1/> REGIONS: CPT | Mod: GP | Performed by: PHYSICAL THERAPIST

## 2021-02-18 ENCOUNTER — HOSPITAL ENCOUNTER (OUTPATIENT)
Dept: PHYSICAL THERAPY | Facility: CLINIC | Age: 76
Setting detail: THERAPIES SERIES
End: 2021-02-18
Attending: FAMILY MEDICINE
Payer: MEDICARE

## 2021-02-18 PROCEDURE — 97110 THERAPEUTIC EXERCISES: CPT | Mod: GP | Performed by: PHYSICAL THERAPIST

## 2021-02-19 DIAGNOSIS — F33.1 MAJOR DEPRESSIVE DISORDER, RECURRENT EPISODE, MODERATE (H): ICD-10-CM

## 2021-02-22 RX ORDER — PAROXETINE 20 MG/1
TABLET, FILM COATED ORAL
Qty: 90 TABLET | Refills: 1 | Status: SHIPPED | OUTPATIENT
Start: 2021-02-22 | End: 2021-07-21

## 2021-02-22 NOTE — TELEPHONE ENCOUNTER
"  PHQ 2/22/2018 3/19/2019 9/10/2019   PHQ-9 Total Score 2 14 4   Q9: Thoughts of better off dead/self-harm past 2 weeks Not at all Not at all Not at all     Requested Prescriptions   Pending Prescriptions Disp Refills     PARoxetine (PAXIL) 20 MG tablet [Pharmacy Med Name: PAROXETINE HYDROCHLORIDE 20 MG Tablet] 90 tablet 1     Sig: TAKE 1 TABLET AT BEDTIME       SSRIs Protocol Failed - 2/19/2021  3:05 PM        Failed - PHQ-9 score less than 5 in past 6 months     Please review last PHQ-9 score.           Failed - Recent (6 mo) or future (30 days) visit within the authorizing provider's specialty     Patient had office visit in the last 6 months or has a visit in the next 30 days with authorizing provider or within the authorizing provider's specialty.  See \"Patient Info\" tab in inbasket, or \"Choose Columns\" in Meds & Orders section of the refill encounter.            Passed - Medication is active on med list        Passed - Patient is age 18 or older        Passed - No active pregnancy on record        Passed - No positive pregnancy test in last 12 months           Dea GUTIERREZ, RN, BSN      "
done

## 2021-02-25 ENCOUNTER — HOSPITAL ENCOUNTER (OUTPATIENT)
Dept: PHYSICAL THERAPY | Facility: CLINIC | Age: 76
Setting detail: THERAPIES SERIES
End: 2021-02-25
Attending: FAMILY MEDICINE
Payer: MEDICARE

## 2021-02-25 PROCEDURE — 97110 THERAPEUTIC EXERCISES: CPT | Mod: GP | Performed by: PHYSICAL THERAPIST

## 2021-03-04 ENCOUNTER — HOSPITAL ENCOUNTER (OUTPATIENT)
Dept: PHYSICAL THERAPY | Facility: CLINIC | Age: 76
Setting detail: THERAPIES SERIES
End: 2021-03-04
Attending: FAMILY MEDICINE
Payer: MEDICARE

## 2021-03-04 PROCEDURE — 97110 THERAPEUTIC EXERCISES: CPT | Mod: GP | Performed by: PHYSICAL THERAPIST

## 2021-03-10 ENCOUNTER — HOSPITAL ENCOUNTER (OUTPATIENT)
Dept: PHYSICAL THERAPY | Facility: CLINIC | Age: 76
Setting detail: THERAPIES SERIES
End: 2021-03-10
Attending: FAMILY MEDICINE
Payer: MEDICARE

## 2021-03-10 PROCEDURE — 97110 THERAPEUTIC EXERCISES: CPT | Mod: GP | Performed by: PHYSICAL THERAPIST

## 2021-03-18 ENCOUNTER — OFFICE VISIT (OUTPATIENT)
Dept: FAMILY MEDICINE | Facility: CLINIC | Age: 76
End: 2021-03-18
Payer: COMMERCIAL

## 2021-03-18 ENCOUNTER — ANCILLARY PROCEDURE (OUTPATIENT)
Dept: GENERAL RADIOLOGY | Facility: CLINIC | Age: 76
End: 2021-03-18
Attending: FAMILY MEDICINE
Payer: COMMERCIAL

## 2021-03-18 VITALS
HEART RATE: 75 BPM | OXYGEN SATURATION: 96 % | SYSTOLIC BLOOD PRESSURE: 124 MMHG | DIASTOLIC BLOOD PRESSURE: 84 MMHG | BODY MASS INDEX: 34.82 KG/M2 | HEIGHT: 65 IN | TEMPERATURE: 97.9 F | RESPIRATION RATE: 18 BRPM | WEIGHT: 209 LBS

## 2021-03-18 DIAGNOSIS — Z12.11 COLON CANCER SCREENING: ICD-10-CM

## 2021-03-18 DIAGNOSIS — C09.9 MALIGNANT NEOPLASM OF TONSIL (H): ICD-10-CM

## 2021-03-18 DIAGNOSIS — R06.09 EXERTIONAL DYSPNEA: Primary | ICD-10-CM

## 2021-03-18 DIAGNOSIS — R06.09 EXERTIONAL DYSPNEA: ICD-10-CM

## 2021-03-18 DIAGNOSIS — F33.1 MAJOR DEPRESSIVE DISORDER, RECURRENT EPISODE, MODERATE (H): ICD-10-CM

## 2021-03-18 DIAGNOSIS — J44.9 COPD, MILD (H): ICD-10-CM

## 2021-03-18 LAB
ALBUMIN SERPL-MCNC: 3.6 G/DL (ref 3.4–5)
ALP SERPL-CCNC: 96 U/L (ref 40–150)
ALT SERPL W P-5'-P-CCNC: 20 U/L (ref 0–50)
ANION GAP SERPL CALCULATED.3IONS-SCNC: 3 MMOL/L (ref 3–14)
AST SERPL W P-5'-P-CCNC: 19 U/L (ref 0–45)
BILIRUB SERPL-MCNC: 0.3 MG/DL (ref 0.2–1.3)
BUN SERPL-MCNC: 23 MG/DL (ref 7–30)
CALCIUM SERPL-MCNC: 9.3 MG/DL (ref 8.5–10.1)
CHLORIDE SERPL-SCNC: 105 MMOL/L (ref 94–109)
CO2 SERPL-SCNC: 33 MMOL/L (ref 20–32)
CREAT SERPL-MCNC: 0.76 MG/DL (ref 0.52–1.04)
ERYTHROCYTE [DISTWIDTH] IN BLOOD BY AUTOMATED COUNT: 14.5 % (ref 10–15)
GFR SERPL CREATININE-BSD FRML MDRD: 76 ML/MIN/{1.73_M2}
GLUCOSE SERPL-MCNC: 82 MG/DL (ref 70–99)
HCT VFR BLD AUTO: 43.1 % (ref 35–47)
HGB BLD-MCNC: 13 G/DL (ref 11.7–15.7)
MCH RBC QN AUTO: 29 PG (ref 26.5–33)
MCHC RBC AUTO-ENTMCNC: 30.2 G/DL (ref 31.5–36.5)
MCV RBC AUTO: 96 FL (ref 78–100)
NT-PROBNP SERPL-MCNC: 62 PG/ML (ref 0–450)
PLATELET # BLD AUTO: 207 10E9/L (ref 150–450)
POTASSIUM SERPL-SCNC: 4.4 MMOL/L (ref 3.4–5.3)
PROT SERPL-MCNC: 7.1 G/DL (ref 6.8–8.8)
RBC # BLD AUTO: 4.48 10E12/L (ref 3.8–5.2)
SODIUM SERPL-SCNC: 141 MMOL/L (ref 133–144)
T4 FREE SERPL-MCNC: 0.86 NG/DL (ref 0.76–1.46)
TSH SERPL DL<=0.005 MIU/L-ACNC: 5.04 MU/L (ref 0.4–4)
WBC # BLD AUTO: 5.2 10E9/L (ref 4–11)

## 2021-03-18 PROCEDURE — 93000 ELECTROCARDIOGRAM COMPLETE: CPT | Performed by: FAMILY MEDICINE

## 2021-03-18 PROCEDURE — 80053 COMPREHEN METABOLIC PANEL: CPT | Performed by: FAMILY MEDICINE

## 2021-03-18 PROCEDURE — 84443 ASSAY THYROID STIM HORMONE: CPT | Performed by: FAMILY MEDICINE

## 2021-03-18 PROCEDURE — 99214 OFFICE O/P EST MOD 30 MIN: CPT | Performed by: FAMILY MEDICINE

## 2021-03-18 PROCEDURE — 85027 COMPLETE CBC AUTOMATED: CPT | Performed by: FAMILY MEDICINE

## 2021-03-18 PROCEDURE — 36415 COLL VENOUS BLD VENIPUNCTURE: CPT | Performed by: FAMILY MEDICINE

## 2021-03-18 PROCEDURE — 71046 X-RAY EXAM CHEST 2 VIEWS: CPT | Performed by: RADIOLOGY

## 2021-03-18 PROCEDURE — 84439 ASSAY OF FREE THYROXINE: CPT | Performed by: FAMILY MEDICINE

## 2021-03-18 PROCEDURE — 83880 ASSAY OF NATRIURETIC PEPTIDE: CPT | Performed by: FAMILY MEDICINE

## 2021-03-18 ASSESSMENT — MIFFLIN-ST. JEOR: SCORE: 1433.9

## 2021-03-18 NOTE — PATIENT INSTRUCTIONS
Anticoagulation Clinic Progress Note    Anticoagulation Summary  As of 6/3/2020    INR goal:   2.0-3.0   TTR:   68.5 % (1.3 y)   INR used for dosin.00 (6/3/2020)   Warfarin maintenance plan:   15 mg every Sun, Wed, Fri; 10 mg all other days   Weekly warfarin total:   85 mg   Plan last modified:   Ramo Morocho RPH (6/3/2020)   Next INR check:   6/10/2020   Priority:   High   Target end date:   Indefinite    Indications    Other acute pulmonary embolism without acute cor pulmonale (CMS/HCC) [I26.99]  History of bilateral pulmonary embolism (CMS/HCC) [I26.99]             Anticoagulation Episode Summary     INR check location:       Preferred lab:       Send INR reminders to:    SMO[facility] RESTREPO  POOL    Comments:   new [employer] home tristen 2019 **WEEKLY TRISTEN**      Anticoagulation Care Providers     Provider Role Specialty Phone number    Torri Colmenares APRN Referring Cardiology [phone]    Elsy Baeza MD Responsible Cardiology [phone]          Clinic Interview:  Patient Findings     Negatives:   Signs/symptoms of thrombosis, Signs/symptoms of bleeding,   Laboratory test error suspected, Change in health, Change in alcohol use,   Change in activity, Upcoming invasive procedure, Emergency department   visit, Upcoming dental procedure, Missed doses, Extra doses, Change in   medications, Change in diet/appetite, Hospital admission, Bruising, Other   complaints      Clinical Outcomes     Negatives:   Major bleeding event, Thromboembolic event,   Anticoagulation-related hospital admission, Anticoagulation-related ED   visit, Anticoagulation-related fatality        INR History:  Anticoagulation Monitoring 2020 2020 6/3/2020   INR 2.70 1.80 2.00   INR Date 2020 2020 6/3/2020   INR Goal 2.0-3.0 2.0-3.0 2.0-3.0   Trend Same Same Up   Last Week Total 80 mg 80 mg 80 mg   Next Week Total 80 mg 85 mg 85 mg   Sun 15 mg 15 mg 15 mg   Mon 10 mg 10 mg 10 mg   Tue 10 mg 15 mg  Please call [phone] to schedule echo+lexiscan.   Follow up in 4-6 weeks or earlier if needed.       Patient Education     Shortness of Breath (Dyspnea)  Shortness of breath is the feeling that you can't catch your breath or get enough air. It is also known as dyspnea.  Dyspnea can be caused by many different conditions. They include:    Acute asthma attack    Worsening of chronic lung diseases such as chronic bronchitis and emphysema    Heart failure. This is when weak heart muscle allows extra fluid to collect in the lungs.    Panic attacks or anxiety. Fear can cause rapid breathing (hyperventilation).    Pneumonia, or an infection in the lung tissue    Exposure to toxic substances, fumes, smoke, or certain medicines    Blood clot in the lung (pulmonary embolism). This is often from a piece of blood clot in a deep vein of the leg (deep vein thrombosis) that breaks off and travels to the lungs.    Heart attack or heart-related chest pain (angina)    Anemia    Collapsed lung (pneumothorax)    Dehydration    Pregnancy  Based on your visit today, the exact cause of your shortness of breath is not certain. Your tests don t show any of the serious causes of dyspnea. You may need other tests to find out if you have a serious problem. It s important to watch for any new symptoms or symptoms that get worse. Follow up with your healthcare provider as directed.  Home care  Follow these tips to take care of yourself at home:    When your symptoms are better, go back to your usual activities.    If you smoke, you should stop. Join a quit-smoking program or ask your healthcare provider for help.    Eat a healthy diet and get plenty of sleep.    Get regular exercise. Talk with your healthcare provider before starting to exercise, especially if you have other medical problems.    Cut down on the amount of caffeine and stimulants you consume.  Follow-up care  Follow up with your healthcare provider, or as advised.  If tests  (5/26) 10 mg   Wed 10 mg 15 mg 15 mg   Thu 10 mg 10 mg 10 mg   Fri 15 mg 10 mg 15 mg   Sat 10 mg 10 mg 10 mg   Visit Report - - -   Some recent data might be hidden       Plan:  1. INR is Therapeutic today- see above in Anticoagulation Summary.   Will instruct Kameron Melendez to Increase their warfarin regimen- see above in Anticoagulation Summary.  2. Follow up in 1 week  3. They have been instructed to call if any changes in medications, doses, concerns, etc. Patient expresses understanding and has no further questions at this time.    Ramo Morocho, [facility]   were done, you will be told if your treatment needs to be changed. You can call as directed for the results.  If an X-ray was taken, a specialist will review it. You will be notified of any new findings that may affect your care.  Call 911  Shortness of breath may be a sign of a serious medical problem. For example, it may be a problem with your heart or lungs. Call 911 if you have worsening shortness of breath or trouble breathing, especially with any of the symptoms below:    Confusion or difficulty waking    Fainting or loss of consciousness.    Fast or irregular heartbeat    Coughing up blood    Pain in your chest, arm, shoulder, neck, or upper back    Sweating  When to seek medical advice  Call your healthcare provider right away if any of these occur:    Slight shortness of breath or wheezing    Redness, pain or swelling in your leg, arm, or other body area    Swelling in both legs or ankles    Fast weight gain    Dizziness or weakness    Fever of 100.4 F (38 C) or higher, or as directed by your healthcare provider  [place] last reviewed this educational content on 6/1/2018 2000-2020 [employer]. All rights reserved. This information is not intended as a substitute for professional medical care. Always follow your healthcare professional's instructions.

## 2021-03-18 NOTE — PROGRESS NOTES
"  Assessment & Plan     Exertional dyspnea  75-year-old female presented with worsening exertional dyspnea which she has been experiencing for last few weeks or so.  No chest pain, palpitation, cough, wheezing or other relevant systemic symptoms.  Physical examination unremarkable.  Differentials discussed in detail including metabolic, infectious, cardiovascular and pulmonary etiology.  Past medical history significant for multiple comorbidities including obstructive sleep apnea, major depression and mild COPD.  Chest x-ray findings reviewed independently which came back unremarkable, ECG showed intraventricular conduction delay.  CBC, CMP, TSH, BNP, PFTs, echocardiogram, Lexiscan ordered for further evaluation.  Medications reviewed and no changes made.  Stressed on regular walks, healthy diet and weight loss.  Follow-up in 4 to 6 weeks or earlier if needed  - CBC with platelets  - Comprehensive metabolic panel (BMP + Alb, Alk Phos, ALT, AST, Total. Bili, TP)  - XR Chest 2 Views; Future  - TSH with free T4 reflex  - BNP-N terminal pro  - Pulmonary Function Test; Future  - NM Lexiscan stress test; Future  - Echocardiogram Complete; Future  - EKG 12-lead complete w/read - Clinics    COPD, mild (H)  History of mild COPD, not on any medication currently.  PFT ordered for further evaluation      Major depressive disorder, recurrent episode, moderate (H)  Symptoms are stable.  History of continue trazodone and paroxetine      Colon cancer screening  - GASTROENTEROLOGY ADULT REF PROCEDURE ONLY; Future         BMI:   Estimated body mass index is 34.78 kg/m  as calculated from the following:    Height as of this encounter: 1.651 m (5' 5\").    Weight as of this encounter: 94.8 kg (209 lb).   Weight management plan: Discussed healthy diet and exercise guidelines    Patient Instructions   Please call [phone] to schedule echo+lexiscan.   Follow up in 4-6 weeks or earlier if needed.       Patient Education     Shortness of " Breath (Dyspnea)  Shortness of breath is the feeling that you can't catch your breath or get enough air. It is also known as dyspnea.  Dyspnea can be caused by many different conditions. They include:    Acute asthma attack    Worsening of chronic lung diseases such as chronic bronchitis and emphysema    Heart failure. This is when weak heart muscle allows extra fluid to collect in the lungs.    Panic attacks or anxiety. Fear can cause rapid breathing (hyperventilation).    Pneumonia, or an infection in the lung tissue    Exposure to toxic substances, fumes, smoke, or certain medicines    Blood clot in the lung (pulmonary embolism). This is often from a piece of blood clot in a deep vein of the leg (deep vein thrombosis) that breaks off and travels to the lungs.    Heart attack or heart-related chest pain (angina)    Anemia    Collapsed lung (pneumothorax)    Dehydration    Pregnancy  Based on your visit today, the exact cause of your shortness of breath is not certain. Your tests don t show any of the serious causes of dyspnea. You may need other tests to find out if you have a serious problem. It s important to watch for any new symptoms or symptoms that get worse. Follow up with your healthcare provider as directed.  Home care  Follow these tips to take care of yourself at home:    When your symptoms are better, go back to your usual activities.    If you smoke, you should stop. Join a quit-smoking program or ask your healthcare provider for help.    Eat a healthy diet and get plenty of sleep.    Get regular exercise. Talk with your healthcare provider before starting to exercise, especially if you have other medical problems.    Cut down on the amount of caffeine and stimulants you consume.  Follow-up care  Follow up with your healthcare provider, or as advised.  If tests were done, you will be told if your treatment needs to be changed. You can call as directed for the results.  If an X-ray was taken, a  specialist will review it. You will be notified of any new findings that may affect your care.  Call 911  Shortness of breath may be a sign of a serious medical problem. For example, it may be a problem with your heart or lungs. Call 911 if you have worsening shortness of breath or trouble breathing, especially with any of the symptoms below:    Confusion or difficulty waking    Fainting or loss of consciousness.    Fast or irregular heartbeat    Coughing up blood    Pain in your chest, arm, shoulder, neck, or upper back    Sweating  When to seek medical advice  Call your healthcare provider right away if any of these occur:    Slight shortness of breath or wheezing    Redness, pain or swelling in your leg, arm, or other body area    Swelling in both legs or ankles    Fast weight gain    Dizziness or weakness    Fever of 100.4 F (38 C) or higher, or as directed by your healthcare provider  [employer] last reviewed this educational content on 6/1/2018 2000-2020 [employer]. All rights reserved. This information is not intended as a substitute for professional medical care. Always follow your healthcare professional's instructions.             Jose Manuel Sutton MD  [facility]    Km Quintero is a 75 year old who presents for the following health issues accompanied by her sister:    HPI     Concern - Exertional Shortness of breath   Onset: about a month  Description: Family has noticed that when walking around for awhile koko gets winded pretty easily. Gets short of breath.  Does a little better when she has something to hold onto.    Intensity: mild  Progression of Symptoms:  same  Accompanying Signs & Symptoms: no headaches, no dizziness, no ringing in her ears   Therapies tried and outcome: has to stop and rest,       Review of Systems   Constitutional, HEENT, cardiovascular, pulmonary, GI, , musculoskeletal, neuro, skin, endocrine and psych systems are negative,  "except as otherwise noted.      Objective    /84 (Cuff Size: Adult Regular)   Pulse 75   Temp 97.9  F (36.6  C) (Tympanic)   Resp 18   Ht 1.651 m (5' 5\")   Wt 94.8 kg (209 lb)   SpO2 96%   Breastfeeding No   BMI 34.78 kg/m    Body mass index is 34.78 kg/m .  Physical Exam   GENERAL: alert, no distress and obese  EYES: Eyes grossly normal to inspection, PERRL and conjunctivae and sclerae normal  NECK: no adenopathy, no asymmetry, masses, or scars and thyroid normal to palpation  RESP: lungs clear to auscultation - no rales, rhonchi or wheezes  CV: regular rate and rhythm, normal S1 S2, no S3 or S4, no murmur, click or rub, no peripheral edema and peripheral pulses strong  ABDOMEN: soft, nontender, no hepatosplenomegaly, no masses and bowel sounds normal  MS: no gross musculoskeletal defects noted, no edema  NEURO: Normal strength and tone, mentation intact and speech normal  PSYCH: mentation appears normal, affect normal/bright    Wt Readings from Last 10 Encounters:   03/18/21 94.8 kg (209 lb)   01/11/21 94.8 kg (209 lb)   02/17/20 86.6 kg (191 lb)   01/13/20 88.5 kg (195 lb)   11/06/19 86.6 kg (191 lb)   10/31/19 88.5 kg (195 lb)   10/07/19 86.6 kg (191 lb)   09/23/19 86.6 kg (191 lb)   08/06/19 87.5 kg (193 lb)   05/10/19 85.3 kg (188 lb)               "

## 2021-03-18 NOTE — NURSING NOTE
"Chief Complaint   Patient presents with     Breathing Problem     /84 (Cuff Size: Adult Regular)   Pulse 75   Temp 97.9  F (36.6  C) (Tympanic)   Resp 18   Ht 1.651 m (5' 5\")   Wt 94.8 kg (209 lb)   SpO2 96%   Breastfeeding No   BMI 34.78 kg/m   Estimated body mass index is 34.78 kg/m  as calculated from the following:    Height as of this encounter: 1.651 m (5' 5\").    Weight as of this encounter: 94.8 kg (209 lb).  Patient presents to the clinic using No DME      Health Maintenance that is potentially due pending provider review:    Health Maintenance Due   Topic Date Due     ZOSTER IMMUNIZATION (1 of 2) Never done     ADVANCE CARE PLANNING  02/09/2017     PHQ-9  03/10/2020     FALL RISK ASSESSMENT  03/19/2020     MEDICARE ANNUAL WELLNESS VISIT  08/06/2020     COLORECTAL CANCER SCREENING  03/16/2021                "

## 2021-03-19 DIAGNOSIS — E03.9 HYPOTHYROIDISM, UNSPECIFIED TYPE: Primary | ICD-10-CM

## 2021-03-19 RX ORDER — LEVOTHYROXINE SODIUM 88 UG/1
88 TABLET ORAL DAILY
Qty: 90 TABLET | Refills: 1 | Status: SHIPPED | OUTPATIENT
Start: 2021-03-19 | End: 2021-10-12

## 2021-03-23 DIAGNOSIS — Z11.59 ENCOUNTER FOR SCREENING FOR OTHER VIRAL DISEASES: ICD-10-CM

## 2021-03-29 ENCOUNTER — HOSPITAL ENCOUNTER (OUTPATIENT)
Dept: NUCLEAR MEDICINE | Facility: CLINIC | Age: 76
Setting detail: NUCLEAR MEDICINE
End: 2021-03-29
Attending: FAMILY MEDICINE
Payer: MEDICARE

## 2021-03-29 ENCOUNTER — HOSPITAL ENCOUNTER (OUTPATIENT)
Dept: CARDIOLOGY | Facility: CLINIC | Age: 76
End: 2021-03-29
Attending: FAMILY MEDICINE
Payer: MEDICARE

## 2021-03-29 DIAGNOSIS — R06.09 EXERTIONAL DYSPNEA: ICD-10-CM

## 2021-03-29 PROCEDURE — 93018 CV STRESS TEST I&R ONLY: CPT | Performed by: INTERNAL MEDICINE

## 2021-03-29 PROCEDURE — 93306 TTE W/DOPPLER COMPLETE: CPT

## 2021-03-29 PROCEDURE — 93306 TTE W/DOPPLER COMPLETE: CPT | Mod: 26 | Performed by: INTERNAL MEDICINE

## 2021-03-29 PROCEDURE — 78452 HT MUSCLE IMAGE SPECT MULT: CPT

## 2021-03-29 PROCEDURE — A9502 TC99M TETROFOSMIN: HCPCS | Performed by: FAMILY MEDICINE

## 2021-03-29 PROCEDURE — 78452 HT MUSCLE IMAGE SPECT MULT: CPT | Mod: 26 | Performed by: INTERNAL MEDICINE

## 2021-03-29 PROCEDURE — 343N000001 HC RX 343: Performed by: FAMILY MEDICINE

## 2021-03-29 RX ADMIN — Medication 10.2 MILLICURIE: at 11:50

## 2021-04-02 ENCOUNTER — HOSPITAL ENCOUNTER (OUTPATIENT)
Dept: CARDIOLOGY | Facility: CLINIC | Age: 76
Discharge: HOME | End: 2021-04-02
Attending: FAMILY MEDICINE | Admitting: FAMILY MEDICINE
Payer: MEDICARE

## 2021-04-02 ENCOUNTER — HOSPITAL ENCOUNTER (OUTPATIENT)
Dept: NUCLEAR MEDICINE | Facility: CLINIC | Age: 76
Setting detail: NUCLEAR MEDICINE
End: 2021-04-02
Attending: FAMILY MEDICINE
Payer: MEDICARE

## 2021-04-02 VITALS — HEIGHT: 65 IN | WEIGHT: 209 LBS | BODY MASS INDEX: 34.82 KG/M2

## 2021-04-02 LAB
CV STRESS MAX HR HE: 89
NUC STRESS EJECTION FRACTION: 65 %
RATE PRESSURE PRODUCT: 8989
STRESS ECHO BASELINE DIASTOLIC HE: 91
STRESS ECHO BASELINE HR: 76
STRESS ECHO BASELINE SYSTOLIC BP: 155
STRESS ECHO CALCULATED PERCENT HR: 62 %
STRESS ECHO LAST STRESS DIASTOLIC BP: 75
STRESS ECHO LAST STRESS SYSTOLIC BP: 101
STRESS ECHO TARGET HR: 143

## 2021-04-02 PROCEDURE — 343N000001 HC RX 343: Performed by: FAMILY MEDICINE

## 2021-04-02 PROCEDURE — 93016 CV STRESS TEST SUPVJ ONLY: CPT | Performed by: INTERNAL MEDICINE

## 2021-04-02 PROCEDURE — 93017 CV STRESS TEST TRACING ONLY: CPT

## 2021-04-02 PROCEDURE — A9502 TC99M TETROFOSMIN: HCPCS | Performed by: FAMILY MEDICINE

## 2021-04-02 PROCEDURE — 250N000011 HC RX IP 250 OP 636: Performed by: FAMILY MEDICINE

## 2021-04-02 RX ORDER — REGADENOSON 0.08 MG/ML
0.4 INJECTION, SOLUTION INTRAVENOUS ONCE
Status: COMPLETED | OUTPATIENT
Start: 2021-04-02 | End: 2021-04-02

## 2021-04-02 RX ADMIN — Medication 30.7 MILLICURIE: at 12:40

## 2021-04-02 RX ADMIN — REGADENOSON 0.4 MG: 0.08 INJECTION, SOLUTION INTRAVENOUS at 12:37

## 2021-04-02 ASSESSMENT — MIFFLIN-ST. JEOR: SCORE: 1433.9

## 2021-04-05 ENCOUNTER — TELEPHONE (OUTPATIENT)
Dept: BEHAVIORAL HEALTH | Facility: CLINIC | Age: 76
End: 2021-04-05

## 2021-04-05 DIAGNOSIS — Z11.59 ENCOUNTER FOR SCREENING FOR OTHER VIRAL DISEASES: ICD-10-CM

## 2021-04-05 LAB
LABORATORY COMMENT REPORT: NORMAL
SARS-COV-2 RNA RESP QL NAA+PROBE: NEGATIVE
SARS-COV-2 RNA RESP QL NAA+PROBE: NORMAL
SPECIMEN SOURCE: NORMAL
SPECIMEN SOURCE: NORMAL

## 2021-04-05 PROCEDURE — U0003 INFECTIOUS AGENT DETECTION BY NUCLEIC ACID (DNA OR RNA); SEVERE ACUTE RESPIRATORY SYNDROME CORONAVIRUS 2 (SARS-COV-2) (CORONAVIRUS DISEASE [COVID-19]), AMPLIFIED PROBE TECHNIQUE, MAKING USE OF HIGH THROUGHPUT TECHNOLOGIES AS DESCRIBED BY CMS-2020-01-R: HCPCS | Performed by: SURGERY

## 2021-04-05 PROCEDURE — U0005 INFEC AGEN DETEC AMPLI PROBE: HCPCS | Performed by: SURGERY

## 2021-04-05 NOTE — TELEPHONE ENCOUNTER
Patient left voicemail for writer stating that she was returning a call. Writer does not work with this patient and did not contact patient. Writer forwarded information onto patient's care team.     Lucila Casillas SRINIVAS  [facility] [place] ([facility])   [facility]  [phone]

## 2021-04-07 ENCOUNTER — ANESTHESIA EVENT (OUTPATIENT)
Dept: GASTROENTEROLOGY | Facility: CLINIC | Age: 76
End: 2021-04-07
Payer: MEDICARE

## 2021-04-07 ASSESSMENT — COPD QUESTIONNAIRES: COPD: 1

## 2021-04-07 NOTE — ANESTHESIA PREPROCEDURE EVALUATION
Anesthesia Pre-Procedure Evaluation    Patient: Ingrid Powell   MRN: [id number] : 1945        Preoperative Diagnosis: Colon cancer screening [Z12.11]   Procedure : Procedure(s):  COLONOSCOPY     Past Medical History:   Diagnosis Date     Arthritis      COPD (chronic obstructive pulmonary disease) (H)      Depressive disorder      Gastroesophageal reflux disease      History of lumbar surgery 2015     Hoarseness      Hypertension      Oxygen dependent     at night     Reduced vision      Sleep apnea       Past Surgical History:   Procedure Laterality Date     BACK SURGERY       CARPAL TUNNEL RELEASE RT/LT      ?side     CHOLECYSTECTOMY       EYE SURGERY      cataracts     HERNIA REPAIR       INCISION AND DRAINAGE TRUNK, COMBINED  2012    Procedure:COMBINED INCISION AND DRAINAGE TRUNK; Incision and Drainage Abdominal Wall Abscess ; Surgeon:ALEXSANDER HANNON; Location:[facility] OR     INSERT PORT VASCULAR ACCESS  2012    Procedure:INSERT PORT VASCULAR ACCESS; Surgeon:MARY JANE GUAN; Location:[facility]     JOINT REPLACEMTN, KNEE RT/LT      bilateral     KNEE SURGERY      left- total     KNEE SURGERY      right- total     LAPAROSCOPIC APPENDECTOMY N/A 10/10/2015    Procedure: LAPAROSCOPIC APPENDECTOMY;  Surgeon: Daniel Chambrelain MD;  Location: [facility]     LARYNGOSCOPY, ESOPHAGOSCOPY,  BIOPSY, COMBINED  2012    Procedure:COMBINED LARYNGOSCOPY, ESOPHAGOSCOPY,  BIOPSY; Direct Laryngoscopy, Esophagoscopy with Biopsy, Stealth Assisted Left Frontal Sinus Biopsy via Vidal Incision, 8 Pashto Power Port in right subclavian & Percutaneous Endoscopic Gastrostomy Placement * Latex Safe*; Surgeon:LIBORIO CAZARES; Location:[facility] OR     left ankle fusion       OPTICAL TRACKING SYSTEM ENDOSCOPIC SINUS SURGERY  2012    Procedure:OPTICAL TRACKING SYSTEM ENDOSCOPIC SINUS SURGERY; Surgeon:LIBORIO ACZARES; Location:[facility] OR     RECTAL SURGERY      ? type     RELEASE DEQUERVAINS  WRIST Left 2018    Procedure: RELEASE DEQUERVAINS WRIST;  Left 1st Distal compartment release;  Surgeon: Ronak Biggs MD;  Location: [facility]     REMOVE PORT VASCULAR ACCESS  2012    Procedure: REMOVE PORT VASCULAR ACCESS;  Remove Port Vascular Access ;  Surgeon: Phillip Ye MD;  Location: [facility] OR     REVERSE ARTHROPLASTY SHOULDER Left 2019    Procedure: Left Reverse Total shoulder arthroplasty;  Surgeon: Oliver Velázquez MD;  Location: [facility]      Allergies   Allergen Reactions     Dilaudid [Hydromorphone Hcl] Other (See Comments)     hallucinations     Fosamax [Alendronate Sodium] Rash            Norvasc [Amlodipine Besylate] Hives and Rash     Tegretol [Carbamazepine] Hives and Rash      Social History     Tobacco Use     Smoking status: Former Smoker     Packs/day: 0.50     Years: 35.00     Pack years: 17.50     Types: Cigarettes     Quit date: 10/3/1995     Years since quittin.5     Smokeless tobacco: Never Used   Substance Use Topics     Alcohol use: Yes     Comment: rare      Wt Readings from Last 1 Encounters:   21 94.8 kg (209 lb)        Anesthesia Evaluation   Pt has had prior anesthetic. Type: MAC.        ROS/MED HX  ENT/Pulmonary:     (+) sleep apnea, tobacco use, Past use, mild,  COPD,     Neurologic:  - neg neurologic ROS     Cardiovascular: Comment: Raynaud's syndrome    (+) Dyslipidemia hypertension-----    METS/Exercise Tolerance:     Hematologic:       Musculoskeletal: Comment: Lumbar radiculopathy  DDD - neg musculoskeletal ROS     GI/Hepatic:     (+) GERD,     Renal/Genitourinary:     (+) renal disease,     Endo: Comment: Tonsillar cancer    (+) thyroid problem, hypothyroidism,     Psychiatric/Substance Use:  - neg psychiatric ROS     Infectious Disease:  - neg infectious disease ROS     Malignancy:  - neg malignancy ROS     Other:            Physical Exam    Airway        Mallampati: IV   TM distance: > 3 FB   Neck ROM: limited   Mouth opening: > 3  cm    Respiratory Devices and Support         Dental  no notable dental history         Cardiovascular   cardiovascular exam normal          Pulmonary   pulmonary exam normal            Other findings: Patient with history of radiation and chemotherapy for tonsilar cancer    OUTSIDE LABS:  CBC:   Lab Results   Component Value Date    WBC 5.2 03/18/2021    WBC 5.4 10/31/2019    HGB 13.0 03/18/2021    HGB 11.0 (L) 11/08/2019    HCT 43.1 03/18/2021    HCT 43.6 10/31/2019     03/18/2021     10/31/2019     BMP:   Lab Results   Component Value Date     03/18/2021     10/31/2019    POTASSIUM 4.4 03/18/2021    POTASSIUM 3.9 10/31/2019    CHLORIDE 105 03/18/2021    CHLORIDE 103 10/31/2019    CO2 33 (H) 03/18/2021    CO2 33 (H) 10/31/2019    BUN 23 03/18/2021    BUN 17 10/31/2019    CR 0.76 03/18/2021    CR 0.81 10/31/2019    GLC 82 03/18/2021    GLC 83 10/31/2019     COAGS:   Lab Results   Component Value Date    PTT 28 05/17/2012    INR 1.10 05/17/2012     POC:   Lab Results   Component Value Date    BGM 84 11/08/2019     HEPATIC:   Lab Results   Component Value Date    ALBUMIN 3.6 03/18/2021    PROTTOTAL 7.1 03/18/2021    ALT 20 03/18/2021    AST 19 03/18/2021    ALKPHOS 96 03/18/2021    BILITOTAL 0.3 03/18/2021     OTHER:   Lab Results   Component Value Date    PH 7.46 (H) 02/11/2012    LACT 0.9 10/10/2015    JOSÉ MIGUEL 9.3 03/18/2021    PHOS 4.2 05/18/2012    MAG 1.3 (L) 03/25/2013    LIPASE 131 07/03/2019    TSH 5.04 (H) 03/18/2021    T4 0.86 03/18/2021    CRP <5.0 03/25/2013    SED 9 03/25/2013       Anesthesia Plan    ASA Status:  3      Anesthesia Type: MAC.   Induction: Intravenous.           Consents    Anesthesia Plan(s) and associated risks, benefits, and realistic alternatives discussed. Questions answered and patient/representative(s) expressed understanding.     - Discussed with:  Patient         Postoperative Care            Comments:                Claudio Ricketts CRNA, APRN CRNA

## 2021-04-08 ENCOUNTER — ANESTHESIA (OUTPATIENT)
Dept: GASTROENTEROLOGY | Facility: CLINIC | Age: 76
End: 2021-04-08
Payer: MEDICARE

## 2021-04-08 ENCOUNTER — HOSPITAL ENCOUNTER (OUTPATIENT)
Facility: CLINIC | Age: 76
Discharge: HOME | End: 2021-04-08
Attending: SURGERY | Admitting: SURGERY
Payer: MEDICARE

## 2021-04-08 VITALS
RESPIRATION RATE: 15 BRPM | DIASTOLIC BLOOD PRESSURE: 100 MMHG | BODY MASS INDEX: 34.82 KG/M2 | HEIGHT: 65 IN | TEMPERATURE: 97.8 F | WEIGHT: 209 LBS | SYSTOLIC BLOOD PRESSURE: 144 MMHG | HEART RATE: 78 BPM | OXYGEN SATURATION: 93 %

## 2021-04-08 LAB — COLONOSCOPY: NORMAL

## 2021-04-08 PROCEDURE — 250N000009 HC RX 250: Performed by: NURSE ANESTHETIST, CERTIFIED REGISTERED

## 2021-04-08 PROCEDURE — 250N000011 HC RX IP 250 OP 636: Performed by: NURSE ANESTHETIST, CERTIFIED REGISTERED

## 2021-04-08 PROCEDURE — G0121 COLON CA SCRN NOT HI RSK IND: HCPCS | Performed by: SURGERY

## 2021-04-08 PROCEDURE — 370N000017 HC ANESTHESIA TECHNICAL FEE, PER MIN: Performed by: SURGERY

## 2021-04-08 PROCEDURE — 250N000009 HC RX 250: Performed by: SURGERY

## 2021-04-08 PROCEDURE — 45378 DIAGNOSTIC COLONOSCOPY: CPT | Performed by: SURGERY

## 2021-04-08 PROCEDURE — 258N000003 HC RX IP 258 OP 636: Performed by: SURGERY

## 2021-04-08 RX ORDER — SODIUM CHLORIDE, SODIUM LACTATE, POTASSIUM CHLORIDE, CALCIUM CHLORIDE 600; 310; 30; 20 MG/100ML; MG/100ML; MG/100ML; MG/100ML
INJECTION, SOLUTION INTRAVENOUS CONTINUOUS
Status: DISCONTINUED | OUTPATIENT
Start: 2021-04-08 | End: 2021-04-08 | Stop reason: HOSPADM

## 2021-04-08 RX ORDER — PROPOFOL 10 MG/ML
INJECTION, EMULSION INTRAVENOUS CONTINUOUS PRN
Status: DISCONTINUED | OUTPATIENT
Start: 2021-04-08 | End: 2021-04-08

## 2021-04-08 RX ORDER — ONDANSETRON 2 MG/ML
4 INJECTION INTRAMUSCULAR; INTRAVENOUS
Status: DISCONTINUED | OUTPATIENT
Start: 2021-04-08 | End: 2021-04-08 | Stop reason: HOSPADM

## 2021-04-08 RX ORDER — PROPOFOL 10 MG/ML
INJECTION, EMULSION INTRAVENOUS PRN
Status: DISCONTINUED | OUTPATIENT
Start: 2021-04-08 | End: 2021-04-08

## 2021-04-08 RX ORDER — LIDOCAINE 40 MG/G
CREAM TOPICAL
Status: DISCONTINUED | OUTPATIENT
Start: 2021-04-08 | End: 2021-04-08 | Stop reason: HOSPADM

## 2021-04-08 RX ADMIN — LIDOCAINE HYDROCHLORIDE 5 ML: 10 INJECTION, SOLUTION EPIDURAL; INFILTRATION; INTRACAUDAL; PERINEURAL at 10:55

## 2021-04-08 RX ADMIN — SODIUM CHLORIDE, POTASSIUM CHLORIDE, SODIUM LACTATE AND CALCIUM CHLORIDE: 600; 310; 30; 20 INJECTION, SOLUTION INTRAVENOUS at 10:44

## 2021-04-08 RX ADMIN — LIDOCAINE HYDROCHLORIDE 0.1 ML: 10 INJECTION, SOLUTION EPIDURAL; INFILTRATION; INTRACAUDAL; PERINEURAL at 10:44

## 2021-04-08 RX ADMIN — PROPOFOL 200 MCG/KG/MIN: 10 INJECTION, EMULSION INTRAVENOUS at 10:55

## 2021-04-08 RX ADMIN — PROPOFOL 50 MG: 10 INJECTION, EMULSION INTRAVENOUS at 10:55

## 2021-04-08 ASSESSMENT — MIFFLIN-ST. JEOR: SCORE: 1433.9

## 2021-04-08 ASSESSMENT — COPD QUESTIONNAIRES: CAT_SEVERITY: MILD

## 2021-04-08 ASSESSMENT — LIFESTYLE VARIABLES: TOBACCO_USE: 1

## 2021-04-08 NOTE — ANESTHESIA CARE TRANSFER NOTE
Patient: Ingrid Powell    Procedure(s):  COLONOSCOPY    Diagnosis: Colon cancer screening [Z12.11]  Diagnosis Additional Information: No value filed.    Anesthesia Type:   MAC     Note:      Level of Consciousness: awake and drowsy  Oxygen Supplementation: room air    Independent Airway: airway patency satisfactory and stable      Report to RN Given: handoff report given  Patient transferred to: Phase II    Handoff Report: Identifed the Patient, Identified the Reponsible Provider, Reviewed the pertinent medical history, Discussed the surgical course, Reviewed Intra-OP anesthesia mangement and issues during anesthesia, Set expectations for post-procedure period and Allowed opportunity for questions and acknowledgement of understanding      Vitals: (Last set prior to Anesthesia Care Transfer)  CRNA VITALS  4/8/2021 1047 - 4/8/2021 1118      4/8/2021             Pulse:  79    Ht Rate:  79    SpO2:  97 %        Electronically Signed By: NORA Coates CRNA  April 8, 2021  11:18 AM

## 2021-04-08 NOTE — ANESTHESIA POSTPROCEDURE EVALUATION
Patient: Ingrid Powell    Procedure(s):  COLONOSCOPY    Diagnosis:Colon cancer screening [Z12.11]  Diagnosis Additional Information: No value filed.    Anesthesia Type:  MAC    Note:  Disposition: Outpatient   Postop Pain Control: Uneventful            Sign Out: Well controlled pain   PONV: No   Neuro/Psych: Uneventful            Sign Out: Acceptable/Baseline neuro status   Airway/Respiratory: Uneventful            Sign Out: Acceptable/Baseline resp. status   CV/Hemodynamics: Uneventful            Sign Out: Acceptable CV status   Other NRE: NONE   DID A NON-ROUTINE EVENT OCCUR? No         Last vitals:  Vitals:    04/08/21 1030   BP: (!) 122/92   Pulse: 96   Temp: 36.6  C (97.8  F)   SpO2: 96%       Last vitals prior to Anesthesia Care Transfer:  CRNA VITALS  4/8/2021 1047 - 4/8/2021 1119      4/8/2021             Pulse:  79    Ht Rate:  79    SpO2:  97 %          Electronically Signed By: NORA Coates CRNA  April 8, 2021  11:19 AM

## 2021-04-08 NOTE — H&P
75 year old year old female here for colonoscopy for screening.    Patient Active Problem List   Diagnosis     Hyperlipidemia LDL goal <130     HTN (hypertension)     HX of MALIGNANT NEOPL TONSIL     COPD, mild (H)     zAdvanced directives, counseling/discussion     Major depressive disorder, recurrent episode, moderate (HCC)     Anxiety     Hypothyroidism     MARGIE (obstructive sleep apnea), Severe     Disturbance of salivary secretion (XEROSTOMIA)     Renal cyst     Raynaud's syndrome     History of lumbar surgery     Primary insomnia     Lumbosacral radiculopathy at L3     DDD (degenerative disc disease), lumbar     Adenomatous colon polyp     Diverticulitis of colon     Benign neoplasm of gastrointestinal tract     Hemorrhoids, internal     Rectocele     Tortuous colon     Family history of breast cancer in mother     DJD of left shoulder       Past Medical History:   Diagnosis Date     Arthritis      COPD (chronic obstructive pulmonary disease) (H)      Depressive disorder      Gastroesophageal reflux disease      History of lumbar surgery 7/28/2015     Hoarseness      Hypertension      Oxygen dependent     at night     Reduced vision      Sleep apnea        Past Surgical History:   Procedure Laterality Date     BACK SURGERY       CARPAL TUNNEL RELEASE RT/LT      ?side     CHOLECYSTECTOMY       EYE SURGERY      cataracts     HERNIA REPAIR       INCISION AND DRAINAGE TRUNK, COMBINED  5/18/2012    Procedure:COMBINED INCISION AND DRAINAGE TRUNK; Incision and Drainage Abdominal Wall Abscess ; Surgeon:ALEXSANDER HANNON; [facility]     INSERT PORT VASCULAR ACCESS  2/8/2012    Procedure:INSERT PORT VASCULAR ACCESS; Surgeon:MARY JANE GUAN; [facility]     JOINT REPLACEMTN, KNEE RT/LT      bilateral     KNEE SURGERY  1995    left- total     KNEE SURGERY  2000    right- total     LAPAROSCOPIC APPENDECTOMY N/A 10/10/2015    Procedure: LAPAROSCOPIC APPENDECTOMY;  Surgeon: Daniel Chamberlain MD;   Location: [facility]     LARYNGOSCOPY, ESOPHAGOSCOPY,  BIOPSY, COMBINED  2/8/2012    Procedure:COMBINED LARYNGOSCOPY, ESOPHAGOSCOPY,  BIOPSY; Direct Laryngoscopy, Esophagoscopy with Biopsy, Stealth Assisted Left Frontal Sinus Biopsy via Vidal Incision, 8 Lebanese Power Port in right subclavian & Percutaneous Endoscopic Gastrostomy Placement * Latex Safe*; Surgeon:LIBORIO CAZARES; Location:[facility]     left ankle fusion       OPTICAL TRACKING SYSTEM ENDOSCOPIC SINUS SURGERY  2/8/2012    Procedure:OPTICAL TRACKING SYSTEM ENDOSCOPIC SINUS SURGERY; Surgeon:LIBORIO CAZARES; [facility]     RECTAL SURGERY      ? type     RELEASE DEQUERVAINS WRIST Left 5/11/2018    Procedure: RELEASE DEQUERVAINS WRIST;  Left 1st Distal compartment release;  Surgeon: Ronak Biggs MD;  Location: [facility]     REMOVE PORT VASCULAR ACCESS  8/21/2012    Procedure: REMOVE PORT VASCULAR ACCESS;  Remove Port Vascular Access ;  Surgeon: Phillip Ye MD;  Location:  OR     REVERSE ARTHROPLASTY SHOULDER Left 11/6/2019    Procedure: Left Reverse Total shoulder arthroplasty;  Surgeon: Oliver Velázquez MD;  Location: [facility]       @[facility]@    No current outpatient medications on file.       Allergies   Allergen Reactions     Dilaudid [Hydromorphone Hcl] Other (See Comments)     hallucinations     Fosamax [Alendronate Sodium] Rash            Norvasc [Amlodipine Besylate] Hives and Rash     Tegretol [Carbamazepine] Hives and Rash       Pt reports that she quit smoking about 25 years ago. Her smoking use included cigarettes. She has a 17.50 pack-year smoking history. She has never used smokeless tobacco. She reports current alcohol use. She reports that she does not use drugs.    Exam:  There were no vitals taken for this visit.    Awake, Alert OX3  Lungs - CTA bilaterally  CV - RRR, no murmurs, distal pulses intact  Abd - soft, non-distended, non-tender, +BS  Extr - No cyanosis or edema    A/P 75 year old year old female in  need of colonoscopy for screening. Risks, benefits, alternatives, and complications were discussed including the possibility of perforation and the patient agreed to proceed    Walter Liang MD

## 2021-04-12 ENCOUNTER — HOSPITAL ENCOUNTER (OUTPATIENT)
Dept: RESPIRATORY THERAPY | Facility: CLINIC | Age: 76
Discharge: HOME | End: 2021-04-12
Attending: INTERNAL MEDICINE | Admitting: INTERNAL MEDICINE
Payer: MEDICARE

## 2021-04-12 DIAGNOSIS — E78.5 HYPERLIPIDEMIA LDL GOAL <130: Chronic | ICD-10-CM

## 2021-04-12 DIAGNOSIS — R06.09 EXERTIONAL DYSPNEA: ICD-10-CM

## 2021-04-12 PROCEDURE — 94729 DIFFUSING CAPACITY: CPT

## 2021-04-12 PROCEDURE — 999N000105 HC STATISTIC NO DOCUMENTATION TO SUPPORT CHARGE

## 2021-04-12 PROCEDURE — 94729 DIFFUSING CAPACITY: CPT | Mod: 26 | Performed by: INTERNAL MEDICINE

## 2021-04-12 PROCEDURE — 94726 PLETHYSMOGRAPHY LUNG VOLUMES: CPT

## 2021-04-12 PROCEDURE — 94726 PLETHYSMOGRAPHY LUNG VOLUMES: CPT | Mod: 26 | Performed by: INTERNAL MEDICINE

## 2021-04-12 PROCEDURE — 94640 AIRWAY INHALATION TREATMENT: CPT

## 2021-04-12 PROCEDURE — 250N000009 HC RX 250: Performed by: FAMILY MEDICINE

## 2021-04-12 PROCEDURE — 94060 EVALUATION OF WHEEZING: CPT | Mod: 26 | Performed by: INTERNAL MEDICINE

## 2021-04-12 PROCEDURE — 94060 EVALUATION OF WHEEZING: CPT | Mod: 59

## 2021-04-12 RX ORDER — ALBUTEROL SULFATE 0.83 MG/ML
2.5 SOLUTION RESPIRATORY (INHALATION) ONCE
Status: COMPLETED | OUTPATIENT
Start: 2021-04-12 | End: 2021-04-12

## 2021-04-12 RX ADMIN — ALBUTEROL SULFATE 2.5 MG: 2.5 SOLUTION RESPIRATORY (INHALATION) at 13:45

## 2021-04-12 NOTE — LETTER
April 15, 2021      Ingrid Powell  [address]  [place] [address]        Dear ,    We are writing to inform you of your test results.    Lung function test findings consistent with moderate obstruction. Suspect due to asthma and COPD.   Will recommend to take albuterol inhaler as needed and follow-up with asthma specialist, order placed.     Please call [phone] to schedule appointment.     Resulted Orders   Pulmonary Function Test   Result Value Ref Range    FVC-Pred 2.72 L    FVC-Pre 1.57 L    FVC-%Pred-Pre 57 %    FEV1-Pre 1.14 L    FEV1-%Pred-Pre 54 %    FEV1FVC-Pred 77 %    FEV1FVC-Pre 73 %    FEFMax-Pred 5.23 L/sec    FEFMax-Pre 3.31 L/sec    FEFMax-%Pred-Pre 63 %    FEF2575-Pred 1.68 L/sec    FEF2575-Pre 0.79 L/sec    XMM3925-%Pred-Pre 47 %    FEF2575-Post 1.24 L/sec    BNN0274-%Pred-Post 73 %    ExpTime-Pre 6.48 sec    FIFMax-Pre 3.69 L/sec    VC-Pred 3.04 L    VC-Pre 1.96 L    VC-%Pred-Pre 64 %    IC-Pred 2.75 L    IC-Pre 1.77 L    IC-%Pred-Pre 64 %    ERV-Pred 0.29 L    ERV-Pre 0.19 L    ERV-%Pred-Pre 65 %    FEV1FEV6-Pred 78 %    FEV1FEV6-Pre 73 %    FRCPleth-Pred 2.78 L    FRCPleth-Pre 3.33 L    FRCPleth-%Pred-Pre 119 %    RVPleth-Pred 2.22 L    RVPleth-Pre 3.14 L    RVPleth-%Pred-Pre 141 %    TLCPleth-Pred 5.11 L    TLCPleth-Pre 5.10 L    TLCPleth-%Pred-Pre 99 %    DLCOunc-Pred 19.63 ml/min/mmHg    DLCOunc-Pre 14.33 ml/min/mmHg    DLCOunc-%Pred-Pre 72 %    VA-Pre 3.65 L    VA-%Pred-Pre 75 %    FEV1SVC-Pred 68 %    FEV1SVC-Pre 58 %    Narrative    The FEV1 and FVC are reduced but the FEV1/FVC ratio is normal.  The inspiratory flow rates are within normal limits.  The FVC is reduced relative to the SVC indicating air trapping.  While the TLC, FRC and SVC are within normal limits, the RV is   increased.  Following administration of bronchodilators, there is a significant response indicated by the increased FVC.  The diffusing capacity is mildly reduced. However, the  diffusing capacity was not corrected for the patient's hemoglobin.  IMPRESSION:  Moderate obstruction with reversibility and air trapping  Mild decreased DLCO  Possible concomittant Restriction  ____________________________________________M.D.    This interpretation has been electronically signed:  Erik Urbina 04/13/2021  02:50:25 PM         If you have any questions or concerns, please call the clinic at the number listed above.       Sincerely,      Jose Manuel Sutton MD/robinson

## 2021-04-13 LAB
DLCOUNC-%PRED-PRE: 72 %
DLCOUNC-PRE: 14.33 ML/MIN/MMHG
DLCOUNC-PRED: 19.63 ML/MIN/MMHG
ERV-%PRED-PRE: 65 %
ERV-PRE: 0.19 L
ERV-PRED: 0.29 L
EXPTIME-PRE: 6.48 SEC
FEF2575-%PRED-POST: 73 %
FEF2575-%PRED-PRE: 47 %
FEF2575-POST: 1.24 L/SEC
FEF2575-PRE: 0.79 L/SEC
FEF2575-PRED: 1.68 L/SEC
FEFMAX-%PRED-PRE: 63 %
FEFMAX-PRE: 3.31 L/SEC
FEFMAX-PRED: 5.23 L/SEC
FEV1-%PRED-PRE: 54 %
FEV1-PRE: 1.14 L
FEV1FEV6-PRE: 73 %
FEV1FEV6-PRED: 78 %
FEV1FVC-PRE: 73 %
FEV1FVC-PRED: 77 %
FEV1SVC-PRE: 58 %
FEV1SVC-PRED: 68 %
FIFMAX-PRE: 3.69 L/SEC
FRCPLETH-%PRED-PRE: 119 %
FRCPLETH-PRE: 3.33 L
FRCPLETH-PRED: 2.78 L
FVC-%PRED-PRE: 57 %
FVC-PRE: 1.57 L
FVC-PRED: 2.72 L
IC-%PRED-PRE: 64 %
IC-PRE: 1.77 L
IC-PRED: 2.75 L
RVPLETH-%PRED-PRE: 141 %
RVPLETH-PRE: 3.14 L
RVPLETH-PRED: 2.22 L
TLCPLETH-%PRED-PRE: 99 %
TLCPLETH-PRE: 5.1 L
TLCPLETH-PRED: 5.11 L
VA-%PRED-PRE: 75 %
VA-PRE: 3.65 L
VC-%PRED-PRE: 64 %
VC-PRE: 1.96 L
VC-PRED: 3.04 L

## 2021-04-15 DIAGNOSIS — R94.2 ABNORMAL PFT: ICD-10-CM

## 2021-04-15 DIAGNOSIS — J44.9 COPD, MILD (H): Primary | ICD-10-CM

## 2021-04-15 RX ORDER — ALBUTEROL SULFATE 90 UG/1
2 AEROSOL, METERED RESPIRATORY (INHALATION) EVERY 6 HOURS
Qty: 8 G | Refills: 1 | Status: SHIPPED | OUTPATIENT
Start: 2021-04-15 | End: 2021-05-06

## 2021-04-15 RX ORDER — SIMVASTATIN 40 MG
TABLET ORAL
Qty: 90 TABLET | Refills: 2 | Status: SHIPPED | OUTPATIENT
Start: 2021-04-15 | End: 2021-11-05

## 2021-04-15 NOTE — TELEPHONE ENCOUNTER
Lab Results   Component Value Date    CHOL 157 06/14/2019     Lab Results   Component Value Date    HDL 80 06/14/2019     Lab Results   Component Value Date    LDL 68 06/14/2019     Lab Results   Component Value Date    TRIG 45 06/14/2019     Lab Results   Component Value Date    CHOLHDLRATIO 2.7 11/25/2014

## 2021-04-29 DIAGNOSIS — E03.9 HYPOTHYROIDISM, UNSPECIFIED TYPE: Primary | ICD-10-CM

## 2021-04-29 RX ORDER — LEVOTHYROXINE SODIUM 88 UG/1
88 TABLET ORAL DAILY
Qty: 90 TABLET | Refills: 0 | Status: SHIPPED | OUTPATIENT
Start: 2021-04-29 | End: 2021-10-12

## 2021-05-05 DIAGNOSIS — M54.17 LUMBOSACRAL RADICULOPATHY AT L3: ICD-10-CM

## 2021-05-05 RX ORDER — GABAPENTIN 300 MG/1
CAPSULE ORAL
Qty: 180 CAPSULE | Refills: 1 | Status: SHIPPED | OUTPATIENT
Start: 2021-05-05 | End: 2021-09-02

## 2021-05-06 DIAGNOSIS — R94.2 ABNORMAL PFT: ICD-10-CM

## 2021-05-06 RX ORDER — ALBUTEROL SULFATE 90 UG/1
2 AEROSOL, METERED RESPIRATORY (INHALATION) EVERY 6 HOURS
Qty: 8 G | Refills: 3 | Status: SHIPPED | OUTPATIENT
Start: 2021-05-06 | End: 2021-05-17

## 2021-05-17 DIAGNOSIS — R94.2 ABNORMAL PFT: ICD-10-CM

## 2021-05-17 RX ORDER — ALBUTEROL SULFATE 90 UG/1
2 AEROSOL, METERED RESPIRATORY (INHALATION) EVERY 6 HOURS
Qty: 8 G | Refills: 0 | Status: SHIPPED | OUTPATIENT
Start: 2021-05-17 | End: 2021-09-02

## 2021-05-25 ENCOUNTER — RECORDS - HEALTHEAST (OUTPATIENT)
Dept: ADMINISTRATIVE | Facility: CLINIC | Age: 76
End: 2021-05-25

## 2021-05-26 ENCOUNTER — RECORDS - HEALTHEAST (OUTPATIENT)
Dept: ADMINISTRATIVE | Facility: CLINIC | Age: 76
End: 2021-05-26

## 2021-05-26 DIAGNOSIS — F51.01 PRIMARY INSOMNIA: ICD-10-CM

## 2021-05-26 RX ORDER — TRAZODONE HYDROCHLORIDE 100 MG/1
TABLET ORAL
Qty: 90 TABLET | Refills: 3 | Status: SHIPPED | OUTPATIENT
Start: 2021-05-26 | End: 2022-05-02

## 2021-05-27 ENCOUNTER — RECORDS - HEALTHEAST (OUTPATIENT)
Dept: ADMINISTRATIVE | Facility: CLINIC | Age: 76
End: 2021-05-27

## 2021-05-28 ENCOUNTER — RECORDS - HEALTHEAST (OUTPATIENT)
Dept: ADMINISTRATIVE | Facility: CLINIC | Age: 76
End: 2021-05-28

## 2021-05-29 ENCOUNTER — RECORDS - HEALTHEAST (OUTPATIENT)
Dept: ADMINISTRATIVE | Facility: CLINIC | Age: 76
End: 2021-05-29

## 2021-05-30 ENCOUNTER — RECORDS - HEALTHEAST (OUTPATIENT)
Dept: ADMINISTRATIVE | Facility: CLINIC | Age: 76
End: 2021-05-30

## 2021-05-31 ENCOUNTER — RECORDS - HEALTHEAST (OUTPATIENT)
Dept: ADMINISTRATIVE | Facility: CLINIC | Age: 76
End: 2021-05-31

## 2021-06-01 ENCOUNTER — RECORDS - HEALTHEAST (OUTPATIENT)
Dept: ADMINISTRATIVE | Facility: CLINIC | Age: 76
End: 2021-06-01

## 2021-06-02 ENCOUNTER — RECORDS - HEALTHEAST (OUTPATIENT)
Dept: ADMINISTRATIVE | Facility: CLINIC | Age: 76
End: 2021-06-02

## 2021-07-19 DIAGNOSIS — F33.1 MAJOR DEPRESSIVE DISORDER, RECURRENT EPISODE, MODERATE (H): ICD-10-CM

## 2021-07-20 NOTE — TELEPHONE ENCOUNTER
Pending Prescriptions:                       Disp   Refills    PARoxetine (PAXIL) 20 MG tablet [Pharmacy *90 tab*1        Sig: TAKE 1 TABLET AT BEDTIME  Routing refill request to provider for review/approval because:  PHQ not current. Last OV 03/18/21  PHQ 2/22/2018 3/19/2019 9/10/2019   PHQ-9 Total Score 2 14 4   Q9: Thoughts of better off dead/self-harm past 2 weeks Not at all Not at all Not at all     Lazara GUTIERREZ RN

## 2021-07-21 RX ORDER — PAROXETINE 20 MG/1
TABLET, FILM COATED ORAL
Qty: 90 TABLET | Refills: 1 | Status: SHIPPED | OUTPATIENT
Start: 2021-07-21 | End: 2021-11-05

## 2021-08-31 DIAGNOSIS — R94.2 ABNORMAL PFT: ICD-10-CM

## 2021-08-31 DIAGNOSIS — E03.9 HYPOTHYROIDISM, UNSPECIFIED TYPE: Primary | ICD-10-CM

## 2021-08-31 DIAGNOSIS — M54.17 LUMBOSACRAL RADICULOPATHY AT L3: ICD-10-CM

## 2021-09-02 ENCOUNTER — MYC MEDICAL ADVICE (OUTPATIENT)
Dept: FAMILY MEDICINE | Facility: CLINIC | Age: 76
End: 2021-09-02

## 2021-09-02 RX ORDER — ALBUTEROL SULFATE 90 UG/1
AEROSOL, METERED RESPIRATORY (INHALATION)
Qty: 18 G | Refills: 1 | Status: SHIPPED | OUTPATIENT
Start: 2021-09-02 | End: 2021-09-13

## 2021-09-02 RX ORDER — GABAPENTIN 300 MG/1
CAPSULE ORAL
Qty: 180 CAPSULE | Refills: 1 | Status: ON HOLD | OUTPATIENT
Start: 2021-09-02 | End: 2022-04-17

## 2021-09-02 NOTE — TELEPHONE ENCOUNTER
Routing refill request to provider for review/approval because:  Drug not on the [facility] refill protocol     Fozia Claros RN

## 2021-09-02 NOTE — TELEPHONE ENCOUNTER
"Pt was supposed to recheck TSH in May.  Hiram sent.  Prescription for albuterol approved per [facility] Refill Protocol.    Requested Prescriptions   Pending Prescriptions Disp Refills     levothyroxine (SYNTHROID/LEVOTHROID) 88 MCG tablet [Pharmacy Med Name: LEVOTHYROXINE SODIUM 88 MCG Tablet] 90 tablet      Sig: TAKE 1 TABLET EVERY DAY (NEW DOSE, NEED TO RECHECK THYROID LABS IN 2 MONTHS)       Thyroid Protocol Failed - 8/31/2021  2:29 PM        Failed - Normal TSH on file in past 12 months     Recent Labs   Lab Test 03/18/21  1639   TSH 5.04*              Passed - Patient is 12 years or older        Passed - Recent (12 mo) or future (30 days) visit within the authorizing provider's specialty     Patient has had an office visit with the authorizing provider or a provider within the authorizing providers department within the previous 12 mos or has a future within next 30 days. See \"Patient Info\" tab in inbasket, or \"Choose Columns\" in Meds & Orders section of the refill encounter.              Passed - Medication is active on med list        Passed - No active pregnancy on record     If patient is pregnant or has had a positive pregnancy test, please check TSH.          Passed - No positive pregnancy test in past 12 months     If patient is pregnant or has had a positive pregnancy test, please check TSH.             albuterol (PROAIR HFA/PROVENTIL HFA/VENTOLIN HFA) 108 (90 Base) MCG/ACT inhaler [Pharmacy Med Name: ALBUTEROL SULFATE  (90 Base) MCG/ACT Aerosol Solution]       Sig: INHALE 2 PUFFS EVERY 6 HOURS       Asthma Maintenance Inhalers - Anticholinergics Passed - 8/31/2021  2:29 PM        Passed - Patient is age 12 years or older        Passed - Recent (12 mo) or future (30 days) visit within the authorizing provider's specialty     Patient has had an office visit with the authorizing provider or a provider within the authorizing providers department within the previous 12 mos or has a future within next " "30 days. See \"Patient Info\" tab in inbasket, or \"Choose Columns\" in Meds & Orders section of the refill encounter.              Passed - Medication is active on med list       Short-Acting Beta Agonist Inhalers Protocol  Passed - 8/31/2021  2:29 PM        Passed - Patient is age 12 or older        Passed - Recent (12 mo) or future (30 days) visit within the authorizing provider's specialty     Patient has had an office visit with the authorizing provider or a provider within the authorizing providers department within the previous 12 mos or has a future within next 30 days. See \"Patient Info\" tab in inbasket, or \"Choose Columns\" in Meds & Orders section of the refill encounter.              Passed - Medication is active on med list             "

## 2021-09-03 RX ORDER — LEVOTHYROXINE SODIUM 88 UG/1
TABLET ORAL
Qty: 90 TABLET | Refills: 1 | Status: SHIPPED | OUTPATIENT
Start: 2021-09-03 | End: 2022-01-26

## 2021-09-10 DIAGNOSIS — R94.2 ABNORMAL PFT: ICD-10-CM

## 2021-09-13 RX ORDER — ALBUTEROL SULFATE 90 UG/1
AEROSOL, METERED RESPIRATORY (INHALATION)
Qty: 18 G | Refills: 0 | Status: SHIPPED | OUTPATIENT
Start: 2021-09-13 | End: 2021-11-05

## 2021-10-02 ENCOUNTER — HEALTH MAINTENANCE LETTER (OUTPATIENT)
Age: 76
End: 2021-10-02

## 2021-10-12 ENCOUNTER — OFFICE VISIT (OUTPATIENT)
Dept: FAMILY MEDICINE | Facility: CLINIC | Age: 76
End: 2021-10-12
Payer: COMMERCIAL

## 2021-10-12 ENCOUNTER — LAB (OUTPATIENT)
Dept: LAB | Facility: CLINIC | Age: 76
End: 2021-10-12
Payer: COMMERCIAL

## 2021-10-12 VITALS
BODY MASS INDEX: 35.99 KG/M2 | WEIGHT: 216 LBS | HEART RATE: 70 BPM | TEMPERATURE: 97.9 F | SYSTOLIC BLOOD PRESSURE: 130 MMHG | RESPIRATION RATE: 18 BRPM | HEIGHT: 65 IN | DIASTOLIC BLOOD PRESSURE: 80 MMHG

## 2021-10-12 DIAGNOSIS — E03.9 HYPOTHYROIDISM, UNSPECIFIED TYPE: ICD-10-CM

## 2021-10-12 DIAGNOSIS — J44.89 ASTHMA WITH COPD (H): Primary | ICD-10-CM

## 2021-10-12 DIAGNOSIS — E66.01 MORBID OBESITY (H): ICD-10-CM

## 2021-10-12 DIAGNOSIS — M54.2 CERVICALGIA: ICD-10-CM

## 2021-10-12 DIAGNOSIS — Z12.31 VISIT FOR SCREENING MAMMOGRAM: ICD-10-CM

## 2021-10-12 DIAGNOSIS — H04.123 DRY EYES: ICD-10-CM

## 2021-10-12 DIAGNOSIS — Z23 NEED FOR PROPHYLACTIC VACCINATION AND INOCULATION AGAINST INFLUENZA: ICD-10-CM

## 2021-10-12 LAB
HOLD SPECIMEN: NORMAL
TSH SERPL DL<=0.005 MIU/L-ACNC: 2.91 MU/L (ref 0.4–4)

## 2021-10-12 PROCEDURE — G0008 ADMIN INFLUENZA VIRUS VAC: HCPCS | Performed by: FAMILY MEDICINE

## 2021-10-12 PROCEDURE — 99214 OFFICE O/P EST MOD 30 MIN: CPT | Mod: 25 | Performed by: FAMILY MEDICINE

## 2021-10-12 PROCEDURE — 36415 COLL VENOUS BLD VENIPUNCTURE: CPT

## 2021-10-12 PROCEDURE — 84443 ASSAY THYROID STIM HORMONE: CPT

## 2021-10-12 PROCEDURE — 90662 IIV NO PRSV INCREASED AG IM: CPT | Performed by: FAMILY MEDICINE

## 2021-10-12 RX ORDER — CYCLOSPORINE 0.5 MG/ML
1 EMULSION OPHTHALMIC 2 TIMES DAILY
Qty: 5.5 ML | Refills: 1 | Status: ON HOLD | OUTPATIENT
Start: 2021-10-12 | End: 2022-04-17

## 2021-10-12 RX ORDER — FLUTICASONE PROPIONATE AND SALMETEROL XINAFOATE 115; 21 UG/1; UG/1
2 AEROSOL, METERED RESPIRATORY (INHALATION) 2 TIMES DAILY
Qty: 12 G | Refills: 1 | Status: SHIPPED | OUTPATIENT
Start: 2021-10-12 | End: 2021-12-01

## 2021-10-12 ASSESSMENT — MIFFLIN-ST. JEOR: SCORE: 1460.65

## 2021-10-12 NOTE — PROGRESS NOTES
Assessment & Plan     Asthma with COPD (H)  76-year-old female with history of adult onset asthma with COPD.  Reports 3 to 6 months of dry cough with occasional productive sputum, requests chest x-ray today. + history of eczema but no history of allergies. Previous pulmonary function testing (4/2021) showed obstruction with reversibility, reduced FEV and FEV1 but normal ratio, and decreased DLCO.  Will add Advair to her current albuterol, and refer to Allergy/Asthma (as previously done but not followed up on). No indication for CXR at this time as she had one previously.   - fluticasone-salmeterol (ADVAIR-HFA) 115-21 MCG/ACT inhaler; Inhale 2 puffs into the lungs 2 times daily  - Adult Allergy/Asthma Referral; Future  - flu vaccine today    Morbid obesity (H)  Patient reports increase in weight over the last 18 months of the pandemic including frequent snacking.  Encouraged on diet and exercise, including giving information about intermittent fasting.  Patient is motivated about weight loss.      Visit for screening mammogram  Patient requesting screening mammogram, denies any skin or breast changes by self exam.  - *MA Screening Digital Bilateral; Future    Dry eyes  Reports continued dry eyes, has been seen by multiple specialists, refilling today but encouraged to reestablish care with ophthalmology.  - cycloSPORINE (RESTASIS) 0.05 % ophthalmic emulsion; Place 1 drop into both eyes 2 times daily    Cervicalgia  Patient reports that 3 to 6 months of cervicalgia from high cervical spine into shoulder musculature, especially into left.  Also reports left hand tingling and numbness, including recent episodes of dropping objects.  Concern for degenerative disc disease, arthritis, and referring to orthopedic for further work-up and potential steroid injections.  - Orthopedic  Referral; Future   :[id number]}     BMI:   Estimated body mass index is 35.94 kg/m  as calculated from the following:    Height as of  "this encounter: 1.651 m (5' 5\").    Weight as of this encounter: 98 kg (216 lb).   Weight management plan: Discussed healthy diet and exercise guidelines      Jose Manuel Sutton MD  [facility]    Km Quintero is a 76 year old who presents for the following health issues accompanied by her sister:    HPI     Concern - Dry cough  Onset: 3-4 months   Description: wants to have imaging done of her lungs, has a dry cough. Does at times cough up some green stuff.   Intensity: moderate  Progression of Symptoms:  worsening  Accompanying Signs & Symptoms: Left hand goes numb on her when she holds stuff.  Neck pain - goes down the base of the skull and goes down into the shoulders. Hurts to turn her head.  Has to sleep in her chair a lot. Can't lay in her bed for very long at a time.   Previous history of similar problem: does have arthritis         Review of Systems   Constitutional, HEENT, cardiovascular, pulmonary, GI, , musculoskeletal, neuro, skin, endocrine and psych systems are negative, except as otherwise noted.      Objective    /80 (Cuff Size: Adult Regular)   Pulse 70   Temp 97.9  F (36.6  C) (Tympanic)   Resp 18   Ht 1.651 m (5' 5\")   Wt 98 kg (216 lb)   Breastfeeding No   BMI 35.94 kg/m    Body mass index is 35.94 kg/m .  Physical Exam   GENERAL: healthy, alert and no distress  NECK: no adenopathy, no asymmetry, masses, or scars and thyroid normal to palpation  RESP: lungs clear to auscultation though slightly distant - no rales, rhonchi or wheezes, no cough during exam  CV: regular rate and rhythm, normal S1 S2, no S3 or S4, no murmur, click or rub, no peripheral edema and peripheral pulses strong  MS: decreased range of motion to left upper extremity, unable to raise left arm over 90 degrees, is able to internally rotate left shoulder with some difficulty, no edema and neck exam shows no tenderness to spine or shoulder or neck musculature,   NEURO: Normal strength " and tone, mentation intact and speech normal, sensation normal and symmetrical to upper extremities. Negative Tinel and Phalen sign.   PSYCH: mentation appears normal, affect normal/bright

## 2021-10-12 NOTE — NURSING NOTE
"Chief Complaint   Patient presents with     Cough     Musculoskeletal Problem     /80 (Cuff Size: Adult Regular)   Pulse 70   Temp 97.9  F (36.6  C) (Tympanic)   Resp 18   Ht 1.651 m (5' 5\")   Wt 98 kg (216 lb)   Breastfeeding No   BMI 35.94 kg/m   Estimated body mass index is 35.94 kg/m  as calculated from the following:    Height as of this encounter: 1.651 m (5' 5\").    Weight as of this encounter: 98 kg (216 lb).  Patient presents to the clinic using No DME      Health Maintenance that is potentially due pending provider review:    Health Maintenance Due   Topic Date Due     ANNUAL REVIEW OF HM ORDERS  Never done     ZOSTER IMMUNIZATION (1 of 2) Never done     ADVANCE CARE PLANNING  02/09/2017     PHQ-9  03/10/2020     MEDICARE ANNUAL WELLNESS VISIT  08/06/2020     INFLUENZA VACCINE (1) 09/01/2021                "

## 2021-10-27 ENCOUNTER — OFFICE VISIT (OUTPATIENT)
Dept: PALLIATIVE MEDICINE | Facility: CLINIC | Age: 76
End: 2021-10-27
Payer: COMMERCIAL

## 2021-10-27 VITALS — HEART RATE: 95 BPM | SYSTOLIC BLOOD PRESSURE: 135 MMHG | DIASTOLIC BLOOD PRESSURE: 83 MMHG

## 2021-10-27 DIAGNOSIS — G56.03 BILATERAL CARPAL TUNNEL SYNDROME: Primary | ICD-10-CM

## 2021-10-27 DIAGNOSIS — M54.2 CERVICALGIA: ICD-10-CM

## 2021-10-27 DIAGNOSIS — M47.812 SPONDYLOSIS OF CERVICAL REGION WITHOUT MYELOPATHY OR RADICULOPATHY: ICD-10-CM

## 2021-10-27 PROCEDURE — 99204 OFFICE O/P NEW MOD 45 MIN: CPT | Performed by: STUDENT IN AN ORGANIZED HEALTH CARE EDUCATION/TRAINING PROGRAM

## 2021-10-27 ASSESSMENT — PAIN SCALES - GENERAL: PAINLEVEL: MODERATE PAIN (4)

## 2021-10-27 NOTE — PROGRESS NOTES
"Ingrid Powell  :  1945  DOS: 10/27/2021  MRN: [id number]    Medical Spine Medicine Clinic Visit    PCP: Jose Manuel Sutton    Ingrid Powell is a 76 year old RHD female with a history of DJD L shoulder s/p TSA, MARGIE, anxiety, depression, morbid obesity referred by Jose Manuel Sutton for: \"cervicalgia\"    Pain for 3 mo. Gradual onset. Neck extension is bothersome, radiates down into bilateral upper trapezius muscles/shoulder.   Pain is at base of skull primarily, near ~C3 level.   Rotation to R and L results in pulling through bilateral upper trapezius muscles and at base of neck.   Has numbness and tingling in bilateral hands for 1.5 yrs - whole hand (all fingers up to wrist). Feels like sand under skin. Constant, holding phone in L hand is exacerbating (primarily thumb and 2nd-3rd))  Pillow behind her neck helps.  Left TSA in 2019, but shoulder still hurts since she stopped PT due to COVID.    Other evaluation and/or treatments so far consists of: Tylenol.  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 Likes to garden and spend time with her sister. Used to like to kp, hand Sx prevent this now    Past Medical History:   Diagnosis Date     Arthritis      COPD (chronic obstructive pulmonary disease) (H)      Depressive disorder      Gastroesophageal reflux disease      History of lumbar surgery 2015     Hoarseness      Hypertension      Oxygen dependent     at night     Reduced vision      Sleep apnea      Past Surgical History:   Procedure Laterality Date     BACK SURGERY       CARPAL TUNNEL RELEASE RT/LT   "    ?side     CHOLECYSTECTOMY       COLONOSCOPY N/A 4/8/2021    Procedure: COLONOSCOPY;  Surgeon: Walter Liang MD;  Location: [facility]     EYE SURGERY      cataracts     HERNIA REPAIR       INCISION AND DRAINAGE TRUNK, COMBINED  5/18/2012    Procedure:COMBINED INCISION AND DRAINAGE TRUNK; Incision and Drainage Abdominal Wall Abscess ; Surgeon:ALEXSANDER HANNON; [facility]     INSERT PORT VASCULAR ACCESS  2/8/2012    Procedure:INSERT PORT VASCULAR ACCESS; Surgeon:MARY JANE GUAN; [facility]     JOINT REPLACEMTN, KNEE RT/LT      bilateral     KNEE SURGERY  1995    left- total     KNEE SURGERY  2000    right- total     LAPAROSCOPIC APPENDECTOMY N/A 10/10/2015    Procedure: LAPAROSCOPIC APPENDECTOMY;  Surgeon: Daniel Chamberlain MD;  Location: [facility]     LARYNGOSCOPY, ESOPHAGOSCOPY,  BIOPSY, COMBINED  2/8/2012    Procedure:COMBINED LARYNGOSCOPY, ESOPHAGOSCOPY,  BIOPSY; Direct Laryngoscopy, Esophagoscopy with Biopsy, Stealth Assisted Left Frontal Sinus Biopsy via Vidal Incision, 8 Irish Power Port in right subclavian & Percutaneous Endoscopic Gastrostomy Placement * Latex Safe*; Surgeon:LIBORIO CAZARES; Location: OR     left ankle fusion       OPTICAL TRACKING SYSTEM ENDOSCOPIC SINUS SURGERY  2/8/2012    Procedure:OPTICAL TRACKING SYSTEM ENDOSCOPIC SINUS SURGERY; Surgeon:LIBORIO CAZARES; Location: OR     RECTAL SURGERY      ? type     RELEASE DEQUERVAINS WRIST Left 5/11/2018    Procedure: RELEASE DEQUERVAINS WRIST;  Left 1st Distal compartment release;  Surgeon: Ronak Biggs MD;  Location: [facility]     REMOVE PORT VASCULAR ACCESS  8/21/2012    Procedure: REMOVE PORT VASCULAR ACCESS;  Remove Port Vascular Access ;  Surgeon: Alexsander Hannon MD;  Location:  OR     REVERSE ARTHROPLASTY SHOULDER Left 11/6/2019    Procedure: Left Reverse Total shoulder arthroplasty;  Surgeon: Oliver Velázquez MD;  Location: [facility]     Family History   Problem Relation Age of Onset      Breast Cancer Mother      Arthritis Mother      Cancer Mother      Heart Disease Father          at 72 of heart failure     Arthritis Brother      Pancreatic Cancer Brother      Arthritis Sister      Skin Cancer Sister      Cancer Other         uncle pancreatic/grandmother- colon/aunt ? mat or pat  breast cancer     Cancer Sister        Objective  /83   Pulse 95       General: healthy, alert and in no distress      HEENT: no scleral icterus or conjunctival erythema     Skin: no suspicious lesions or rash. No jaundice.     Resp: normal respiratory effort without conversational dyspnea     Psych: normal mood and affect      Gait: nonantalgic, appropriate coordination and balance     Neuro: Motor strength as noted below     Neck exam:    Inspection:       Head forward posture in the neck       normal skin       normal vascular       normal lymphatic       Mild edema of the L wrist around location where she wears her wrist watch    Cervical spine:  Range of motion limited in extension, rotation bilaterally due to pain at the base of her neck  Myofascial tenderness: throughout bilateral upper trapezius muscles, reproducing pain  No focal spinous process tenderness.   Spurling's negative bilaterally.     Lefty Shoulder exam:   No shoulder girdle wasting or scapular dyskinesis. No palmar muscle wasting. No tenderness of bicipital groove, AC, GH, or SC joints. Shoulder range of motion limited in abduction and internal rotation. Watkins', Neers, are negative. empty-can positive on L, cross arm positive on L    Carpal Tunnel tests:   Tinel's negative bilaterally     Ulnar neuropathy tests:   Tinel's at the ulnar groove negative    Neurologic exam:  CN:  Cranial nerves 2-12 are grossly intact  Motor Strength:  5/5 symmetric UE and LE strength, except for L shoulder abduction and elbow flexion, which are limited due to L shoulder pain    Reflexes:    Normal and symmetric at biceps, brachioradialis,  triceps    Other reflexes:     No ankle clonus  Elena's positive on L    Sensory:  (upper and lower extremities):   Light touch: normal    Allodynia: absent    Hyperalgesia: absent        Radiology:  XR CERVICAL SPINE TWO-THREE VIEWS 1/11/2021 3:40 PM      COMPARISON: None     HISTORY: Cervicalgia                                                                      IMPRESSION: There is normal alignment of the cervical vertebrae;  however, there is straightening of normal cervical lordosis. Vertebral  body heights of the cervical spine are normal. Craniocervical  alignment is normal. There is no evidence for fracture of the cervical  spine. Moderate facet arthropathy throughout the cervical spine.  Moderate degenerative endplate spurring at the C3-C4, C5-C6 and C6-C7  levels.    Assessment:  1. Bilateral carpal tunnel syndrome    2. Cervicalgia    3. Spondylosis of cervical region without myelopathy or radiculopathy      Pain at the base of neck primarily, radiating to bilateral upper trapezius muscles, most likely myofascial given location and Tenderness to palpation of trapezius muscles, no radicular features. This is likely due to or exacerbated by her cervical spondylosis and continued pain in left shoulder s/p TSA.   Possible radiculopathy given severe midline neck/upper thoracic back pain with cervical extension and difficulty with Spurling's maneuver, but less likely   Bilateral hand numbness primarily in radial aspect of hand improved with shaking out, worse with holding phone with flexed wrist. Most likely carpal tunnel syndrome.     Plan:  Discussed the assessment with the patient.  PT referral  DME order for bilateral wrist braces  Over the counter medication: Acetaminophen (Tylenol) 1000mg every 6 hours with food (Maximum of 3000mg/day)  Ibuprofen (Advil) maximum of 800mg three times a day with food  Follow up: as needed in 2+ mo    BILLING TIME DOCUMENTATION:   The total TIME spent on this patient on  the date of the encounter/appointment was 38 minutes.      TOTAL TIME includes:   Time spent preparing to see the patient (reviewing records and tests) - 2 min  Time spent face to face (or over the phone) with the patient - 29 min  Time spent ordering tests, medications, procedures and referrals - 0 min  Time spent Referring and communicating with other healthcare professionals - 0 min  Time spent documenting clinical information in Epic - 7 min     Hoa Ferris MD  [facility]     Disclaimer: This note consists of symbols derived from keyboarding, dictation and/or voice recognition software. As a result, there may be errors in the script that have gone undetected. Please consider this when interpreting information found in this chart.

## 2021-10-27 NOTE — PATIENT INSTRUCTIONS
I think yourneck pain is most likely due to muscular pain, likely exacerbated by arthritis of the neck and your painful left shoulder. I think you probably have carpal tunnel syndrome.      For this we will:     - Start physical therapy for your neck and left shoulder  - Use carpal tunnel wrist braces  - Use Tylenol and ibuprofen as needed.   Max doses are:   Tylenol 1000mg up to three times a day with ibuprofen 800mg up to three times a day. Tylenol and ibuprofen tend to work best when used together. These are max doses, use the least amount necessary to get pain relief      Your next steps:  1. Schedule physical therapy. You will be called to schedule this   2.  wrist braces at a medical supply store.   3. Call me if you are not getting better after 2+ mo    Follow up:   - As needed    Hoa Ferris MD  [facility]     ----------------------------------------------------------------  Clinic Number:  [phone]     Call with any questions about your care and for scheduling assistance.     Calls are returned Monday through Friday between 8 AM and 4:30 PM. We usually get back to you within 2 business days depending on the issue/request.    If we are prescribing your medications:    For opioid medication refills, call the clinic or send a [employer] message 7 days in advance.  Please include:    Name of requested medication    Name of the pharmacy.    For non-opioid medications, call your pharmacy directly to request a refill. Please allow 3-4 days to be processed.     Per [place] State Law:    All controlled substance prescriptions must be filled within 30 days of being written.      For those controlled substances allowing refills, pickup must occur within 30 days of last fill.      We believe regular attendance is key to your success in our program!      Any time you are unable to keep your appointment we ask that you call us at least 24 hours in advance to cancel.This will allow us to  offer the appointment time to another patient.     Multiple missed appointments may lead to dismissal from the clinic.

## 2021-11-03 DIAGNOSIS — R94.2 ABNORMAL PFT: ICD-10-CM

## 2021-11-03 DIAGNOSIS — F33.1 MAJOR DEPRESSIVE DISORDER, RECURRENT EPISODE, MODERATE (H): ICD-10-CM

## 2021-11-03 DIAGNOSIS — E78.5 HYPERLIPIDEMIA LDL GOAL <130: Chronic | ICD-10-CM

## 2021-11-05 RX ORDER — ALBUTEROL SULFATE 90 UG/1
AEROSOL, METERED RESPIRATORY (INHALATION)
Qty: 18 G | Refills: 3 | Status: SHIPPED | OUTPATIENT
Start: 2021-11-05 | End: 2022-04-17

## 2021-11-05 RX ORDER — PAROXETINE 20 MG/1
TABLET, FILM COATED ORAL
Qty: 90 TABLET | Refills: 1 | Status: SHIPPED | OUTPATIENT
Start: 2021-11-05 | End: 2022-05-22

## 2021-11-05 RX ORDER — SIMVASTATIN 40 MG
TABLET ORAL
Qty: 90 TABLET | Refills: 2 | Status: SHIPPED | OUTPATIENT
Start: 2021-11-05 | End: 2022-04-17

## 2021-11-05 NOTE — TELEPHONE ENCOUNTER
"Requested Prescriptions   Pending Prescriptions Disp Refills    simvastatin (ZOCOR) 40 MG tablet [Pharmacy Med Name: SIMVASTATIN 40 MG Tablet] 90 tablet 2     Sig: TAKE 1 TABLET AT BEDTIME        Statins Protocol Failed - 11/5/2021 12:36 PM        Failed - LDL on file in past 12 months     Recent Labs   Lab Test 06/14/19  1100   LDL 68             Passed - No abnormal creatine kinase in past 12 months     No lab results found.             Passed - Recent (12 mo) or future (30 days) visit within the authorizing provider's specialty     Patient has had an office visit with the authorizing provider or a provider within the authorizing providers department within the previous 12 mos or has a future within next 30 days. See \"Patient Info\" tab in inbasket, or \"Choose Columns\" in Meds & Orders section of the refill encounter.              Passed - Medication is active on med list        Passed - Patient is age 18 or older        Passed - No active pregnancy on record        Passed - No positive pregnancy test in past 12 months           PARoxetine (PAXIL) 20 MG tablet [Pharmacy Med Name: PAROXETINE HYDROCHLORIDE 20 MG Tablet] 90 tablet 1     Sig: TAKE 1 TABLET AT BEDTIME        SSRIs Protocol Failed - 11/5/2021 12:36 PM        Failed - PHQ-9 score less than 5 in past 6 months     Please review last PHQ-9 score.           Passed - Medication is active on med list        Passed - Patient is age 18 or older        Passed - No active pregnancy on record        Passed - No positive pregnancy test in last 12 months        Passed - Recent (6 mo) or future (30 days) visit within the authorizing provider's specialty     Patient had office visit in the last 6 months or has a visit in the next 30 days with authorizing provider or within the authorizing provider's specialty.  See \"Patient Info\" tab in inbasket, or \"Choose Columns\" in Meds & Orders section of the refill encounter.              Signed Prescriptions Disp Refills    " "albuterol (PROAIR HFA/PROVENTIL HFA/VENTOLIN HFA) 108 (90 Base) MCG/ACT inhaler 18 g 3     Sig: INHALE 2 PUFFS EVERY 6 HOURS        Asthma Maintenance Inhalers - Anticholinergics Passed - 11/5/2021 12:36 PM        Passed - Patient is age 12 years or older        Passed - Recent (12 mo) or future (30 days) visit within the authorizing provider's specialty     Patient has had an office visit with the authorizing provider or a provider within the authorizing providers department within the previous 12 mos or has a future within next 30 days. See \"Patient Info\" tab in inbasket, or \"Choose Columns\" in Meds & Orders section of the refill encounter.              Passed - Medication is active on med list       Short-Acting Beta Agonist Inhalers Protocol  Passed - 11/5/2021 12:36 PM        Passed - Patient is age 12 or older        Passed - Recent (12 mo) or future (30 days) visit within the authorizing provider's specialty     Patient has had an office visit with the authorizing provider or a provider within the authorizing providers department within the previous 12 mos or has a future within next 30 days. See \"Patient Info\" tab in inbasket, or \"Choose Columns\" in Meds & Orders section of the refill encounter.              Passed - Medication is active on med list               Last seen 10/12/21  "

## 2021-11-05 NOTE — PROGRESS NOTES
Outpatient Physical Therapy Discharge Note     Patient: Ingrid Powell  : 1945    Beginning/End Dates of Reporting Period:  2021 to 3/10/2021    Referring Provider: Dr. Sutton    Therapy Diagnosis: Neck Pain     Client Self Report: Reports that her neck is doing well, however her shoulder is sore.    Objective Measurements:  Objective Measure: Cervical ROM  Details: Flex: 55*; Ext: 55*; R Rot: 68*; L Rot: 65*  Objective Measure: L shoulder ROM  Details: Flex: 130*; Abduct: 97*; ER: 40*; IR: L PSIS    Goals:  Goal Identifier Sitting   Goal Description Pt will tolerate sitting for 15 minutes without needing to support her head wth a pillow without an increase in symptoms.   Target Date 21   Date Met  21   Progress:     Goal Identifier Cervical ROM   Goal Description Pt will demonstrate cervical rotation ROM to 55* in order to look over her shoulders while walking without an increase in symptoms.   Target Date 21   Date Met  02/10/21   Progress:     Goal Identifier Shoulder flexion   Goal Description Pt will demonstrate shoulder flexion with 50% reduction in activation of cervical muscluature and no increase in symptoms.   Target Date 21   Date Met  03/10/21   Progress:     Goal Identifier Lifting   Goal Description Pt will tolerate lifting 8# (gallon jug) without an increase in neck symptoms in order to carry a gallon jug.   Target Date 21   Date Met      Progress:     Progress Toward Goals:   Progress this reporting period: Pt demonstrates improving ROM and increased tolerance to strengthening by progressing from supine exercises to standing.  Pt also met 1 goal with reduced cervical activation with shoulder flexion.  Pt did not return for follow up appointments so current status is unknown.  Pt will discharge at this time.    Plan:  Discharge from therapy.    Discharge:    Reason for Discharge: Patient chooses to discontinue therapy.  Patient has failed to schedule  further appointments.    Equipment Issued: none    Discharge Plan: Patient to continue home program.

## 2021-11-08 ENCOUNTER — HOSPITAL ENCOUNTER (OUTPATIENT)
Dept: PHYSICAL THERAPY | Facility: CLINIC | Age: 76
Setting detail: THERAPIES SERIES
End: 2021-11-08
Attending: FAMILY MEDICINE
Payer: MEDICARE

## 2021-11-08 DIAGNOSIS — M47.812 SPONDYLOSIS OF CERVICAL REGION WITHOUT MYELOPATHY OR RADICULOPATHY: ICD-10-CM

## 2021-11-08 DIAGNOSIS — M54.2 CERVICALGIA: ICD-10-CM

## 2021-11-08 PROCEDURE — 97162 PT EVAL MOD COMPLEX 30 MIN: CPT | Mod: GP | Performed by: PHYSICAL THERAPIST

## 2021-11-08 PROCEDURE — 97110 THERAPEUTIC EXERCISES: CPT | Mod: GP | Performed by: PHYSICAL THERAPIST

## 2021-11-08 PROCEDURE — 97140 MANUAL THERAPY 1/> REGIONS: CPT | Mod: GP | Performed by: PHYSICAL THERAPIST

## 2021-11-08 NOTE — PROGRESS NOTES
[facility]          OUTPATIENT PHYSICAL THERAPY ORTHOPEDIC EVALUATION  PLAN OF TREATMENT FOR OUTPATIENT REHABILITATION  (COMPLETE FOR INITIAL CLAIMS ONLY)  Patient's Last Name, First Name, M.I.  YOB: 1945  Ingrid Powell    Provider s Name:  [facility]   Medical Record No.  [id number]   Start of Care Date:  11/08/21   Onset Date:  08/01/21   Type:     _X__PT   ___OT   ___SLP Medical Diagnosis:  Cervicalgia M54.2 Spondylosis of cervical region without myelopathy or radiculopathy M47.812     PT Diagnosis:  L shoudler pain, pain in L upper trap, neck pain   Visits from SOC:  1      _________________________________________________________________________________  Plan of Treatment/Functional Goals:  joint mobilization,manual therapy,neuromuscular re-education,ROM,strengthening,stretching     Cryotherapy,Electrical stimulation,TENS     Goals  Goal Identifier: Neck ROM  Goal Description: Pt will demonstrate 55 deg neck rotation w/o pain to be able look when crossing stretch   Target Date: 11/29/21    Goal Identifier: L shoulder - to do hair  Goal Description: Pt will demonstrate 120 deg shoulder flex to be able to style hair  Target Date: 11/29/21    Goal Identifier: IR  Goal Description: Pt will dmeonstrate improved IR/ER ROM to be able to wash back.   Target Date: 12/20/21    Goal Identifier: HEP   Goal Description: Pt will be compliant and competent in HEP to allow them to achieve maximal strength and reduce pain   Target Date: 12/20/21                    Therapy Frequency:  1 time/week  Predicted Duration of Therapy Intervention:  6 weeks    Yamel Vallecillo, PT                 I CERTIFY THE NEED FOR THESE SERVICES FURNISHED UNDER        THIS PLAN OF TREATMENT AND WHILE UNDER MY CARE     (Physician co-signature of this document indicates review and certification of the therapy plan).                       Certification Date From:  11/08/21    Certification Date To:  12/20/21    Referring Provider:  Hoa Ferris MD     Initial Assessment        See Epic Evaluation Start of Care Date: 11/08/21 11/08/21 1300   General Information   Type of Visit Initial OP Ortho PT Evaluation   Start of Care Date 11/08/21   Referring Physician Hoa Ferris MD    Patient/Family Goals Statement reduce pain w turning   Orders Evaluate and Treat   Orders Comment Cervicalgia likely due to spondylosis and head forward posture. Also h/o L TSA with incomplete PT and continued pain   Date of Order 10/27/21   Certification Required? Yes   Medical Diagnosis Cervicalgia M54.2 Spondylosis of cervical region without myelopathy or radiculopathy M47.812   Surgical/Medical history reviewed Yes   Precautions/Limitations no known precautions/limitations   General Information Comments PMH: cancer, COPD, depression, high BP, bladder contraol, menopause, OA, TKA< gallbldder, appendix, throat cancer, TSA   Body Part(s)   Body Part(s) Cervical Spine;Shoulder   Presentation and Etiology   Pertinent history of current problem (include personal factors and/or comorbidities that impact the POC) Pt relates chronic neck and shoulder pain with flare up in August. Pt relates has L TSA in 2019 and did not get full rehab due to Covid. Pt relates also has neck pain with turning head. L hand tingles however MD and pt think it is due to carpal tunnel. Pt has a wrist splint on however it doesn't always help. Fingers in both hands feels like sand.  Pt prevoiusl y saw in Jan for similar care. Pt relates still uses pulleys.    Impairments A. Pain;B. Decreased WB tolerance;D. Decreased ROM;E. Decreased flexibility;F. Decreased strength and endurance   Functional Limitations perform activities of daily living   Symptom Location Neck, L shoulder   How/Where did it occur From insidious onset   Onset date of current episode/exacerbation 08/01/21   Chronicity Recurrent   Pain rating  (0-10 point scale) Best (/10);Worst (/10)   Best (/10) 3   Worst (/10) 6   Pain quality B. Dull;C. Aching   Frequency of pain/symptoms A. Constant   Pain/symptoms exacerbated by C. Lifting;D. Carrying;E. Rest;G. Certain positions;H. Overhead reach;J. ADL;K. Home tasks   Pain/symptoms eased by A. Sitting;H. Cold;I. OTC medication(s)   Progression of symptoms since onset: Worsened   Current Level of Function   Current Community Support Family/friend caregiver   Patient role/employment history F. Retired   Living environment [place]   Fall Risk Screen   Fall screen completed by PT   Have you fallen 2 or more times in the past year? No   Have you fallen and had an injury in the past year? No   Is patient a fall risk? No   Abuse Screen (yes response referral indicated)   Feels Unsafe at Home or Work/School no   Feels Threatened by Someone no   Does Anyone Try to Keep You From Having Contact with Others or Doing Things Outside Your Home? no   Physical Signs of Abuse Present no   Functional Scales   Functional Scales Other   Other Scales  NDI: 33%    Cervical Spine   Posture increased thoraci kyphosis, and fwd head posture    Cervical Flexion ROM 50 w pain   Cervical Extension ROM 48 w pain   Cervical Right Rotation ROM 40 w pain   Cervical Left Rotation ROM 45 w pain    Shoulder Shrug (C2-C4) Strength 5/5 B   Shoulder Abd (C5) Strength R: 4/5 L: 3/5    Shoulder Add (C7) Strength R: 4/5 L: 3/5    Shoulder ER (C5, C6) Strength R: 4/5 L: 3/5    Shoulder IR (C5, C6) Strength R: 4/5 L: 3/5    Elbow Flexion (C5, C6) Strength 4/5 B    Elbow Extension (C7) Strength 4/5 B    Wrist Extension (C6) Strength R: 4/5 L: 3/5    Wrist Flexion (C7) Strength R: 4/5 L: 3/5    Vertebral Artery Test -   Alar Ligament Test -   Transverse Ligament Test -   Spurling Test -   UE Neural Tension UE Neural Tension Tests  (pt relates always numb and tingling )   ULTT I (Median and Anterior Interosseous) Negative   ULTT II (Median and  Musculocutaneous and Axillary) Negative   ULTT III (Radial) Negative   ULTT IV (Ulnar) Negative   Shoulder Objective Findings   Side (if bilateral, select both right and left) Left   Shoulder Special Tests Comments TTP L upper trap, mod tightness in cervical spine and upper trap    Left Shoulder Flexion AROM 80   Left Shoulder Flexion PROM 90   Left Shoulder Abduction AROM 60   Left Shoulder Abduction PROM 90   Left Shoulder ER AROM T2   Left Shoulder ER PROM 50   Left Shoulder IR AROM L hip    Left Shoulder IR PROM 20   Planned Therapy Interventions   Planned Therapy Interventions joint mobilization;manual therapy;neuromuscular re-education;ROM;strengthening;stretching   Planned Modality Interventions   Planned Modality Interventions Cryotherapy;Electrical stimulation;TENS   Clinical Impression   Criteria for Skilled Therapeutic Interventions Met yes, treatment indicated   PT Diagnosis L shoudler pain, pain in L upper trap, neck pain   Influenced by the following impairments limited ROM, UT compensation, weakness   Functional limitations due to impairments turning head, lfitng arm, doing hair   Clinical Presentation Stable/Uncomplicated   Clinical Presentation Rationale Pt presnet with L shoulder and neck pain. Pt has mod tightness in upper trap due to compensation for weakness in shoulder Pt also OA throughout neck and body. Pt is motivated to get better and did jenna faulkner prior PT   Clinical Decision Making (Complexity) Low complexity   Therapy Frequency 1 time/week   Predicted Duration of Therapy Intervention (days/wks) 6 weeks   Risk & Benefits of therapy have been explained Yes   Patient, Family & other staff in agreement with plan of care Yes   Education Assessment   Preferred Learning Style Demonstration;Pictures/video;Reading;Listening   Barriers to Learning No barriers   ORTHO GOALS   PT Ortho Eval Goals 1;2;3;4   Ortho Goal 1   Goal Identifier Neck ROM   Goal Description Pt will demonstrate 55 deg neck  rotation w/o pain to be able look when crossing stretch    Target Date 11/29/21   Ortho Goal 2   Goal Identifier L shoulder - to do hair   Goal Description Pt will demonstrate 120 deg shoulder flex to be able to style hair   Target Date 11/29/21   Ortho Goal 3   Goal Identifier IR   Goal Description Pt will dmeonstrate improved IR/ER ROM to be able to wash back.    Target Date 12/20/21   Ortho Goal 4   Goal Identifier HEP    Goal Description Pt will be compliant and competent in HEP to allow them to achieve maximal strength and reduce pain    Target Date 12/20/21   Total Evaluation Time   PT Eval, Moderate Complexity Minutes (69841) 20   Therapy Certification   Certification date from 11/08/21   Certification date to 12/20/21   Medical Diagnosis Cervicalgia M54.2 Spondylosis of cervical region without myelopathy or radiculopathy M47.812

## 2021-11-16 ENCOUNTER — HOSPITAL ENCOUNTER (OUTPATIENT)
Dept: PHYSICAL THERAPY | Facility: CLINIC | Age: 76
Setting detail: THERAPIES SERIES
End: 2021-11-16
Attending: STUDENT IN AN ORGANIZED HEALTH CARE EDUCATION/TRAINING PROGRAM
Payer: MEDICARE

## 2021-11-16 PROCEDURE — 97140 MANUAL THERAPY 1/> REGIONS: CPT | Mod: GP | Performed by: PHYSICAL THERAPIST

## 2021-11-16 PROCEDURE — 97110 THERAPEUTIC EXERCISES: CPT | Mod: GP | Performed by: PHYSICAL THERAPIST

## 2021-11-19 ENCOUNTER — OFFICE VISIT (OUTPATIENT)
Dept: ALLERGY | Facility: CLINIC | Age: 76
End: 2021-11-19
Payer: COMMERCIAL

## 2021-11-19 VITALS
HEART RATE: 67 BPM | SYSTOLIC BLOOD PRESSURE: 151 MMHG | TEMPERATURE: 97 F | WEIGHT: 214.51 LBS | DIASTOLIC BLOOD PRESSURE: 95 MMHG | BODY MASS INDEX: 35.7 KG/M2 | OXYGEN SATURATION: 94 %

## 2021-11-19 DIAGNOSIS — J44.89 ASTHMA WITH COPD (H): Primary | ICD-10-CM

## 2021-11-19 DIAGNOSIS — J30.0 VASOMOTOR RHINITIS: ICD-10-CM

## 2021-11-19 LAB
BASOPHILS # BLD AUTO: 0 10E3/UL (ref 0–0.2)
BASOPHILS NFR BLD AUTO: 1 %
EOSINOPHIL # BLD AUTO: 0.3 10E3/UL (ref 0–0.7)
EOSINOPHIL NFR BLD AUTO: 5 %
ERYTHROCYTE [DISTWIDTH] IN BLOOD BY AUTOMATED COUNT: 14.2 % (ref 10–15)
HCT VFR BLD AUTO: 43.4 % (ref 35–47)
HGB BLD-MCNC: 13.5 G/DL (ref 11.7–15.7)
LYMPHOCYTES # BLD AUTO: 1 10E3/UL (ref 0.8–5.3)
LYMPHOCYTES NFR BLD AUTO: 20 %
MCH RBC QN AUTO: 29.9 PG (ref 26.5–33)
MCHC RBC AUTO-ENTMCNC: 31.1 G/DL (ref 31.5–36.5)
MCV RBC AUTO: 96 FL (ref 78–100)
MONOCYTES # BLD AUTO: 0.5 10E3/UL (ref 0–1.3)
MONOCYTES NFR BLD AUTO: 11 %
NEUTROPHILS # BLD AUTO: 3.1 10E3/UL (ref 1.6–8.3)
NEUTROPHILS NFR BLD AUTO: 64 %
PLATELET # BLD AUTO: 203 10E3/UL (ref 150–450)
RBC # BLD AUTO: 4.51 10E6/UL (ref 3.8–5.2)
WBC # BLD AUTO: 4.8 10E3/UL (ref 4–11)

## 2021-11-19 PROCEDURE — 82785 ASSAY OF IGE: CPT | Performed by: ALLERGY & IMMUNOLOGY

## 2021-11-19 PROCEDURE — 86003 ALLG SPEC IGE CRUDE XTRC EA: CPT | Performed by: ALLERGY & IMMUNOLOGY

## 2021-11-19 PROCEDURE — 36415 COLL VENOUS BLD VENIPUNCTURE: CPT | Performed by: ALLERGY & IMMUNOLOGY

## 2021-11-19 PROCEDURE — 99204 OFFICE O/P NEW MOD 45 MIN: CPT | Performed by: ALLERGY & IMMUNOLOGY

## 2021-11-19 PROCEDURE — 85025 COMPLETE CBC W/AUTO DIFF WBC: CPT | Performed by: ALLERGY & IMMUNOLOGY

## 2021-11-19 RX ORDER — FLUTICASONE PROPIONATE 50 MCG
2 SPRAY, SUSPENSION (ML) NASAL DAILY
Qty: 16 G | Refills: 3 | Status: SHIPPED | OUTPATIENT
Start: 2021-11-19 | End: 2023-04-26

## 2021-11-19 RX ORDER — PREDNISONE 20 MG/1
40 TABLET ORAL DAILY
Qty: 10 TABLET | Refills: 0 | Status: SHIPPED | OUTPATIENT
Start: 2021-11-19 | End: 2021-11-24

## 2021-11-19 RX ORDER — AZELASTINE 1 MG/ML
2 SPRAY, METERED NASAL 2 TIMES DAILY PRN
Qty: 30 ML | Refills: 3 | Status: SHIPPED | OUTPATIENT
Start: 2021-11-19 | End: 2022-04-26

## 2021-11-19 ASSESSMENT — ENCOUNTER SYMPTOMS
ADENOPATHY: 0
NAUSEA: 0
HEADACHES: 0
VOMITING: 0
EYE DISCHARGE: 0
MYALGIAS: 0
EYE REDNESS: 0
SINUS PRESSURE: 0
ACTIVITY CHANGE: 0
COUGH: 1
WHEEZING: 0
CHILLS: 0
ARTHRALGIAS: 0
SHORTNESS OF BREATH: 1
DIARRHEA: 0
JOINT SWELLING: 0
FACIAL SWELLING: 0
RHINORRHEA: 1
EYE ITCHING: 0
FEVER: 0
CHEST TIGHTNESS: 0

## 2021-11-19 NOTE — PATIENT INSTRUCTIONS
--Start prednisone 40 mg by mouth once a day for 5 days.   Start Trelegy Ellipta 100/62.5/25 1 puff once daily. Rinse/gargle/spit water after use.    -Start albuterol inhaler 2-4 puffs every 4 hours as needed for chest tightness/wheezing/shortness of breath/persistent cough.  -Use it with a chamber device.    When you have a lot of nasal congestion, use Flonase 1-2 sprays in each nostril once daily.     If yo have runny nose only, use azelastine 2 sprays in each nostril twice a day when necessary.      Get the bloodwork done.          Allergy Staff Appt Hours Shot Hours Locations    Physician     Brody Hanks MD       Support Staff     Judy Rene LPN     [facility] [place]    Tuesday:   [place] 7-5 Wednesday:  [place]: 7-5 Thursday:         [facility] [place]:  [place] 7-3  [place]        Thursday: 7-5:20        Friday: 7-12:20     [place]        Tuesday: 7- 4:20 Wednesday: 7-4:20 [place] Monday: 7-2:20        Tuesday: 9-5:20 Thursday: 7-2:20    [place]       Tues & Wed: 7-5:20 Mon & Thurs: 7-4:20       Fri: 7-2:20           [facility]  [address] [place] [address]  Appt Line: [phone]      [facility]  [address] [place] [address]  Appt Line: [phone]       Important Scheduling Information (if recommended by provider):  Aspirin Desensitization: Appt will last 2 clinic days. Please call the Allergy RN line for your clinic to schedule. Discontinue antihistamines 7 days prior to the appointment.     Food Challenges: Appt will last 3-4 hours. Please call the Allergy RN line for your clinic to schedule. Discontinue antihistamines 7 days prior to the appointment.     Penicillin Testing: Appt will last 2-3 hours. Please call the Allergy RN line for your clinic to schedule. Discontinue antihistamines 7 days prior to the appointment.     Skin Testing: Appt will about 40 minutes. Call the appointment line for your clinic to  schedule. Discontinue antihistamines 7 days prior to the appointment.     Thank you for trusting us with your Allergy, Asthma, and Immunology care. Please feel free to contact us with any questions or concerns you may have.

## 2021-11-19 NOTE — PROGRESS NOTES
SUBJECTIVE:                                                                   Ingrid Powell is a 76-year-old female who presents today to our Allergy Clinic at [facility]; She is being seen in consultation at the request of Jose Manuel Sutton MD, asthma/COPD evaluation.   The patient is accompanied by her sister who helps to provide history.      The patient used to smoke for approximately 20-22 years.  Stopped in her 40s.  She used to smoke 0.5-1 pack/day.  She has not smoked since then.  Within the last 10 years, she started developing episodes of wheezing with viral respiratory infections.  She also developed a lot of shortness of breath with exertion.  And she has had chest tightness lately.  She frequently has a dry, sometimes productive cough.  Albuterol seems to be helpful within 15 minutes, but then she needs to use it once or twice a day.  She denies being hospitalized for current chest symptoms.  She had PFT done in April 2021.  Moderate obstruction with possible reversibility, but I also question effort.  DLCO mildly decreased, which would be unusual for asthma.    She was recently prescribed Advair, but she never picked it up.  For the past 10 to 12 years, she has had almost daily clear rhinorrhea.  Perennial pattern with Summer being worse.  In winter, she may develop some nasal congestion as well.  She has not tried treating her nasal symptoms with oral antihistamines or nasal sprays.      Patient Active Problem List   Diagnosis     Hyperlipidemia LDL goal <130     HTN (hypertension)     HX of MALIGNANT NEOPL TONSIL     COPD, mild (H)     zAdvanced directives, counseling/discussion     Major depressive disorder, recurrent episode, moderate (HCC)     Anxiety     Hypothyroidism     MARGIE (obstructive sleep apnea), Severe     Disturbance of salivary secretion (XEROSTOMIA)     Renal cyst     Raynaud's syndrome     History of lumbar surgery     Primary insomnia     Lumbosacral  radiculopathy at L3     DDD (degenerative disc disease), lumbar     Adenomatous colon polyp     Diverticulitis of colon     Benign neoplasm of gastrointestinal tract     Hemorrhoids, internal     Rectocele     Tortuous colon     Family history of breast cancer in mother     DJD of left shoulder     Morbid obesity (H)       Past Medical History:   Diagnosis Date     Arthritis      COPD (chronic obstructive pulmonary disease) (H)      Depressive disorder      Gastroesophageal reflux disease      History of lumbar surgery 2015     Hoarseness      Hypertension      Oxygen dependent     at night     Reduced vision      Sleep apnea       Problem (# of Occurrences) Relation (Name,Age of Onset)    Arthritis (3) Mother, Sister (Gayla), Brother (Edgard)    Asthma (1) Brother (Edgard)    Breast Cancer (1) Mother    Cancer (2) Mother, Other: uncle pancreatic/grandmother- colon/aunt ? mat or pat  breast cancer    Chronic Bronchitis (1) Sister (Gayla)    Emphysema (2) Father, Brother (Edgard)    Heart Disease (1) Father:  at 72 of heart failure    Pancreatic Cancer (1) Brother (Edgard)    Skin Cancer (1) Sister (Gayla)        Past Surgical History:   Procedure Laterality Date     BACK SURGERY       CARPAL TUNNEL RELEASE RT/LT      ?side     CHOLECYSTECTOMY       COLONOSCOPY N/A 2021    Procedure: COLONOSCOPY;  Surgeon: Walter Liang MD;  Location: [facility]     EYE SURGERY      cataracts     HERNIA REPAIR       INCISION AND DRAINAGE TRUNK, COMBINED  2012    Procedure:COMBINED INCISION AND DRAINAGE TRUNK; Incision and Drainage Abdominal Wall Abscess ; Surgeon:ALEXSANDER HANNON; Location:[facility]     INSERT PORT VASCULAR ACCESS  2012    Procedure:INSERT PORT VASCULAR ACCESS; Surgeon:MARY JANE GUAN; Location:[facility]     JOINT REPLACEMTN, KNEE RT/LT      bilateral     KNEE SURGERY      left- total     KNEE SURGERY      right- total     LAPAROSCOPIC APPENDECTOMY N/A 10/10/2015    Procedure: LAPAROSCOPIC  APPENDECTOMY;  Surgeon: Daniel Chamberlain MD;  Location: [facility]     LARYNGOSCOPY, ESOPHAGOSCOPY,  BIOPSY, COMBINED  2012    Procedure:COMBINED LARYNGOSCOPY, ESOPHAGOSCOPY,  BIOPSY; Direct Laryngoscopy, Esophagoscopy with Biopsy, Stealth Assisted Left Frontal Sinus Biopsy via Vidal Incision, 8 [place] Power Port in right subclavian & Percutaneous Endoscopic Gastrostomy Placement * Latex Safe*; Surgeon:LIBORIO CAZARES; Location: OR     left ankle fusion       OPTICAL TRACKING SYSTEM ENDOSCOPIC SINUS SURGERY  2012    Procedure:OPTICAL TRACKING SYSTEM ENDOSCOPIC SINUS SURGERY; Surgeon:LIBORIO CAZARES; Location: OR     RECTAL SURGERY      ? type     RELEASE DEQUERVAINS WRIST Left 2018    Procedure: RELEASE DEQUERVAINS WRIST;  Left 1st Distal compartment release;  Surgeon: Ronak Biggs MD;  Location: [facility]     REMOVE PORT VASCULAR ACCESS  2012    Procedure: REMOVE PORT VASCULAR ACCESS;  Remove Port Vascular Access ;  Surgeon: Phillip Ye MD;  Location:  OR     REVERSE ARTHROPLASTY SHOULDER Left 2019    Procedure: Left Reverse Total shoulder arthroplasty;  Surgeon: Oliver Velázquez MD;  Location: [facility]     Social History     Socioeconomic History     Marital status: Single     Spouse name: None     Number of children: None     Years of education: None     Highest education level: None   Occupational History     Occupation: RETIRED   Tobacco Use     Smoking status: Former Smoker     Packs/day: 0.50     Years: 35.00     Pack years: 17.50     Types: Cigarettes     Quit date: 10/3/1995     Years since quittin.1     Smokeless tobacco: Never Used   Substance and Sexual Activity     Alcohol use: Yes     Comment: rare     Drug use: No     Sexual activity: Not Currently     Partners: Male   Other Topics Concern     Parent/sibling w/ CABG, MI or angioplasty before 65F 55M? Not Asked   Social History Narrative    2021    ENVIRONMENTAL  HISTORY: [place] The home is heated with a boiler. They do not have central air conditioning. The patient's bedroom is furnished with carpeting in bedroom.  No pets. There is no history of cockroach or mice infestation. There is/are 0 smokers in the house.  The house does not have a basement.      Social Determinants of Health     Financial Resource Strain: Not on file   Food Insecurity: Not on file   Transportation Needs: Not on file   Physical Activity: Not on file   Stress: Not on file   Social Connections: Not on file   Intimate Partner Violence: Not on file   Housing Stability: Not on file           Review of Systems   Constitutional: Negative for activity change, chills and fever.   HENT: Positive for postnasal drip and rhinorrhea. Negative for congestion, dental problem, ear pain, facial swelling, nosebleeds, sinus pressure and sneezing.    Eyes: Negative for discharge, redness and itching.   Respiratory: Positive for cough (phlegm) and shortness of breath. Negative for chest tightness and wheezing.    Cardiovascular: Negative for chest pain.   Gastrointestinal: Negative for diarrhea, nausea and vomiting.   Musculoskeletal: Negative for arthralgias, joint swelling and myalgias.   Skin: Negative for rash.   Neurological: Negative for headaches.   Hematological: Negative for adenopathy.   Psychiatric/Behavioral: Negative for behavioral problems and self-injury.           Current Outpatient Medications:      albuterol (PROAIR HFA/PROVENTIL HFA/VENTOLIN HFA) 108 (90 Base) MCG/ACT inhaler, INHALE 2 PUFFS EVERY 6 HOURS, Disp: 18 g, Rfl: 3     azelastine (ASTELIN) 0.1 % nasal spray, Spray 2 sprays into both nostrils 2 times daily as needed for rhinitis, Disp: 30 mL, Rfl: 3     cyanocobalamin (VITAMIN B-12) 100 MCG tablet, Take 100 mcg by mouth daily, Disp: , Rfl:      fluticasone (FLONASE) 50 MCG/ACT nasal spray, Spray 2 sprays into both nostrils daily, Disp: 16 g,  Rfl: 3     Fluticasone-Umeclidin-Vilanterol (TRELEGY ELLIPTA) 100-62.5-25 MCG/INH oral inhaler, Inhale 1 puff into the lungs daily, Disp: 60 each, Rfl: 3     gabapentin (NEURONTIN) 300 MG capsule, TAKE 1 CAPSULE THREE TIMES DAILY, Disp: 180 capsule, Rfl: 1     levothyroxine (SYNTHROID/LEVOTHROID) 88 MCG tablet, TAKE 1 TABLET EVERY DAY (NEW DOSE, NEED TO RECHECK THYROID LABS IN 2 MONTHS), Disp: 90 tablet, Rfl: 1     lidocaine (LIDODERM) 5 % patch, Place 1 patch onto the skin every 24 hours To prevent lidocaine toxicity, patient should be patch free for 12 hrs daily., Disp: 30 patch, Rfl: 1     magnesium 250 MG tablet, Take 1 tablet by mouth every morning , Disp: , Rfl:      PARoxetine (PAXIL) 20 MG tablet, TAKE 1 TABLET AT BEDTIME, Disp: 90 tablet, Rfl: 1     predniSONE (DELTASONE) 20 MG tablet, Take 2 tablets (40 mg) by mouth daily for 5 days, Disp: 10 tablet, Rfl: 0     senna-docusate (SENOKOT-S/PERICOLACE) 8.6-50 MG tablet, Take 1-2 tablets by mouth 2 times daily, Disp: 60 tablet, Rfl: 0     simvastatin (ZOCOR) 40 MG tablet, TAKE 1 TABLET AT BEDTIME, Disp: 90 tablet, Rfl: 2     traZODone (DESYREL) 100 MG tablet, TAKE 1 TABLET EVERY NIGHT AS NEEDED FOR SLEEP, Disp: 90 tablet, Rfl: 3     VITAMIN D, CHOLECALCIFEROL, PO, Take 2,000 Units by mouth daily.  , Disp: , Rfl:      cycloSPORINE (RESTASIS) 0.05 % ophthalmic emulsion, Place 1 drop into both eyes 2 times daily (Patient not taking: Reported on 10/27/2021), Disp: 5.5 mL, Rfl: 1     fluticasone-salmeterol (ADVAIR-HFA) 115-21 MCG/ACT inhaler, Inhale 2 puffs into the lungs 2 times daily (Patient not taking: Reported on 11/19/2021), Disp: 12 g, Rfl: 1  Immunization History   Administered Date(s) Administered     COVID-19,PF,Moderna 03/04/2021, 04/01/2021     Influenza (High Dose) 3 valent vaccine 10/11/2015, 09/07/2016, 10/01/2018, 10/21/2019     Influenza (IIV3) PF 09/30/2011, 10/15/2012, 10/18/2013     Influenza, Quad, High Dose, Pf, 65yr+ (Fluzone HD) 09/22/2020,  10/12/2021     Pneumo Conj 13-V (2010&after) 09/07/2016     Pneumococcal 23 valent 10/10/2010     TDAP Vaccine (Adacel) 10/18/2013     Allergies   Allergen Reactions     Dilaudid [Hydromorphone Hcl] Other (See Comments)     hallucinations     Fosamax [Alendronate Sodium] Rash            Norvasc [Amlodipine Besylate] Hives and Rash     Tegretol [Carbamazepine] Hives and Rash     OBJECTIVE:                                                                 BP (!) 151/95 (BP Location: Left arm, Patient Position: Sitting, Cuff Size: Adult Regular)   Pulse 67   Temp 97  F (36.1  C) (Tympanic)   Wt 97.3 kg (214 lb 8.1 oz)   SpO2 94%   BMI 35.70 kg/m          Physical Exam  Vitals and nursing note reviewed.   Constitutional:       General: She is not in acute distress.     Appearance: She is not ill-appearing, toxic-appearing or diaphoretic.   HENT:      Head: Normocephalic and atraumatic.      Right Ear: Tympanic membrane, ear canal and external ear normal.      Left Ear: Tympanic membrane, ear canal and external ear normal.      Nose: Mucosal edema and congestion present.      Right Turbinates: Enlarged. Not swollen or pale.      Left Turbinates: Enlarged. Not swollen or pale.      Mouth/Throat:      Lips: Pink.      Mouth: Mucous membranes are moist.      Pharynx: Oropharynx is clear. No pharyngeal swelling, oropharyngeal exudate, posterior oropharyngeal erythema or uvula swelling.   Eyes:      General:         Right eye: No discharge.         Left eye: No discharge.      Conjunctiva/sclera: Conjunctivae normal.   Cardiovascular:      Rate and Rhythm: Normal rate and regular rhythm.      Heart sounds: Normal heart sounds. No murmur heard.      Pulmonary:      Effort: Pulmonary effort is normal. No respiratory distress.      Breath sounds: Normal air entry. No stridor, decreased air movement or transmitted upper airway sounds. Wheezing (End expiratory bilaterally) present. No decreased breath sounds, rhonchi or rales.    Musculoskeletal:         General: Normal range of motion.      Cervical back: Normal range of motion.   Skin:     General: Skin is warm.      Findings: No rash.   Neurological:      Mental Status: She is alert and oriented to person, place, and time.   Psychiatric:         Mood and Affect: Mood normal.         Behavior: Behavior normal.         ASSESSMENT/PLAN:    Asthma with COPD (H)  O2 sat varies between 91 and 94%, but she has gel nails.  Next time, I asked the patient to come without artificial nails.  Considering the onset of symptoms and smoking history, I favor COPD more than asthma.  Decreased DLCO is not characteristic of asthma.  She is currently symptomatic.  -Start prednisone 20 mg by mouth once daily.  -Instead of Advair, start Trelegy Ellipta 100/62.5/25 mcg 1 puff once daily.rinse/gargle/spit water after use.  -Ordered CBC with differential and total serum IgE, should we consider Biologics in the future.  Depending on the future symptom control, may need Pulmonology evaluation as well.    - CBC with Platelets & Differential  - IgE  - Fluticasone-Umeclidin-Vilanterol (TRELEGY ELLIPTA) 100-62.5-25 MCG/INH oral inhaler  Dispense: 60 each; Refill: 3  - predniSONE (DELTASONE) 20 MG tablet  Dispense: 10 tablet; Refill: 0    Vasomotor rhinitis  Ordered serum IgE for regional aeroallergen panel.  For rhinorrhea, use azelastine 2 sprays in each nostril twice daily as needed.  When she developed nasal congestion, and it is persistent, add intranasal fluticasone 2 sprays in each nostril twice daily as needed.  -Ordered serum IgE for regional aeroallergen panel.    - azelastine (ASTELIN) 0.1 % nasal spray  Dispense: 30 mL; Refill: 3  - Allergen cat epithellium IgE  - Allergen dog epithelium IgE  - Allergen Bradley grass IgE  - Allergen orchard grass IgE  - Allergen meghna IgE  - Allergen D farinae IgE  - Allergen D pteronyssinus IgE  - Allergen alternaria alternata IgE  - Allergen aspergillus fumigatus IgE  -  Allergen cladosporium herbarum IgE  - Allergen Epicoccum purpurascens IgE  - Allergen penicillium notatum IgE  - Allergen shelli white IgE  - Allergen Cedar IgE  - Allergen cottonwood IgE  - Allergen elm IgE  - Allergen maple box elder IgE  - Allergen oak white IgE  - Allergen [place] IgE  - Allergen silver  birch IgE  - Allergen Tree [place] IgE  - Allergen [place] Tree  - Allergen white pine IgE  - Allergen English plantain IgE  - Allergen giant ragweed IgE  - Allergen lamb's quarter IgE  - Allergen Mugwort IgE  - Allergen ragweed short IgE  - Allergen Sagebrush [place]ormwood IgE  - Allergen Sheep Sorrel IgE  - Allergen thistle Russian IgE  - Allergen Weed Nettle IgE  - Allergen, Kochia/Firebush    - fluticasone (FLONASE) 50 MCG/ACT nasal spray  Dispense: 16 g; Refill: 3      Return in about 6 weeks (around 12/31/2021), or if symptoms worsen or fail to improve.    Thank you for allowing us to participate in the care of this patient. Please feel free to contact us if there are any questions or concerns about the patient.    Disclaimer: This note consists of symbols derived from keyboarding, dictation and/or voice recognition software. As a result, there may be errors in the script that have gone undetected. Please consider this when interpreting information found in this chart.    Brody Hanks MD, FAAAAI, FACAAI  Allergy, Asthma and Immunology     [facility]

## 2021-11-19 NOTE — LETTER
11/19/2021         RE: Ingrid Powell  [address]  [place] [facility] [address]        Dear Colleague,    Thank you for referring your patient, Ingrid Powell, to the [facility]. Please see a copy of my visit note below.    SUBJECTIVE:                                                                   Ingrid Powell is a 76-year-old female who presents today to our Allergy Clinic at [facility]; She is being seen in consultation at the request of Jose Manuel Sutton MD, asthma/COPD evaluation.   The patient is accompanied by her sister who helps to provide history.      The patient used to smoke for approximately 20-22 years.  Stopped in her 40s.  She used to smoke 0.5-1 pack/day.  She has not smoked since then.  Within the last 10 years, she started developing episodes of wheezing with viral respiratory infections.  She also developed a lot of shortness of breath with exertion.  And she has had chest tightness lately.  She frequently has a dry, sometimes productive cough.  Albuterol seems to be helpful within 15 minutes, but then she needs to use it once or twice a day.  She denies being hospitalized for current chest symptoms.  She had PFT done in April 2021.  Moderate obstruction with possible reversibility, but I also question effort.  DLCO mildly decreased, which would be unusual for asthma.    She was recently prescribed Advair, but she never picked it up.  For the past 10 to 12 years, she has had almost daily clear rhinorrhea.  Perennial pattern with Summer being worse.  In winter, she may develop some nasal congestion as well.  She has not tried treating her nasal symptoms with oral antihistamines or nasal sprays.      Patient Active Problem List   Diagnosis     Hyperlipidemia LDL goal <130     HTN (hypertension)     HX of MALIGNANT NEOPL TONSIL     COPD, mild (H)     zAdvanced directives, counseling/discussion     Major depressive disorder,  recurrent episode, moderate (HCC)     Anxiety     Hypothyroidism     MARGIE (obstructive sleep apnea), Severe     Disturbance of salivary secretion (XEROSTOMIA)     Renal cyst     Raynaud's syndrome     History of lumbar surgery     Primary insomnia     Lumbosacral radiculopathy at L3     DDD (degenerative disc disease), lumbar     Adenomatous colon polyp     Diverticulitis of colon     Benign neoplasm of gastrointestinal tract     Hemorrhoids, internal     Rectocele     Tortuous colon     Family history of breast cancer in mother     DJD of left shoulder     Morbid obesity (H)       Past Medical History:   Diagnosis Date     Arthritis      COPD (chronic obstructive pulmonary disease) (H)      Depressive disorder      Gastroesophageal reflux disease      History of lumbar surgery 2015     Hoarseness      Hypertension      Oxygen dependent     at night     Reduced vision      Sleep apnea       Problem (# of Occurrences) Relation (Name,Age of Onset)    Arthritis (3) Mother, Sister (Gayla), Brother (Edgard)    Asthma (1) Brother (Edgard)    Breast Cancer (1) Mother    Cancer (2) Mother, Other: uncle pancreatic/grandmother- colon/aunt ? mat or pat  breast cancer    Chronic Bronchitis (1) Sister (Gayla)    Emphysema (2) Father, Brother (Edgard)    Heart Disease (1) Father:  at 72 of heart failure    Pancreatic Cancer (1) Brother (Edgard)    Skin Cancer (1) Sister (Gayla)        Past Surgical History:   Procedure Laterality Date     BACK SURGERY       CARPAL TUNNEL RELEASE RT/LT      ?side     CHOLECYSTECTOMY       COLONOSCOPY N/A 2021    Procedure: COLONOSCOPY;  Surgeon: Walter Liang MD;  Location: [facility]     EYE SURGERY      cataracts     HERNIA REPAIR       INCISION AND DRAINAGE TRUNK, COMBINED  2012    Procedure:COMBINED INCISION AND DRAINAGE TRUNK; Incision and Drainage Abdominal Wall Abscess ; Surgeon:ALEXSANDER HANNON; Location:[facility]     INSERT PORT VASCULAR ACCESS  2012    Procedure:INSERT PORT  VASCULAR ACCESS; Surgeon:MARY JANE GUAN; Location:[facility]     JOINT REPLACEMTN, KNEE RT/LT      bilateral     KNEE SURGERY      left- total     KNEE SURGERY      right- total     LAPAROSCOPIC APPENDECTOMY N/A 10/10/2015    Procedure: LAPAROSCOPIC APPENDECTOMY;  Surgeon: Daniel Chamberlain MD;  Location: [facility]     LARYNGOSCOPY, ESOPHAGOSCOPY,  BIOPSY, COMBINED  2012    Procedure:COMBINED LARYNGOSCOPY, ESOPHAGOSCOPY,  BIOPSY; Direct Laryngoscopy, Esophagoscopy with Biopsy, Stealth Assisted Left Frontal Sinus Biopsy via Vidal Incision, 8 Slovak Power Port in right subclavian & Percutaneous Endoscopic Gastrostomy Placement * Latex Safe*; Surgeon:LIBORIO CAZARES; Location: OR     left ankle fusion       OPTICAL TRACKING SYSTEM ENDOSCOPIC SINUS SURGERY  2012    Procedure:OPTICAL TRACKING SYSTEM ENDOSCOPIC SINUS SURGERY; Surgeon:LIBORIO CAZARES; Location: OR     RECTAL SURGERY      ? type     RELEASE DEQUERVAINS WRIST Left 2018    Procedure: RELEASE DEQUERVAINS WRIST;  Left 1st Distal compartment release;  Surgeon: Ronak Biggs MD;  Location: [facility]     REMOVE PORT VASCULAR ACCESS  2012    Procedure: REMOVE PORT VASCULAR ACCESS;  Remove Port Vascular Access ;  Surgeon: Phillip Ye MD;  Location:  OR     REVERSE ARTHROPLASTY SHOULDER Left 2019    Procedure: Left Reverse Total shoulder arthroplasty;  Surgeon: Oliver Velázquez MD;  Location: [facility]     Social History     Socioeconomic History     Marital status: Single     Spouse name: None     Number of children: None     Years of education: None     Highest education level: None   Occupational History     Occupation: RETIRED   Tobacco Use     Smoking status: Former Smoker     Packs/day: 0.50     Years: 35.00     Pack years: 17.50     Types: Cigarettes     Quit date: 10/3/1995     Years since quittin.1     Smokeless tobacco: Never Used   Substance and Sexual Activity     Alcohol use:  Yes     Comment: rare     Drug use: No     Sexual activity: Not Currently     Partners: Male   Other Topics Concern     Parent/sibling w/ CABG, MI or angioplasty before 65F 55M? Not Asked   Social History Narrative    November 19, 2021    ENVIRONMENTAL HISTORY: [place] The home is heated with a boiler. They do not have central air conditioning. The patient's bedroom is furnished with carpeting in bedroom.  No pets. There is no history of cockroach or mice infestation. There is/are 0 smokers in the house.  The house does not have a basement.      Social Determinants of Health     Financial Resource Strain: Not on file   Food Insecurity: Not on file   Transportation Needs: Not on file   Physical Activity: Not on file   Stress: Not on file   Social Connections: Not on file   Intimate Partner Violence: Not on file   Housing Stability: Not on file           Review of Systems   Constitutional: Negative for activity change, chills and fever.   HENT: Positive for postnasal drip and rhinorrhea. Negative for congestion, dental problem, ear pain, facial swelling, nosebleeds, sinus pressure and sneezing.    Eyes: Negative for discharge, redness and itching.   Respiratory: Positive for cough (phlegm) and shortness of breath. Negative for chest tightness and wheezing.    Cardiovascular: Negative for chest pain.   Gastrointestinal: Negative for diarrhea, nausea and vomiting.   Musculoskeletal: Negative for arthralgias, joint swelling and myalgias.   Skin: Negative for rash.   Neurological: Negative for headaches.   Hematological: Negative for adenopathy.   Psychiatric/Behavioral: Negative for behavioral problems and self-injury.           Current Outpatient Medications:      albuterol (PROAIR HFA/PROVENTIL HFA/VENTOLIN HFA) 108 (90 Base) MCG/ACT inhaler, INHALE 2 PUFFS EVERY 6 HOURS, Disp: 18 g, Rfl: 3     azelastine (ASTELIN) 0.1 % nasal spray, Spray 2 sprays into both nostrils 2  times daily as needed for rhinitis, Disp: 30 mL, Rfl: 3     cyanocobalamin (VITAMIN B-12) 100 MCG tablet, Take 100 mcg by mouth daily, Disp: , Rfl:      fluticasone (FLONASE) 50 MCG/ACT nasal spray, Spray 2 sprays into both nostrils daily, Disp: 16 g, Rfl: 3     Fluticasone-Umeclidin-Vilanterol (TRELEGY ELLIPTA) 100-62.5-25 MCG/INH oral inhaler, Inhale 1 puff into the lungs daily, Disp: 60 each, Rfl: 3     gabapentin (NEURONTIN) 300 MG capsule, TAKE 1 CAPSULE THREE TIMES DAILY, Disp: 180 capsule, Rfl: 1     levothyroxine (SYNTHROID/LEVOTHROID) 88 MCG tablet, TAKE 1 TABLET EVERY DAY (NEW DOSE, NEED TO RECHECK THYROID LABS IN 2 MONTHS), Disp: 90 tablet, Rfl: 1     lidocaine (LIDODERM) 5 % patch, Place 1 patch onto the skin every 24 hours To prevent lidocaine toxicity, patient should be patch free for 12 hrs daily., Disp: 30 patch, Rfl: 1     magnesium 250 MG tablet, Take 1 tablet by mouth every morning , Disp: , Rfl:      PARoxetine (PAXIL) 20 MG tablet, TAKE 1 TABLET AT BEDTIME, Disp: 90 tablet, Rfl: 1     predniSONE (DELTASONE) 20 MG tablet, Take 2 tablets (40 mg) by mouth daily for 5 days, Disp: 10 tablet, Rfl: 0     senna-docusate (SENOKOT-S/PERICOLACE) 8.6-50 MG tablet, Take 1-2 tablets by mouth 2 times daily, Disp: 60 tablet, Rfl: 0     simvastatin (ZOCOR) 40 MG tablet, TAKE 1 TABLET AT BEDTIME, Disp: 90 tablet, Rfl: 2     traZODone (DESYREL) 100 MG tablet, TAKE 1 TABLET EVERY NIGHT AS NEEDED FOR SLEEP, Disp: 90 tablet, Rfl: 3     VITAMIN D, CHOLECALCIFEROL, PO, Take 2,000 Units by mouth daily.  , Disp: , Rfl:      cycloSPORINE (RESTASIS) 0.05 % ophthalmic emulsion, Place 1 drop into both eyes 2 times daily (Patient not taking: Reported on 10/27/2021), Disp: 5.5 mL, Rfl: 1     fluticasone-salmeterol (ADVAIR-HFA) 115-21 MCG/ACT inhaler, Inhale 2 puffs into the lungs 2 times daily (Patient not taking: Reported on 11/19/2021), Disp: 12 g, Rfl: 1  Immunization History   Administered Date(s) Administered      COVID-19,PF,Moderna 03/04/2021, 04/01/2021     Influenza (High Dose) 3 valent vaccine 10/11/2015, 09/07/2016, 10/01/2018, 10/21/2019     Influenza (IIV3) PF 09/30/2011, 10/15/2012, 10/18/2013     Influenza, Quad, High Dose, Pf, 65yr+ (Fluzone HD) 09/22/2020, 10/12/2021     Pneumo Conj 13-V (2010&after) 09/07/2016     Pneumococcal 23 valent 10/10/2010     TDAP Vaccine (Adacel) 10/18/2013     Allergies   Allergen Reactions     Dilaudid [Hydromorphone Hcl] Other (See Comments)     hallucinations     Fosamax [Alendronate Sodium] Rash            Norvasc [Amlodipine Besylate] Hives and Rash     Tegretol [Carbamazepine] Hives and Rash     OBJECTIVE:                                                                 BP (!) 151/95 (BP Location: Left arm, Patient Position: Sitting, Cuff Size: Adult Regular)   Pulse 67   Temp 97  F (36.1  C) (Tympanic)   Wt 97.3 kg (214 lb 8.1 oz)   SpO2 94%   BMI 35.70 kg/m          Physical Exam  Vitals and nursing note reviewed.   Constitutional:       General: She is not in acute distress.     Appearance: She is not ill-appearing, toxic-appearing or diaphoretic.   HENT:      Head: Normocephalic and atraumatic.      Right Ear: Tympanic membrane, ear canal and external ear normal.      Left Ear: Tympanic membrane, ear canal and external ear normal.      Nose: Mucosal edema and congestion present.      Right Turbinates: Enlarged. Not swollen or pale.      Left Turbinates: Enlarged. Not swollen or pale.      Mouth/Throat:      Lips: Pink.      Mouth: Mucous membranes are moist.      Pharynx: Oropharynx is clear. No pharyngeal swelling, oropharyngeal exudate, posterior oropharyngeal erythema or uvula swelling.   Eyes:      General:         Right eye: No discharge.         Left eye: No discharge.      Conjunctiva/sclera: Conjunctivae normal.   Cardiovascular:      Rate and Rhythm: Normal rate and regular rhythm.      Heart sounds: Normal heart sounds. No murmur heard.      Pulmonary:       Effort: Pulmonary effort is normal. No respiratory distress.      Breath sounds: Normal air entry. No stridor, decreased air movement or transmitted upper airway sounds. Wheezing (End expiratory bilaterally) present. No decreased breath sounds, rhonchi or rales.   Musculoskeletal:         General: Normal range of motion.      Cervical back: Normal range of motion.   Skin:     General: Skin is warm.      Findings: No rash.   Neurological:      Mental Status: She is alert and oriented to person, place, and time.   Psychiatric:         Mood and Affect: Mood normal.         Behavior: Behavior normal.         ASSESSMENT/PLAN:    Asthma with COPD (H)  O2 sat varies between 91 and 94%, but she has gel nails.  Next time, I asked the patient to come without artificial nails.  Considering the onset of symptoms and smoking history, I favor COPD more than asthma.  Decreased DLCO is not characteristic of asthma.  She is currently symptomatic.  -Start prednisone 20 mg by mouth once daily.  -Instead of Advair, start Trelegy Ellipta 100/62.5/25 mcg 1 puff once daily.rinse/gargle/spit water after use.  -Ordered CBC with differential and total serum IgE, should we consider Biologics in the future.  Depending on the future symptom control, may need Pulmonology evaluation as well.    - CBC with Platelets & Differential  - IgE  - Fluticasone-Umeclidin-Vilanterol (TRELEGY ELLIPTA) 100-62.5-25 MCG/INH oral inhaler  Dispense: 60 each; Refill: 3  - predniSONE (DELTASONE) 20 MG tablet  Dispense: 10 tablet; Refill: 0    Vasomotor rhinitis  Ordered serum IgE for regional aeroallergen panel.  For rhinorrhea, use azelastine 2 sprays in each nostril twice daily as needed.  When she developed nasal congestion, and it is persistent, add intranasal fluticasone 2 sprays in each nostril twice daily as needed.  -Ordered serum IgE for regional aeroallergen panel.    - azelastine (ASTELIN) 0.1 % nasal spray  Dispense: 30 mL; Refill: 3  - Allergen cat  epithellium IgE  - Allergen dog epithelium IgE  - Allergen Bradley grass IgE  - Allergen orchard grass IgE  - Allergen meghna IgE  - Allergen D farinae IgE  - Allergen D pteronyssinus IgE  - Allergen alternaria alternata IgE  - Allergen aspergillus fumigatus IgE  - Allergen cladosporium herbarum IgE  - Allergen Epicoccum purpurascens IgE  - Allergen penicillium notatum IgE  - Allergen shelli white IgE  - Allergen Cedar IgE  - Allergen cottonwood IgE  - Allergen elm IgE  - Allergen maple box elder IgE  - Allergen oak white IgE  - Allergen Red [place] IgE  - Allergen silver  birch IgE  - Allergen Tree White [place] IgE  - Allergen Garwin Tree  - Allergen white pine IgE  - Allergen English plantain IgE  - Allergen giant ragweed IgE  - Allergen lamb's quarter IgE  - Allergen Mugwort IgE  - Allergen ragweed short IgE  - Allergen Sagebrush Wormwood IgE  - Allergen Sheep Sorrel IgE  - Allergen thistle Russian IgE  - Allergen Weed Nettle IgE  - Allergen, Kochia/Firebush    - fluticasone (FLONASE) 50 MCG/ACT nasal spray  Dispense: 16 g; Refill: 3      Return in about 6 weeks (around 12/31/2021), or if symptoms worsen or fail to improve.    Thank you for allowing us to participate in the care of this patient. Please feel free to contact us if there are any questions or concerns about the patient.    Disclaimer: This note consists of symbols derived from keyboarding, dictation and/or voice recognition software. As a result, there may be errors in the script that have gone undetected. Please consider this when interpreting information found in this chart.    Brody Hanks MD, FAAAAI, FACAAI  Allergy, Asthma and Immunology     [facility]         Again, thank you for allowing me to participate in the care of your patient.        Sincerely,        Brody Hanks MD

## 2021-11-20 ASSESSMENT — ASTHMA QUESTIONNAIRES: ACT_TOTALSCORE: 16

## 2021-11-22 LAB
A ALTERNATA IGE QN: <0.1 KU(A)/L
A FUMIGATUS IGE QN: <0.1 KU(A)/L
C HERBARUM IGE QN: <0.1 KU(A)/L
CALIF WALNUT POLN IGE QN: <0.1 KU(A)/L
CAT DANDER IGG QN: <0.1 KU(A)/L
CEDAR IGE QN: <0.1 KU(A)/L
COCKSFOOT IGE QN: <0.1 KU(A)/L
COMMON RAGWEED IGE QN: <0.1 KU(A)/L
COTTONWOOD IGE QN: <0.1 KU(A)/L
D FARINAE IGE QN: <0.1 KU(A)/L
D PTERONYSS IGE QN: <0.1 KU(A)/L
DOG DANDER+EPITH IGE QN: <0.1 KU(A)/L
E PURPURASCENS IGE QN: <0.1 KU(A)/L
EAST WHITE PINE IGE QN: <0.1 KU(A)/L
ENGL PLANTAIN IGE QN: <0.1 KU(A)/L
FIREBUSH IGE QN: <0.1 KU(A)/L
GIANT RAGWEED IGE QN: <0.1 KU(A)/L
GOOSEFOOT IGE QN: <0.1 KU(A)/L
JOHNSON GRASS IGE QN: <0.1 KU(A)/L
MAPLE IGE QN: <0.1 KU(A)/L
MUGWORT IGE QN: <0.1 KU(A)/L
NETTLE IGE QN: <0.1 KU(A)/L
P NOTATUM IGE QN: <0.1 KU(A)/L
RED MULBERRY IGE QN: <0.1 KU(A)/L
SALTWORT IGE QN: <0.1 KU(A)/L
SHEEP SORREL IGE QN: <0.1 KU(A)/L
SILVER BIRCH IGE QN: <0.1 KU(A)/L
TIMOTHY IGE QN: <0.1 KU(A)/L
WHITE ASH IGE QN: <0.1 KU(A)/L
WHITE ELM IGE QN: <0.1 KU(A)/L
WHITE MULBERRY IGE QN: <0.1 KU(A)/L
WHITE OAK IGE QN: <0.1 KU(A)/L
WORMWOOD IGE QN: <0.1 KU(A)/L

## 2021-11-23 ENCOUNTER — HOSPITAL ENCOUNTER (OUTPATIENT)
Dept: PHYSICAL THERAPY | Facility: CLINIC | Age: 76
Setting detail: THERAPIES SERIES
End: 2021-11-23
Attending: STUDENT IN AN ORGANIZED HEALTH CARE EDUCATION/TRAINING PROGRAM
Payer: MEDICARE

## 2021-11-23 LAB — IGE SERPL-ACNC: 4 KU/L (ref 0–114)

## 2021-11-23 PROCEDURE — 97110 THERAPEUTIC EXERCISES: CPT | Mod: GP | Performed by: PHYSICAL THERAPIST

## 2021-11-23 PROCEDURE — 97140 MANUAL THERAPY 1/> REGIONS: CPT | Mod: GP | Performed by: PHYSICAL THERAPIST

## 2021-11-29 NOTE — RESULT ENCOUNTER NOTE
[employer] message sent:     CBC with differential is essentially within normal limits.  Total serum IgE is within normal limits.  Negative serum IgE for regional aeroallergen panel. It suggests lack of sensitivity to tested environmental/seasonal allergens.  Unable to recommend avoidance measures or allergen immunotherapy.  -No changes in the treatment plan.

## 2021-11-30 ENCOUNTER — HOSPITAL ENCOUNTER (OUTPATIENT)
Dept: PHYSICAL THERAPY | Facility: CLINIC | Age: 76
Setting detail: THERAPIES SERIES
End: 2021-11-30
Attending: STUDENT IN AN ORGANIZED HEALTH CARE EDUCATION/TRAINING PROGRAM
Payer: MEDICARE

## 2021-11-30 PROCEDURE — 97110 THERAPEUTIC EXERCISES: CPT | Mod: GP | Performed by: PHYSICAL THERAPIST

## 2021-11-30 PROCEDURE — 97140 MANUAL THERAPY 1/> REGIONS: CPT | Mod: GP | Performed by: PHYSICAL THERAPIST

## 2021-12-01 ENCOUNTER — OFFICE VISIT (OUTPATIENT)
Dept: FAMILY MEDICINE | Facility: CLINIC | Age: 76
End: 2021-12-01
Payer: COMMERCIAL

## 2021-12-01 ENCOUNTER — ANCILLARY PROCEDURE (OUTPATIENT)
Dept: MAMMOGRAPHY | Facility: CLINIC | Age: 76
End: 2021-12-01
Attending: FAMILY MEDICINE
Payer: COMMERCIAL

## 2021-12-01 VITALS
RESPIRATION RATE: 18 BRPM | HEIGHT: 65 IN | TEMPERATURE: 97.6 F | HEART RATE: 73 BPM | SYSTOLIC BLOOD PRESSURE: 140 MMHG | OXYGEN SATURATION: 97 % | DIASTOLIC BLOOD PRESSURE: 82 MMHG | WEIGHT: 223 LBS | BODY MASS INDEX: 37.15 KG/M2

## 2021-12-01 DIAGNOSIS — Z00.00 ENCOUNTER FOR MEDICARE ANNUAL WELLNESS EXAM: Primary | ICD-10-CM

## 2021-12-01 DIAGNOSIS — Z12.31 VISIT FOR SCREENING MAMMOGRAM: ICD-10-CM

## 2021-12-01 DIAGNOSIS — Z23 HIGH PRIORITY FOR 2019-NCOV VACCINE: ICD-10-CM

## 2021-12-01 PROCEDURE — 91306 COVID-19,PF,MODERNA (18+ YRS BOOSTER .25ML): CPT | Performed by: FAMILY MEDICINE

## 2021-12-01 PROCEDURE — 77067 SCR MAMMO BI INCL CAD: CPT | Mod: TC | Performed by: RADIOLOGY

## 2021-12-01 PROCEDURE — 77063 BREAST TOMOSYNTHESIS BI: CPT | Mod: TC | Performed by: RADIOLOGY

## 2021-12-01 PROCEDURE — 0064A COVID-19,PF,MODERNA (18+ YRS BOOSTER .25ML): CPT | Performed by: FAMILY MEDICINE

## 2021-12-01 PROCEDURE — 99397 PER PM REEVAL EST PAT 65+ YR: CPT | Mod: 25 | Performed by: FAMILY MEDICINE

## 2021-12-01 ASSESSMENT — ENCOUNTER SYMPTOMS
CONSTIPATION: 0
HEADACHES: 0
DYSURIA: 0
BRUISES/BLEEDS EASILY: 0
APPETITE CHANGE: 0
FREQUENCY: 1
CONSTIPATION: 1
ARTHRALGIAS: 1
BREAST MASS: 0
HEMATOCHEZIA: 0
FEVER: 0
EYE PAIN: 0
DIZZINESS: 0
PARESTHESIAS: 0
SORE THROAT: 0
FREQUENCY: 0
JOINT SWELLING: 0
HEMATURIA: 0
CHILLS: 1
PALPITATIONS: 0
SHORTNESS OF BREATH: 1
NAUSEA: 0
SHORTNESS OF BREATH: 0
DIARRHEA: 0
MYALGIAS: 1
ABDOMINAL PAIN: 0
NERVOUS/ANXIOUS: 0
WEAKNESS: 0
COUGH: 0
HEARTBURN: 0
ADENOPATHY: 0
CHILLS: 0

## 2021-12-01 ASSESSMENT — ACTIVITIES OF DAILY LIVING (ADL): CURRENT_FUNCTION: NO ASSISTANCE NEEDED

## 2021-12-01 ASSESSMENT — MIFFLIN-ST. JEOR: SCORE: 1492.4

## 2021-12-01 ASSESSMENT — PAIN SCALES - GENERAL: PAINLEVEL: NO PAIN (0)

## 2021-12-01 NOTE — PATIENT INSTRUCTIONS
Patient Education   Personalized Prevention Plan  You are due for the preventive services outlined below.  Your care team is available to assist you in scheduling these services.  If you have already completed any of these items, please share that information with your care team to update in your medical record.  Health Maintenance Due   Topic Date Due     ANNUAL REVIEW OF  ORDERS  Never done     Zoster (Shingles) Vaccine (1 of 2) Never done     Depression Assessment  03/10/2020     COVID-19 Vaccine (3 - Booster for Moderna series) 10/01/2021     Your Health Risk Assessment indicates you feel you are not in good health    A healthy lifestyle helps keep the body fit and the mind alert. It helps protect you from disease, helps you fight disease, and helps prevent chronic disease (disease that doesn't go away) from getting worse. This is important as you get older and begin to notice twinges in muscles and joints and a decline in the strength and stamina you once took for granted. A healthy lifestyle includes good healthcare, good nutrition, weight control, recreation, and regular exercise. Avoid harmful substances and do what you can to keep safe. Another part of a healthy lifestyle is stay mentally active and socially involved.    Good healthcare     Have a wellness visit every year.     If you have new symptoms, let us know right away. Don't wait until the next checkup.     Take medicines exactly as prescribed and keep your medicines in a safe place. Tell us if your medicine causes problems.   Healthy diet and weight control     Eat 3 or 4 small, nutritious, low-fat, high-fiber meals a day. Include a variety of fruits, vegetables, and whole-grain foods.     Make sure you get enough calcium in your diet. Calcium, vitamin D, and exercise help prevent osteoporosis (bone thinning).     If you live alone, try eating with others when you can. That way you get a good meal and have company while you eat it.     Try to  keep a healthy weight. If you eat more calories than your body uses for energy, it will be stored as fat and you will gain weight.     Recreation   Recreation is not limited to sports and team events. It includes any activity that provides relaxation, interest, enjoyment, and exercise. Recreation provides an outlet for physical, mental, and social energy. It can give a sense of worth and achievement. It can help you stay healthy.    Mental Exercise and Social Involvement  Mental and emotional health is as important as physical health. Keep in touch with friends and family. Stay as active as possible. Continue to learn and challenge yourself.   Things you can do to stay mentally active are:    Learn something new, like a foreign language or musical instrument.     Play SCRABBLE or do crossword puzzles. If you cannot find people to play these games with you at home, you can play them with others on your computer through the Internet.     Join a games club--anything from card games to chess or checkers or lawn bowling.     Start a new hobby.     Go back to school.     Volunteer.     Read.   Keep up with world events.    Exercise for a Healthier Heart  You may wonder how you can improve the health of your heart. If you re thinking about exercise, you re on the right track. You don t need to become an athlete. But you do need a certain amount of brisk exercise to help strengthen your heart. If you have been diagnosed with a heart condition, your healthcare provider may advise exercise to help stabilize your condition. To help make exercise a habit, choose safe, fun activities.      Exercise with a friend. When activity is fun, you're more likely to stick with it.   Before you start  Check with your healthcare provider before starting an exercise program. This is especially important if you have not been active for a while. It's also important if you have a long-term (chronic) health problem such as heart disease,  diabetes, or obesity. Or if you are at high risk for having these problems.   Why exercise?  Exercising regularly offers many healthy rewards. It can help you do all of the following:     Improve your blood cholesterol level to help prevent further heart trouble    Lower your blood pressure to help prevent a stroke or heart attack    Control diabetes, or reduce your risk of getting this disease    Improve your heart and lung function    Reach and stay at a healthy weight    Make your muscles stronger so you can stay active    Prevent falls and fractures by slowing the loss of bone mass (osteoporosis)    Manage stress better    Reduce your blood pressure    Improve your sense of self and your body image  Exercise tips      Ease into your routine. Set small goals. Then build on them. If you are not sure what your activity level should be, talk with your healthcare provider first before starting an exercise routine.    Exercise on most days. Aim for a total of 150 minutes (2 hours and 30 minutes) or more of moderate-intensity aerobic activity each week. Or 75 minutes (1 hour and 15 minutes) or more of vigorous-intensity aerobic activity each week. Or try for a combination of both. Moderate activity means that you breathe heavier and your heart rate increases but you can still talk. Think about doing 40 minutes of moderate exercise, 3 to 4 times a week. For best results, activity should last for about 40 minutes to lower blood pressure and cholesterol. It's OK to work up to the 40-minute period over time. Examples of moderate-intensity activity are walking 1 mile in 15 minutes. Or doing 30 to 45 minutes of yard work.    Step up your daily activity level.  Along with your exercise program, try being more active the whole day. Walk instead of drive. Or park further away so that you take more steps each day. Do more household tasks or yard work. You may not be able to meet the advised mount of physical activity. But doing  some moderate- or vigorous-intensity aerobic activity can help reduce your risk for heart disease. Your healthcare provider can help you figure out what is best for you.    Choose 1 or more activities you enjoy.  Walking is one of the easiest things you can do. You can also try swimming, riding a bike, dancing, or taking an exercise class.    When to call your healthcare provider  Call your healthcare provider if you have any of these:     Chest pain or feel dizzy or lightheaded    Burning, tightness, pressure, or heaviness in your chest, neck, shoulders, back, or arms    Abnormal shortness of breath    More joint or muscle pain    A very fast or irregular heartbeat (palpitations)  [employer] last reviewed this educational content on 7/1/2019 2000-2021 [employer]. All rights reserved. This information is not intended as a substitute for professional medical care. Always follow your healthcare professional's instructions.          Understanding USDA MyPlate  The USDA has guidelines to help you make healthy food choices. These are called MyPlate. MyPlate shows the food groups that make up healthy meals using the image of a place setting. Before you eat, think about the healthiest choices for what to put on your plate or in your cup or bowl. To learn more about building a healthy plate, visit www.choosemyplate.gov.    The food groups    Fruits. Any fruit or 100% fruit juice counts as part of the Fruit Group. Fruits may be fresh, canned, frozen, or dried, and may be whole, cut-up, or pureed. Make 1/2 of your plate fruits and vegetables.    Vegetables. Any vegetable or 100% vegetable juice counts as a member of the Vegetable Group. Vegetables may be fresh, frozen, canned, or dried. They can be served raw or cooked and may be whole, cut-up, or mashed. Make 1/2 of your plate fruits and vegetables.    Grains. All foods made from grains are part of the Grains Group. These include wheat, rice, oats, cornmeal,  and barley. Grains are often used to make foods such as bread, pasta, oatmeal, cereal, tortillas, and grits. Grains should be no more than 1/4 of your plate. At least half of your grains should be whole grains.    Protein. This group includes meat, poultry, seafood, beans and peas, eggs, processed soy products (such as tofu), nuts (including nut butters), and seeds. Make protein choices no more than 1/4 of your plate. Meat and poultry choices should be lean or low fat.    Dairy. The Dairy Group includes all fluid milk products and foods made from milk that contain calcium, such as yogurt and cheese. (Foods that have little calcium, such as cream, butter, and cream cheese, are not part of this group.) Most dairy choices should be low-fat or fat-free.    Oils. Oils aren't a food group, but they do contain essential nutrients. However it's important to watch your intake of oils. These are fats that are liquid at room temperature. They include canola, corn, olive, soybean, vegetable, and sunflower oil. Foods that are mainly oil include mayonnaise, certain salad dressings, and soft margarines. You likely already get your daily oil allowance from the foods you eat.  Things to limit  Eating healthy also means limiting these things in your diet:       Salt (sodium). Many processed foods have a lot of sodium. To keep sodium intake down, eat fresh vegetables, meats, poultry, and seafood when possible. Purchase low-sodium, reduced-sodium, or no-salt-added food products at the store. And don't add salt to your meals at home. Instead, season them with herbs and spices such as dill, oregano, cumin, and paprika. Or try adding flavor with lemon or lime zest and juice.    Saturated fat. Saturated fats are most often found in animal products such as beef, pork, and chicken. They are often solid at room temperature, such as butter. To reduce your saturated fat intake, choose leaner cuts of meat and poultry. And try healthier cooking  methods such as grilling, broiling, roasting, or baking. For a simple lower-fat swap, use plain nonfat yogurt instead of mayonnaise when making potato salad or macaroni salad.    Added sugars. These are sugars added to foods. They are in foods such as ice cream, candy, soda, fruit drinks, sports drinks, energy drinks, cookies, pastries, jams, and syrups. Cut down on added sugars by sharing sweet treats with a family member or friend. You can also choose fruit for dessert, and drink water or other unsweetened beverages.     [employer] last reviewed this educational content on 6/1/2020 2000-2021 [employer]. All rights reserved. This information is not intended as a substitute for professional medical care. Always follow your healthcare professional's instructions.          Urinary Incontinence, Female (Adult)   Urinary incontinence means loss of bladder control. This problem affects many women, especially as they get older. If you have incontinence, you may be embarrassed to ask for help. But know that this problem can be treated.   Types of Incontinence  There are different types of incontinence. Two of the main types are described here. You can have more than one type.     Stress incontinence. With this type, urine leaks when pressure (stress) is put on the bladder. This may happen when you cough, sneeze, or laugh. Stress incontinence most often occurs because the pelvic floor muscles that support the bladder and urethra are weak. This can happen after pregnancy and vaginal childbirth or a hysterectomy. It can also be due to excess body weight or hormone changes.    Urge incontinence (also called overactive bladder). With this type, a sudden urge to urinate is felt often. This may happen even though there may not be much urine in the bladder. The need to urinate often during the night is common. Urge incontinence most often occurs because of bladder spasms. This may be due to bladder irritation or  infection. Damage to bladder nerves or pelvic muscles, constipation, and certain medicines can also lead to urge incontinence.  Treatment depends on the cause. Further evaluation is needed to find the type you have. This will likely include an exam and certain tests. Based on the results, you and your healthcare provider can then plan treatment. Until a diagnosis is made, the home care tips below can help ease symptoms.   Home care    Do pelvic floor muscle exercises, if they are prescribed. The pelvic floor muscles help support the bladder and urethra. Many women find that their symptoms improve when doing special exercises that strengthen these muscles. To do the exercises, contract the muscles you would use to stop your stream of urine. But do this when you re not urinating. Hold for 10 seconds, then relax. Repeat 10 to 20 times in a row, at least 3 times a day. Your healthcare provider may give you other instructions for how to do the exercises and how often.    Keep a bladder diary. This helps track how often and how much you urinate over a set period of time. Bring this diary with you to your next visit with the provider. The information can help your provider learn more about your bladder problem.    Lose weight, if advised to by your provider. Extra weight puts pressure on the bladder. Your provider can help you create a weight-loss plan that s right for you. This may include exercising more and making certain diet changes.    Don't have foods and drinks that may irritate the bladder. These can include alcohol and caffeinated drinks.    Quit smoking. Smoking and other tobacco use can lead to a long-term (chronic) cough that strains the pelvic floor muscles. Smoking may also damage the bladder and urethra. Talk with your provider about treatments or methods you can use to quit smoking.    If drinking large amounts of fluid makes you have symptoms, you may be advised to limit your fluid intake. You may also be  advised to drink most of your fluids during the day and to limit fluids at night.    If you re worried about urine leakage or accidents, you may wear absorbent pads to catch urine. Change the pads often. This helps reduce discomfort. It may also reduce the risk of skin or bladder infections.    Follow-up care  Follow up with your healthcare provider, or as directed. It may take some to find the right treatment for your problem. But healthy lifestyle changes can be made right away. These include such things as exercising on a regular basis, eating a healthy diet, losing weight (if needed), and quitting smoking. Your treatment plan may include special therapies or medicines. Certain procedures or surgery may also be options. Talk about any questions you have with your provider.   When to seek medical advice  Call the healthcare provider right away if any of these occur:    Fever of 100.4 F (38 C) or higher, or as directed by your provider    Bladder pain or fullness    Belly swelling    Nausea or vomiting    Back pain    Weakness, dizziness, or fainting  [employer] last reviewed this educational content on 1/1/2020 2000-2021 [employer]. All rights reserved. This information is not intended as a substitute for professional medical care. Always follow your healthcare professional's instructions.

## 2021-12-01 NOTE — NURSING NOTE
"Chief Complaint   Patient presents with     Physical     BP (!) 140/82 (Cuff Size: Adult Large)   Pulse 73   Temp 97.6  F (36.4  C) (Tympanic)   Resp 18   Ht 1.651 m (5' 5\")   Wt 101.2 kg (223 lb)   LMP  (LMP Unknown)   SpO2 97%   Breastfeeding No   BMI 37.11 kg/m   Estimated body mass index is 37.11 kg/m  as calculated from the following:    Height as of this encounter: 1.651 m (5' 5\").    Weight as of this encounter: 101.2 kg (223 lb).  Patient presents to the clinic using No DME      Health Maintenance that is potentially due pending provider review:    Health Maintenance Due   Topic Date Due     ANNUAL REVIEW OF HM ORDERS  Never done     ZOSTER IMMUNIZATION (1 of 2) Never done     ADVANCE CARE PLANNING  02/09/2017     PHQ-9  03/10/2020     COVID-19 Vaccine (3 - Booster for Moderna series) 10/01/2021                "

## 2021-12-01 NOTE — PROGRESS NOTES
"SUBJECTIVE:   Ingrid Powell is a 76 year old female who presents for Preventive Visit.    Patient has been advised of split billing requirements and indicates understanding: Yes   Are you in the first 12 months of your Medicare coverage?  No    Healthy Habits:     In general, how would you rate your overall health?  Fair    Frequency of exercise:  None    Do you usually eat at least 4 servings of fruit and vegetables a day, include whole grains    & fiber and avoid regularly eating high fat or \"junk\" foods?  No    Taking medications regularly:  Yes    Medication side effects:  Not applicable    Ability to successfully perform activities of daily living:  No assistance needed    Home Safety:  Throw rugs in the hallway    Hearing Impairment:  No hearing concerns    In the past 6 months, have you been bothered by leaking of urine? Yes    In general, how would you rate your overall mental or emotional health?  Good      PHQ-2 Total Score: 0    Additional concerns today:  No    Do you feel safe in your environment? Yes    Have you ever done Advance Care Planning? (For example, a Health Directive, POLST, or a discussion with a medical provider or your loved ones about your wishes): Yes, advance care planning is on file.       Fall risk  Fallen 2 or more times in the past year?: No  Any fall with injury in the past year?: No    Cognitive Screening   1) Repeat 3 items (Leader, Season, Table)    2) Clock draw: NORMAL  3) 3 item recall: Recalls 2 objects   Results: NORMAL clock, 1-2 items recalled: COGNITIVE IMPAIRMENT LESS LIKELY    Mini-CogTM Copyright ANJUM Quinones. Licensed by the author for use in [facility]; reprinted with permission (douglas@.[facility]). All rights reserved.          Reviewed and updated as needed this visit by clinical staff  Tobacco  Allergies  Meds             Reviewed and updated as needed this visit by Provider               Social History     Tobacco Use     Smoking status: Former Smoker "     Packs/day: 0.50     Years: 35.00     Pack years: 17.50     Types: Cigarettes     Quit date: 10/3/1995     Years since quittin.1     Smokeless tobacco: Never Used   Substance Use Topics     Alcohol use: Yes     Comment: rare         Alcohol Use 2021   Prescreen: >3 drinks/day or >7 drinks/week? Not Applicable         PROBLEMS TO ADD ON...  Tenderness in right breast- but has mammogram right after       Current providers sharing in care for this patient include:   Patient Care Team:  Jose Manuel Sutton MD as PCP - General (Family Practice)  Yamel Noland, RN as Continuity Care Coordinator (Nurse)  Tejinder Tracy MD as MD (Oncology)  Lyle Major MD as Assigned Musculoskeletal Provider  Jose Manuel Sutton MD as Assigned PCP  Brody Hanks MD as Assigned Allergy Provider    The following health maintenance items are reviewed in Epic and correct as of today:  Health Maintenance Due   Topic Date Due     ANNUAL REVIEW OF HM ORDERS  Never done     ZOSTER IMMUNIZATION (1 of 2) Never done     ADVANCE CARE PLANNING  2017     PHQ-9  03/10/2020     COVID-19 Vaccine (3 - Booster for Moderna series) 10/01/2021     Lab work is in process  Labs reviewed in EPIC  BP Readings from Last 3 Encounters:   21 (!) 140/82   21 (!) 151/95   10/27/21 135/83    Wt Readings from Last 3 Encounters:   21 101.2 kg (223 lb)   21 97.3 kg (214 lb 8.1 oz)   10/12/21 98 kg (216 lb)                  Patient Active Problem List   Diagnosis     Hyperlipidemia LDL goal <130     HTN (hypertension)     HX of MALIGNANT NEOPL TONSIL     COPD, mild (H)     zAdvanced directives, counseling/discussion     Major depressive disorder, recurrent episode, moderate (HCC)     Anxiety     Hypothyroidism     MARGIE (obstructive sleep apnea), Severe     Disturbance of salivary secretion (XEROSTOMIA)     Renal cyst     Raynaud's syndrome     History of lumbar surgery     Primary insomnia     Lumbosacral radiculopathy at L3      DDD (degenerative disc disease), lumbar     Adenomatous colon polyp     Diverticulitis of colon     Benign neoplasm of gastrointestinal tract     Hemorrhoids, internal     Rectocele     Tortuous colon     Family history of breast cancer in mother     DJD of left shoulder     Morbid obesity (H)     Past Surgical History:   Procedure Laterality Date     BACK SURGERY       CARPAL TUNNEL RELEASE RT/LT      ?side     CHOLECYSTECTOMY       COLONOSCOPY N/A 4/8/2021    Procedure: COLONOSCOPY;  Surgeon: Walter Liang MD;  Location: [facility]     EYE SURGERY      cataracts     HERNIA REPAIR       INCISION AND DRAINAGE TRUNK, COMBINED  5/18/2012    Procedure:COMBINED INCISION AND DRAINAGE TRUNK; Incision and Drainage Abdominal Wall Abscess ; Surgeon:ALEXSANDER HANNON; [facility]     INSERT PORT VASCULAR ACCESS  2/8/2012    Procedure:INSERT PORT VASCULAR ACCESS; Surgeon:MARY JANE GUAN; [facility]     JOINT REPLACEMTN, KNEE RT/LT      bilateral     KNEE SURGERY  1995    left- total     KNEE SURGERY  2000    right- total     LAPAROSCOPIC APPENDECTOMY N/A 10/10/2015    Procedure: LAPAROSCOPIC APPENDECTOMY;  Surgeon: Daniel Chamberlain MD;  Location: [facility]     LARYNGOSCOPY, ESOPHAGOSCOPY,  BIOPSY, COMBINED  2/8/2012    Procedure:COMBINED LARYNGOSCOPY, ESOPHAGOSCOPY,  BIOPSY; Direct Laryngoscopy, Esophagoscopy with Biopsy, Stealth Assisted Left Frontal Sinus Biopsy via Vidal Incision, 8 [place] Power Port in right subclavian & Percutaneous Endoscopic Gastrostomy Placement * Latex Safe*; Surgeon:LIBORIO CAZARES; [facility]     left ankle fusion       OPTICAL TRACKING SYSTEM ENDOSCOPIC SINUS SURGERY  2/8/2012    Procedure:OPTICAL TRACKING SYSTEM ENDOSCOPIC SINUS SURGERY; Surgeon:LIBORIO CAZARES; [facility]     RECTAL SURGERY      ? type     RELEASE DEQUERVAINS WRIST Left 5/11/2018    Procedure: RELEASE DEQUERVAINS WRIST;  Left 1st Distal compartment release;  Surgeon: Kalyan  Ronak ALBRIGHT MD;  Location: [facility]     REMOVE PORT VASCULAR ACCESS  2012    Procedure: REMOVE PORT VASCULAR ACCESS;  Remove Port Vascular Access ;  Surgeon: Phillip Ye MD;  [facility]     REVERSE ARTHROPLASTY SHOULDER Left 2019    Procedure: Left Reverse Total shoulder arthroplasty;  Surgeon: Oliver Velázquez MD;  Location: [facility]       Social History     Tobacco Use     Smoking status: Former Smoker     Packs/day: 0.50     Years: 35.00     Pack years: 17.50     Types: Cigarettes     Quit date: 10/3/1995     Years since quittin.1     Smokeless tobacco: Never Used   Substance Use Topics     Alcohol use: Yes     Comment: rare     Family History   Problem Relation Age of Onset     Breast Cancer Mother      Arthritis Mother      Cancer Mother      Heart Disease Father          at 72 of heart failure     Emphysema Father      Arthritis Sister      Skin Cancer Sister      Chronic Bronchitis Sister      Arthritis Brother      Pancreatic Cancer Brother      Emphysema Brother      Asthma Brother      Cancer Other         uncle pancreatic/grandmother- colon/aunt ? mat or pat  breast cancer         Current Outpatient Medications   Medication Sig Dispense Refill     albuterol (PROAIR HFA/PROVENTIL HFA/VENTOLIN HFA) 108 (90 Base) MCG/ACT inhaler INHALE 2 PUFFS EVERY 6 HOURS 18 g 3     azelastine (ASTELIN) 0.1 % nasal spray Spray 2 sprays into both nostrils 2 times daily as needed for rhinitis 30 mL 3     cyanocobalamin (VITAMIN B-12) 100 MCG tablet Take 100 mcg by mouth daily       fluticasone (FLONASE) 50 MCG/ACT nasal spray Spray 2 sprays into both nostrils daily 16 g 3     Fluticasone-Umeclidin-Vilanterol (TRELEGY ELLIPTA) 100-62.5-25 MCG/INH oral inhaler Inhale 1 puff into the lungs daily 60 each 3     gabapentin (NEURONTIN) 300 MG capsule TAKE 1 CAPSULE THREE TIMES DAILY 180 capsule 1     levothyroxine (SYNTHROID/LEVOTHROID) 88 MCG tablet TAKE 1 TABLET EVERY DAY (NEW DOSE, NEED TO RECHECK  THYROID LABS IN 2 MONTHS) 90 tablet 1     magnesium 250 MG tablet Take 1 tablet by mouth every morning        PARoxetine (PAXIL) 20 MG tablet TAKE 1 TABLET AT BEDTIME 90 tablet 1     senna-docusate (SENOKOT-S/PERICOLACE) 8.6-50 MG tablet Take 1-2 tablets by mouth 2 times daily 60 tablet 0     simvastatin (ZOCOR) 40 MG tablet TAKE 1 TABLET AT BEDTIME 90 tablet 2     traZODone (DESYREL) 100 MG tablet TAKE 1 TABLET EVERY NIGHT AS NEEDED FOR SLEEP 90 tablet 3     VITAMIN D, CHOLECALCIFEROL, PO Take 2,000 Units by mouth daily.         cycloSPORINE (RESTASIS) 0.05 % ophthalmic emulsion Place 1 drop into both eyes 2 times daily (Patient not taking: Reported on 10/27/2021) 5.5 mL 1     Allergies   Allergen Reactions     Dilaudid [Hydromorphone Hcl] Other (See Comments)     hallucinations     Fosamax [Alendronate Sodium] Rash            Norvasc [Amlodipine Besylate] Hives and Rash     Tegretol [Carbamazepine] Hives and Rash     Recent Labs   Lab Test 10/12/21  1257 03/18/21  1639 06/24/20  1505 10/31/19  1052 08/06/19  1100 06/14/19  1100 10/01/18  1155 02/26/18  0958 01/29/16  1219 01/13/16  1310   LDL  --   --   --   --   --  68  --  66  --  76   HDL  --   --   --   --   --  80  --  62  --  70   TRIG  --   --   --   --   --  45  --  145  --  77   ALT  --  20 15  --   --  97*   < >  --    < >  --    CR  --  0.76  --  0.81  --   --    < >  --    < >  --    GFRESTIMATED  --  76  --  71  --   --    < >  --    < >  --    GFRESTBLACK  --  88  --  82  --   --    < >  --    < >  --    POTASSIUM  --  4.4  --  3.9  --   --    < >  --    < >  --    TSH 2.91 5.04*  --   --    < >  --    < >  --    < >  --     < > = values in this interval not displayed.          Pertinent mammograms are reviewed under the imaging tab.    Review of Systems   Constitutional: Negative for appetite change, chills and fever.   HENT: Negative for congestion, ear pain, hearing loss and sore throat.    Eyes: Positive for visual disturbance. Negative for pain.  "  Respiratory: Negative for cough and shortness of breath.    Cardiovascular: Negative for chest pain, palpitations and peripheral edema.   Gastrointestinal: Negative for abdominal pain, constipation, diarrhea, heartburn, hematochezia and nausea.   Endocrine: Negative for cold intolerance and heat intolerance.   Breasts:  Negative for tenderness, breast mass and discharge.   Genitourinary: Positive for urgency. Negative for dysuria, frequency, genital sores, hematuria, pelvic pain, vaginal bleeding and vaginal discharge.   Musculoskeletal: Positive for arthralgias and myalgias. Negative for joint swelling.   Skin: Negative for rash.   Allergic/Immunologic: Positive for environmental allergies.   Neurological: Negative for dizziness, weakness, headaches and paresthesias.   Hematological: Negative for adenopathy. Does not bruise/bleed easily.   Psychiatric/Behavioral: Negative for mood changes. The patient is not nervous/anxious.        OBJECTIVE:   BP (!) 140/82 (Cuff Size: Adult Large)   Pulse 73   Temp 97.6  F (36.4  C) (Tympanic)   Resp 18   Ht 1.651 m (5' 5\")   Wt 101.2 kg (223 lb)   LMP  (LMP Unknown)   SpO2 97%   Breastfeeding No   BMI 37.11 kg/m   Estimated body mass index is 37.11 kg/m  as calculated from the following:    Height as of this encounter: 1.651 m (5' 5\").    Weight as of this encounter: 101.2 kg (223 lb).  Physical Exam  GENERAL: alert, no distress and obese  EYES: Eyes grossly normal to inspection, PERRL and conjunctivae and sclerae normal  HENT: normal cephalic/atraumatic, nose and mouth without ulcers or lesions, oropharynx clear and oral mucous membranes moist  NECK: no adenopathy, no asymmetry, masses, or scars and thyroid normal to palpation  RESP: lungs clear to auscultation - no rales, rhonchi or wheezes  BREAST: deferred (scheduled her mammogram today)  CV: regular rates and rhythm, normal S1 S2, no S3 or S4 and no murmur, click or rub  ABDOMEN: soft, nontender  MS: extremities " "normal- no gross deformities noted  SKIN: no suspicious lesions or rashes  NEURO: Normal strength and tone, mentation intact and speech normal  PSYCH: mentation appears normal, affect normal/bright      ASSESSMENT / PLAN:   (Z00.00) Encounter for Medicare annual wellness exam  (primary encounter diagnosis)  Comment: Medically stable apart from experiencing some right breat pain, no lumps, scheduled to have mammogram today.  Medications reviewed and no changes made.  COVID-19 booster vaccine administered in office.  Suggested regular walks, healthy diet and to follow-up in 6 months or earlier if needed      COUNSELING:  Reviewed preventive health counseling, as reflected in patient instructions    Estimated body mass index is 37.11 kg/m  as calculated from the following:    Height as of this encounter: 1.651 m (5' 5\").    Weight as of this encounter: 101.2 kg (223 lb).    Weight management plan: Healthy diet and regular walks    She reports that she quit smoking about 26 years ago. Her smoking use included cigarettes. She has a 17.50 pack-year smoking history. She has never used smokeless tobacco.      Appropriate preventive services were discussed with this patient, including applicable screening as appropriate for cardiovascular disease, diabetes, osteopenia/osteoporosis, and glaucoma.  As appropriate for age/gender, discussed screening for colorectal cancer, prostate cancer, breast cancer, and cervical cancer. Checklist reviewing preventive services available has been given to the patient.    Reviewed patients plan of care and provided an AVS. The Basic Care Plan (routine screening as documented in Health Maintenance) for Ingrid meets the Care Plan requirement. This Care Plan has been established and reviewed with the Patient.    Counseling Resources:  ATP IV Guidelines  Pooled Cohorts Equation Calculator  Breast Cancer Risk Calculator  Breast Cancer: Medication to Reduce Risk  FRAX Risk Assessment  ICSI Preventive " Guidelines  Dietary Guidelines for Americans, 2010  USDA's MyPlate  ASA Prophylaxis  Lung CA Screening    Jose Manuel Sutton MD  [facility]    Identified Health Risks:    The patient was provided with suggestions to help her develop a healthy physical lifestyle.  She is at risk for lack of exercise and has been provided with information to increase physical activity for the benefit of her well-being.  The patient was counseled and encouraged to consider modifying their diet and eating habits. She was provided with information on recommended healthy diet options.  Information on urinary incontinence and treatment options given to patient.

## 2021-12-30 NOTE — PROGRESS NOTES
"  SUBJECTIVE:   Ingrid Powell is a 71 year old female who presents to clinic today for the following health issues:    [place]    [place]  [facility] [place] of [place]: [place]  Reason for visit: Cough - Acute Bronchitis  Current Status: feels terrible, frustrated that this is not going away   Since labor day. Coughing a lot, raising green yellow sputum.  No fever though feels warm at times.  Awake a lot at night due to coughing.  Taking naps as no sleep at night     constipation and abd pain  Abdominal pain daily,  like menstrual cramps. Feels both upper and lower quads.   Mother had diverticulitis, colon ca in grandmother, aunt and uncle.    Can go 2 weeks or more without having a stool if not \"doing something\" to help. mag citrate works though doesn't want to rely on this. Often the stool is liquid, solid at times.   Routinely uses stool softener and occasionally the mag citrate.     Problem list and histories reviewed & adjusted, as indicated.  Additional history: as documented    Patient Active Problem List   Diagnosis     Hyperlipidemia LDL goal <130     HTN (hypertension)     HX of MALIGNANT NEOPL TONSIL     COPD, mild (H)     zAdvanced directives, counseling/discussion     Major depressive disorder, recurrent episode, moderate (HCC)     Anxiety     Hypothyroidism     MARGIE (obstructive sleep apnea), Severe     Disturbance of salivary secretion (XEROSTOMIA)     Renal cyst     Raynaud's syndrome     History of lumbar surgery     Primary insomnia     Lumbosacral radiculopathy at L3     DDD (degenerative disc disease), lumbar     Adenomatous colon polyp     Diverticulitis of colon     Benign neoplasm of gastrointestinal tract     Hemorrhoids, internal     Rectocele     Tortuous colon     Family history of breast cancer in mother     Malignant neoplasm of oropharynx (H)     Past Surgical History:   Procedure Laterality Date     BACK SURGERY       CARPAL TUNNEL RELEASE RT/LT      ?side     " CHOLECYSTECTOMY       EYE SURGERY      cataracts     HERNIA REPAIR       INCISION AND DRAINAGE TRUNK, COMBINED  2012    Procedure:COMBINED INCISION AND DRAINAGE TRUNK; Incision and Drainage Abdominal Wall Abscess ; Surgeon:ALEXSANDER HANNON; [facility]     INSERT PORT VASCULAR ACCESS  2012    Procedure:INSERT PORT VASCULAR ACCESS; Surgeon:MARY JANE GUAN; [facility]     JOINT REPLACEMTN, KNEE RT/LT      bilateral     KNEE SURGERY      left- total     KNEE SURGERY      right- total     LAPAROSCOPIC APPENDECTOMY N/A 10/10/2015    Procedure: LAPAROSCOPIC APPENDECTOMY;  Surgeon: Daniel Chamberlain MD;  Location: [facility]     LARYNGOSCOPY, ESOPHAGOSCOPY,  BIOPSY, COMBINED  2012    Procedure:COMBINED LARYNGOSCOPY, ESOPHAGOSCOPY,  BIOPSY; Direct Laryngoscopy, Esophagoscopy with Biopsy, Stealth Assisted Left Frontal Sinus Biopsy via Vidal Incision, 8 [place] Power Port in right subclavian & Percutaneous Endoscopic Gastrostomy Placement * Latex Safe*; Surgeon:LIBORIO CAZARES; [facility]     left ankle fusion       OPTICAL TRACKING SYSTEM ENDOSCOPIC SINUS SURGERY  2012    Procedure:OPTICAL TRACKING SYSTEM ENDOSCOPIC SINUS SURGERY; Surgeon:LIBORIO CAZARES; [facility]     RECTAL SURGERY      ? type     REMOVE PORT VASCULAR ACCESS  2012    Procedure: REMOVE PORT VASCULAR ACCESS;  Remove Port Vascular Access ;  Surgeon: Alexsander Hannon MD;  [facility]       Social History   Substance Use Topics     Smoking status: Former Smoker     Packs/day: 0.50     Years: 35.00     Types: Cigarettes     Quit date: 10/3/1995     Smokeless tobacco: Never Used     Alcohol use Yes      Comment: rare     Family History   Problem Relation Age of Onset     Breast Cancer Mother      Arthritis Mother      CANCER Mother      HEART DISEASE Father       at 72 of heart failure     Arthritis Brother      Arthritis Sister      CANCER Other      uncle  "pancreatic/grandmother- colon/aunt ? mat or pat  breast cancer     CANCER Sister          BP Readings from Last 3 Encounters:   09/13/17 130/80   09/10/17 134/77   08/11/17 117/75    Wt Readings from Last 3 Encounters:   09/13/17 167 lb 9.6 oz (76 kg)   08/01/17 166 lb 12.8 oz (75.7 kg)   07/19/17 169 lb (76.7 kg)           Reviewed and updated as needed this visit by clinical staffTobacco  Allergies  Meds  Problems  Med Hx  Surg Hx  Fam Hx  Soc Hx        Reviewed and updated as needed this visit by Provider  Allergies  Meds  Problems         ROS:  Constitutional, HEENT, cardiovascular, pulmonary, GI, , musculoskeletal, neuro, skin, endocrine and psych systems are negative, except as otherwise noted.      OBJECTIVE:   /80  Pulse 66  Temp 98.4  F (36.9  C) (Tympanic)  Ht 5' 5\" (1.651 m)  Wt 167 lb 9.6 oz (76 kg)  SpO2 96%  BMI 27.89 kg/m2  Body mass index is 27.89 kg/(m^2).  GENERAL: healthy, alert and no distress  HENT: ear canals and TM's normal, nose and mouth without ulcers or lesions  NECK: no adenopathy, no asymmetry, masses, or scars and thyroid normal to palpation  RESP: harsh productive cough. No rales, rhonchi  CV: regular rate and rhythm, normal S1 S2, no S3 or S4, no murmur, click or rub, no peripheral edema and peripheral pulses strong  ABDOMEN: soft, nontender, no hepatosplenomegaly, no masses and bowel sounds normal  MS: no gross musculoskeletal defects noted, no edema    Diagnostic Test Results:  Results for orders placed or performed during the hospital encounter of 09/10/17   Chest XR,  PA & LAT    Narrative    CHEST TWO VIEWS  9/10/2017 12:17 PM     HISTORY: Shortness of breath.     COMPARISON: 9/13/2016.      Impression    IMPRESSION: No significant interval change. Hyperinflation both lungs.  Mild cardiac enlargement. Pulmonary vascularity is within normal  limits. Lungs clear. Aortic calcification. Degenerative changes in the  thoracolumbar spine.    MEG URIBE MD "       ASSESSMENT/PLAN:       (J20.9) Acute bronchitis, unspecified organism  (primary encounter diagnosis)  Comment:   Plan: amoxicillin (AMOXIL) 875 MG tablet            (K59.04) Chronic idiopathic constipation  Comment: history of diverticulitis, colonic polyp in 2016, tortuous colon. Last colonoscopy 3/16/16 [facility]: Diverticulosis location-sigmoid,  history of adenomatous polyp in colon-repeat colonoscopy in 5  years  Plan: GASTROENTEROLOGY ADULT REF CONSULT ONLY, CT         Abdomen Pelvis w Contrast, polyethylene glycol         (MIRALAX) powder            (K57.32) history of diverticulitis of colon without hemorrhage  Comment:   Plan: GASTROENTEROLOGY ADULT REF CONSULT ONLY, CT         Abdomen Pelvis w Contrast           (R10.84) Abdominal pain, generalized  Comment:   Plan: CT Abdomen Pelvis w Contrast              Patient Instructions   Take Miralax daily    Take the [place] 1 tablet in the evening    Schedule your appointment with [facility]    Schedule your CT scan at the [facility]      Tila Tyson, RADHA, APRN [facility]   warm

## 2022-01-12 ENCOUNTER — TELEPHONE (OUTPATIENT)
Dept: FAMILY MEDICINE | Facility: CLINIC | Age: 77
End: 2022-01-12
Payer: COMMERCIAL

## 2022-01-12 DIAGNOSIS — H04.123 DRY EYES: Primary | ICD-10-CM

## 2022-01-12 RX ORDER — CYCLOSPORINE 0.5 MG/ML
1 EMULSION OPHTHALMIC 2 TIMES DAILY
Qty: 5.5 ML | Refills: 0 | Status: ON HOLD | OUTPATIENT
Start: 2022-01-12 | End: 2022-04-17

## 2022-01-12 NOTE — TELEPHONE ENCOUNTER
Last OV for annual wellness exam on 12/01/21.  Order pended if you agree.  Lazara GUTIERREZ RN

## 2022-01-12 NOTE — TELEPHONE ENCOUNTER
Reason for Call:  Medication request.    Detailed comments:  Patient has dry eye and would like prescription for Restasis.  She had it several years ago but had not used since and would like new Rx.  [facility].      Phone Number Patient can be reached at: [phone]    Can we leave a detailed message on this number? Yes    Call taken on 1/12/2022 at 3:28 PM by Ingrid Velez

## 2022-01-25 ENCOUNTER — OFFICE VISIT (OUTPATIENT)
Dept: FAMILY MEDICINE | Facility: CLINIC | Age: 77
End: 2022-01-25
Payer: COMMERCIAL

## 2022-01-25 ENCOUNTER — ANCILLARY PROCEDURE (OUTPATIENT)
Dept: GENERAL RADIOLOGY | Facility: CLINIC | Age: 77
End: 2022-01-25
Attending: NURSE PRACTITIONER
Payer: COMMERCIAL

## 2022-01-25 VITALS
WEIGHT: 226 LBS | RESPIRATION RATE: 16 BRPM | BODY MASS INDEX: 37.61 KG/M2 | TEMPERATURE: 97.3 F | SYSTOLIC BLOOD PRESSURE: 142 MMHG | DIASTOLIC BLOOD PRESSURE: 80 MMHG | HEART RATE: 78 BPM | OXYGEN SATURATION: 94 %

## 2022-01-25 DIAGNOSIS — R60.0 BILATERAL LEG EDEMA: ICD-10-CM

## 2022-01-25 DIAGNOSIS — R06.02 SOBOE (SHORTNESS OF BREATH ON EXERTION): ICD-10-CM

## 2022-01-25 DIAGNOSIS — E66.01 MORBID OBESITY (H): ICD-10-CM

## 2022-01-25 DIAGNOSIS — K59.00 CONSTIPATION, UNSPECIFIED CONSTIPATION TYPE: ICD-10-CM

## 2022-01-25 DIAGNOSIS — J44.89 ASTHMA WITH COPD (H): ICD-10-CM

## 2022-01-25 DIAGNOSIS — R60.0 BILATERAL LEG EDEMA: Primary | ICD-10-CM

## 2022-01-25 DIAGNOSIS — I10 PRIMARY HYPERTENSION: ICD-10-CM

## 2022-01-25 DIAGNOSIS — F33.1 MAJOR DEPRESSIVE DISORDER, RECURRENT EPISODE, MODERATE (H): ICD-10-CM

## 2022-01-25 DIAGNOSIS — E03.9 HYPOTHYROIDISM, UNSPECIFIED TYPE: ICD-10-CM

## 2022-01-25 LAB
D DIMER PPP FEU-MCNC: 0.95 UG/ML FEU (ref 0–0.5)
NT-PROBNP SERPL-MCNC: 165 PG/ML (ref 0–450)
TSH SERPL DL<=0.005 MIU/L-ACNC: 2.2 MU/L (ref 0.4–4)

## 2022-01-25 PROCEDURE — 85379 FIBRIN DEGRADATION QUANT: CPT | Performed by: NURSE PRACTITIONER

## 2022-01-25 PROCEDURE — 74019 RADEX ABDOMEN 2 VIEWS: CPT | Performed by: RADIOLOGY

## 2022-01-25 PROCEDURE — 83880 ASSAY OF NATRIURETIC PEPTIDE: CPT | Performed by: NURSE PRACTITIONER

## 2022-01-25 PROCEDURE — 71046 X-RAY EXAM CHEST 2 VIEWS: CPT | Performed by: RADIOLOGY

## 2022-01-25 PROCEDURE — 84443 ASSAY THYROID STIM HORMONE: CPT | Performed by: NURSE PRACTITIONER

## 2022-01-25 PROCEDURE — 99215 OFFICE O/P EST HI 40 MIN: CPT | Performed by: NURSE PRACTITIONER

## 2022-01-25 PROCEDURE — 36415 COLL VENOUS BLD VENIPUNCTURE: CPT | Performed by: NURSE PRACTITIONER

## 2022-01-25 RX ORDER — TRIAMTERENE AND HYDROCHLOROTHIAZIDE 37.5; 25 MG/1; MG/1
1 CAPSULE ORAL DAILY
Qty: 90 CAPSULE | Refills: 1 | Status: SHIPPED | OUTPATIENT
Start: 2022-01-25 | End: 2022-02-08

## 2022-01-25 ASSESSMENT — PAIN SCALES - GENERAL: PAINLEVEL: NO PAIN (0)

## 2022-01-25 NOTE — PROGRESS NOTES
Assessment & Plan     Bilateral leg edema  - XR Abdomen 2 Views  - D dimer, quantitative    Constipation, unspecified constipation type  Symptomatic care strategies reviewed  Begin MiraLAX daily.  Begin Metamucil daily  - XR Abdomen 2 Views    SOBOE (shortness of breath on exertion)  Labs and chest x-ray today.  We will contact with results as they become available.  - D dimer, quantitative  - XR Chest 2 Views  - BNP-N terminal pro      Asthma with COPD (H)  Continue Trelegy Ellipta  Albuterol 2 puffs every 4-6 hours as needed for wheeze cough shortness of breath    Hypothyroidism, unspecified type  Follow-up labs done today  - TSH with free T4 reflex      Primary hypertension  Blood pressure elevated. Bilateral pedal edema. Previous evidence of congestive heart failure  Begin diuretic  - triamterene-HCTZ (DYAZIDE) 37.5-25 MG capsule  Dispense: 90 capsule; Refill: 1      Over 50 minutes were spent in chart review, exam, education, plan of care  Call or return to the clinic with any worsening of symptoms or no resolution. Patient/Parent verbalized understanding and is in agreement. Medication side effects reviewed.   Current Outpatient Medications   Medication Sig Dispense Refill     albuterol (PROAIR HFA/PROVENTIL HFA/VENTOLIN HFA) 108 (90 Base) MCG/ACT inhaler INHALE 2 PUFFS EVERY 6 HOURS 18 g 3     cyanocobalamin (VITAMIN B-12) 100 MCG tablet Take 100 mcg by mouth daily       cycloSPORINE (RESTASIS) 0.05 % ophthalmic emulsion Place 1 drop into both eyes 2 times daily 5.5 mL 1     fluticasone (FLONASE) 50 MCG/ACT nasal spray Spray 2 sprays into both nostrils daily 16 g 3     gabapentin (NEURONTIN) 300 MG capsule TAKE 1 CAPSULE THREE TIMES DAILY 180 capsule 1     magnesium 250 MG tablet Take 1 tablet by mouth every morning        PARoxetine (PAXIL) 20 MG tablet TAKE 1 TABLET AT BEDTIME 90 tablet 1     simvastatin (ZOCOR) 40 MG tablet TAKE 1 TABLET AT BEDTIME 90 tablet 2     traZODone (DESYREL) 100 MG tablet TAKE 1  TABLET EVERY NIGHT AS NEEDED FOR SLEEP 90 tablet 3     triamterene-HCTZ (DYAZIDE) 37.5-25 MG capsule Take 1 capsule by mouth daily 90 capsule 1     VITAMIN D, CHOLECALCIFEROL, PO Take 2,000 Units by mouth daily.         azelastine (ASTELIN) 0.1 % nasal spray Spray 2 sprays into both nostrils 2 times daily as needed for rhinitis 30 mL 3     cycloSPORINE (RESTASIS) 0.05 % ophthalmic emulsion Place 1 drop into both eyes 2 times daily 5.5 mL 0     Fluticasone-Umeclidin-Vilanterol (TRELEGY ELLIPTA) 100-62.5-25 MCG/INH oral inhaler Inhale 1 puff into the lungs daily 60 each 3     levothyroxine (SYNTHROID/LEVOTHROID) 88 MCG tablet TAKE 1 TABLET EVERY DAY 90 tablet 1     senna-docusate (SENOKOT-S/PERICOLACE) 8.6-50 MG tablet Take 1-2 tablets by mouth 2 times daily 60 tablet 0     Chart documentation with Dragon Voice recognition Software. Although reviewed after completion, some words and grammatical errors may remain.  Follow-up with primary care provider with ongoing symptoms or no resolution  NORA Becker CNP  [facility]    Km Quintero is a 76 year old who presents for the following health issues  accompanied by her sister.    HPI     Concern - EDEMA   Onset: 2 weeks   Description: bilateral  Intensity: moderate  Progression of Symptoms:  same  Accompanying Signs & Symptoms: SOBOE  Previous history of similar problem: none  Precipitating factors:        Worsened by: sitting   Alleviating factors:        Improved by: none   Therapies tried and outcome:  none   Trial of compression socks. Not helpful. Hard to get on.    Menopausal age 43   Some stooling with urination every time   Hard and formed. Pressure in the lower abdomen left > right and suprapubic area.     Left hand duputreynes contracture present.     Needs TSH repeated.   CHF parents and previous ECHO show Grade I which she states she was not aware of  History of COPD and Asthma. Non productive cough   Needs TSH   Follow ujp .     Previous history of malignant neoplasm of tonsil        Review of Systems   Constitutional, HEENT, cardiovascular, pulmonary, GI, , musculoskeletal, neuro, skin, endocrine and psych systems are negative, except as otherwise noted.      Objective    BP (!) 142/80   Pulse 78   Temp 97.3  F (36.3  C) (Tympanic)   Resp 16   Wt 102.5 kg (226 lb)   LMP  (LMP Unknown)   SpO2 94%   BMI 37.61 kg/m    Body mass index is 37.61 kg/m .  Physical Exam   GENERAL: healthy, alert and no distress pale obese female  EYES: Eyes grossly normal to inspection, PERRL and conjunctivae and sclerae normal  HENT: ear canals and TM's normal, nose and mouth without ulcers or lesions  NECK: no adenopathy, no asymmetry, masses, or scars and thyroid normal to palpation  RESP: prolonged expiratory phase no crackles wheeze or rhonchi present  CV: regular rates and rhythm, normal S1 S2, no S3 or S4, peripheral pulses strong, 2+ bilateral lower extremity pitting edema legs   and no JVD  ABDOMEN: soft, mild LLQ and suprapubic tender, no hepatosplenomegaly, no masses and bowel sounds normal  MS: no gross musculoskeletal defects noted,  SKIN: no suspicious lesions or rashes  NEURO: Normal strength and tone, mentation intact and speech normal  PSYCH: mentation appears normal, affect normal/bright    Office Visit on 11/19/2021   Component Date Value Ref Range Status     Immunoglobulin E 11/19/2021 4  0 - 114 kU/L Final     [place]/Firebush IgE 11/19/2021 <0.10  <0.10 KU(A)/L Final    Interpretation: None Detected     Weed Nettle IgE 11/19/2021 <0.10  <0.10 KU(A)/L Final    Interpretation: None Detected     Russian Thistle IgE 11/19/2021 <0.10  <0.10 KU(A)/L Final    Interpretation: None Detected     Sheep [place] IgE 11/19/2021 <0.10  <0.10 KU(A)/L Final    Interpretation: None Detected     Sagebrush [place] IgE 11/19/2021 <0.10  <0.10 KU(A)/L Final    Interpretation: None Detected     Ragweed Short IgE 11/19/2021 <0.10  <0.10  KU(A)/L Final    Interpretation: None Detected     Mugwort IgE 11/19/2021 <0.10  <0.10 KU(A)/L Final    Interpretation: None Detected     Lamb's Quarter's IgE 11/19/2021 <0.10  <0.10 KU(A)/L Final    Interpretation: None Detected     Giant Ragweed IgE 11/19/2021 <0.10  <0.10 KU(A)/L Final    Interpretation: None Detected     English Plantain IgE 11/19/2021 <0.10  <0.10 KU(A)/L Final    Interpretation: None Detected     Worth IgE 11/19/2021 <0.10  <0.10 KU(A)/L Final    Interpretation: None Detected     [place] Tree IgE 11/19/2021 <0.10  <0.10 KU(A)/L Final    Interpretation: None Detected     White [place] IgE 11/19/2021 <0.10  <0.10 KU(A)/L Final    Interpretation: None Detected     Silver Birch IgE 11/19/2021 <0.10  <0.10 KU(A)/L Final    Interpretation: None Detected     Red [place] IgE 11/19/2021 <0.10  <0.10 KU(A)/L Final    Interpretation: None Detected     [place] (White) IgE 11/19/2021 <0.10  <0.10 KU(A)/L Final    Interpretation: None Detected     Maple Tree IgE 11/19/2021 <0.10  <0.10 KU(A)/L Final    Interpretation: None Detected     Elm Tree IgE 11/19/2021 <0.10  <0.10 KU(A)/L Final    Interpretation: None Detected     [place] IgE 11/19/2021 <0.10  <0.10 KU(A)/L Final    Interpretation: None Detected     [place], Tree IgE 11/19/2021 <0.10  <0.10 KU(A)/L Final    Interpretation: None Detected     White [place], Tree IgE 11/19/2021 <0.10  <0.10 KU(A)/L Final    Interpretation: None Detected     Penicillium notatum IgE 11/19/2021 <0.10  <0.10 KU(A)/L Final    Interpretation: None Detected     Epicoccum purpurascens IgE 11/19/2021 <0.10  <0.10 KU(A)/L Final    Interpretation: None Detected     Cladosporium herbarum IgE 11/19/2021 <0.10  <0.10 KU(A)/L Final    Interpretation: None Detected     Aspergillis fumigatus IgE 11/19/2021 <0.10  <0.10 KU(A)/L Final    Interpretation: None Detected     Alternaria alternata, Mold IgE 11/19/2021 <0.10  <0.10 KU(A)/L Final    Interpretation: None Detected      Dermatophagoide pteronyssinus IgE 11/19/2021 <0.10  <0.10 KU(A)/L Final    Interpretation: None Detected     Dermatophagoides farinae IgE 11/19/2021 <0.10  <0.10 KU(A)/L Final    Interpretation: None Detected     [place] Grass IgE 11/19/2021 <0.10  <0.10 KU(A)/L Final    Interpretation: None Detected     [place] (Orchard Grass) IgE 11/19/2021 <0.10  <0.10 KU(A)/L Final    Interpretation: None Detected     [place]ley Grass IgE 11/19/2021 <0.10  <0.10 KU(A)/L Final    Interpretation: None Detected     Dog Dander IgE 11/19/2021 <0.10  <0.10 KU(A)/L Final    Interpretation: None Detected     Cat Dander IgE 11/19/2021 <0.10  <0.10 KU(A)/L Final    Interpretation: None Detected     WBC Count 11/19/2021 4.8  4.0 - 11.0 10e3/uL Final     RBC Count 11/19/2021 4.51  3.80 - 5.20 10e6/uL Final     Hemoglobin 11/19/2021 13.5  11.7 - 15.7 g/dL Final     Hematocrit 11/19/2021 43.4  35.0 - 47.0 % Final     MCV 11/19/2021 96  78 - 100 fL Final     MCH 11/19/2021 29.9  26.5 - 33.0 pg Final     MCHC 11/19/2021 31.1* 31.5 - 36.5 g/dL Final     RDW 11/19/2021 14.2  10.0 - 15.0 % Final     Platelet Count 11/19/2021 203  150 - 450 10e3/uL Final     % Neutrophils 11/19/2021 64  % Final     % Lymphocytes 11/19/2021 20  % Final     % Monocytes 11/19/2021 11  % Final     % Eosinophils 11/19/2021 5  % Final     % Basophils 11/19/2021 1  % Final     Absolute Neutrophils 11/19/2021 3.1  1.6 - 8.3 10e3/uL Final     Absolute Lymphocytes 11/19/2021 1.0  0.8 - 5.3 10e3/uL Final     Absolute Monocytes 11/19/2021 0.5  0.0 - 1.3 10e3/uL Final     Absolute Eosinophils 11/19/2021 0.3  0.0 - 0.7 10e3/uL Final     Absolute Basophils 11/19/2021 0.0  0.0 - 0.2 10e3/uL Final

## 2022-01-25 NOTE — PATIENT INSTRUCTIONS
Patient Education     Coping with Edema  What is edema?  Edema is the build-up of fluid in the body, which causes swelling. Swelling most commonly occurs in the feet, ankles, lower legs or hands.  Swelling can occur in the belly or chest may be a sign of a more severe problem.  Certain medicines or conditions can make the swelling worse.  Symptoms include:    Feet and lower legs get larger when you sit or walk.    Hands feel tight when you make a fist.    When you push on the skin, skin stays dented.    Shiny, tight skin.    Fast weight gain.  How is it treated?  Your care team may give you a medicine to reduce the swelling.  They may also suggest that you meet with a dietitian. He or she can help with food choices to reduce the swelling.  What can I do about the swelling?    Place your feet above your heart 3 times a day: Sit with your feet up on a stool with a pillow. Sit on the bed or couch with two pillows under your feet.    Do not stand for long periods of time.    Wear loose-fitting clothes.    Do not cross your legs.    Reduce the salt in your diet. These foods are high in salt:  ? Chips, soup  ? Frozen meals, TV dinners  ? [place], lunch meat, ham  ? Sauces (soy, canned spaghetti sauce)    Walk or do other exercise.    Wear compression stockings.    Drink water as normal.    Weigh yourself every day at the same time to keep track of weight gain.  When should I call my care team?  Call your care team if:    You have a hard time breathing.    You gained 5 pounds or more in 1 week.    Your hands or feet feel cold when you touch them.    You are peeing very little or not at all.    Swelling is moving up your arms or legs.    Your tongue is swelling.    You cannot eat for more than a day  If you have any side effects, call us. We can help you manage these problems.  For more information,  see:  www.chemocare.com  www.cancer.org/treatment/treatmentsandsideeffects/physicalsideeffects/dealingwithsymptomsathome  www.cancer.gov/cancertopics/coping/chemotherapy-and-you  Comments:  __________________________________________  __________________________________________  __________________________________________  __________________________________________  __________________________________________  __________________________________________  __________________________________________  For informational purposes only. Not to replace the advice of your health care provider.  Copyright   2014 [employer]. All rights reserved. [employer] 247503 - REV 03/16.

## 2022-01-26 DIAGNOSIS — E03.9 HYPOTHYROIDISM, UNSPECIFIED TYPE: ICD-10-CM

## 2022-01-26 DIAGNOSIS — R06.02 SOBOE (SHORTNESS OF BREATH ON EXERTION): ICD-10-CM

## 2022-01-26 DIAGNOSIS — R79.89 ELEVATED D-DIMER: Primary | ICD-10-CM

## 2022-01-26 DIAGNOSIS — M79.89 SWELLING OF LOWER EXTREMITY: ICD-10-CM

## 2022-01-26 RX ORDER — LEVOTHYROXINE SODIUM 88 UG/1
TABLET ORAL
Qty: 90 TABLET | Refills: 1 | Status: SHIPPED | OUTPATIENT
Start: 2022-01-26 | End: 2022-02-08 | Stop reason: DRUGHIGH

## 2022-02-03 ENCOUNTER — HOSPITAL ENCOUNTER (OUTPATIENT)
Dept: CT IMAGING | Facility: CLINIC | Age: 77
End: 2022-02-03
Attending: NURSE PRACTITIONER
Payer: MEDICARE

## 2022-02-03 ENCOUNTER — HOSPITAL ENCOUNTER (OUTPATIENT)
Dept: ULTRASOUND IMAGING | Facility: CLINIC | Age: 77
End: 2022-02-03
Attending: NURSE PRACTITIONER
Payer: MEDICARE

## 2022-02-03 DIAGNOSIS — R06.02 SOBOE (SHORTNESS OF BREATH ON EXERTION): ICD-10-CM

## 2022-02-03 DIAGNOSIS — I10 ESSENTIAL HYPERTENSION: ICD-10-CM

## 2022-02-03 DIAGNOSIS — M79.89 SWELLING OF LOWER EXTREMITY: ICD-10-CM

## 2022-02-03 DIAGNOSIS — E78.5 HYPERLIPIDEMIA LDL GOAL <130: ICD-10-CM

## 2022-02-03 DIAGNOSIS — I51.7 CARDIOMEGALY: ICD-10-CM

## 2022-02-03 DIAGNOSIS — R79.89 ELEVATED D-DIMER: ICD-10-CM

## 2022-02-03 DIAGNOSIS — E66.01 MORBID OBESITY (H): ICD-10-CM

## 2022-02-03 DIAGNOSIS — I25.10 CORONARY ARTERY DISEASE INVOLVING NATIVE CORONARY ARTERY OF NATIVE HEART WITHOUT ANGINA PECTORIS: ICD-10-CM

## 2022-02-03 DIAGNOSIS — I31.39 PERICARDIAL EFFUSION: ICD-10-CM

## 2022-02-03 DIAGNOSIS — R06.02 SOBOE (SHORTNESS OF BREATH ON EXERTION): Primary | ICD-10-CM

## 2022-02-03 LAB
CREAT BLD-MCNC: 0.8 MG/DL (ref 0.5–1)
GFR SERPL CREATININE-BSD FRML MDRD: >60 ML/MIN/1.73M2

## 2022-02-03 PROCEDURE — 93970 EXTREMITY STUDY: CPT

## 2022-02-03 PROCEDURE — 82565 ASSAY OF CREATININE: CPT

## 2022-02-03 PROCEDURE — 250N000009 HC RX 250: Performed by: NURSE PRACTITIONER

## 2022-02-03 PROCEDURE — 250N000011 HC RX IP 250 OP 636: Performed by: NURSE PRACTITIONER

## 2022-02-03 PROCEDURE — 71275 CT ANGIOGRAPHY CHEST: CPT

## 2022-02-03 RX ORDER — IOPAMIDOL 755 MG/ML
92 INJECTION, SOLUTION INTRAVASCULAR ONCE
Status: COMPLETED | OUTPATIENT
Start: 2022-02-03 | End: 2022-02-03

## 2022-02-03 RX ADMIN — IOPAMIDOL 92 ML: 755 INJECTION, SOLUTION INTRAVENOUS at 13:18

## 2022-02-03 RX ADMIN — SODIUM CHLORIDE 100 ML: 9 INJECTION, SOLUTION INTRAVENOUS at 13:18

## 2022-02-04 ENCOUNTER — TELEPHONE (OUTPATIENT)
Dept: FAMILY MEDICINE | Facility: CLINIC | Age: 77
End: 2022-02-04
Payer: COMMERCIAL

## 2022-02-04 ENCOUNTER — MYC MEDICAL ADVICE (OUTPATIENT)
Dept: FAMILY MEDICINE | Facility: CLINIC | Age: 77
End: 2022-02-04
Payer: COMMERCIAL

## 2022-02-04 NOTE — TELEPHONE ENCOUNTER
Received call from patient.    We reviewed referral. She is given a contact number.  Questions answered

## 2022-02-08 ENCOUNTER — OFFICE VISIT (OUTPATIENT)
Dept: FAMILY MEDICINE | Facility: CLINIC | Age: 77
End: 2022-02-08
Payer: COMMERCIAL

## 2022-02-08 VITALS
OXYGEN SATURATION: 93 % | HEART RATE: 85 BPM | TEMPERATURE: 97.3 F | WEIGHT: 228 LBS | DIASTOLIC BLOOD PRESSURE: 82 MMHG | BODY MASS INDEX: 37.94 KG/M2 | RESPIRATION RATE: 14 BRPM | SYSTOLIC BLOOD PRESSURE: 138 MMHG

## 2022-02-08 DIAGNOSIS — E78.5 HYPERLIPIDEMIA LDL GOAL <130: ICD-10-CM

## 2022-02-08 DIAGNOSIS — I25.10 CORONARY ARTERY DISEASE INVOLVING NATIVE CORONARY ARTERY OF NATIVE HEART WITHOUT ANGINA PECTORIS: ICD-10-CM

## 2022-02-08 DIAGNOSIS — R60.0 PEDAL EDEMA: ICD-10-CM

## 2022-02-08 DIAGNOSIS — E03.9 HYPOTHYROIDISM, UNSPECIFIED TYPE: ICD-10-CM

## 2022-02-08 DIAGNOSIS — I51.7 CARDIOMEGALY: Primary | ICD-10-CM

## 2022-02-08 PROCEDURE — 99214 OFFICE O/P EST MOD 30 MIN: CPT | Performed by: NURSE PRACTITIONER

## 2022-02-08 RX ORDER — LEVOTHYROXINE SODIUM 100 UG/1
100 TABLET ORAL DAILY
Qty: 90 TABLET | Refills: 0 | Status: SHIPPED | OUTPATIENT
Start: 2022-02-08 | End: 2022-04-17

## 2022-02-08 RX ORDER — LISINOPRIL 5 MG/1
5 TABLET ORAL DAILY
Qty: 90 TABLET | Refills: 0 | Status: SHIPPED | OUTPATIENT
Start: 2022-02-08 | End: 2022-04-17

## 2022-02-08 RX ORDER — FUROSEMIDE 20 MG
20 TABLET ORAL DAILY
Qty: 90 TABLET | Refills: 0 | Status: SHIPPED | OUTPATIENT
Start: 2022-02-08 | End: 2022-04-17

## 2022-02-08 RX ORDER — METOPROLOL SUCCINATE 25 MG/1
25 TABLET, EXTENDED RELEASE ORAL DAILY
Qty: 90 TABLET | Refills: 1 | Status: SHIPPED | OUTPATIENT
Start: 2022-02-08 | End: 2022-04-17

## 2022-02-08 ASSESSMENT — ANXIETY QUESTIONNAIRES
1. FEELING NERVOUS, ANXIOUS, OR ON EDGE: SEVERAL DAYS
5. BEING SO RESTLESS THAT IT IS HARD TO SIT STILL: NOT AT ALL
7. FEELING AFRAID AS IF SOMETHING AWFUL MIGHT HAPPEN: NOT AT ALL
3. WORRYING TOO MUCH ABOUT DIFFERENT THINGS: NOT AT ALL
GAD7 TOTAL SCORE: 2
2. NOT BEING ABLE TO STOP OR CONTROL WORRYING: SEVERAL DAYS
6. BECOMING EASILY ANNOYED OR IRRITABLE: NOT AT ALL
GAD7 TOTAL SCORE: 2
GAD7 TOTAL SCORE: 2
4. TROUBLE RELAXING: NOT AT ALL
7. FEELING AFRAID AS IF SOMETHING AWFUL MIGHT HAPPEN: NOT AT ALL

## 2022-02-08 ASSESSMENT — PAIN SCALES - GENERAL: PAINLEVEL: MILD PAIN (2)

## 2022-02-08 ASSESSMENT — PATIENT HEALTH QUESTIONNAIRE - PHQ9
SUM OF ALL RESPONSES TO PHQ QUESTIONS 1-9: 1
SUM OF ALL RESPONSES TO PHQ QUESTIONS 1-9: 1
10. IF YOU CHECKED OFF ANY PROBLEMS, HOW DIFFICULT HAVE THESE PROBLEMS MADE IT FOR YOU TO DO YOUR WORK, TAKE CARE OF THINGS AT HOME, OR GET ALONG WITH OTHER PEOPLE: NOT DIFFICULT AT ALL

## 2022-02-08 NOTE — PROGRESS NOTES
Assessment & Plan     Cardiomegaly  Begin new medication  - metoprolol succinate ER (TOPROL-XL) 25 MG 24 hr tablet  Dispense: 90 tablet; Refill: 1  - lisinopril (ZESTRIL) 5 MG tablet  Dispense: 90 tablet; Refill: 0  - furosemide (LASIX) 20 MG tablet  Dispense: 90 tablet; Refill: 0  - **Basic metabolic panel FUTURE 14d    Coronary artery disease involving native coronary artery of native heart without angina pectoris  See cardiology as planned.  Angiogram if needed  - metoprolol succinate ER (TOPROL-XL) 25 MG 24 hr tablet  Dispense: 90 tablet; Refill: 1  - lisinopril (ZESTRIL) 5 MG tablet  Dispense: 90 tablet; Refill: 0    Hyperlipidemia LDL goal <130  Continue simvastatin.  Consider rosuvastatin/atorvastatin    Hypothyroidism, unspecified type  TSH   Date Value Ref Range Status   01/25/2022 2.20 0.40 - 4.00 mU/L Final   10/12/2021 2.91 0.40 - 4.00 mU/L Final   03/18/2021 5.04 (H) 0.40 - 4.00 mU/L Final   08/06/2019 2.56 0.40 - 4.00 mU/L Final   03/19/2019 2.76 0.40 - 4.00 mU/L Final   10/01/2018 2.95 0.40 - 4.00 mU/L Final   01/19/2018 4.58 (H) 0.40 - 4.00 mU/L Final     Continue levothyroxine 100 mcg daily  - levothyroxine (SYNTHROID/LEVOTHROID) 100 MCG tablet  Dispense: 90 tablet; Refill: 0    Pedal edema  Stop the triamterene hydrochlorothiazide-ineffective begin  - furosemide (LASIX) 20 MG tablet  Dispense: 90 tablet; Refill: 0  Weight blood pressure BMP recheck 2 weeks  - **Basic metabolic panel FUTURE 14d      Call or return to the clinic with any worsening of symptoms or no resolution. Patient/Parent verbalized understanding and is in agreement. Medication side effects reviewed.   Current Outpatient Medications   Medication Sig Dispense Refill     albuterol (PROAIR HFA/PROVENTIL HFA/VENTOLIN HFA) 108 (90 Base) MCG/ACT inhaler INHALE 2 PUFFS EVERY 6 HOURS 18 g 3     azelastine (ASTELIN) 0.1 % nasal spray Spray 2 sprays into both nostrils 2 times daily as needed for rhinitis 30 mL 3     cyanocobalamin  (VITAMIN B-12) 100 MCG tablet Take 100 mcg by mouth daily       cycloSPORINE (RESTASIS) 0.05 % ophthalmic emulsion Place 1 drop into both eyes 2 times daily 5.5 mL 0     cycloSPORINE (RESTASIS) 0.05 % ophthalmic emulsion Place 1 drop into both eyes 2 times daily 5.5 mL 1     fluticasone (FLONASE) 50 MCG/ACT nasal spray Spray 2 sprays into both nostrils daily 16 g 3     Fluticasone-Umeclidin-Vilanterol (TRELEGY ELLIPTA) 100-62.5-25 MCG/INH oral inhaler Inhale 1 puff into the lungs daily 60 each 3     furosemide (LASIX) 20 MG tablet Take 1 tablet (20 mg) by mouth daily 90 tablet 0     gabapentin (NEURONTIN) 300 MG capsule TAKE 1 CAPSULE THREE TIMES DAILY 180 capsule 1     levothyroxine (SYNTHROID/LEVOTHROID) 100 MCG tablet Take 1 tablet (100 mcg) by mouth daily 90 tablet 0     lisinopril (ZESTRIL) 5 MG tablet Take 1 tablet (5 mg) by mouth daily 90 tablet 0     magnesium 250 MG tablet Take 1 tablet by mouth every morning        metoprolol succinate ER (TOPROL-XL) 25 MG 24 hr tablet Take 1 tablet (25 mg) by mouth daily 90 tablet 1     PARoxetine (PAXIL) 20 MG tablet TAKE 1 TABLET AT BEDTIME 90 tablet 1     senna-docusate (SENOKOT-S/PERICOLACE) 8.6-50 MG tablet Take 1-2 tablets by mouth 2 times daily 60 tablet 0     simvastatin (ZOCOR) 40 MG tablet TAKE 1 TABLET AT BEDTIME 90 tablet 2     traZODone (DESYREL) 100 MG tablet TAKE 1 TABLET EVERY NIGHT AS NEEDED FOR SLEEP 90 tablet 3     VITAMIN D, CHOLECALCIFEROL, PO Take 2,000 Units by mouth daily.         Chart documentation with Dragon Voice recognition Software. Although reviewed after completion, some words and grammatical errors may remain.    NORA Becker CNP  [facility]    Km Quintero is a 76 year old who presents for the following health issues  accompanied by her sister.    HPI     FOLLOW UP EDEMA   CT RESULTS     US LOWER EXTREMITY VENOUS DUPLEX BILATERAL 2/3/2022 1:59 PM     HISTORY: Bilateral leg  pain.     TECHNIQUE: Imaged deep venous structures of each lower extremity  include the common femoral veins, superficial femoral veins, popliteal  veins, and visualized posterior deep calf veins.  Color flow and  spectral Doppler with waveform analysis performed.     COMPARISON: None.     FINDINGS: No DVT is demonstrated in either lower extremity.                                                                      IMPRESSION: Negative.     MARY JANE BUTLER MD     Shortness of breath with exertion persists  Pedal edema no improvement with previous diuretic  No chest pain  No cough  CT results reviewed  No problems with urination or stooling  No abdominal pain or discomfort      Review of Systems   Constitutional, HEENT, cardiovascular, pulmonary, GI, , musculoskeletal, neuro, skin, endocrine and psych systems are negative, except as otherwise noted.      Objective    /82   Pulse 85   Temp 97.3  F (36.3  C) (Tympanic)   Resp 14   Wt 103.4 kg (228 lb)   LMP  (LMP Unknown)   SpO2 93%   BMI 37.94 kg/m    Body mass index is 37.94 kg/m .  Physical Exam   Wt Readings from Last 5 Encounters:   02/08/22 103.4 kg (228 lb)   01/25/22 102.5 kg (226 lb)   12/01/21 101.2 kg (223 lb)   11/19/21 97.3 kg (214 lb 8.1 oz)   10/12/21 98 kg (216 lb)       GENERAL: alert, no distress, pale and fatigued  EYES: Eyes grossly normal to inspection, PERRL and conjunctivae and sclerae normal  HENT: ear canals and TM's normal, nose and mouth without ulcers or lesions  NECK: no adenopathy, no asymmetry, masses, or scars and thyroid normal to palpation  RESP: decreased breath sounds bases bilaterally  CV: S1-S2.  1+ pitting edema to the lower extremities no JVD  ABDOMEN: soft, nontender, no hepatosplenomegaly, no masses and bowel sounds normal  MS: no gross musculoskeletal defects noted, no edema  SKIN: no suspicious lesions or rashes  NEURO: Normal strength and tone, mentation intact and speech normal  PSYCH: mentation appears  normal, affect normal/[facility] Outpatient Visit on 02/03/2022   Component Date Value Ref Range Status     Creatinine POCT 02/03/2022 0.8  0.5 - 1.0 mg/dL Final     GFR, ESTIMATED POCT 02/03/2022 >60  >60 mL/min/1.73m2 Final               Answers for HPI/ROS submitted by the patient on 2/8/2022  If you checked off any problems, how difficult have these problems made it for you to do your work, take care of things at home, or get along with other people?: Not difficult at all  PHQ9 TOTAL SCORE: 1  JAIME 7 TOTAL SCORE: 2

## 2022-02-09 ASSESSMENT — ANXIETY QUESTIONNAIRES: GAD7 TOTAL SCORE: 2

## 2022-02-23 ENCOUNTER — LAB (OUTPATIENT)
Dept: LAB | Facility: CLINIC | Age: 77
End: 2022-02-23
Payer: COMMERCIAL

## 2022-02-23 DIAGNOSIS — R60.0 PEDAL EDEMA: ICD-10-CM

## 2022-02-23 DIAGNOSIS — I51.7 CARDIOMEGALY: ICD-10-CM

## 2022-02-23 LAB
ANION GAP SERPL CALCULATED.3IONS-SCNC: 7 MMOL/L (ref 3–14)
BUN SERPL-MCNC: 38 MG/DL (ref 7–30)
CALCIUM SERPL-MCNC: 9.3 MG/DL (ref 8.5–10.1)
CHLORIDE BLD-SCNC: 106 MMOL/L (ref 94–109)
CO2 SERPL-SCNC: 29 MMOL/L (ref 20–32)
CREAT SERPL-MCNC: 0.9 MG/DL (ref 0.52–1.04)
GFR SERPL CREATININE-BSD FRML MDRD: 65 ML/MIN/1.73M2
GLUCOSE BLD-MCNC: 90 MG/DL (ref 70–99)
POTASSIUM BLD-SCNC: 4.9 MMOL/L (ref 3.4–5.3)
SODIUM SERPL-SCNC: 142 MMOL/L (ref 133–144)

## 2022-02-23 PROCEDURE — 36415 COLL VENOUS BLD VENIPUNCTURE: CPT

## 2022-02-23 PROCEDURE — 80048 BASIC METABOLIC PNL TOTAL CA: CPT

## 2022-03-23 ENCOUNTER — OFFICE VISIT (OUTPATIENT)
Dept: CARDIOLOGY | Facility: CLINIC | Age: 77
End: 2022-03-23
Attending: NURSE PRACTITIONER
Payer: COMMERCIAL

## 2022-03-23 VITALS
WEIGHT: 226.2 LBS | HEART RATE: 68 BPM | OXYGEN SATURATION: 93 % | BODY MASS INDEX: 37.64 KG/M2 | SYSTOLIC BLOOD PRESSURE: 118 MMHG | TEMPERATURE: 97.4 F | DIASTOLIC BLOOD PRESSURE: 73 MMHG

## 2022-03-23 DIAGNOSIS — R06.09 DOE (DYSPNEA ON EXERTION): ICD-10-CM

## 2022-03-23 DIAGNOSIS — I31.39 PERICARDIAL EFFUSION: ICD-10-CM

## 2022-03-23 DIAGNOSIS — I25.10 CORONARY ARTERY DISEASE INVOLVING NATIVE CORONARY ARTERY OF NATIVE HEART WITHOUT ANGINA PECTORIS: ICD-10-CM

## 2022-03-23 DIAGNOSIS — I10 ESSENTIAL HYPERTENSION: ICD-10-CM

## 2022-03-23 DIAGNOSIS — I10 HTN (HYPERTENSION): Primary | ICD-10-CM

## 2022-03-23 DIAGNOSIS — Z11.59 ENCOUNTER FOR SCREENING FOR OTHER VIRAL DISEASES: Primary | ICD-10-CM

## 2022-03-23 DIAGNOSIS — R06.02 SOBOE (SHORTNESS OF BREATH ON EXERTION): ICD-10-CM

## 2022-03-23 DIAGNOSIS — E66.01 MORBID OBESITY (H): ICD-10-CM

## 2022-03-23 DIAGNOSIS — E78.5 HYPERLIPIDEMIA LDL GOAL <130: ICD-10-CM

## 2022-03-23 DIAGNOSIS — I51.7 CARDIOMEGALY: ICD-10-CM

## 2022-03-23 PROCEDURE — 99205 OFFICE O/P NEW HI 60 MIN: CPT | Performed by: INTERNAL MEDICINE

## 2022-03-23 NOTE — LETTER
3/23/2022    Jose Manuel Sutton MD  [address] [place] [id number]    RE: Ingrid REBEKAH Powell       Dear Colleague,     I had the pleasure of seeing Ingrid Powell in the [facility].  HPI and Plan:   Today I had the pleasure of seeing Ingrid Powell at [facility]. 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  She is accompanied by her sister and her daughter.    The patient presented to her primary care physician in 01/2020 for complaints of lower extremity edema and shortness of breath.  Comprehensive work-up was performed and was mostly negative.  On CT scan she was noted to have significant coronary calcification, moderate pericardial effusion and cardiomegaly.  No evidence of pulmonary embolism was found.  She was then started on hydrochlorothiazide-triamterene to which she did not have good response.  She was later switched to 20 mg of Lasix and metoprolol and lisinopril were added. Blood work performed 2 weeks after initiation of Lasix showed significant rise in BUN and stable creatinine. Echocardiogram performed in 03/2091 showed low normal BiV function with LV ejection fraction of 50%, small circumferential pericardial effusion and no significant valvular disease.     Today, the patient tells me that she has been extremely short of breath for the last 4 to 6 months and this has gotten especially worse over the last month or so.  She is now unable to walk more than 50 feet without having to stop to catch her breath.  This was noticed by her family members today when she had to stop multiple times while walking from the cafeteria to the clinic [100-150 feet].  She has noticed significant improvement in the lower  extremity edema which is completely resolved since initiation of Lasix but even with this her shortness of breath has no change in perhaps even gotten worse.  She denies chest pain or chest pressure but also notes that she has not been able to walk long enough to experience that.  She also denies palpitations, orthopnea, PND, presyncope or syncope.    Assessment and plan:   1.  Shortness of breath with mild exertion  2.  Severe coronary calcification on CT chest, negative stress test in 04/2021  3.  History of pericardial effusion  4.  Chronic left bundle branch block  5.  Mild COPD  6.  Hypertension  7.  Hyperlipidemia  8.  Obstructive sleep apnea on CPAP  9.  Raynaud's disease    Due to history of pericardial effusion noted in 2021 I was worried about her symptoms being secondary to worsening of pericardial effusion and hence I had her walk with me to the echo lab where I obtained some bedside images.  I only noticed very tiny, not hemodynamically significant, pericardial effusion.  IVC was small and collapsing spontaneously suggestive of lower right atrial pressure.  We did not assess the wall motion in great detail but overall LV and RV function appeared normal.  She did have significant posterior mitral annular calcification. WE will further assess this with a formal echo.  Review of the CT scan of the chest and it shows severe coronary calcification in distribution of all 3 arteries.  Because of abnormal labs and evidence of hypovolemia, I have told her to stop Lasix for a couple of days and thereafter to take it on as-needed basis.  I will repeat a formal TTE to assess LV RV function and rule out valvular disease.  I also think it is important to definitively rule out obstructive coronary disease as a reason for her symptoms and hence I will schedule her to undergo left heart catheterization. I will also add right heart catheterization to accurately assess her pulmonary pressures.   Discussed the risks and  benefits of both these procedures and obtained verbal consent. I believe she is a good candidate for DAPT if need be. If all this is negative, I suggested that she gets another PFT.  FEV1 increased from 79% to 85% after administration of albuterol during PFTs in 2013 and this is that she may have an underlying lung issue.    thank you for allowing me to participate in the care of Ingrid Powell    This note was completed in part using Dragon voice recognition software. Although reviewed after completion, some word and grammatical errors may occur.    Nathan Michaels MD  Cardiology    Orders Placed This Encounter   Procedures     Echocardiogram Complete       No orders of the defined types were placed in this encounter.      There are no discontinued medications.    Encounter Diagnoses   Name Primary?     SOBOE (shortness of breath on exertion)      Cardiomegaly      Pericardial effusion      Coronary artery disease involving native coronary artery of native heart without angina pectoris      Hyperlipidemia LDL goal <130      Morbid obesity (H)      Essential hypertension        CURRENT MEDICATIONS:  Current Outpatient Medications   Medication Sig Dispense Refill     albuterol (PROAIR HFA/PROVENTIL HFA/VENTOLIN HFA) 108 (90 Base) MCG/ACT inhaler INHALE 2 PUFFS EVERY 6 HOURS 18 g 3     azelastine (ASTELIN) 0.1 % nasal spray Spray 2 sprays into both nostrils 2 times daily as needed for rhinitis 30 mL 3     cyanocobalamin (VITAMIN B-12) 100 MCG tablet Take 100 mcg by mouth daily       cycloSPORINE (RESTASIS) 0.05 % ophthalmic emulsion Place 1 drop into both eyes 2 times daily 5.5 mL 1     fluticasone (FLONASE) 50 MCG/ACT nasal spray Spray 2 sprays into both nostrils daily 16 g 3     Fluticasone-Umeclidin-Vilanterol (TRELEGY ELLIPTA) 100-62.5-25 MCG/INH oral inhaler Inhale 1 puff into the lungs daily 60 each 3     furosemide (LASIX) 20 MG tablet Take 1 tablet (20 mg) by mouth daily 90 tablet 0     gabapentin  (NEURONTIN) 300 MG capsule TAKE 1 CAPSULE THREE TIMES DAILY 180 capsule 1     levothyroxine (SYNTHROID/LEVOTHROID) 100 MCG tablet Take 1 tablet (100 mcg) by mouth daily 90 tablet 0     lisinopril (ZESTRIL) 5 MG tablet Take 1 tablet (5 mg) by mouth daily 90 tablet 0     magnesium 250 MG tablet Take 1 tablet by mouth every morning        metoprolol succinate ER (TOPROL-XL) 25 MG 24 hr tablet Take 1 tablet (25 mg) by mouth daily 90 tablet 1     PARoxetine (PAXIL) 20 MG tablet TAKE 1 TABLET AT BEDTIME 90 tablet 1     senna-docusate (SENOKOT-S/PERICOLACE) 8.6-50 MG tablet Take 1-2 tablets by mouth 2 times daily 60 tablet 0     simvastatin (ZOCOR) 40 MG tablet TAKE 1 TABLET AT BEDTIME 90 tablet 2     traZODone (DESYREL) 100 MG tablet TAKE 1 TABLET EVERY NIGHT AS NEEDED FOR SLEEP 90 tablet 3     VITAMIN D, CHOLECALCIFEROL, PO Take 2,000 Units by mouth daily.         cycloSPORINE (RESTASIS) 0.05 % ophthalmic emulsion Place 1 drop into both eyes 2 times daily (Patient not taking: Reported on 3/23/2022) 5.5 mL 0       ALLERGIES     Allergies   Allergen Reactions     Dilaudid [Hydromorphone Hcl] Other (See Comments)     hallucinations     Fosamax [Alendronate Sodium] Rash            Norvasc [Amlodipine Besylate] Hives and Rash     Tegretol [Carbamazepine] Hives and Rash       PAST MEDICAL HISTORY:  Past Medical History:   Diagnosis Date     Arthritis      COPD (chronic obstructive pulmonary disease) (H)      Depressive disorder      Gastroesophageal reflux disease      History of lumbar surgery 7/28/2015     Hoarseness      Hypertension      Oxygen dependent     at night     Reduced vision      Sleep apnea        PAST SURGICAL HISTORY:  Past Surgical History:   Procedure Laterality Date     BACK SURGERY       CARPAL TUNNEL RELEASE RT/LT      ?side     CHOLECYSTECTOMY       COLONOSCOPY N/A 4/8/2021    Procedure: COLONOSCOPY;  Surgeon: Walter Liang MD;  Location: [facility]     EYE SURGERY      cataracts     HERNIA REPAIR        INCISION AND DRAINAGE TRUNK, COMBINED  2012    Procedure:COMBINED INCISION AND DRAINAGE TRUNK; Incision and Drainage Abdominal Wall Abscess ; Surgeon:ALEXSANDER HANNON; Location:[facility]     INSERT PORT VASCULAR ACCESS  2012    Procedure:INSERT PORT VASCULAR ACCESS; Surgeon:MARY JANE GUAN; Location[facility]     JOINT REPLACEMTN, KNEE RT/LT      bilateral     KNEE SURGERY      left- total     KNEE SURGERY      right- total     LAPAROSCOPIC APPENDECTOMY N/A 10/10/2015    Procedure: LAPAROSCOPIC APPENDECTOMY;  Surgeon: Daniel Chamberlain MD;  Location: [facility]     LARYNGOSCOPY, ESOPHAGOSCOPY,  BIOPSY, COMBINED  2012    Procedure:COMBINED LARYNGOSCOPY, ESOPHAGOSCOPY,  BIOPSY; Direct Laryngoscopy, Esophagoscopy with Biopsy, Stealth Assisted Left Frontal Sinus Biopsy via Vidal Incision, 8 [place] Power Port in right subclavian & Percutaneous Endoscopic Gastrostomy Placement * Latex Safe*; Surgeon:LIBORIO CAZARES; Location:[facility]     left ankle fusion       OPTICAL TRACKING SYSTEM ENDOSCOPIC SINUS SURGERY  2012    Procedure:OPTICAL TRACKING SYSTEM ENDOSCOPIC SINUS SURGERY; Surgeon:LIBORIO CAZARES; Location:[facility]     RECTAL SURGERY      ? type     RELEASE DEQUERVAINS WRIST Left 2018    Procedure: RELEASE DEQUERVAINS WRIST;  Left 1st Distal compartment release;  Surgeon: Ronak Biggs MD;  Location: [facility]     REMOVE PORT VASCULAR ACCESS  2012    Procedure: REMOVE PORT VASCULAR ACCESS;  Remove Port Vascular Access ;  Surgeon: Alexsander Hannon MD;  Location:  OR     REVERSE ARTHROPLASTY SHOULDER Left 2019    Procedure: Left Reverse Total shoulder arthroplasty;  Surgeon: Oliver Velázquez MD;  Location: [facility]       FAMILY HISTORY:  Family History   Problem Relation Age of Onset     Breast Cancer Mother      Arthritis Mother      Cancer Mother      Heart Disease Father          at 72 of heart failure     Emphysema Father      Arthritis Sister       Skin Cancer Sister      Chronic Bronchitis Sister      Arthritis Brother      Pancreatic Cancer Brother      Emphysema Brother      Asthma Brother      Cancer Other         uncle pancreatic/grandmother- colon/aunt ? mat or pat  breast cancer       SOCIAL HISTORY:  Social History     Socioeconomic History     Marital status:      Spouse name: None     Number of children: None     Years of education: None     Highest education level: None   Occupational History     Occupation: RETIRED   Tobacco Use     Smoking status: Former Smoker     Packs/day: 0.50     Years: 35.00     Pack years: 17.50     Types: Cigarettes     Quit date: 10/3/1995     Years since quittin.4     Smokeless tobacco: Never Used   Substance and Sexual Activity     Alcohol use: Yes     Comment: rare     Drug use: No     Sexual activity: Not Currently     Partners: Male   Other Topics Concern     Parent/sibling w/ CABG, MI or angioplasty before 65F 55M? Not Asked   Social History Narrative    2021    ENVIRONMENTAL HISTORY: The family [place] The home is heated with a boiler. They do not have central air conditioning. The patient's bedroom is furnished with carpeting in bedroom.  No pets. There is no history of cockroach or mice infestation. There is/are 0 smokers in the house.  The house does not have a basement.      Social Determinants of Health     Financial Resource Strain: Not on file   Food Insecurity: Not on file   Transportation Needs: Not on file   Physical Activity: Not on file   Stress: Not on file   Social Connections: Not on file   Intimate Partner Violence: Not on file   Housing Stability: Not on file       Review of Systems:  Skin:  Negative       Eyes:  Positive for visual blurring    ENT:  Negative      Respiratory:  Positive for dyspnea on exertion;shortness of breath     Cardiovascular:    Positive for;lower extremity symptoms;edema;fatigue    Gastroenterology:  Negative      Genitourinary:  Positive for urinary frequency    Musculoskeletal:  Positive for joint pain;joint swelling;joint stiffness    Neurologic:  Positive for numbness or tingling of hands    Psychiatric:  Positive for sleep disturbances;anxiety;depression    Heme/Lymph/Imm:  Negative      Endocrine:  Positive for thyroid disorder      Physical Exam:  Vitals: /73   Pulse 68   Temp 97.4  F (36.3  C) (Tympanic)   Wt 102.6 kg (226 lb 3.2 oz)   LMP  (LMP Unknown)   SpO2 93%   BMI 37.64 kg/m    Eyes: No icterus.  Pulmonary: Chest symmetric, lungs clear bilaterally and no crackles, wheezes or rales.  Cardiovascular: RRR with normal S1 and S2, no murmur, JVP normal.  Musculoskeletal: Edema of the lower extremities: None.  Neurologic: Oriented and appropriate without obvious focal deficits.   Psychiatric: Normal affect.     Recent Lab Results:  LIPID RESULTS:  Lab Results   Component Value Date    CHOL 157 06/14/2019    HDL 80 06/14/2019    LDL 68 06/14/2019    TRIG 45 06/14/2019    CHOLHDLRATIO 2.7 11/25/2014       LIVER ENZYME RESULTS:  Lab Results   Component Value Date    AST 19 03/18/2021    ALT 20 03/18/2021       CBC RESULTS:  Lab Results   Component Value Date    WBC 4.8 11/19/2021    WBC 5.2 03/18/2021    RBC 4.51 11/19/2021    RBC 4.48 03/18/2021    HGB 13.5 11/19/2021    HGB 13.0 03/18/2021    HCT 43.4 11/19/2021    HCT 43.1 03/18/2021    MCV 96 11/19/2021    MCV 96 03/18/2021    MCH 29.9 11/19/2021    MCH 29.0 03/18/2021    MCHC 31.1 (L) 11/19/2021    MCHC 30.2 (L) 03/18/2021    RDW 14.2 11/19/2021    RDW 14.5 03/18/2021     11/19/2021     03/18/2021       BMP RESULTS:  Lab Results   Component Value Date     02/23/2022     03/18/2021    POTASSIUM 4.9 02/23/2022    POTASSIUM 4.4 03/18/2021    CHLORIDE 106 02/23/2022    CHLORIDE 105 03/18/2021    CO2 29 02/23/2022    CO2 33 (H) 03/18/2021    ANIONGAP 7 02/23/2022    ANIONGAP 3 03/18/2021    GLC 90 02/23/2022    GLC 82  03/18/2021    BUN 38 (H) 02/23/2022    BUN 23 03/18/2021    CR 0.90 02/23/2022    CR 0.76 03/18/2021    GFRESTIMATED 65 02/23/2022    GFRESTIMATED >60 02/03/2022    GFRESTIMATED 76 03/18/2021    GFRESTBLACK 88 03/18/2021    JOSÉ MIGUEL 9.3 02/23/2022    JOSÉ MIGUEL 9.3 03/18/2021        A1C RESULTS:  No results found for: A1C    INR RESULTS:  Lab Results   Component Value Date    INR 1.10 05/17/2012    INR 1.03 02/10/2012       CC  NORA Becker CNP  [address] [place] [id number]    All medical records were reviewed in detail and discussed with the patient. Greater than 45 mins were spent with the patient, 50% of this time was spent on counseling and coordination of care.  After visit summary was printed and given to the patient.        Thank you for allowing me to participate in the care of your patient.      Sincerely,     Nathan Michaels MD     [facility]  cc:   NORA Becker CNP  [address] [place] [id number]

## 2022-03-23 NOTE — PROGRESS NOTES
HPI and Plan:   Today I had the pleasure of seeing Ingrid Powell at [facility]. 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  She is accompanied by her sister and her daughter.    The patient presented to her primary care physician in 01/2020 for complaints of lower extremity edema and shortness of breath.  Comprehensive work-up was performed and was mostly negative.  On CT scan she was noted to have significant coronary calcification, moderate pericardial effusion and cardiomegaly.  No evidence of pulmonary embolism was found.  She was then started on hydrochlorothiazide-triamterene to which she did not have good response.  She was later switched to 20 mg of Lasix and metoprolol and lisinopril were added. Blood work performed 2 weeks after initiation of Lasix showed significant rise in BUN and stable creatinine. Echocardiogram performed in 03/2091 showed low normal BiV function with LV ejection fraction of 50%, small circumferential pericardial effusion and no significant valvular disease.     Today, the patient tells me that she has been extremely short of breath for the last 4 to 6 months and this has gotten especially worse over the last month or so.  She is now unable to walk more than 50 feet without having to stop to catch her breath.  This was noticed by her family members today when she had to stop multiple times while walking from the cafeteria to the clinic [100-150 feet].  She has noticed significant improvement in the lower extremity edema which is completely resolved since initiation of Lasix but even with this her shortness of breath has no change in perhaps even gotten worse.  She denies chest pain or chest pressure but also  notes that she has not been able to walk long enough to experience that.  She also denies palpitations, orthopnea, PND, presyncope or syncope.    Assessment and plan:   1.  Shortness of breath with mild exertion  2.  Severe coronary calcification on CT chest, negative stress test in 04/2021  3.  History of pericardial effusion  4.  Chronic left bundle branch block  5.  Mild COPD  6.  Hypertension  7.  Hyperlipidemia  8.  Obstructive sleep apnea on CPAP  9.  Raynaud's disease    Due to history of pericardial effusion noted in 2021 I was worried about her symptoms being secondary to worsening of pericardial effusion and hence I had her walk with me to the echo lab where I obtained some bedside images.  I only noticed very tiny, not hemodynamically significant, pericardial effusion.  IVC was small and collapsing spontaneously suggestive of lower right atrial pressure.  We did not assess the wall motion in great detail but overall LV and RV function appeared normal.  She did have significant posterior mitral annular calcification. WE will further assess this with a formal echo.  Review of the CT scan of the chest and it shows severe coronary calcification in distribution of all 3 arteries.  Because of abnormal labs and evidence of hypovolemia, I have told her to stop Lasix for a couple of days and thereafter to take it on as-needed basis.  I will repeat a formal TTE to assess LV RV function and rule out valvular disease.  I also think it is important to definitively rule out obstructive coronary disease as a reason for her symptoms and hence I will schedule her to undergo left heart catheterization. I will also add right heart catheterization to accurately assess her pulmonary pressures.   Discussed the risks and benefits of both these procedures and obtained verbal consent. I believe she is a good candidate for DAPT if need be. If all this is negative, I suggested that she gets another PFT.  FEV1 increased from 79% to  85% after administration of albuterol during PFTs in 2013 and this is that she may have an underlying lung issue.    thank you for allowing me to participate in the care of Ingrid Powell    This note was completed in part using Dragon voice recognition software. Although reviewed after completion, some word and grammatical errors may occur.    Nathan Michaels MD  Cardiology    Orders Placed This Encounter   Procedures     Echocardiogram Complete       No orders of the defined types were placed in this encounter.      There are no discontinued medications.    Encounter Diagnoses   Name Primary?     SOBOE (shortness of breath on exertion)      Cardiomegaly      Pericardial effusion      Coronary artery disease involving native coronary artery of native heart without angina pectoris      Hyperlipidemia LDL goal <130      Morbid obesity (H)      Essential hypertension        CURRENT MEDICATIONS:  Current Outpatient Medications   Medication Sig Dispense Refill     albuterol (PROAIR HFA/PROVENTIL HFA/VENTOLIN HFA) 108 (90 Base) MCG/ACT inhaler INHALE 2 PUFFS EVERY 6 HOURS 18 g 3     azelastine (ASTELIN) 0.1 % nasal spray Spray 2 sprays into both nostrils 2 times daily as needed for rhinitis 30 mL 3     cyanocobalamin (VITAMIN B-12) 100 MCG tablet Take 100 mcg by mouth daily       cycloSPORINE (RESTASIS) 0.05 % ophthalmic emulsion Place 1 drop into both eyes 2 times daily 5.5 mL 1     fluticasone (FLONASE) 50 MCG/ACT nasal spray Spray 2 sprays into both nostrils daily 16 g 3     Fluticasone-Umeclidin-Vilanterol (TRELEGY ELLIPTA) 100-62.5-25 MCG/INH oral inhaler Inhale 1 puff into the lungs daily 60 each 3     furosemide (LASIX) 20 MG tablet Take 1 tablet (20 mg) by mouth daily 90 tablet 0     gabapentin (NEURONTIN) 300 MG capsule TAKE 1 CAPSULE THREE TIMES DAILY 180 capsule 1     levothyroxine (SYNTHROID/LEVOTHROID) 100 MCG tablet Take 1 tablet (100 mcg) by mouth daily 90 tablet 0     lisinopril (ZESTRIL) 5 MG tablet  Take 1 tablet (5 mg) by mouth daily 90 tablet 0     magnesium 250 MG tablet Take 1 tablet by mouth every morning        metoprolol succinate ER (TOPROL-XL) 25 MG 24 hr tablet Take 1 tablet (25 mg) by mouth daily 90 tablet 1     PARoxetine (PAXIL) 20 MG tablet TAKE 1 TABLET AT BEDTIME 90 tablet 1     senna-docusate (SENOKOT-S/PERICOLACE) 8.6-50 MG tablet Take 1-2 tablets by mouth 2 times daily 60 tablet 0     simvastatin (ZOCOR) 40 MG tablet TAKE 1 TABLET AT BEDTIME 90 tablet 2     traZODone (DESYREL) 100 MG tablet TAKE 1 TABLET EVERY NIGHT AS NEEDED FOR SLEEP 90 tablet 3     VITAMIN D, CHOLECALCIFEROL, PO Take 2,000 Units by mouth daily.         cycloSPORINE (RESTASIS) 0.05 % ophthalmic emulsion Place 1 drop into both eyes 2 times daily (Patient not taking: Reported on 3/23/2022) 5.5 mL 0       ALLERGIES     Allergies   Allergen Reactions     Dilaudid [Hydromorphone Hcl] Other (See Comments)     hallucinations     Fosamax [Alendronate Sodium] Rash            Norvasc [Amlodipine Besylate] Hives and Rash     Tegretol [Carbamazepine] Hives and Rash       PAST MEDICAL HISTORY:  Past Medical History:   Diagnosis Date     Arthritis      COPD (chronic obstructive pulmonary disease) (H)      Depressive disorder      Gastroesophageal reflux disease      History of lumbar surgery 7/28/2015     Hoarseness      Hypertension      Oxygen dependent     at night     Reduced vision      Sleep apnea        PAST SURGICAL HISTORY:  Past Surgical History:   Procedure Laterality Date     BACK SURGERY       CARPAL TUNNEL RELEASE RT/LT      ?side     CHOLECYSTECTOMY       COLONOSCOPY N/A 4/8/2021    Procedure: COLONOSCOPY;  Surgeon: Walter Liang MD;  Location: [facility]     EYE SURGERY      cataracts     HERNIA REPAIR       INCISION AND DRAINAGE TRUNK, COMBINED  5/18/2012    Procedure:COMBINED INCISION AND DRAINAGE TRUNK; Incision and Drainage Abdominal Wall Abscess ; Surgeon:ALEXSANDER HANNON; Location:[facility]     INSERT PORT VASCULAR  ACCESS  2012    Procedure:INSERT PORT VASCULAR ACCESS; Surgeon:MARY JANE GUAN; [facility]     JOINT REPLACEMTN, KNEE RT/LT      bilateral     KNEE SURGERY      left- total     KNEE SURGERY      right- total     LAPAROSCOPIC APPENDECTOMY N/A 10/10/2015    Procedure: LAPAROSCOPIC APPENDECTOMY;  Surgeon: Daniel Chamberlain MD;  Location: [facility]     LARYNGOSCOPY, ESOPHAGOSCOPY,  BIOPSY, COMBINED  2012    Procedure:COMBINED LARYNGOSCOPY, ESOPHAGOSCOPY,  BIOPSY; Direct Laryngoscopy, Esophagoscopy with Biopsy, Stealth Assisted Left Frontal Sinus Biopsy via Vidal Incision, 8 [employer] Port in right subclavian & Percutaneous Endoscopic Gastrostomy Placement * Latex Safe*; Surgeon:LIBORIO CAZARES; Location:[facility]     left ankle fusion       OPTICAL TRACKING SYSTEM ENDOSCOPIC SINUS SURGERY  2012    Procedure:OPTICAL TRACKING SYSTEM ENDOSCOPIC SINUS SURGERY; Surgeon:LIBORIO CAZARES; Location[facility]     RECTAL SURGERY      ? type     RELEASE DEQUERVAINS WRIST Left 2018    Procedure: RELEASE DEQUERVAINS WRIST;  Left 1st Distal compartment release;  Surgeon: Ronak Biggs MD;  Location: [facility]     REMOVE PORT VASCULAR ACCESS  2012    Procedure: REMOVE PORT VASCULAR ACCESS;  Remove Port Vascular Access ;  Surgeon: Phillip Ye MD;  Location:  OR     REVERSE ARTHROPLASTY SHOULDER Left 2019    Procedure: Left Reverse Total shoulder arthroplasty;  Surgeon: Oliver Velázquez MD;  Location: [facility]       FAMILY HISTORY:  Family History   Problem Relation Age of Onset     Breast Cancer Mother      Arthritis Mother      Cancer Mother      Heart Disease Father          at 72 of heart failure     Emphysema Father      Arthritis Sister      Skin Cancer Sister      Chronic Bronchitis Sister      Arthritis Brother      Pancreatic Cancer Brother      Emphysema Brother      Asthma Brother      Cancer Other         uncle pancreatic/grandmother- colon/aunt ?  mat or pat  breast cancer       SOCIAL HISTORY:  Social History     Socioeconomic History     Marital status:      Spouse name: None     Number of children: None     Years of education: None     Highest education level: None   Occupational History     Occupation: RETIRED   Tobacco Use     Smoking status: Former Smoker     Packs/day: 0.50     Years: 35.00     Pack years: 17.50     Types: Cigarettes     Quit date: 10/3/1995     Years since quittin.4     Smokeless tobacco: Never Used   Substance and Sexual Activity     Alcohol use: Yes     Comment: rare     Drug use: No     Sexual activity: Not Currently     Partners: Male   Other Topics Concern     Parent/sibling w/ CABG, MI or angioplasty before 65F 55M? Not Asked   Social History Narrative    2021    ENVIRONMENTAL HISTORY: The family [place] The home is heated with a boiler. They do not have central air conditioning. The patient's bedroom is furnished with carpeting in bedroom.  No pets. There is no history of cockroach or mice infestation. There is/are 0 smokers in the house.  The house does not have a basement.      Social Determinants of Health     Financial Resource Strain: Not on file   Food Insecurity: Not on file   Transportation Needs: Not on file   Physical Activity: Not on file   Stress: Not on file   Social Connections: Not on file   Intimate Partner Violence: Not on file   Housing Stability: Not on file       Review of Systems:  Skin:  Negative       Eyes:  Positive for visual blurring    ENT:  Negative      Respiratory:  Positive for dyspnea on exertion;shortness of breath     Cardiovascular:    Positive for;lower extremity symptoms;edema;fatigue    Gastroenterology: Negative      Genitourinary:  Positive for urinary frequency    Musculoskeletal:  Positive for joint pain;joint swelling;joint stiffness    Neurologic:  Positive for numbness or tingling of hands    Psychiatric:  Positive for  sleep disturbances;anxiety;depression    Heme/Lymph/Imm:  Negative      Endocrine:  Positive for thyroid disorder      Physical Exam:  Vitals: /73   Pulse 68   Temp 97.4  F (36.3  C) (Tympanic)   Wt 102.6 kg (226 lb 3.2 oz)   LMP  (LMP Unknown)   SpO2 93%   BMI 37.64 kg/m    Eyes: No icterus.  Pulmonary: Chest symmetric, lungs clear bilaterally and no crackles, wheezes or rales.  Cardiovascular: RRR with normal S1 and S2, no murmur, JVP normal.  Musculoskeletal: Edema of the lower extremities: None.  Neurologic: Oriented and appropriate without obvious focal deficits.   Psychiatric: Normal affect.     Recent Lab Results:  LIPID RESULTS:  Lab Results   Component Value Date    CHOL 157 06/14/2019    HDL 80 06/14/2019    LDL 68 06/14/2019    TRIG 45 06/14/2019    CHOLHDLRATIO 2.7 11/25/2014       LIVER ENZYME RESULTS:  Lab Results   Component Value Date    AST 19 03/18/2021    ALT 20 03/18/2021       CBC RESULTS:  Lab Results   Component Value Date    WBC 4.8 11/19/2021    WBC 5.2 03/18/2021    RBC 4.51 11/19/2021    RBC 4.48 03/18/2021    HGB 13.5 11/19/2021    HGB 13.0 03/18/2021    HCT 43.4 11/19/2021    HCT 43.1 03/18/2021    MCV 96 11/19/2021    MCV 96 03/18/2021    MCH 29.9 11/19/2021    MCH 29.0 03/18/2021    MCHC 31.1 (L) 11/19/2021    MCHC 30.2 (L) 03/18/2021    RDW 14.2 11/19/2021    RDW 14.5 03/18/2021     11/19/2021     03/18/2021       BMP RESULTS:  Lab Results   Component Value Date     02/23/2022     03/18/2021    POTASSIUM 4.9 02/23/2022    POTASSIUM 4.4 03/18/2021    CHLORIDE 106 02/23/2022    CHLORIDE 105 03/18/2021    CO2 29 02/23/2022    CO2 33 (H) 03/18/2021    ANIONGAP 7 02/23/2022    ANIONGAP 3 03/18/2021    GLC 90 02/23/2022    GLC 82 03/18/2021    BUN 38 (H) 02/23/2022    BUN 23 03/18/2021    CR 0.90 02/23/2022    CR 0.76 03/18/2021    GFRESTIMATED 65 02/23/2022    GFRESTIMATED >60 02/03/2022    GFRESTIMATED 76 03/18/2021    GFRESTBLACK 88 03/18/2021    JOSÉ MIGUEL  9.3 02/23/2022    JOSÉ MIGUEL 9.3 03/18/2021        A1C RESULTS:  No results found for: A1C    INR RESULTS:  Lab Results   Component Value Date    INR 1.10 05/17/2012    INR 1.03 02/10/2012       CC  NORA Becker CNP  [address] [place] [id number]    All medical records were reviewed in detail and discussed with the patient. Greater than 45 mins were spent with the patient, 50% of this time was spent on counseling and coordination of care.  After visit summary was printed and given to the patient.

## 2022-03-28 DIAGNOSIS — I25.10 CORONARY ARTERY DISEASE INVOLVING NATIVE CORONARY ARTERY OF NATIVE HEART WITHOUT ANGINA PECTORIS: Primary | ICD-10-CM

## 2022-03-28 RX ORDER — SODIUM CHLORIDE 9 MG/ML
INJECTION, SOLUTION INTRAVENOUS CONTINUOUS
Status: CANCELLED | OUTPATIENT
Start: 2022-03-28

## 2022-03-28 RX ORDER — LIDOCAINE 40 MG/G
CREAM TOPICAL
Status: CANCELLED | OUTPATIENT
Start: 2022-03-28

## 2022-03-28 NOTE — PROGRESS NOTES
HEART CATHETERIZATION PREP INSTRUCTIONS    Procedure is scheduled for 4/4/22. They should check in [facility], at 0730. The procedure will start at 0930  Patient to be NPO after midnight.  Patient is NOT on insulin or on any other diabetic medications.   Patient is NOT on an anticoagulant.   Instructed patient to take 325 mg ASA pm before and am of cath (4 baby ASA tabs x2).   Patient can take his/her other meds prior to procedure with small sips of water.  Patient DENIES any contrast allergy  Patient will need someone available to drive them to the hospital and to drive them home.  Patient should have someone available to stay with them for at least 24 hours after the procedure.   COVID-19 test scheduled for 4/1/22.   Nursing orders signed and held? YES    Patient also provided written instructions in clinic.    Madison Lyman RN on 3/28/2022 at 11:50 AM

## 2022-04-01 ENCOUNTER — LAB (OUTPATIENT)
Dept: LAB | Facility: CLINIC | Age: 77
End: 2022-04-01
Payer: COMMERCIAL

## 2022-04-01 DIAGNOSIS — Z11.59 ENCOUNTER FOR SCREENING FOR OTHER VIRAL DISEASES: ICD-10-CM

## 2022-04-01 LAB — SARS-COV-2 RNA RESP QL NAA+PROBE: NEGATIVE

## 2022-04-01 PROCEDURE — U0005 INFEC AGEN DETEC AMPLI PROBE: HCPCS | Performed by: INTERNAL MEDICINE

## 2022-04-01 PROCEDURE — U0003 INFECTIOUS AGENT DETECTION BY NUCLEIC ACID (DNA OR RNA); SEVERE ACUTE RESPIRATORY SYNDROME CORONAVIRUS 2 (SARS-COV-2) (CORONAVIRUS DISEASE [COVID-19]), AMPLIFIED PROBE TECHNIQUE, MAKING USE OF HIGH THROUGHPUT TECHNOLOGIES AS DESCRIBED BY CMS-2020-01-R: HCPCS | Performed by: INTERNAL MEDICINE

## 2022-04-04 ENCOUNTER — APPOINTMENT (OUTPATIENT)
Dept: CARDIOLOGY | Facility: CLINIC | Age: 77
DRG: 242 | End: 2022-04-04
Attending: INTERNAL MEDICINE
Payer: MEDICARE

## 2022-04-04 ENCOUNTER — APPOINTMENT (OUTPATIENT)
Dept: GENERAL RADIOLOGY | Facility: CLINIC | Age: 77
DRG: 242 | End: 2022-04-04
Attending: INTERNAL MEDICINE
Payer: MEDICARE

## 2022-04-04 ENCOUNTER — HOSPITAL ENCOUNTER (INPATIENT)
Facility: CLINIC | Age: 77
LOS: 2 days | Discharge: HOSPICE | DRG: 242 | End: 2022-04-06
Admitting: ANESTHESIOLOGY
Payer: MEDICARE

## 2022-04-04 ENCOUNTER — ANESTHESIA EVENT (OUTPATIENT)
Dept: CARDIOLOGY | Facility: CLINIC | Age: 77
DRG: 242 | End: 2022-04-04
Payer: MEDICARE

## 2022-04-04 ENCOUNTER — ANESTHESIA (OUTPATIENT)
Dept: CARDIOLOGY | Facility: CLINIC | Age: 77
DRG: 242 | End: 2022-04-04
Payer: MEDICARE

## 2022-04-04 ENCOUNTER — APPOINTMENT (OUTPATIENT)
Dept: GENERAL RADIOLOGY | Facility: CLINIC | Age: 77
DRG: 242 | End: 2022-04-04
Attending: ANESTHESIOLOGY
Payer: MEDICARE

## 2022-04-04 DIAGNOSIS — I10 HTN (HYPERTENSION): ICD-10-CM

## 2022-04-04 DIAGNOSIS — R06.09 DOE (DYSPNEA ON EXERTION): ICD-10-CM

## 2022-04-04 DIAGNOSIS — I44.2 COMPLETE ATRIOVENTRICULAR BLOCK (H): Primary | ICD-10-CM

## 2022-04-04 DIAGNOSIS — I25.10 CORONARY ARTERY DISEASE INVOLVING NATIVE CORONARY ARTERY OF NATIVE HEART WITHOUT ANGINA PECTORIS: ICD-10-CM

## 2022-04-04 PROBLEM — Z98.890 STATUS POST CORONARY ANGIOGRAM: Status: ACTIVE | Noted: 2022-04-04

## 2022-04-04 LAB
ALBUMIN SERPL-MCNC: 3.4 G/DL (ref 3.4–5)
ALP SERPL-CCNC: 104 U/L (ref 40–150)
ALT SERPL W P-5'-P-CCNC: 71 U/L (ref 0–50)
ANION GAP SERPL CALCULATED.3IONS-SCNC: 1 MMOL/L (ref 3–14)
ANION GAP SERPL CALCULATED.3IONS-SCNC: 3 MMOL/L (ref 3–14)
APTT PPP: 29 SECONDS (ref 22–38)
AST SERPL W P-5'-P-CCNC: 78 U/L (ref 0–45)
BASE EXCESS BLDA CALC-SCNC: -4.1 MMOL/L (ref -9–1.8)
BI-PLANE LVEF ECHO: NORMAL
BILIRUB SERPL-MCNC: 0.2 MG/DL (ref 0.2–1.3)
BUN SERPL-MCNC: 39 MG/DL (ref 7–30)
BUN SERPL-MCNC: 40 MG/DL (ref 7–30)
CALCIUM SERPL-MCNC: 8.4 MG/DL (ref 8.5–10.1)
CALCIUM SERPL-MCNC: 8.9 MG/DL (ref 8.5–10.1)
CHLORIDE BLD-SCNC: 107 MMOL/L (ref 94–109)
CHLORIDE BLD-SCNC: 112 MMOL/L (ref 94–109)
CO2 SERPL-SCNC: 23 MMOL/L (ref 20–32)
CO2 SERPL-SCNC: 26 MMOL/L (ref 20–32)
COHGB MFR BLD: 84 % (ref 92–100)
COHGB MFR BLD: 86 % (ref 92–100)
CREAT SERPL-MCNC: 0.87 MG/DL (ref 0.52–1.04)
CREAT SERPL-MCNC: 0.88 MG/DL (ref 0.52–1.04)
ERYTHROCYTE [DISTWIDTH] IN BLOOD BY AUTOMATED COUNT: 13.7 % (ref 10–15)
ERYTHROCYTE [DISTWIDTH] IN BLOOD BY AUTOMATED COUNT: 13.9 % (ref 10–15)
GFR SERPL CREATININE-BSD FRML MDRD: 67 ML/MIN/1.73M2
GFR SERPL CREATININE-BSD FRML MDRD: 68 ML/MIN/1.73M2
GLUCOSE BLD-MCNC: 196 MG/DL (ref 70–99)
GLUCOSE BLD-MCNC: 86 MG/DL (ref 70–99)
GLUCOSE BLDC GLUCOMTR-MCNC: 118 MG/DL (ref 70–99)
GLUCOSE BLDC GLUCOMTR-MCNC: 188 MG/DL (ref 70–99)
HCO3 BLD-SCNC: 23 MMOL/L (ref 21–28)
HCO3 BLDA-SCNC: 23 MMOL/L (ref 21–28)
HCO3 BLDA-SCNC: 24 MMOL/L (ref 21–28)
HCO3 BLDV-SCNC: 25 MMOL/L (ref 21–28)
HCT VFR BLD AUTO: 38.8 % (ref 35–47)
HCT VFR BLD AUTO: 44.1 % (ref 35–47)
HGB BLD-MCNC: 11.9 G/DL (ref 11.7–15.7)
HGB BLD-MCNC: 13.8 G/DL (ref 11.7–15.7)
HGB BLD-MCNC: 13.8 G/DL (ref 11.7–15.7)
INR PPP: 1 (ref 0.85–1.15)
LACTATE BLD-SCNC: 0.9 MMOL/L
LACTATE BLD-SCNC: 1 MMOL/L
LACTATE BLD-SCNC: <0.3 MMOL/L
LACTATE SERPL-SCNC: 1.1 MMOL/L (ref 0.7–2)
LVEF ECHO: NORMAL
LVEF ECHO: NORMAL
MCH RBC QN AUTO: 28.5 PG (ref 26.5–33)
MCH RBC QN AUTO: 29.3 PG (ref 26.5–33)
MCHC RBC AUTO-ENTMCNC: 30.7 G/DL (ref 31.5–36.5)
MCHC RBC AUTO-ENTMCNC: 31.3 G/DL (ref 31.5–36.5)
MCV RBC AUTO: 93 FL (ref 78–100)
MCV RBC AUTO: 94 FL (ref 78–100)
O2/TOTAL GAS SETTING VFR VENT: 70 %
OXYHGB MFR BLD: 95 % (ref 92–100)
PCO2 BLD: 47 MM HG (ref 35–45)
PCO2 BLDA: 47 MM HG (ref 35–45)
PCO2 BLDA: 53 MM HG (ref 35–45)
PCO2 BLDV: 44 MM HG (ref 40–50)
PH BLD: 7.29 [PH] (ref 7.35–7.45)
PH BLDA: 7.25 [PH] (ref 7.35–7.45)
PH BLDA: 7.29 [PH] (ref 7.35–7.45)
PH BLDV: 7.36 [PH] (ref 7.32–7.43)
PLAT MORPH BLD: NORMAL
PLATELET # BLD AUTO: 205 10E3/UL (ref 150–450)
PLATELET # BLD AUTO: 331 10E3/UL (ref 150–450)
PO2 BLD: 84 MM HG (ref 80–105)
PO2 BLDA: 54 MM HG (ref 80–105)
PO2 BLDA: 61 MM HG (ref 80–105)
PO2 BLDV: 73 MM HG (ref 25–47)
POTASSIUM BLD-SCNC: 4.4 MMOL/L (ref 3.4–5.3)
POTASSIUM BLD-SCNC: 5.1 MMOL/L (ref 3.4–5.3)
POTASSIUM BLD-SCNC: 5.8 MMOL/L (ref 3.4–5.3)
PROT SERPL-MCNC: 7.2 G/DL (ref 6.8–8.8)
RBC # BLD AUTO: 4.17 10E6/UL (ref 3.8–5.2)
RBC # BLD AUTO: 4.71 10E6/UL (ref 3.8–5.2)
RBC MORPH BLD: NORMAL
SAO2 % BLDV: 94 % (ref 94–100)
SODIUM SERPL-SCNC: 133 MMOL/L (ref 133–144)
SODIUM SERPL-SCNC: 139 MMOL/L (ref 133–144)
WBC # BLD AUTO: 15.8 10E3/UL (ref 4–11)
WBC # BLD AUTO: 6.6 10E3/UL (ref 4–11)

## 2022-04-04 PROCEDURE — 250N000009 HC RX 250: Performed by: NURSE ANESTHETIST, CERTIFIED REGISTERED

## 2022-04-04 PROCEDURE — 93321 DOPPLER ECHO F-UP/LMTD STD: CPT | Mod: 26 | Performed by: INTERNAL MEDICINE

## 2022-04-04 PROCEDURE — 93460 R&L HRT ART/VENTRICLE ANGIO: CPT | Performed by: INTERNAL MEDICINE

## 2022-04-04 PROCEDURE — 250N000009 HC RX 250: Performed by: INTERNAL MEDICINE

## 2022-04-04 PROCEDURE — 82803 BLOOD GASES ANY COMBINATION: CPT

## 2022-04-04 PROCEDURE — 33225 L VENTRIC PACING LEAD ADD-ON: CPT | Performed by: INTERNAL MEDICINE

## 2022-04-04 PROCEDURE — 999N000208 ECHOCARDIOGRAM LIMITED

## 2022-04-04 PROCEDURE — 5A1223Z PERFORMANCE OF CARDIAC PACING, CONTINUOUS: ICD-10-PCS | Performed by: INTERNAL MEDICINE

## 2022-04-04 PROCEDURE — 250N000009 HC RX 250

## 2022-04-04 PROCEDURE — 99153 MOD SED SAME PHYS/QHP EA: CPT | Performed by: INTERNAL MEDICINE

## 2022-04-04 PROCEDURE — B2111ZZ FLUOROSCOPY OF MULTIPLE CORONARY ARTERIES USING LOW OSMOLAR CONTRAST: ICD-10-PCS | Performed by: INTERNAL MEDICINE

## 2022-04-04 PROCEDURE — 85730 THROMBOPLASTIN TIME PARTIAL: CPT | Performed by: INTERNAL MEDICINE

## 2022-04-04 PROCEDURE — 93308 TTE F-UP OR LMTD: CPT | Mod: 26 | Performed by: INTERNAL MEDICINE

## 2022-04-04 PROCEDURE — 02HL3JZ INSERTION OF PACEMAKER LEAD INTO LEFT VENTRICLE, PERCUTANEOUS APPROACH: ICD-10-PCS | Performed by: INTERNAL MEDICINE

## 2022-04-04 PROCEDURE — 5A12012 PERFORMANCE OF CARDIAC OUTPUT, SINGLE, MANUAL: ICD-10-PCS | Performed by: INTERNAL MEDICINE

## 2022-04-04 PROCEDURE — C1894 INTRO/SHEATH, NON-LASER: HCPCS | Performed by: INTERNAL MEDICINE

## 2022-04-04 PROCEDURE — 85027 COMPLETE CBC AUTOMATED: CPT | Performed by: INTERNAL MEDICINE

## 2022-04-04 PROCEDURE — 94002 VENT MGMT INPAT INIT DAY: CPT

## 2022-04-04 PROCEDURE — 200N000001 HC R&B ICU

## 2022-04-04 PROCEDURE — 93325 DOPPLER ECHO COLOR FLOW MAPG: CPT | Mod: 26 | Performed by: INTERNAL MEDICINE

## 2022-04-04 PROCEDURE — 33210 INSERT ELECTRD/PM CATH SNGL: CPT | Performed by: INTERNAL MEDICINE

## 2022-04-04 PROCEDURE — C1769 GUIDE WIRE: HCPCS | Performed by: INTERNAL MEDICINE

## 2022-04-04 PROCEDURE — 83605 ASSAY OF LACTIC ACID: CPT

## 2022-04-04 PROCEDURE — 99291 CRITICAL CARE FIRST HOUR: CPT | Mod: 25 | Performed by: ANESTHESIOLOGY

## 2022-04-04 PROCEDURE — 999N000009 HC STATISTIC AIRWAY CARE

## 2022-04-04 PROCEDURE — 99152 MOD SED SAME PHYS/QHP 5/>YRS: CPT | Performed by: INTERNAL MEDICINE

## 2022-04-04 PROCEDURE — 999N000184 HC STATISTIC TELEMETRY

## 2022-04-04 PROCEDURE — 36556 INSERT NON-TUNNEL CV CATH: CPT | Mod: GC | Performed by: ANESTHESIOLOGY

## 2022-04-04 PROCEDURE — 250N000009 HC RX 250: Performed by: ANESTHESIOLOGY

## 2022-04-04 PROCEDURE — 370N000003 HC ANESTHESIA WARD SERVICE: Performed by: ANESTHESIOLOGY

## 2022-04-04 PROCEDURE — 99291 CRITICAL CARE FIRST HOUR: CPT | Mod: 25 | Performed by: INTERNAL MEDICINE

## 2022-04-04 PROCEDURE — 250N000013 HC RX MED GY IP 250 OP 250 PS 637: Performed by: INTERNAL MEDICINE

## 2022-04-04 PROCEDURE — C1898 LEAD, PMKR, OTHER THAN TRANS: HCPCS | Performed by: INTERNAL MEDICINE

## 2022-04-04 PROCEDURE — 250N000011 HC RX IP 250 OP 636: Performed by: INTERNAL MEDICINE

## 2022-04-04 PROCEDURE — C1900 LEAD, CORONARY VENOUS: HCPCS | Performed by: INTERNAL MEDICINE

## 2022-04-04 PROCEDURE — C2621 PMKR, OTHER THAN SING/DUAL: HCPCS | Performed by: INTERNAL MEDICINE

## 2022-04-04 PROCEDURE — 85018 HEMOGLOBIN: CPT

## 2022-04-04 PROCEDURE — C1730 CATH, EP, 19 OR FEW ELECT: HCPCS | Performed by: INTERNAL MEDICINE

## 2022-04-04 PROCEDURE — 36415 COLL VENOUS BLD VENIPUNCTURE: CPT | Performed by: INTERNAL MEDICINE

## 2022-04-04 PROCEDURE — 272N000001 HC OR GENERAL SUPPLY STERILE: Performed by: INTERNAL MEDICINE

## 2022-04-04 PROCEDURE — 93306 TTE W/DOPPLER COMPLETE: CPT

## 2022-04-04 PROCEDURE — 84132 ASSAY OF SERUM POTASSIUM: CPT | Performed by: ANESTHESIOLOGY

## 2022-04-04 PROCEDURE — 4A023N8 MEASUREMENT OF CARDIAC SAMPLING AND PRESSURE, BILATERAL, PERCUTANEOUS APPROACH: ICD-10-PCS | Performed by: INTERNAL MEDICINE

## 2022-04-04 PROCEDURE — C1887 CATHETER, GUIDING: HCPCS | Performed by: INTERNAL MEDICINE

## 2022-04-04 PROCEDURE — 255N000002 HC RX 255 OP 636: Performed by: INTERNAL MEDICINE

## 2022-04-04 PROCEDURE — 370N000003 HC ANESTHESIA WARD SERVICE

## 2022-04-04 PROCEDURE — 02H63JZ INSERTION OF PACEMAKER LEAD INTO RIGHT ATRIUM, PERCUTANEOUS APPROACH: ICD-10-PCS | Performed by: INTERNAL MEDICINE

## 2022-04-04 PROCEDURE — 82805 BLOOD GASES W/O2 SATURATION: CPT | Performed by: ANESTHESIOLOGY

## 2022-04-04 PROCEDURE — 999N000065 XR CHEST PORT 1 VIEW

## 2022-04-04 PROCEDURE — 999N000157 HC STATISTIC RCP TIME EA 10 MIN

## 2022-04-04 PROCEDURE — 250N000013 HC RX MED GY IP 250 OP 250 PS 637: Performed by: ANESTHESIOLOGY

## 2022-04-04 PROCEDURE — 93458 L HRT ARTERY/VENTRICLE ANGIO: CPT | Performed by: INTERNAL MEDICINE

## 2022-04-04 PROCEDURE — 999N000185 HC STATISTIC TRANSPORT TIME EA 15 MIN

## 2022-04-04 PROCEDURE — 85014 HEMATOCRIT: CPT | Performed by: ANESTHESIOLOGY

## 2022-04-04 PROCEDURE — 0JH637Z INSERTION OF CARDIAC RESYNCHRONIZATION PACEMAKER PULSE GENERATOR INTO CHEST SUBCUTANEOUS TISSUE AND FASCIA, PERCUTANEOUS APPROACH: ICD-10-PCS | Performed by: INTERNAL MEDICINE

## 2022-04-04 PROCEDURE — 258N000003 HC RX IP 258 OP 636: Performed by: INTERNAL MEDICINE

## 2022-04-04 PROCEDURE — 999N000071 HC STATISTIC HEART CATH LAB OR EP LAB

## 2022-04-04 PROCEDURE — 83605 ASSAY OF LACTIC ACID: CPT | Performed by: ANESTHESIOLOGY

## 2022-04-04 PROCEDURE — 82040 ASSAY OF SERUM ALBUMIN: CPT | Performed by: ANESTHESIOLOGY

## 2022-04-04 PROCEDURE — 85610 PROTHROMBIN TIME: CPT | Performed by: INTERNAL MEDICINE

## 2022-04-04 PROCEDURE — 33208 INSRT HEART PM ATRIAL & VENT: CPT | Mod: KX | Performed by: INTERNAL MEDICINE

## 2022-04-04 PROCEDURE — 93005 ELECTROCARDIOGRAM TRACING: CPT

## 2022-04-04 PROCEDURE — 93325 DOPPLER ECHO COLOR FLOW MAPG: CPT

## 2022-04-04 PROCEDURE — 258N000003 HC RX IP 258 OP 636: Performed by: STUDENT IN AN ORGANIZED HEALTH CARE EDUCATION/TRAINING PROGRAM

## 2022-04-04 PROCEDURE — 74018 RADEX ABDOMEN 1 VIEW: CPT

## 2022-04-04 PROCEDURE — 255N000002 HC RX 255 OP 636

## 2022-04-04 PROCEDURE — 93306 TTE W/DOPPLER COMPLETE: CPT | Mod: 26 | Performed by: INTERNAL MEDICINE

## 2022-04-04 PROCEDURE — 3E043XZ INTRODUCTION OF VASOPRESSOR INTO CENTRAL VEIN, PERCUTANEOUS APPROACH: ICD-10-PCS | Performed by: INTERNAL MEDICINE

## 2022-04-04 PROCEDURE — 02HK3JZ INSERTION OF PACEMAKER LEAD INTO RIGHT VENTRICLE, PERCUTANEOUS APPROACH: ICD-10-PCS | Performed by: INTERNAL MEDICINE

## 2022-04-04 PROCEDURE — 250N000011 HC RX IP 250 OP 636: Performed by: ANESTHESIOLOGY

## 2022-04-04 PROCEDURE — 93010 ELECTROCARDIOGRAM REPORT: CPT | Performed by: INTERNAL MEDICINE

## 2022-04-04 PROCEDURE — 80053 COMPREHEN METABOLIC PANEL: CPT | Performed by: INTERNAL MEDICINE

## 2022-04-04 DEVICE — LEAD INGEVITY+ AF IS1 7841 52CM: Type: IMPLANTABLE DEVICE | Status: FUNCTIONAL

## 2022-04-04 DEVICE — LEAD LV ACUITY X4 STR 86CM: Type: IMPLANTABLE DEVICE | Status: FUNCTIONAL

## 2022-04-04 DEVICE — LEAD INGEVITY+ AF IS1 7840 4CM: Type: IMPLANTABLE DEVICE | Status: FUNCTIONAL

## 2022-04-04 RX ORDER — EPINEPHRINE IN SOD CHLOR,ISO 1 MG/10 ML
SYRINGE (ML) INTRAVENOUS
Status: DISCONTINUED | OUTPATIENT
Start: 2022-04-04 | End: 2022-04-04 | Stop reason: HOSPADM

## 2022-04-04 RX ORDER — NOREPINEPHRINE BITARTRATE 0.02 MG/ML
INJECTION, SOLUTION INTRAVENOUS
Status: COMPLETED
Start: 2022-04-04 | End: 2022-04-04

## 2022-04-04 RX ORDER — DOBUTAMINE HYDROCHLORIDE 200 MG/100ML
5-40 INJECTION INTRAVENOUS CONTINUOUS PRN
Status: DISCONTINUED | OUTPATIENT
Start: 2022-04-04 | End: 2022-04-04 | Stop reason: HOSPADM

## 2022-04-04 RX ORDER — ATROPINE SULFATE 0.1 MG/ML
INJECTION INTRAVENOUS
Status: DISCONTINUED | OUTPATIENT
Start: 2022-04-04 | End: 2022-04-04 | Stop reason: HOSPADM

## 2022-04-04 RX ORDER — LIDOCAINE 40 MG/G
CREAM TOPICAL
Status: CANCELLED | OUTPATIENT
Start: 2022-04-04

## 2022-04-04 RX ORDER — CEFAZOLIN SODIUM 2 G/100ML
2 INJECTION, SOLUTION INTRAVENOUS
Status: CANCELLED | OUTPATIENT
Start: 2022-04-04

## 2022-04-04 RX ORDER — PHENYLEPHRINE HYDROCHLORIDE 10 MG/ML
INJECTION INTRAVENOUS
Status: DISCONTINUED | OUTPATIENT
Start: 2022-04-04 | End: 2022-04-04 | Stop reason: HOSPADM

## 2022-04-04 RX ORDER — CHLORHEXIDINE GLUCONATE ORAL RINSE 1.2 MG/ML
15 SOLUTION DENTAL EVERY 12 HOURS
Status: DISCONTINUED | OUTPATIENT
Start: 2022-04-04 | End: 2022-04-06

## 2022-04-04 RX ORDER — HEPARIN SODIUM 200 [USP'U]/100ML
100-500 INJECTION, SOLUTION INTRAVENOUS CONTINUOUS PRN
Status: DISCONTINUED | OUTPATIENT
Start: 2022-04-04 | End: 2022-04-04 | Stop reason: HOSPADM

## 2022-04-04 RX ORDER — DEXTROSE MONOHYDRATE 25 G/50ML
25-50 INJECTION, SOLUTION INTRAVENOUS
Status: DISCONTINUED | OUTPATIENT
Start: 2022-04-04 | End: 2022-04-06 | Stop reason: HOSPADM

## 2022-04-04 RX ORDER — CEFAZOLIN SODIUM 2 G/100ML
2 INJECTION, SOLUTION INTRAVENOUS ONCE
Status: COMPLETED | OUTPATIENT
Start: 2022-04-04 | End: 2022-04-04

## 2022-04-04 RX ORDER — NALOXONE HYDROCHLORIDE 0.4 MG/ML
0.2 INJECTION, SOLUTION INTRAMUSCULAR; INTRAVENOUS; SUBCUTANEOUS
Status: DISCONTINUED | OUTPATIENT
Start: 2022-04-04 | End: 2022-04-06 | Stop reason: HOSPADM

## 2022-04-04 RX ORDER — SODIUM CHLORIDE 9 MG/ML
INJECTION, SOLUTION INTRAVENOUS CONTINUOUS
Status: DISCONTINUED | OUTPATIENT
Start: 2022-04-04 | End: 2022-04-06

## 2022-04-04 RX ORDER — CEFAZOLIN SODIUM 1 G/3ML
1 INJECTION, POWDER, FOR SOLUTION INTRAMUSCULAR; INTRAVENOUS
Status: DISCONTINUED | OUTPATIENT
Start: 2022-04-04 | End: 2022-04-04 | Stop reason: HOSPADM

## 2022-04-04 RX ORDER — PROCHLORPERAZINE MALEATE 5 MG
5 TABLET ORAL EVERY 6 HOURS PRN
Status: DISCONTINUED | OUTPATIENT
Start: 2022-04-04 | End: 2022-04-06

## 2022-04-04 RX ORDER — FENTANYL CITRATE 50 UG/ML
25 INJECTION, SOLUTION INTRAMUSCULAR; INTRAVENOUS
Status: DISCONTINUED | OUTPATIENT
Start: 2022-04-04 | End: 2022-04-06 | Stop reason: HOSPADM

## 2022-04-04 RX ORDER — LEVOTHYROXINE SODIUM 100 UG/1
100 TABLET ORAL DAILY
Status: DISCONTINUED | OUTPATIENT
Start: 2022-04-04 | End: 2022-04-06

## 2022-04-04 RX ORDER — FUROSEMIDE 20 MG
20 TABLET ORAL DAILY
Status: DISCONTINUED | OUTPATIENT
Start: 2022-04-04 | End: 2022-04-04 | Stop reason: ALTCHOICE

## 2022-04-04 RX ORDER — DOBUTAMINE HYDROCHLORIDE 200 MG/100ML
2.5 INJECTION INTRAVENOUS CONTINUOUS
Status: DISCONTINUED | OUTPATIENT
Start: 2022-04-04 | End: 2022-04-04

## 2022-04-04 RX ORDER — SODIUM CHLORIDE 450 MG/100ML
INJECTION, SOLUTION INTRAVENOUS CONTINUOUS
Status: CANCELLED | OUTPATIENT
Start: 2022-04-04

## 2022-04-04 RX ORDER — PROPOFOL 10 MG/ML
5-75 INJECTION, EMULSION INTRAVENOUS CONTINUOUS
Status: DISCONTINUED | OUTPATIENT
Start: 2022-04-04 | End: 2022-04-04

## 2022-04-04 RX ORDER — OXYCODONE AND ACETAMINOPHEN 5; 325 MG/1; MG/1
1 TABLET ORAL EVERY 4 HOURS PRN
Status: DISCONTINUED | OUTPATIENT
Start: 2022-04-04 | End: 2022-04-06 | Stop reason: HOSPADM

## 2022-04-04 RX ORDER — NOREPINEPHRINE BITARTRATE 0.06 MG/ML
.01-.6 INJECTION, SOLUTION INTRAVENOUS CONTINUOUS
Status: DISCONTINUED | OUTPATIENT
Start: 2022-04-04 | End: 2022-04-06

## 2022-04-04 RX ORDER — NOREPINEPHRINE BITARTRATE 0.02 MG/ML
.01-.6 INJECTION, SOLUTION INTRAVENOUS CONTINUOUS
Status: DISCONTINUED | OUTPATIENT
Start: 2022-04-04 | End: 2022-04-04

## 2022-04-04 RX ORDER — ONDANSETRON 4 MG/1
4 TABLET, ORALLY DISINTEGRATING ORAL EVERY 6 HOURS PRN
Status: DISCONTINUED | OUTPATIENT
Start: 2022-04-04 | End: 2022-04-06

## 2022-04-04 RX ORDER — NICOTINE POLACRILEX 4 MG
15-30 LOZENGE BUCCAL
Status: DISCONTINUED | OUTPATIENT
Start: 2022-04-04 | End: 2022-04-06 | Stop reason: HOSPADM

## 2022-04-04 RX ORDER — ONDANSETRON 2 MG/ML
4 INJECTION INTRAMUSCULAR; INTRAVENOUS EVERY 6 HOURS PRN
Status: DISCONTINUED | OUTPATIENT
Start: 2022-04-04 | End: 2022-04-06

## 2022-04-04 RX ORDER — ATROPINE SULFATE 0.1 MG/ML
0.5 INJECTION INTRAVENOUS
Status: ACTIVE | OUTPATIENT
Start: 2022-04-04 | End: 2022-04-04

## 2022-04-04 RX ORDER — NALOXONE HYDROCHLORIDE 0.4 MG/ML
0.4 INJECTION, SOLUTION INTRAMUSCULAR; INTRAVENOUS; SUBCUTANEOUS
Status: DISCONTINUED | OUTPATIENT
Start: 2022-04-04 | End: 2022-04-06 | Stop reason: HOSPADM

## 2022-04-04 RX ORDER — LIDOCAINE 40 MG/G
CREAM TOPICAL
Status: DISCONTINUED | OUTPATIENT
Start: 2022-04-04 | End: 2022-04-04 | Stop reason: HOSPADM

## 2022-04-04 RX ORDER — ACETAMINOPHEN 325 MG/1
650 TABLET ORAL EVERY 4 HOURS PRN
Status: DISCONTINUED | OUTPATIENT
Start: 2022-04-04 | End: 2022-04-06 | Stop reason: HOSPADM

## 2022-04-04 RX ORDER — AMOXICILLIN 250 MG
2 CAPSULE ORAL 2 TIMES DAILY PRN
Status: DISCONTINUED | OUTPATIENT
Start: 2022-04-04 | End: 2022-04-06 | Stop reason: HOSPADM

## 2022-04-04 RX ORDER — SODIUM CHLORIDE 9 MG/ML
INJECTION, SOLUTION INTRAVENOUS CONTINUOUS
Status: DISCONTINUED | OUTPATIENT
Start: 2022-04-04 | End: 2022-04-04 | Stop reason: HOSPADM

## 2022-04-04 RX ORDER — FUROSEMIDE 10 MG/ML
INJECTION INTRAMUSCULAR; INTRAVENOUS
Status: DISCONTINUED | OUTPATIENT
Start: 2022-04-04 | End: 2022-04-04 | Stop reason: HOSPADM

## 2022-04-04 RX ORDER — FLUMAZENIL 0.1 MG/ML
0.2 INJECTION, SOLUTION INTRAVENOUS
Status: ACTIVE | OUTPATIENT
Start: 2022-04-04 | End: 2022-04-04

## 2022-04-04 RX ORDER — FENTANYL CITRATE 50 UG/ML
INJECTION, SOLUTION INTRAMUSCULAR; INTRAVENOUS
Status: DISCONTINUED | OUTPATIENT
Start: 2022-04-04 | End: 2022-04-04 | Stop reason: HOSPADM

## 2022-04-04 RX ORDER — FUROSEMIDE 10 MG/ML
20 INJECTION INTRAMUSCULAR; INTRAVENOUS EVERY 12 HOURS
Status: DISCONTINUED | OUTPATIENT
Start: 2022-04-04 | End: 2022-04-06

## 2022-04-04 RX ORDER — ETOMIDATE 2 MG/ML
INJECTION INTRAVENOUS PRN
Status: DISCONTINUED | OUTPATIENT
Start: 2022-04-04 | End: 2022-04-04

## 2022-04-04 RX ORDER — BUPIVACAINE HYDROCHLORIDE 2.5 MG/ML
INJECTION, SOLUTION EPIDURAL; INFILTRATION; INTRACAUDAL
Status: DISCONTINUED | OUTPATIENT
Start: 2022-04-04 | End: 2022-04-04 | Stop reason: HOSPADM

## 2022-04-04 RX ORDER — HEPARIN SODIUM 200 [USP'U]/100ML
100-600 INJECTION, SOLUTION INTRAVENOUS CONTINUOUS PRN
Status: DISCONTINUED | OUTPATIENT
Start: 2022-04-04 | End: 2022-04-04 | Stop reason: HOSPADM

## 2022-04-04 RX ORDER — PROPOFOL 10 MG/ML
5-75 INJECTION, EMULSION INTRAVENOUS CONTINUOUS
Status: DISCONTINUED | OUTPATIENT
Start: 2022-04-04 | End: 2022-04-06

## 2022-04-04 RX ORDER — CYCLOSPORINE 0.5 MG/ML
1 EMULSION OPHTHALMIC 2 TIMES DAILY
Status: DISCONTINUED | OUTPATIENT
Start: 2022-04-04 | End: 2022-04-04

## 2022-04-04 RX ORDER — DEXTROSE MONOHYDRATE 100 MG/ML
INJECTION, SOLUTION INTRAVENOUS CONTINUOUS PRN
Status: DISCONTINUED | OUTPATIENT
Start: 2022-04-04 | End: 2022-04-06 | Stop reason: HOSPADM

## 2022-04-04 RX ORDER — MIDAZOLAM HCL IN 0.9 % NACL/PF 1 MG/ML
.5-6 PLASTIC BAG, INJECTION (ML) INTRAVENOUS CONTINUOUS PRN
Status: DISCONTINUED | OUTPATIENT
Start: 2022-04-04 | End: 2022-04-04 | Stop reason: HOSPADM

## 2022-04-04 RX ORDER — PROCHLORPERAZINE 25 MG
12.5 SUPPOSITORY, RECTAL RECTAL EVERY 12 HOURS PRN
Status: DISCONTINUED | OUTPATIENT
Start: 2022-04-04 | End: 2022-04-06

## 2022-04-04 RX ORDER — AMOXICILLIN 250 MG
1 CAPSULE ORAL 2 TIMES DAILY PRN
Status: DISCONTINUED | OUTPATIENT
Start: 2022-04-04 | End: 2022-04-06 | Stop reason: HOSPADM

## 2022-04-04 RX ORDER — DOPAMINE HYDROCHLORIDE 160 MG/100ML
2-20 INJECTION, SOLUTION INTRAVENOUS CONTINUOUS
Status: DISCONTINUED | OUTPATIENT
Start: 2022-04-04 | End: 2022-04-06

## 2022-04-04 RX ORDER — BISACODYL 10 MG
10 SUPPOSITORY, RECTAL RECTAL DAILY PRN
Status: DISCONTINUED | OUTPATIENT
Start: 2022-04-04 | End: 2022-04-06 | Stop reason: HOSPADM

## 2022-04-04 RX ORDER — FLUTICASONE PROPIONATE 50 MCG
2 SPRAY, SUSPENSION (ML) NASAL DAILY
Status: DISCONTINUED | OUTPATIENT
Start: 2022-04-05 | End: 2022-04-06 | Stop reason: HOSPADM

## 2022-04-04 RX ADMIN — ROCURONIUM BROMIDE 50 MG: 50 INJECTION, SOLUTION INTRAVENOUS at 10:40

## 2022-04-04 RX ADMIN — PROPOFOL 10 MCG/KG/MIN: 10 INJECTION, EMULSION INTRAVENOUS at 20:56

## 2022-04-04 RX ADMIN — NOREPINEPHRINE BITARTRATE 4 MG/250 ML (16 MCG/ML) IN 0.9 % NACL IV 0.35 MCG/KG/MIN: at 13:04

## 2022-04-04 RX ADMIN — DOPAMINE HYDROCHLORIDE IN DEXTROSE 2 MCG/KG/MIN: 1.6 INJECTION, SOLUTION INTRAVENOUS at 17:15

## 2022-04-04 RX ADMIN — CEFAZOLIN 1 G: 1 INJECTION, POWDER, FOR SOLUTION INTRAMUSCULAR; INTRAVENOUS at 12:37

## 2022-04-04 RX ADMIN — PROPOFOL 45 MCG/KG/MIN: 10 INJECTION, EMULSION INTRAVENOUS at 15:45

## 2022-04-04 RX ADMIN — DOBUTAMINE HYDROCHLORIDE 2 MCG/KG/MIN: 200 INJECTION INTRAVENOUS at 12:11

## 2022-04-04 RX ADMIN — NOREPINEPHRINE BITARTRATE 4 MG/250 ML (16 MCG/ML) IN 0.9 % NACL IV 0.35 MCG/KG/MIN: at 16:45

## 2022-04-04 RX ADMIN — DEXTRAN 70, GLYCERIN, HYPROMELLOSE 1 DROP: 1; 2; 3 SOLUTION/ DROPS OPHTHALMIC at 21:01

## 2022-04-04 RX ADMIN — FUROSEMIDE 20 MG: 10 INJECTION, SOLUTION INTRAVENOUS at 18:43

## 2022-04-04 RX ADMIN — ETOMIDATE 20 MG: 2 INJECTION, SOLUTION INTRAVENOUS at 10:40

## 2022-04-04 RX ADMIN — SODIUM CHLORIDE: 9 INJECTION, SOLUTION INTRAVENOUS at 08:09

## 2022-04-04 RX ADMIN — SODIUM CHLORIDE: 9 INJECTION, SOLUTION INTRAVENOUS at 14:00

## 2022-04-04 RX ADMIN — Medication 0.35 MCG/KG/MIN: at 16:45

## 2022-04-04 RX ADMIN — PROPOFOL 20 MCG/KG/MIN: 10 INJECTION, EMULSION INTRAVENOUS at 10:41

## 2022-04-04 RX ADMIN — CHLORHEXIDINE GLUCONATE 15 ML: 1.2 SOLUTION ORAL at 21:01

## 2022-04-04 RX ADMIN — Medication 0.3 MCG/KG/MIN: at 18:29

## 2022-04-04 RX ADMIN — NOREPINEPHRINE BITARTRATE 4 MG/250 ML (16 MCG/ML) IN 0.9 % NACL IV 0.35 MCG/KG/MIN: at 14:51

## 2022-04-04 RX ADMIN — NOREPINEPHRINE BITARTRATE 4 MG/250 ML (16 MCG/ML) IN 0.9 % NACL IV 0.05 MCG/KG/MIN: at 11:11

## 2022-04-04 RX ADMIN — NOREPINEPHRINE BITARTRATE 4 MG/250 ML (16 MCG/ML) IN 0.9 % NACL IV 0.3 MCG/KG/MIN: at 11:16

## 2022-04-04 RX ADMIN — CEFAZOLIN SODIUM 2 G: 2 INJECTION, SOLUTION INTRAVENOUS at 10:21

## 2022-04-04 RX ADMIN — HUMAN ALBUMIN MICROSPHERES AND PERFLUTREN 9 ML: 10; .22 INJECTION, SOLUTION INTRAVENOUS at 16:53

## 2022-04-04 RX ADMIN — NOREPINEPHRINE BITARTRATE 4 MG/250 ML (16 MCG/ML) IN 0.9 % NACL IV 0.4 MCG/KG/MIN: at 11:25

## 2022-04-04 ASSESSMENT — ACTIVITIES OF DAILY LIVING (ADL)
ADLS_ACUITY_SCORE: 21
ADLS_ACUITY_SCORE: 21
ADLS_ACUITY_SCORE: 20
ADLS_ACUITY_SCORE: 21
ADLS_ACUITY_SCORE: 14
WEAR_GLASSES_OR_BLIND: OTHER (SEE COMMENTS)

## 2022-04-04 NOTE — PRE-PROCEDURE
GENERAL PRE-PROCEDURE:   Procedure:  Cor angio, posible PCI, C  Date/Time:  4/4/2022 9:53 AM    Written consent obtained?: Yes    Risks and benefits: Risks, benefits and alternatives were discussed    Consent given by:  Patient  Patient states understanding of procedure being performed: Yes    Patient's understanding of procedure matches consent: Yes    Procedure consent matches procedure scheduled: Yes    Expected level of sedation:  Moderate  Appropriately NPO:  Yes  ASA Class:  3  Mallampati  :  Grade 1- soft palate, uvula, tonsillar pillars, and posterior pharyngeal wall visible  Lungs:  Other (comment)  Heart:  RRR  History & Physical reviewed:  History and physical reviewed and no updates needed  Statement of review:  I have reviewed the lab findings, diagnostic data, medications, and the plan for sedation

## 2022-04-04 NOTE — CONSULTS
Chart reviewed, asked to see patient for TF initiation.  Patient just admitted to ICU from cath lab s/p cardiac arrest and cardiogenic shock.  She is NPO on vent.    Will initiate Vital HP at 10 mL/hr = 240 kcal, 21 g protein, 27 g CHO, 0 g fiber, 201 mL H2O    Full nutrition assessment with TF advancements to follow 4/5/22.    Aruna Tristan RD, LD, [facility]   Clinical Dietitian - [facility]

## 2022-04-04 NOTE — PROCEDURES
Procedure Note:    Diagnosis:  Hemodynamic instability  Procedure:  Central line placement  Date:  04/04/22   Catheter type: triple lumen 7 Fr     Description:  Informed consent was obtained.  The left groin was prepped and draped in the usual sterile fashion and anesthetized with Lidocaine.  Using US guidance, the left femoral vein was accessed using Seldinger technique.  A dilator was advanced followed by insertion of the catheter over the wire.  The catheter was then secured with suture.  All ports aspirated and flushed easily.  A sterile dressing was applied.   Patient tolerated the procedure without complications.      Attempts: 2   Complication: Hematoma due to overlying artery and body habitus with resultant arterial stick on first attempt. Controlled with direct pressure.  Arterial line placement deferred secondary to the hematoma.        Kendrick Morataya MD  [facility] Fellow

## 2022-04-04 NOTE — ANESTHESIA CARE TRANSFER NOTE
Patient: Ingrid Powell    Procedure: * No procedures listed *       Diagnosis: * No pre-op diagnosis entered *  Diagnosis Additional Information: No value filed.    Anesthesia Type:   No value filed.     Note:    Oropharynx: endotracheal tube in place  Level of Consciousness: iatrogenic sedation      Independent Airway: airway patency not satisfactory and stable  Dentition: dentition unchanged      Patient transferred to: [facility]          Vitals:  Vitals Value Taken Time   BP     Temp     Pulse     Resp 18 04/04/22 1217   SpO2         Electronically Signed By: NORA Coleman CRNA  April 4, 2022  12:26 PM

## 2022-04-04 NOTE — Clinical Note
Hemodynamic equipment used: 5 lead ECG, [employer] With 3 Leads, Machine BP Cuff and pulse oximeter probe.

## 2022-04-04 NOTE — PRE-PROCEDURE
GENERAL PRE-PROCEDURE:   Procedure:  PPM implant  Date/Time:  4/4/2022 11:45 AM    Written consent obtained?: Yes    Risks and benefits: Risks, benefits and alternatives were discussed    Consent given by:  Patient  Patient states understanding of procedure being performed: Yes    Patient's understanding of procedure matches consent: Yes    Procedure consent matches procedure scheduled: Yes    Expected level of sedation:  Moderate  Appropriately NPO:  Yes  Mallampati  :  Grade 2- soft palate, base of uvula, tonsillar pillars, and portion of posterior pharyngeal wall visible  Lungs:  Lungs clear with good breath sounds bilaterally  Heart:  Normal heart sounds and rate  History & Physical reviewed:  History and physical reviewed and no updates needed  Statement of review:  I have reviewed the lab findings, diagnostic data, medications, and the plan for sedation

## 2022-04-04 NOTE — H&P
Critical Care  Note      04/04/2022    Name: Ingrid Powell MRN#: [id number]   Age: 76 year old YOB: 1945     Hsptl Day# 0  ICU DAY # 0    MV DAY # 0             Problem List:   Active Problems:    HTN (hypertension)    Status post coronary angiogram           Summary/Hospital Course:   76-year-old female with history of hypertension, hyperlipidemia, CAD, Raynaud's, MARGIE on CPAP, depression, anxiety, hypothyroidism status post resection of tonsillar chemo who was admitted to the ICU after cardiac arrest secondary to complete heart block.  Patient was undergoing a right heart catheterization when she went to complete heart block loss of pulse and required CPR.  Patient had a temporary pacemaker placed and was intubated for decreased respiratory drive and need to complete the procedure.  Patient was intubated placed on full ventilatory support.  Patient underwent a permanent pacemaker.  There is also concern for pericardial effusion and the patient is being monitored for cardiac tamponade at this time.  She was also found to have decreased EF on echo with ejection fraction around 10%.  She is  norepinephrine drip for ongoing hypotension.  Patient's angiogram also revealed a 70% proximal RCA lesion.  Patient was weaned off with epinephrine in the Cath Lab.  She was brought to the ICU intubated on full ventilatory support.  Of note her ET tube was noted at 18 cm, chest x-ray which was personally reviewed by me showed ET tube significantly above the raquel so I ordered ET tube to be advanced.        Assessment and plan :     Ingrid Powell IS a 76 year old female admitted on 4/4/2022 for status post cardiac arrest with heart block, permanent pacemaker placement, cardiogenic shock and pericardial effusion.   I have personally reviewed the daily labs, imaging studies, cultures and discussed the case with referring physician and consulting physicians.     My assessment and plan by system for this patient  is as follows:    Neurology/Psychiatry:   1.  Sedated    Plan  Patient currently on propofol for ICU sedation.  Not dyssynchronous with the ventilator we will continue to monitor.    Cardiovascular:   1.Hemodynamics -cardiogenic shock  2.Rhythm -paced rhythm  3. Ischemia -history of coronary artery disease.  Plan  Overall continue the patient with current pacemaker settings.  Appreciate cardiology input.  We will try to wean norepinephrine as tolerated.  Will discuss with cardiology service possible need for dobutamine given her decreased EF.  Continue to monitor signs of cardiac tamponade given pleural effusion    Pulmonary/Ventilator Management:   1. Airway intubated for airway protection and respiratory failure  2. Oxygenation/ventilation/mechanics currently on 70% FiO2 12 of PEEP.  Plan  -We will obtain a baseline arterial blood gas and wean PEEP and FiO2 as tolerated.  Goal would be to wean the patient 50% then to start decreasing PEEP in increments of 2.  Upon my review with her arterial blood gas there was a noted mild respiratory acidosis I increase patient's respiratory rate to 16.    GI and Nutrition :   1.  Concern for protein calorie malnutrition  2.  GI prophylaxis    Plan  -At this time patient will remain n.p.o., if the patient appears to have a prolonged course of intubation would consider placing nasojejunal tube and starting tube feeds.    Renal/Fluids/Electrolytes:   1.  Respiratory acidosis with normal lactic acid  2.  Electrolytes within normal limits  3.  Creatinine within normal limits  4.  Appears euvolemic  Plan  -Continue to monitor  - monitor function and electrolytes as needed with replacement per ICU protocols. - generally avoid nephrotoxic agents such as NSAID, IV contrast unless specifically required  - adjust medications as needed for renal clearance  - follow I/O's as appropriate.    Infectious Disease:   1.  No acute infectious concerns at this time      Endocrine:   1.  Concern for  stress-induced hyperglycemia    Plan  - ICU insulin protocol, goal sugar <180      Hematology/Oncology:   1.  White blood cells within normal limits  2.  No signs of anemia  3.  Plan  -Continue to monitor     IV/Access:   1. Venous access -femoral sheath in place  2. Arterial access -femoral arterial line in place  3.  Plan  - central access required and necessary      ICU Prophylaxis:   1. DVT: Mechanical given concern for cardiac tamponade  2. VAP: HOB 30 degrees, chlorhexidine rinse  3. Stress Ulcer: PPI/H2 blocker  4. Restraints: Nonviolent soft two point restraints required and necessary for patient safety and continued cares and good effect as patient continues to pull at necessary lines, tubes despite education and distraction. Will readdress daily.   5. Wound care  -local wound care  6. Feeding -n.p.o. for now  7. Family Update: No family at bedside  8. Disposition -ICU        Key goals for next 24 hours:   1.  Wean FiO2 and PEEP as tolerated  2.  Wean norepinephrine as tolerated  3.  Monitor signs for cardiac tamponade               Medical History:     Past Medical History:   Diagnosis Date     Arthritis      COPD (chronic obstructive pulmonary disease) (H)      Depressive disorder      Gastroesophageal reflux disease      History of lumbar surgery 7/28/2015     Hoarseness      Hypertension      MARGIE (obstructive sleep apnea), Severe      Oxygen dependent     at night     Reduced vision      Sleep apnea      Syncope      Syncope and collapse      Past Surgical History:   Procedure Laterality Date     BACK SURGERY       CARPAL TUNNEL RELEASE RT/LT      ?side     CHOLECYSTECTOMY       COLONOSCOPY N/A 4/8/2021    Procedure: COLONOSCOPY;  Surgeon: Walter Liang MD;  Location: [facility]     EYE SURGERY      cataracts     HERNIA REPAIR       INCISION AND DRAINAGE TRUNK, COMBINED  5/18/2012    Procedure:COMBINED INCISION AND DRAINAGE TRUNK; Incision and Drainage Abdominal Wall Abscess ; Surgeon:ALEXSANDER HANNON  JONAS; Location:[facility]     INSERT PORT VASCULAR ACCESS  2/8/2012    Procedure:INSERT PORT VASCULAR ACCESS; Surgeon:MARY JANE GUAN; [facility]     JOINT REPLACEMTN, KNEE RT/LT      bilateral     KNEE SURGERY  1995    left- total     KNEE SURGERY  2000    right- total     LAPAROSCOPIC APPENDECTOMY N/A 10/10/2015    Procedure: LAPAROSCOPIC APPENDECTOMY;  Surgeon: Daniel Chamberlain MD;  Location: [facility]     LARYNGOSCOPY, ESOPHAGOSCOPY,  BIOPSY, COMBINED  2/8/2012    Procedure:COMBINED LARYNGOSCOPY, ESOPHAGOSCOPY,  BIOPSY; Direct Laryngoscopy, Esophagoscopy with Biopsy, Stealth Assisted Left Frontal Sinus Biopsy via Vidal Incision, 8 [place] Power Port in right subclavian & Percutaneous Endoscopic Gastrostomy Placement * Latex Safe*; Surgeon:LIBORIO CAZARES; Location:[facility]     left ankle fusion       OPTICAL TRACKING SYSTEM ENDOSCOPIC SINUS SURGERY  2/8/2012    Procedure:OPTICAL TRACKING SYSTEM ENDOSCOPIC SINUS SURGERY; Surgeon:LIBORIO CAZARES; Location[facility]     RECTAL SURGERY      ? type     RELEASE DEQUERVAINS WRIST Left 5/11/2018    Procedure: RELEASE DEQUERVAINS WRIST;  Left 1st Distal compartment release;  Surgeon: Ronak Biggs MD;  Location: [facility]     REMOVE PORT VASCULAR ACCESS  8/21/2012    Procedure: REMOVE PORT VASCULAR ACCESS;  Remove Port Vascular Access ;  Surgeon: Phillip Ye MD;  Location: [facility]     REVERSE ARTHROPLASTY SHOULDER Left 11/6/2019    Procedure: Left Reverse Total shoulder arthroplasty;  Surgeon: Oliver Velázquez MD;  Location: [facility]     Social History     Socioeconomic History     Marital status:      Spouse name: Not on file     Number of children: Not on file     Years of education: Not on file     Highest education level: Not on file   Occupational History     Occupation: RETIRED   Tobacco Use     Smoking status: Former Smoker     Packs/day: 0.50     Years: 35.00     Pack years: 17.50     Types: Cigarettes     Quit date:  10/3/1995     Years since quittin.5     Smokeless tobacco: Never Used   Substance and Sexual Activity     Alcohol use: Yes     Comment: rare     Drug use: No     Sexual activity: Not Currently     Partners: Male   Other Topics Concern     Parent/sibling w/ CABG, MI or angioplasty before 65F 55M? Not Asked   Social History Narrative    2021    ENVIRONMENTAL HISTORY: The family [place] The home is heated with a boiler. They do not have central air conditioning. The patient's bedroom is furnished with carpeting in bedroom.  No pets. There is no history of cockroach or mice infestation. There is/are 0 smokers in the house.  The house does not have a basement.      Social Determinants of Health     Financial Resource Strain: Not on file   Food Insecurity: Not on file   Transportation Needs: Not on file   Physical Activity: Not on file   Stress: Not on file   Social Connections: Not on file   Intimate Partner Violence: Not on file   Housing Stability: Not on file        Allergies   Allergen Reactions     Dilaudid [Hydromorphone Hcl] Other (See Comments)     hallucinations     Fosamax [Alendronate Sodium] Rash            Norvasc [Amlodipine Besylate] Hives and Rash     Tegretol [Carbamazepine] Hives and Rash              Gonzalez Medications:       chlorhexidine  15 mL Mouth/Throat Q12H     [START ON 2022] fluticasone  2 spray Both Nostrils Daily     furosemide  20 mg Oral Daily     hypromellose-dextran  1 drop Both Eyes BID     levothyroxine  100 mcg Oral or Feeding Tube Daily     [START ON 2022] pantoprazole  40 mg Per Feeding Tube QAM AC    Or     [START ON 2022] pantoprazole (PROTONIX) IV  40 mg Intravenous QAM AC       sodium chloride          Home Meds  No current facility-administered medications on file prior to encounter.  albuterol (PROAIR HFA/PROVENTIL HFA/VENTOLIN HFA) 108 (90 Base) MCG/ACT inhaler, INHALE 2 PUFFS EVERY 6 HOURS  azelastine  (ASTELIN) 0.1 % nasal spray, Spray 2 sprays into both nostrils 2 times daily as needed for rhinitis  cyanocobalamin (VITAMIN B-12) 100 MCG tablet, Take 100 mcg by mouth daily  cycloSPORINE (RESTASIS) 0.05 % ophthalmic emulsion, Place 1 drop into both eyes 2 times daily  fluticasone (FLONASE) 50 MCG/ACT nasal spray, Spray 2 sprays into both nostrils daily  Fluticasone-Umeclidin-Vilanterol (TRELEGY ELLIPTA) 100-62.5-25 MCG/INH oral inhaler, Inhale 1 puff into the lungs daily  furosemide (LASIX) 20 MG tablet, Take 1 tablet (20 mg) by mouth daily  gabapentin (NEURONTIN) 300 MG capsule, TAKE 1 CAPSULE THREE TIMES DAILY  levothyroxine (SYNTHROID/LEVOTHROID) 100 MCG tablet, Take 1 tablet (100 mcg) by mouth daily  lisinopril (ZESTRIL) 5 MG tablet, Take 1 tablet (5 mg) by mouth daily  magnesium 250 MG tablet, Take 1 tablet by mouth every morning   metoprolol succinate ER (TOPROL-XL) 25 MG 24 hr tablet, Take 1 tablet (25 mg) by mouth daily  PARoxetine (PAXIL) 20 MG tablet, TAKE 1 TABLET AT BEDTIME  senna-docusate (SENOKOT-S/PERICOLACE) 8.6-50 MG tablet, Take 1-2 tablets by mouth 2 times daily  simvastatin (ZOCOR) 40 MG tablet, TAKE 1 TABLET AT BEDTIME  traZODone (DESYREL) 100 MG tablet, TAKE 1 TABLET EVERY NIGHT AS NEEDED FOR SLEEP  VITAMIN D, CHOLECALCIFEROL, PO, Take 2,000 Units by mouth daily.    cycloSPORINE (RESTASIS) 0.05 % ophthalmic emulsion, Place 1 drop into both eyes 2 times daily (Patient not taking: Reported on 3/23/2022)               Physical Examination:   Pulse:  [] 104  Resp:  [16-24] 16  BP: (138-152)/() 138/100  FiO2 (%):  [70 %-100 %] 70 %  SpO2:  [93 %-96 %] 94 %  No intake or output data in the 24 hours ending 04/04/22 1517  Wt Readings from Last 4 Encounters:   04/04/22 103.5 kg (228 lb 3.2 oz)   03/23/22 102.6 kg (226 lb 3.2 oz)   02/08/22 103.4 kg (228 lb)   01/25/22 102.5 kg (226 lb)     BP - Mean:  [113-114] 114  Vent Mode: CMV/AC  (Continuous Mandatory Ventilation/ Assist  Control)  FiO2 (%): 70 %  Resp Rate (Set): 16 breaths/min  Tidal Volume (Set, mL): 450 mL  PEEP (cm H2O): 12 cmH2O  Resp: 16    Recent Labs   Lab 04/04/22  1104 04/04/22  1029 04/04/22  1006   PH  --   --  7.36   PCO2 53* 47*  --    HCO3 24 23  --        GEN: no acute distress   HEENT: head ncat, sclera anicteric, OP patent, trachea midline   PULM: unlabored synchronous with vent, clear anteriorly    CV/COR: Paced rhythm,    ABD: soft nontender, hypoactive bowel sounds, no mass  EXT:  Edema   warm  NEURO: Consistent with chemical sedation, will grimace to pain   SKIN: no obvious rash  LINES: clean, dry intact         Data:   All data and imaging reviewed     ROUTINE ICU LABS (Last four results)  CMP  Recent Labs   Lab 04/04/22  0807      POTASSIUM 4.4   CHLORIDE 112*   CO2 26   ANIONGAP 1*   GLC 86   BUN 40*   CR 0.88   GFRESTIMATED 67   JOSÉ MIGUEL 8.4*     CBC  Recent Labs   Lab 04/04/22  0807   WBC 6.6   RBC 4.17   HGB 11.9   HCT 38.8   MCV 93   MCH 28.5   MCHC 30.7*   RDW 13.9        INR  Recent Labs   Lab 04/04/22  0807   INR 1.00     Arterial Blood Gas  Recent Labs   Lab 04/04/22  1104 04/04/22  1029 04/04/22  1006   PH  --   --  7.36   PCO2 53* 47*  --    HCO3 24 23  --        All cultures:  No results for input(s): CULT in the last 168 hours.  Recent Results (from the past 24 hour(s))   Cardiac Catheterization    Narrative      Sustained complete heart block during right heart cath requiring CPR and   temporary pacemaker insertion with plan for emergent permanent pacemaker   placement.    Severely elevated right and left sided filling pressures in the setting   of severe cardiomyopathy (new diagnosis - stress vs ischemic vs   multi-factorial).    60% mid LAD lesion, 75% proximal RCA lesion (calcified) and   non-obstructive atherosclerosis in the LCX.      Echo Complete   Result Value    LVEF  20-25%    Narrative    [id number]  [id number]  [id number]  [id number]^FRANCISCAJUSTIN^RANULFO     [facility]  [facility]  Echocardiography Laboratory  [address] [place] [id number]     Name: REYES LOUIS  MRN: [id number]  : 1945  Study Date: 2022 10:32 AM  Age: 76 yrs  Gender: Female  Patient Location: [facility] [place]  Reason For Study: Complete AV block  Ordering Physician: RANULFO BRINK  Referring Physician: Jose Manuel Sutton  Performed By: Asher Najera RDCS     BSA: 2.1 m2  Height: 65 in  Weight: 228 lb  HR: 80  BP: 152/84 mmHg  ______________________________________________________________________________  Procedure  Complete Portable Echo Adult.  ______________________________________________________________________________  Interpretation Summary     Left ventricular systolic function is severely reduced.  The visual ejection fraction is 20-25%.  There is a pacemaker lead in the right ventricle.  Small to medium size localized pericardial effusion noted that measures 1.8cm  along inferolateral wall. No echo evidence of tamponade.  A mobile echodensity is noted in the vicinity of pacer lead as it traverses  the right atrium. This could represent chiari network when compared to prior  echo images. Vegetation/clot unlikely as this lead was inserted today.  Correlate clinically.  EF is lower compared to prior studyf rom 3/21.  The study was technically difficult.  ______________________________________________________________________________  Left Ventricle  The left ventricle is normal in size. Left ventricular systolic function is  severely reduced. The visual ejection fraction is 20-25%. Left ventricular  diastolic function is indeterminate. Septal motion is consistent with  conduction abnormality. There is severe global hypokinesia of the left  ventricle. There is apical akinesis.     Right Ventricle  The right ventricle is normal size. The right ventricular systolic function is  normal. There is a pacemaker lead in the right ventricle.     Atria  The left atrium is mildly dilated. Right  atrial size is normal.     Mitral Valve  The mitral valve leaflets are moderately thickened. Posterior leaflet is  thickened and calcified. The leaflet motion is restricted. There is mild to  moderate (1-2+) mitral regurgitation.     Tricuspid Valve  There is mild to moderate (1-2+) tricuspid regurgitation. The right  ventricular systolic pressure is approximated at 45.7 mmHg plus the right  atrial pressure. Right ventricular systolic pressure is elevated, consistent  with moderate pulmonary hypertension.     Aortic Valve  The aortic valve is not well visualized.     Pulmonic Valve  The pulmonic valve is not well visualized.     Vessels  The aortic root is normal size. IVC catheter noted.     Pericardium  Small to medium size localized pericardial effusion noted that measures 1.8cm  along inferolateral wall. Small posterior pericardial effusion.     Rhythm  The rhythm was atrial fibrillation.  ______________________________________________________________________________  MMode/2D Measurements & Calculations  IVSd: 0.99 cm     LVIDd: 5.7 cm  LVIDs: 4.4 cm  LVPWd: 1.4 cm  FS: 23.7 %  LV mass(C)d: 294.4 grams  LV mass(C)dI: 140.8 grams/m2  Ao root diam: 3.2 cm  LA dimension: 4.1 cm  asc Aorta Diam: 3.2 cm  LA/Ao: 1.3  LVOT diam: 1.7 cm  LVOT area: 2.4 cm2  LA Volume (BP): 75.5 ml  RWT: 0.50     Doppler Measurements & Calculations  MV E max sincere: 124.7 cm/sec  MV dec slope: 961.4 cm/sec2  LV V1 max P.9 mmHg  LV V1 max: 85.9 cm/sec  LV V1 VTI: 11.9 cm  SV(LVOT): 28.5 ml  SI(LVOT): 13.6 ml/m2  TR max sincere: 337.8 cm/sec  TR max P.7 mmHg  E/E' av.0     Lateral E/e': 17.0  Medial E/e': 21.0     ______________________________________________________________________________  Report approved by: Nick Tinsley 2022 01:44 PM           I I personally reviewed the patient's chest x-ray which shows ET tube significantly above the raquel.  We advance the ET tube 3 cm and will repeat chest x-ray.      Billing:  This patient is critically ill: yes. Total critical care time today 31 min.

## 2022-04-04 NOTE — PROGRESS NOTES
Patient underwent LHC/RHC today, and procedure was complicated by cardiac arrest associated with complete AV block.  A temporary pacer wire was placed, and subsequently she underwent biventricular pacemaker implantation.      Prior to pacemaker implantation, she was on pressors (Levophed and dobutamine) and an echo showed a small to moderate size pericardial effusion without signs of tamponade. During placement of the LV lead, coronary sinus dissection was observed.  Patient's hemodynamics were unchanged and she remained stable throughout the procedure.  LV lead was successfully placed.      After pacemaker implantation, a bedside echo was repeated in the EP lab, and a small increase in the pericardial effusion size (anterior portion of the RV) was noticed.  Pericardial effusion was then monitored  by echo in the EP lab for an additional hour.  During this period, patient remained stable, there was no change in pericardial effusion size and we were able to wean off dobutamine. There were no signs of pericardial tamponade. Case was discussed with the interventional team and pericardiocentesis was not indicated.      An echo will be repeated later today for close monitoring.    Jose Fairbanks MD

## 2022-04-04 NOTE — PROGRESS NOTES
Care Suites Admission Nursing Note    Patient Information  Name: Ingrid Powell  Age: 76 year old  Reason for admission: [facility]  Care Suites arrival time: 0743    Visitor Information  Name: Lida  Informed of visitor restrictions: Yes  1 visitor allowed per patient   Visitor must screen negative for COVID symptoms   Visitor must wear a mask  Waiting rooms closed to visitors    Patient Admission/Assessment   Pre-procedure assessment complete: Yes  If abnormal assessment/labs, provider notified: N/A  NPO: Yes  Medications held per instructions/orders: Yes  Consent: deferred  If applicable, pregnancy test status: deferred  Patient oriented to room: Yes  Education/questions answered: Yes  Plan/other: proceed as scheduled    Discharge Planning  Discharge name/phone number: Lida [phone]  Overnight post sedation caregiver: Sister  Discharge location: home    Sherri Chanel RN       PATIENT/VISITOR WELLNESS SCREENING    Step 1 Patient Screening    1. In the last month, have you been in contact with someone who was confirmed or suspected to have Coronavirus/COVID-19? No    2. Do you have the following symptoms?  Fever/Chills? No   Cough? No   Shortness of breath? No   New loss of taste or smell? No  Sore throat? No  Muscle or body aches? No  Headaches? No  Fatigue? No  Vomiting or diarrhea? No    Step 2 Visitor Screening    1. Name of Visitor (1 visitor per patient): Lida    2. In the last month, have you been in contact with someone who was confirmed or suspected to have Coronavirus/COVID-19? No    3. Do you have the following symptoms?  Fever/Chills? No   Cough? No   Shortness of breath? No   Skin rash? No   Loss of taste or smell? No  Sore throat? No  Runny or stuffy nose? No  Muscle or body aches? No  Headaches? No  Fatigue? No  Vomiting or diarrhea? No    If the visitor has positive symptoms, notify supervisor/manger  Per policy, the visitor will need to leave the facility     Step 3 Refer to logic grid below for  actions    NO SYMPTOM(S)    ACTIONS:  1. Standard rooming process  2. Provider to assess per normal protocol  3. Implement precautions as needed and per guidelines     POSITIVE SYMPTOM(S)  If positive for ANY of the following symptoms: fever, cough, shortness of breath, rash    ACTION:  1. Continue to have the patient wear a mask   2. Room patient as soon as possible  3. Don appropriate PPE when entering room  4. Provider evaluation

## 2022-04-04 NOTE — Clinical Note
Potential access sites were evaluated for patency using ultrasound.   The left subclavian vein was selected. Access was obtained under with Fluoroscopic guidance using a micropuncture 21 gauge needle with direct visualization of needle entry.

## 2022-04-04 NOTE — Clinical Note
Generator attached  VISIONIST X4 CRT-P, MODEL U228 Implanted  [id number]   Used: 1;     Laterality: Missing;     Exp Date: 02/23/2024;     Time Implanted: 1300;     Implanted by: None;     Date Implanted: 4/4/2022

## 2022-04-04 NOTE — PROGRESS NOTES
[facility] ICU RESPIRATORY NOTE        Date of Admission: 4/4/2022    Date of Intubation (most recent): 4/4/2022.    Reason for Mechanical Ventilation: Airway protection/decreased respiratory drive.     Number of Days on Mechanical Ventilation: Initial day.    Met Criteria for Spontaneous Breathing Trial: No.    Reason for No Spontaneous Breathing Trial: Per MD, first day on mechanical ventilation.    Significant Events Today: Patient coded, intubated, underwent permanent pacemaker, brought to ICU for further evaluation.     ABG Results:   Recent Labs   Lab 04/04/22  1545 04/04/22  1104 04/04/22  1029 04/04/22  1006   PH 7.29*  --   --  7.36   PCO2 47* 53* 47*  --    PO2 84  --   --   --    HCO3 23 24 23  --    O2PER 70  --   --   --        Current Vent Settings: Vent Mode: CMV/AC  (Continuous Mandatory Ventilation/ Assist Control)  FiO2 (%): 100 %  Resp Rate (Set): 16 breaths/min  Tidal Volume (Set, mL): 450 mL  PEEP (cm H2O): 12 cmH2O  Resp: 17      Skin Assessment: Clean/Dry/Intact.    Plan: Continue to monitor patient on the full ventilatory settings listed above. RT to continue to follow.     Bobby Roque, RT

## 2022-04-04 NOTE — PROGRESS NOTES
Patient transported to ICU via raquel transport ventilator on the following settings:    CMV/AC:   Rate: 16/min  TV: 450 mL  FiO2: 70%  PEEP: 12 cmH2O    Tube is size 7.0 mm located at 18cm at the lips. Original location per Anesthesia was 21 at the teeth/gums. Advise for x-ray to determine proper placement.

## 2022-04-04 NOTE — CONSULTS
Interventional cardiology    Ingrid Powell is a very nice 76-year-old woman who was referred for coronary angiogram, left heart catheterization and right heart catheterization for evaluation of severe exertional dyspnea.  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  She was recently seen in cardiology clinic on 3/23/2022 and at that time a bedside echo was done but the images were not saved but reportedly the LV and RV function appeared normal and she had a small pericardial effusion and a normal IVC.  She has a baseline left bundle branch block.  Please also see complete cardiology consult from 3/23/22.    After obtaining right femoral arterial and venous access a Suffield catheter was inserted into the right heart and she developed complete heart block.  The Suffield was immediately removed.  She received transcutaneous pacing, CPR, atropine and a milligram of epinephrine and a temporary pacemaker was placed.  She regained perfusion with the epinephrine but subsequently continued to have episodic complete heart block and significant hypotension.  Her bedside echo showed severe left ventricular dysfunction and probably moderate mitral and tricuspid regurgitation and a small to moderate but probably not hemodynamically significant pericardial effusion (formal review pending).  She was intubated by anesthesia.  Her coronary angiogram was done which showed moderate to severe mid LAD stenosis and severe proximal RCA stenosis but coronary disease is out of proportion to her cardiomyopathy.    Assessment  1.  Severe cardiomyopathy with systolic heart failure with shock EF visually estimated to be 15%, RV function appears grossly normal.   2.  Intermittent complete heart block  3.  Coronary disease out of proportion to her cardiomyopathy.  Severe  stenosis proxRCA, moderate to severe stenosis midLAD.   4.  Small to moderate pericardial effusion  5.  Elevated right and left filling pressures LVEDP 15mmHg, CVP 15mmHg.    Recommendations  1. Electrophysiology has been consulted.  Plan for permanent biventricular pacemaker to be placed today.  This plan was discussed with the patient's daughter, Lida,  2. Admit to the ICU for management of heart failure with cardiogenic shock.  3. Right heart cath was not done and will not be done at this time due to complete heart block.  4. Long-term plan treatment of her chronic CAD will likely be for PCI of the proxRCA and hemodynamic assessment of the midLAD.  This can likely be re-addressed after her acute hospitalization.     >1 hour critical care time.  ICU and EP MDs and patient's family have been updated.

## 2022-04-04 NOTE — CODE/RAPID RESPONSE
[facility]    CODE BLUE NOTE  4/4/2022   Time Called: 10:16    Code Status: Prior     I was called to evaluate Ingrid Powell, who is a 76 year old female who was admitted today for a coronary angiogram and right heart cath for progressive dyspnea. PMH includes HTN, HLD, CAD, Raynaud's, MARGIE on CPAP, depression, anxiety, hypothyroidism, tonsil cancer s/p resection, XRT, and chemo.    Assessment & Plan      Cardiac arrest 2/2 Complete Heart Block  Cardiogenic Shock 2/2 New Cardiomyopathy (unknown cause)    Upon arrival CPR was no longer in progress, as ROSC was obtained.  Prior to my arrival patient received some compressions, one dose of epi, and atropine. Per Dr. Valentine, when the right heart cath was being done, the patient went into a complete heart block, and lost a pulse requiring placement of a temporary pacemaker.    On initial assessment patient is awake and following simple commands, able to verbalize appropriately. Patient became more lethargic and breathing more shallow. Because her procedure was not yet complete, and she had decreased respiratory drive, decision was made to have anesthesia intubate for airway protection. Initial vent settings included RR 14, PEEP 5, , and FiO2 of 100%. Vent settings were adjusted based on ABG (7.25/53/61/24), with final settings being RR 18, PEEP 12, , FiO2 100%. Propofol drip was initiated for post intubation sedation.    Patient was started on epinephrine drip, and levophed drip for ongoing hypotension. 1000 ml bolus was started, but stopped due to concern for pulmonary edema. 20 mg IV lasix ordered per Dr. Valentine. Bedside echo was remarkable for severe left ventricular dysfunction, moderate mitral and tricuspid regurgitation, a small pericardial effusion, and EF < 15%. Patient's angiogram revealed 60% mid LAD lesion, 70% proximal RCA lesion, and non-obstructive atherosclerosis in the LCX. Patient was also found to have  elevated right and left filling pressures LVEDP 15 mmHg, and CVP 15 mmHg.     Patient was able to be weaned off the epi drip, but remained on norepinephrine drip. After angio patient was transferred to [facility] with Dr. Fairbanks for permanent pacemaker placement. Dr. Valentine updated the patient's daughter Lida, and discussed patient's case with the intensivist Dr. New. Patient will be transferred to the intensive care unit after pacemaker placement.      [facility]  Hospitalist Service - House ROSALINA  Pager: [phone] (7a - 6p)      Physical Exam   Vital Signs with Ranges:  Pulse:  [63-70] 63  Resp:  [18-20] 18  BP: (141-152)/() 152/84  FiO2 (%):  [100 %] 100 %  SpO2:  [95 %] 95 %  No intake/output data recorded.                    IMAGING: (X-ray/CT/MRI)   Recent Results (from the past 24 hour(s))   Cardiac Catheterization    Narrative      Sustained complete heart block during right heart cath requiring CPR and   temporary pacemaker insertion with plan for emergent permanent pacemaker   placement.    Severely elevated right and left sided filling pressures in the setting   of severe cardiomyopathy (new diagnosis - stress vs ischemic vs   multi-factorial).    60% mid LAD lesion, 70% proximal RCA lesion (calcified) and   non-obstructive atherosclerosis in the LCX.    Once clinically stabilized, plan to return to cath lab for RCA PCI and   iFR of LAD with possible intervention.          CBC with Diff:  Recent Labs   Lab Test 04/04/22  0807   WBC 6.6   HGB 11.9   MCV 93      INR 1.00      No results found for: RETICABSCT  No results found for: RETP    Comprehensive Metabolic Panel:  Recent Labs   Lab 04/04/22  0807      POTASSIUM 4.4   CHLORIDE 112*   CO2 26   ANIONGAP 1*   GLC 86   BUN 40*   CR 0.88   GFRESTIMATED 67   JOSÉ MIGUEL 8.4*

## 2022-04-04 NOTE — PROGRESS NOTES
Potassium noted to be elevated at 5.8.  Will follow serial potassium  Restart Hugo Ramos MD  Critical Care Medicine

## 2022-04-05 ENCOUNTER — APPOINTMENT (OUTPATIENT)
Dept: GENERAL RADIOLOGY | Facility: CLINIC | Age: 77
DRG: 242 | End: 2022-04-05
Attending: INTERNAL MEDICINE
Payer: MEDICARE

## 2022-04-05 LAB
ANION GAP SERPL CALCULATED.3IONS-SCNC: 6 MMOL/L (ref 3–14)
BUN SERPL-MCNC: 38 MG/DL (ref 7–30)
CALCIUM SERPL-MCNC: 8.8 MG/DL (ref 8.5–10.1)
CHLORIDE BLD-SCNC: 109 MMOL/L (ref 94–109)
CO2 SERPL-SCNC: 23 MMOL/L (ref 20–32)
CREAT SERPL-MCNC: 0.88 MG/DL (ref 0.52–1.04)
ERYTHROCYTE [DISTWIDTH] IN BLOOD BY AUTOMATED COUNT: 13.7 % (ref 10–15)
GFR SERPL CREATININE-BSD FRML MDRD: 67 ML/MIN/1.73M2
GLUCOSE BLD-MCNC: 153 MG/DL (ref 70–99)
GLUCOSE BLDC GLUCOMTR-MCNC: 132 MG/DL (ref 70–99)
GLUCOSE BLDC GLUCOMTR-MCNC: 133 MG/DL (ref 70–99)
HCT VFR BLD AUTO: 41.1 % (ref 35–47)
HGB BLD-MCNC: 13 G/DL (ref 11.7–15.7)
MCH RBC QN AUTO: 29.2 PG (ref 26.5–33)
MCHC RBC AUTO-ENTMCNC: 31.6 G/DL (ref 31.5–36.5)
MCV RBC AUTO: 92 FL (ref 78–100)
PLATELET # BLD AUTO: 295 10E3/UL (ref 150–450)
POTASSIUM BLD-SCNC: 4.1 MMOL/L (ref 3.4–5.3)
POTASSIUM BLD-SCNC: 4.6 MMOL/L (ref 3.4–5.3)
POTASSIUM BLD-SCNC: 5 MMOL/L (ref 3.4–5.3)
POTASSIUM BLD-SCNC: 5 MMOL/L (ref 3.4–5.3)
RBC # BLD AUTO: 4.45 10E6/UL (ref 3.8–5.2)
SODIUM SERPL-SCNC: 138 MMOL/L (ref 133–144)
WBC # BLD AUTO: 15.5 10E3/UL (ref 4–11)

## 2022-04-05 PROCEDURE — 250N000011 HC RX IP 250 OP 636: Performed by: ANESTHESIOLOGY

## 2022-04-05 PROCEDURE — 250N000009 HC RX 250: Performed by: ANESTHESIOLOGY

## 2022-04-05 PROCEDURE — 84132 ASSAY OF SERUM POTASSIUM: CPT | Performed by: ANESTHESIOLOGY

## 2022-04-05 PROCEDURE — 250N000013 HC RX MED GY IP 250 OP 250 PS 637: Performed by: ANESTHESIOLOGY

## 2022-04-05 PROCEDURE — 250N000011 HC RX IP 250 OP 636: Performed by: INTERNAL MEDICINE

## 2022-04-05 PROCEDURE — 94003 VENT MGMT INPAT SUBQ DAY: CPT

## 2022-04-05 PROCEDURE — 80048 BASIC METABOLIC PNL TOTAL CA: CPT | Performed by: STUDENT IN AN ORGANIZED HEALTH CARE EDUCATION/TRAINING PROGRAM

## 2022-04-05 PROCEDURE — 999N000065 XR CHEST 2 VW

## 2022-04-05 PROCEDURE — 250N000013 HC RX MED GY IP 250 OP 250 PS 637: Performed by: INTERNAL MEDICINE

## 2022-04-05 PROCEDURE — 999N000185 HC STATISTIC TRANSPORT TIME EA 15 MIN

## 2022-04-05 PROCEDURE — 200N000001 HC R&B ICU

## 2022-04-05 PROCEDURE — 99207 PR SC NO CHARGE VISIT: CPT | Performed by: INTERNAL MEDICINE

## 2022-04-05 PROCEDURE — 93010 ELECTROCARDIOGRAM REPORT: CPT | Performed by: INTERNAL MEDICINE

## 2022-04-05 PROCEDURE — 85049 AUTOMATED PLATELET COUNT: CPT

## 2022-04-05 PROCEDURE — 999N000009 HC STATISTIC AIRWAY CARE

## 2022-04-05 PROCEDURE — 999N000157 HC STATISTIC RCP TIME EA 10 MIN

## 2022-04-05 PROCEDURE — 99291 CRITICAL CARE FIRST HOUR: CPT | Performed by: ANESTHESIOLOGY

## 2022-04-05 PROCEDURE — 99233 SBSQ HOSP IP/OBS HIGH 50: CPT | Mod: 24 | Performed by: INTERNAL MEDICINE

## 2022-04-05 PROCEDURE — 93005 ELECTROCARDIOGRAM TRACING: CPT

## 2022-04-05 PROCEDURE — 99231 SBSQ HOSP IP/OBS SF/LOW 25: CPT | Mod: 24 | Performed by: INTERNAL MEDICINE

## 2022-04-05 RX ORDER — DEXMEDETOMIDINE HYDROCHLORIDE 4 UG/ML
.1-1.2 INJECTION, SOLUTION INTRAVENOUS CONTINUOUS
Status: DISCONTINUED | OUTPATIENT
Start: 2022-04-05 | End: 2022-04-06

## 2022-04-05 RX ORDER — MILRINONE LACTATE 0.2 MG/ML
0.12 INJECTION, SOLUTION INTRAVENOUS CONTINUOUS
Status: DISCONTINUED | OUTPATIENT
Start: 2022-04-05 | End: 2022-04-06

## 2022-04-05 RX ORDER — AMINO AC/PROTEIN HYDR/WHEY PRO 10G-100/30
2 LIQUID (ML) ORAL 3 TIMES DAILY
Status: DISCONTINUED | OUTPATIENT
Start: 2022-04-05 | End: 2022-04-06

## 2022-04-05 RX ADMIN — Medication 0.23 MCG/KG/MIN: at 13:00

## 2022-04-05 RX ADMIN — DEXMEDETOMIDINE HYDROCHLORIDE 1.2 MCG/KG/HR: 400 INJECTION INTRAVENOUS at 18:34

## 2022-04-05 RX ADMIN — DEXMEDETOMIDINE HYDROCHLORIDE 1.2 MCG/KG/HR: 400 INJECTION INTRAVENOUS at 15:49

## 2022-04-05 RX ADMIN — Medication 40 MG: at 09:07

## 2022-04-05 RX ADMIN — FUROSEMIDE 20 MG: 10 INJECTION, SOLUTION INTRAVENOUS at 04:20

## 2022-04-05 RX ADMIN — MILRINONE LACTATE IN DEXTROSE 0.12 MCG/KG/MIN: 200 INJECTION, SOLUTION INTRAVENOUS at 12:17

## 2022-04-05 RX ADMIN — Medication 2 PACKET: at 18:29

## 2022-04-05 RX ADMIN — OXYCODONE HYDROCHLORIDE AND ACETAMINOPHEN 1 TABLET: 5; 325 TABLET ORAL at 11:11

## 2022-04-05 RX ADMIN — PROPOFOL 25 MCG/KG/MIN: 10 INJECTION, EMULSION INTRAVENOUS at 10:32

## 2022-04-05 RX ADMIN — LEVOTHYROXINE SODIUM 100 MCG: 100 TABLET ORAL at 09:07

## 2022-04-05 RX ADMIN — CHLORHEXIDINE GLUCONATE 15 ML: 1.2 SOLUTION ORAL at 20:52

## 2022-04-05 RX ADMIN — OXYCODONE HYDROCHLORIDE AND ACETAMINOPHEN 1 TABLET: 5; 325 TABLET ORAL at 18:51

## 2022-04-05 RX ADMIN — DEXMEDETOMIDINE HYDROCHLORIDE 0.1 MCG/KG/HR: 400 INJECTION INTRAVENOUS at 12:45

## 2022-04-05 RX ADMIN — DEXTRAN 70, GLYCERIN, HYPROMELLOSE 1 DROP: 1; 2; 3 SOLUTION/ DROPS OPHTHALMIC at 20:54

## 2022-04-05 RX ADMIN — Medication 2 PACKET: at 21:58

## 2022-04-05 RX ADMIN — DOPAMINE HYDROCHLORIDE IN DEXTROSE 4 MCG/KG/MIN: 1.6 INJECTION, SOLUTION INTRAVENOUS at 07:50

## 2022-04-05 RX ADMIN — Medication 0.25 MCG/KG/MIN: at 02:41

## 2022-04-05 RX ADMIN — PROPOFOL 20 MCG/KG/MIN: 10 INJECTION, EMULSION INTRAVENOUS at 04:19

## 2022-04-05 RX ADMIN — FUROSEMIDE 20 MG: 10 INJECTION, SOLUTION INTRAVENOUS at 15:49

## 2022-04-05 RX ADMIN — DEXTRAN 70, GLYCERIN, HYPROMELLOSE 1 DROP: 1; 2; 3 SOLUTION/ DROPS OPHTHALMIC at 09:12

## 2022-04-05 RX ADMIN — DEXMEDETOMIDINE HYDROCHLORIDE 1.2 MCG/KG/HR: 400 INJECTION INTRAVENOUS at 21:58

## 2022-04-05 RX ADMIN — CHLORHEXIDINE GLUCONATE 15 ML: 1.2 SOLUTION ORAL at 09:07

## 2022-04-05 ASSESSMENT — ACTIVITIES OF DAILY LIVING (ADL)
ADLS_ACUITY_SCORE: 21
ADLS_ACUITY_SCORE: 23
ADLS_ACUITY_SCORE: 21
ADLS_ACUITY_SCORE: 23
ADLS_ACUITY_SCORE: 21
ADLS_ACUITY_SCORE: 23
ADLS_ACUITY_SCORE: 23
ADLS_ACUITY_SCORE: 21

## 2022-04-05 NOTE — PLAN OF CARE
Pt arrived from Cath lab 14:35 accompanied by RN and RT via cart, transferred into bed with total assist and hover mat, pt remains intubated on ventilator, sedated, on levophed with dopamine added per orders, see MAR, triple lumen CVC placed for vasopressor support and venous sheath removed per orders, pt arouses to voice on sedation vacation, follows commands and moves all extremities equally niru, plan to titrate pressors as able overnight with goal of weaning vent tomorrow.  Continuing to closely monitor

## 2022-04-05 NOTE — PROGRESS NOTES
In the process of weaning vasopressors and sedation drips.  Patient currently off propofol and Precedex infusing with RASS 0/-1.  Milrinone initiated with immediate drop in BP.  Attempting to wean down norepi and Dopamine.  Per Dr. Ramos, attempt to wean off dopamine before norepi.

## 2022-04-05 NOTE — PROGRESS NOTES
EP CARDIOLOGY PROGRESS NOTE  Milla Hoffmann MD (pager [phone])    76-year-old female who was electively admitted yesterday for outpatient cardiac catheterization to investigate exertional dyspnea.  History of hypertension, dyslipidemia, Raynaud's, MARGIE with CPAP, hypothyroidism, tonsil cancer, LBBB.    During her cardiac procedure yesterday she developed complete AV block requiring resuscitation and placement of a temporary pacemaker.  She was found to have severe LV dysfunction with EF 15% and was in cardiogenic shock.  She had CAD disproportionate to the degree of LV dysfunction.  She had a small to moderate pericardial effusion.    A CRT-P was implanted by Dr. Fairbanks soon thereafter.      Today she remains intubated in the ICU.  Device function is normal today.  Chest x-ray shows good lead position and no pneumothorax.  She has a pressure dressing but there does not appear to be significant hematoma.  The dressing can be removed tomorrow morning.    EP is signing off.  We will make arrangements for device follow-up post discharge.  General cardiology will follow Ms. Powell while inpatient.

## 2022-04-05 NOTE — CONSULTS
"CLINICAL NUTRITION SERVICES  -  ASSESSMENT NOTE      Recommendations Ordered by Registered Dietitian (RD): Increase TF to Vital HP at 15 mL/hr x 22 hours per day (holding for Synthroid) = 330 kcal, 29 g protein, 37 g CHO, 0 g fiber, 276 mL H2O  Add ProSource 2 pkts TID= 240 kcal and 66 g protein   Total (TF + ProSource + Propofol)= 979 kcal (9 kcal/kg and 89% needs), 95 g protein (1.7 g/kg)  Flushes 60 mL every 4 hours   Certavite daily    Malnutrition: % Weight Loss:  None noted  % Intake:  Unable to determine   Subcutaneous Fat Loss:  None observed  Muscle Loss:  None observed  Fluid Retention:  None noted    Malnutrition Diagnosis: Patient does not meet two of the above criteria necessary for diagnosing malnutrition        REASON FOR ASSESSMENT  Ingrid Powell is a 76 year old female seen by Registered Dietitian for Provider Order - Registered Dietitian to Assess and Order TF per Medical Nutrition Therapy Protocol      NUTRITION HISTORY  - Unable to obtain nutrition history as patient intubated and sedated.        CURRENT NUTRITION ORDERS  Diet Order:     NPO   TF was started last night (trickle) and continues as follows ~    Nutrition Support Enteral:  Type of Feeding Tube: NG  Enteral Frequency:  Continuous  Enteral Regimen: Vital HP at 10 mL/hr x 22 hours per day (holding TF 1 hour before and 1 hour after Synthroid administration)  Total Enteral Provisions: 220 kcal, 19 g protein (0.3 g/kg), 24 g CHO, 0 g fiber, 184 mL H2O  Free Water Flush: 60 mL every 4 hours  Propofol at 15.5 mL/hr= 409 kcal  Total = 629 kcal (6 kcal/kg)      Current Intake/Tolerance:  Patient tolerating above trickle TF s/o any documented intolerance        NUTRITION FOCUSED PHYSICAL ASSESSMENT FOR DIAGNOSING MALNUTRITION)  Yes          Observed:    No nutrition-related physical findings observed    Obtained from Chart/Interdisciplinary Team:  None     ANTHROPOMETRICS  Height: 5'5\"  Weight: 103.5 kg (228#)(4/4)  Body mass index is 37.9 " kg/m   Weight Status:  Obesity Grade II BMI 35-39.9  IBW: 56.8 kg   % IBW: 182%  Weight History:   Wt Readings from Last 10 Encounters:   04/05/22 105.4 kg (232 lb 5.8 oz)   03/23/22 102.6 kg (226 lb 3.2 oz)   02/08/22 103.4 kg (228 lb)   01/25/22 102.5 kg (226 lb)   12/01/21 101.2 kg (223 lb)   11/19/21 97.3 kg (214 lb 8.1 oz)   10/12/21 98 kg (216 lb)   04/08/21 94.8 kg (209 lb)   04/02/21 94.8 kg (209 lb)   03/18/21 94.8 kg (209 lb)     Weight up overall     LABS  Labs reviewed    MEDICATIONS:  Synthroid (hold TF 1 hour before and 1 hour after administration)  Pressor x 2    Propofol at 15.5 mL/hr= 409 kcal     ASSESSED NUTRITION NEEDS PER APPROVED PRACTICE GUIDELINES:    Dosing Weight 103.5 kg (energy) and 56.8 kg (protein)  Estimated Energy Needs: 9710-4490 kcals (11-14 Kcal/Kg)  Justification: obese and vented  Estimated Protein Needs:  grams protein (1.5-2 g pro/Kg)  Justification: hypercatabolism with critical illness and obesity guidelines   Estimated Fluid Needs: 8210-6792 mL (1 mL/Kcal)  Justification: maintenance    MALNUTRITION:  % Weight Loss:  None noted  % Intake:  Unable to determine   Subcutaneous Fat Loss:  None observed  Muscle Loss:  None observed  Fluid Retention:  None noted    Malnutrition Diagnosis: Patient does not meet two of the above criteria necessary for diagnosing malnutrition    NUTRITION DIAGNOSIS:  Inadequate enteral nutrition infusion related to TF at trickle TF as evidenced by meeting 50% energy and 20% protein needs from current regimen       NUTRITION INTERVENTIONS  Recommendations / Nutrition Prescription  Increase TF to Vital HP at 15 mL/hr x 22 hours per day (holding for Synthroid) = 330 kcal, 29 g protein, 37 g CHO, 0 g fiber, 276 mL H2O  Add ProSource 2 pkts TID= 240 kcal and 66 g protein   Total (TF + ProSource + Propofol)= 979 kcal (9 kcal/kg and 89% needs), 95 g protein (1.7 g/kg)  Flushes 60 mL every 4 hours   Certavite daily     Implementation  Nutrition  education: Not appropriate at this time due to patient condition  EN Composition, EN Schedule, Medical Food Supplement, Multivitamin/Mineral:  Orders written to increase TF as above, add ProSource, and add Certavite   Collaboration and Referral of Nutrition care:  Patient discussed today during interdisciplinary bedside rounds    Nutrition Goals  TF + ProSource + Propofol will meet % needs     MONITORING AND EVALUATION:  Progress towards goals will be monitored and evaluated per protocol and Practice Guidelines    Aruna Tristan RD, LD, CNSC   Clinical Dietitian - [facility]

## 2022-04-05 NOTE — PROGRESS NOTES
[facility] ICU RESPIRATORY NOTE        Date of Admission: 4/5/2022    Date of Intubation (most recent): 4/4/22    Reason for Mechanical Ventilation: procedural     Number of Days on Mechanical Ventilation: 2    Met Criteria for Spontaneous Breathing Trial: No    Reason for No Spontaneous Breathing Trial: Per MD    Significant Events Today: None to note     ABG Results:   Recent Labs   Lab 04/04/22  1545 04/04/22  1104 04/04/22  1029 04/04/22  1006   PH 7.29*  --   --  7.36   PCO2 47* 53* 47*  --    PO2 84  --   --   --    HCO3 23 24 23  --    O2PER 70  --   --   --        Current Vent Settings: Vent Mode: CMV/AC  (Continuous Mandatory Ventilation/ Assist Control)  FiO2 (%): 50 %  Resp Rate (Set): 16 breaths/min  Tidal Volume (Set, mL): 450 mL  PEEP (cm H2O): 8 cmH2O  Resp: 10      Skin Assessment: intact     Plan: pt to remain on ventilatory support     José Miguel Esparza, RT

## 2022-04-05 NOTE — CONSULTS
Pt stable at the moment. Drip rates see Flow sheet. OGT placed, CXR in room. Will Await CXR verification via CXR before use.  Bedside report given to RUPAL Bosch

## 2022-04-05 NOTE — H&P
Critical Care  Note      04/05/2022    Name: Ingrid Powell MRN#: [id number]   Age: 76 year old YOB: 1945     [facility]    [facility] DAY # 1             Problem List:   Active Problems:    HTN (hypertension)    Status post coronary angiogram           Summary/Hospital Course:   76-year-old female with history of hypertension, hyperlipidemia, CAD, Raynaud's, MARGIE on CPAP, depression, anxiety, hypothyroidism status post resection of tonsillar chemo who was admitted to the ICU after cardiac arrest secondary to complete heart block.  Patient was undergoing a right heart catheterization when she went to complete heart block loss of pulse and required CPR.  Patient had a temporary pacemaker placed and was intubated for decreased respiratory drive and need to complete the procedure.  Patient was intubated placed on full ventilatory support.  Patient underwent a permanent pacemaker.  There is also concern for pericardial effusion and the patient is being monitored for cardiac tamponade at this time.  Events of last 24 hours noted including no signs of cardiac tamponade.  Patient will be transitioned off of propofol and Levophed to milrinone and Precedex.  Overall the patient's neurologic status seems to be improving she will follow commands.  No signs of hematoma at femoral A-line site.        Assessment and plan :     Ingrid Powell IS a 76 year old female admitted on 4/4/2022 for status post cardiac arrest with heart block, permanent pacemaker placement, cardiogenic shock and pericardial effusion.   I have personally reviewed the daily labs, imaging studies, cultures and discussed the case with referring physician and consulting physicians.     My assessment and plan by system for this patient is as follows:    Neurology/Psychiatry:   1.  Sedated    Plan  Concerned that propofol is adding to her vasoplegia, will transition to Precedex.  Neurologically postarrest patient is doing well she is  following commands in all 4 extremities.    Cardiovascular:   1.Hemodynamics -cardiogenic shock  2.Rhythm -paced rhythm  3. Ischemia -history of coronary artery disease.  Plan  Overall continue the patient with current pacemaker settings.  Appreciate cardiology input.  Patient still on dopamine and norepinephrine.  Discussion with cardiology to transition patient to milrinone for the patient's heart failure.  Hopefully between adding milrinone and stopping propofol we will be able to wean some of the vasopressor support.    Pulmonary/Ventilator Management:   1. Airway intubated for airway protection and respiratory failure  2. Oxygenation/ventilation/mechanics currently on 70% FiO2 12 of PEEP.  Plan  -We will obtain a baseline arterial blood gas and wean PEEP and FiO2 as tolerated.  Goal would be to wean the patient 50% then to start decreasing PEEP in increments of 2.  Patient is tolerating weaning of FiO2 and PEEP at this time.  We will continue with goal of getting FiO2 less than 50% and PEEP to 8 today.  If patient tolerates spontaneous breathing trial would consider extubation.      GI and Nutrition :   1.  Concern for protein calorie malnutrition  2.  GI prophylaxis    Plan  -At this time patient will remain n.p.o., if the patient appears to have a prolonged course of intubation would consider placing nasojejunal tube and starting tube feeds.    Renal/Fluids/Electrolytes:   1.  Respiratory acidosis progressing as expected  2.  Hyperkalemia is resolving  3.  Creatinine within normal limits  4.  Appears euvolemic  Plan  -Continue to monitor  - monitor function and electrolytes as needed with replacement per ICU protocols. - generally avoid nephrotoxic agents such as NSAID, IV contrast unless specifically required  - adjust medications as needed for renal clearance  - follow I/O's as appropriate.    Infectious Disease:   1.  No acute infectious concerns at this time      Endocrine:   1.  Concern for  stress-induced hyperglycemia    Plan  - ICU insulin protocol, goal sugar <180      Hematology/Oncology:   1.  White blood cells within normal limits  2.  No signs of anemia  3.  Plan  -Continue to monitor     IV/Access:   1. Venous access -femoral triple-lumen in place  2. Arterial access -femoral arterial line in place  3.  Plan  - central access required and necessary      ICU Prophylaxis:   1. DVT: Mechanical given concern for cardiac tamponade  2. VAP: HOB 30 degrees, chlorhexidine rinse  3. Stress Ulcer: PPI/H2 blocker  4. Restraints: Nonviolent soft two point restraints required and necessary for patient safety and continued cares and good effect as patient continues to pull at necessary lines, tubes despite education and distraction. Will readdress daily.   5. Wound care  -local wound care  6. Feeding -n.p.o. for now  7. Family Update: Discussed with daughter at bedside, the need for nasojejunal tube and nutritional consult.  We will also discuss possible wean the patient from mechanical ventilation.  8. Disposition -ICU        Key goals for next 24 hours:   1.  Wean FiO2 and PEEP as tolerated  2.  Add milrinone  3.  Transition from propofol to Precedex               Medical History:     Past Medical History:   Diagnosis Date     Arthritis      COPD (chronic obstructive pulmonary disease) (H)      Depressive disorder      Gastroesophageal reflux disease      History of lumbar surgery 7/28/2015     Hoarseness      Hypertension      MARGIE (obstructive sleep apnea), Severe      Oxygen dependent     at night     Reduced vision      Sleep apnea      Syncope      Syncope and collapse      Past Surgical History:   Procedure Laterality Date     BACK SURGERY       CARPAL TUNNEL RELEASE RT/LT      ?side     CHOLECYSTECTOMY       COLONOSCOPY N/A 4/8/2021    Procedure: COLONOSCOPY;  Surgeon: Walter Liang MD;  Location: [facility]     CV CORONARY ANGIOGRAM N/A 4/4/2022    Procedure: Coronary Angiogram;  Surgeon: Meme  MD Yu;  Location:  HEART CARDIAC CATH LAB     CV LEFT HEART CATH N/A 4/4/2022    Procedure: Left Heart Catheterization;  Surgeon: Yu Valentine MD;  Location:  HEART CARDIAC CATH LAB     CV RIGHT HEART CATH MEASUREMENTS RECORDED N/A 4/4/2022    Procedure: Right Heart Catheterization;  Surgeon: Yu Valentine MD;  Location:  HEART CARDIAC CATH LAB     CV TEMPORARY PACEMAKER INSERTION N/A 4/4/2022    Procedure: Temporary Pacemaker Insertion;  Surgeon: Yu Valentine MD;  Location: [facility] CARDIAC CATH LAB     EYE SURGERY      cataracts     HERNIA REPAIR       INCISION AND DRAINAGE TRUNK, COMBINED  5/18/2012    Procedure:COMBINED INCISION AND DRAINAGE TRUNK; Incision and Drainage Abdominal Wall Abscess ; Surgeon:ALEXSANDER HANNON; Location:[facility]     INSERT PORT VASCULAR ACCESS  2/8/2012    Procedure:INSERT PORT VASCULAR ACCESS; Surgeon:MARY JANE GUAN; [facility]     JOINT REPLACEMTN, KNEE RT/LT      bilateral     KNEE SURGERY  1995    left- total     KNEE SURGERY  2000    right- total     LAPAROSCOPIC APPENDECTOMY N/A 10/10/2015    Procedure: LAPAROSCOPIC APPENDECTOMY;  Surgeon: Daniel Chamberlain MD;  Location: [facility]     LARYNGOSCOPY, ESOPHAGOSCOPY,  BIOPSY, COMBINED  2/8/2012    Procedure:COMBINED LARYNGOSCOPY, ESOPHAGOSCOPY,  BIOPSY; Direct Laryngoscopy, Esophagoscopy with Biopsy, Stealth Assisted Left Frontal Sinus Biopsy via Vidal Incision, 8 Turkmen Power Port in right subclavian & Percutaneous Endoscopic Gastrostomy Placement * Latex Safe*; Surgeon:LIBORIO CAZARES; Location:[facility]     left ankle fusion       OPTICAL TRACKING SYSTEM ENDOSCOPIC SINUS SURGERY  2/8/2012    Procedure:OPTICAL TRACKING SYSTEM ENDOSCOPIC SINUS SURGERY; Surgeon:LIBORIO CAZARES; Location:[facility]     RECTAL SURGERY      ? type     RELEASE DEQUERVAINS WRIST Left 5/11/2018    Procedure: RELEASE DEQUERVAINS WRIST;  Left 1st Distal compartment release;  Surgeon: Ronak Biggs MD;   Location: [facility]     REMOVE PORT VASCULAR ACCESS  2012    Procedure: REMOVE PORT VASCULAR ACCESS;  Remove Port Vascular Access ;  Surgeon: Phillip Ye MD;  Location: [facility]     REVERSE ARTHROPLASTY SHOULDER Left 2019    Procedure: Left Reverse Total shoulder arthroplasty;  Surgeon: Oliver Velázquez MD;  Location: [facility]     Social History     Socioeconomic History     Marital status:      Spouse name: Not on file     Number of children: Not on file     Years of education: Not on file     Highest education level: Not on file   Occupational History     Occupation: RETIRED   Tobacco Use     Smoking status: Former Smoker     Packs/day: 0.50     Years: 35.00     Pack years: 17.50     Types: Cigarettes     Quit date: 10/3/1995     Years since quittin.5     Smokeless tobacco: Never Used   Substance and Sexual Activity     Alcohol use: Yes     Comment: rare     Drug use: No     Sexual activity: Not Currently     Partners: Male   Other Topics Concern     Parent/sibling w/ CABG, MI or angioplasty before 65F 55M? Not Asked   Social History Narrative    2021    ENVIRONMENTAL HISTORY: The [place] The home is heated with a boiler. They do not have central air conditioning. The patient's bedroom is furnished with carpeting in bedroom.  No pets. There is no history of cockroach or mice infestation. There is/are 0 smokers in the house.  The house does not have a basement.      Social Determinants of Health     Financial Resource Strain: Not on file   Food Insecurity: Not on file   Transportation Needs: Not on file   Physical Activity: Not on file   Stress: Not on file   Social Connections: Not on file   Intimate Partner Violence: Not on file   Housing Stability: Not on file        Allergies   Allergen Reactions     Dilaudid [Hydromorphone Hcl] Other (See Comments)     hallucinations     Fosamax [Alendronate Sodium] Rash            Norvasc  [Amlodipine Besylate] Hives and Rash     Tegretol [Carbamazepine] Hives and Rash              Gonzalez Medications:       chlorhexidine  15 mL Mouth/Throat Q12H     fluticasone  2 spray Both Nostrils Daily     furosemide  20 mg Intravenous Q12H     hypromellose-dextran  1 drop Both Eyes BID     levothyroxine  100 mcg Oral or Feeding Tube Daily     pantoprazole  40 mg Per Feeding Tube QAM AC    Or     pantoprazole (PROTONIX) IV  40 mg Intravenous QAM AC       dextrose       DOPamine 4 mcg/kg/min (04/05/22 0750)     norepinephrine 0.25 mcg/kg/min (04/05/22 0730)     propofol (DIPRIVAN) infusion 25 mcg/kg/min (04/05/22 1032)     sodium chloride 20 mL/hr at 04/05/22 0028        Home Meds  No current facility-administered medications on file prior to encounter.  albuterol (PROAIR HFA/PROVENTIL HFA/VENTOLIN HFA) 108 (90 Base) MCG/ACT inhaler, INHALE 2 PUFFS EVERY 6 HOURS  azelastine (ASTELIN) 0.1 % nasal spray, Spray 2 sprays into both nostrils 2 times daily as needed for rhinitis  cyanocobalamin (VITAMIN B-12) 100 MCG tablet, Take 100 mcg by mouth daily  cycloSPORINE (RESTASIS) 0.05 % ophthalmic emulsion, Place 1 drop into both eyes 2 times daily  fluticasone (FLONASE) 50 MCG/ACT nasal spray, Spray 2 sprays into both nostrils daily  Fluticasone-Umeclidin-Vilanterol (TRELEGY ELLIPTA) 100-62.5-25 MCG/INH oral inhaler, Inhale 1 puff into the lungs daily  furosemide (LASIX) 20 MG tablet, Take 1 tablet (20 mg) by mouth daily  gabapentin (NEURONTIN) 300 MG capsule, TAKE 1 CAPSULE THREE TIMES DAILY  levothyroxine (SYNTHROID/LEVOTHROID) 100 MCG tablet, Take 1 tablet (100 mcg) by mouth daily  lisinopril (ZESTRIL) 5 MG tablet, Take 1 tablet (5 mg) by mouth daily  magnesium 250 MG tablet, Take 1 tablet by mouth every morning   metoprolol succinate ER (TOPROL-XL) 25 MG 24 hr tablet, Take 1 tablet (25 mg) by mouth daily  PARoxetine (PAXIL) 20 MG tablet, TAKE 1 TABLET AT BEDTIME  senna-docusate (SENOKOT-S/PERICOLACE) 8.6-50 MG tablet, Take  1-2 tablets by mouth 2 times daily  simvastatin (ZOCOR) 40 MG tablet, TAKE 1 TABLET AT BEDTIME  traZODone (DESYREL) 100 MG tablet, TAKE 1 TABLET EVERY NIGHT AS NEEDED FOR SLEEP  VITAMIN D, CHOLECALCIFEROL, PO, Take 2,000 Units by mouth daily.    cycloSPORINE (RESTASIS) 0.05 % ophthalmic emulsion, Place 1 drop into both eyes 2 times daily (Patient not taking: Reported on 3/23/2022)               Physical Examination:   Temp:  [96.4  F (35.8  C)-100.6  F (38.1  C)] 100.4  F (38  C)  Pulse:  [] 114  Resp:  [10-24] 16  BP: ()/() 110/74  MAP:  [44 mmHg-268 mmHg] 88 mmHg  Arterial Line BP: ()/() 122/69  FiO2 (%):  [50 %-100 %] 50 %  SpO2:  [91 %-100 %] 98 %  No intake or output data in the 24 hours ending 04/04/22 1517  Wt Readings from Last 4 Encounters:   04/05/22 105.4 kg (232 lb 5.8 oz)   03/23/22 102.6 kg (226 lb 3.2 oz)   02/08/22 103.4 kg (228 lb)   01/25/22 102.5 kg (226 lb)     Arterial Line BP: ()/() 122/69  MAP:  [44 mmHg-268 mmHg] 88 mmHg  BP - Mean:  [] 85  Vent Mode: CMV/AC  (Continuous Mandatory Ventilation/ Assist Control)  FiO2 (%): 50 %  Resp Rate (Set): 16 breaths/min  Tidal Volume (Set, mL): 450 mL  PEEP (cm H2O): 8 cmH2O  Resp: 16    Recent Labs   Lab 04/04/22  1545 04/04/22  1104 04/04/22  1029 04/04/22  1006   PH 7.29*  --   --  7.36   PCO2 47* 53* 47*  --    PO2 84  --   --   --    HCO3 23 24 23  --    O2PER 70  --   --   --        GEN: no acute distress   HEENT: head ncat, sclera anicteric, OP patent, trachea midline   PULM: unlabored synchronous with vent, clear anteriorly    CV/COR: Paced rhythm,    ABD: soft nontender, hypoactive bowel sounds, no mass  EXT:  Edema   warm  NEURO: Follows commands in all 4 extremities  SKIN: no obvious rash  LINES: clean, dry intact         Data:   All data and imaging reviewed     ROUTINE ICU LABS (Last four results)  CMP  Recent Labs   Lab 04/05/22  0847 04/05/22  0507 04/04/22  2201 04/04/22 2003 04/04/22  1545  04/04/22  0807   NA  --  138  --   --  133 139   POTASSIUM  --  5.0  5.0 5.1  --  5.8* 4.4   CHLORIDE  --  109  --   --  107 112*   CO2  --  23  --   --  23 26   ANIONGAP  --  6  --   --  3 1*   * 153*  --  118* 196*  188* 86   BUN  --  38*  --   --  39* 40*   CR  --  0.88  --   --  0.87 0.88   GFRESTIMATED  --  67  --   --  68 67   JOSÉ MIGUEL  --  8.8  --   --  8.9 8.4*   PROTTOTAL  --   --   --   --  7.2  --    ALBUMIN  --   --   --   --  3.4  --    BILITOTAL  --   --   --   --  0.2  --    ALKPHOS  --   --   --   --  104  --    AST  --   --   --   --  78*  --    ALT  --   --   --   --  71*  --      CBC  Recent Labs   Lab 04/05/22  0507 04/04/22  2201 04/04/22  1545 04/04/22  0807   WBC 15.5*  --  15.8* 6.6   RBC 4.45  --  4.71 4.17   HGB 13.0 13.8 13.8 11.9   HCT 41.1  --  44.1 38.8   MCV 92  --  94 93   MCH 29.2  --  29.3 28.5   MCHC 31.6  --  31.3* 30.7*   RDW 13.7  --  13.7 13.9     --  331 205     INR  Recent Labs   Lab 04/04/22  0807   INR 1.00     Arterial Blood Gas  Recent Labs   Lab 04/04/22  1545 04/04/22  1104 04/04/22  1029 04/04/22  1006   PH 7.29*  --   --  7.36   PCO2 47* 53* 47*  --    PO2 84  --   --   --    HCO3 23 24 23  --    O2PER 70  --   --   --        All cultures:  No results for input(s): CULT in the last 168 hours.  Recent Results (from the past 24 hour(s))   Cardiac Catheterization    Narrative      Sustained complete heart block during right heart cath requiring CPR and   temporary pacemaker insertion with plan for emergent permanent pacemaker   placement.    Severely elevated right and left sided filling pressures in the setting   of severe cardiomyopathy (new diagnosis - stress vs ischemic vs   multi-factorial).    60% mid LAD lesion, 75% proximal RCA lesion (calcified) and   non-obstructive atherosclerosis in the LCX.      Echo Complete   Result Value    LVEF  20-25%    Narrative    [id number]  [id number]  [id number]  [id number]^KEENA^RANULFO     [facility]  [facility]  Echocardiography Laboratory  [address] [place] [id number]     Name: REYES LOUIS  MRN: [id number]  : 1945  Study Date: 2022 10:32 AM  Age: 76 yrs  Gender: Female  Patient Location: [facility]  Reason For Study: Complete AV block  Ordering Physician: RANULFO BRINK  Referring Physician: Jose Manuel Sutton  Performed By: Asher Najera RDCS     BSA: 2.1 m2  Height: 65 in  Weight: 228 lb  HR: 80  BP: 152/84 mmHg  ______________________________________________________________________________  Procedure  Complete Portable Echo Adult.  ______________________________________________________________________________  Interpretation Summary     Left ventricular systolic function is severely reduced.  The visual ejection fraction is 20-25%.  There is a pacemaker lead in the right ventricle.  Small to medium size localized pericardial effusion noted that measures 1.8cm  along inferolateral wall. No echo evidence of tamponade.  A mobile echodensity is noted in the vicinity of pacer lead as it traverses  the right atrium. This could represent chiari network when compared to prior  echo images. Vegetation/clot unlikely as this lead was inserted today.  Correlate clinically.  EF is lower compared to prior studyf rom 3/21.  The study was technically difficult.  ______________________________________________________________________________  Left Ventricle  The left ventricle is normal in size. Left ventricular systolic function is  severely reduced. The visual ejection fraction is 20-25%. Left ventricular  diastolic function is indeterminate. Septal motion is consistent with  conduction abnormality. There is severe global hypokinesia of the left  ventricle. There is apical akinesis.     Right Ventricle  The right ventricle is normal size. The right ventricular systolic function is  normal. There is a pacemaker lead in the right ventricle.     Atria  The left atrium is mildly dilated. Right  atrial size is normal.     Mitral Valve  The mitral valve leaflets are moderately thickened. Posterior leaflet is  thickened and calcified. The leaflet motion is restricted. There is mild to  moderate (1-2+) mitral regurgitation.     Tricuspid Valve  There is mild to moderate (1-2+) tricuspid regurgitation. The right  ventricular systolic pressure is approximated at 45.7 mmHg plus the right  atrial pressure. Right ventricular systolic pressure is elevated, consistent  with moderate pulmonary hypertension.     Aortic Valve  The aortic valve is not well visualized.     Pulmonic Valve  The pulmonic valve is not well visualized.     Vessels  The aortic root is normal size. IVC catheter noted.     Pericardium  Small to medium size localized pericardial effusion noted that measures 1.8cm  along inferolateral wall. Small posterior pericardial effusion.     Rhythm  The rhythm was atrial fibrillation.  ______________________________________________________________________________  MMode/2D Measurements & Calculations  IVSd: 0.99 cm     LVIDd: 5.7 cm  LVIDs: 4.4 cm  LVPWd: 1.4 cm  FS: 23.7 %  LV mass(C)d: 294.4 grams  LV mass(C)dI: 140.8 grams/m2  Ao root diam: 3.2 cm  LA dimension: 4.1 cm  asc Aorta Diam: 3.2 cm  LA/Ao: 1.3  LVOT diam: 1.7 cm  LVOT area: 2.4 cm2  LA Volume (BP): 75.5 ml  RWT: 0.50     Doppler Measurements & Calculations  MV E max sincere: 124.7 cm/sec  MV dec slope: 961.4 cm/sec2  LV V1 max P.9 mmHg  LV V1 max: 85.9 cm/sec  LV V1 VTI: 11.9 cm  SV(LVOT): 28.5 ml  SI(LVOT): 13.6 ml/m2  TR max sincere: 337.8 cm/sec  TR max P.7 mmHg  E/E' av.0     Lateral E/e': 17.0  Medial E/e': 21.0     ______________________________________________________________________________  Report approved by: Nick Tinsley 2022 01:44 PM               Billing: This patient is critically ill: yes. Total critical care time today 32 min.

## 2022-04-05 NOTE — PROGRESS NOTES
Shift Summary: Pt stable throughout shift. Some restlessness experienced, prop increased. Vent settings weaned, pt currently tolerating.   Neuro: Follows commands. PERRLA. Some restlessness.   CV: 100% Paced.   Resp: Diminished. Tolerating vent  GI: BS hypo, no BM. Tube feeds at trickle rate   : Mejía intact, adequate output.   Pain: Denies pain.   Activity: Assist of 2    Gtt: Prop @ 25, Dopamine @ 4. Levo @ 0.25  Skin: L femoral bruising, soft to touch, nontender. Lt side chest incision CDI.      Plan:  Continue with plan of cares. Cards to reevaluate conditions.

## 2022-04-05 NOTE — PROGRESS NOTES
Cardiology Progress Note  [facility], [facility]          Assessment and Plan:     1.  Severe cardiomyopathy with systolic heart failure with shock EF visually estimated to be 15%, RV function appears grossly normal.   2.  Intermittent complete heart block, S/P biV PM 4/4/22  3.  Coronary disease out of proportion to her cardiomyopathy.  Severe stenosis proxRCA, moderate to severe stenosis mid-LAD.   4.  Small pericardial effusion, stable  5.  Elevated right and left filling pressures LVEDP 15mmHg, CVP 15mmHg.    Would recommend switching NE to milrinone or dobutamine.  Have also discussed switching from propafol for sedation as this may contributing to hypotension.      Once she is extubated and hemodynamically stable, than can gradually start goal-directed therapy and plan for long-term management.  Would like her to be on ASA and statin when NG in place.     Remove groin arterial access.    Will continue to follow.     ROSENDO Skaggs MD             Interval History:   Ingrid Powell is a  76-year-old woman who was referred for outpatient coronary angiogram, left heart catheterization and right heart catheterization for evaluation of severe exertional dyspnea 4/4/22.  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      During her RHC yesterday she went into CHB and had transient CPR and then biV PM placement after it was determined that her EF was around 15%.   She was intubated during the event and started on pressors for hypotension.  She remains intubated today and was started on antibiotics.    She is awake, however.  She is on NE at 0.25. Her coronary angiogram was done which showed moderate to severe mid LAD stenosis and severe proximal RCA stenosis.  These were not intervened on.                 Medications:       chlorhexidine  15 mL Mouth/Throat Q12H     fluticasone  2 spray Both Nostrils Daily     furosemide  20 mg Intravenous Q12H     hypromellose-dextran  1 drop Both Eyes BID     levothyroxine  100 mcg Oral or Feeding Tube Daily     pantoprazole  40 mg Per Feeding Tube QAM AC    Or     pantoprazole (PROTONIX) IV  40 mg Intravenous QAM AC     protein modular  2 packet Per Feeding Tube TID     acetaminophen, bisacodyl, dextrose, glucose **OR** dextrose **OR** glucagon, fentaNYL, HOLD MEDICATION, HOLD MEDICATION, HOLD MEDICATION, HOLD MEDICATION, magnesium hydroxide, midazolam, naloxone **OR** naloxone **OR** naloxone **OR** naloxone, ondansetron **OR** ondansetron, oxyCODONE-acetaminophen, prochlorperazine **OR** prochlorperazine **OR** prochlorperazine, senna-docusate **OR** senna-docusate               Physical Exam:   Blood pressure 125/79, pulse 91, temperature 100.4  F (38  C), resp. rate 16, weight 105.4 kg (232 lb 5.8 oz), SpO2 98 %, not currently breastfeeding.  Wt Readings from Last 4 Encounters:   22 105.4 kg (232 lb 5.8 oz)   22 102.6 kg (226 lb 3.2 oz)   22 103.4 kg (228 lb)   22 102.5 kg (226 lb)         Vital Sign Ranges  Temperature Temp  Av.7  F (37.1  C)  Min: 96.4  F (35.8  C)  Max: 100.6  F (38.1  C)   Blood pressure Systolic (24hrs), Av , Min:79 , Max:134        Diastolic (24hrs), Av, Min:54, Max:94      Pulse Pulse  Av.8  Min: 68  Max: 120   Respirations Resp  Avg: 15.8  Min: 10  Max: 20   Pulse oximetry SpO2  Av.5 %  Min: 91 %  Max: 100 %         Intake/Output Summary (Last 24 hours) at 2022 1532  Last data filed at 2022 1336  Gross per 24 hour   Intake 2666.15 ml   Output 1706 ml   Net 960.15 ml       Constitutional: Awake, alert, intubated   Lungs: Good aeration bilaterally   Cardiovascular: Regular rate and rhythm   Abdomen: Normal bowel sounds, soft, non-distended, non-tender   Skin: No rashes, no cyanosis   Other:            Data:     Recent Labs   Lab Test 04/05/22  0507 04/04/22 2201 04/04/22  1545 04/04/22  0807   WBC 15.5*  --  15.8* 6.6   HGB 13.0 13.8 13.8 11.9   MCV 92  --  94 93     --  331 205   INR  --   --   --  1.00      Recent Labs   Lab Test 04/05/22  1215 04/05/22  1211 04/05/22  0847 04/05/22 0507 04/04/22 2201 04/04/22 2003 04/04/22  1545 04/04/22  0807   NA  --   --   --  138  --   --  133 139   POTASSIUM  --  4.1  --  5.0  5.0 5.1  --  5.8* 4.4   CHLORIDE  --   --   --  109  --   --  107 112*   CO2  --   --   --  23  --   --  23 26   BUN  --   --   --  38*  --   --  39* 40*   CR  --   --   --  0.88  --   --  0.87 0.88   ANIONGAP  --   --   --  6  --   --  3 1*   JOSÉ MIGUEL  --   --   --  8.8  --   --  8.9 8.4*   *  --  133* 153*  --    < > 196*  188* 86    < > = values in this interval not displayed.         Jair Skaggs MD

## 2022-04-05 NOTE — PROGRESS NOTES
[facility] Progress Note  I met with Ingrid's daughter this afternoon at pt's bedside. She shared how she is still processing all that happened in the last day. She is hopeful for pt's recovery and finds strength in that hope. She reiterated that pt would not want intubation for a long time, but that a temporary status is in line with her wishes.     No other emotional or spiritual needs today.       Reno Albrecht  Associate

## 2022-04-05 NOTE — PROGRESS NOTES
[facility] ICU RESPIRATORY NOTE        Date of Admission: 4/4/2022    Date of Intubation (most recent):  4/4/22    Reason for Mechanical Ventilation: AW protection    Number of Days on Mechanical Ventilation: 2    Met Criteria for Spontaneous Breathing Trial: No    Reason for No Spontaneous Breathing Trial: Peep still at +8    Significant Events Today: None    ABG Results:   Recent Labs   Lab 04/04/22  1545 04/04/22  1104 04/04/22  1029 04/04/22  1006   PH 7.29*  --   --  7.36   PCO2 47* 53* 47*  --    PO2 84  --   --   --    HCO3 23 24 23  --    O2PER 70  --   --   --        Current Vent Settings: Vent Mode: CMV/AC  (Continuous Mandatory Ventilation/ Assist Control)  FiO2 (%): 50 %  Resp Rate (Set): 16 breaths/min  Tidal Volume (Set, mL): 450 mL  PEEP (cm H2O): 8 cmH2O  Resp: 10      Plan:  Pt to remain on full vent support overnight    José Miguel Esparza, RT

## 2022-04-06 ENCOUNTER — HOSPITAL ENCOUNTER (INPATIENT)
Facility: CLINIC | Age: 77
LOS: 12 days | Discharge: HOSPICE | End: 2022-04-18
Attending: ANESTHESIOLOGY | Admitting: ANESTHESIOLOGY
Payer: MEDICARE

## 2022-04-06 VITALS
SYSTOLIC BLOOD PRESSURE: 67 MMHG | OXYGEN SATURATION: 97 % | BODY MASS INDEX: 38.63 KG/M2 | DIASTOLIC BLOOD PRESSURE: 40 MMHG | HEART RATE: 76 BPM | RESPIRATION RATE: 18 BRPM | TEMPERATURE: 100.4 F | WEIGHT: 232.14 LBS

## 2022-04-06 DIAGNOSIS — R52 PAIN: ICD-10-CM

## 2022-04-06 DIAGNOSIS — Z51.5 HOSPICE CARE PATIENT: ICD-10-CM

## 2022-04-06 DIAGNOSIS — H04.123 DRY EYES: Primary | ICD-10-CM

## 2022-04-06 PROBLEM — R06.09 DOE (DYSPNEA ON EXERTION): Status: ACTIVE | Noted: 2022-04-06

## 2022-04-06 LAB
GLUCOSE BLDC GLUCOMTR-MCNC: 132 MG/DL (ref 70–99)
GLUCOSE BLDC GLUCOMTR-MCNC: 138 MG/DL (ref 70–99)
GLUCOSE BLDC GLUCOMTR-MCNC: 142 MG/DL (ref 70–99)
GLUCOSE BLDC GLUCOMTR-MCNC: 143 MG/DL (ref 70–99)

## 2022-04-06 PROCEDURE — 999N000157 HC STATISTIC RCP TIME EA 10 MIN

## 2022-04-06 PROCEDURE — 250N000013 HC RX MED GY IP 250 OP 250 PS 637: Performed by: INTERNAL MEDICINE

## 2022-04-06 PROCEDURE — 110N000005 HC R&B HOSPICE, ACCENT

## 2022-04-06 PROCEDURE — 250N000011 HC RX IP 250 OP 636: Performed by: ANESTHESIOLOGY

## 2022-04-06 PROCEDURE — 250N000011 HC RX IP 250 OP 636: Performed by: STUDENT IN AN ORGANIZED HEALTH CARE EDUCATION/TRAINING PROGRAM

## 2022-04-06 PROCEDURE — 99222 1ST HOSP IP/OBS MODERATE 55: CPT | Performed by: ANESTHESIOLOGY

## 2022-04-06 PROCEDURE — 99233 SBSQ HOSP IP/OBS HIGH 50: CPT | Mod: 24 | Performed by: INTERNAL MEDICINE

## 2022-04-06 PROCEDURE — 94003 VENT MGMT INPAT SUBQ DAY: CPT

## 2022-04-06 PROCEDURE — 99207 PR NO BILLABLE SERVICE THIS VISIT: CPT | Mod: GV | Performed by: INTERNAL MEDICINE

## 2022-04-06 PROCEDURE — 250N000009 HC RX 250: Performed by: ANESTHESIOLOGY

## 2022-04-06 PROCEDURE — 250N000013 HC RX MED GY IP 250 OP 250 PS 637: Performed by: ANESTHESIOLOGY

## 2022-04-06 RX ORDER — LORAZEPAM 2 MG/ML
1 CONCENTRATE ORAL EVERY 4 HOURS PRN
Status: DISCONTINUED | OUTPATIENT
Start: 2022-04-06 | End: 2022-04-12

## 2022-04-06 RX ORDER — ACETAMINOPHEN 325 MG/1
650 TABLET ORAL EVERY 4 HOURS PRN
Status: CANCELLED | OUTPATIENT
Start: 2022-04-06

## 2022-04-06 RX ORDER — BISACODYL 10 MG
10 SUPPOSITORY, RECTAL RECTAL DAILY PRN
Status: CANCELLED | OUTPATIENT
Start: 2022-04-06

## 2022-04-06 RX ORDER — ONDANSETRON 4 MG/1
4 TABLET, ORALLY DISINTEGRATING ORAL EVERY 6 HOURS PRN
Status: DISCONTINUED | OUTPATIENT
Start: 2022-04-06 | End: 2022-04-18 | Stop reason: HOSPADM

## 2022-04-06 RX ORDER — ONDANSETRON 4 MG/1
4 TABLET, ORALLY DISINTEGRATING ORAL EVERY 6 HOURS PRN
Status: CANCELLED | OUTPATIENT
Start: 2022-04-06

## 2022-04-06 RX ORDER — ATROPINE SULFATE 10 MG/ML
2 SOLUTION/ DROPS OPHTHALMIC EVERY 4 HOURS PRN
Status: CANCELLED | OUTPATIENT
Start: 2022-04-06

## 2022-04-06 RX ORDER — FLUTICASONE PROPIONATE 50 MCG
2 SPRAY, SUSPENSION (ML) NASAL DAILY
Status: CANCELLED | OUTPATIENT
Start: 2022-04-07

## 2022-04-06 RX ORDER — ONDANSETRON 4 MG/1
4 TABLET, ORALLY DISINTEGRATING ORAL EVERY 6 HOURS PRN
Status: DISCONTINUED | OUTPATIENT
Start: 2022-04-06 | End: 2022-04-06 | Stop reason: HOSPADM

## 2022-04-06 RX ORDER — ONDANSETRON 2 MG/ML
4 INJECTION INTRAMUSCULAR; INTRAVENOUS EVERY 6 HOURS PRN
Status: CANCELLED | OUTPATIENT
Start: 2022-04-06

## 2022-04-06 RX ORDER — LORAZEPAM 2 MG/ML
1 CONCENTRATE ORAL EVERY 4 HOURS PRN
Status: CANCELLED | OUTPATIENT
Start: 2022-04-06

## 2022-04-06 RX ORDER — HALOPERIDOL 2 MG/ML
2 SOLUTION ORAL EVERY 6 HOURS PRN
Status: CANCELLED | OUTPATIENT
Start: 2022-04-06

## 2022-04-06 RX ORDER — FENTANYL CITRATE 50 UG/ML
25 INJECTION, SOLUTION INTRAMUSCULAR; INTRAVENOUS
Status: CANCELLED | OUTPATIENT
Start: 2022-04-06

## 2022-04-06 RX ORDER — FENTANYL CITRATE 50 UG/ML
25 INJECTION, SOLUTION INTRAMUSCULAR; INTRAVENOUS
Status: DISCONTINUED | OUTPATIENT
Start: 2022-04-06 | End: 2022-04-06

## 2022-04-06 RX ORDER — NICOTINE POLACRILEX 4 MG
15-30 LOZENGE BUCCAL
Status: CANCELLED | OUTPATIENT
Start: 2022-04-06

## 2022-04-06 RX ORDER — FENTANYL CITRATE 50 UG/ML
50 INJECTION, SOLUTION INTRAMUSCULAR; INTRAVENOUS EVERY 30 MIN PRN
Status: DISCONTINUED | OUTPATIENT
Start: 2022-04-06 | End: 2022-04-06 | Stop reason: HOSPADM

## 2022-04-06 RX ORDER — FENTANYL CITRATE 50 UG/ML
50 INJECTION, SOLUTION INTRAMUSCULAR; INTRAVENOUS EVERY 10 MIN PRN
Status: DISCONTINUED | OUTPATIENT
Start: 2022-04-06 | End: 2022-04-18 | Stop reason: HOSPADM

## 2022-04-06 RX ORDER — NALOXONE HYDROCHLORIDE 0.4 MG/ML
0.2 INJECTION, SOLUTION INTRAMUSCULAR; INTRAVENOUS; SUBCUTANEOUS
Status: CANCELLED | OUTPATIENT
Start: 2022-04-06

## 2022-04-06 RX ORDER — HALOPERIDOL 2 MG/ML
2 SOLUTION ORAL EVERY 6 HOURS PRN
Status: DISCONTINUED | OUTPATIENT
Start: 2022-04-06 | End: 2022-04-18 | Stop reason: HOSPADM

## 2022-04-06 RX ORDER — NALOXONE HYDROCHLORIDE 0.4 MG/ML
0.1 INJECTION, SOLUTION INTRAMUSCULAR; INTRAVENOUS; SUBCUTANEOUS
Status: DISCONTINUED | OUTPATIENT
Start: 2022-04-06 | End: 2022-04-18 | Stop reason: HOSPADM

## 2022-04-06 RX ORDER — FENTANYL CITRATE 50 UG/ML
50 INJECTION, SOLUTION INTRAMUSCULAR; INTRAVENOUS EVERY 30 MIN PRN
Status: DISCONTINUED | OUTPATIENT
Start: 2022-04-06 | End: 2022-04-18 | Stop reason: HOSPADM

## 2022-04-06 RX ORDER — DEXTROSE MONOHYDRATE 100 MG/ML
INJECTION, SOLUTION INTRAVENOUS CONTINUOUS PRN
Status: DISCONTINUED | OUTPATIENT
Start: 2022-04-06 | End: 2022-04-18 | Stop reason: HOSPADM

## 2022-04-06 RX ORDER — NALOXONE HYDROCHLORIDE 0.4 MG/ML
0.2 INJECTION, SOLUTION INTRAMUSCULAR; INTRAVENOUS; SUBCUTANEOUS
Status: DISCONTINUED | OUTPATIENT
Start: 2022-04-06 | End: 2022-04-18 | Stop reason: HOSPADM

## 2022-04-06 RX ORDER — DEXTROSE MONOHYDRATE 100 MG/ML
INJECTION, SOLUTION INTRAVENOUS CONTINUOUS PRN
Status: CANCELLED | OUTPATIENT
Start: 2022-04-06

## 2022-04-06 RX ORDER — PROCHLORPERAZINE 25 MG
12.5 SUPPOSITORY, RECTAL RECTAL EVERY 12 HOURS PRN
Status: DISCONTINUED | OUTPATIENT
Start: 2022-04-06 | End: 2022-04-06 | Stop reason: HOSPADM

## 2022-04-06 RX ORDER — BISACODYL 10 MG
10 SUPPOSITORY, RECTAL RECTAL DAILY PRN
Status: DISCONTINUED | OUTPATIENT
Start: 2022-04-06 | End: 2022-04-18 | Stop reason: HOSPADM

## 2022-04-06 RX ORDER — NICOTINE POLACRILEX 4 MG
15-30 LOZENGE BUCCAL
Status: DISCONTINUED | OUTPATIENT
Start: 2022-04-06 | End: 2022-04-18 | Stop reason: HOSPADM

## 2022-04-06 RX ORDER — ONDANSETRON 2 MG/ML
4 INJECTION INTRAMUSCULAR; INTRAVENOUS EVERY 6 HOURS PRN
Status: DISCONTINUED | OUTPATIENT
Start: 2022-04-06 | End: 2022-04-18 | Stop reason: HOSPADM

## 2022-04-06 RX ORDER — MORPHINE SULFATE 20 MG/ML
5-10 SOLUTION ORAL
Status: DISCONTINUED | OUTPATIENT
Start: 2022-04-06 | End: 2022-04-09

## 2022-04-06 RX ORDER — FENTANYL CITRATE 50 UG/ML
50 INJECTION, SOLUTION INTRAMUSCULAR; INTRAVENOUS EVERY 30 MIN PRN
Status: CANCELLED | OUTPATIENT
Start: 2022-04-06

## 2022-04-06 RX ORDER — FLUTICASONE PROPIONATE 50 MCG
2 SPRAY, SUSPENSION (ML) NASAL DAILY
Status: DISCONTINUED | OUTPATIENT
Start: 2022-04-07 | End: 2022-04-18 | Stop reason: HOSPADM

## 2022-04-06 RX ORDER — NALOXONE HYDROCHLORIDE 0.4 MG/ML
0.1 INJECTION, SOLUTION INTRAMUSCULAR; INTRAVENOUS; SUBCUTANEOUS
Status: CANCELLED | OUTPATIENT
Start: 2022-04-06

## 2022-04-06 RX ORDER — ONDANSETRON 2 MG/ML
4 INJECTION INTRAMUSCULAR; INTRAVENOUS EVERY 6 HOURS PRN
Status: DISCONTINUED | OUTPATIENT
Start: 2022-04-06 | End: 2022-04-06 | Stop reason: HOSPADM

## 2022-04-06 RX ORDER — PROCHLORPERAZINE MALEATE 5 MG
5 TABLET ORAL EVERY 6 HOURS PRN
Status: DISCONTINUED | OUTPATIENT
Start: 2022-04-06 | End: 2022-04-06 | Stop reason: HOSPADM

## 2022-04-06 RX ORDER — DEXTROSE MONOHYDRATE 25 G/50ML
25-50 INJECTION, SOLUTION INTRAVENOUS
Status: CANCELLED | OUTPATIENT
Start: 2022-04-06

## 2022-04-06 RX ORDER — FENTANYL CITRATE 50 UG/ML
50 INJECTION, SOLUTION INTRAMUSCULAR; INTRAVENOUS EVERY 10 MIN PRN
Status: CANCELLED | OUTPATIENT
Start: 2022-04-06

## 2022-04-06 RX ORDER — DEXTROSE MONOHYDRATE 25 G/50ML
25-50 INJECTION, SOLUTION INTRAVENOUS
Status: DISCONTINUED | OUTPATIENT
Start: 2022-04-06 | End: 2022-04-18 | Stop reason: HOSPADM

## 2022-04-06 RX ORDER — ACETAMINOPHEN 325 MG/1
650 TABLET ORAL EVERY 4 HOURS PRN
Status: DISCONTINUED | OUTPATIENT
Start: 2022-04-06 | End: 2022-04-18 | Stop reason: HOSPADM

## 2022-04-06 RX ORDER — MORPHINE SULFATE 20 MG/ML
5-10 SOLUTION ORAL
Status: CANCELLED | OUTPATIENT
Start: 2022-04-06

## 2022-04-06 RX ORDER — ATROPINE SULFATE 10 MG/ML
2 SOLUTION/ DROPS OPHTHALMIC EVERY 4 HOURS PRN
Status: DISCONTINUED | OUTPATIENT
Start: 2022-04-06 | End: 2022-04-18 | Stop reason: HOSPADM

## 2022-04-06 RX ORDER — FENTANYL CITRATE 50 UG/ML
50 INJECTION, SOLUTION INTRAMUSCULAR; INTRAVENOUS EVERY 10 MIN PRN
Status: DISCONTINUED | OUTPATIENT
Start: 2022-04-06 | End: 2022-04-06 | Stop reason: HOSPADM

## 2022-04-06 RX ADMIN — ATROPINE SULFATE 2 DROP: 10 SOLUTION OPHTHALMIC at 20:11

## 2022-04-06 RX ADMIN — LORAZEPAM 1 MG: 2 LIQUID ORAL at 20:11

## 2022-04-06 RX ADMIN — MILRINONE LACTATE IN DEXTROSE 0.12 MCG/KG/MIN: 200 INJECTION, SOLUTION INTRAVENOUS at 06:44

## 2022-04-06 RX ADMIN — DEXMEDETOMIDINE HYDROCHLORIDE 1.2 MCG/KG/HR: 400 INJECTION INTRAVENOUS at 04:21

## 2022-04-06 RX ADMIN — FENTANYL CITRATE 25 MCG: 50 INJECTION, SOLUTION INTRAMUSCULAR; INTRAVENOUS at 08:46

## 2022-04-06 RX ADMIN — DEXMEDETOMIDINE HYDROCHLORIDE 1.2 MCG/KG/HR: 400 INJECTION INTRAVENOUS at 07:26

## 2022-04-06 RX ADMIN — DEXTRAN 70, GLYCERIN, HYPROMELLOSE 1 DROP: 1; 2; 3 SOLUTION/ DROPS OPHTHALMIC at 12:00

## 2022-04-06 RX ADMIN — FENTANYL CITRATE 50 MCG: 50 INJECTION, SOLUTION INTRAMUSCULAR; INTRAVENOUS at 16:17

## 2022-04-06 RX ADMIN — FUROSEMIDE 20 MG: 10 INJECTION, SOLUTION INTRAVENOUS at 03:56

## 2022-04-06 RX ADMIN — FENTANYL CITRATE 50 MCG: 50 INJECTION, SOLUTION INTRAMUSCULAR; INTRAVENOUS at 18:07

## 2022-04-06 RX ADMIN — Medication 0.05 MCG/KG/MIN: at 12:08

## 2022-04-06 RX ADMIN — LEVOTHYROXINE SODIUM 100 MCG: 100 TABLET ORAL at 08:15

## 2022-04-06 RX ADMIN — DEXMEDETOMIDINE HYDROCHLORIDE 1.2 MCG/KG/HR: 400 INJECTION INTRAVENOUS at 01:47

## 2022-04-06 RX ADMIN — MIDAZOLAM HYDROCHLORIDE 0.5 MG: 1 INJECTION, SOLUTION INTRAMUSCULAR; INTRAVENOUS at 16:31

## 2022-04-06 RX ADMIN — Medication 40 MG: at 08:15

## 2022-04-06 RX ADMIN — CHLORHEXIDINE GLUCONATE 15 ML: 1.2 SOLUTION ORAL at 08:15

## 2022-04-06 ASSESSMENT — ACTIVITIES OF DAILY LIVING (ADL)
ADLS_ACUITY_SCORE: 13
ADLS_ACUITY_SCORE: 13
ADLS_ACUITY_SCORE: 20
ADLS_ACUITY_SCORE: 21
ADLS_ACUITY_SCORE: 20
ADLS_ACUITY_SCORE: 21
ADLS_ACUITY_SCORE: 21
ADLS_ACUITY_SCORE: 20
ADLS_ACUITY_SCORE: 13
ADLS_ACUITY_SCORE: 21
ADLS_ACUITY_SCORE: 13
ADLS_ACUITY_SCORE: 13
ADLS_ACUITY_SCORE: 21
ADLS_ACUITY_SCORE: 13
ADLS_ACUITY_SCORE: 21
ADLS_ACUITY_SCORE: 21
ADLS_ACUITY_SCORE: 13
ADLS_ACUITY_SCORE: 21

## 2022-04-06 NOTE — DISCHARGE SUMMARY
Discharge Summary      Date of admission April 4, 2022   Date of discharge April 6, 2022    Admitting physician invasive cardiology  Attending ICU physician Naren Ramos MD  Consulting physician: Dr. Skaggs Cardiology    Active problems hypertension, status post coronary angiogram, status post cardiac arrest from heart block, pacemaker placement, acute respiratory failure, pericardial effusion, heart failure    In summary this is a 76-year-old female admitted to the ICU after undergoing a right heart catheterization resulting in right heart block.  This required CPR and temporary pacemaker.  After which the patient underwent a permanent pacemaker placement.  There was concern for pericardial effusion and patient was monitored for cardiac tamponade.  Over the first 24 hours in the ICU she required hemodynamic support with Levophed and milrinone for her heart failure.  Her heart failure on postprocedure echo was 10%.  Over the course of the first hospitalization was weaned for mechanical ventilatory support and finally placed on CPAP postprocedural day 2.  She remained in heart failure requiring milrinone and with vasoplegia requiring Levophed.  The patient was extubated the morning of the post procedure day 2 while still on multiple pressors.  At this time multiple discussions were had between the patient, cardiology, ICU service regarding goals of care.  Patient decided at this time she did not want any further measures to sustain her life.  The patient was weaned off milrinone and vasoplegia and transferred to an hospital hospice program.  The patient was discharged the afternoon of 4/6/2022 to the hospitalist service.    I answered all patient's questions along with all of family's questions prior to transfer.    Significant medications  Lorazepam as needed  Atropine solution as needed  Opioid as needed

## 2022-04-06 NOTE — PROGRESS NOTES
Successful completion of SBT: Yes   Extubation time: 1055    Patient assessment:  Lung sounds: Coarse Crackles   Stridor Present: No  Patient tolerance: Yes    Oxygen device:  Oxymask 5L  Liter flow: 5L  SpO2: 96%    Plan: Extubated pt to Comfort care per MD orders.

## 2022-04-06 NOTE — PROGRESS NOTES
[facility]  Cardiology Progress Note  Date of Admission: 4/4/2022  Date of Service: 04/06/2022  Primary Cardiologist: Dr. Michaels    Assessment & Plan   Ingrid Powell is a 76 year old female with past medical history significant for hypertension, hyperlipidemia, CAD, Raynaud's, MARGIE on CPAP, depression, anxiety, hypothyroidism status post resection of tonsillar chemo admitted on 4/4/2022 with CHB during cardiac catheterization. She was found to have severe cardiomyopathy and required intubation and admission to ICU.    Interval History:  Patient extubated today but requiring pressor support.A diiscussion with family, ICU attending and our team with a decision to transition to comfort care .    Assessment:   1. Severe cardiomyopathy with systolic heart failure with shock EF visually estimated to be 15%, RV function appears grossly normal.   2.  Intermittent complete heart block, S/P biV PM 4/4/22  3.  Coronary disease out of proportion to her cardiomyopathy.  Severe stenosis proxRCA, moderate to severe stenosis mid-LAD.   4.  Small pericardial effusion, stable  5.  Elevated right and left filling pressures LVEDP 15mmHg, CVP 15mmHg.    Plan:  1. Comfort care per patient's wishes.    This assessment and plan was formulated under the guidance of Dr. Skaggs.    A total of 35 minutes was spent face-to-face or coordinating care of Ingrid Powell. Over 50% of our time was spent counseling the patient and/or coordinating care.    NORA PICKETT CNP  Pager:  [phone]   (8 am - 5pm, M-F)    ATTESTATION:  Ms. Powell was seen and examined. Agree with note and plan of care.   I discussed with the patient and her family as well as the ICU attending that though she is critically ill, that we do feel that she has a chance of cardiovascular recovery, but that it will take time and good medical management to sort things out.  She and her family discussed and she decided that she did not  want to go through anything further and wanted comfort measures.   ROSENDO Skaggs MD     Physical Exam   Temp: 100  F (37.8  C) Temp src: Bladder BP: (!) 67/40 Pulse: 84   Resp: 20 SpO2: 92 % O2 Device: Mechanical Ventilator    Vitals:    04/04/22 0743 04/05/22 0200 04/06/22 0400   Weight: 103.5 kg (228 lb 3.2 oz) 105.4 kg (232 lb 5.8 oz) 105.3 kg (232 lb 2.3 oz)       Constitutional:   NAD    Skin:   Warm and dry   Head:   Nontraumatic   Neck:   No performed    Lungs:   Mechanical lung sounds   Cardiovascular:   normal S1 and S2   Abdomen:   Benign   Extremities and Back:   not performed   Neurological:   Grossly nonfocal     Medications     dexmedetomidine 0.2 mcg/kg/hr (04/06/22 1030)     dextrose       DOPamine Stopped (04/05/22 1525)     milrinone 0.125 mcg/kg/min (04/06/22 0741)     norepinephrine 0.05 mcg/kg/min (04/06/22 1208)     propofol (DIPRIVAN) infusion Stopped (04/05/22 1336)     sodium chloride 20 mL/hr at 04/05/22 2000       chlorhexidine  15 mL Mouth/Throat Q12H     fluticasone  2 spray Both Nostrils Daily     furosemide  20 mg Intravenous Q12H     hypromellose-dextran  1 drop Both Eyes BID     levothyroxine  100 mcg Oral or Feeding Tube Daily     pantoprazole  40 mg Per Feeding Tube QAM AC    Or     pantoprazole (PROTONIX) IV  40 mg Intravenous QAM AC     protein modular  2 packet Per Feeding Tube TID       Code Status    No CPR- Do NOT Intubate    Allergies   Allergies   Allergen Reactions     Dilaudid [Hydromorphone Hcl] Other (See Comments)     hallucinations     Fosamax [Alendronate Sodium] Rash            Norvasc [Amlodipine Besylate] Hives and Rash     Tegretol [Carbamazepine] Hives and Rash

## 2022-04-06 NOTE — H&P
Critical Care  Note      04/06/2022    Name: Ingrid oPwell MRN#: [id number]   Age: 76 year old YOB: 1945     Hsptl Day# 2  ICU DAY #2    MV DAY # 2             Problem List:   Active Problems:    HTN (hypertension)    Status post coronary angiogram           Summary/Hospital Course:   76-year-old female with history of hypertension, hyperlipidemia, CAD, Raynaud's, MARGIE on CPAP, depression, anxiety, hypothyroidism status post resection of tonsillar chemo who was admitted to the ICU after cardiac arrest secondary to complete heart block.  Patient was undergoing a right heart catheterization when she went to complete heart block loss of pulse and required CPR.  Patient had a temporary pacemaker placed and was intubated for decreased respiratory drive and need to complete the procedure.  Patient was intubated placed on full ventilatory support.  Patient underwent a permanent pacemaker.  There is also concern for pericardial effusion and the patient is being monitored for cardiac tamponade at this time.  Events of last 24 hours noted including no signs of cardiac tamponade.  Patient will be transitioned off of propofol and Levophed to milrinone and Precedex.  Overall the patient's neurologic status seems to be improving she will follow commands.  No signs of hematoma at femoral A-line site.    Patient was weaned from mechanical ventilatory support and extubated on postprocedural day 2.  She still remained dependent on milrinone and norepinephrine for heart failure and vasoplegia.  Discussion was made at this time for the patient be transferred to in-hospital hospice service for end-of-life issues.  This discussion was made with cardiology, ICU patient's family and patient involved.        Assessment and plan :     Ingrid Powell IS a 76 year old female admitted on 4/4/2022 for status post cardiac arrest with heart block, permanent pacemaker placement, cardiogenic shock and pericardial effusion.   I  have personally reviewed the daily labs, imaging studies, cultures and discussed the case with referring physician and consulting physicians.     My assessment and plan by system for this patient is as follows:    Neurology/Psychiatry:   1.  Sedated    Plan  As needed lorazepam    Cardiovascular:   1.Hemodynamics -cardiogenic shock  2.Rhythm -paced rhythm  3. Ischemia -history of coronary artery disease.  Plan  Off Levophed and milrinone    Pulmonary/Ventilator Management:   1. Airway intubated for airway protection and respiratory failure  2. Oxygenation/ventilation/mechanics currently on 70% FiO2 12 of PEEP.  Plan  Extubated, will manage at hunger with opioid      GI and Nutrition :   1.  Concern for protein calorie malnutrition  2.  GI prophylaxis    Plan  Advance diet if possible  Renal/Fluids/Electrolytes:   1.  Respiratory acidosis progressing as expected  2.  Hyperkalemia is resolving  3.  Creatinine within normal limits  4.  Appears euvolemic  Plan  Supportive renal care    Infectious Disease:   1.  No acute infectious concerns at this time      Endocrine:   1.  Concern for stress-induced hyperglycemia    Plan  - ICU insulin protocol, goal sugar <180      Hematology/Oncology:   1.  White blood cells within normal limits  2.  No signs of anemia  3.  Plan  -Continue to monitor     IV/Access:   1. Venous access -femoral triple-lumen in place  2. Arterial access -femoral arterial line in place  3.  Plan  We will remove all central access      ICU Prophylaxis:   1. DVT: Comfort care  2. VAP: HOB 30 degrees, chlorhexidine rinse  3. Stress Ulcer: PPI/H2 blocker  4. Restraints: Restraints not needed  5. Wound care  -local wound care  6. Feeding -n.p.o. for now  7. Family Update: Multiple discussions with family regarding transition to hospice  8. Disposition -ICU        Key goals for next 24 hours:   1.  Transition to hospice care               Medical History:     Past Medical History:   Diagnosis Date      Arthritis      COPD (chronic obstructive pulmonary disease) (H)      Depressive disorder      Gastroesophageal reflux disease      History of lumbar surgery 7/28/2015     Hoarseness      Hypertension      MARGIE (obstructive sleep apnea), Severe      Oxygen dependent     at night     Reduced vision      Sleep apnea      Syncope      Syncope and collapse      Past Surgical History:   Procedure Laterality Date     BACK SURGERY       CARPAL TUNNEL RELEASE RT/LT      ?side     CHOLECYSTECTOMY       COLONOSCOPY N/A 4/8/2021    Procedure: COLONOSCOPY;  Surgeon: Walter Liang MD;  Location: [facility]     CV CORONARY ANGIOGRAM N/A 4/4/2022    Procedure: Coronary Angiogram;  Surgeon: Yu Valentine MD;  Location:  HEART CARDIAC CATH LAB     CV LEFT HEART CATH N/A 4/4/2022    Procedure: Left Heart Catheterization;  Surgeon: Yu Valentine MD;  Location:  HEART CARDIAC CATH LAB     CV RIGHT HEART CATH MEASUREMENTS RECORDED N/A 4/4/2022    Procedure: Right Heart Catheterization;  Surgeon: Yu Valentine MD;  Location:  HEART CARDIAC CATH LAB     CV TEMPORARY PACEMAKER INSERTION N/A 4/4/2022    Procedure: Temporary Pacemaker Insertion;  Surgeon: Yu Valentine MD;  Location: [facility] CARDIAC CATH LAB     EYE SURGERY      cataracts     HERNIA REPAIR       INCISION AND DRAINAGE TRUNK, COMBINED  5/18/2012    Procedure:COMBINED INCISION AND DRAINAGE TRUNK; Incision and Drainage Abdominal Wall Abscess ; Surgeon:ALEXSANDER HANNON; [facility]     INSERT PORT VASCULAR ACCESS  2/8/2012    Procedure:INSERT PORT VASCULAR ACCESS; Surgeon:MARY JANE GUAN; [facility]     JOINT REPLACEMTN, KNEE RT/LT      bilateral     KNEE SURGERY  1995    left- total     KNEE SURGERY  2000    right- total     LAPAROSCOPIC APPENDECTOMY N/A 10/10/2015    Procedure: LAPAROSCOPIC APPENDECTOMY;  Surgeon: Daniel Chamberlain MD;  Location: [facility]     LARYNGOSCOPY, ESOPHAGOSCOPY,  BIOPSY, COMBINED  2/8/2012    Procedure:COMBINED  LARYNGOSCOPY, ESOPHAGOSCOPY,  BIOPSY; Direct Laryngoscopy, Esophagoscopy with Biopsy, Stealth Assisted Left Frontal Sinus Biopsy via Vidal Incision, 8 [place] Power Port in right subclavian & Percutaneous Endoscopic Gastrostomy Placement * Latex Safe*; Surgeon:LIBORIO CAZARES; Location:[facility]     left ankle fusion       OPTICAL TRACKING SYSTEM ENDOSCOPIC SINUS SURGERY  2012    Procedure:OPTICAL TRACKING SYSTEM ENDOSCOPIC SINUS SURGERY; Surgeon:LIBORIO CAZARES; Location:[facility]     RECTAL SURGERY      ? type     RELEASE DEQUERVAINS WRIST Left 2018    Procedure: RELEASE DEQUERVAINS WRIST;  Left 1st Distal compartment release;  Surgeon: Ronak Biggs MD;  Location: [facility]     REMOVE PORT VASCULAR ACCESS  2012    Procedure: REMOVE PORT VASCULAR ACCESS;  Remove Port Vascular Access ;  Surgeon: Phillip Ye MD;  [facility]     REVERSE ARTHROPLASTY SHOULDER Left 2019    Procedure: Left Reverse Total shoulder arthroplasty;  Surgeon: Oliver Velázquez MD;  Location: [facility]     Social History     Socioeconomic History     Marital status:      Spouse name: Not on file     Number of children: Not on file     Years of education: Not on file     Highest education level: Not on file   Occupational History     Occupation: RETIRED   Tobacco Use     Smoking status: Former Smoker     Packs/day: 0.50     Years: 35.00     Pack years: 17.50     Types: Cigarettes     Quit date: 10/3/1995     Years since quittin.5     Smokeless tobacco: Never Used   Substance and Sexual Activity     Alcohol use: Yes     Comment: rare     Drug use: No     Sexual activity: Not Currently     Partners: Male   Other Topics Concern     Parent/sibling w/ CABG, MI or angioplasty before 65F 55M? Not Asked   Social History Narrative    2021    ENVIRONMENTAL HISTORY: The family [place] The home is heated with a boiler. They do not have central  air conditioning. The patient's bedroom is furnished with carpeting in bedroom.  No pets. There is no history of cockroach or mice infestation. There is/are 0 smokers in the house.  The house does not have a basement.      Social Determinants of Health     Financial Resource Strain: Not on file   Food Insecurity: Not on file   Transportation Needs: Not on file   Physical Activity: Not on file   Stress: Not on file   Social Connections: Not on file   Intimate Partner Violence: Not on file   Housing Stability: Not on file        Allergies   Allergen Reactions     Dilaudid [Hydromorphone Hcl] Other (See Comments)     hallucinations     Fosamax [Alendronate Sodium] Rash            Norvasc [Amlodipine Besylate] Hives and Rash     Tegretol [Carbamazepine] Hives and Rash              Gonzalez Medications:       fluticasone  2 spray Both Nostrils Daily     hypromellose-dextran  1 drop Both Eyes BID       dextrose          Home Meds  No current facility-administered medications on file prior to encounter.  albuterol (PROAIR HFA/PROVENTIL HFA/VENTOLIN HFA) 108 (90 Base) MCG/ACT inhaler, INHALE 2 PUFFS EVERY 6 HOURS  azelastine (ASTELIN) 0.1 % nasal spray, Spray 2 sprays into both nostrils 2 times daily as needed for rhinitis  cyanocobalamin (VITAMIN B-12) 100 MCG tablet, Take 100 mcg by mouth daily  cycloSPORINE (RESTASIS) 0.05 % ophthalmic emulsion, Place 1 drop into both eyes 2 times daily  fluticasone (FLONASE) 50 MCG/ACT nasal spray, Spray 2 sprays into both nostrils daily  Fluticasone-Umeclidin-Vilanterol (TRELEGY ELLIPTA) 100-62.5-25 MCG/INH oral inhaler, Inhale 1 puff into the lungs daily  furosemide (LASIX) 20 MG tablet, Take 1 tablet (20 mg) by mouth daily  gabapentin (NEURONTIN) 300 MG capsule, TAKE 1 CAPSULE THREE TIMES DAILY  levothyroxine (SYNTHROID/LEVOTHROID) 100 MCG tablet, Take 1 tablet (100 mcg) by mouth daily  lisinopril (ZESTRIL) 5 MG tablet, Take 1 tablet (5 mg) by mouth daily  magnesium 250 MG tablet, Take 1  tablet by mouth every morning   metoprolol succinate ER (TOPROL-XL) 25 MG 24 hr tablet, Take 1 tablet (25 mg) by mouth daily  PARoxetine (PAXIL) 20 MG tablet, TAKE 1 TABLET AT BEDTIME  senna-docusate (SENOKOT-S/PERICOLACE) 8.6-50 MG tablet, Take 1-2 tablets by mouth 2 times daily  simvastatin (ZOCOR) 40 MG tablet, TAKE 1 TABLET AT BEDTIME  traZODone (DESYREL) 100 MG tablet, TAKE 1 TABLET EVERY NIGHT AS NEEDED FOR SLEEP  VITAMIN D, CHOLECALCIFEROL, PO, Take 2,000 Units by mouth daily.    cycloSPORINE (RESTASIS) 0.05 % ophthalmic emulsion, Place 1 drop into both eyes 2 times daily (Patient not taking: Reported on 3/23/2022)               Physical Examination:   Temp:  [98.8  F (37.1  C)-100.8  F (38.2  C)] 100.4  F (38  C)  Pulse:  [76-96] 76  Resp:  [10-41] 18  BP: ()/(40-78) 67/40  MAP:  [25 mmHg-299 mmHg] 56 mmHg  Arterial Line BP: ()/() 77/45  FiO2 (%):  [30 %-40 %] 30 %  SpO2:  [87 %-100 %] 97 %  No intake or output data in the 24 hours ending 04/04/22 1517  Wt Readings from Last 4 Encounters:   04/06/22 105.3 kg (232 lb 2.3 oz)   03/23/22 102.6 kg (226 lb 3.2 oz)   02/08/22 103.4 kg (228 lb)   01/25/22 102.5 kg (226 lb)     Arterial Line BP: ()/() 77/45  MAP:  [25 mmHg-299 mmHg] 56 mmHg  BP - Mean:  [52-91] 52  Vent Mode: (S) PS  (Pressure Support)  FiO2 (%): 30 %  Resp Rate (Set): 16 breaths/min  Tidal Volume (Set, mL): 450 mL  PEEP (cm H2O): 6 cmH2O  Pressure Support (cm H2O): 6 cmH2O  Resp: 18    Recent Labs   Lab 04/04/22  1545 04/04/22  1104 04/04/22  1029 04/04/22  1006   PH 7.29*  --   --  7.36   PCO2 47* 53* 47*  --    PO2 84  --   --   --    HCO3 23 24 23  --    O2PER 70  --   --   --        GEN: no acute distress   HEENT: head ncat, sclera anicteric, OP patent, trachea midline   PULM: unlabored synchronous with vent, clear anteriorly    CV/COR: Paced rhythm,    ABD: soft nontender, hypoactive bowel sounds, no mass  EXT:  Edema   warm  NEURO: Follows commands in all 4  extremities  SKIN: no obvious rash  LINES: clean, dry intact         Data:   All data and imaging reviewed     ROUTINE ICU LABS (Last four results)  CMP  Recent Labs   Lab 04/06/22  0801 04/06/22  0520 04/06/22  0135 04/05/22  1826 04/05/22  1823 04/05/22  1215 04/05/22  1211 04/05/22  0847 04/05/22  0507 04/04/22  2201 04/04/22 2003 04/04/22  1545 04/04/22  0807   NA  --   --   --   --   --   --   --   --  138  --   --  133 139   POTASSIUM  --   --   --   --  4.6  --  4.1  --  5.0  5.0 5.1  --  5.8* 4.4   CHLORIDE  --   --   --   --   --   --   --   --  109  --   --  107 112*   CO2  --   --   --   --   --   --   --   --  23  --   --  23 26   ANIONGAP  --   --   --   --   --   --   --   --  6  --   --  3 1*   * 138* 132* 143*  --    < >  --    < > 153*  --    < > 196*  188* 86   BUN  --   --   --   --   --   --   --   --  38*  --   --  39* 40*   CR  --   --   --   --   --   --   --   --  0.88  --   --  0.87 0.88   GFRESTIMATED  --   --   --   --   --   --   --   --  67  --   --  68 67   JOSÉ MIGUEL  --   --   --   --   --   --   --   --  8.8  --   --  8.9 8.4*   PROTTOTAL  --   --   --   --   --   --   --   --   --   --   --  7.2  --    ALBUMIN  --   --   --   --   --   --   --   --   --   --   --  3.4  --    BILITOTAL  --   --   --   --   --   --   --   --   --   --   --  0.2  --    ALKPHOS  --   --   --   --   --   --   --   --   --   --   --  104  --    AST  --   --   --   --   --   --   --   --   --   --   --  78*  --    ALT  --   --   --   --   --   --   --   --   --   --   --  71*  --     < > = values in this interval not displayed.     CBC  Recent Labs   Lab 04/05/22  0507 04/04/22  2201 04/04/22  1545 04/04/22  0807   WBC 15.5*  --  15.8* 6.6   RBC 4.45  --  4.71 4.17   HGB 13.0 13.8 13.8 11.9   HCT 41.1  --  44.1 38.8   MCV 92  --  94 93   MCH 29.2  --  29.3 28.5   MCHC 31.6  --  31.3* 30.7*   RDW 13.7  --  13.7 13.9     --  331 205     INR  Recent Labs   Lab 04/04/22  0807   INR 1.00     Arterial  Blood Gas  Recent Labs   Lab 22  1545 22  1104 22  1029 22  1006   PH 7.29*  --   --  7.36   PCO2 47* 53* 47*  --    PO2 84  --   --   --    HCO3 23 24 23  --    O2PER 70  --   --   --        All cultures:  No results for input(s): CULT in the last 168 hours.  Recent Results (from the past 24 hour(s))   Cardiac Catheterization    Narrative      Sustained complete heart block during right heart cath requiring CPR and   temporary pacemaker insertion with plan for emergent permanent pacemaker   placement.    Severely elevated right and left sided filling pressures in the setting   of severe cardiomyopathy (new diagnosis - stress vs ischemic vs   multi-factorial).    60% mid LAD lesion, 75% proximal RCA lesion (calcified) and   non-obstructive atherosclerosis in the LCX.      Echo Complete   Result Value    LVEF  20-25%    Narrative    [id number]  [id number]  [id number]  [id number]^KEENA^RANULFO     [facility]  Echocardiography Laboratory  [address] [place] [address]     Name: REYES LOUIS  MRN: [id number]  : 1945  Study Date: 2022 10:32 AM  Age: 76 yrs  Gender: Female  [place] [facility] [place] Study: Complete AV block  Ordering Physician: RANULFO BRINK  Referring Physician: Jose Manuel Sutton  Performed By: Asher Najera RDCS     BSA: 2.1 m2  Height: 65 in  Weight: 228 lb  HR: 80  BP: 152/84 mmHg  ______________________________________________________________________________  Procedure  Complete Portable Echo Adult.  ______________________________________________________________________________  Interpretation Summary     Left ventricular systolic function is severely reduced.  The visual ejection fraction is 20-25%.  There is a pacemaker lead in the right ventricle.  Small to medium size localized pericardial effusion noted that measures 1.8cm  along inferolateral wall. No echo evidence of tamponade.  A mobile echodensity is noted in the  vicinity of pacer lead as it traverses  the right atrium. This could represent chiari network when compared to prior  echo images. Vegetation/clot unlikely as this lead was inserted today.  Correlate clinically.  EF is lower compared to prior studyf rom 3/21.  The study was technically difficult.  ______________________________________________________________________________  Left Ventricle  The left ventricle is normal in size. Left ventricular systolic function is  severely reduced. The visual ejection fraction is 20-25%. Left ventricular  diastolic function is indeterminate. Septal motion is consistent with  conduction abnormality. There is severe global hypokinesia of the left  ventricle. There is apical akinesis.     Right Ventricle  The right ventricle is normal size. The right ventricular systolic function is  normal. There is a pacemaker lead in the right ventricle.     Atria  The left atrium is mildly dilated. Right atrial size is normal.     Mitral Valve  The mitral valve leaflets are moderately thickened. Posterior leaflet is  thickened and calcified. The leaflet motion is restricted. There is mild to  moderate (1-2+) mitral regurgitation.     Tricuspid Valve  There is mild to moderate (1-2+) tricuspid regurgitation. The right  ventricular systolic pressure is approximated at 45.7 mmHg plus the right  atrial pressure. Right ventricular systolic pressure is elevated, consistent  with moderate pulmonary hypertension.     Aortic Valve  The aortic valve is not well visualized.     Pulmonic Valve  The pulmonic valve is not well visualized.     Vessels  The aortic root is normal size. IVC catheter noted.     Pericardium  Small to medium size localized pericardial effusion noted that measures 1.8cm  along inferolateral wall. Small posterior pericardial effusion.     Rhythm  The rhythm was atrial fibrillation.  ______________________________________________________________________________  MMode/2D Measurements &  Calculations  IVSd: 0.99 cm     LVIDd: 5.7 cm  LVIDs: 4.4 cm  LVPWd: 1.4 cm  FS: 23.7 %  LV mass(C)d: 294.4 grams  LV mass(C)dI: 140.8 grams/m2  Ao root diam: 3.2 cm  LA dimension: 4.1 cm  asc Aorta Diam: 3.2 cm  LA/Ao: 1.3  LVOT diam: 1.7 cm  LVOT area: 2.4 cm2  LA Volume (BP): 75.5 ml  RWT: 0.50     Doppler Measurements & Calculations  MV E max sincere: 124.7 cm/sec  MV dec slope: 961.4 cm/sec2  LV V1 max P.9 mmHg  LV V1 max: 85.9 cm/sec  LV V1 VTI: 11.9 cm  SV(LVOT): 28.5 ml  SI(LVOT): 13.6 ml/m2  TR max sincere: 337.8 cm/sec  TR max P.7 mmHg  E/E' av.0     Lateral E/e': 17.0  Medial E/e': 21.0     ______________________________________________________________________________  Report approved by: Nick Tinsley 2022 01:44 PM               Billing:

## 2022-04-06 NOTE — PROGRESS NOTES
SP[facility]IT[facility] Progress Note  Page from ICU nurse    Saw pt Ingrid Powell per Nurse.  Daughter and sister present. Other family members on IPAD      Illness Narrative - Ingrid heart is failing.       Distress - Family members desire to see her before Ingrid's heart fails. Family members trying to adjust to visitor policies and trade family members out. More family are on the way.      Coping - Family requested . Ingrid's sister debriefed me on the crisis experience. Ingrid's sister and daughter desire for as many family members as can to be able to see Ingrid and say goodbye.       Meaning-Making - Ingrid's sister and daughter are visiting with Grandma and facilitating the visiting going on through the IPAD. They requested prayer. I prayed with Ingrid and her family.      Plan -  has been contacted.     Reno Ghotra MA   Intern  On call pager: [phone]

## 2022-04-06 NOTE — CONSULTS
[facility]    Consult Note - [facility]    ______________________________________________________________________    [facility] 24/7 Contact Number: [phone]    - Providers: Please contact [facility] with changes in orders or clinical plan of care   - Nursing: Please contact [facility] with significant changes in patient condition    Provider Workflow Guidance: Refer to the discharge/re-admit as IP hospice tipsheet     Hospice will notify the care team (including the hospitalist) to confirm date of inpatient hospice (GIP) admission.    New Epic encounter will not be created until hospice completes admission.   ______________________________________________________________________        Hospice Diagnosis: Decompensated systolic CHF with EF 15%    Indication for Inpatient Hospice: Pt is eligible for hospital GIP for unmanaged anxiety and terminal secretions following extubation. Goal is that pt will be calm and peaceful and will have decreased congestion. Will collaborate with hospital staff to manage care.    Goals for Hospital Discharge: Pt is not stable enough to transfer at this time. If pt does stabilize, hospice team will work on discharge to home with hospice vs facility.    Plan of Care Discussed with the Following:   - Nurse:Bhanu  - Hospitalist/Rounding Provider: Dr. Naren Ramos  - Ingrid's Family/Preferred Contact: Daughter Lida and son Daljit  - Hospice Provider: Dr. Brian Carbajal    Summary of Visit (includes assessment, medications and any new orders):   Pt is minimally responsive and does report feeling anxious and scared. Versed given to help pt relax. Some slight congestion noted. Encourage use of Atropine to help decrease secretions. Grandsons present. Offered support.    Hospice SW spoke with pt's daughter Lida about the hospice philosophy, benefits of the program, and services provided.    Reviewed recommended comfort meds with   Gould Jill Schoenecker, RN

## 2022-04-06 NOTE — PLAN OF CARE
End of Shift Nursing Summary    Neuro:  Arouses to voice.  Strong attempting to sit up at times.  Sedation meds changed to Precedex.  RASS 0/-1.  PERRLA.  Pain: Percocet.  CV:  Dopamine d/c'd.  Continues to require Norepi to maintain map > 65 mmHg.  Milrinone gtt per cardiology.    Pulm: Lung sounds coarse to dim.  Creamy inline secretions.  Able to wean FiO2 to 50% and peep to 8 today.    GI/: Trickle feeds per OG.  No NJ as pt will most likely be extubated tomorrow and resume normal diet.  Diuresing with IV lasix bid.    Endocrine: BGM stable.  Skin: Dressing over PPM to remain on for another 24-48 hours.    Fever: 38.2 Getting percocet.  Lines/drips: right arterial femoral sheath being transduced for BP, left femoral CVC.    Additional: K remains stable.     *See EPIC flowsheet for full nursing assessment findings

## 2022-04-06 NOTE — PROGRESS NOTES
[facility] ICU RESPIRATORY NOTE           Date of Admission: 4/4/2022     Date of Intubation (most recent):  4/4/22     Reason for Mechanical Ventilation: AW protection     Number of Days on Mechanical Ventilation: 3     Met Criteria for Spontaneous Breathing Trial: No     Reason for No Spontaneous Breathing Trial: Peep still at +8     Significant Events Today: None     ABG Results:          Recent Labs   Lab 04/04/22  1545 04/04/22  1104 04/04/22  1029 04/04/22  1006   PH 7.29*  --   --  7.36   PCO2 47* 53* 47*  --    PO2 84  --   --   --    HCO3 23 24 23  --    O2PER 70  --   --   --        Current Vent Settings: Vent Mode: CMV/AC  (Continuous Mandatory Ventilation/ Assist Control)  FiO2 (%): 40 %  Resp Rate (Set): 16 breaths/min  Tidal Volume (Set, mL): 450 mL  PEEP (cm H2O): 8 cmH2O  Resp: 10        Plan:  Pt to remain on full vent support overnight    Viky Adamson, RT

## 2022-04-06 NOTE — PLAN OF CARE
Extubated @ 1100 to oxymask , sats 92-96%, daughter and sister AT bedside. POC reviewed, Dr. Ramos at bedside with change in code status to DNR/DNI, curently on Milrinone gtt and norepi gtt to keep Map.65   Goal Outcome Evaluation: patient will progress to comfort cares when family ready

## 2022-04-06 NOTE — H&P
[facility]    History and Physical - Hospitalist Service       Date of Admission:  4/6/2022    Assessment & Plan   Ingrid Powell is a 76 year old female who is being transitioned to inpatient hospice on 4/6/2022. Please see the discharge summary from the same date for more details of her hospital course.    #.  Cardiogenic shock, blood pressure currently in 60s systolic  #. Severe cardiomyopathy with systolic heart failure, EF 10-15%  #. Complete heart block  #.  Coronary disease out of proportion to her cardiomyopathy.    #.  Severe stenosis proxRCA, moderate to severe stenosis midLAD.   #.  Small to moderate pericardial effusion  #.  Acute hypoxic respiratory failure due to cardiac arrest-intubated and sedated.  Now extubated    Plan  -Admit to [facility]IP  -Oxygen for comfort  -Atropine solution for secretions  -Artificial tears for dry eyes  -Roxanol for pain and air hunger  -Ativan for anxiety  -Haldol for agitation  -Zofran for nausea or vomiting       Diet:  As tolerated  Mejía Catheter: PRESENT, indication:    Central Lines: None  Code Status: No CPR- Do NOT Intubate    Expected Discharge: working on discharge with hospice    Melida Whalen MD  Hospitalist Service  [facility]  Securely message with the Vocera Web Console (learn more here)  Text page via [employer] Paging/Directory         ______________________________________________________________________    Chief Complaint   Transitioning to inpatient hospice    History of Present Illness   Ingrid Powell is a 76 year old female who is being transitioned to inpatient hospice.  Please see the discharge summary from the same date for more details; a brief summary of her current symptoms is included here.    She has history of hypertension dyslipidemia, Raynaud's, obstructive sleep apnea, hypothyroidism, tonsil cancer, s/p chemotherapy and radiation in 2014 who presented on 4/4 for left and right heart cath  for severe exertional dyspnea.    During right heart cath, she developed complete heart block.  She received CPR and a temporary pacemaker was placed.  Echo showed severe LV dysfunction with EF of 15%.  She was intubated, started on pressors and admitted to ICU.    She then underwent permanent pacemaker implantation.  She required hemodynamic support with norepinephrine and milrinone due to severe cardiomyopathy.    She was extubated on day 2.  After multiple discussions, patient decided to opt for hospice.  All infusions were discontinued.  Hospice GIP was consulted.  She was transitioned to hospice care on 4/6.    She is awake, feels okay at this time, a bit anxious.  Her sister and daughter are present at bedside.  Grandson is available on [employer].       Review of Systems    Review of systems was not needed on this patient    Past Medical History    Please see the medical & surgical history outlined in the H&P from the previous hospital encounter    Social History   Please see the social history outlined in the H&P from the previous hospital encounter    Family History   Please see the family history outlined in the H&P from the previous hospital encounter    Prior to Admission Medications   Prior to Admission Medications   Prescriptions Last Dose Informant Patient Reported? Taking?   Fluticasone-Umeclidin-Vilanterol (TRELEGY ELLIPTA) 100-62.5-25 MCG/INH oral inhaler   No No   Sig: Inhale 1 puff into the lungs daily   PARoxetine (PAXIL) 20 MG tablet   No No   Sig: TAKE 1 TABLET AT BEDTIME   VITAMIN D, CHOLECALCIFEROL, PO  Self Yes No   Sig: Take 2,000 Units by mouth daily.     albuterol (PROAIR HFA/PROVENTIL HFA/VENTOLIN HFA) 108 (90 Base) MCG/ACT inhaler   No No   Sig: INHALE 2 PUFFS EVERY 6 HOURS   azelastine (ASTELIN) 0.1 % nasal spray   No No   Sig: Spray 2 sprays into both nostrils 2 times daily as needed for rhinitis   cyanocobalamin (VITAMIN B-12) 100 MCG tablet  Self Yes No   Sig: Take 100 mcg by mouth  daily   cycloSPORINE (RESTASIS) 0.05 % ophthalmic emulsion   No No   Sig: Place 1 drop into both eyes 2 times daily   cycloSPORINE (RESTASIS) 0.05 % ophthalmic emulsion   No No   Sig: Place 1 drop into both eyes 2 times daily   Patient not taking: Reported on 3/23/2022   fluticasone (FLONASE) 50 MCG/ACT nasal spray   No No   Sig: Spray 2 sprays into both nostrils daily   furosemide (LASIX) 20 MG tablet   No No   Sig: Take 1 tablet (20 mg) by mouth daily   gabapentin (NEURONTIN) 300 MG capsule   No No   Sig: TAKE 1 CAPSULE THREE TIMES DAILY   levothyroxine (SYNTHROID/LEVOTHROID) 100 MCG tablet   No No   Sig: Take 1 tablet (100 mcg) by mouth daily   lisinopril (ZESTRIL) 5 MG tablet   No No   Sig: Take 1 tablet (5 mg) by mouth daily   magnesium 250 MG tablet  Self Yes No   Sig: Take 1 tablet by mouth every morning    metoprolol succinate ER (TOPROL-XL) 25 MG 24 hr tablet   No No   Sig: Take 1 tablet (25 mg) by mouth daily   senna-docusate (SENOKOT-S/PERICOLACE) 8.6-50 MG tablet   No No   Sig: Take 1-2 tablets by mouth 2 times daily   simvastatin (ZOCOR) 40 MG tablet   No No   Sig: TAKE 1 TABLET AT BEDTIME   traZODone (DESYREL) 100 MG tablet   No No   Sig: TAKE 1 TABLET EVERY NIGHT AS NEEDED FOR SLEEP      Facility-Administered Medications: None     Allergies   Allergies   Allergen Reactions     Dilaudid [Hydromorphone Hcl] Other (See Comments)     hallucinations     Fosamax [Alendronate Sodium] Rash            Norvasc [Amlodipine Besylate] Hives and Rash     Tegretol [Carbamazepine] Hives and Rash       Physical Exam   Vital Signs:   Temp src: Bladder       Resp: 20        Weight: 0 lbs 0 oz    Constitutional - alert, resting in bed, appears comfortable  Head - normocephalic, atraumatic  ENT - normal eye lids and lashes, no conjunctival hyperemia, no icterus, extraocular movements are normal, normal nose, no discharge, moist oral mucosa, no ulcers or exudates, normal external ear  Neck - no thyromegaly or  lymphadenopathy. Trachea is midline  CV - regular rate and rhythm, no murmurs  Pulmonary - lungs are clear to auscultation bilaterally, no wheezing or rhonchi  GI - abdomen is soft, non distended, non tender, bowel sounds are present, no organomegaly  Neurological - alert, no focal deficits  Musculoskeletal - no joint erythema or swelling, ROM is ok

## 2022-04-06 NOTE — PLAN OF CARE
ICU End of Shift Summary. See flowsheets for vital signs and detailed assessment.    Changes this shift: Levo from 0.13 to 0.05.  Response to lasix is good, totaling 800 ml the 2 hours following dose of 20.  Arterial sheath being transduced is very positional.

## 2022-04-06 NOTE — PROGRESS NOTES
Palliative consult received for end of life planning. Reviewed case with Dr. Ramos. Pt has been compassionately extubated and transitioned to comfort measures. No concern for plan of care or symptom management. Recommend [facility] hospice consult. Will discontinue palliative consult at this time. Thanks.    NORA Epperson [facility]  Contact information available via [facility] Paging/Directory

## 2022-04-06 NOTE — PHARMACY-ADMISSION MEDICATION HISTORY
Admission medication history completed at [facility] during non-hospice admission. PTA med list was completed by RN on admission per pre-procedure routine on  04/04/2022.

## 2022-04-06 NOTE — H&P
Critical Care  Note      04/06/2022    Name: Ingrid Powell MRN#: [id number]   Age: 76 year old YOB: 1945     Hsptl Day# 2  ICU DAY # 2    MV DAY # 2             Problem List:   Active Problems:    HTN (hypertension)    Status post coronary angiogram           Summary/Hospital Course:   76-year-old female with history of hypertension, hyperlipidemia, CAD, Raynaud's, MARGIE on CPAP, depression, anxiety, hypothyroidism status post resection of tonsillar chemo who was admitted to the ICU after cardiac arrest secondary to complete heart block.  Patient was undergoing a right heart catheterization when she went to complete heart block loss of pulse and required CPR.  Patient had a temporary pacemaker placed and was intubated for decreased respiratory drive and need to complete the procedure.  Patient was intubated placed on full ventilatory support.  Patient underwent a permanent pacemaker.  There is also concern for pericardial effusion and the patient is being monitored for cardiac tamponade at this time.    Events of last 24 hours noted include patient being weaned to pressure support ventilation and extubated without complications.  Patient is still requiring milrinone and Levophed.  Upon extended discussion with patient's family and patient she wishes to transition to comfort care at this time.  After my discussion with cardiology we will transition patient off of milrinone and Levophed and place comfort care orders.      Assessment and plan :     Ingrid Powell IS a 76 year old female admitted on 4/4/2022 for status post cardiac arrest with heart block, permanent pacemaker placement, cardiogenic shock and pericardial effusion.   I have personally reviewed the daily labs, imaging studies, cultures and discussed the case with referring physician and consulting physicians.     My assessment and plan by system for this patient is as follows:    Neurology/Psychiatry:   1.  Awake alert following  commands    Plan  Patient's been weaned off of Precedex and has been extubated.  Currently she is awake alert follow commands.  Given the patient's clear mental status we had extensive conversation regarding goals of care.  At this time it appears the patient would want to transition to comfort care.      Cardiovascular:   1.Hemodynamics -cardiogenic shock  2.Rhythm -paced rhythm  3. Ischemia -history of coronary artery disease.  Plan  Overall continue the patient with current pacemaker settings.  Appreciate cardiology input.  Patient still on milrinone and norepinephrine for cardiac failure and vasoplegia.  At this time the patient appears to want to transition to comfort care and we will wean off milrinone and norepinephrine.      Pulmonary/Ventilator Management:   1. Airway intubated for airway protection and respiratory failure  2. Oxygenation/ventilation/mechanics currently on 70% FiO2 12 of PEEP.  Plan  -Extubated from CPAP, will continue with aggressive bronchial hygiene at this time.    GI and Nutrition :   1.  Concern for protein calorie malnutrition  2.  GI prophylaxis    Plan  Now that extubated would consider advancing diet at this time.    Renal/Fluids/Electrolytes:   1.  Respiratory acidosis progressing as expected  2.  Hyperkalemia is resolving  3.  Creatinine within normal limits  4.  Appears euvolemic  Plan  -Continue to monitor  - monitor function and electrolytes as needed with replacement per ICU protocols. - generally avoid nephrotoxic agents such as NSAID, IV contrast unless specifically required  - adjust medications as needed for renal clearance  - follow I/O's as appropriate.    Infectious Disease:   1.  No acute infectious concerns at this time      Endocrine:   1.  Concern for stress-induced hyperglycemia    Plan  - ICU insulin protocol, goal sugar <180      Hematology/Oncology:   1.  White blood cells within normal limits  2.  No signs of anemia  3.  Plan  -Continue to monitor      IV/Access:   1. Venous access -femoral triple-lumen in place  2. Arterial access -femoral arterial line in place  3.  Plan  - central access required and necessary      ICU Prophylaxis:   1. DVT: Mechanical given concern for cardiac tamponade  2. VAP: HOB 30 degrees, chlorhexidine rinse  3. Stress Ulcer: PPI/H2 blocker  4. Restraints: Nonviolent soft two point restraints required and necessary for patient safety and continued cares and good effect as patient continues to pull at necessary lines, tubes despite education and distraction. Will readdress daily.   5. Wound care  -local wound care  6. Feeding -n.p.o. for now  7. Family Update: Prolonged conversation with family and patient at bedside discussing at first extubation.  We also discussed if the patient was to develop respiratory distress with the patient wanted the reintubated.  She would not want to be reintubated and the family believes that is her goals of care.  I made the patient a DNR/DNI.  If the patient was extubated will discuss goals of care.  We explained that she could be medically managed and have cardiac rehab however there are certain uncertainties given her current ejection fraction.  After family discussion she decided to transition to comfort care.  8. Disposition -ICU        Key goals for next 24 hours:   1.  Extubate, bronchial hygiene  2.  Wean pressors  3.  Consider transition to comfort care               Medical History:     Past Medical History:   Diagnosis Date     Arthritis      COPD (chronic obstructive pulmonary disease) (H)      Depressive disorder      Gastroesophageal reflux disease      History of lumbar surgery 7/28/2015     Hoarseness      Hypertension      MARGIE (obstructive sleep apnea), Severe      Oxygen dependent     at night     Reduced vision      Sleep apnea      Syncope      Syncope and collapse      Past Surgical History:   Procedure Laterality Date     BACK SURGERY       CARPAL TUNNEL RELEASE RT/LT      ?side      CHOLECYSTECTOMY       COLONOSCOPY N/A 4/8/2021    Procedure: COLONOSCOPY;  Surgeon: Walter Liang MD;  Location: [facility]     CV CORONARY ANGIOGRAM N/A 4/4/2022    Procedure: Coronary Angiogram;  Surgeon: Yu Valentine MD;  Location:  HEART CARDIAC CATH LAB     CV LEFT HEART CATH N/A 4/4/2022    Procedure: Left Heart Catheterization;  Surgeon: Yu Valentine MD;  Location:  HEART CARDIAC CATH LAB     CV RIGHT HEART CATH MEASUREMENTS RECORDED N/A 4/4/2022    Procedure: Right Heart Catheterization;  Surgeon: Yu Valentine MD;  Location:  HEART CARDIAC CATH LAB     CV TEMPORARY PACEMAKER INSERTION N/A 4/4/2022    Procedure: Temporary Pacemaker Insertion;  Surgeon: Yu Valentine MD;  Location:  HEART CARDIAC CATH LAB     EYE SURGERY      cataracts     HERNIA REPAIR       INCISION AND DRAINAGE TRUNK, COMBINED  5/18/2012    Procedure:COMBINED INCISION AND DRAINAGE TRUNK; Incision and Drainage Abdominal Wall Abscess ; Surgeon:ALEXSANDER HANNON; Location: OR     INSERT PORT VASCULAR ACCESS  2/8/2012    Procedure:INSERT PORT VASCULAR ACCESS; Surgeon:MARY JANE GUAN; [facility]     JOINT REPLACEMTN, KNEE RT/LT      bilateral     KNEE SURGERY  1995    left- total     KNEE SURGERY  2000    right- total     LAPAROSCOPIC APPENDECTOMY N/A 10/10/2015    Procedure: LAPAROSCOPIC APPENDECTOMY;  Surgeon: Daniel Chamberlain MD;  Location: [facility]     LARYNGOSCOPY, ESOPHAGOSCOPY,  BIOPSY, COMBINED  2/8/2012    Procedure:COMBINED LARYNGOSCOPY, ESOPHAGOSCOPY,  BIOPSY; Direct Laryngoscopy, Esophagoscopy with Biopsy, Stealth Assisted Left Frontal Sinus Biopsy via Vidal Incision, 8 [place] Power Port in right subclavian & Percutaneous Endoscopic Gastrostomy Placement * Latex Safe*; Surgeon:LIBORIO CAZARES; Location: OR     left ankle fusion       OPTICAL TRACKING SYSTEM ENDOSCOPIC SINUS SURGERY  2/8/2012    Procedure:OPTICAL TRACKING SYSTEM ENDOSCOPIC SINUS SURGERY; Surgeon:LIBORIO CAZARES  KATHYA; Location:[facility]     RECTAL SURGERY      ? type     RELEASE DEQUERVAINS WRIST Left 2018    Procedure: RELEASE DEQUERVAINS WRIST;  Left 1st Distal compartment release;  Surgeon: Ronak Biggs MD;  Location: [facility]     REMOVE PORT VASCULAR ACCESS  2012    Procedure: REMOVE PORT VASCULAR ACCESS;  Remove Port Vascular Access ;  Surgeon: Phillip Ye MD;  [facility]     REVERSE ARTHROPLASTY SHOULDER Left 2019    Procedure: Left Reverse Total shoulder arthroplasty;  Surgeon: Oliver Velázquez MD;  Location: [facility]     Social History     Socioeconomic History     Marital status:      Spouse name: Not on file     Number of children: Not on file     Years of education: Not on file     Highest education level: Not on file   Occupational History     Occupation: RETIRED   Tobacco Use     Smoking status: Former Smoker     Packs/day: 0.50     Years: 35.00     Pack years: 17.50     Types: Cigarettes     Quit date: 10/3/1995     Years since quittin.5     Smokeless tobacco: Never Used   Substance and Sexual Activity     Alcohol use: Yes     Comment: rare     Drug use: No     Sexual activity: Not Currently     Partners: Male   Other Topics Concern     Parent/sibling w/ CABG, MI or angioplasty before 65F 55M? Not Asked   Social History Narrative    2021    ENVIRONMENTAL HISTORY: [place] The home is heated with a boiler. They do not have central air conditioning. The patient's bedroom is furnished with carpeting in bedroom.  No pets. There is no history of cockroach or mice infestation. There is/are 0 smokers in the house.  The house does not have a basement.      Social Determinants of Health     Financial Resource Strain: Not on file   Food Insecurity: Not on file   Transportation Needs: Not on file   Physical Activity: Not on file   Stress: Not on file   Social Connections: Not on file   Intimate Partner  Violence: Not on file   Housing Stability: Not on file        Allergies   Allergen Reactions     Dilaudid [Hydromorphone Hcl] Other (See Comments)     hallucinations     Fosamax [Alendronate Sodium] Rash            Norvasc [Amlodipine Besylate] Hives and Rash     Tegretol [Carbamazepine] Hives and Rash              Gonzalez Medications:       chlorhexidine  15 mL Mouth/Throat Q12H     fluticasone  2 spray Both Nostrils Daily     furosemide  20 mg Intravenous Q12H     hypromellose-dextran  1 drop Both Eyes BID     levothyroxine  100 mcg Oral or Feeding Tube Daily     pantoprazole  40 mg Per Feeding Tube QAM AC    Or     pantoprazole (PROTONIX) IV  40 mg Intravenous QAM AC     protein modular  2 packet Per Feeding Tube TID       dexmedetomidine 0.2 mcg/kg/hr (04/06/22 1030)     dextrose       DOPamine Stopped (04/05/22 1525)     milrinone 0.125 mcg/kg/min (04/06/22 0741)     norepinephrine 0.04 mcg/kg/min (04/06/22 0743)     propofol (DIPRIVAN) infusion Stopped (04/05/22 1336)     sodium chloride 20 mL/hr at 04/05/22 2000        Home Meds  No current facility-administered medications on file prior to encounter.  albuterol (PROAIR HFA/PROVENTIL HFA/VENTOLIN HFA) 108 (90 Base) MCG/ACT inhaler, INHALE 2 PUFFS EVERY 6 HOURS  azelastine (ASTELIN) 0.1 % nasal spray, Spray 2 sprays into both nostrils 2 times daily as needed for rhinitis  cyanocobalamin (VITAMIN B-12) 100 MCG tablet, Take 100 mcg by mouth daily  cycloSPORINE (RESTASIS) 0.05 % ophthalmic emulsion, Place 1 drop into both eyes 2 times daily  fluticasone (FLONASE) 50 MCG/ACT nasal spray, Spray 2 sprays into both nostrils daily  Fluticasone-Umeclidin-Vilanterol (TRELEGY ELLIPTA) 100-62.5-25 MCG/INH oral inhaler, Inhale 1 puff into the lungs daily  furosemide (LASIX) 20 MG tablet, Take 1 tablet (20 mg) by mouth daily  gabapentin (NEURONTIN) 300 MG capsule, TAKE 1 CAPSULE THREE TIMES DAILY  levothyroxine (SYNTHROID/LEVOTHROID) 100 MCG tablet, Take 1 tablet (100 mcg) by  mouth daily  lisinopril (ZESTRIL) 5 MG tablet, Take 1 tablet (5 mg) by mouth daily  magnesium 250 MG tablet, Take 1 tablet by mouth every morning   metoprolol succinate ER (TOPROL-XL) 25 MG 24 hr tablet, Take 1 tablet (25 mg) by mouth daily  PARoxetine (PAXIL) 20 MG tablet, TAKE 1 TABLET AT BEDTIME  senna-docusate (SENOKOT-S/PERICOLACE) 8.6-50 MG tablet, Take 1-2 tablets by mouth 2 times daily  simvastatin (ZOCOR) 40 MG tablet, TAKE 1 TABLET AT BEDTIME  traZODone (DESYREL) 100 MG tablet, TAKE 1 TABLET EVERY NIGHT AS NEEDED FOR SLEEP  VITAMIN D, CHOLECALCIFEROL, PO, Take 2,000 Units by mouth daily.    cycloSPORINE (RESTASIS) 0.05 % ophthalmic emulsion, Place 1 drop into both eyes 2 times daily (Patient not taking: Reported on 3/23/2022)               Physical Examination:   Temp:  [98.8  F (37.1  C)-100.8  F (38.2  C)] 100  F (37.8  C)  Pulse:  [] 85  Resp:  [10-41] 16  BP: ()/(40-79) 67/40  MAP:  [25 mmHg-299 mmHg] 67 mmHg  Arterial Line BP: ()/() 96/50  FiO2 (%):  [30 %-50 %] 30 %  SpO2:  [87 %-100 %] 95 %  No intake or output data in the 24 hours ending 04/04/22 1517  Wt Readings from Last 4 Encounters:   04/06/22 105.3 kg (232 lb 2.3 oz)   03/23/22 102.6 kg (226 lb 3.2 oz)   02/08/22 103.4 kg (228 lb)   01/25/22 102.5 kg (226 lb)     Arterial Line BP: ()/() 96/50  MAP:  [25 mmHg-299 mmHg] 67 mmHg  BP - Mean:  [52-92] 52  Vent Mode: (S) PS  (Pressure Support)  FiO2 (%): 30 %  Resp Rate (Set): 16 breaths/min  Tidal Volume (Set, mL): 450 mL  PEEP (cm H2O): 6 cmH2O  Pressure Support (cm H2O): 6 cmH2O  Resp: 16    Recent Labs   Lab 04/04/22  1545 04/04/22  1104 04/04/22  1029 04/04/22  1006   PH 7.29*  --   --  7.36   PCO2 47* 53* 47*  --    PO2 84  --   --   --    HCO3 23 24 23  --    O2PER 70  --   --   --        GEN: no acute distress   HEENT: head ncat, sclera anicteric, OP patent, trachea midline   PULM: unlabored synchronous with vent, clear anteriorly    CV/COR: Paced rhythm,     ABD: soft nontender, hypoactive bowel sounds, no mass  EXT:  Edema   warm  NEURO: Follows commands in all 4 extremities  SKIN: no obvious rash  LINES: clean, dry intact         Data:   All data and imaging reviewed     ROUTINE ICU LABS (Last four results)  CMP  Recent Labs   Lab 04/06/22  0801 04/06/22  0520 04/06/22  0135 04/05/22  1826 04/05/22  1823 04/05/22  1215 04/05/22  1211 04/05/22  0847 04/05/22  0507 04/04/22  2201 04/04/22 2003 04/04/22  1545 04/04/22  0807   NA  --   --   --   --   --   --   --   --  138  --   --  133 139   POTASSIUM  --   --   --   --  4.6  --  4.1  --  5.0  5.0 5.1  --  5.8* 4.4   CHLORIDE  --   --   --   --   --   --   --   --  109  --   --  107 112*   CO2  --   --   --   --   --   --   --   --  23  --   --  23 26   ANIONGAP  --   --   --   --   --   --   --   --  6  --   --  3 1*   * 138* 132* 143*  --    < >  --    < > 153*  --    < > 196*  188* 86   BUN  --   --   --   --   --   --   --   --  38*  --   --  39* 40*   CR  --   --   --   --   --   --   --   --  0.88  --   --  0.87 0.88   GFRESTIMATED  --   --   --   --   --   --   --   --  67  --   --  68 67   JOSÉ MIGUEL  --   --   --   --   --   --   --   --  8.8  --   --  8.9 8.4*   PROTTOTAL  --   --   --   --   --   --   --   --   --   --   --  7.2  --    ALBUMIN  --   --   --   --   --   --   --   --   --   --   --  3.4  --    BILITOTAL  --   --   --   --   --   --   --   --   --   --   --  0.2  --    ALKPHOS  --   --   --   --   --   --   --   --   --   --   --  104  --    AST  --   --   --   --   --   --   --   --   --   --   --  78*  --    ALT  --   --   --   --   --   --   --   --   --   --   --  71*  --     < > = values in this interval not displayed.     CBC  Recent Labs   Lab 04/05/22  0507 04/04/22  2201 04/04/22  1545 04/04/22  0807   WBC 15.5*  --  15.8* 6.6   RBC 4.45  --  4.71 4.17   HGB 13.0 13.8 13.8 11.9   HCT 41.1  --  44.1 38.8   MCV 92  --  94 93   MCH 29.2  --  29.3 28.5   MCHC 31.6  --  31.3* 30.7*    RDW 13.7  --  13.7 13.9     --  331 205     INR  Recent Labs   Lab 22  0807   INR 1.00     Arterial Blood Gas  Recent Labs   Lab 22  1545 22  1104 22  1029 22  1006   PH 7.29*  --   --  7.36   PCO2 47* 53* 47*  --    PO2 84  --   --   --    HCO3 23 24 23  --    O2PER 70  --   --   --        All cultures:  No results for input(s): CULT in the last 168 hours.  Recent Results (from the past 24 hour(s))   Cardiac Catheterization    Narrative      Sustained complete heart block during right heart cath requiring CPR and   temporary pacemaker insertion with plan for emergent permanent pacemaker   placement.    Severely elevated right and left sided filling pressures in the setting   of severe cardiomyopathy (new diagnosis - stress vs ischemic vs   multi-factorial).    60% mid LAD lesion, 75% proximal RCA lesion (calcified) and   non-obstructive atherosclerosis in the LCX.      Echo Complete   Result Value    LVEF  20-25%    Narrative    [id number]  [id number]  [id number]  [id number]^KEENA^RANULFO     [facility]  Echocardiography Laboratory  [address] [place] [address]     Name: REYES LOUIS  MRN: [id number]  : 1945  Study Date: 2022 10:32 AM  Age: 76 yrs  Gender: Female  [place] [facility] [place] Study: Complete AV block  Ordering Physician: RANULFO BRINK  Referring Physician: Jose Manuel Sutton  Performed By: Asher Najera RDCS     BSA: 2.1 m2  Height: 65 in  Weight: 228 lb  HR: 80  BP: 152/84 mmHg  ______________________________________________________________________________  Procedure  Complete Portable Echo Adult.  ______________________________________________________________________________  Interpretation Summary     Left ventricular systolic function is severely reduced.  The visual ejection fraction is 20-25%.  There is a pacemaker lead in the right ventricle.  Small to medium size localized pericardial effusion noted  that measures 1.8cm  along inferolateral wall. No echo evidence of tamponade.  A mobile echodensity is noted in the vicinity of pacer lead as it traverses  the right atrium. This could represent chiari network when compared to prior  echo images. Vegetation/clot unlikely as this lead was inserted today.  Correlate clinically.  EF is lower compared to prior studyf rom 3/21.  The study was technically difficult.  ______________________________________________________________________________  Left Ventricle  The left ventricle is normal in size. Left ventricular systolic function is  severely reduced. The visual ejection fraction is 20-25%. Left ventricular  diastolic function is indeterminate. Septal motion is consistent with  conduction abnormality. There is severe global hypokinesia of the left  ventricle. There is apical akinesis.     Right Ventricle  The right ventricle is normal size. The right ventricular systolic function is  normal. There is a pacemaker lead in the right ventricle.     Atria  The left atrium is mildly dilated. Right atrial size is normal.     Mitral Valve  The mitral valve leaflets are moderately thickened. Posterior leaflet is  thickened and calcified. The leaflet motion is restricted. There is mild to  moderate (1-2+) mitral regurgitation.     Tricuspid Valve  There is mild to moderate (1-2+) tricuspid regurgitation. The right  ventricular systolic pressure is approximated at 45.7 mmHg plus the right  atrial pressure. Right ventricular systolic pressure is elevated, consistent  with moderate pulmonary hypertension.     Aortic Valve  The aortic valve is not well visualized.     Pulmonic Valve  The pulmonic valve is not well visualized.     Vessels  The aortic root is normal size. IVC catheter noted.     Pericardium  Small to medium size localized pericardial effusion noted that measures 1.8cm  along inferolateral wall. Small posterior pericardial effusion.     Rhythm  The rhythm was atrial  fibrillation.  ______________________________________________________________________________  MMode/2D Measurements & Calculations  IVSd: 0.99 cm     LVIDd: 5.7 cm  LVIDs: 4.4 cm  LVPWd: 1.4 cm  FS: 23.7 %  LV mass(C)d: 294.4 grams  LV mass(C)dI: 140.8 grams/m2  Ao root diam: 3.2 cm  LA dimension: 4.1 cm  asc Aorta Diam: 3.2 cm  LA/Ao: 1.3  LVOT diam: 1.7 cm  LVOT area: 2.4 cm2  LA Volume (BP): 75.5 ml  RWT: 0.50     Doppler Measurements & Calculations  MV E max sincere: 124.7 cm/sec  MV dec slope: 961.4 cm/sec2  LV V1 max P.9 mmHg  LV V1 max: 85.9 cm/sec  LV V1 VTI: 11.9 cm  SV(LVOT): 28.5 ml  SI(LVOT): 13.6 ml/m2  TR max sincere: 337.8 cm/sec  TR max P.7 mmHg  E/E' av.0     Lateral E/e': 17.0  Medial E/e': 21.0     ______________________________________________________________________________  Report approved by: Nick Tinsley 2022 01:44 PM           On my initial examination this morning patient was on CPAP.  Her respiratory rate was in the 20s with a tidal volume of 300.  She was able to follow commands in all 4 extremities and was able to lift her head off the bed.  Given this I ordered the patient be extubated.  Patient was extubated without complications.      Billing: This patient is critically ill: yes. Total critical care time today 33 min.              Further discussion with family patient wishes to be transition to comfort care.  Comfort care medications ordered and dynamic support stopped.

## 2022-04-07 ENCOUNTER — DOCUMENTATION ONLY (OUTPATIENT)
Dept: CARDIOLOGY | Facility: CLINIC | Age: 77
End: 2022-04-07
Payer: COMMERCIAL

## 2022-04-07 DIAGNOSIS — Z95.0 CARDIAC PACEMAKER IN SITU: ICD-10-CM

## 2022-04-07 DIAGNOSIS — I44.2 ATRIOVENTRICULAR BLOCK, COMPLETE (H): Primary | ICD-10-CM

## 2022-04-07 LAB
ATRIAL RATE - MUSE: 103 BPM
ATRIAL RATE - MUSE: 59 BPM
DIASTOLIC BLOOD PRESSURE - MUSE: NORMAL MMHG
DIASTOLIC BLOOD PRESSURE - MUSE: NORMAL MMHG
INTERPRETATION ECG - MUSE: NORMAL
INTERPRETATION ECG - MUSE: NORMAL
P AXIS - MUSE: 41 DEGREES
P AXIS - MUSE: 68 DEGREES
PR INTERVAL - MUSE: 118 MS
PR INTERVAL - MUSE: 216 MS
QRS DURATION - MUSE: 152 MS
QRS DURATION - MUSE: 82 MS
QT - MUSE: 418 MS
QT - MUSE: 460 MS
QTC - MUSE: 455 MS
QTC - MUSE: 547 MS
R AXIS - MUSE: -49 DEGREES
R AXIS - MUSE: 132 DEGREES
SYSTOLIC BLOOD PRESSURE - MUSE: NORMAL MMHG
SYSTOLIC BLOOD PRESSURE - MUSE: NORMAL MMHG
T AXIS - MUSE: 17 DEGREES
T AXIS - MUSE: 56 DEGREES
VENTRICULAR RATE- MUSE: 103 BPM
VENTRICULAR RATE- MUSE: 59 BPM

## 2022-04-07 PROCEDURE — 99231 SBSQ HOSP IP/OBS SF/LOW 25: CPT | Mod: GV | Performed by: INTERNAL MEDICINE

## 2022-04-07 PROCEDURE — 110N000005 HC R&B HOSPICE, ACCENT

## 2022-04-07 PROCEDURE — 250N000013 HC RX MED GY IP 250 OP 250 PS 637: Performed by: HOSPITALIST

## 2022-04-07 PROCEDURE — 250N000011 HC RX IP 250 OP 636: Performed by: ANESTHESIOLOGY

## 2022-04-07 PROCEDURE — 250N000013 HC RX MED GY IP 250 OP 250 PS 637: Performed by: ANESTHESIOLOGY

## 2022-04-07 RX ORDER — QUETIAPINE FUMARATE 25 MG/1
25 TABLET, FILM COATED ORAL EVERY 12 HOURS PRN
Status: DISCONTINUED | OUTPATIENT
Start: 2022-04-07 | End: 2022-04-18 | Stop reason: HOSPADM

## 2022-04-07 RX ORDER — TRAZODONE HYDROCHLORIDE 100 MG/1
100 TABLET ORAL ONCE
Status: COMPLETED | OUTPATIENT
Start: 2022-04-07 | End: 2022-04-07

## 2022-04-07 RX ADMIN — ONDANSETRON 4 MG: 2 INJECTION INTRAMUSCULAR; INTRAVENOUS at 20:51

## 2022-04-07 RX ADMIN — LORAZEPAM 1 MG: 2 LIQUID ORAL at 16:19

## 2022-04-07 RX ADMIN — LORAZEPAM 1 MG: 2 LIQUID ORAL at 20:51

## 2022-04-07 RX ADMIN — LORAZEPAM 1 MG: 2 LIQUID ORAL at 10:59

## 2022-04-07 RX ADMIN — LORAZEPAM 1 MG: 2 LIQUID ORAL at 02:01

## 2022-04-07 RX ADMIN — TRAZODONE HYDROCHLORIDE 100 MG: 100 TABLET ORAL at 04:49

## 2022-04-07 RX ADMIN — MORPHINE SULFATE 5 MG: 10 SOLUTION ORAL at 04:35

## 2022-04-07 RX ADMIN — ATROPINE SULFATE 2 DROP: 10 SOLUTION OPHTHALMIC at 02:12

## 2022-04-07 RX ADMIN — ACETAMINOPHEN 650 MG: 325 TABLET ORAL at 17:18

## 2022-04-07 RX ADMIN — MORPHINE SULFATE 10 MG: 10 SOLUTION ORAL at 20:51

## 2022-04-07 RX ADMIN — MORPHINE SULFATE 5 MG: 10 SOLUTION ORAL at 02:09

## 2022-04-07 RX ADMIN — LORAZEPAM 1 MG: 2 LIQUID ORAL at 07:02

## 2022-04-07 ASSESSMENT — ACTIVITIES OF DAILY LIVING (ADL)
ADLS_ACUITY_SCORE: 18
ADLS_ACUITY_SCORE: 18
ADLS_ACUITY_SCORE: 16
ADLS_ACUITY_SCORE: 18
ADLS_ACUITY_SCORE: 13
ADLS_ACUITY_SCORE: 18
ADLS_ACUITY_SCORE: 13
ADLS_ACUITY_SCORE: 18
ADLS_ACUITY_SCORE: 13
ADLS_ACUITY_SCORE: 18
ADLS_ACUITY_SCORE: 13
ADLS_ACUITY_SCORE: 18
ADLS_ACUITY_SCORE: 13
ADLS_ACUITY_SCORE: 16
ADLS_ACUITY_SCORE: 13
ADLS_ACUITY_SCORE: 13
ADLS_ACUITY_SCORE: 18
ADLS_ACUITY_SCORE: 13
ADLS_ACUITY_SCORE: 18

## 2022-04-07 NOTE — PLAN OF CARE
Goal Outcome Evaluation:        A&Ox4. VS and most assessments deferred d/t comfort cares/hospice. Pt denied pain, but felt mildly anxious and very tired. PRN ativan and morphine given. Daughter requested to stop giving morphine d/t pt having hallucinations. Trazodone tablet given - pt tolerated pill well. Turn and repo Q2h. Mejía in place with good UOP. Daughter at bedside overnight.

## 2022-04-07 NOTE — PROGRESS NOTES
/65 (BP Location: Left arm)   Pulse 72   Temp 98.4  F (36.9  C) (Axillary)   Resp 18   LMP  (LMP Unknown)   SpO2 91%   There is no height or weight on file to calculate BMI.    Primary Diagnosis: inpatient hospice  Pain/Nausea/PRN: pt's daughter requested to avoid morphine, given PRN oral ativan Q4hr per family request. Admin PRN tylenol for chest pain in afternoon  Diet: reg diet  LDA: R PIV SL  : boles cath in place  Surgical Site: pacemaker dressing removed in AM  Mobility: A x 2 up in recliner  Plan: maintain comfort and turn off pacemaker tomorrow

## 2022-04-07 NOTE — PROGRESS NOTES
[facility]    Hospitalist Progress Note    Assessment & Plan   Ingrid Powell is a 76 year old female who is being transitioned to inpatient hospice on 4/6/2022. Please see the discharge summary from the same date for more details of her hospital course.      Cardiogenic shock, blood pressure currently in 60s systolic  Severe cardiomyopathy with systolic heart failure, EF 10-15%   Complete heart block   Coronary disease out of proportion to her cardiomyopathy.     Severe stenosis proxRCA, moderate to severe stenosis midLAD.    Small to moderate pericardial effusion   Acute hypoxic respiratory failure due to cardiac arrest-intubated and sedated.  Now extubated  Family near bedside, continue inpatient hospice  --Continue hospice medications, patient and family had concerns about pacemaker was which was placed recently, they will discuss this further with the [facility] hospice.  --Updated family discussed with social work.  Code Status: No CPR- Do NOT Intubate     Disposition: Expected discharge : Patient enrolled in inpatient hospice    Lazara Boyd MD  Text Page   (7am to 6pm)    Interval History   Sleepy , family near bedside.    -Data reviewed today: I reviewed all new labs and imaging results over the last 24 hours.  Physical Exam   Temp: 98.4  F (36.9  C) Temp src: Oral BP: 105/58 Pulse: 89   Resp: 16 SpO2: 91 % O2 Device: None (Room air)    There were no vitals filed for this visit.  Vital Signs with Ranges  Temp:  [98.4  F (36.9  C)-100.4  F (38  C)] 98.4  F (36.9  C)  Pulse:  [76-89] 89  Resp:  [16-20] 16  BP: (105)/(58) 105/58  MAP:  [56 mmHg-58 mmHg] 56 mmHg  Arterial Line BP: (77-85)/(45) 77/45  SpO2:  [91 %-97 %] 91 %  I/O last 3 completed shifts:  In: -   Out: 180 [Urine:180]    Constitutional: sleepy  Respiratory: bilateral basilar crackles  Cardiovascular: pulse present   GI: Normal bowel sounds, soft, non-distended, non-tender  Skin/Integumen: No rashes, no cyanosis, no  edema  Neuro : moving all 4 extremities, no focal deficit noted     Medications     dextrose         fluticasone  2 spray Both Nostrils Daily     hypromellose-dextran  1 drop Both Eyes BID       Data   Recent Labs   Lab 04/06/22  0801 04/06/22  0520 04/06/22  0135 04/05/22  1826 04/05/22  1823 04/05/22  1215 04/05/22  1211 04/05/22  0847 04/05/22  0507 04/04/22  2201 04/04/22 2003 04/04/22  1545 04/04/22  0807   WBC  --   --   --   --   --   --   --   --  15.5*  --   --  15.8* 6.6   HGB  --   --   --   --   --   --   --   --  13.0 13.8  --  13.8 11.9   MCV  --   --   --   --   --   --   --   --  92  --   --  94 93   PLT  --   --   --   --   --   --   --   --  295  --   --  331 205   INR  --   --   --   --   --   --   --   --   --   --   --   --  1.00   NA  --   --   --   --   --   --   --   --  138  --   --  133 139   POTASSIUM  --   --   --   --  4.6  --  4.1  --  5.0  5.0 5.1  --  5.8* 4.4   CHLORIDE  --   --   --   --   --   --   --   --  109  --   --  107 112*   CO2  --   --   --   --   --   --   --   --  23  --   --  23 26   BUN  --   --   --   --   --   --   --   --  38*  --   --  39* 40*   CR  --   --   --   --   --   --   --   --  0.88  --   --  0.87 0.88   ANIONGAP  --   --   --   --   --   --   --   --  6  --   --  3 1*   JOSÉ MIGUEL  --   --   --   --   --   --   --   --  8.8  --   --  8.9 8.4*   * 138* 132*   < >  --    < >  --    < > 153*  --    < > 196*  188* 86   ALBUMIN  --   --   --   --   --   --   --   --   --   --   --  3.4  --    PROTTOTAL  --   --   --   --   --   --   --   --   --   --   --  7.2  --    BILITOTAL  --   --   --   --   --   --   --   --   --   --   --  0.2  --    ALKPHOS  --   --   --   --   --   --   --   --   --   --   --  104  --    ALT  --   --   --   --   --   --   --   --   --   --   --  71*  --    AST  --   --   --   --   --   --   --   --   --   --   --  78*  --     < > = values in this interval not displayed.     Recent Labs   Lab 04/06/22  0801 04/06/22  0520  04/06/22  0135 04/05/22  1826 04/05/22  1215   * 138* 132* 143* 132*       Imaging:   No results found for this or any previous visit (from the past 24 hour(s)).

## 2022-04-07 NOTE — PROGRESS NOTES
[facility]    Social Work Progress Note - [facility]    ______________________________________________________________________    [facility] Contact Number: [phone]    - Providers: Please contact [facility] with changes in orders or clinical plan of care   - Nursing: Please contact [facility] with significant changes in patient condition  - Social Work: Please contact [facility] for discharge phanning/updates  ______________________________________________________________________      Summary of Visit (includes psychosocial assessment/updates, acceptance of palliative care/hospice philosophy, discharge plans):   SRINIVAS visited Ingrid on this date in her room for routine visit. Present were also her dtr Lida, sister Gayla and son Daljit. Ingrid was sitting up in her recliner next to her hospital bed eating a popsicle and watching television. Ingrid did not report any pain but was short of breath and had difficulty completing sentences. Lida asked SRINIVAS when a nurse would be able to visit regarding the pacemaker as family would like it removed. SRINIVAS said she would check in with RUPAL Hilton regarding this but she is expected to visit soon.    Ingrid said she was feeling anxious about the dying process and SW provided empathy and education on what to expect as the dying process continues. Ingrid also asked if would be staying in the hospital today and SRINIVAS noted that this will be evaluated daily based on her sxs. SW also offered support to family.    Hospice RN to f/u regarding stopping the pacemaker.     Plan of Care Discussed with the Following:   - Nurse: Zo   - Hospitalist/Rounding Provider: Dr. Boyd   - Ingrid's Family/Preferred Contact: Lida Farias (dtr)  - Hospice Provider: Dr Brian Huerta [facility]  [phone]

## 2022-04-07 NOTE — CONSULTS
[facility]    Progress Note - [facility]    ______________________________________________________________________    [facility] Contact Number: [phone]    - Providers: Please contact [facility] with changes in orders or clinical plan of care   - Nursing: Please contact [facility] with significant changes in patient condition  ______________________________________________________________________        Plan of Care Discussed with the Following:   - Nurse:Zo  - Hospitalist/Rounding Provider: Dr. Lazara Quintero's Family/Preferred Contact: Daughter Lida, son Daljit, sister Gayla  - Hospice Provider: Dr. Brian Carbajal    Summary of Visit (includes assessment, medications and any new orders):   Pt is alert and oriented. Reports feelings of anxiety. Getting oral Lorazepam about every 4 hours for comfort. Denies pain, but did state that her chest hurts when coughing. Had a lengthy discussion with pt and family about turning off the pacemaker. It is unclear at this time if the pt fully understands the implications of doing so. Will continue the discussion with Hospice Medical Director, Dr. Carbajal, pt, and family more in the morning. [employer] was contacted. Will follow up in the morning.       Jill Schoenecker, RN

## 2022-04-07 NOTE — PROGRESS NOTES
Device Discharge  Dressing:  Clean, dry, and intact Patient had BiV pacemaker placed on 4/7/2022. Removed outer dressing today, 3 days post procedure. Steri strips are clean, dry and intact. No swelling or signs of infection present. Discussed with patient and family that steri strips need to stay in place until patient has follow up in device clinic in 7-10 days.   Chest XR reviewed:  Yes, Dr. Hoffmann reviewed on 4/5/2022 Pneumo present?:  No  Lead Measurements per Device Rep:  [employer] rep will check lead measurements today    Education Provided:  Avoid raising left arm above the level of the shoulder for 3 weeks.  Do not shower until outer occlusive dressing has been removed.  Leave steri-strips in place, these will be removed at the 1 week check.   Call Device Clinic with increased swelling, drainage, or fever > 101 degrees.   Follow-up with the Device Clinic as scheduled.     Answered patients and family's questions regarding CRT-P device and what to expect with follow up care. Patient does have remote monitor. Will have [employer] rep check patients device today. Patient family has requested the cardiology team be consulted now that patient is awake and no longer in the ICU. RN care coordinator updated and will follow up.     Discharge plans are pending. Discussed with family and patient that we will check in over the next several days. Once family and patient has plan in place we will call patient to set up follow up appointments in device clinic.

## 2022-04-07 NOTE — PROGRESS NOTES
Spoke with [employer] Tushar Kuo. Tushar interrogated patients device while patient was in the ICU on 4/5/2022. Reviewed lead measurements from 4/5/2022 which are stable from time of implant.     Called patients daughter, Lida, with update. Will have patients device interrogated next week to assess lead measurements if patient remains inpatient. Otherwise we will follow up with device care outpatient once patient is discharged.

## 2022-04-08 ENCOUNTER — DOCUMENTATION ONLY (OUTPATIENT)
Dept: CARDIOLOGY | Facility: CLINIC | Age: 77
End: 2022-04-08
Payer: COMMERCIAL

## 2022-04-08 PROCEDURE — 250N000011 HC RX IP 250 OP 636: Performed by: ANESTHESIOLOGY

## 2022-04-08 PROCEDURE — 250N000013 HC RX MED GY IP 250 OP 250 PS 637: Performed by: HOSPITALIST

## 2022-04-08 PROCEDURE — 99231 SBSQ HOSP IP/OBS SF/LOW 25: CPT | Mod: GV | Performed by: INTERNAL MEDICINE

## 2022-04-08 PROCEDURE — 250N000013 HC RX MED GY IP 250 OP 250 PS 637: Performed by: ANESTHESIOLOGY

## 2022-04-08 PROCEDURE — 250N000011 HC RX IP 250 OP 636: Performed by: INTERNAL MEDICINE

## 2022-04-08 PROCEDURE — 110N000005 HC R&B HOSPICE, ACCENT

## 2022-04-08 RX ORDER — LORAZEPAM 2 MG/ML
.5-1 INJECTION INTRAMUSCULAR
Status: DISCONTINUED | OUTPATIENT
Start: 2022-04-08 | End: 2022-04-18 | Stop reason: HOSPADM

## 2022-04-08 RX ADMIN — MORPHINE SULFATE 10 MG: 10 SOLUTION ORAL at 02:25

## 2022-04-08 RX ADMIN — LORAZEPAM 1 MG: 2 LIQUID ORAL at 02:25

## 2022-04-08 RX ADMIN — MORPHINE SULFATE 10 MG: 10 SOLUTION ORAL at 11:58

## 2022-04-08 RX ADMIN — LORAZEPAM 1 MG: 2 LIQUID ORAL at 11:58

## 2022-04-08 RX ADMIN — QUETIAPINE FUMARATE 25 MG: 25 TABLET ORAL at 02:16

## 2022-04-08 RX ADMIN — MORPHINE SULFATE 10 MG: 10 SOLUTION ORAL at 05:52

## 2022-04-08 RX ADMIN — ONDANSETRON 4 MG: 2 INJECTION INTRAMUSCULAR; INTRAVENOUS at 02:24

## 2022-04-08 RX ADMIN — LORAZEPAM 1 MG: 2 INJECTION INTRAMUSCULAR; INTRAVENOUS at 14:43

## 2022-04-08 ASSESSMENT — ACTIVITIES OF DAILY LIVING (ADL)
ADLS_ACUITY_SCORE: 18
ADLS_ACUITY_SCORE: 19
ADLS_ACUITY_SCORE: 18
ADLS_ACUITY_SCORE: 19
ADLS_ACUITY_SCORE: 18
ADLS_ACUITY_SCORE: 19
ADLS_ACUITY_SCORE: 18

## 2022-04-08 NOTE — PLAN OF CARE
Summary: Hx of cardiomyopathy, heart block with pacer placed  Primary Diagnosis: hospice d/t cardiogenic shock  Orientation: A&Ox4  Aggression Stop Light: Green  Mobility: Bedrest  Pain Management: PRN morphine, ativan, atropine  Diet: Regular as tolerated  Bowel/Bladder: Mejía, incontinent bowel  Abnormal Lab/Assessments:   Drain/Device/Wound: PIV - SL  Consults: Hospice  D/C Day/Goals/Place: TBD- family would like to turn pacemaker off debra; pendiong AM discussion with Hospice.

## 2022-04-08 NOTE — CONSULTS
[facility]    Progress Note - [facility]    ______________________________________________________________________    [facility] Contact Number: [phone]    - Providers: Please contact [facility] with changes in orders or clinical plan of care   - Nursing: Please contact [facility] with significant changes in patient condition  ______________________________________________________________________        Plan of Care Discussed with the Following:   - Nurse:Flores  - Hospitalist/Rounding Provider: Dr. Lazara Boyd   - Ingrid's Family/Preferred Contact: Daughter Lida, son Daljit, sister Gayla  - Hospice Provider: Dr. Brian Carbajal    Summary of Visit (includes assessment, medications and any new orders):   Pt is alert and oriented but very fatigued today. Had a lengthy discussion earlier this morning with hospice medical director, Dr. Carbajal, pt, and family about what may happen if the pacemaker got turned off. The pt was very clear after the discussion that she no longer wished to have the pacemaker functioning and decided to move forward with turning it off. The [employer] Tushar millard, and Dr. Boyd were present in pt's room to deactivate the device. Upon interrogation of the device, the findings were that the pt is no longer being 100% paced and the heart is actually beating on it's own. The pt still wanted to proceed and the device was turned off. Pt was given IV Lorazepam for anxiety and Morphine for chest discomfort.   Hospice team will start discharge planning. [facility] but is no longer safe to live at home alone. Plan will be LTC facility vs hospice home. Will cont to follow up.       Jill Schoenecker, RN

## 2022-04-08 NOTE — PLAN OF CARE
Goal Outcome Evaluation:    [id number]  Summary: Hx of cardiomyopathy, heart block with pacer placed  Primary Diagnosis: hospice d/t cardiogenic shock  Orientation: A&Ox4  Aggression Stop Light: Green  Mobility: Bedrest  Pain Management: PRN morphine, ativan given  Diet: Regular as tolerated  Bowel/Bladder: Mejía, incontinent bowel  Abnormal Lab/Assessments:   Drain/Device/Wound: PIV - SL  Consults: Hospice  D/C Day/Goals/Place: TBD-LTC facility vs hospice home per Hospice RN note.  Pacemaker turned off this shift.       Pacemaker interrogated by [employer] rep.  Per Hospice RN note, findings were that the pt was no longer being 100% paced and the heart is actually beating on it's own. Pacemaker turned off per pt's wish with [employer] rep, Hospitalist, Hospice team and pt's family members present in the room.  Discharge planning being done by Hospice.  Pt turned and repositioned.  Family at bedside.

## 2022-04-08 NOTE — PLAN OF CARE
Patient is alert and oriented to self, place, time, and situation on room air. Reports severe generalized pain of 8-9/10 and shortness of breath at this time. Daughter at bedside confirms patient's restlessness, anxiety and discomfort. Prn medications given and 2 liters of oxygen via nasal cannula is also applied for comfort. Lung sounds diminished. Patient repositioned for comfort. Daughter and patient refused scheduled eye drops, saying they were not needed at this time. Daughter and patient also wanted medications for comfort to be given throughout night so that patient is able to get rest. Will continue to monitor for discomfort throughout shift.

## 2022-04-08 NOTE — PROGRESS NOTES
[facility]    Hospitalist Progress Note    Assessment & Plan   Ingrid Powell is a 76 year old female who is being transitioned to inpatient hospice on 4/6/2022. Please see the discharge summary from the same date for more details of her hospital course.      Cardiogenic shock, blood pressure currently in 60s systolic  Severe cardiomyopathy with systolic heart failure, EF 10-15%   Complete heart block   Coronary disease out of proportion to her cardiomyopathy.     Severe stenosis proxRCA, moderate to severe stenosis midLAD.    Small to moderate pericardial effusion   Acute hypoxic respiratory failure due to cardiac arrest-intubated and sedated.  Now extubated  Family near bedside, continue inpatient hospice  --Continue hospice medications, plan is to switch of pacemaker today, discussed with family, multiple family members present, discussed with [facility] hospice team.  Code Status: No CPR- Do NOT Intubate     Disposition: Expected discharge : Patient enrolled in inpatient hospice    Lazara Boyd MD  Text Page   (7am to 6pm)    Interval History   Sleepy , family near bedside.    -Data reviewed today: I reviewed all new labs and imaging results over the last 24 hours.  Physical Exam   Temp: 98.4  F (36.9  C) Temp src: Axillary BP: 122/65 Pulse: 72   Resp: 18 SpO2: 91 % O2 Device: None (Room air)    There were no vitals filed for this visit.  Vital Signs with Ranges  Temp:  [98.4  F (36.9  C)] 98.4  F (36.9  C)  Pulse:  [72] 72  Resp:  [18] 18  BP: (122)/(65) 122/65  SpO2:  [91 %] 91 %  I/O last 3 completed shifts:  In: 220 [P.O.:220]  Out: 725 [Urine:725]    Constitutional: sleepy  Respiratory: bilateral basilar crackles  Cardiovascular: pulse present   GI: Normal bowel sounds, soft, non-distended, non-tender  Skin/Integumen: No rashes, no cyanosis, no edema  Neuro : moving all 4 extremities, no focal deficit noted     Medications     dextrose         fluticasone  2 spray Both Nostrils  Daily     hypromellose-dextran  1 drop Both Eyes BID       Data   Recent Labs   Lab 04/06/22  0801 04/06/22  0520 04/06/22  0135 04/05/22  1826 04/05/22  1823 04/05/22  1215 04/05/22  1211 04/05/22  0847 04/05/22  0507 04/04/22  2201 04/04/22 2003 04/04/22  1545 04/04/22  0807   WBC  --   --   --   --   --   --   --   --  15.5*  --   --  15.8* 6.6   HGB  --   --   --   --   --   --   --   --  13.0 13.8  --  13.8 11.9   MCV  --   --   --   --   --   --   --   --  92  --   --  94 93   PLT  --   --   --   --   --   --   --   --  295  --   --  331 205   INR  --   --   --   --   --   --   --   --   --   --   --   --  1.00   NA  --   --   --   --   --   --   --   --  138  --   --  133 139   POTASSIUM  --   --   --   --  4.6  --  4.1  --  5.0  5.0 5.1  --  5.8* 4.4   CHLORIDE  --   --   --   --   --   --   --   --  109  --   --  107 112*   CO2  --   --   --   --   --   --   --   --  23  --   --  23 26   BUN  --   --   --   --   --   --   --   --  38*  --   --  39* 40*   CR  --   --   --   --   --   --   --   --  0.88  --   --  0.87 0.88   ANIONGAP  --   --   --   --   --   --   --   --  6  --   --  3 1*   JOSÉ MIGUEL  --   --   --   --   --   --   --   --  8.8  --   --  8.9 8.4*   * 138* 132*   < >  --    < >  --    < > 153*  --    < > 196*  188* 86   ALBUMIN  --   --   --   --   --   --   --   --   --   --   --  3.4  --    PROTTOTAL  --   --   --   --   --   --   --   --   --   --   --  7.2  --    BILITOTAL  --   --   --   --   --   --   --   --   --   --   --  0.2  --    ALKPHOS  --   --   --   --   --   --   --   --   --   --   --  104  --    ALT  --   --   --   --   --   --   --   --   --   --   --  71*  --    AST  --   --   --   --   --   --   --   --   --   --   --  78*  --     < > = values in this interval not displayed.     Recent Labs   Lab 04/06/22  0801 04/06/22  0520 04/06/22  0135 04/05/22  1826 04/05/22  1215   * 138* 132* 143* 132*       Imaging:   No results found for this or any previous visit  (from the past 24 hour(s)).

## 2022-04-09 PROCEDURE — 250N000013 HC RX MED GY IP 250 OP 250 PS 637: Performed by: INTERNAL MEDICINE

## 2022-04-09 PROCEDURE — 110N000005 HC R&B HOSPICE, ACCENT

## 2022-04-09 PROCEDURE — 99231 SBSQ HOSP IP/OBS SF/LOW 25: CPT | Mod: GV | Performed by: INTERNAL MEDICINE

## 2022-04-09 RX ORDER — NITROGLYCERIN 0.4 MG/1
0.4 TABLET SUBLINGUAL EVERY 5 MIN PRN
Status: DISCONTINUED | OUTPATIENT
Start: 2022-04-09 | End: 2022-04-12

## 2022-04-09 RX ORDER — TRAZODONE HYDROCHLORIDE 100 MG/1
100 TABLET ORAL AT BEDTIME
Status: DISCONTINUED | OUTPATIENT
Start: 2022-04-09 | End: 2022-04-18 | Stop reason: HOSPADM

## 2022-04-09 RX ORDER — FUROSEMIDE 20 MG
20 TABLET ORAL DAILY
Status: DISCONTINUED | OUTPATIENT
Start: 2022-04-09 | End: 2022-04-11

## 2022-04-09 RX ORDER — MORPHINE SULFATE 20 MG/ML
2.5-1 SOLUTION ORAL
Status: DISCONTINUED | OUTPATIENT
Start: 2022-04-09 | End: 2022-04-12

## 2022-04-09 RX ADMIN — MORPHINE SULFATE 10 MG: 10 SOLUTION ORAL at 20:03

## 2022-04-09 RX ADMIN — TRAZODONE HYDROCHLORIDE 100 MG: 100 TABLET ORAL at 20:54

## 2022-04-09 RX ADMIN — FUROSEMIDE 20 MG: 20 TABLET ORAL at 16:18

## 2022-04-09 ASSESSMENT — ACTIVITIES OF DAILY LIVING (ADL)
ADLS_ACUITY_SCORE: 19
ADLS_ACUITY_SCORE: 15
ADLS_ACUITY_SCORE: 19
ADLS_ACUITY_SCORE: 15
ADLS_ACUITY_SCORE: 19
ADLS_ACUITY_SCORE: 15
ADLS_ACUITY_SCORE: 19
ADLS_ACUITY_SCORE: 15
ADLS_ACUITY_SCORE: 19
ADLS_ACUITY_SCORE: 15
ADLS_ACUITY_SCORE: 19

## 2022-04-09 NOTE — PROGRESS NOTES
[facility]    Progress Note - [facility]    ______________________________________________________________________    [facility] Contact Number: [phone]    - Providers: Please contact [facility] with changes in orders or clinical plan of care   - Nursing: Please contact [facility] with significant changes in patient condition  ______________________________________________________________________        Plan of Care Discussed with the Following:   - Nurse: Melany Rust RN  - Hospitalist/Rounding Provider:    Lazara Boyd MD     Hospitalist - Internal Medicine     Since 4/6/2022     [phone]         - Ingrid's Family/Preferred Contact:Lida Farias daughter; [phone]  - Hospice Provider: Lakisha Zhao -[phone]    Summary of Visit (includes assessment, medications and any new orders):   Patients sister visited with writer prior to assessment with patient. She noted patient is more lucid today. She has been eating and drinking. Writer advised that Hospice is most appropriate as her heart is failing and by taking her off Hospice would prolong pain and suffering.  Upon assessment she reported she has had chest pain. Writer encouraged use of morphine. Patient had bad experiences with morphine and is allergic to dilaudid. Writer recommended providing a broader range of Morphine 2.5 -10 mg PRN for chest discomfort. Patient also requested to have Trazodone 100 mg PO at HS.   Writer placed call to Internist Dr. Boyd to discuss concerns. Discussed using lasix for a few days to help with labored breathing expanding range for morphine dosage, and use of nitroglycerin for chest discomfort as needed.       Writer agreed with plan to continue [facility] hospice care as she is appropriate for services with a poor prognosis due to EF of 12%.  Expressed to family that medications that are administered are appropriate to her needs at  this time. Spoke with daughter Lida over the phone and she was in agreement to use lasix, morphine and trazodone for sleep. Writer will reassess effectiveness of medications.     Discharge plans: Anticipated discharge to Long term care facility with the assist of  as patient can no longer live alone. Will defer to huddle with Team.       Lakisha Zhao RN

## 2022-04-09 NOTE — PROVIDER NOTIFICATION
MD Notification    Notified Person: MD    Notified Person Name: Dr. Boyd     Notification Date/Time: 4/9/22 @ 1150    Notification Interaction: [employer] messaging    Purpose of Notification:  Pt's IV was leaking so I removed it. Does she need another one placed?    Orders Received: no need for new one    Comments:

## 2022-04-09 NOTE — PLAN OF CARE
A&Ox4. Slept much of the shift. Appears comfortable. T/R for comfort. Repositions self frequently as well. Family at bedside.

## 2022-04-09 NOTE — PLAN OF CARE
Pt is alert, only oriented to self. VS and assessments deferred due to comfort care status. Denies pain. R SOLOMON Mejía patent. No BM this shift. T/R q2h as pt allowed. Up A1-2 with GB+W. On regular diet. Discharge plan pending.

## 2022-04-09 NOTE — PLAN OF CARE
Goal Outcome Evaluation:      Comfort cares - VS & full assessment deferred. Alert to self. Denies pain or N/V. Regular diet, fair appetite. Up A1 GB/W - T/R as pt allowed. Preventative mepi on sacrum. Pt up in chair this afternoon. Mejía removed - pt voiding adequately. No BM this shift. Discharge plan pending.

## 2022-04-09 NOTE — PROGRESS NOTES
[facility]    Hospitalist Progress Note    Assessment & Plan   Ingrid Powell is a 76 year old female who is being transitioned to inpatient hospice on 4/6/2022. Please see the discharge summary from the same date for more details of her hospital course.      Cardiogenic shock, blood pressure currently in 60s systolic  Severe cardiomyopathy with systolic heart failure, EF 10-15%   Complete heart block   Coronary disease out of proportion to her cardiomyopathy.     Severe stenosis proxRCA, moderate to severe stenosis midLAD.    Small to moderate pericardial effusion   Acute hypoxic respiratory failure due to cardiac arrest-intubated and sedated.  Now extubated  Family near bedside, continue inpatient hospice  --Continue hospice medications, her pacemaker was reprogrammed to be ineffective on 4/8  --4/9 patient is doing much better, she is questioning whether she should come out of hospice care.  Echocardiogram results reviewed which shows an EF of 12% , currently family and patient is planning to continue on hospice since patient does not want to have aggressive care continued.  Code Status: No CPR- Do NOT Intubate     Disposition: Expected discharge : Patient enrolled in inpatient hospice    Lazara Boyd MD  Text Page   (7am to 6pm)    Interval History   Alert oriented x3, sitting up and having breakfast.    -Data reviewed today: I reviewed all new labs and imaging results over the last 24 hours.  Physical Exam   Temp: 99  F (37.2  C) Temp src: Oral BP: 136/85 Pulse: 103   Resp: 16 SpO2: 90 % O2 Device: Nasal cannula    There were no vitals filed for this visit.  Vital Signs with Ranges  Temp:  [99  F (37.2  C)] 99  F (37.2  C)  Pulse:  [103] 103  Resp:  [16-18] 16  BP: (136)/(85) 136/85  SpO2:  [90 %] 90 %  I/O last 3 completed shifts:  In: -   Out: 400 [Urine:400]    Constitutional: Alert, cooperative and communicative  Respiratory: bilateral basilar crackles  Cardiovascular: pulse  present   GI: Normal bowel sounds, soft, non-distended, non-tender  Skin/Integumen: No rashes, no cyanosis, no edema  Neuro : moving all 4 extremities, no focal deficit noted     Medications     dextrose         fluticasone  2 spray Both Nostrils Daily     hypromellose-dextran  1 drop Both Eyes BID       Data   Recent Labs   Lab 04/06/22  0801 04/06/22  0520 04/06/22  0135 04/05/22  1826 04/05/22  1823 04/05/22  1215 04/05/22  1211 04/05/22  0847 04/05/22  0507 04/04/22  2201 04/04/22 2003 04/04/22  1545 04/04/22  0807   WBC  --   --   --   --   --   --   --   --  15.5*  --   --  15.8* 6.6   HGB  --   --   --   --   --   --   --   --  13.0 13.8  --  13.8 11.9   MCV  --   --   --   --   --   --   --   --  92  --   --  94 93   PLT  --   --   --   --   --   --   --   --  295  --   --  331 205   INR  --   --   --   --   --   --   --   --   --   --   --   --  1.00   NA  --   --   --   --   --   --   --   --  138  --   --  133 139   POTASSIUM  --   --   --   --  4.6  --  4.1  --  5.0  5.0 5.1  --  5.8* 4.4   CHLORIDE  --   --   --   --   --   --   --   --  109  --   --  107 112*   CO2  --   --   --   --   --   --   --   --  23  --   --  23 26   BUN  --   --   --   --   --   --   --   --  38*  --   --  39* 40*   CR  --   --   --   --   --   --   --   --  0.88  --   --  0.87 0.88   ANIONGAP  --   --   --   --   --   --   --   --  6  --   --  3 1*   JOSÉ MIGUEL  --   --   --   --   --   --   --   --  8.8  --   --  8.9 8.4*   * 138* 132*   < >  --    < >  --    < > 153*  --    < > 196*  188* 86   ALBUMIN  --   --   --   --   --   --   --   --   --   --   --  3.4  --    PROTTOTAL  --   --   --   --   --   --   --   --   --   --   --  7.2  --    BILITOTAL  --   --   --   --   --   --   --   --   --   --   --  0.2  --    ALKPHOS  --   --   --   --   --   --   --   --   --   --   --  104  --    ALT  --   --   --   --   --   --   --   --   --   --   --  71*  --    AST  --   --   --   --   --   --   --   --   --   --   --  78*   --     < > = values in this interval not displayed.     Recent Labs   Lab 04/06/22  0801 04/06/22  0520 04/06/22  0135 04/05/22  1826 04/05/22  1215   * 138* 132* 143* 132*       Imaging:   No results found for this or any previous visit (from the past 24 hour(s)).

## 2022-04-10 ENCOUNTER — MEDICAL CORRESPONDENCE (OUTPATIENT)
Dept: HEALTH INFORMATION MANAGEMENT | Facility: CLINIC | Age: 77
End: 2022-04-10
Payer: COMMERCIAL

## 2022-04-10 PROCEDURE — 250N000013 HC RX MED GY IP 250 OP 250 PS 637: Performed by: INTERNAL MEDICINE

## 2022-04-10 PROCEDURE — 99231 SBSQ HOSP IP/OBS SF/LOW 25: CPT | Mod: GV | Performed by: INTERNAL MEDICINE

## 2022-04-10 PROCEDURE — 250N000013 HC RX MED GY IP 250 OP 250 PS 637: Performed by: ANESTHESIOLOGY

## 2022-04-10 PROCEDURE — 110N000005 HC R&B HOSPICE, ACCENT

## 2022-04-10 RX ORDER — AMOXICILLIN 250 MG
1-2 CAPSULE ORAL
Status: DISCONTINUED | OUTPATIENT
Start: 2022-04-10 | End: 2022-04-18 | Stop reason: HOSPADM

## 2022-04-10 RX ORDER — ISOSORBIDE MONONITRATE 30 MG/1
30 TABLET, EXTENDED RELEASE ORAL DAILY
Status: DISCONTINUED | OUTPATIENT
Start: 2022-04-10 | End: 2022-04-11

## 2022-04-10 RX ORDER — GABAPENTIN 600 MG/1
600 TABLET ORAL AT BEDTIME
Status: DISCONTINUED | OUTPATIENT
Start: 2022-04-10 | End: 2022-04-18 | Stop reason: HOSPADM

## 2022-04-10 RX ORDER — POLYETHYLENE GLYCOL 3350 17 G/17G
17 POWDER, FOR SOLUTION ORAL DAILY
Status: DISCONTINUED | OUTPATIENT
Start: 2022-04-10 | End: 2022-04-18 | Stop reason: HOSPADM

## 2022-04-10 RX ADMIN — MORPHINE SULFATE 10 MG: 10 SOLUTION ORAL at 21:09

## 2022-04-10 RX ADMIN — MORPHINE SULFATE 2.5 MG: 10 SOLUTION ORAL at 12:19

## 2022-04-10 RX ADMIN — LORAZEPAM 1 MG: 2 LIQUID ORAL at 14:19

## 2022-04-10 RX ADMIN — POLYETHYLENE GLYCOL 3350 17 G: 17 POWDER, FOR SOLUTION ORAL at 12:19

## 2022-04-10 RX ADMIN — MORPHINE SULFATE 10 MG: 10 SOLUTION ORAL at 15:11

## 2022-04-10 RX ADMIN — MORPHINE SULFATE 5 MG: 10 SOLUTION ORAL at 12:55

## 2022-04-10 RX ADMIN — FUROSEMIDE 20 MG: 20 TABLET ORAL at 08:33

## 2022-04-10 RX ADMIN — MORPHINE SULFATE 2.5 MG: 10 SOLUTION ORAL at 08:47

## 2022-04-10 RX ADMIN — MORPHINE SULFATE 10 MG: 10 SOLUTION ORAL at 18:29

## 2022-04-10 RX ADMIN — LORAZEPAM 1 MG: 2 LIQUID ORAL at 22:48

## 2022-04-10 RX ADMIN — GABAPENTIN 600 MG: 600 TABLET, FILM COATED ORAL at 21:06

## 2022-04-10 RX ADMIN — ISOSORBIDE MONONITRATE 30 MG: 30 TABLET, EXTENDED RELEASE ORAL at 11:18

## 2022-04-10 RX ADMIN — MORPHINE SULFATE 10 MG: 10 SOLUTION ORAL at 05:27

## 2022-04-10 RX ADMIN — TRAZODONE HYDROCHLORIDE 100 MG: 100 TABLET ORAL at 21:05

## 2022-04-10 ASSESSMENT — ACTIVITIES OF DAILY LIVING (ADL)
ADLS_ACUITY_SCORE: 12
DRESSING/BATHING: BATHING DIFFICULTY, ASSISTANCE 1 PERSON;DRESSING DIFFICULTY, ASSISTANCE 1 PERSON
ADLS_ACUITY_SCORE: 15
TOILETING_ISSUES: NO
DRESSING/BATHING_DIFFICULTY: YES
CONCENTRATING,_REMEMBERING_OR_MAKING_DECISIONS_DIFFICULTY: YES
ADLS_ACUITY_SCORE: 15
DOING_ERRANDS_INDEPENDENTLY_DIFFICULTY: YES
CHANGE_IN_FUNCTIONAL_STATUS_SINCE_ONSET_OF_CURRENT_ILLNESS/INJURY: YES
DRESS: 1-->ASSISTANCE (EQUIPMENT/PERSON) NEEDED (NOT DEVELOPMENTALLY APPROPRIATE)
ADLS_ACUITY_SCORE: 15
BATHING: 1-->ASSISTANCE NEEDED
ADLS_ACUITY_SCORE: 15
TRANSFERRING: 1-->ASSISTANCE (EQUIPMENT/PERSON) NEEDED
ADLS_ACUITY_SCORE: 15
ADLS_ACUITY_SCORE: 15
FALL_HISTORY_WITHIN_LAST_SIX_MONTHS: NO
ADLS_ACUITY_SCORE: 15
WALKING_OR_CLIMBING_STAIRS_DIFFICULTY: YES
WALKING_OR_CLIMBING_STAIRS: STAIR CLIMBING DIFFICULTY, DEPENDENT
ADLS_ACUITY_SCORE: 15
DRESS: 1-->ASSISTANCE (EQUIPMENT/PERSON) NEEDED
ADLS_ACUITY_SCORE: 15
ADLS_ACUITY_SCORE: 15
DIFFICULTY_EATING/SWALLOWING: NO
ADLS_ACUITY_SCORE: 12
WEAR_GLASSES_OR_BLIND: YES
ADLS_ACUITY_SCORE: 15
ADLS_ACUITY_SCORE: 15
ADLS_ACUITY_SCORE: 12
ADLS_ACUITY_SCORE: 15
ADLS_ACUITY_SCORE: 15
TRANSFERRING: 0-->ASSISTANCE NEEDED (DEVELOPMETNALLY APPROPRIATE)

## 2022-04-10 NOTE — PLAN OF CARE
Pt declines pain. Up to the bathroom with x1 assist/GB/Walker. Pt has 2L of oxygen on for comfort.

## 2022-04-10 NOTE — PROGRESS NOTES
[facility]    Hospitalist Progress Note    Assessment & Plan   Ingrid Powell is a 76 year old female who is being transitioned to inpatient hospice on 4/6/2022. Please see the discharge summary from the same date for more details of her hospital course.      Cardiogenic shock, blood pressure currently in 60s systolic  Severe cardiomyopathy with systolic heart failure, EF 10-15%   Complete heart block   Coronary disease out of proportion to her cardiomyopathy.     Severe stenosis proxRCA, moderate to severe stenosis midLAD.    Small to moderate pericardial effusion   Acute hypoxic respiratory failure due to cardiac arrest-intubated and sedated.  Now extubated  Family near bedside, continue inpatient hospice  --Continue hospice medications, her pacemaker was reprogrammed to be ineffective on 4/8.  --4/9 patient is doing much better, she is questioning whether she should come out of hospice care.  Echocardiogram results reviewed which shows an EF of 12% , currently family and patient is planning to continue on hospice since patient does not want to have aggressive care continued.  --4/10 discussed with the hospice nurse, medications adjusted, added Imdur 30 mg  due to her ongoing chest pain possibly due to underlying CHF, Imdur was added for comfort, added gabapentin for pain and medications for constipation.  Current plan is to discharge patient to assisted living with 24/7 care, family and the hospice nurse are working on it.  Possible discharge to assisted living on 4/11.  Code Status: No CPR- Do NOT Intubate     Disposition: Expected discharge : Patient enrolled in inpatient hospice, plan is for discharge to assisted living on 4/11    Lazara Boyd MD  Text Page   (7am to 6pm)    Interval History   Patient is resting comfortably, family at bedside, they are looking for assisted living as early as tomorrow for the patient .patient mentions some chest tightness and heaviness .    -Data  reviewed today: I reviewed all new labs and imaging results over the last 24 hours.  Physical Exam                      There were no vitals filed for this visit.  Vital Signs with Ranges     I/O last 3 completed shifts:  In: -   Out: 200 [Urine:200]    Constitutional: Alert, cooperative and communicative  Respiratory: bilateral basilar crackles  Cardiovascular: pulse present   GI: Normal bowel sounds, soft, non-distended, non-tender  Skin/Integumen: No rashes, no cyanosis, no edema  Neuro : moving all 4 extremities, no focal deficit noted     Medications     dextrose         fluticasone  2 spray Both Nostrils Daily     furosemide  20 mg Oral Daily     gabapentin  600 mg Oral At Bedtime     hypromellose-dextran  1 drop Both Eyes BID     isosorbide mononitrate  30 mg Oral Daily     polyethylene glycol  17 g Oral Daily     traZODone  100 mg Oral At Bedtime       Data   Recent Labs   Lab 04/06/22  0801 04/06/22  0520 04/06/22  0135 04/05/22  1826 04/05/22  1823 04/05/22  1215 04/05/22  1211 04/05/22  0847 04/05/22  0507 04/04/22  2201 04/04/22 2003 04/04/22  1545 04/04/22  0807   WBC  --   --   --   --   --   --   --   --  15.5*  --   --  15.8* 6.6   HGB  --   --   --   --   --   --   --   --  13.0 13.8  --  13.8 11.9   MCV  --   --   --   --   --   --   --   --  92  --   --  94 93   PLT  --   --   --   --   --   --   --   --  295  --   --  331 205   INR  --   --   --   --   --   --   --   --   --   --   --   --  1.00   NA  --   --   --   --   --   --   --   --  138  --   --  133 139   POTASSIUM  --   --   --   --  4.6  --  4.1  --  5.0  5.0 5.1  --  5.8* 4.4   CHLORIDE  --   --   --   --   --   --   --   --  109  --   --  107 112*   CO2  --   --   --   --   --   --   --   --  23  --   --  23 26   BUN  --   --   --   --   --   --   --   --  38*  --   --  39* 40*   CR  --   --   --   --   --   --   --   --  0.88  --   --  0.87 0.88   ANIONGAP  --   --   --   --   --   --   --   --  6  --   --  3 1*   JOSÉ MIGUEL  --   --   --    --   --   --   --   --  8.8  --   --  8.9 8.4*   * 138* 132*   < >  --    < >  --    < > 153*  --    < > 196*  188* 86   ALBUMIN  --   --   --   --   --   --   --   --   --   --   --  3.4  --    PROTTOTAL  --   --   --   --   --   --   --   --   --   --   --  7.2  --    BILITOTAL  --   --   --   --   --   --   --   --   --   --   --  0.2  --    ALKPHOS  --   --   --   --   --   --   --   --   --   --   --  104  --    ALT  --   --   --   --   --   --   --   --   --   --   --  71*  --    AST  --   --   --   --   --   --   --   --   --   --   --  78*  --     < > = values in this interval not displayed.     Recent Labs   Lab 04/06/22  0801 04/06/22  0520 04/06/22  0135 04/05/22  1826 04/05/22  1215   * 138* 132* 143* 132*       Imaging:   No results found for this or any previous visit (from the past 24 hour(s)).

## 2022-04-10 NOTE — PLAN OF CARE
"Goal Outcome Evaluation:      Comfort cares - VS & full assessment deferred. A/Ox4. C/o chest pain 6/10 - morphine given x2, ativan given x1. Pt requested \"heart\" pillow to help with pain as well - effective. Regular diet, fair appetite. Up A1 GB/W. Continent B/B, up to the bathroom & chair. Voiding adequately, no BM this shift. Discharge plan pending.                 "

## 2022-04-10 NOTE — PLAN OF CARE
Shift Note: 3240-4030  Pt on comfort cares - VS and full assessment deferred. A&Ox4. C/o chest pain, rated 5/10, morphine given x2. Regular diet, fair appetite. Up with 1 assist gb/walker. Continent B/B, up to the bathroom. 1 BM this shift, voiding adequately. Discharge plan pending.

## 2022-04-10 NOTE — PROGRESS NOTES
"[facility]    Progress Note - [facility]    ______________________________________________________________________    [facility] 24/7 Contact Number: [phone]    - Providers: Please contact [facility] with changes in orders or clinical plan of care   - Nursing: Please contact [facility] with significant changes in patient condition  ______________________________________________________________________        Plan of Care Discussed with the Following:   - Nurse: Melany Rust RN  NA/NST:   [place] TUBBS  - Hospitalist/Rounding Provider:     Lazara Boyd MD     Hospitalist - Internal Medicine     Since 4/6/2022     [phone]       - Ingrid's Family/Preferred Contact: Lida daughter, [phone]  - Hospice Provider: Lakisha Zhao -[phone]    Summary of Visit (includes assessment, medications and any new orders):   Patient was sitting up in chair with her daughter, Lida, at her side. She was alert and oriented x4. She did eat breakfast and was drinking water during this encounter. She was made aware of medications initiated by team yesterday to include nitroglycerin sublingual for chest pain PRN, lasix po 20 mg daily, Trazodone 100 mg po daily. She reported she slept until 3 am and then was awake. She requested to have Gabapentin 600 mg po at bedtime with her Trazodone to keep her more comfortable. She didn't remember the last time she had a bowel movement. Staff RN Melany stated, \"it was charted you had a BM yesterday.\" Patient reported, \"I am constipated and always have been, so I may need a laxative. I need something to have a BM\" Patient and daughter were educated on the benefits of the hospice program and both were in agreement to continue with the service. MD agreed to add Gabapentin, senna and miralax to her medication profile.     Discharge planning: Lida, daughter of patient would like her mother to move to [facility] assisted " living and has already initiated plans for placement. She would qualify for Elderly Waiver program to supplement payment for the facility. She reported her father in law is residing there and does like the facility staff and setting. Writer discussed plan with Hospitalist as patient is ready for discharge.   [facility]  [address] [place] [address]    [phone]      Lida made aware that discharge plans will be formalized on Monday April 11th with assist of Hospice.        Lakisha Zhao RN

## 2022-04-11 LAB — GLUCOSE BLDC GLUCOMTR-MCNC: 95 MG/DL (ref 70–99)

## 2022-04-11 PROCEDURE — 110N000005 HC R&B HOSPICE, ACCENT

## 2022-04-11 PROCEDURE — 250N000013 HC RX MED GY IP 250 OP 250 PS 637: Performed by: ANESTHESIOLOGY

## 2022-04-11 PROCEDURE — 99233 SBSQ HOSP IP/OBS HIGH 50: CPT | Mod: GV | Performed by: HOSPITALIST

## 2022-04-11 PROCEDURE — 250N000013 HC RX MED GY IP 250 OP 250 PS 637: Performed by: INTERNAL MEDICINE

## 2022-04-11 RX ORDER — CARBOXYMETHYLCELLULOSE SODIUM 5 MG/ML
1 SOLUTION/ DROPS OPHTHALMIC 4 TIMES DAILY PRN
Status: DISCONTINUED | OUTPATIENT
Start: 2022-04-11 | End: 2022-04-18 | Stop reason: HOSPADM

## 2022-04-11 RX ADMIN — LORAZEPAM 1 MG: 2 LIQUID ORAL at 12:11

## 2022-04-11 RX ADMIN — LORAZEPAM 1 MG: 2 LIQUID ORAL at 23:28

## 2022-04-11 RX ADMIN — MORPHINE SULFATE 10 MG: 10 SOLUTION ORAL at 15:59

## 2022-04-11 RX ADMIN — TRAZODONE HYDROCHLORIDE 100 MG: 100 TABLET ORAL at 23:28

## 2022-04-11 RX ADMIN — MORPHINE SULFATE 10 MG: 10 SOLUTION ORAL at 13:40

## 2022-04-11 RX ADMIN — LORAZEPAM 1 MG: 2 LIQUID ORAL at 16:38

## 2022-04-11 RX ADMIN — POLYETHYLENE GLYCOL 3350 17 G: 17 POWDER, FOR SOLUTION ORAL at 08:20

## 2022-04-11 RX ADMIN — MORPHINE SULFATE 10 MG: 10 SOLUTION ORAL at 08:21

## 2022-04-11 RX ADMIN — FUROSEMIDE 20 MG: 20 TABLET ORAL at 08:20

## 2022-04-11 RX ADMIN — ACETAMINOPHEN 650 MG: 325 TABLET ORAL at 18:46

## 2022-04-11 RX ADMIN — MORPHINE SULFATE 10 MG: 10 SOLUTION ORAL at 18:26

## 2022-04-11 RX ADMIN — GABAPENTIN 600 MG: 600 TABLET, FILM COATED ORAL at 23:28

## 2022-04-11 RX ADMIN — MORPHINE SULFATE 10 MG: 10 SOLUTION ORAL at 20:34

## 2022-04-11 RX ADMIN — ISOSORBIDE MONONITRATE 30 MG: 30 TABLET, EXTENDED RELEASE ORAL at 08:20

## 2022-04-11 RX ADMIN — MORPHINE SULFATE 10 MG: 10 SOLUTION ORAL at 23:28

## 2022-04-11 RX ADMIN — NITROGLYCERIN 0.4 MG: 0.4 TABLET SUBLINGUAL at 18:47

## 2022-04-11 RX ADMIN — MORPHINE SULFATE 10 MG: 10 SOLUTION ORAL at 10:46

## 2022-04-11 ASSESSMENT — ACTIVITIES OF DAILY LIVING (ADL)
ADLS_ACUITY_SCORE: 15

## 2022-04-11 NOTE — PLAN OF CARE
"Goal Outcome Evaluation:      Comfort cares - VS & full assessment deferred. A/Ox4. C/o chest pain 6/10 - morphine given x3, ativan given x1 before shower. Pt requested \"heart\" pillow to help with pain as well - effective. Regular diet, fair appetite. Up A1 GB/W. Continent B/B, up to the bathroom & chair. Voiding adequately, no BM this shift. Received shower & went for walk around unit in the wheelchair. Discharge to hospice facility pending.                 "

## 2022-04-11 NOTE — PROGRESS NOTES
"Comfort cares - VS & full assessment deferred. A/Ox4. C/o chest pain 7/10 - morphine given x3, ativan given x1. Pt requested \"heart\" pillow to help with pain as well - effective. Regular diet, fair appetite. Up A1 GB/W to BR x3. Continent B/B. Voiding well, no BM this shift, passing flatus.. Discharge plan pending.   "

## 2022-04-11 NOTE — PLAN OF CARE
Goal Outcome Evaluation:    Plan of Care Reviewed With: patient, family     Comfort cares. Full assessment deferred for comfort cares. C/O chest pain prn tylenol and morphine given. PRN ativan given per pt request. nitroglycerin x1 given per pt request. Regular diet, poor appetite. Assist x1.

## 2022-04-11 NOTE — CONSULTS
[facility]    Progress Note - [facility]    ______________________________________________________________________    [facility] 24/7 Contact Number: [phone]    - Providers: Please contact [facility] with changes in orders or clinical plan of care   - Nursing: Please contact [facility] with significant changes in patient condition  ______________________________________________________________________        Plan of Care Discussed with the Following:   - Nurse: Melany  - Hospitalist/Rounding Provider: Dr. Amie Quintero's Family/Preferred Contact: daughter Lida, sister Gayla  - Hospice Provider: Dr. Brian Carbajal    Summary of Visit (includes assessment, medications and any new orders):   Pt is A/O x 4. Reports chest wall pain and rates pain 4-5/10. Has been requiring Morphine 10 mg about Q 2 hrs this am for comfort. Pain worsens with coughing or laughing. Pt is  holding a pillow to her chest when coughing for support. Hospice team is working on discharge planning. The plan is for pt to go to [facility] in [place] with the community hospice team in place.    Med recommendations from Dr. Carbajal:  Tylenol 500 mg, 2 tabs po TID scheduled  MS contin 15 mg, 1 tab po Q 12 hrs scheduled  Senna-s, 1 tab po BID scheduled    Jill Schoenecker, RN

## 2022-04-11 NOTE — PROGRESS NOTES
Pt is on comfort cares, VS and assessment deferred. A&O x4. Mepilex on coccyx. Denies pain. Regular diet. Up Ax1 GB/W. No IV access. Discharge to assisted living facility with hospice care pending placement

## 2022-04-11 NOTE — PROGRESS NOTES
[facility]    Hospitalist Progress Note    Assessment & Plan   Ingrid Powell is a 76 year old female who is being transitioned to inpatient hospice on 4/6/2022. Please see the discharge summary from the same date for more details of her hospital course.      Cardiogenic shock, blood pressure currently in 60s systolic  Severe cardiomyopathy with systolic heart failure, EF 10-15%   Complete heart block   Coronary disease out of proportion to her cardiomyopathy.     Severe stenosis proxRCA, moderate to severe stenosis midLAD.    Small to moderate pericardial effusion   Acute hypoxic respiratory failure due to cardiac arrest-intubated and sedated.  Now extubated  Family near bedside, continue inpatient hospice  --Continue hospice medications, her pacemaker was reprogrammed to be ineffective on 4/8.  --4/9 patient is doing much better, she is questioning whether she should come out of hospice care.  Echocardiogram results reviewed which shows an EF of 12% , currently family and patient is planning to continue on hospice since patient does not want to have aggressive care continued.  --4/10 prior hospitalist discussed with the hospice nurse, medications adjusted, added Imdur 30 mg  due to her ongoing chest pain possibly due to underlying CHF, Imdur was added for comfort, added gabapentin for pain and medications for constipation.   -4/11 Discussed with Patient, and family, Sister and Daughter and GIP.  They were not aware of restart of Imdur and furosemide yesterday.  Discussed with GIP who were not also aware.  On discussion with Sister, Daughter they did not want these medications and did not feel that these medications were consistent with patient's goal directed at comfort and hospice care.  Imdur and furosemide discontinued.  Continue Comfort Care measures.  Discussed with Daughter and Sister plans are to establish discharge to LTC under waiver; plan established with GIP and  SW..      Code Status: No CPR- Do NOT Intubate     Disposition: Expected discharge : safe discharge under Hospice; LTC near [place] with hospice resources.  Ochoa Holloway MD  Text Page   (7am to 6pm)    Total unit/floor time 35 minutes:  time consisted of the following assuming Patient care, examination of patient, review of records including labs, imaging results, medications, interdisciplinary notes and completing documentation; > 50% Counseling/discussion with Patient and Family, Sister and Daughter  regarding current condition and goals of care and Coordination of Care time with Nursing, SW  and Specialists, GIP regarding Hospice and discharge care plan, management and surveillance.     Interval History   It is very difficult for patient to articulate her feelings.  At times she states she is comfortable other times she states she has left-sided chest pain, medications help.  Denies dyspnea, no increased secretions, her goal she states is to be comfortable and with family.       Physical Exam                      There were no vitals filed for this visit.  Vital Signs with Ranges     I/O last 3 completed shifts:  In: 720 [P.O.:720]  Out: -     Constitutional:  NAD, calm, appears comfortable  Respiratory: Respirations nonlabored on room air; no increased secretions  GI:  Soft nontender  Psych:  Affect: comfortable      Medications     dextrose         fluticasone  2 spray Both Nostrils Daily     gabapentin  600 mg Oral At Bedtime     hypromellose-dextran  1 drop Both Eyes BID     polyethylene glycol  17 g Oral Daily     traZODone  100 mg Oral At Bedtime

## 2022-04-12 LAB
GLUCOSE BLDC GLUCOMTR-MCNC: 88 MG/DL (ref 70–99)
GLUCOSE BLDC GLUCOMTR-MCNC: 90 MG/DL (ref 70–99)
GLUCOSE BLDC GLUCOMTR-MCNC: 92 MG/DL (ref 70–99)

## 2022-04-12 PROCEDURE — 250N000013 HC RX MED GY IP 250 OP 250 PS 637: Performed by: HOSPITALIST

## 2022-04-12 PROCEDURE — 250N000013 HC RX MED GY IP 250 OP 250 PS 637: Performed by: ANESTHESIOLOGY

## 2022-04-12 PROCEDURE — 250N000013 HC RX MED GY IP 250 OP 250 PS 637: Performed by: INTERNAL MEDICINE

## 2022-04-12 PROCEDURE — 110N000005 HC R&B HOSPICE, ACCENT

## 2022-04-12 PROCEDURE — 99231 SBSQ HOSP IP/OBS SF/LOW 25: CPT | Mod: GV | Performed by: HOSPITALIST

## 2022-04-12 RX ORDER — ACETAMINOPHEN 500 MG
1000 TABLET ORAL 3 TIMES DAILY
Status: DISCONTINUED | OUTPATIENT
Start: 2022-04-12 | End: 2022-04-18 | Stop reason: HOSPADM

## 2022-04-12 RX ORDER — MORPHINE SULFATE 15 MG/1
15 TABLET, FILM COATED, EXTENDED RELEASE ORAL EVERY 12 HOURS SCHEDULED
Status: DISCONTINUED | OUTPATIENT
Start: 2022-04-12 | End: 2022-04-18 | Stop reason: HOSPADM

## 2022-04-12 RX ORDER — LORAZEPAM 2 MG/ML
1-2 CONCENTRATE ORAL EVERY 4 HOURS PRN
Status: DISCONTINUED | OUTPATIENT
Start: 2022-04-12 | End: 2022-04-18 | Stop reason: HOSPADM

## 2022-04-12 RX ORDER — AMOXICILLIN 250 MG
1 CAPSULE ORAL 2 TIMES DAILY
Status: DISCONTINUED | OUTPATIENT
Start: 2022-04-12 | End: 2022-04-18 | Stop reason: HOSPADM

## 2022-04-12 RX ORDER — MORPHINE SULFATE 20 MG/ML
2.5-1 SOLUTION ORAL
Status: DISCONTINUED | OUTPATIENT
Start: 2022-04-12 | End: 2022-04-12

## 2022-04-12 RX ORDER — MORPHINE SULFATE 20 MG/ML
2.5-1 SOLUTION ORAL
Status: DISCONTINUED | OUTPATIENT
Start: 2022-04-12 | End: 2022-04-18 | Stop reason: HOSPADM

## 2022-04-12 RX ADMIN — MORPHINE SULFATE 10 MG: 10 SOLUTION ORAL at 15:08

## 2022-04-12 RX ADMIN — MORPHINE SULFATE 10 MG: 10 SOLUTION ORAL at 06:08

## 2022-04-12 RX ADMIN — GABAPENTIN 600 MG: 600 TABLET, FILM COATED ORAL at 22:09

## 2022-04-12 RX ADMIN — ACETAMINOPHEN 650 MG: 325 TABLET ORAL at 15:07

## 2022-04-12 RX ADMIN — MORPHINE SULFATE 10 MG: 10 SOLUTION ORAL at 16:49

## 2022-04-12 RX ADMIN — MORPHINE SULFATE 15 MG: 15 TABLET, EXTENDED RELEASE ORAL at 20:19

## 2022-04-12 RX ADMIN — LORAZEPAM 1 MG: 2 LIQUID ORAL at 11:10

## 2022-04-12 RX ADMIN — ACETAMINOPHEN 1000 MG: 500 TABLET, FILM COATED ORAL at 20:20

## 2022-04-12 RX ADMIN — MORPHINE SULFATE 10 MG: 10 SOLUTION ORAL at 01:31

## 2022-04-12 RX ADMIN — LORAZEPAM 1 MG: 2 LIQUID ORAL at 16:15

## 2022-04-12 RX ADMIN — TRAZODONE HYDROCHLORIDE 100 MG: 100 TABLET ORAL at 22:09

## 2022-04-12 RX ADMIN — NITROGLYCERIN 0.4 MG: 0.4 TABLET SUBLINGUAL at 09:00

## 2022-04-12 RX ADMIN — BISACODYL 10 MG: 10 SUPPOSITORY RECTAL at 15:07

## 2022-04-12 RX ADMIN — MORPHINE SULFATE 10 MG: 10 SOLUTION ORAL at 11:10

## 2022-04-12 RX ADMIN — POLYETHYLENE GLYCOL 3350 17 G: 17 POWDER, FOR SOLUTION ORAL at 09:00

## 2022-04-12 RX ADMIN — SENNOSIDES AND DOCUSATE SODIUM 1 TABLET: 50; 8.6 TABLET ORAL at 20:21

## 2022-04-12 RX ADMIN — MORPHINE SULFATE 10 MG: 10 SOLUTION ORAL at 03:52

## 2022-04-12 RX ADMIN — LORAZEPAM 1 MG: 2 LIQUID ORAL at 06:14

## 2022-04-12 RX ADMIN — MORPHINE SULFATE 10 MG: 10 SOLUTION ORAL at 09:00

## 2022-04-12 RX ADMIN — LORAZEPAM 1 MG: 2 LIQUID ORAL at 17:10

## 2022-04-12 RX ADMIN — NITROGLYCERIN 0.4 MG: 0.4 TABLET SUBLINGUAL at 16:12

## 2022-04-12 ASSESSMENT — ACTIVITIES OF DAILY LIVING (ADL)
ADLS_ACUITY_SCORE: 15

## 2022-04-12 NOTE — PROGRESS NOTES
[facility]    Hospitalist Progress Note    Assessment & Plan   Ingrid Powell is a 76 year old female who is being transitioned to inpatient hospice on 4/6/2022. Please see the discharge summary from the same date for more details of her hospital course.    Severe cardiomyopathy with systolic heart failure, EF 10-15%   Complete heart block   Coronary disease out of proportion to her cardiomyopathy.     Severe stenosis proxRCA, moderate to severe stenosis midLAD.    Small to moderate pericardial effusion   Acute hypoxic respiratory failure due to cardiac arrest-intubated and sedated.  Now extubated  Prior hospitalist discussion with family, transition to inpatient hospice  --Continue hospice medications, her pacemaker was reprogrammed to be ineffective on 4/8.  --4/9 patient is doing much better, she is questioning whether she should come out of hospice care.  Echocardiogram results reviewed which shows an EF of 12% , currently family and patient is planning to continue on hospice since patient does not want to have aggressive care continued on discussion with prior hospitalist.  --4/10 prior hospitalist discussed with the hospice nurse, medications adjusted, added Imdur 30 mg  due to her ongoing chest pain possibly due to underlying CHF, Imdur was added for comfort, added gabapentin for pain and medications for constipation.   -4/11 Discussed with Patient, and family, Sister and Daughter and GIP.  They were not aware of restart of Imdur and furosemide yesterday.  Discussed with GIP who were not also aware.  On discussion with Sister, Daughter they did not want these medications and did not feel that these medications were consistent with patient's goal directed at comfort and hospice care.  GIP concurs.  Imdur and furosemide discontinued.  Continue Comfort Care measures.  Discussed with Daughter and Sister plans are to establish discharge to LTC under waiver; plan established with GIP  and SW.  -Patient somewhat somnolent on encounter, appears comfortable.  No increase secretions, O2 per NC.  Nursing reports some increased pain. PRN MS increased frequency 5-10 mg to every hour from every 2 hours PRN.  Patient also has as needed Ativan for anxiety.  Continue comfort cares with goal directed at patient comfort..  ADDENDUM; discussed with GIP.  Recommend scheduled MS Contin however GIP NP states 2 pages regarding these recommendations yesterday, I discussed with her reviewing all pages did not receive any.  Upon clarification she had wrong phone number to send these pages.  Clarified page number, in the meantime added GIP recommendations for scheduled MS Contin, acetaminophen, senna S and changed PRN short acting MS back to every 2 hours PRN.  Appreciate assistance of Robert who assisted with these medication changes due to locked med records in Epic.     Code Status: No CPR- Do NOT Intubate     Disposition: Expected discharge : safe discharge under Hospice; LTC near [place] with hospice resources.  Ochoa Holloway MD  Text Page   (7am to 6pm)      Interval History   On encounter patient somnolent yet arousable and conversive on discussion.  She appears comfortable.  Son at bedside, working on LTC placement.     Physical Exam                      There were no vitals filed for this visit.  Vital Signs with Ranges     I/O last 3 completed shifts:  In: 480 [P.O.:480]  Out: -     Constitutional: NAD, calm, appears comfortable.  Respiratory: Respirations nonlabored on room air; no increased secretions  GI:  Soft nontender  Psych:  Affect: Calm    Medications     dextrose         fluticasone  2 spray Both Nostrils Daily     gabapentin  600 mg Oral At Bedtime     hypromellose-dextran  1 drop Both Eyes BID     polyethylene glycol  17 g Oral Daily     traZODone  100 mg Oral At Bedtime

## 2022-04-12 NOTE — PLAN OF CARE
Pt comfort cares, all VS and formal assessments deferred. Start of this shift, pt A/O, drowsy at times. C/o chest pain, PRN nitrostat given in addition to morphine, ativan and Tylenol given throughout the day for pain and comfort. Will start MS contin tonight. This afternoon, pt started to rigor aggressively, PRN 1mg ativan given, pt c/o of chest pain, saying it was their heart that was hurting, nitrostat given once more, PRN morphine given in addition, all not seeming to relieve rigors. Warm blankets applied, PRN tylenol given prior for chills.  Additional dose of SL ativan ordered and given, pt finally calming down and rigors stopped. Regular diet, poor appetite. Continent, voiding well in BR. C/o constipation, PRN dulcolax sup given. Up A1, Gb/W, ambulated to BR. T/R as pt allows. Mepilex to coccyx. GIP following. Discharge on hospice to [facility] in [place] pending MA paperwork.

## 2022-04-12 NOTE — PLAN OF CARE
Comfort cares, VS and assessments deferred. C/o chest pain, requested morphine to be given q2h and to be woken up for this. PRN ativan given at bedtime per pt request and at 0600.  Offer ativan when available. Regular diet. BG 95@ 2330, 88@0200. A1 to bathroom. Discharge pending, plan to go to [facility] in [place] with community hospice team.

## 2022-04-12 NOTE — CONSULTS
[facility]    Progress Note - [facility]    ______________________________________________________________________    [facility] Contact Number: [phone]    - Providers: Please contact [facility] with changes in orders or clinical plan of care   - Nursing: Please contact [facility] with significant changes in patient condition  ______________________________________________________________________        Plan of Care Discussed with the Following:   - Nurse: Lakisha/Angelita  - Hospitalist/Rounding Provider: Dr. Ochoa Holloway    - Ingrid's Family/Preferred Contact: Daughter Lida  - Hospice Provider: Dr. Brian Carbajal    Summary of Visit (includes assessment, medications and any new orders):   Pt is alert and oriented to person and place. Confused to time and pt reports feeling confused. Checked O2 sats which were 73% on RA. New onset of confusion most likely caused by hypoxia. Applied O2 at 4L via NC and O2 sats increased to 90%. Pt is breathing shallow d/t severe chest pain. Pain is 4-5/10 at rest and increases to 9/10 with coughing and taking deep breaths. Spoke to Dr. Holloway, who will order MS contin 15 mg Q 12 hrs and also schedule Tylenol 1,000mg TID. Pt also reports constipation. No BM since last Friday. Staff nurse will give a supp today. Dr. Holloway will order scheduled Senna-s for comfort. Hospice team  Continues working on discharge to AL facility in [place].       Jill Schoenecker, RN

## 2022-04-12 NOTE — PROVIDER NOTIFICATION
MD Notification    Notified Person: MD    Notified Person Name: Amie    Notification Date/Time: 04/12/22  12:15 PM    Notification Interaction: [employer] messaging    Purpose of Notification: Are you able to order additional pain meds? giving Roxanol 10mg q2h, doesn't seem to be enough.    Orders Received:    Comments:'

## 2022-04-13 PROCEDURE — 110N000005 HC R&B HOSPICE, ACCENT

## 2022-04-13 PROCEDURE — 250N000013 HC RX MED GY IP 250 OP 250 PS 637: Performed by: ANESTHESIOLOGY

## 2022-04-13 PROCEDURE — 250N000013 HC RX MED GY IP 250 OP 250 PS 637

## 2022-04-13 PROCEDURE — 250N000013 HC RX MED GY IP 250 OP 250 PS 637: Performed by: INTERNAL MEDICINE

## 2022-04-13 PROCEDURE — 99231 SBSQ HOSP IP/OBS SF/LOW 25: CPT | Mod: GV | Performed by: HOSPITALIST

## 2022-04-13 PROCEDURE — 250N000013 HC RX MED GY IP 250 OP 250 PS 637: Performed by: HOSPITALIST

## 2022-04-13 RX ORDER — GUAIFENESIN 600 MG/1
600 TABLET, EXTENDED RELEASE ORAL 2 TIMES DAILY PRN
Status: DISCONTINUED | OUTPATIENT
Start: 2022-04-13 | End: 2022-04-18 | Stop reason: HOSPADM

## 2022-04-13 RX ORDER — GUAIFENESIN AND DEXTROMETHORPHAN HYDROBROMIDE 100; 10 MG/5ML; MG/5ML
5-10 SOLUTION ORAL EVERY 4 HOURS PRN
Status: DISCONTINUED | OUTPATIENT
Start: 2022-04-13 | End: 2022-04-18 | Stop reason: HOSPADM

## 2022-04-13 RX ADMIN — ACETAMINOPHEN 1000 MG: 500 TABLET, FILM COATED ORAL at 21:46

## 2022-04-13 RX ADMIN — GABAPENTIN 600 MG: 600 TABLET, FILM COATED ORAL at 21:47

## 2022-04-13 RX ADMIN — ATROPINE SULFATE 2 DROP: 10 SOLUTION OPHTHALMIC at 04:11

## 2022-04-13 RX ADMIN — TRAZODONE HYDROCHLORIDE 100 MG: 100 TABLET ORAL at 21:47

## 2022-04-13 RX ADMIN — SENNOSIDES AND DOCUSATE SODIUM 1 TABLET: 50; 8.6 TABLET ORAL at 08:35

## 2022-04-13 RX ADMIN — MORPHINE SULFATE 15 MG: 15 TABLET, EXTENDED RELEASE ORAL at 08:34

## 2022-04-13 RX ADMIN — SENNOSIDES AND DOCUSATE SODIUM 1 TABLET: 50; 8.6 TABLET ORAL at 21:47

## 2022-04-13 RX ADMIN — LORAZEPAM 1 MG: 2 LIQUID ORAL at 22:58

## 2022-04-13 RX ADMIN — GUAIFENESIN 600 MG: 600 TABLET ORAL at 18:24

## 2022-04-13 RX ADMIN — MORPHINE SULFATE 15 MG: 15 TABLET, EXTENDED RELEASE ORAL at 19:53

## 2022-04-13 RX ADMIN — MORPHINE SULFATE 10 MG: 10 SOLUTION ORAL at 18:24

## 2022-04-13 RX ADMIN — ACETAMINOPHEN 650 MG: 325 TABLET ORAL at 14:41

## 2022-04-13 ASSESSMENT — ACTIVITIES OF DAILY LIVING (ADL)
ADLS_ACUITY_SCORE: 15
ADLS_ACUITY_SCORE: 21
ADLS_ACUITY_SCORE: 15
ADLS_ACUITY_SCORE: 21
ADLS_ACUITY_SCORE: 15

## 2022-04-13 NOTE — CONSULTS
[facility]    Progress Note - [facility]    ______________________________________________________________________    [facility] Contact Number: [phone]    - Providers: Please contact [facility] with changes in orders or clinical plan of care   - Nursing: Please contact [facility] with significant changes in patient condition  ______________________________________________________________________        Plan of Care Discussed with the Following:   - Nurse: Lakisha/Angelita  - Hospitalist/Rounding Provider: Dr. Ochoa Holloway   - Ingrid's Family/Preferred Contact: Daughter Lida and sister Gayla  - Hospice Provider: Dr. Brian Carbajal    Summary of Visit (includes assessment, medications and any new orders):   Pt is A/O to person and place. Is forgetful. Pain is well managed at rest but worsens with cough. Pt continues to have a frequent congested cough. Lungs have crackles in bases bilaterally posteriorly. O2 sats 95% on 3L via NC. BP is 139/62. Hospice MD suggested 1 dose of Furosemide to help get rid of extra fluid building up in pt's lungs. Therefore, making breathing more comfortable and decreasing cough. Also suggested using PRN opioids for respiratory effort and easing cough. Spoke to pt and sister about these recommendations and both agreed that it was a good plan for pt's comfort.    Continue to plan for discharge to [facility]. Spoke to Ary at the facility, who states that they will be ready to accept the pt on Monday 4/18/22. Will continue to plan for discharge on Monday to Al facility with hospice in place.       Jill Schoenecker, RN

## 2022-04-13 NOTE — PLAN OF CARE
[id number]  Comfort cares maintained. Pt lethargic this AM, less responsive, more alert this afternoon. On 3-4L NC for comfort. Pt up A1 GB/W this afternoon. T/R q2h when in bed.  C/o pain to chest, tylenol and morphine given for comfort. Frequent coughing, PRN Mucinex given, robitussin available. Regular diet, fair appetite. Incontinent, low UOP, purewick in place this morning when lethargic, BR this afternoon. Discharge pending hospice placement.

## 2022-04-13 NOTE — PROGRESS NOTES
[facility]    Hospitalist Progress Note    Assessment & Plan   Ingrid Powell is a 76 year old female who is being transitioned to inpatient hospice on 4/6/2022. Please see the discharge summary from the same date for more details of her hospital course.    Severe cardiomyopathy with systolic heart failure, EF 10-15%   Complete heart block   Coronary disease out of proportion to her cardiomyopathy.     Severe stenosis proxRCA, moderate to severe stenosis midLAD.    Small to moderate pericardial effusion   Acute hypoxic respiratory failure due to cardiac arrest-intubated and sedated.  Now extubated  Prior hospitalist discussion with family, transition to inpatient hospice  --Continue hospice medications, her pacemaker was reprogrammed to be ineffective on 4/8.  --4/9 patient is doing much better, she is questioning whether she should come out of hospice care.  Echocardiogram results reviewed which shows an EF of 12% , currently family and patient is planning to continue on hospice since patient does not want to have aggressive care continued on discussion with prior hospitalist.  --4/10 prior hospitalist discussed with the hospice nurse, medications adjusted, added Imdur 30 mg  due to her ongoing chest pain possibly due to underlying CHF, Imdur was added for comfort, added gabapentin for pain and medications for constipation.   -4/11 Discussed with Patient, and family, Sister and Daughter and GIP.  They were not aware of restart of Imdur and furosemide yesterday.  Discussed with GIP who were not also aware.  On discussion with Sister, Daughter they did not want these medications and did not feel that these medications were consistent with patient's goal directed at comfort and hospice care.  GIP concurs.  Imdur and furosemide discontinued.  Continue Comfort Care measures.  Discussed with Daughter and Sister plans are to establish discharge to LTC under waiver; plan established with GIP  and SW.  -Patient somewhat somnolent on encounter, appears comfortable.  No increase secretions, O2 per NC.  Nursing reports some increased pain. PRN MS increased frequency 5-10 mg to every hour from every 2 hours PRN.  Patient also has as needed Ativan for anxiety.  Continue comfort cares with goal directed at patient comfort..  ADDENDUM 4/12/22 discussed with GIP.  Recommend scheduled MS Contin however [facility] NP states 2 pages regarding these recommendations yesterday 4/11/2022, I discussed with her reviewing all pages did not receive any.  Upon clarification she had wrong phone number to send these pages.  Clarified page number, in the meantime added [facility] recommendations for scheduled MS Contin, acetaminophen, senna S and changed PRN short acting MS back to every 2 hours PRN.  Appreciate assistance of Robert who assisted with these medication changes due to locked med records in [facility].   -On encounter today patient appears comfortable, on scheduled MS Contin and acetaminophen.  Sister present during encounter who agrees patient is more comfortable.  Continue comfort care.  ADDENDUM 4/13/22: Received call from [facility] provider that stated that the [facility] nurse spoke with family about starting furosemide and potassium supplement.  Discussed with GI provider that earlier this week when I discussed with patient's daughter and sister that orders were entered for furosemide and Imdur they were surprised and did not want furosemide or other treatment medications for the patient who is on comfort care and they only wanted comfort care medications.  Discussed with [facility] provider that I would only prescribe what family or Patient  wanted.  Therefore called [facility] nurse who spoke with Sister who was present in room.  Sister notified that daughter Lida is the decision-maker.  I had a phone discussion with daughter Lida who emphatically stated that she did not want Mother/patient started on furosemide and or potassium or other treatment  measures addressed supposedly by GIP nurse; Daughter continued to reiterate that she wanted patient only on comfort care measures of pain management [currently scheduled and PRN],  atropine PRN for increased secretions, PRN ativan or MS for respiratory distress; discussed we can add Mucinex expectorant and/ or Robitussin-DM as options.  This is what daughter Lida wanted for her mother/patient and emphatically did not want the addition of furosemide/potassium supplement.  Daughter/Lida reiterated that they want patient on comfort care and did not want to prolong patient suffering.    Code Status: No CPR- Do NOT Intubate     Disposition: Expected discharge : safe discharge under Hospice; LTC near [place] with hospice resources.  Ochoa Holloway MD  Text Page   (7am to 6pm)      Interval History   Awake, conversive.  States she is comfortable.  Mild cough, no increased secretions.  Sister present at bedside.   Physical Exam                      There were no vitals filed for this visit.  Vital Signs with Ranges     No intake/output data recorded.    Constitutional: NAD, calm, appears comfortable.  Respiratory: Respirations nonlabored on room air; no increased secretions  GI:  Soft nontender  Psych:  Affect: Calm.    Medications     dextrose         acetaminophen  1,000 mg Oral TID     fluticasone  2 spray Both Nostrils Daily     gabapentin  600 mg Oral At Bedtime     hypromellose-dextran  1 drop Both Eyes BID     morphine  15 mg Oral Q12H LEYLA     polyethylene glycol  17 g Oral Daily     senna-docusate  1 tablet Oral BID     traZODone  100 mg Oral At Bedtime

## 2022-04-13 NOTE — PLAN OF CARE
Goal Outcome Evaluation:        Comfort cares maintained.  Full assessment and VS deferred.  Patient is very lethargic this shift, but is able to be awoken to take scheduled medications; swallows without difficulty.  Incontinent of bladder; Purewick in-place with very low output.  Denies pain; scheduled MS Contin and Tylenol given.  No IV access.  Regular diet ordered, but patient only had sips of water overnight.  T/R as patient allows.  Discharge to hospice facility once MA paperwork completed.

## 2022-04-14 PROCEDURE — 250N000013 HC RX MED GY IP 250 OP 250 PS 637: Performed by: INTERNAL MEDICINE

## 2022-04-14 PROCEDURE — 110N000005 HC R&B HOSPICE, ACCENT

## 2022-04-14 PROCEDURE — 250N000013 HC RX MED GY IP 250 OP 250 PS 637: Performed by: ANESTHESIOLOGY

## 2022-04-14 PROCEDURE — 99231 SBSQ HOSP IP/OBS SF/LOW 25: CPT | Mod: GV | Performed by: HOSPITALIST

## 2022-04-14 PROCEDURE — 250N000013 HC RX MED GY IP 250 OP 250 PS 637: Performed by: HOSPITALIST

## 2022-04-14 RX ADMIN — ACETAMINOPHEN 1000 MG: 500 TABLET, FILM COATED ORAL at 16:29

## 2022-04-14 RX ADMIN — MORPHINE SULFATE 10 MG: 10 SOLUTION ORAL at 10:53

## 2022-04-14 RX ADMIN — LORAZEPAM 1 MG: 2 LIQUID ORAL at 03:27

## 2022-04-14 RX ADMIN — SENNOSIDES AND DOCUSATE SODIUM 1 TABLET: 50; 8.6 TABLET ORAL at 08:36

## 2022-04-14 RX ADMIN — SENNOSIDES AND DOCUSATE SODIUM 1 TABLET: 50; 8.6 TABLET ORAL at 21:41

## 2022-04-14 RX ADMIN — GABAPENTIN 600 MG: 600 TABLET, FILM COATED ORAL at 21:44

## 2022-04-14 RX ADMIN — ACETAMINOPHEN 1000 MG: 500 TABLET, FILM COATED ORAL at 08:36

## 2022-04-14 RX ADMIN — LORAZEPAM 1 MG: 2 LIQUID ORAL at 13:53

## 2022-04-14 RX ADMIN — TRAZODONE HYDROCHLORIDE 100 MG: 100 TABLET ORAL at 21:41

## 2022-04-14 RX ADMIN — POLYETHYLENE GLYCOL 3350 17 G: 17 POWDER, FOR SOLUTION ORAL at 08:36

## 2022-04-14 RX ADMIN — LORAZEPAM 2 MG: 2 LIQUID ORAL at 21:49

## 2022-04-14 RX ADMIN — MORPHINE SULFATE 15 MG: 15 TABLET, EXTENDED RELEASE ORAL at 21:41

## 2022-04-14 RX ADMIN — FLUTICASONE PROPIONATE 2 SPRAY: 50 SPRAY, METERED NASAL at 13:48

## 2022-04-14 RX ADMIN — MORPHINE SULFATE 15 MG: 15 TABLET, EXTENDED RELEASE ORAL at 08:36

## 2022-04-14 RX ADMIN — ACETAMINOPHEN 1000 MG: 500 TABLET, FILM COATED ORAL at 21:41

## 2022-04-14 ASSESSMENT — ACTIVITIES OF DAILY LIVING (ADL)
ADLS_ACUITY_SCORE: 21
ADLS_ACUITY_SCORE: 23
ADLS_ACUITY_SCORE: 21
ADLS_ACUITY_SCORE: 23
ADLS_ACUITY_SCORE: 23
ADLS_ACUITY_SCORE: 21
ADLS_ACUITY_SCORE: 23
ADLS_ACUITY_SCORE: 21
ADLS_ACUITY_SCORE: 21
ADLS_ACUITY_SCORE: 23
ADLS_ACUITY_SCORE: 21
ADLS_ACUITY_SCORE: 23
ADLS_ACUITY_SCORE: 21
ADLS_ACUITY_SCORE: 19
ADLS_ACUITY_SCORE: 21
ADLS_ACUITY_SCORE: 19
ADLS_ACUITY_SCORE: 23
ADLS_ACUITY_SCORE: 23
ADLS_ACUITY_SCORE: 19
ADLS_ACUITY_SCORE: 23
ADLS_ACUITY_SCORE: 21
ADLS_ACUITY_SCORE: 21

## 2022-04-14 NOTE — PLAN OF CARE
Goal Outcome Evaluation:        Comfort cares continued, full assessment and VSS deferred. Alert intermittently overnight, forgetful and disoriented at times, easily redirected. Denied pain overnight, Lorazepam x 1 for anxiety. Purewick in place, also up to BR A1 at her request. T/R q2h for comfort. Preventative mepi in place to sacrum. 02 for comfort, HOB elevated. Continue comfort focused measures until pt is discharged to [facility] facility on hospice.

## 2022-04-14 NOTE — PLAN OF CARE
Goal Outcome Evaluation:      Comfort cares. A&Ox4. VS deferred. Wears 3LNC of oxygen intermittently for comfort. Morphine given x1 for left chest pain. Steri strips to pacemaker removal site CDI. Up to BSC with A2 GB and walker. Otherwise turn and repo ~q2h. Can be incontinent at times. Discharge likely on 4/18 to [facility] with hospice services.

## 2022-04-14 NOTE — CONSULTS
[facility]    Progress Note - [facility]    ______________________________________________________________________    [facility] Contact Number: [phone]    - Providers: Please contact [facility] with changes in orders or clinical plan of care   - Nursing: Please contact [facility] with significant changes in patient condition  ______________________________________________________________________        Plan of Care Discussed with the Following:   - Nurse: Ce  - Hospitalist/Rounding Provider:Dr. Ochoa Holloway   - Ingrid's Family/Preferred Contact: Destini Hilton  - Hospice Provider: Dr. Brian Carbajal    Summary of Visit (includes assessment, medications and any new orders):   Pt is A/O x 3 today. Congested cough continues. Chest pain is rated 5/10 at rest and 9/10 with coughing. Pt is receiving MS contin 15 mg BID and is aware that she may ask for Morphine for breakthrough pain as needed. O2 sats are 90-92% on O2 3L. Pt has not had a BM since last Friday. Abdomen is soft and nondistended. BS pos but sluggish. Pt denies nausea. Pt is getting Miralax and Senna-s scheduled daily. If no BM by tomorrow pt will want a supp. Will follow up in the am.    Cont discharge planning. Plan continues to discharge pt to AL on Monday.       Jill Schoenecker, RN

## 2022-04-14 NOTE — PROGRESS NOTES
[facility]    Hospitalist Progress Note    Assessment & Plan   Ingrid Powell is a 76 year old female who is being transitioned to inpatient hospice on 4/6/2022. Please see the discharge summary from the same date for more details of her hospital course.    Severe cardiomyopathy with systolic heart failure, EF 10-15%   Complete heart block   Coronary disease out of proportion to her cardiomyopathy.     Severe stenosis proxRCA, moderate to severe stenosis midLAD.    Small to moderate pericardial effusion   Acute hypoxic respiratory failure due to cardiac arrest-intubated and sedated.  Now extubated  Prior hospitalist discussion with family, transition to inpatient hospice  --Continue hospice medications, her pacemaker was reprogrammed to be ineffective on 4/8.  --4/9 patient is doing much better, she is questioning whether she should come out of hospice care.  Echocardiogram results reviewed which shows an EF of 12% , currently family and patient is planning to continue on hospice since patient does not want to have aggressive care continued on discussion with prior hospitalist.  --4/10 prior hospitalist discussed with the hospice nurse, medications adjusted, added Imdur 30 mg  due to her ongoing chest pain possibly due to underlying CHF, Imdur was added for comfort, added gabapentin for pain and medications for constipation.   -4/11 Discussed with Patient, and family, Sister and Daughter and GIP.  They were not aware of restart of Imdur and furosemide yesterday.  Discussed with GIP who were not also aware.  On discussion with Sister, Daughter they did not want these medications and did not feel that these medications were consistent with patient's goal directed at comfort and hospice care.  GIP concurs.  Imdur and furosemide discontinued.  Continue Comfort Care measures.  Discussed with Daughter and Sister plans are to establish discharge to LTC under waiver; plan established with GIP  and SW.  -Patient somewhat somnolent on encounter, appears comfortable.  No increase secretions, O2 per NC.  Nursing reports some increased pain. PRN MS increased frequency 5-10 mg to every hour from every 2 hours PRN.  Patient also has as needed Ativan for anxiety.  Continue comfort cares with goal directed at patient comfort..  ADDENDUM 4/12/22 discussed with GIP.  Recommend scheduled MS Contin however [facility] NP states 2 pages regarding these recommendations yesterday 4/11/2022, I discussed with her reviewing all pages did not receive any.  Upon clarification she had wrong phone number to send these pages.  Clarified page number, in the meantime added [facility] recommendations for scheduled MS Contin, acetaminophen, senna S and changed PRN short acting MS back to every 2 hours PRN.  Appreciate assistance of Robert who assisted with these medication changes due to locked med records in [facility].   -On encounter today patient appears comfortable, on scheduled MS Contin and acetaminophen.  Sister present during encounter who agrees patient is more comfortable.  Continue comfort care.  ADDENDUM 4/13/22: Received call from [facility] provider that stated that the [facility] nurse spoke with family about starting furosemide and potassium supplement.  Discussed with GI provider that earlier this week when I discussed with patient's daughter and sister that orders were entered for furosemide and Imdur they were surprised and did not want furosemide or other treatment medications for the patient who is on comfort care and they only wanted comfort care medications.  Discussed with [facility] provider that I would only prescribe what family or Patient  wanted.  Therefore called [facility] nurse who spoke with Sister who was present in room.  Sister notified that daughter Lida is the decision-maker.  I had a phone discussion with daughter Lida who emphatically stated that she did not want Mother/patient started on furosemide and or potassium or other treatment  measures addressed supposedly by [facility] nurse; Daughter continued to reiterate that she wanted patient only on comfort care measures of pain management [currently scheduled and PRN],  atropine PRN for increased secretions, PRN ativan or MS for respiratory distress; discussed we can add Mucinex expectorant and/ or Robitussin-DM as options.  This is what daughter Lida wanted for her mother/patient and emphatically did not want the addition of furosemide/potassium supplement.  Daughter/Lida reiterated that they want patient on comfort care and did not want to prolong patient suffering.  4/14/2022 patient appears much more comfortable, denies pain, no significant cough, secretions/sputum.  Son present at bedside today; he confirms patient is comfortable and he affirms current treatment plan as outlined above with sister, Lida above who is primary decision maker, son secondary of current comfort care treatment plan.  They did not want measures as advised by [facility] regarding furosemide, potassium supplements.  Care is directed at comfort, they do not want to prolong patient suffering regarding end-stage heart disease..    Code Status: No CPR- Do NOT Intubate     Disposition: Expected discharge : safe discharge under Hospice; LTC near [place] with hospice resources.  Ochoa Holloway MD  Text Page   (7am to 6pm)      Interval History   Sleepy yet arousable, conversive, appropriate.  She states she is comfortable.  No significant cough or secretions.  Son at bedside.     Physical Exam                        Constitutional: NAD, calm, appears comfortable.  Respiratory: Respirations nonlabored on room air; no cough, no increased secretions.  GI:  Soft nontender  Psych:  Affect: Calm.    Medications     dextrose         acetaminophen  1,000 mg Oral TID     fluticasone  2 spray Both Nostrils Daily     gabapentin  600 mg Oral At Bedtime     hypromellose-dextran  1 drop Both Eyes BID     morphine  15 mg Oral Q12H LEYLA      polyethylene glycol  17 g Oral Daily     senna-docusate  1 tablet Oral BID     traZODone  100 mg Oral At Bedtime

## 2022-04-14 NOTE — PROGRESS NOTES
"Hospital check of Pacemaker received and reviewed. Pacemaker interrogated by [employer] rep. Patient and family requested Pacemaker be turned off or removed. Patient has transitioned to inpatient Hospice. Order was placed by Dr Lazara Boyd to stop the pacemaker.     The following programming changes were made by the [employer] rep:  Normal Jin A Amplitude changed from Trend 3.5V@0.4ms to 0.1V@0.1ms  Normal Jin RV Amplitude changed from Trend 3.5V to 0.1V  Normal Jin LV amplitude changed from 3.5V@1ms to 0.1V@0.1ms  Normal Jin Lower rate limit changed from 60bpm to 30bpm  Normal Jin Mode changed from DDD to DDI  Normal Jin Lower Rate Limit Interval changed from 1000ms to 2000ms  Accelerometer changed from On to Passive  Minute Ventilation Sensor changed from On to Passive  Ventricular Rate Regulation changed to Off      Per note on 4/8/22:     \"The [employer] rep, Tushar, and Dr. Boyd were present in pt's room to deactivate the device. Upon interrogation of the device, the findings were that the pt is no longer being 100% paced and the heart is actually beating on it's own. The pt still wanted to proceed and the device was turned off. \"  "

## 2022-04-14 NOTE — PLAN OF CARE
Goal Outcome Evaluation:    Shift Note: 04/14/2022 [id number]  Inpt Hospice, Comfort Cares continued. A/O x3-4, forgetful of situation throughout evening. Up x1 w/ GB, walker. Reposition q2h or as pt tolerates (refuses at times). Tolerating regular diet, does not need assistance ordering food (per pt). On 3L O2 NC for comfort. Continent B/B, some dribbling at NOC per report (has brief on for bedtime). No BM for several days. If none by tomorrow, pt requests suppository. Denied pain, administered scheduled Tylenol, MS Contin per MAR. Discharge Monday 4/18 to [facility] on Hospice.

## 2022-04-14 NOTE — PLAN OF CARE
Goal Outcome Evaluation:        [id number]: Continued to concentrate on comfort cares.  A&Ox4, can be forgetful.  Full assessment and VS deferred; oxygen via NC per patient request.  Scheduled MS Contin and Tylenol given for complaints of generalized pain.  Ativan also given per patient request.  No IV access.  Tolerating regular diet.  Purewick in-place for incontinence; no BM this shift.  T/R q2hrs or as requested by the patient; did not get OOB this shift.  Discharge pending hospice placement.

## 2022-04-15 PROCEDURE — 250N000013 HC RX MED GY IP 250 OP 250 PS 637: Performed by: INTERNAL MEDICINE

## 2022-04-15 PROCEDURE — 99231 SBSQ HOSP IP/OBS SF/LOW 25: CPT | Mod: GV | Performed by: HOSPITALIST

## 2022-04-15 PROCEDURE — 110N000005 HC R&B HOSPICE, ACCENT

## 2022-04-15 PROCEDURE — 250N000013 HC RX MED GY IP 250 OP 250 PS 637: Performed by: HOSPITALIST

## 2022-04-15 RX ADMIN — POLYETHYLENE GLYCOL 3350 17 G: 17 POWDER, FOR SOLUTION ORAL at 07:49

## 2022-04-15 RX ADMIN — ACETAMINOPHEN 1000 MG: 500 TABLET, FILM COATED ORAL at 16:03

## 2022-04-15 RX ADMIN — SENNOSIDES AND DOCUSATE SODIUM 1 TABLET: 50; 8.6 TABLET ORAL at 20:01

## 2022-04-15 RX ADMIN — MORPHINE SULFATE 15 MG: 15 TABLET, EXTENDED RELEASE ORAL at 07:49

## 2022-04-15 RX ADMIN — ACETAMINOPHEN 1000 MG: 500 TABLET, FILM COATED ORAL at 22:25

## 2022-04-15 RX ADMIN — SENNOSIDES AND DOCUSATE SODIUM 1 TABLET: 50; 8.6 TABLET ORAL at 07:49

## 2022-04-15 RX ADMIN — MORPHINE SULFATE 10 MG: 10 SOLUTION ORAL at 15:37

## 2022-04-15 RX ADMIN — MORPHINE SULFATE 10 MG: 10 SOLUTION ORAL at 01:29

## 2022-04-15 RX ADMIN — ACETAMINOPHEN 1000 MG: 500 TABLET, FILM COATED ORAL at 07:48

## 2022-04-15 RX ADMIN — MORPHINE SULFATE 10 MG: 10 SOLUTION ORAL at 20:03

## 2022-04-15 RX ADMIN — MORPHINE SULFATE 15 MG: 15 TABLET, EXTENDED RELEASE ORAL at 19:50

## 2022-04-15 RX ADMIN — MORPHINE SULFATE 10 MG: 10 SOLUTION ORAL at 17:39

## 2022-04-15 RX ADMIN — GABAPENTIN 600 MG: 600 TABLET, FILM COATED ORAL at 22:25

## 2022-04-15 RX ADMIN — LORAZEPAM 2 MG: 2 LIQUID ORAL at 20:02

## 2022-04-15 RX ADMIN — TRAZODONE HYDROCHLORIDE 100 MG: 100 TABLET ORAL at 22:25

## 2022-04-15 RX ADMIN — MORPHINE SULFATE 10 MG: 10 SOLUTION ORAL at 12:05

## 2022-04-15 RX ADMIN — FLUTICASONE PROPIONATE 2 SPRAY: 50 SPRAY, METERED NASAL at 12:04

## 2022-04-15 RX ADMIN — LORAZEPAM 2 MG: 2 LIQUID ORAL at 07:02

## 2022-04-15 ASSESSMENT — ACTIVITIES OF DAILY LIVING (ADL)
ADLS_ACUITY_SCORE: 21

## 2022-04-15 NOTE — PLAN OF CARE
Goal Outcome Evaluation:        4/14/22 [id number]   Inpt hospice/comfort cares, VS and full assessment deferred. Pt A/O x3, on 3L via NC. PRN morphine given x1 for chest pain. PIV SL. Up A1 w/ GB + W to the BR. Steri strips covering permanent pacemaker. Regular diet. T/R q2h or as tolerated. Incontinent at times. Discharge to [facility] w/ hospice services likely on 4/18.

## 2022-04-15 NOTE — PROGRESS NOTES
[facility]    Hospitalist Progress Note    Assessment & Plan   Ingrid Powell is a 76 year old female who is being transitioned to inpatient hospice on 4/6/2022. Please see the discharge summary from the same date for more details of her hospital course.    Severe cardiomyopathy with systolic heart failure, EF 10-15%   Complete heart block   Coronary disease out of proportion to her cardiomyopathy.     Severe stenosis proxRCA, moderate to severe stenosis midLAD.    Small to moderate pericardial effusion   Acute hypoxic respiratory failure due to cardiac arrest-intubated and sedated.  Now extubated  Prior hospitalist discussion with family, transition to inpatient hospice  --Continue hospice medications, her pacemaker was reprogrammed to be ineffective on 4/8.  --4/9 patient is doing much better, she is questioning whether she should come out of hospice care.  Echocardiogram results reviewed which shows an EF of 12% , currently family and patient is planning to continue on hospice since patient does not want to have aggressive care continued on discussion with prior hospitalist.  --4/10 prior hospitalist discussed with the hospice nurse, medications adjusted, added Imdur 30 mg  due to her ongoing chest pain possibly due to underlying CHF, Imdur was added for comfort, added gabapentin for pain and medications for constipation.   -4/11 Discussed with Patient, and family, Sister and Daughter and GIP.  They were not aware of restart of Imdur and furosemide yesterday.  Discussed with GIP who were not also aware.  On discussion with Sister, Daughter they did not want these medications and did not feel that these medications were consistent with patient's goal directed at comfort and hospice care.  GIP concurs.  Imdur and furosemide discontinued.  Continue Comfort Care measures.  Discussed with Daughter and Sister plans are to establish discharge to LTC under waiver; plan established with GIP  and SW.  -Patient somewhat somnolent on encounter, appears comfortable.  No increase secretions, O2 per NC.  Nursing reports some increased pain. PRN MS increased frequency 5-10 mg to every hour from every 2 hours PRN.  Patient also has as needed Ativan for anxiety.  Continue comfort cares with goal directed at patient comfort..  ADDENDUM 4/12/22 discussed with GIP.  Recommend scheduled MS Contin however [facility] NP states 2 pages regarding these recommendations yesterday 4/11/2022, I discussed with her reviewing all pages did not receive any.  Upon clarification she had wrong phone number to send these pages.  Clarified page number, in the meantime added [facility] recommendations for scheduled MS Contin, acetaminophen, senna S and changed PRN short acting MS back to every 2 hours PRN.  Appreciate assistance of Robert who assisted with these medication changes due to locked med records in [facility].   -On encounter today patient appears comfortable, on scheduled MS Contin and acetaminophen.  Sister present during encounter who agrees patient is more comfortable.  Continue comfort care.  ADDENDUM 4/13/22: Received call from [facility] provider that stated that the [facility] nurse spoke with family about starting furosemide and potassium supplement.  Discussed with GI provider that earlier this week when I discussed with patient's daughter and sister that orders were entered for furosemide and Imdur they were surprised and did not want furosemide or other treatment medications for the patient who is on comfort care and they only wanted comfort care medications.  Discussed with [facility] provider that I would only prescribe what family or Patient  wanted.  Therefore called [facility] nurse who spoke with Sister who was present in room.  Sister notified that daughter Lida is the decision-maker.  I had a phone discussion with daughter Lida who emphatically stated that she did not want Mother/patient started on furosemide and or potassium or other treatment  measures addressed supposedly by [facility] nurse; Daughter continued to reiterate that she wanted patient only on comfort care measures of pain management [currently scheduled and PRN],  atropine PRN for increased secretions, PRN ativan or MS for respiratory distress; discussed we can add Mucinex expectorant and/ or Robitussin-DM as options.  This is what daughter Lida wanted for her mother/patient and emphatically did not want the addition of furosemide/potassium supplement.  Daughter/Lida reiterated that they want patient on comfort care and did not want to prolong patient suffering.  4/14/2022 patient appears much more comfortable, denies pain, no significant cough, secretions/sputum.  Son present at bedside today; he confirms patient is comfortable and he affirms current treatment plan as outlined above with sister, Lida above who is primary decision maker, son secondary of current comfort care treatment plan.  They did not want measures as advised by [facility] regarding furosemide, potassium supplements.  Care is directed at comfort, they do not want to prolong patient suffering regarding end-stage heart disease..  4/15/2022 more alert, conversive, smiling on her face, appears comfortable.  Denies cough, sputum, dyspnea.  States she is comfortable.  Right eye ptosis, she states she has a lazy eye, no conjunctivitis, no visual complaints.  Can use artificial tears as needed.    Code Status: No CPR- Do NOT Intubate     Disposition: Expected discharge : safe discharge under Hospice; LTC near [place] with hospice resources.  Ochoa Holloway MD  Text Page   (7am to 6pm)      Interval History   More alert, conversive, states she is comfortable.  Denies cough, sputum, states she has a lazy eye on R, can use artificial tears as needed, no visual or pain complaints.     Physical Exam   Temp: 97.7  F (36.5  C) Temp src: Oral BP: (!) 158/86 Pulse: 89   Resp: 16 SpO2: (!) 87 % O2 Device: None (Room air)      Constitutional: NAD,  calm, appears comfortable.  Respiratory: Respirations nonlabored on room air; no cough, no increased secretions.  GI:  Soft nontender  Psych:  Affect: Calm.    Medications     dextrose         acetaminophen  1,000 mg Oral TID     fluticasone  2 spray Both Nostrils Daily     gabapentin  600 mg Oral At Bedtime     hypromellose-dextran  1 drop Both Eyes BID     morphine  15 mg Oral Q12H LEYLA     polyethylene glycol  17 g Oral Daily     senna-docusate  1 tablet Oral BID     traZODone  100 mg Oral At Bedtime

## 2022-04-15 NOTE — PLAN OF CARE
[id number]  Pt with some sternal pain today, tylenol, heat pack and morphine used PRN.  Pt tolerating regular diet.  Up to bathroom with assist of one/GB/walker.  Had small BM this am.  Pacemaker with steri strips intact.  Pt pleasant and talkative.  Family visited on/off today.  Plan for Monday discharge at 0930 to [facility].

## 2022-04-15 NOTE — CONSULTS
Pt will be discharging to [facility] with hospice services in place on Monday 4/18/22 at 0930 am. Transportation was set up by [facility] and equipment will be sent out tomorrow. Please order the following meds from the hospital pharmacy and send with the pt at time of discharge:    Tylenol 500 mg tabs, take 2 tabs PO TID scheduled   Atropine 1%, 2 drops SL Q 4 hrs prn secretions  Bisacodyl supp 10 mg, 1 supp [place] daily prn constipaiton  Refresh 0.5 % eye drops, 1 drop both eyes QID prn dry eyes  Gabapentin 600 mg tablet, 1 tab PO Q HS  Haloperidol 2mg/mL, 0.5-1 mg PO Q 6 hrs prn agitation  Lorazepam 2mg/mL, give 0.5-1 mg PO Q 4 hrs prn anxiety  MS contin 15mg tab, take 1 tab PO Q 12 hrs scheduled  Roxanol 20mg/mL, take 5-10mg PO Q 2 hrs PRN pain/dyspnea  Miralax powder, take 17g po daily scheduled  Senna-s 8.6-50 mg, 1 tab PO BID  Trazodone 100 mg, take 1 tab PO Q HS    Jill Schoenecker, RN [facility]           no

## 2022-04-16 PROCEDURE — 110N000005 HC R&B HOSPICE, ACCENT

## 2022-04-16 PROCEDURE — 250N000013 HC RX MED GY IP 250 OP 250 PS 637: Performed by: HOSPITALIST

## 2022-04-16 PROCEDURE — 99231 SBSQ HOSP IP/OBS SF/LOW 25: CPT | Mod: GV | Performed by: HOSPITALIST

## 2022-04-16 PROCEDURE — 250N000013 HC RX MED GY IP 250 OP 250 PS 637: Performed by: INTERNAL MEDICINE

## 2022-04-16 RX ADMIN — ACETAMINOPHEN 1000 MG: 500 TABLET, FILM COATED ORAL at 07:54

## 2022-04-16 RX ADMIN — MORPHINE SULFATE 15 MG: 15 TABLET, EXTENDED RELEASE ORAL at 07:54

## 2022-04-16 RX ADMIN — MORPHINE SULFATE 10 MG: 10 SOLUTION ORAL at 10:10

## 2022-04-16 RX ADMIN — MORPHINE SULFATE 10 MG: 10 SOLUTION ORAL at 14:47

## 2022-04-16 RX ADMIN — MORPHINE SULFATE 10 MG: 10 SOLUTION ORAL at 12:06

## 2022-04-16 RX ADMIN — ACETAMINOPHEN 1000 MG: 500 TABLET, FILM COATED ORAL at 15:14

## 2022-04-16 RX ADMIN — POLYETHYLENE GLYCOL 3350 17 G: 17 POWDER, FOR SOLUTION ORAL at 07:53

## 2022-04-16 RX ADMIN — MORPHINE SULFATE 10 MG: 10 SOLUTION ORAL at 22:15

## 2022-04-16 RX ADMIN — MORPHINE SULFATE 15 MG: 15 TABLET, EXTENDED RELEASE ORAL at 20:41

## 2022-04-16 RX ADMIN — SENNOSIDES AND DOCUSATE SODIUM 1 TABLET: 50; 8.6 TABLET ORAL at 07:55

## 2022-04-16 RX ADMIN — FLUTICASONE PROPIONATE 2 SPRAY: 50 SPRAY, METERED NASAL at 07:55

## 2022-04-16 RX ADMIN — TRAZODONE HYDROCHLORIDE 100 MG: 100 TABLET ORAL at 22:12

## 2022-04-16 RX ADMIN — ACETAMINOPHEN 1000 MG: 500 TABLET, FILM COATED ORAL at 22:12

## 2022-04-16 RX ADMIN — LORAZEPAM 2 MG: 2 LIQUID ORAL at 15:42

## 2022-04-16 RX ADMIN — SENNOSIDES AND DOCUSATE SODIUM 1 TABLET: 50; 8.6 TABLET ORAL at 20:41

## 2022-04-16 RX ADMIN — GABAPENTIN 600 MG: 600 TABLET, FILM COATED ORAL at 22:12

## 2022-04-16 ASSESSMENT — ACTIVITIES OF DAILY LIVING (ADL)
ADLS_ACUITY_SCORE: 21

## 2022-04-16 NOTE — PROGRESS NOTES
[facility]    Progress Note - [facility]    ______________________________________________________________________    [facility] Contact Number: [phone]    - Providers: Please contact [facility] with changes in orders or clinical plan of care   - Nursing: Please contact [facility] with significant changes in patient condition  ______________________________________________________________________        Plan of Care Discussed with the Following:   - Nurse: Judy Richardson  - Hospitalist/Rounding Provider:   Ochoa Holloway MD      Hospitalist - Internal Medicine     Since 4/10/2022     [phone]         - Ingrid's Family/Preferred Contact:Lida Georges [phone]  - Hospice Provider: Monica Zhao [phone]    Summary of Visit (includes assessment, medications and any new orders):   Patient did not wake up until 12 noon. Her medications were not given due to this. Writer visited in the afternoon with sister at bedside. She was awake and received scheduled medications as ordered in am a little later than usual. Noted BM in the AM. Vital signs:  Temp 97.8  Oral  Pulse: 75  Respirations 20  Blood pressure 149/87   Oxygen saturation: 86-89% room air. Oxygen is available at request of patient and is humidified.   Use of fan, hot packs to chest and pillow to grab when coughing to reduce discomfort  Discussed discharge scheduled on Monday April 18th with patient and sister.    Discharge planning: Will transfer to community setting [facility] in [place]. Daughter lives in [place] and wants her mother to move closer to her. Recommendations to give scheduled MS contin and PRN dose of roxanol prior to departure of 9:30 am as patient has a 90 minute drive to [place]. All necessary equipment which hospice provides will be available when patient arrives to facility. Hospice will continue to follow patient  in the community setting.        Lakisha Zhao RN

## 2022-04-16 NOTE — PLAN OF CARE
Goal Outcome Evaluation:      Sternal pain controlled with PRN Roxanol and schedule MS contin. Ativan also given per pt request. Became slightly disoriented after administration of PRN meds, resolved this AM. Up A1/GB/W. Pacemaker with steri strips intact. Plan to discharge to [facility] on Monday at 0930.

## 2022-04-16 NOTE — PLAN OF CARE
[id number]  Pt up with assist of one/GB/walker to bathroom.  Using roxanol and ativan PRN, mainly having sternal pain, heat packs also helpful.  Eating/drinking fair.  New mepilex placed this am.  Plan for discharge to [facility] at 0930 with hospice on Monday.

## 2022-04-16 NOTE — PROGRESS NOTES
[facility]    Hospitalist Progress Note    Assessment & Plan   Ingrid Powell is a 76 year old female who is being transitioned to inpatient hospice on 4/6/2022. Please see the discharge summary from the same date for more details of her hospital course.    Severe cardiomyopathy with systolic heart failure, EF 10-15%   Complete heart block   Coronary disease out of proportion to her cardiomyopathy.     Severe stenosis proxRCA, moderate to severe stenosis midLAD.    Small to moderate pericardial effusion   Acute hypoxic respiratory failure due to cardiac arrest-intubated and sedated.  Now extubated  Prior hospitalist discussion with family, transition to inpatient hospice  --Continue hospice medications, her pacemaker was reprogrammed to be ineffective on 4/8.  --4/9 patient is doing much better, she is questioning whether she should come out of hospice care.  Echocardiogram results reviewed which shows an EF of 12% , currently family and patient is planning to continue on hospice since patient does not want to have aggressive care continued on discussion with prior hospitalist.  --4/10 prior hospitalist discussed with the hospice nurse, medications adjusted, added Imdur 30 mg  due to her ongoing chest pain possibly due to underlying CHF, Imdur was added for comfort, added gabapentin for pain and medications for constipation.   -4/11 Discussed with Patient, and family, Sister and Daughter and GIP.  They were not aware of restart of Imdur and furosemide yesterday.  Discussed with GIP who were not also aware.  On discussion with Sister, Daughter they did not want these medications and did not feel that these medications were consistent with patient's goal directed at comfort and hospice care.  GIP concurs.  Imdur and furosemide discontinued.  Continue Comfort Care measures.  Discussed with Daughter and Sister plans are to establish discharge to LTC under waiver; plan established with GIP  and SW.  -Patient somewhat somnolent on encounter, appears comfortable.  No increase secretions, O2 per NC.  Nursing reports some increased pain. PRN MS increased frequency 5-10 mg to every hour from every 2 hours PRN.  Patient also has as needed Ativan for anxiety.  Continue comfort cares with goal directed at patient comfort..  ADDENDUM 4/12/22 discussed with GIP.  Recommend scheduled MS Contin however [facility] NP states 2 pages regarding these recommendations yesterday 4/11/2022, I discussed with her reviewing all pages did not receive any.  Upon clarification she had wrong phone number to send these pages.  Clarified page number, in the meantime added [facility] recommendations for scheduled MS Contin, acetaminophen, senna S and changed PRN short acting MS back to every 2 hours PRN.  Appreciate assistance of Robert who assisted with these medication changes due to locked med records in [facility].   -On encounter today patient appears comfortable, on scheduled MS Contin and acetaminophen.  Sister present during encounter who agrees patient is more comfortable.  Continue comfort care.  ADDENDUM 4/13/22: Received call from [facility] provider that stated that the [facility] nurse spoke with family about starting furosemide and potassium supplement.  Discussed with GI provider that earlier this week when I discussed with patient's daughter and sister that orders were entered for furosemide and Imdur they were surprised and did not want furosemide or other treatment medications for the patient who is on comfort care and they only wanted comfort care medications.  Discussed with [facility] provider that I would only prescribe what family or Patient  wanted.  Therefore called [facility] nurse who spoke with Sister who was present in room.  Sister notified that daughter Lida is the decision-maker.  I had a phone discussion with daughter Lida who emphatically stated that she did not want Mother/patient started on furosemide and or potassium or other treatment  measures addressed supposedly by [facility] nurse; Daughter continued to reiterate that she wanted patient only on comfort care measures of pain management [currently scheduled and PRN],  atropine PRN for increased secretions, PRN ativan or MS for respiratory distress; discussed we can add Mucinex expectorant and/ or Robitussin-DM as options.  This is what daughter Lida wanted for her mother/patient and emphatically did not want the addition of furosemide/potassium supplement.  Daughter/Lida reiterated that they want patient on comfort care and did not want to prolong patient suffering.  4/14/2022 patient appears much more comfortable, denies pain, no significant cough, secretions/sputum.  Son present at bedside today; he confirms patient is comfortable and he affirms current treatment plan as outlined above with sister, Lida above who is primary decision maker, son secondary of current comfort care treatment plan.  They did not want measures as advised by [facility] regarding furosemide, potassium supplements.  Care is directed at comfort, they do not want to prolong patient suffering regarding end-stage heart disease..  4/15/2022 more alert, conversive, smiling on her face, appears comfortable.  Denies cough, sputum, dyspnea.  States she is comfortable.  Right eye ptosis, she states she has a lazy eye, no conjunctivitis, no visual complaints.  Can use artificial tears as needed.  -Alert, conversive, reports she is comfortable.  In the past the chest pain that was attributed to angina was in fact musculoskeletal as the patient had CPR prior to presentation resulting in significant musculoskeletal pain/injury, now much more comfortable.  [place] with hospice on 4/18/2022.    Code Status: No CPR- Do NOT Intubate     Disposition: Expected discharge : safe discharge under Hospice, [facility] near [place] with hospice resources.  On 418/22  Ochoa Holloway MD  Text Page   (7am to  6pm)      Interval History   More alert, pleasant conversation., reports she is comfortable.  Tries to eat some.  Not hungry.      Physical Exam   Temp: 97.8  F (36.6  C) Temp src: Oral      Resp: 18         Constitutional: NAD, calm, appears comfortable.  Respiratory: Respirations nonlabored on room air; no cough, no increased secretions.  GI:  Soft nontender  Psych:  Affect: Calm..    Medications     dextrose         acetaminophen  1,000 mg Oral TID     fluticasone  2 spray Both Nostrils Daily     gabapentin  600 mg Oral At Bedtime     hypromellose-dextran  1 drop Both Eyes BID     morphine  15 mg Oral Q12H LEYLA     polyethylene glycol  17 g Oral Daily     senna-docusate  1 tablet Oral BID     traZODone  100 mg Oral At Bedtime

## 2022-04-17 VITALS
RESPIRATION RATE: 16 BRPM | TEMPERATURE: 97.6 F | HEART RATE: 89 BPM | DIASTOLIC BLOOD PRESSURE: 86 MMHG | SYSTOLIC BLOOD PRESSURE: 158 MMHG | OXYGEN SATURATION: 87 %

## 2022-04-17 PROCEDURE — 250N000013 HC RX MED GY IP 250 OP 250 PS 637: Performed by: HOSPITALIST

## 2022-04-17 PROCEDURE — 99232 SBSQ HOSP IP/OBS MODERATE 35: CPT | Mod: GV | Performed by: HOSPITALIST

## 2022-04-17 PROCEDURE — 110N000005 HC R&B HOSPICE, ACCENT

## 2022-04-17 PROCEDURE — 250N000013 HC RX MED GY IP 250 OP 250 PS 637: Performed by: INTERNAL MEDICINE

## 2022-04-17 RX ORDER — ATROPINE SULFATE 10 MG/ML
2 SOLUTION/ DROPS OPHTHALMIC EVERY 4 HOURS PRN
Qty: 2 ML | Refills: 0 | Status: SHIPPED | OUTPATIENT
Start: 2022-04-17 | End: 2022-04-26

## 2022-04-17 RX ORDER — LORAZEPAM 2 MG/ML
1-2 CONCENTRATE ORAL EVERY 4 HOURS PRN
Qty: 30 ML | Refills: 0 | Status: SHIPPED | OUTPATIENT
Start: 2022-04-17 | End: 2022-04-26

## 2022-04-17 RX ORDER — ACETAMINOPHEN 500 MG
1000 TABLET ORAL 3 TIMES DAILY
COMMUNITY
Start: 2022-04-17 | End: 2022-06-07

## 2022-04-17 RX ORDER — BISACODYL 10 MG
10 SUPPOSITORY, RECTAL RECTAL DAILY PRN
Qty: 2 SUPPOSITORY | Refills: 0 | Status: SHIPPED | OUTPATIENT
Start: 2022-04-17 | End: 2022-07-08

## 2022-04-17 RX ORDER — MORPHINE SULFATE 15 MG/1
15 TABLET, FILM COATED, EXTENDED RELEASE ORAL EVERY 12 HOURS
Qty: 8 TABLET | Refills: 0 | Status: SHIPPED | OUTPATIENT
Start: 2022-04-17 | End: 2022-05-02 | Stop reason: DRUGHIGH

## 2022-04-17 RX ORDER — LIDOCAINE 4 G/G
1 PATCH TOPICAL
Status: DISCONTINUED | OUTPATIENT
Start: 2022-04-17 | End: 2022-04-18 | Stop reason: HOSPADM

## 2022-04-17 RX ORDER — CARBOXYMETHYLCELLULOSE SODIUM 5 MG/ML
1 SOLUTION/ DROPS OPHTHALMIC 4 TIMES DAILY PRN
Qty: 1 EACH | Refills: 0 | Status: SHIPPED | OUTPATIENT
Start: 2022-04-17 | End: 2023-02-27

## 2022-04-17 RX ORDER — GABAPENTIN 600 MG/1
600 TABLET ORAL AT BEDTIME
Qty: 30 TABLET | Refills: 0 | Status: SHIPPED | OUTPATIENT
Start: 2022-04-17 | End: 2022-06-07

## 2022-04-17 RX ORDER — MORPHINE SULFATE 20 MG/ML
2.5-1 SOLUTION ORAL
Qty: 15 ML | Refills: 0 | Status: SHIPPED | OUTPATIENT
Start: 2022-04-17 | End: 2022-04-26

## 2022-04-17 RX ORDER — POLYETHYLENE GLYCOL 3350 17 G/17G
17 POWDER, FOR SOLUTION ORAL DAILY
Qty: 10 EACH | Refills: 0 | Status: SHIPPED | OUTPATIENT
Start: 2022-04-17 | End: 2022-05-10

## 2022-04-17 RX ADMIN — GABAPENTIN 600 MG: 600 TABLET, FILM COATED ORAL at 22:14

## 2022-04-17 RX ADMIN — SENNOSIDES AND DOCUSATE SODIUM 1 TABLET: 50; 8.6 TABLET ORAL at 20:20

## 2022-04-17 RX ADMIN — LORAZEPAM 1 MG: 2 LIQUID ORAL at 19:13

## 2022-04-17 RX ADMIN — MORPHINE SULFATE 15 MG: 15 TABLET, EXTENDED RELEASE ORAL at 07:57

## 2022-04-17 RX ADMIN — MORPHINE SULFATE 10 MG: 10 SOLUTION ORAL at 15:11

## 2022-04-17 RX ADMIN — MORPHINE SULFATE 10 MG: 10 SOLUTION ORAL at 20:23

## 2022-04-17 RX ADMIN — MORPHINE SULFATE 10 MG: 10 SOLUTION ORAL at 12:20

## 2022-04-17 RX ADMIN — MORPHINE SULFATE 10 MG: 10 SOLUTION ORAL at 17:41

## 2022-04-17 RX ADMIN — FLUTICASONE PROPIONATE 2 SPRAY: 50 SPRAY, METERED NASAL at 08:00

## 2022-04-17 RX ADMIN — MORPHINE SULFATE 10 MG: 10 SOLUTION ORAL at 10:22

## 2022-04-17 RX ADMIN — POLYETHYLENE GLYCOL 3350 17 G: 17 POWDER, FOR SOLUTION ORAL at 07:55

## 2022-04-17 RX ADMIN — LORAZEPAM 1 MG: 2 LIQUID ORAL at 09:46

## 2022-04-17 RX ADMIN — MORPHINE SULFATE 10 MG: 10 SOLUTION ORAL at 02:22

## 2022-04-17 RX ADMIN — LIDOCAINE 1 PATCH: 560 PATCH PERCUTANEOUS; TOPICAL; TRANSDERMAL at 20:16

## 2022-04-17 RX ADMIN — ACETAMINOPHEN 1000 MG: 500 TABLET, FILM COATED ORAL at 07:56

## 2022-04-17 RX ADMIN — MORPHINE SULFATE 10 MG: 10 SOLUTION ORAL at 00:16

## 2022-04-17 RX ADMIN — ACETAMINOPHEN 1000 MG: 500 TABLET, FILM COATED ORAL at 22:14

## 2022-04-17 RX ADMIN — MORPHINE SULFATE 15 MG: 15 TABLET, EXTENDED RELEASE ORAL at 20:17

## 2022-04-17 RX ADMIN — SENNOSIDES AND DOCUSATE SODIUM 1 TABLET: 50; 8.6 TABLET ORAL at 07:57

## 2022-04-17 RX ADMIN — TRAZODONE HYDROCHLORIDE 100 MG: 100 TABLET ORAL at 22:14

## 2022-04-17 RX ADMIN — ACETAMINOPHEN 1000 MG: 500 TABLET, FILM COATED ORAL at 15:10

## 2022-04-17 RX ADMIN — MORPHINE SULFATE 10 MG: 10 SOLUTION ORAL at 07:54

## 2022-04-17 ASSESSMENT — ACTIVITIES OF DAILY LIVING (ADL)
ADLS_ACUITY_SCORE: 21

## 2022-04-17 NOTE — PROGRESS NOTES
[facility]    Progress Note - [facility]    ______________________________________________________________________    [facility] Contact Number: [phone]    - Providers: Please contact [facility] with changes in orders or clinical plan of care   - Nursing: Please contact [facility] with significant changes in patient condition  ______________________________________________________________________        Plan of Care Discussed with the Following:   - Nurse: Judy Richardson RN  - Hospitalist/Rounding Provider:   Ochoa Holloway MD      Hospitalist - Internal Medicine     Since 4/10/2022     [phone]         - Ingrid's Family/Preferred Contact: Lida daughter  - Hospice Provider: Lakisha Zhao RN     Summary of Visit (includes assessment, medications and any new orders):   Patient was alone in bed and sitting up. She reported having hesitation of moving to a new location. Her vitals  Pulse 86  Respirations 16  Oxygen saturation 92% room air      She coughed and reported discomfort. She asked for a PRN dose of Morphine. Over the past 24 hours she received 6 doses of PRN Morphine for chest pain. Staff RN will inquire if lidocaine patch can be used for sternal pain from CPR. Ingrid also received 2 doses of ativan PRN. No new recommendations as patient may be in distress over move to community setting on Monday April 18th. Hospice will continue to follow patient in the community setting.     Discharge plans: Patient will be discharged to [facility] in [place], [place] which is where her daughter lives. .       Lakisha Zhao RN

## 2022-04-17 NOTE — PLAN OF CARE
Goal Outcome Evaluation:      IP hospice patient, full assessment and VS deferred. A&Ox4, drowsy at times. Sternal pain controlled with PRN roxanal, scheduled MS contin and tylenol. Heat packs applied. Up A1/GB/W to bathroom and BSC, reminded to shift weight while in bed and turned as allowed. Mepilex to coccyx. Plan to discharge to [facility] on Monday at 0930.

## 2022-04-17 NOTE — PROGRESS NOTES
[facility]    Hospitalist Progress Note    Assessment & Plan   Ingrid Powell is a 76 year old female who is being transitioned to inpatient hospice on 4/6/2022. Please see the discharge summary from the same date for more details of her hospital course.    Severe cardiomyopathy with systolic heart failure, EF 10-15%   Complete heart block   Coronary disease out of proportion to her cardiomyopathy.     Severe stenosis proxRCA, moderate to severe stenosis midLAD.    Small to moderate pericardial effusion   Acute hypoxic respiratory failure due to cardiac arrest-intubated and sedated.  Now extubated  Prior hospitalist discussion with family, transition to inpatient hospice  --Continue hospice medications, her pacemaker was reprogrammed to be ineffective on 4/8.  --4/9 patient is doing much better, she is questioning whether she should come out of hospice care.  Echocardiogram results reviewed which shows an EF of 12% , currently family and patient is planning to continue on hospice since patient does not want to have aggressive care continued on discussion with prior hospitalist.  --4/10 prior hospitalist discussed with the hospice nurse, medications adjusted, added Imdur 30 mg  due to her ongoing chest pain possibly due to underlying CHF, Imdur was added for comfort, added gabapentin for pain and medications for constipation.   -4/11 Discussed with Patient, and family, Sister and Daughter and GIP.  They were not aware of restart of Imdur and furosemide yesterday.  Discussed with GIP who were not also aware.  On discussion with Sister, Daughter they did not want these medications and did not feel that these medications were consistent with patient's goal directed at comfort and hospice care.  GIP concurs.  Imdur and furosemide discontinued.  Continue Comfort Care measures.  Discussed with Daughter and Sister plans are to establish discharge to LTC under waiver; plan established with GIP  and SW.  -On encounter today patient appears comfortable, on scheduled MS Contin and acetaminophen.  Sister present during encounter who agrees patient is more comfortable.  Continue comfort care.  ADDENDUM 4/13/22: Received call from GIP provider that stated that the GIP nurse spoke with family about starting furosemide and potassium supplement.  Discussed with GI provider that earlier this week when I discussed with patient's daughter and sister that orders were entered by prior Hospitalist for furosemide and Imdur they were surprised and did not want furosemide or other treatment medications for the patient who is on comfort care and they only wanted comfort care medications.  Discussed with GIP provider that I would only prescribe what family or Patient  wanted.  Therefore called [facility] nurse who spoke with Sister who was present in room.  Sister notified that daughter Lida is the decision-maker.  I had a phone discussion with daughter Lida who emphatically stated that she did not want Mother/patient started on furosemide and or potassium or other treatment measures addressed supposedly by [facility] nurse; Daughter continued to reiterate that she wanted patient only on comfort care measures of pain management [currently scheduled and PRN],  atropine PRN for increased secretions, PRN ativan or MS for respiratory distress; discussed we can add Mucinex expectorant and/ or Robitussin-DM as options.  This is what daughter Lida wanted for her mother/patient and emphatically did not want the addition of furosemide/potassium supplement as [facility] recommended.  Daughter/Lida reiterated that they want patient on comfort care and did not want to prolong patient suffering.  4/14/2022 patient appears much more comfortable, denies pain, no significant cough, secretions/sputum.  Son present at bedside today; he confirms patient is comfortable and he affirms current treatment plan as outlined above with sisterLida above who is primary  decision maker, son secondary of current comfort care treatment plan.  They did not want measures as advised by GIP regarding furosemide, potassium supplements.  Care is directed at comfort, they do not want to prolong patient suffering regarding end-stage heart disease..  -more alert, conversive, reports she is comfortable.  In the past the chest pain that was attributed to angina was in fact musculoskeletal as the patient had CPR prior to presentation resulting in significant musculoskeletal pain/injury, now much more comfortable on scheduled and PRN MS, roxanol. Patient comfortable with plan.  -.  Plan to discharge to [facility] closer to home and [place] with hospice resources are on 4/18/2022,  medications recommended per hospice ordered today 4/17/22 and should be available per outpatient pharmacy [except PRN haldol [on Hospice list] as review of records indicates no use of Haldol since patient started on comfort care.]  Hospice to meet with patient at home after discharge  likely tomorrow however within 24-48 hours with ongoing Rx management and refills .  Addendum; trial lidocaine patch to area of musculoskeletal chest pain.  If beneficial need to order on discharge tomorrow AM.      Code Status: No CPR- Do NOT Intubate     Disposition: Expected discharge: [facility] [place] with hospice resources 418/22 [Rx ordered 4/17/22 and should be available per [facility] outpatient pharmacy]  Ochoa Holloway MD  Text Page   (7am to 6pm)  I spent 25 minutes on the unit/floor in management of care today reviewing labs, medications, interdisciplinary notes; and completing documentation of encounter and orders with over 50% of my time was spent  Coordinating Care and plan with Nursing re; discharge planning and Specialists, reviewed Hospice Rx, completed discharge medications per Hospice    Interval History   Alert, conversive, pleasant, reports she is comfortable.  Tries to eat some.  Not  hungry.  No cough, dyspnea, sputum.        Physical Exam   Temp: 98.2  F (36.8  C) Temp src: Oral      Resp: 16         Constitutional: NAD, awake, calm, cooperative.  Appears comfortable.     Respiratory: Respirations nonlabored on room air; no cough, no increased secretions.  GI:  Soft nontender  Psych:  Affect: Calm..    Medications     dextrose         acetaminophen  1,000 mg Oral TID     fluticasone  2 spray Both Nostrils Daily     gabapentin  600 mg Oral At Bedtime     hypromellose-dextran  1 drop Both Eyes BID     morphine  15 mg Oral Q12H LEYLA     polyethylene glycol  17 g Oral Daily     senna-docusate  1 tablet Oral BID     traZODone  100 mg Oral At Bedtime

## 2022-04-17 NOTE — PLAN OF CARE
[id number]  Pt continues with prn ativan and roxanol for sternal pain, uses heat packs as well.  Pt slightly forgetful at times, reorients well.  Continues to eat/drink fairly well.  Up to bathroom with SBA/walker/GB.  New order for lidocaine patch to try tonight.  Plan for discharge to [facility] tomorrow at 0930 with hospice.

## 2022-04-17 NOTE — PROVIDER NOTIFICATION
Messaged Dr. Holloway requesting lidocaine to sternal area.  Orders received for lidocaine patch.  Will trial overnight.

## 2022-04-18 PROCEDURE — 99239 HOSP IP/OBS DSCHRG MGMT >30: CPT | Performed by: INTERNAL MEDICINE

## 2022-04-18 PROCEDURE — 250N000013 HC RX MED GY IP 250 OP 250 PS 637: Performed by: INTERNAL MEDICINE

## 2022-04-18 PROCEDURE — 250N000013 HC RX MED GY IP 250 OP 250 PS 637: Performed by: HOSPITALIST

## 2022-04-18 RX ORDER — LIDOCAINE 50 MG/G
1 PATCH TOPICAL DAILY PRN
Qty: 20 PATCH | Refills: 0 | Status: SHIPPED | OUTPATIENT
Start: 2022-04-18 | End: 2022-05-02

## 2022-04-18 RX ADMIN — FLUTICASONE PROPIONATE 2 SPRAY: 50 SPRAY, METERED NASAL at 08:04

## 2022-04-18 RX ADMIN — ACETAMINOPHEN 1000 MG: 500 TABLET, FILM COATED ORAL at 08:03

## 2022-04-18 RX ADMIN — MORPHINE SULFATE 10 MG: 10 SOLUTION ORAL at 09:13

## 2022-04-18 RX ADMIN — SENNOSIDES AND DOCUSATE SODIUM 1 TABLET: 50; 8.6 TABLET ORAL at 08:03

## 2022-04-18 RX ADMIN — MORPHINE SULFATE 10 MG: 10 SOLUTION ORAL at 04:14

## 2022-04-18 RX ADMIN — LORAZEPAM 1 MG: 2 LIQUID ORAL at 09:13

## 2022-04-18 RX ADMIN — POLYETHYLENE GLYCOL 3350 17 G: 17 POWDER, FOR SOLUTION ORAL at 08:03

## 2022-04-18 RX ADMIN — MORPHINE SULFATE 15 MG: 15 TABLET, EXTENDED RELEASE ORAL at 08:02

## 2022-04-18 ASSESSMENT — ACTIVITIES OF DAILY LIVING (ADL)
ADLS_ACUITY_SCORE: 21

## 2022-04-18 NOTE — DISCHARGE SUMMARY
Discharge Note    Patient discharged to hospice facility via EMS/BLS accompanied by no family/friend .  IV: Discontinued  Prescriptions filled and given to patient/family.   Belongings reviewed and sent with patient.   Home medications returned to patient: Yes  Equipment sent with: patient, N/A.   patient verbalizes understanding of discharge instructions. AVS given to patient & EMS.      Pt A/Ox4 - forgetful & a little confused this morning but easily reoriented. C/o pain in chest - managed w/ scheduled MS contin, tylenol, & PRN roxicodone. PRN ativan given prior to discharge. Regular diet, fair appetite. Up A1 GB/W. Ambulated to bathroom. Belongings sent with family & patient.

## 2022-04-18 NOTE — PLAN OF CARE
Goal Outcome Evaluation:      A&Ox4, forgetful at times. Comfort cares, full assessment and VS deferred. Sternal pain controlled with scheduled MS contin, tylenol, PRN roxanol. Lidocaine patch placed to sternum. Ativan given x1 for anxiety. Up SBA/GB/W to BR/BSC. Able to reposition self in bed with encouragement. Plan to discharge to [facility] today at 0930.

## 2022-04-18 NOTE — DISCHARGE SUMMARY
[facility]  Hospitalist Discharge Summary      Date of Admission:  4/6/2022  Date of Discharge:  4/18/2022 10:06 AM  Discharging Provider: Nyla Bartlett MD  Discharge Service: Hospitalist Service    Discharge Diagnoses   Severe cardiomyopathy with systolic heart failure, EF 10-15%   Cardiogenic shock   Cardiac arrest secondary to Complete heart block s/p PPM   Coronary disease out of proportion to her cardiomyopathy.      Small to moderate pericardial effusion   Acute hypoxic respiratory failure due to cardiac arrest  Hospice care    Follow-ups Needed After Discharge   Follow-up Appointments     Follow-up and recommended labs and tests       Follow up with Hospice Care team             Unresulted Labs Ordered in the Past 30 Days of this Admission     No orders found from 3/7/2022 to 4/7/2022.      None    Discharge Disposition   Discharged to assisted living  Condition at discharge: Stable      Hospital Course   This is a 76-year-old female admitted to the ICU after undergoing a right heart catheterization resulting in right heart block.  This required CPR and temporary pacemaker, and subsequently underwent a permanent pacemaker placement.  There was concern for pericardial effusion and patient was monitored for cardiac tamponade.  Over the first 24 hours in the ICU she required hemodynamic support with Levophed and milrinone for her heart failure.  Her heart failure on postprocedure echo was 10%.  Over the course of her hospitalization, she was weaned off mechanical ventilatory support and finally placed on CPAP postprocedural day 2.  She remained in heart failure requiring milrinone and with vasoplegia requiring Levophed.  The patient was extubated the morning of the post procedure day 2 while still on multiple pressors.  At this time multiple discussions were held between the patient, cardiology, ICU service regarding goals of care.  Patient decided at this time she did not want  any further aggressive measures to sustain her life.  The patient was weaned off milrinone and vasoplegia and transferred to an hospital hospice program. She had been [facility]; her pacemaker was reprogrammed to be ineffective on 4/8. GIP recommended to start her on MS Contin 15 mg po BID for pain control. There were some discussion about starting the patient on Imdur and Lasix as adjuvant medications that might help the patient with chest pain/fluid overload/dyspnea. The hospitalist discussed with the patient daughter who wanted her mother to continue only with comfort care medications and did not want her mother to be started on Imdur/Lasix. She was discharged in stable condition to [facility] in [place], close to her daughter.       Consultations This Hospital Stay   PHARMACY IP CONSULT  INPATIENT HOSPICE ADULT CONSULT    Code Status   No CPR- Do NOT Intubate    Time Spent on this Encounter   I, Nyla Bartlett MD, personally saw the patient today and spent greater than 30 minutes discharging this patient.       Nyla Bartlett MD  [place] [address] [facility]  [address] [place] [address], [address] [address]  Phone: [phone]  ______________________________________________________________________    Physical Exam   Vital Signs: Temp: 97.6  F (36.4  C) Temp src: Oral              Weight: 0 lbs 0 oz     Constitutional: NAD, awake, cooperative.  Appears comfortable but mildly anxious.     Respiratory: Respirations nonlabored on room air; no cough, no increased secretions.  GI:  Soft nontender  Neuro: AAOX3, no FNDs       Primary Care Physician   Jose Manuel Sutton    Discharge Orders      Reason for your hospital stay    Severe cardiomyopathy with systolic heart failure  Complete heart block  Hospice Care     Follow-up and recommended labs and tests     Follow up with Hospice Care team     Diet    Follow this diet upon discharge: Orders  Placed This Encounter      Regular Diet Adult       Significant Results and Procedures   Most Recent 3 CBC's:Recent Labs   Lab Test 04/05/22  0507 04/04/22 2201 04/04/22  1545 04/04/22  0807   WBC 15.5*  --  15.8* 6.6   HGB 13.0 13.8 13.8 11.9   MCV 92  --  94 93     --  331 205     Most Recent 3 BMP's:Recent Labs   Lab Test 04/12/22  1147 04/12/22  0802 04/12/22  0140 04/05/22  1826 04/05/22  1823 04/05/22  1215 04/05/22  1211 04/05/22  0847 04/05/22  0507 04/04/22 2003 04/04/22  1545 04/04/22  0807   NA  --   --   --   --   --   --   --   --  138  --  133 139   POTASSIUM  --   --   --   --  4.6  --  4.1  --  5.0  5.0   < > 5.8* 4.4   CHLORIDE  --   --   --   --   --   --   --   --  109  --  107 112*   CO2  --   --   --   --   --   --   --   --  23  --  23 26   BUN  --   --   --   --   --   --   --   --  38*  --  39* 40*   CR  --   --   --   --   --   --   --   --  0.88  --  0.87 0.88   ANIONGAP  --   --   --   --   --   --   --   --  6  --  3 1*   JOSÉ MIGUEL  --   --   --   --   --   --   --   --  8.8  --  8.9 8.4*   GLC 92 90 88   < >  --    < >  --    < > 153*   < > 196*  188* 86    < > = values in this interval not displayed.     Most Recent 2 LFT's:Recent Labs   Lab Test 04/04/22  1545 03/18/21  1639   AST 78* 19   ALT 71* 20   ALKPHOS 104 96   BILITOTAL 0.2 0.3     Most Recent 3 INR's:Recent Labs   Lab Test 04/04/22  0807   INR 1.00     Most Recent 3 Creatinines:Recent Labs   Lab Test 04/05/22  0507 04/04/22  1545 04/04/22  0807   CR 0.88 0.87 0.88     Most Recent 3 Hemoglobins:Recent Labs   Lab Test 04/05/22  0507 04/04/22  2201 04/04/22  1545   HGB 13.0 13.8 13.8     Most Recent 3 Troponin's:Recent Labs   Lab Test 08/25/15  0005 08/24/15  1800 08/24/15  1225   TROPI <0.015  The 99th percentile for upper reference range is 0.045 ug/L.  Troponin values in   the range of 0.045 - 0.120 ug/L may be associated with risks of adverse   clinical events.   <0.015  The 99th percentile for upper reference range is  0.045 ug/L.  Troponin values in   the range of 0.045 - 0.120 ug/L may be associated with risks of adverse   clinical events.   <0.015  The 99th percentile for upper reference range is 0.045 ug/L.  Troponin values in   the range of 0.045 - 0.120 ug/L may be associated with risks of adverse   clinical events.       Most Recent 3 BNP's:Recent Labs   Lab Test 01/25/22  1523 03/18/21  1639   NTBNP 165 62     Most Recent TSH and T4:Recent Labs   Lab Test 01/25/22  1523 10/12/21  1257 03/18/21  1639   TSH 2.20   < > 5.04*   T4  --   --  0.86    < > = values in this interval not displayed.     Most Recent Hemoglobin A1c:No lab results found.  Most Recent 6 glucoses:Recent Labs   Lab Test 04/12/22  1147 04/12/22  0802 04/12/22  0140 04/11/22  2327 04/06/22  0801 04/06/22  0520   GLC 92 90 88 95 142* 138*     Most Recent ABG:Recent Labs   Lab Test 04/04/22  1545   PH 7.29*   PO2 84   PCO2 47*   HCO3 23   ALESSANDRA -4.1     Most Recent ESR & CRP:No lab results found.  Most Recent Anemia Panel:Recent Labs   Lab Test 04/05/22  0507   WBC 15.5*   HGB 13.0   HCT 41.1   MCV 92          Discharge Medications   Current Discharge Medication List      START taking these medications    Details   acetaminophen (TYLENOL) 500 MG tablet Take 2 tablets (1,000 mg) by mouth 3 times daily    Associated Diagnoses: Pain      atropine 1 % ophthalmic solution Place 2 drops under the tongue every 4 hours as needed (secretions) Ongoing management/refills per Hospice  Qty: 2 mL, Refills: 0    Associated Diagnoses: Hospice care patient      bisacodyl (DULCOLAX) 10 MG suppository Place 1 suppository (10 mg) rectally daily as needed for constipation Ongoing management/refills per Hospice  Qty: 2 suppository, Refills: 0    Associated Diagnoses: Hospice care patient      carboxymethylcellulose PF (REFRESH PLUS) 0.5 % ophthalmic solution Place 1 drop into both eyes 4 times daily as needed for dry eyes or other (eye irritation) Ongoing management/refills  per Hospice  Qty: 1 each, Refills: 0    Associated Diagnoses: Dry eyes      gabapentin (NEURONTIN) 600 MG tablet Take 1 tablet (600 mg) by mouth At Bedtime Ongoing management/refills per Hospice  Qty: 30 tablet, Refills: 0    Associated Diagnoses: Pain      hypromellose-dextran (ARTIFICAL TEARS) 0.1-0.3 % ophthalmic solution Place 1 drop into both eyes 2 times daily Ongoing management/refills per Hospice  Qty: 15 mL, Refills: 0    Associated Diagnoses: Dry eyes      lidocaine (LIDODERM) 5 % patch Place 1 patch onto the skin daily as needed for moderate pain To prevent lidocaine toxicity, patient should be patch free for 12 hrs daily.  Qty: 20 patch, Refills: 0    Associated Diagnoses: Pain      LORazepam (ATIVAN) 2 MG/ML (HIGH CONC) oral solution Take 0.5-1 mLs (1-2 mg) by mouth every 4 hours as needed for anxiety Ongoing management/refills per Hospice  Qty: 30 mL, Refills: 0    Associated Diagnoses: Hospice care patient      morphine (MS CONTIN) 15 MG CR tablet Take 1 tablet (15 mg) by mouth every 12 hours Ongoing management/refills per Hospice  Qty: 8 tablet, Refills: 0    Associated Diagnoses: Hospice care patient      morphine sulfate (ROXANOL) 20 mg/mL (HIGH CONC) soln Take 0.125-0.5 mLs (2.5-10 mg) by mouth every 3 hours as needed for moderate to severe pain Ongoing management/refills per Hospice  Qty: 15 mL, Refills: 0    Associated Diagnoses: Hospice care patient      polyethylene glycol (MIRALAX) 17 g packet Take 17 g by mouth daily Ongoing management/refills per Hospice  Qty: 10 each, Refills: 0    Associated Diagnoses: Hospice care patient         CONTINUE these medications which have NOT CHANGED    Details   azelastine (ASTELIN) 0.1 % nasal spray Spray 2 sprays into both nostrils 2 times daily as needed for rhinitis  Qty: 30 mL, Refills: 3    Associated Diagnoses: Vasomotor rhinitis      fluticasone (FLONASE) 50 MCG/ACT nasal spray Spray 2 sprays into both nostrils daily  Qty: 16 g, Refills: 3     Associated Diagnoses: Vasomotor rhinitis      PARoxetine (PAXIL) 20 MG tablet TAKE 1 TABLET AT BEDTIME  Qty: 90 tablet, Refills: 1    Associated Diagnoses: Major depressive disorder, recurrent episode, moderate (H)      senna-docusate (SENOKOT-S/PERICOLACE) 8.6-50 MG tablet Take 1-2 tablets by mouth 2 times daily  Qty: 60 tablet, Refills: 0    Associated Diagnoses: Primary osteoarthritis of left shoulder      traZODone (DESYREL) 100 MG tablet TAKE 1 TABLET EVERY NIGHT AS NEEDED FOR SLEEP  Qty: 90 tablet, Refills: 3    Associated Diagnoses: Primary insomnia         STOP taking these medications       albuterol (PROAIR HFA/PROVENTIL HFA/VENTOLIN HFA) 108 (90 Base) MCG/ACT inhaler Comments:   Reason for Stopping:         cyanocobalamin (VITAMIN B-12) 100 MCG tablet Comments:   Reason for Stopping:         cycloSPORINE (RESTASIS) 0.05 % ophthalmic emulsion Comments:   Reason for Stopping:         cycloSPORINE (RESTASIS) 0.05 % ophthalmic emulsion Comments:   Reason for Stopping:         Fluticasone-Umeclidin-Vilanterol (TRELEGY ELLIPTA) 100-62.5-25 MCG/INH oral inhaler Comments:   Reason for Stopping:         furosemide (LASIX) 20 MG tablet Comments:   Reason for Stopping:         gabapentin (NEURONTIN) 300 MG capsule Comments:   Reason for Stopping:         levothyroxine (SYNTHROID/LEVOTHROID) 100 MCG tablet Comments:   Reason for Stopping:         lisinopril (ZESTRIL) 5 MG tablet Comments:   Reason for Stopping:         magnesium 250 MG tablet Comments:   Reason for Stopping:         metoprolol succinate ER (TOPROL-XL) 25 MG 24 hr tablet Comments:   Reason for Stopping:         simvastatin (ZOCOR) 40 MG tablet Comments:   Reason for Stopping:         VITAMIN D, CHOLECALCIFEROL, PO Comments:   Reason for Stopping:             Allergies   Allergies   Allergen Reactions     Dilaudid [Hydromorphone Hcl] Other (See Comments)     hallucinations     Fosamax [Alendronate Sodium] Rash            Norvasc [Amlodipine Besylate]  Hives and Rash     Tegretol [Carbamazepine] Hives and Rash

## 2022-04-24 ENCOUNTER — MEDICAL CORRESPONDENCE (OUTPATIENT)
Dept: HEALTH INFORMATION MANAGEMENT | Facility: CLINIC | Age: 77
End: 2022-04-24

## 2022-04-24 ENCOUNTER — HOSPITAL ENCOUNTER (EMERGENCY)
Facility: CLINIC | Age: 77
Discharge: HOME | End: 2022-04-25
Attending: EMERGENCY MEDICINE | Admitting: EMERGENCY MEDICINE
Payer: MEDICARE

## 2022-04-24 DIAGNOSIS — R07.9 CHEST PAIN, UNSPECIFIED TYPE: ICD-10-CM

## 2022-04-24 LAB
ALBUMIN SERPL-MCNC: 2.8 G/DL (ref 3.4–5)
ALP SERPL-CCNC: 81 U/L (ref 40–150)
ALT SERPL W P-5'-P-CCNC: 31 U/L (ref 0–50)
ANION GAP SERPL CALCULATED.3IONS-SCNC: 6 MMOL/L (ref 3–14)
AST SERPL W P-5'-P-CCNC: 50 U/L (ref 0–45)
BASOPHILS # BLD AUTO: 0.1 10E3/UL (ref 0–0.2)
BASOPHILS NFR BLD AUTO: 1 %
BILIRUB SERPL-MCNC: 0.4 MG/DL (ref 0.2–1.3)
BUN SERPL-MCNC: 14 MG/DL (ref 7–30)
CALCIUM SERPL-MCNC: 8.8 MG/DL (ref 8.5–10.1)
CHLORIDE BLD-SCNC: 102 MMOL/L (ref 94–109)
CO2 SERPL-SCNC: 29 MMOL/L (ref 20–32)
CREAT SERPL-MCNC: 0.71 MG/DL (ref 0.52–1.04)
EOSINOPHIL # BLD AUTO: 0.1 10E3/UL (ref 0–0.7)
EOSINOPHIL NFR BLD AUTO: 1 %
ERYTHROCYTE [DISTWIDTH] IN BLOOD BY AUTOMATED COUNT: 15.7 % (ref 10–15)
GFR SERPL CREATININE-BSD FRML MDRD: 87 ML/MIN/1.73M2
GLUCOSE BLD-MCNC: 88 MG/DL (ref 70–99)
HCT VFR BLD AUTO: 34.3 % (ref 35–47)
HGB BLD-MCNC: 10.7 G/DL (ref 11.7–15.7)
HOLD SPECIMEN: NORMAL
IMM GRANULOCYTES # BLD: 0.1 10E3/UL
IMM GRANULOCYTES NFR BLD: 1 %
LYMPHOCYTES # BLD AUTO: 1.2 10E3/UL (ref 0.8–5.3)
LYMPHOCYTES NFR BLD AUTO: 11 %
MCH RBC QN AUTO: 28.7 PG (ref 26.5–33)
MCHC RBC AUTO-ENTMCNC: 31.2 G/DL (ref 31.5–36.5)
MCV RBC AUTO: 92 FL (ref 78–100)
MONOCYTES # BLD AUTO: 0.7 10E3/UL (ref 0–1.3)
MONOCYTES NFR BLD AUTO: 7 %
NEUTROPHILS # BLD AUTO: 8.6 10E3/UL (ref 1.6–8.3)
NEUTROPHILS NFR BLD AUTO: 79 %
NRBC # BLD AUTO: 0 10E3/UL
NRBC BLD AUTO-RTO: 0 /100
PLATELET # BLD AUTO: 284 10E3/UL (ref 150–450)
POTASSIUM BLD-SCNC: 5.2 MMOL/L (ref 3.4–5.3)
PROT SERPL-MCNC: 6 G/DL (ref 6.8–8.8)
RBC # BLD AUTO: 3.73 10E6/UL (ref 3.8–5.2)
SODIUM SERPL-SCNC: 137 MMOL/L (ref 133–144)
TROPONIN I SERPL HS-MCNC: 27 NG/L
WBC # BLD AUTO: 10.8 10E3/UL (ref 4–11)

## 2022-04-24 PROCEDURE — 99285 EMERGENCY DEPT VISIT HI MDM: CPT | Mod: 25 | Performed by: EMERGENCY MEDICINE

## 2022-04-24 PROCEDURE — 84484 ASSAY OF TROPONIN QUANT: CPT | Performed by: EMERGENCY MEDICINE

## 2022-04-24 PROCEDURE — 93005 ELECTROCARDIOGRAM TRACING: CPT | Performed by: EMERGENCY MEDICINE

## 2022-04-24 PROCEDURE — 36415 COLL VENOUS BLD VENIPUNCTURE: CPT | Performed by: EMERGENCY MEDICINE

## 2022-04-24 PROCEDURE — 99284 EMERGENCY DEPT VISIT MOD MDM: CPT | Performed by: EMERGENCY MEDICINE

## 2022-04-24 PROCEDURE — 80053 COMPREHEN METABOLIC PANEL: CPT | Performed by: EMERGENCY MEDICINE

## 2022-04-24 PROCEDURE — 85025 COMPLETE CBC W/AUTO DIFF WBC: CPT | Performed by: EMERGENCY MEDICINE

## 2022-04-24 PROCEDURE — 83880 ASSAY OF NATRIURETIC PEPTIDE: CPT | Mod: GZ | Performed by: EMERGENCY MEDICINE

## 2022-04-24 PROCEDURE — 93010 ELECTROCARDIOGRAM REPORT: CPT | Performed by: EMERGENCY MEDICINE

## 2022-04-25 VITALS
TEMPERATURE: 98.5 F | SYSTOLIC BLOOD PRESSURE: 147 MMHG | OXYGEN SATURATION: 97 % | BODY MASS INDEX: 34.99 KG/M2 | HEART RATE: 71 BPM | DIASTOLIC BLOOD PRESSURE: 87 MMHG | RESPIRATION RATE: 14 BRPM | WEIGHT: 210 LBS | HEIGHT: 65 IN

## 2022-04-25 LAB
ATRIAL RATE - MUSE: 67 BPM
DIASTOLIC BLOOD PRESSURE - MUSE: NORMAL MMHG
INTERPRETATION ECG - MUSE: NORMAL
NT-PROBNP SERPL-MCNC: 1591 PG/ML (ref 0–1800)
P AXIS - MUSE: 47 DEGREES
PR INTERVAL - MUSE: 194 MS
QRS DURATION - MUSE: 128 MS
QT - MUSE: 464 MS
QTC - MUSE: 490 MS
R AXIS - MUSE: -57 DEGREES
SYSTOLIC BLOOD PRESSURE - MUSE: NORMAL MMHG
T AXIS - MUSE: 146 DEGREES
TROPONIN I SERPL HS-MCNC: 28 NG/L
VENTRICULAR RATE- MUSE: 67 BPM

## 2022-04-25 PROCEDURE — 36415 COLL VENOUS BLD VENIPUNCTURE: CPT | Performed by: EMERGENCY MEDICINE

## 2022-04-25 PROCEDURE — 84484 ASSAY OF TROPONIN QUANT: CPT | Performed by: EMERGENCY MEDICINE

## 2022-04-25 PROCEDURE — 84484 ASSAY OF TROPONIN QUANT: CPT | Mod: 91 | Performed by: EMERGENCY MEDICINE

## 2022-04-25 NOTE — ED TRIAGE NOTES
arrived by EMS for 10/10 chest pain that started around 8pm. Given full strength ASA in route, as well as 1 nitro spray. Pain not relived so started nitro drip. Pain better now. Hx of cardiac arrest 3 weeks ago during angiogram.

## 2022-04-25 NOTE — ED PROVIDER NOTES
[place] EMERGENCY DEPARTMENT ([facility])  4/24/22      History     Chief Complaint   Patient presents with     Chest Pain     HPI  Ingrid Powell is a 76 year old female with a PMH of CAD, MI, HTN, MARGIE, COPD, oxygen dependent, GERD, hypothyroidism, Raynaud's disease and S/P cholecystectomy who presents BIBA to the ED today with chest pain.  Per EMS, patient reported chest pain starting around 8 PM.  She was given full-strength ASA in route as well as 1 nitro spray.  Pain was not relieved so nitro drip was started.  Pain has resolved here in the ED.  Patient presents with her daughter.  Patient reports that she was sitting in a chair when her chest pain started, no exertion.  Patient reports left-sided chest pain that is nonradiating.  No nausea, lightheadedness, shortness of breath, or diaphoresis when it started. Patient reports that her legs feel more swollen than usual, though her daughter notes that they stopped her water pills recently.    Patient was recently seen on 4/4/2022 at [facility] for coronary angiogram when she incidentally had a cardiac arrest due to a complete AV block.  This required CPR and temporary pacemaker, after which the patient underwent a permanent pacemaker placement. There was concern for pericardial effusion and patient was monitored for cardiac tamponade. Her heart failure on postprocedure echo was 10%.  Over the course of the first hospitalization was weaned for mechanical ventilatory support and finally placed on CPAP postprocedural day 2.  She remained in heart failure requiring milrinone and with vasoplegia requiring Levophed.  The patient was extubated the morning of the post procedure day 2 while still on multiple pressors.  At that time multiple discussions were had between the patient, cardiology, ICU service regarding goals of care.  Patient decided at that time she did not want any further measures to sustain her life.  The patient was  weaned off milrinone and vasoplegia and transferred to an hospital hospice program.     Past Medical History  Past Medical History:   Diagnosis Date     Arthritis      COPD (chronic obstructive pulmonary disease) (H)      Depressive disorder      Gastroesophageal reflux disease      History of lumbar surgery 7/28/2015     Hoarseness      Hypertension      MARGIE (obstructive sleep apnea), Severe      Oxygen dependent     at night     Reduced vision      Sleep apnea      Syncope      Syncope and collapse      Past Surgical History:   Procedure Laterality Date     BACK SURGERY       CARPAL TUNNEL RELEASE RT/LT      ?side     CHOLECYSTECTOMY       COLONOSCOPY N/A 4/8/2021    Procedure: COLONOSCOPY;  Surgeon: Walter Liang MD;  Location: [facility]     CV CORONARY ANGIOGRAM N/A 4/4/2022    Procedure: Coronary Angiogram;  Surgeon: Yu Valentine MD;  Location:  HEART CARDIAC CATH LAB     CV LEFT HEART CATH N/A 4/4/2022    Procedure: Left Heart Catheterization;  Surgeon: Yu Valentine MD;  Location:  HEART CARDIAC CATH LAB     CV RIGHT HEART CATH MEASUREMENTS RECORDED N/A 4/4/2022    Procedure: Right Heart Catheterization;  Surgeon: Yu Valentine MD;  Location:  HEART CARDIAC CATH LAB     CV TEMPORARY PACEMAKER INSERTION N/A 4/4/2022    Procedure: Temporary Pacemaker Insertion;  Surgeon: Yu Valentine MD;  Location: [facility] CARDIAC CATH LAB     EP PACEMAKER DEVICE & LEAD IMPLANT- RIGHT ATRIAL & LEFT VENTRICULAR Left 4/4/2022    Procedure: Pacemaker Device & Lead Implant- Right Atrial & Left Ventricular;  Surgeon: Jose Fairbanks MD;  Location:  HEART CARDIAC CATH LAB     EYE SURGERY      cataracts     HERNIA REPAIR       INCISION AND DRAINAGE TRUNK, COMBINED  5/18/2012    Procedure:COMBINED INCISION AND DRAINAGE TRUNK; Incision and Drainage Abdominal Wall Abscess ; Surgeon:ALEXSANDER HANNON; Location:[facility] OR     INSERT PORT VASCULAR ACCESS  2/8/2012    Procedure:INSERT PORT VASCULAR ACCESS;  Surgeon:MARY JANE GUAN; Location:[facility] OR     JOINT REPLACEMTN, KNEE RT/LT      bilateral     KNEE SURGERY  1995    left- total     KNEE SURGERY  2000    right- total     LAPAROSCOPIC APPENDECTOMY N/A 10/10/2015    Procedure: LAPAROSCOPIC APPENDECTOMY;  Surgeon: Daniel Chamberlain MD;  Location: [facility] OR     LARYNGOSCOPY, ESOPHAGOSCOPY,  BIOPSY, COMBINED  2/8/2012    Procedure:COMBINED LARYNGOSCOPY, ESOPHAGOSCOPY,  BIOPSY; Direct Laryngoscopy, Esophagoscopy with Biopsy, Stealth Assisted Left Frontal Sinus Biopsy via Vidal Incision, 8 [place] Power Port in right subclavian & Percutaneous Endoscopic Gastrostomy Placement * Latex Safe*; Surgeon:LIBORIO CAZARES; Location:[facility] OR     left ankle fusion       OPTICAL TRACKING SYSTEM ENDOSCOPIC SINUS SURGERY  2/8/2012    Procedure:OPTICAL TRACKING SYSTEM ENDOSCOPIC SINUS SURGERY; Surgeon:LIBORIO CAZARES; Location:[facility] OR     RECTAL SURGERY      ? type     RELEASE DEQUERVAINS WRIST Left 5/11/2018    Procedure: RELEASE DEQUERVAINS WRIST;  Left 1st Distal compartment release;  Surgeon: Ronak Biggs MD;  Location: [facility] OR     REMOVE PORT VASCULAR ACCESS  8/21/2012    Procedure: REMOVE PORT VASCULAR ACCESS;  Remove Port Vascular Access ;  Surgeon: Phillip Ye MD;  Location: [facility] OR     REVERSE ARTHROPLASTY SHOULDER Left 11/6/2019    Procedure: Left Reverse Total shoulder arthroplasty;  Surgeon: Oliver Velázquez MD;  Location: [facility] OR     acetaminophen (TYLENOL) 500 MG tablet  atropine 1 % ophthalmic solution  azelastine (ASTELIN) 0.1 % nasal spray  bisacodyl (DULCOLAX) 10 MG suppository  carboxymethylcellulose PF (REFRESH PLUS) 0.5 % ophthalmic solution  fluticasone (FLONASE) 50 MCG/ACT nasal spray  gabapentin (NEURONTIN) 600 MG tablet  hypromellose-dextran (ARTIFICAL TEARS) 0.1-0.3 % ophthalmic solution  lidocaine (LIDODERM) 5 % patch  LORazepam (ATIVAN) 2 MG/ML (HIGH CONC) oral solution  morphine (MS CONTIN) 15 MG CR tablet  morphine sulfate  (ROXANOL) 20 mg/mL (HIGH CONC) soln  PARoxetine (PAXIL) 20 MG tablet  polyethylene glycol (MIRALAX) 17 g packet  senna-docusate (SENOKOT-S/PERICOLACE) 8.6-50 MG tablet  traZODone (DESYREL) 100 MG tablet      Allergies   Allergen Reactions     Dilaudid [Hydromorphone Hcl] Other (See Comments)     hallucinations     Fosamax [Alendronate Sodium] Rash            Norvasc [Amlodipine Besylate] Hives and Rash     Tegretol [Carbamazepine] Hives and Rash     Family History  Family History   Problem Relation Age of Onset     Breast Cancer Mother      Arthritis Mother      Cancer Mother      Heart Disease Father          at 72 of heart failure     Emphysema Father      Arthritis Sister      Skin Cancer Sister      Chronic Bronchitis Sister      Arthritis Brother      Pancreatic Cancer Brother      Emphysema Brother      Asthma Brother      Cancer Other         uncle pancreatic/grandmother- colon/aunt ? mat or pat  breast cancer     Social History   Social History     Tobacco Use     Smoking status: Former Smoker     Packs/day: 0.50     Years: 35.00     Pack years: 17.50     Types: Cigarettes     Quit date: 10/3/1995     Years since quittin.5     Smokeless tobacco: Never Used   Substance Use Topics     Alcohol use: Yes     Comment: rare     Drug use: No      Past medical history, past surgical history, medications, allergies, family history, and social history were reviewed with the patient. No additional pertinent items.       Review of Systems  A complete review of systems was performed with pertinent positives and negatives noted in the HPI, and all other systems negative.    Physical Exam   Pulse: 69  Temp: 98.5  F (36.9  C)  Resp: 14  SpO2: 99 %  Physical Exam  Vitals and nursing note reviewed.   Constitutional:       General: She is not in acute distress.     Appearance: She is well-developed. She is not diaphoretic.      Interventions: Nasal cannula in place.   HENT:      Head: Normocephalic and atraumatic.       Mouth/Throat:      Pharynx: No oropharyngeal exudate.   Eyes:      General: No scleral icterus.        Right eye: No discharge.         Left eye: No discharge.      Pupils: Pupils are equal, round, and reactive to light.   Cardiovascular:      Rate and Rhythm: Normal rate and regular rhythm.      Heart sounds: Normal heart sounds. No murmur heard.    No friction rub. No gallop.   Pulmonary:      Effort: Pulmonary effort is normal. No respiratory distress.      Breath sounds: Normal breath sounds. No wheezing.   Chest:      Chest wall: No tenderness.   Abdominal:      General: Bowel sounds are normal. There is no distension.      Palpations: Abdomen is soft.      Tenderness: There is no abdominal tenderness.   Musculoskeletal:         General: No tenderness or deformity. Normal range of motion.      Cervical back: Normal range of motion and neck supple.      Right lower leg: Edema present.      Left lower leg: Edema present.   Skin:     General: Skin is warm and dry.      Coloration: Skin is not pale.      Findings: No erythema or rash.   Neurological:      Mental Status: She is alert and oriented to person, place, and time.      Cranial Nerves: No cranial nerve deficit.         ED Course     11:03 PM  The patient was seen and examined by Keo Caceres DO in Room ED02.     Procedures            EKG Interpretation:      Interpreted by Keo Caceres DO  Time reviewed: 2039  Symptoms at time of EKG: None   Rhythm: normal sinus   Rate: 67  Axis: Normal  Ectopy: none  Conduction: left bundle branch block (incomplete)  ST Segments/ T Waves: T wave inversion V4 and V5  Q Waves: nonspecific  Comparison to prior: 4/5/2022.  Previous shows paced rhythm.  4/4/2022 shows left bundle branch block.  T wave inversions in V4 and V5 are noted.    Clinical Impression: non-specific EKG                        No results found for any visits on 04/24/22.  Medications - No data to display     Assessments & Plan (with Medical  Decision Making)   This is a 76-year-old female who presents with chest pain.  Patient had an episode of chest pain that resolved after nitro.  She is currently asymptomatic.  Patient does have increased peripheral edema but she has stopped taking diuretics.  Exam demonstrates no acute abnormalities.  ECG shows new T wave inversions V4 V5.  Lab work shows no acute abnormalities.  proBNP is not elevated.  Troponin and repeat troponin not elevated.  I discussed all results with patient.  Discussed options with patient.  Patient opts for discharge home.  She is currently on hospice.  Patient will be discharged back to her facility.    I have reviewed the nursing notes. I have reviewed the findings, diagnosis, plan and need for follow up with the patient.    New Prescriptions    No medications on file       Final diagnoses:   None   IEliezer am serving as a trained medical scribe to document services personally performed by Keo Caceres DO, based on the provider's statements to me.     IKeo DO, was physically present and have reviewed and verified the accuracy of this note documented by Eliezer Mendoza.      --  Keo Caceres DO  [facility] EMERGENCY DEPARTMENT  4/24/2022     Keo Caceres DO  04/25/22 0428

## 2022-04-26 ENCOUNTER — PATIENT OUTREACH (OUTPATIENT)
Dept: CARE COORDINATION | Facility: CLINIC | Age: 77
End: 2022-04-26

## 2022-04-26 ENCOUNTER — ASSISTED LIVING VISIT (OUTPATIENT)
Dept: GERIATRICS | Facility: CLINIC | Age: 77
End: 2022-04-26
Payer: COMMERCIAL

## 2022-04-26 VITALS
OXYGEN SATURATION: 98 % | SYSTOLIC BLOOD PRESSURE: 127 MMHG | HEIGHT: 65 IN | HEART RATE: 85 BPM | DIASTOLIC BLOOD PRESSURE: 80 MMHG | WEIGHT: 225.4 LBS | BODY MASS INDEX: 37.55 KG/M2 | RESPIRATION RATE: 18 BRPM | TEMPERATURE: 97.3 F

## 2022-04-26 DIAGNOSIS — E03.9 ACQUIRED HYPOTHYROIDISM: ICD-10-CM

## 2022-04-26 DIAGNOSIS — I20.9 ANGINA PECTORIS (H): ICD-10-CM

## 2022-04-26 DIAGNOSIS — I25.5 ISCHEMIC CARDIOMYOPATHY: ICD-10-CM

## 2022-04-26 DIAGNOSIS — K59.01 SLOW TRANSIT CONSTIPATION: ICD-10-CM

## 2022-04-26 DIAGNOSIS — I50.22 CHRONIC SYSTOLIC HEART FAILURE (H): Primary | ICD-10-CM

## 2022-04-26 DIAGNOSIS — Z71.89 OTHER SPECIFIED COUNSELING: ICD-10-CM

## 2022-04-26 DIAGNOSIS — Z86.74 HX OF CARDIAC ARREST: ICD-10-CM

## 2022-04-26 DIAGNOSIS — E66.01 MORBID OBESITY (H): ICD-10-CM

## 2022-04-26 DIAGNOSIS — R52 GENERALIZED PAIN: ICD-10-CM

## 2022-04-26 DIAGNOSIS — J31.0 CHRONIC RHINITIS: ICD-10-CM

## 2022-04-26 DIAGNOSIS — C09.9 MALIGNANT NEOPLASM OF TONSIL (H): ICD-10-CM

## 2022-04-26 DIAGNOSIS — F33.1 MAJOR DEPRESSIVE DISORDER, RECURRENT EPISODE, MODERATE (H): ICD-10-CM

## 2022-04-26 DIAGNOSIS — F51.01 PRIMARY INSOMNIA: ICD-10-CM

## 2022-04-26 RX ORDER — AMOXICILLIN 250 MG
1 CAPSULE ORAL 2 TIMES DAILY
COMMUNITY
End: 2022-05-10

## 2022-04-26 RX ORDER — MORPHINE SULFATE 15 MG/1
30 TABLET, FILM COATED, EXTENDED RELEASE ORAL EVERY 12 HOURS
COMMUNITY
End: 2022-05-10

## 2022-04-26 RX ORDER — OXYCODONE HYDROCHLORIDE 5 MG/1
5 TABLET ORAL EVERY 4 HOURS PRN
Qty: 10 TABLET | Refills: 0 | Status: SHIPPED | OUTPATIENT
Start: 2022-04-26 | End: 2022-07-15

## 2022-04-26 RX ORDER — TRAZODONE HYDROCHLORIDE 100 MG/1
100 TABLET ORAL AT BEDTIME
COMMUNITY
End: 2022-05-10

## 2022-04-26 RX ORDER — LIDOCAINE 4 G/G
1 PATCH TOPICAL EVERY 24 HOURS
COMMUNITY
End: 2022-05-10

## 2022-04-26 RX ORDER — MORPHINE SULFATE 30 MG/1
5 TABLET ORAL
COMMUNITY
End: 2022-04-26

## 2022-04-26 RX ORDER — LORAZEPAM 1 MG/1
1 TABLET ORAL EVERY 4 HOURS PRN
COMMUNITY
End: 2022-04-26

## 2022-04-26 RX ORDER — HALOPERIDOL 0.5 MG/1
2 TABLET ORAL EVERY 6 HOURS PRN
COMMUNITY
End: 2022-05-02

## 2022-04-26 NOTE — PROGRESS NOTES
[facility] GERIATRICS    PRIMARY CARE PROVIDER AND CLINIC:  NORA Balderas CNP, [address] / [facility] [address]  Chief Complaint   Patient presents with     [facility] Medical Record Number:  [id number]  Place of Service where encounter took place:  [facility] ([facility]) [[id number]]    Ingrid Powell  is a 76 year old  (1945), admitted to the above facility from  [facility]. Hospital stay 4/6/22 through 4/18/22...     HPI:    Ingrid is a 76 year old female with chronic health conditions that include hypothyroidism, constipation, morbid obesity, and rhinitis.    Ingrid underwent a right heart catheterization which resulted in right heart block, that required CPR and temporary pacemaker.  Eventually, Ingrid underwent a permanent pacemaker placement.  There was concern for pericardial effusion and monitored for cardiac tamponade.  Ingrid required hemodynamic support with Levophed and milrinone for heart failure.  Over time she was weaned off mechanical ventilator support and placed on CPAP post procedural day 2.  Remained in heart failure and multiple pressors.    Multiple discussions were held between the patient, cardiology, ICU services regarding goals of care.  Ingrid made the decision she did not want any more aggressive service to prolong life.  She was weaned off milrinone and vasoplegia and transferred to a hospital hospice program.    The pacemaker was reprogrammed to be ineffective on 4/8.  Was recommended to start on MS Contin 15mg po BID for pain control.  It was discussed about Lasix and Imdur for comfort measures to help with dypsnea and fluid overload.  Family declined.  Discharged to [facility] to be near family.    On 4/24/22, Ingrid was having significant chest pain and so requested to go to the hospital.  She was sent to [facility].  En-route she was give ASA 325mg po daily and 1 Nitroglycerin  "spray.  That was not effective and so a Nitroglycerin drip was started.  In the ED, the chest pain had resolved.  Peripheral edema present but Ingrid had stopped the Lasix when in [facility].  She opted to return home and not being admitted.    Currently:  Today this NP came to see Ingrid for initial visit.  Nursing updates this NP today about the trip to the ED.  Ingrid has now announced \"She wants to live\".  New POLST form filled out to be CPR.  Confirmed this with Ingrid as well.  She now is off hospice.  Nursing asking for orders for O2 as well as she will still need a hospital bed and a wheelchair.  The shower chair typically is not a order for the MD.  That can be gotten at a DME store.      Found Ingrid in her apartment down the limon from the nursing station.  She is in her bid recliner and typically sleeps in the recliner for comfort. Oxygen is being used at 3L/min Via NC.  Staff noted that while she was doing light personal cares within the past couple of days, her O2 sats down in the 70's.  She went back to sit down and took some time to recover.    Alert and pleasant.  Talked about each of her medications she has.  Asked about the MS Contin and she states it is for the left side of her chest.  Assume chest wall pain as she has had CPR preformed.  Ingrid has many medications listed that are hospice oriented and will be discontinuing them today as not appropriate at this time.      CODE STATUS/ADVANCE DIRECTIVES DISCUSSION:  Prior  CPR/Full code   ALLERGIES:   Allergies   Allergen Reactions     Dilaudid [Hydromorphone Hcl] Other (See Comments)     hallucinations     Fosamax [Alendronate Sodium] Rash            Norvasc [Amlodipine Besylate] Hives and Rash     Tegretol [Carbamazepine] Hives and Rash      PAST MEDICAL HISTORY:   Past Medical History:   Diagnosis Date     Arthritis      COPD (chronic obstructive pulmonary disease) (H)      Depressive disorder      Gastroesophageal reflux disease      History of lumbar " surgery 7/28/2015     Hoarseness      Hypertension      MARGIE (obstructive sleep apnea), Severe      Oxygen dependent     at night     Reduced vision      Sleep apnea      Syncope      Syncope and collapse       PAST SURGICAL HISTORY:   has a past surgical history that includes joint replacemtn, knee rt/lt; Cholecystectomy; carpal tunnel release rt/lt; Rectal surgery; back surgery; hernia repair; left ankle fusion; Laryngoscopy, esophagoscopy, biopsy, combined (2/8/2012); Optical tracking system endoscopic sinus surgery (2/8/2012); Insert port vascular access (2/8/2012); Incision and drainage trunk, combined (5/18/2012); Eye surgery; Remove port vascular access (8/21/2012); knee surgery (1995); knee surgery (2000); Laparoscopic appendectomy (N/A, 10/10/2015); Release dequervains wrist (Left, 5/11/2018); Reverse arthroplasty shoulder (Left, 11/6/2019); Colonoscopy (N/A, 4/8/2021); Coronary Angiogram (N/A, 4/4/2022); Right Heart Catheterization (N/A, 4/4/2022); Left Heart Catheterization (N/A, 4/4/2022); Temporary Pacemaker Insertion (N/A, 4/4/2022); and Pacemaker Device & Lead Implant- Right Atrial & Left Ventricular (Left, 4/4/2022).  FAMILY HISTORY: family history includes Arthritis in her brother, mother, and sister; Asthma in her brother; Breast Cancer in her mother; Cancer in her mother and another family member; Chronic Bronchitis in her sister; Emphysema in her brother and father; Heart Disease in her father; Pancreatic Cancer in her brother; Skin Cancer in her sister.  SOCIAL HISTORY:   reports that she quit smoking about 26 years ago. Her smoking use included cigarettes. She has a 17.50 pack-year smoking history. She has never used smokeless tobacco. She reports current alcohol use. She reports that she does not use drugs.  Patient's living condition: lives in an assisted living facility    Post Discharge Medication Reconciliation Status: discharge medications reconciled and changed, per note/orders  Current  "Outpatient Medications   Medication Sig     acetaminophen (TYLENOL) 500 MG tablet Take 2 tablets (1,000 mg) by mouth 3 times daily     bisacodyl (DULCOLAX) 10 MG suppository Place 1 suppository (10 mg) rectally daily as needed for constipation Ongoing management/refills per Hospice     carboxymethylcellulose PF (REFRESH PLUS) 0.5 % ophthalmic solution Place 1 drop into both eyes 4 times daily as needed for dry eyes or other (eye irritation) Ongoing management/refills per Hospice     fluticasone (FLONASE) 50 MCG/ACT nasal spray Spray 2 sprays into both nostrils daily     gabapentin (NEURONTIN) 600 MG tablet Take 1 tablet (600 mg) by mouth At Bedtime Ongoing management/refills per Hospice     hypromellose-dextran (ARTIFICAL TEARS) 0.1-0.3 % ophthalmic solution Place 1 drop into both eyes 2 times daily Ongoing management/refills per Hospice     Lidocaine (LIDOCARE) 4 % Patch Place 1 patch onto the skin every 24 hours To prevent lidocaine toxicity, patient should be patch free for 12 hrs daily.     morphine (MS CONTIN) 15 MG CR tablet Take 30 mg by mouth every 12 hours     oxyCODONE (ROXICODONE) 5 MG tablet Take 1 tablet (5 mg) by mouth every 4 hours as needed for pain     PARoxetine (PAXIL) 20 MG tablet TAKE 1 TABLET AT BEDTIME     polyethylene glycol (MIRALAX) 17 g packet Take 17 g by mouth daily Ongoing management/refills per Hospice     senna-docusate (SENOKOT-S/PERICOLACE) 8.6-50 MG tablet Take 1 tablet by mouth 2 times daily     traZODone (DESYREL) 100 MG tablet Take 100 mg by mouth At Bedtime     No current facility-administered medications for this visit.       ROS:  10 point ROS of systems including Constitutional, Eyes, Respiratory, Cardiovascular, Gastroenterology, Genitourinary, Integumentary, Musculoskeletal, Psychiatric were all negative except for pertinent positives noted in my HPI.    Vitals:  /80   Pulse 85   Temp 97.3  F (36.3  C)   Resp 18   Ht 1.651 m (5' 5\")   Wt 102.2 kg (225 lb 6.4 oz) " "  LMP  (LMP Unknown)   SpO2 98%   BMI 37.51 kg/m    Exam:  GENERAL APPEARANCE:  Alert, in no distress, oriented, cooperative  EYES:  EOM normal, conjunctiva and lids normal, has reading glasses  RESP:  respiratory effort and palpation of chest normal, lungs clear to auscultation , oxygen on at 3L/min via NC.  no acute distress just talking.  CV:  Palpation and auscultation of heart done , no edema, legs are \"thick\" due to her obesity but ankles and tops of feet show no edema.  sitting in recliner with legs elevated,  Raised lump on left side chest wall with steri strips/bruising present.  Non-working pacemaker present.  ABDOMEN:  normal bowel sounds, soft, nontender, no hepatosplenomegaly or other masses, no guarding or rebound  :    gets self to bathroom in her apartment.  M/S:   Gait and station abnormal uses a 4 WW.  there is a w/c for longer distances.  SKIN:  pink, warm and dry.  no open areas  PSYCH:  oriented X 3, normal insight, judgement and memory, affect and mood normal    Lab/Diagnostic data:  Component      Latest Ref Rng & Units 4/5/2022   Sodium      133 - 144 mmol/L 138   Potassium      3.4 - 5.3 mmol/L 5.0   Chloride      94 - 109 mmol/L 109   Carbon Dioxide      20 - 32 mmol/L 23   Anion Gap      3 - 14 mmol/L 6   Urea Nitrogen      7 - 30 mg/dL 38 (H)   Creatinine      0.52 - 1.04 mg/dL 0.88   Calcium      8.5 - 10.1 mg/dL 8.8   Glucose      70 - 99 mg/dL 153 (H)   GFR Estimate      >60 mL/min/1.73m2 67   WBC      4.0 - 11.0 10e3/uL 15.5 (H)   RBC Count      3.80 - 5.20 10e6/uL 4.45   Hemoglobin      11.7 - 15.7 g/dL 13.0   Hematocrit      35.0 - 47.0 % 41.1   MCV      78 - 100 fL 92   MCH      26.5 - 33.0 pg 29.2   MCHC      31.5 - 36.5 g/dL 31.6   RDW      10.0 - 15.0 % 13.7   Platelet Count      150 - 450 10e3/uL 295       ASSESSMENT/PLAN:    (I50.22) Chronic systolic heart failure (H)  (primary encounter diagnosis)  Comment: in no acute distress at this time.  Hx of being on Lasix.  Did " have acute respiratory failure with hypoxia.  Currently needing the O2 due to low O2 saturations.    Will give orders today for O2 therapy.  Will visit Ingrid again in a couple of weeks when this NP is back in building to see how she is adjusting.      (I20.9) Angina pectoris (H)  (I25.5) Ischemic cardiomyopathy  (Z86.74) Hx of cardiac arrest  Comment: Imdur was declined.  Was at the ED since hospitalization without restart of medication.  Just announced she wants to live.  Will give her time with minimal medications and then reassess need for medication and even if she wants to see Cardiology.  Comfort care hospice medications discontinued today.    (E66.01) Morbid obesity (H)  Comment: BMI 38%.  Regular diet.  Weight influences her heart and lung status.  Goals right now is the adapt to a new normal.  Has a ways to improve in health before discussing if desire to loose weight.  Weight's not routinely done in the AL and especially if thinking she was going to be hospice.  Can discuss in future visits.    (E03.9) Acquired hypothyroidism  Comment: noted she use to be on Levothyroxine 100mcg daily.  Not on now.  Will restart her at 50mcg and get a TSH level on 5/12/22.      (F33.1) Major depressive disorder, recurrent episode, moderate (HCC)  Comment: currently taking her Paxil 20mg at HS.  Did not sense depression at today's visit but initial visit.  No changes.  Feel she will have many emotions to come.      (R52) Generalized pain  Comment: currently on MS Contin 30mg po BID and has morphine solutabs which that will have to be discontinued.  Will replace with oxycodone instead as Solutabs would come from the hospice pharmacy not to mention the cost.    Will discontinue all other hospice medications like Haldol and Atropine.  Ingrid is also on Gabapentin 600mg po at HS.  Plan: oxyCODONE (ROXICODONE) 5 MG tablet    (F51.01) Primary insomnia  Comment:  Does have Trazodone 100mg po daily at HS.  States it is effective to  help with sleep.    (J31.0) Chronic rhinitis  Comment: has Flonase 50mcg nasal spray 2 sprays once daily.  No complaints of dripping nose.  Agreed that it is effective.    (K59.01) Slow transit constipation  Comment: Senna-S 1 tab po BID.  For now she feels her bowels are stable.    (C09.9) HX of MALIGNANT NEOPL TONSIL  Comment: appears that back in 2014 was her neoplasm with treatment of chemotherapy and radiation.  Not a main point of her health.      Orders:  1.  discontinue Atropine, Azelastine nasal solution, Morphine solutabs, haldol.  2.  Oxycodone 5mg po every 4 hours prn for pain  3.  O2 at 1-3L.min via NC for heart failure, chronic respiratory failure.    Total time spent with patient visit at the skilled nursing facility was 60 minutes including patient visit, review of past records and discussion with nursing. Greater than 50% of total time spent with counseling and coordinating care due to new goals of care, review of medications and review or chart.   F2F for hospital bed and w/c.     Electronically signed by:  NORA Balderas CNP

## 2022-04-26 NOTE — LETTER
4/26/2022        RE: Ingrid Powell  [address]  [facility] [address]        [facility] GERIATRICS    PRIMARY CARE PROVIDER AND CLINIC:  NORA Balderas [facility], [address] / [facility] [address]  Chief Complaint   Patient presents with     [facility] Medical Record Number:  [id number]  Place of Service where encounter took place:  [facility] [[id number]]    Ingrid Powell  is a 76 year old  (1945), admitted to the above facility from  [facility]. Hospital stay 4/6/22 through 4/18/22...     HPI:    Ingrid is a 76 year old female with chronic health conditions that include hypothyroidism, constipation, morbid obesity, and rhinitis.    Ingrid underwent a right heart catheterization which resulted in right heart block, that required CPR and temporary pacemaker.  Eventually, Ingrid underwent a permanent pacemaker placement.  There was concern for pericardial effusion and monitored for cardiac tamponade.  Ingrid required hemodynamic support with Levophed and milrinone for heart failure.  Over time she was weaned off mechanical ventilator support and placed on CPAP post procedural day 2.  Remained in heart failure and multiple pressors.    Multiple discussions were held between the patient, cardiology, ICU services regarding goals of care.  Ingrid made the decision she did not want any more aggressive service to prolong life.  She was weaned off milrinone and vasoplegia and transferred to a hospital hospice program.    The pacemaker was reprogrammed to be ineffective on 4/8.  Was recommended to start on MS Contin 15mg po BID for pain control.  It was discussed about Lasix and Imdur for comfort measures to help with dypsnea and fluid overload.  Family declined.  Discharged to [facility] to be near family.    On 4/24/22, Ingrid was having significant chest pain and so requested to go to  "the hospital.  She was sent to [facility].  En-route she was give ASA 325mg po daily and 1 Nitroglycerin spray.  That was not effective and so a Nitroglycerin drip was started.  In the ED, the chest pain had resolved.  Peripheral edema present but Ingrid had stopped the Lasix when in [facility].  She opted to return home and not being admitted.    Currently:  Today this NP came to see Ingrid for initial visit.  Nursing updates this NP today about the trip to the ED.  Ingrid has now announced \"She wants to live\".  New POLST form filled out to be CPR.  Confirmed this with Ingrid as well.  She now is off hospice.  Nursing asking for orders for O2 as well as she will still need a hospital bed and a wheelchair.  The shower chair typically is not a order for the MD.  That can be gotten at a [employer] store.      Found Ingrid in her apartment down the liomn from the nursing station.  She is in her bid recliner and typically sleeps in the recliner for comfort. Oxygen is being used at 3L/min Via NC.  Staff noted that while she was doing light personal cares within the past couple of days, her O2 sats down in the 70's.  She went back to sit down and took some time to recover.    Alert and pleasant.  Talked about each of her medications she has.  Asked about the MS Contin and she states it is for the left side of her chest.  Assume chest wall pain as she has had CPR preformed.  Ingrid has many medications listed that are hospice oriented and will be discontinuing them today as not appropriate at this time.      CODE STATUS/ADVANCE DIRECTIVES DISCUSSION:  Prior  CPR/Full code   ALLERGIES:   Allergies   Allergen Reactions     Dilaudid [Hydromorphone Hcl] Other (See Comments)     hallucinations     Fosamax [Alendronate Sodium] Rash            Norvasc [Amlodipine Besylate] Hives and Rash     Tegretol [Carbamazepine] Hives and Rash      PAST MEDICAL HISTORY:   Past Medical History:   Diagnosis Date     Arthritis      COPD (chronic obstructive " pulmonary disease) (H)      Depressive disorder      Gastroesophageal reflux disease      History of lumbar surgery 7/28/2015     Hoarseness      Hypertension      MARGIE (obstructive sleep apnea), Severe      Oxygen dependent     at night     Reduced vision      Sleep apnea      Syncope      Syncope and collapse       PAST SURGICAL HISTORY:   has a past surgical history that includes joint replacemtn, knee rt/lt; Cholecystectomy; carpal tunnel release rt/lt; Rectal surgery; back surgery; hernia repair; left ankle fusion; Laryngoscopy, esophagoscopy, biopsy, combined (2/8/2012); Optical tracking system endoscopic sinus surgery (2/8/2012); Insert port vascular access (2/8/2012); Incision and drainage trunk, combined (5/18/2012); Eye surgery; Remove port vascular access (8/21/2012); knee surgery (1995); knee surgery (2000); Laparoscopic appendectomy (N/A, 10/10/2015); Release dequervains wrist (Left, 5/11/2018); Reverse arthroplasty shoulder (Left, 11/6/2019); Colonoscopy (N/A, 4/8/2021); Coronary Angiogram (N/A, 4/4/2022); Right Heart Catheterization (N/A, 4/4/2022); Left Heart Catheterization (N/A, 4/4/2022); Temporary Pacemaker Insertion (N/A, 4/4/2022); and Pacemaker Device & Lead Implant- Right Atrial & Left Ventricular (Left, 4/4/2022).  FAMILY HISTORY: family history includes Arthritis in her brother, mother, and sister; Asthma in her brother; Breast Cancer in her mother; Cancer in her mother and another family member; Chronic Bronchitis in her sister; Emphysema in her brother and father; Heart Disease in her father; Pancreatic Cancer in her brother; Skin Cancer in her sister.  SOCIAL HISTORY:   reports that she quit smoking about 26 years ago. Her smoking use included cigarettes. She has a 17.50 pack-year smoking history. She has never used smokeless tobacco. She reports current alcohol use. She reports that she does not use drugs.  Patient's living condition: lives in an assisted living facility    Post  Discharge Medication Reconciliation Status: discharge medications reconciled and changed, per note/orders  Current Outpatient Medications   Medication Sig     acetaminophen (TYLENOL) 500 MG tablet Take 2 tablets (1,000 mg) by mouth 3 times daily     bisacodyl (DULCOLAX) 10 MG suppository Place 1 suppository (10 mg) rectally daily as needed for constipation Ongoing management/refills per Hospice     carboxymethylcellulose PF (REFRESH PLUS) 0.5 % ophthalmic solution Place 1 drop into both eyes 4 times daily as needed for dry eyes or other (eye irritation) Ongoing management/refills per Hospice     fluticasone (FLONASE) 50 MCG/ACT nasal spray Spray 2 sprays into both nostrils daily     gabapentin (NEURONTIN) 600 MG tablet Take 1 tablet (600 mg) by mouth At Bedtime Ongoing management/refills per Hospice     hypromellose-dextran (ARTIFICAL TEARS) 0.1-0.3 % ophthalmic solution Place 1 drop into both eyes 2 times daily Ongoing management/refills per Hospice     Lidocaine (LIDOCARE) 4 % Patch Place 1 patch onto the skin every 24 hours To prevent lidocaine toxicity, patient should be patch free for 12 hrs daily.     morphine (MS CONTIN) 15 MG CR tablet Take 30 mg by mouth every 12 hours     oxyCODONE (ROXICODONE) 5 MG tablet Take 1 tablet (5 mg) by mouth every 4 hours as needed for pain     PARoxetine (PAXIL) 20 MG tablet TAKE 1 TABLET AT BEDTIME     polyethylene glycol (MIRALAX) 17 g packet Take 17 g by mouth daily Ongoing management/refills per Hospice     senna-docusate (SENOKOT-S/PERICOLACE) 8.6-50 MG tablet Take 1 tablet by mouth 2 times daily     traZODone (DESYREL) 100 MG tablet Take 100 mg by mouth At Bedtime     No current facility-administered medications for this visit.       ROS:  10 point ROS of systems including Constitutional, Eyes, Respiratory, Cardiovascular, Gastroenterology, Genitourinary, Integumentary, Musculoskeletal, Psychiatric were all negative except for pertinent positives noted in my  "HPI.    Vitals:  /80   Pulse 85   Temp 97.3  F (36.3  C)   Resp 18   Ht 1.651 m (5' 5\")   Wt 102.2 kg (225 lb 6.4 oz)   LMP  (LMP Unknown)   SpO2 98%   BMI 37.51 kg/m    Exam:  GENERAL APPEARANCE:  Alert, in no distress, oriented, cooperative  EYES:  EOM normal, conjunctiva and lids normal, has reading glasses  RESP:  respiratory effort and palpation of chest normal, lungs clear to auscultation , oxygen on at 3L/min via NC.  no acute distress just talking.  CV:  Palpation and auscultation of heart done , no edema, legs are \"thick\" due to her obesity but ankles and tops of feet show no edema.  sitting in recliner with legs elevated,  Raised lump on left side chest wall with steri strips/bruising present.  Non-working pacemaker present.  ABDOMEN:  normal bowel sounds, soft, nontender, no hepatosplenomegaly or other masses, no guarding or rebound  :    gets self to bathroom in her apartment.  M/S:   Gait and station abnormal uses a 4 WW.  there is a w/c for longer distances.  SKIN:  pink, warm and dry.  no open areas  PSYCH:  oriented X 3, normal insight, judgement and memory, affect and mood normal    Lab/Diagnostic data:  Component      Latest Ref Rng & Units 4/5/2022   Sodium      133 - 144 mmol/L 138   Potassium      3.4 - 5.3 mmol/L 5.0   Chloride      94 - 109 mmol/L 109   Carbon Dioxide      20 - 32 mmol/L 23   Anion Gap      3 - 14 mmol/L 6   Urea Nitrogen      7 - 30 mg/dL 38 (H)   Creatinine      0.52 - 1.04 mg/dL 0.88   Calcium      8.5 - 10.1 mg/dL 8.8   Glucose      70 - 99 mg/dL 153 (H)   GFR Estimate      >60 mL/min/1.73m2 67   WBC      4.0 - 11.0 10e3/uL 15.5 (H)   RBC Count      3.80 - 5.20 10e6/uL 4.45   Hemoglobin      11.7 - 15.7 g/dL 13.0   Hematocrit      35.0 - 47.0 % 41.1   MCV      78 - 100 fL 92   MCH      26.5 - 33.0 pg 29.2   MCHC      31.5 - 36.5 g/dL 31.6   RDW      10.0 - 15.0 % 13.7   Platelet Count      150 - 450 10e3/uL 295       ASSESSMENT/PLAN:    (I50.22) Chronic " systolic heart failure (H)  (primary encounter diagnosis)  Comment: in no acute distress at this time.  Hx of being on Lasix.  Did have acute respiratory failure with hypoxia.  Currently needing the O2 due to low O2 saturations.    Will give orders today for O2 therapy.  Will visit Ingrid again in a couple of weeks when this NP is back in building to see how she is adjusting.      (I20.9) Angina pectoris (H)  (I25.5) Ischemic cardiomyopathy  (Z86.74) Hx of cardiac arrest  Comment: Imdur was declined.  Was at the ED since hospitalization without restart of medication.  Just announced she wants to live.  Will give her time with minimal medications and then reassess need for medication and even if she wants to see Cardiology.  Comfort care hospice medications discontinued today.    (E66.01) Morbid obesity (H)  Comment: BMI 38%.  Regular diet.  Weight influences her heart and lung status.  Goals right now is the adapt to a new normal.  Has a ways to improve in health before discussing if desire to loose weight.  Weight's not routinely done in the AL and especially if thinking she was going to be hospice.  Can discuss in future visits.    (E03.9) Acquired hypothyroidism  Comment: noted she use to be on Levothyroxine 100mcg daily.  Not on now.  Will restart her at 50mcg and get a TSH level on 5/12/22.      (F33.1) Major depressive disorder, recurrent episode, moderate (HCC)  Comment: currently taking her Paxil 20mg at HS.  Did not sense depression at today's visit but initial visit.  No changes.  Feel she will have many emotions to come.      (R52) Generalized pain  Comment: currently on MS Contin 30mg po BID and has morphine solutabs which that will have to be discontinued.  Will replace with oxycodone instead as Solutabs would come from the hospice pharmacy not to mention the cost.    Will discontinue all other hospice medications like Haldol and Atropine.  Ingrid is also on Gabapentin 600mg po at HS.  Plan: oxyCODONE  (ROXICODONE) 5 MG tablet    (F51.01) Primary insomnia  Comment:  Does have Trazodone 100mg po daily at HS.  States it is effective to help with sleep.    (J31.0) Chronic rhinitis  Comment: has Flonase 50mcg nasal spray 2 sprays once daily.  No complaints of dripping nose.  Agreed that it is effective.    (K59.01) Slow transit constipation  Comment: Senna-S 1 tab po BID.  For now she feels her bowels are stable.    (C09.9) HX of MALIGNANT NEOPL TONSIL  Comment: appears that back in  was her neoplasm with treatment of chemotherapy and radiation.  Not a main point of her health.      Orders:  1.  discontinue Atropine, Azelastine nasal solution, Morphine solutabs, haldol.  2.  Oxycodone 5mg po every 4 hours prn for pain  3.  O2 at 1-3L.min via NC for heart failure, chronic respiratory failure.    Total time spent with patient visit at the skilled nursing facility was 60 minutes including patient visit, review of past records and discussion with nursing. Greater than 50% of total time spent with counseling and coordinating care due to new goals of care, review of medications and review or chart.   F2F for hospital bed and w/c.     Electronically signed by:  NORA Balderas CNP                     Face to Face and Medical Necessity Statement for DME Provider visit    Demographic Information on Ingrid Powell:  Gender: female  : 1945  [address]  [facility] [address]  Currently:  [place], [place]  [phone] (home)     Medical Record: [id number]  Social Security Number: [id number]  Primary Care Provider: Elsa Jeffries  Insurance: Payor: BCBS / Plan: BCBS PLATINUM BLUE / Product Type: PPO /     HPI:   Ingrid Powell is a 76 year old  (1945), who is being seen today for a face to face provider visit at [facility]; medical necessity statement for DME included. This patient requires the following:  DME  Ordered and Medical Necessity Statement   Ingrid has chronic heart failure, Angina, CAD, and pain in legs that require her to benefit from having a wheelchair that she could use in her environment to participate in activities inside and outside her apartment at the Assisted Living.  Right now she has a pacemaker in left chest and so mobility limited in the left arm until further healing has occurred.  Ingrid does have people that can propel her in the wheelchair.  She would be able to use her right arm and legs to propel as well as long as she does not become winded.    Breathing managed with Oxygen and so would use portable oxygen to get around out of the apartment.    Ingrid resides in an Assisted Living building and so door ways and space is present in order to maneuver self in her space.      Wheelchair Documentation  Size: 20 x 16  Corresponding cushion: Yes: seat and back  Standard foot rests: Yes  Elevating leg rests: No  Arm rests: Yes: standard  Lap tray: No  Dose the patient use oxygen? Yes: uses portables   Is the patient able to propel wheelchair? No If no why not? Pacemaker in left chest wall - limited motion in arms. And is there someone who can? Yes - family or staff  1. The patient has mobility limitations that impairs their ability to participate in one or more mobility related activities: Grooming and Bathing.  The wheelchair is suitable and necessary for use in the patient's home.  2. The patient's mobility limitations cannot be safely resolved by using a cane/walker:Yes  Uses a walker for short distances  Reason why a cane or walker will not meet the patient's needs. (ie: balance, tolerance, level of assistance) tolerance, endurance due to respiratory status.    3. The patients home has adequate access to use a manual wheelchair:Yes  4. The use of a manual wheelchair on a regular basis will improve the patients ability to participate in mobility related ADL's at home:Yes  5. The patient is willing  "to use a manual wheelchair at home:Yes  6. The patient has adequate upper body strength and the mental capability to safely use a manual wheelchair and/or has a caregiver that is able to assist: yes and eventually will get arm mobility back  7. Does the patient have a lower extremity injury or edema?Yes  Reason for Type of Wheelchair  Patient weight: 225 lbs  Light Weight Wheelchair: Patient is unable to self-propel a standard wheelchair in the home but can self propel a light weight wheelchair.      Pt needing above DME with expected length of need of 99 Lifetime due to medical necessity associated with following diagnosis:     Chronic systolic heart failure (H)  Angina pectoris (H)  Ischemic cardiomyopathy  Hx of cardiac arrest  Morbid obesity (H)  Acquired hypothyroidism  Major depressive disorder, recurrent episode, moderate (H)  Generalized pain  Primary insomnia  Chronic rhinitis  Slow transit constipation  Malignant neoplasm of tonsil (H)      PMH   has a past medical history of Angina pectoris (H) (5/2/2022), Arthritis, COPD (chronic obstructive pulmonary disease) (H), Depressive disorder, Gastroesophageal reflux disease, History of lumbar surgery (7/28/2015), Hoarseness, Hypertension, MARGIE (obstructive sleep apnea), Severe, Oxygen dependent, Reduced vision, Sleep apnea, Syncope, and Syncope and collapse.    She has no past medical history of Basal cell carcinoma, Diabetes (H), Malignant melanoma (H), PONV (postoperative nausea and vomiting), Squamous cell carcinoma, or Uncomplicated asthma.    ROS:10 point ROS of systems including Constitutional, Eyes, Respiratory, Cardiovascular, Gastroenterology, Genitourinary, Integumentary, Musculoskeletal, Psychiatric were all negative except for pertinent positives noted in my HPI.    EXAM  Vitals: /80   Pulse 85   Temp 97.3  F (36.3  C)   Resp 18   Ht 1.651 m (5' 5\")   Wt 102.2 kg (225 lb 6.4 oz)   LMP  (LMP Unknown)   SpO2 98%   BMI 37.51 kg/m  ;BMI= " "Body mass index is 37.51 kg/m .  GENERAL APPEARANCE:  Alert, in no distress, oriented, cooperative  EYES:  EOM normal, conjunctiva and lids normal, has reading glasses  RESP:  respiratory effort and palpation of chest normal, lungs clear to auscultation , oxygen on at 3L/min via NC.  no acute distress just talking.  CV:  Palpation and auscultation of heart done , no edema, legs are \"thick\" due to her obesity but ankles and tops of feet show no edema.  sitting in recliner with legs elevated,  Raised lump on left side chest wall with steri strips/bruising present.  Non-working pacemaker present.  ABDOMEN:  normal bowel sounds, soft, nontender, no hepatosplenomegaly or other masses, no guarding or rebound  :    gets self to bathroom in her apartment.  M/S:   Gait and station abnormal uses a 4 WW.  there is a w/c for longer distances.  SKIN:  pink, warm and dry.  no open areas  PSYCH:  oriented X 3, normal insight, judgement and memory, affect and mood normal    ASSESSMENT/PLAN:  1. Chronic systolic heart failure (H)    2. Angina pectoris (H)    3. Ischemic cardiomyopathy    4. Hx of cardiac arrest    5. Morbid obesity (H)    6. Acquired hypothyroidism    7. Major depressive disorder, recurrent episode, moderate (HCC)    8. Generalized pain    9. Primary insomnia    10. Chronic rhinitis    11. Slow transit constipation    12. HX of MALIGNANT NEOPL TONSIL      With they above diagnoses, Ingrid would benefit from a standard wheelchair in order to participate in activities in her apartment but more outside her apartment that would allow her to go to appointments or even just down to the dining room for meals.  For now she has people that can help propel w/c while her left arm heals from a pacemaker placement.  Expect that she can propel self short distances with her right arm and legs for now.    Orders:  1. Facility staff/TC to contact DME company to get their order form for provider to fill out    ELECTRONICALLY SIGNED " BY LUCAS CERTIFIED PROVIDER:  NORA Balderas CNP   NPI: [id number]  [facility]  [address]  [place] [address]    Fax:  [phone]            Face to Face and Medical Necessity Statement for DME Provider visit    Demographic Information on Ingrid Powell:  Gender: female  : 1945  [address]  [facility] [address]  Currently:  [place], [place]  [phone] (home)     Medical Record: [id number]  Social Security Number: [id number]  Primary Care Provider: Elsa Jeffries  Insurance: Payor: [employer] / Plan: [employer] PLATINUM BLUE / Product Type: PPO /     HPI:   Ingrid Powell is a 76 year old  (1945), who is being seen today for a face to face provider visit at [facility]; medical necessity statement for DME included. This patient requires the following:  DME Ordered and Medical Necessity Statement   Electrical hospital bed due to chronic heart failure, pain in legs, ischemic cardiomyopathy, morbid obesity.  With these diagnoses, Ingrid requires positioning with an electric bed to elevate her head minimally 30 degrees to help with her breathing in chronic and acute situations.  Is on chronic oxygen .    Due to her legs having discomfort as well as her chest wall pain, positioning her body is important to give her relief of acute pain that is not feasible by an ordinary bed.  Being able to adjust the height of the bed would help her plant her feet flat on the floor in order to go from sitting to standing position or from surface to surface safely and independently.      The patient does  require positioning of the body in ways not feasible with an ordinary bed due to a medical condition that is expected to last at least 1 month due to chronic heart failure/cardiomyopathy/pain in legs.   The patient does  require, for the alleviation of pain, postioning of the body in ways not  "feasible with an ordinary bed.   The patient does  require the head of bed elevated more than 30* most of the time due to CHF, chronic pulmonary disease or aspiration.  The patient does not require traction that can only be attached to a hospital bed.  The patient does  require a bed height different than a fixed height hospital bed to permit tranfers to wheelchair or standing position.   The patient does  require frequent or immediate changes in body position due to shortness of breath or pain in legs.     Pt needing above DME with expected length of need of 99 lifetime due to medical necessity associated with following diagnosis:     Chronic systolic heart failure (H)  Angina pectoris (H)  Ischemic cardiomyopathy  Hx of cardiac arrest  Morbid obesity (H)  Acquired hypothyroidism  Major depressive disorder, recurrent episode, moderate (H)  Generalized pain  Primary insomnia  Chronic rhinitis  Slow transit constipation  Malignant neoplasm of tonsil (H)      PMH   has a past medical history of Angina pectoris (H) (5/2/2022), Arthritis, COPD (chronic obstructive pulmonary disease) (H), Depressive disorder, Gastroesophageal reflux disease, History of lumbar surgery (7/28/2015), Hoarseness, Hypertension, MARGIE (obstructive sleep apnea), Severe, Oxygen dependent, Reduced vision, Sleep apnea, Syncope, and Syncope and collapse.    She has no past medical history of Basal cell carcinoma, Diabetes (H), Malignant melanoma (H), PONV (postoperative nausea and vomiting), Squamous cell carcinoma, or Uncomplicated asthma.    ROS:10 point ROS of systems including Constitutional, Eyes, Respiratory, Cardiovascular, Gastroenterology, Genitourinary, Integumentary, Musculoskeletal, Psychiatric were all negative except for pertinent positives noted in my HPI.    EXAM  Vitals: /80   Pulse 85   Temp 97.3  F (36.3  C)   Resp 18   Ht 1.651 m (5' 5\")   Wt 102.2 kg (225 lb 6.4 oz)   LMP  (LMP Unknown)   SpO2 98%   BMI 37.51 kg/m  " ";BMI= Body mass index is 37.51 kg/m .   GENERAL APPEARANCE:  Alert, in no distress, oriented, cooperative  EYES:  EOM normal, conjunctiva and lids normal, has reading glasses  RESP:  respiratory effort and palpation of chest normal, lungs clear to auscultation , oxygen on at 3L/min via NC.  no acute distress just talking.  CV:  Palpation and auscultation of heart done , no edema, legs are \"thick\" due to her obesity but ankles and tops of feet show no edema.  sitting in recliner with legs elevated,  Raised lump on left side chest wall with steri strips/bruising present.  Non-working pacemaker present.  ABDOMEN:  normal bowel sounds, soft, nontender, no hepatosplenomegaly or other masses, no guarding or rebound  :    gets self to bathroom in her apartment.  M/S:   Gait and station abnormal uses a 4 WW.  there is a w/c for longer distances.  SKIN:  pink, warm and dry.  no open areas  PSYCH:  oriented X 3, normal insight, judgement and memory, affect and mood normal    ASSESSMENT/PLAN:  1. Chronic systolic heart failure (H)    2. Angina pectoris (H)    3. Ischemic cardiomyopathy    4. Hx of cardiac arrest    5. Morbid obesity (H)    6. Acquired hypothyroidism    7. Major depressive disorder, recurrent episode, moderate (HCC)    8. Generalized pain    9. Primary insomnia    10. Chronic rhinitis    11. Slow transit constipation    12. HX of MALIGNANT NEOPL TONSIL        Orders:  1. Facility staff/TC to contact [employer] company to get their order form for provider to fill out    ELECTRONICALLY SIGNED BY LUCAS CERTIFIED PROVIDER:  NORA Balderas CNP   NPI: [id number]  [facility]  [address]  [place] [address]    Fax:  [phone]              Sincerely,        NORA Balderas CNP    "

## 2022-04-26 NOTE — PROGRESS NOTES
Clinic Care Coordination Contact    Background: Care Coordination referral placed from [facility] discharge report for reason of patient meeting criteria for a TCM outreach call by [facility] [place] team.    Assessment: Upon chart review, [facility] Team member will cancel/close the referral for TCM outreach due to reason below:    Patient has been discharged to Hospice Care    Plan: Care Coordination referral for TCM outreach canceled.    Domitila Sims MA  [facility], [facility]

## 2022-04-27 ENCOUNTER — DOCUMENTATION ONLY (OUTPATIENT)
Dept: OTHER | Facility: CLINIC | Age: 77
End: 2022-04-27
Payer: COMMERCIAL

## 2022-05-02 PROBLEM — I20.9 ANGINA PECTORIS (H): Status: ACTIVE | Noted: 2022-05-02

## 2022-05-02 NOTE — PROGRESS NOTES
Face to Face and Medical Necessity Statement for DME Provider visit    Demographic Information on Ingrid Powell:  Gender: female  : 1945  [address]  [facility] [address]  Currently:  [place], [place]  [phone] (home)     Medical Record: [id number]  Social Security Number: [id number]  Primary Care Provider: Elsa Jeffries  Insurance: Payor: BCBS / Plan: BCBS PLATINUM BLUE / Product Type: PPO /     HPI:   Ingrid Powell is a 76 year old  (1945), who is being seen today for a face to face provider visit at [facility]; medical necessity statement for DME included. This patient requires the following:  DME Ordered and Medical Necessity Statement   Ingrid has chronic heart failure, Angina, CAD, and pain in legs that require her to benefit from having a wheelchair that she could use in her environment to participate in activities inside and outside her apartment at the [facility].  Right now she has a pacemaker in left chest and so mobility limited in the left arm until further healing has occurred.  Ingrid does have people that can propel her in the wheelchair.  She would be able to use her right arm and legs to propel as well as long as she does not become winded.    Breathing managed with Oxygen and so would use portable oxygen to get around out of the apartment.    Ingrid resides in an Assisted Living building and so door ways and space is present in order to maneuver self in her space.      Wheelchair Documentation  Size: 20 x 16  Corresponding cushion: Yes: seat and back  Standard foot rests: Yes  Elevating leg rests: No  Arm rests: Yes: standard  Lap tray: No  Dose the patient use oxygen? Yes: uses portables   Is the patient able to propel wheelchair? No If no why not? Pacemaker in left chest wall - limited motion in arms. And is there someone who can? Yes - family or staff  1. The patient has mobility  limitations that impairs their ability to participate in one or more mobility related activities: Grooming and Bathing.  The wheelchair is suitable and necessary for use in the patient's home.  2. The patient's mobility limitations cannot be safely resolved by using a cane/walker:Yes  Uses a walker for short distances  Reason why a cane or walker will not meet the patient's needs. (ie: balance, tolerance, level of assistance) tolerance, endurance due to respiratory status.    3. The patients home has adequate access to use a manual wheelchair:Yes  4. The use of a manual wheelchair on a regular basis will improve the patients ability to participate in mobility related ADL's at home:Yes  5. The patient is willing to use a manual wheelchair at home:Yes  6. The patient has adequate upper body strength and the mental capability to safely use a manual wheelchair and/or has a caregiver that is able to assist: yes and eventually will get arm mobility back  7. Does the patient have a lower extremity injury or edema?Yes  Reason for Type of Wheelchair  Patient weight: 225 lbs  Light Weight Wheelchair: Patient is unable to self-propel a standard wheelchair in the home but can self propel a light weight wheelchair.      Pt needing above DME with expected length of need of 99 Lifetime due to medical necessity associated with following diagnosis:     Chronic systolic heart failure (H)  Angina pectoris (H)  Ischemic cardiomyopathy  Hx of cardiac arrest  Morbid obesity (H)  Acquired hypothyroidism  Major depressive disorder, recurrent episode, moderate (H)  Generalized pain  Primary insomnia  Chronic rhinitis  Slow transit constipation  Malignant neoplasm of tonsil (H)      PM   has a past medical history of Angina pectoris (H) (5/2/2022), Arthritis, COPD (chronic obstructive pulmonary disease) (H), Depressive disorder, Gastroesophageal reflux disease, History of lumbar surgery (7/28/2015), Hoarseness, Hypertension, MARGIE  "(obstructive sleep apnea), Severe, Oxygen dependent, Reduced vision, Sleep apnea, Syncope, and Syncope and collapse.    She has no past medical history of Basal cell carcinoma, Diabetes (H), Malignant melanoma (H), PONV (postoperative nausea and vomiting), Squamous cell carcinoma, or Uncomplicated asthma.    ROS:10 point ROS of systems including Constitutional, Eyes, Respiratory, Cardiovascular, Gastroenterology, Genitourinary, Integumentary, Musculoskeletal, Psychiatric were all negative except for pertinent positives noted in my HPI.    EXAM  Vitals: /80   Pulse 85   Temp 97.3  F (36.3  C)   Resp 18   Ht 1.651 m (5' 5\")   Wt 102.2 kg (225 lb 6.4 oz)   LMP  (LMP Unknown)   SpO2 98%   BMI 37.51 kg/m  ;BMI= Body mass index is 37.51 kg/m .  GENERAL APPEARANCE:  Alert, in no distress, oriented, cooperative  EYES:  EOM normal, conjunctiva and lids normal, has reading glasses  RESP:  respiratory effort and palpation of chest normal, lungs clear to auscultation , oxygen on at 3L/min via NC.  no acute distress just talking.  CV:  Palpation and auscultation of heart done , no edema, legs are \"thick\" due to her obesity but ankles and tops of feet show no edema.  sitting in recliner with legs elevated,  Raised lump on left side chest wall with steri strips/bruising present.  Non-working pacemaker present.  ABDOMEN:  normal bowel sounds, soft, nontender, no hepatosplenomegaly or other masses, no guarding or rebound  :    gets self to bathroom in her apartment.  M/S:   Gait and station abnormal uses a 4 WW.  there is a w/c for longer distances.  SKIN:  pink, warm and dry.  no open areas  PSYCH:  oriented X 3, normal insight, judgement and memory, affect and mood normal    ASSESSMENT/PLAN:  1. Chronic systolic heart failure (H)    2. Angina pectoris (H)    3. Ischemic cardiomyopathy    4. Hx of cardiac arrest    5. Morbid obesity (H)    6. Acquired hypothyroidism    7. Major depressive disorder, recurrent " episode, moderate (HCC)    8. Generalized pain    9. Primary insomnia    10. Chronic rhinitis    11. Slow transit constipation    12. HX of MALIGNANT NEOPL TONSIL      With they above diagnoses, Ingrid would benefit from a standard wheelchair in order to participate in activities in her apartment but more outside her apartment that would allow her to go to appointments or even just down to the dining room for meals.  For now she has people that can help propel w/c while her left arm heals from a pacemaker placement.  Expect that she can propel self short distances with her right arm and legs for now.    Orders:  1. Facility staff/TC to contact DME company to get their order form for provider to fill out    ELECTRONICALLY SIGNED BY LUCAS CERTIFIED PROVIDER:  NORA Balderas CNP   NPI: [id number]  [facility]  [address] [place] [address] [address]  [place] [address]    Fax:  [phone]

## 2022-05-02 NOTE — PROGRESS NOTES
Face to Face and Medical Necessity Statement for DME Provider visit    Demographic Information on Ingrid Powell:  Gender: female  : 1945  [address]  [facility] [address]  Currently:  [place], [place]  [phone] (home)     Medical Record: [id number]  Social Security Number: [id number]  Primary Care Provider: Elsa Jeffries  Insurance: Payor: BCBS / Plan: BCBS PLATINUM BLUE / Product Type: PPO /     HPI:   Ingrid Powell is a 76 year old  (1945), who is being seen today for a face to face provider visit at [facility]; medical necessity statement for DME included. This patient requires the following:  DME Ordered and Medical Necessity Statement   Electrical hospital bed due to chronic heart failure, pain in legs, ischemic cardiomyopathy, morbid obesity.  With these diagnoses, Ingrid requires positioning with an electric bed to elevate her head minimally 30 degrees to help with her breathing in chronic and acute situations.  Is on chronic oxygen .    Due to her legs having discomfort as well as her chest wall pain, positioning her body is important to give her relief of acute pain that is not feasible by an ordinary bed.  Being able to adjust the height of the bed would help her plant her feet flat on the floor in order to go from sitting to standing position or from surface to surface safely and independently.      The patient does  require positioning of the body in ways not feasible with an ordinary bed due to a medical condition that is expected to last at least 1 month due to chronic heart failure/cardiomyopathy/pain in legs.   The patient does  require, for the alleviation of pain, postioning of the body in ways not feasible with an ordinary bed.   The patient does  require the head of bed elevated more than 30* most of the time due to CHF, chronic pulmonary disease or aspiration.  The patient does not  "require traction that can only be attached to a hospital bed.  The patient does  require a bed height different than a fixed height hospital bed to permit tranfers to wheelchair or standing position.   The patient does  require frequent or immediate changes in body position due to shortness of breath or pain in legs.     Pt needing above DME with expected length of need of 99 lifetime due to medical necessity associated with following diagnosis:     Chronic systolic heart failure (H)  Angina pectoris (H)  Ischemic cardiomyopathy  Hx of cardiac arrest  Morbid obesity (H)  Acquired hypothyroidism  Major depressive disorder, recurrent episode, moderate (H)  Generalized pain  Primary insomnia  Chronic rhinitis  Slow transit constipation  Malignant neoplasm of tonsil (H)      PMH   has a past medical history of Angina pectoris (H) (5/2/2022), Arthritis, COPD (chronic obstructive pulmonary disease) (H), Depressive disorder, Gastroesophageal reflux disease, History of lumbar surgery (7/28/2015), Hoarseness, Hypertension, MARGIE (obstructive sleep apnea), Severe, Oxygen dependent, Reduced vision, Sleep apnea, Syncope, and Syncope and collapse.    She has no past medical history of Basal cell carcinoma, Diabetes (H), Malignant melanoma (H), PONV (postoperative nausea and vomiting), Squamous cell carcinoma, or Uncomplicated asthma.    ROS:10 point ROS of systems including Constitutional, Eyes, Respiratory, Cardiovascular, Gastroenterology, Genitourinary, Integumentary, Musculoskeletal, Psychiatric were all negative except for pertinent positives noted in my HPI.    EXAM  Vitals: /80   Pulse 85   Temp 97.3  F (36.3  C)   Resp 18   Ht 1.651 m (5' 5\")   Wt 102.2 kg (225 lb 6.4 oz)   LMP  (LMP Unknown)   SpO2 98%   BMI 37.51 kg/m  ;BMI= Body mass index is 37.51 kg/m .   GENERAL APPEARANCE:  Alert, in no distress, oriented, cooperative  EYES:  EOM normal, conjunctiva and lids normal, has reading glasses  RESP:  " "respiratory effort and palpation of chest normal, lungs clear to auscultation , oxygen on at 3L/min via NC.  no acute distress just talking.  CV:  Palpation and auscultation of heart done , no edema, legs are \"thick\" due to her obesity but ankles and tops of feet show no edema.  sitting in recliner with legs elevated,  Raised lump on left side chest wall with steri strips/bruising present.  Non-working pacemaker present.  ABDOMEN:  normal bowel sounds, soft, nontender, no hepatosplenomegaly or other masses, no guarding or rebound  :    gets self to bathroom in her apartment.  M/S:   Gait and station abnormal uses a 4 WW.  there is a w/c for longer distances.  SKIN:  pink, warm and dry.  no open areas  PSYCH:  oriented X 3, normal insight, judgement and memory, affect and mood normal    ASSESSMENT/PLAN:  1. Chronic systolic heart failure (H)    2. Angina pectoris (H)    3. Ischemic cardiomyopathy    4. Hx of cardiac arrest    5. Morbid obesity (H)    6. Acquired hypothyroidism    7. Major depressive disorder, recurrent episode, moderate (HCC)    8. Generalized pain    9. Primary insomnia    10. Chronic rhinitis    11. Slow transit constipation    12. HX of MALIGNANT NEOPL TONSIL        Orders:  1. Facility staff/TC to contact DME company to get their order form for provider to fill out    ELECTRONICALLY SIGNED BY LUCAS CERTIFIED PROVIDER:  NORA Balderas CNP   NPI: [id number]  [facility]  [address]  [place] [address]    Fax:  [phone]        "

## 2022-05-05 ENCOUNTER — LAB REQUISITION (OUTPATIENT)
Dept: LAB | Facility: CLINIC | Age: 77
End: 2022-05-05
Payer: MEDICARE

## 2022-05-05 DIAGNOSIS — E03.9 HYPOTHYROIDISM, UNSPECIFIED: ICD-10-CM

## 2022-05-10 ENCOUNTER — ASSISTED LIVING VISIT (OUTPATIENT)
Dept: GERIATRICS | Facility: CLINIC | Age: 77
End: 2022-05-10
Payer: COMMERCIAL

## 2022-05-10 ENCOUNTER — LAB REQUISITION (OUTPATIENT)
Dept: LAB | Facility: CLINIC | Age: 77
End: 2022-05-10
Payer: COMMERCIAL

## 2022-05-10 VITALS
RESPIRATION RATE: 18 BRPM | TEMPERATURE: 97.3 F | BODY MASS INDEX: 37.55 KG/M2 | DIASTOLIC BLOOD PRESSURE: 80 MMHG | HEART RATE: 85 BPM | OXYGEN SATURATION: 96 % | HEIGHT: 65 IN | SYSTOLIC BLOOD PRESSURE: 127 MMHG | WEIGHT: 225.4 LBS

## 2022-05-10 DIAGNOSIS — F51.01 PRIMARY INSOMNIA: ICD-10-CM

## 2022-05-10 DIAGNOSIS — R07.89 CHEST WALL PAIN: ICD-10-CM

## 2022-05-10 DIAGNOSIS — I50.22 CHRONIC SYSTOLIC CONGESTIVE HEART FAILURE (H): Primary | ICD-10-CM

## 2022-05-10 DIAGNOSIS — E03.9 ACQUIRED HYPOTHYROIDISM: ICD-10-CM

## 2022-05-10 DIAGNOSIS — I50.33 ACUTE ON CHRONIC DIASTOLIC (CONGESTIVE) HEART FAILURE (H): ICD-10-CM

## 2022-05-10 DIAGNOSIS — K59.01 SLOW TRANSIT CONSTIPATION: ICD-10-CM

## 2022-05-10 DIAGNOSIS — I20.9 ANGINA PECTORIS (H): ICD-10-CM

## 2022-05-10 RX ORDER — TRAZODONE HYDROCHLORIDE 100 MG/1
100 TABLET ORAL AT BEDTIME
Qty: 30 TABLET | Refills: 11 | Status: SHIPPED | OUTPATIENT
Start: 2022-05-10 | End: 2022-12-01

## 2022-05-10 RX ORDER — FUROSEMIDE 20 MG
20 TABLET ORAL DAILY
Qty: 30 TABLET | Refills: 11 | Status: SHIPPED | OUTPATIENT
Start: 2022-05-10 | End: 2022-05-22

## 2022-05-10 RX ORDER — NITROGLYCERIN 0.4 MG/1
TABLET SUBLINGUAL
Qty: 6 TABLET | Refills: 4 | Status: SHIPPED | OUTPATIENT
Start: 2022-05-10 | End: 2023-04-04

## 2022-05-10 RX ORDER — POLYETHYLENE GLYCOL 3350 17 G/17G
17 POWDER, FOR SOLUTION ORAL DAILY
Qty: 30 EACH | Refills: 11 | Status: SHIPPED | OUTPATIENT
Start: 2022-05-10 | End: 2022-09-05

## 2022-05-10 RX ORDER — LIDOCAINE 4 G/G
1 PATCH TOPICAL EVERY 24 HOURS
Qty: 30 PATCH | Refills: 11 | Status: SHIPPED | OUTPATIENT
Start: 2022-05-10 | End: 2022-07-07

## 2022-05-10 RX ORDER — MORPHINE SULFATE 15 MG/1
TABLET, FILM COATED, EXTENDED RELEASE ORAL
Qty: 90 TABLET | Refills: 0 | Status: SHIPPED | OUTPATIENT
Start: 2022-05-10 | End: 2022-05-24

## 2022-05-10 RX ORDER — AMOXICILLIN 250 MG
1 CAPSULE ORAL 2 TIMES DAILY
Qty: 60 TABLET | Refills: 11 | Status: SHIPPED | OUTPATIENT
Start: 2022-05-10 | End: 2022-07-22

## 2022-05-10 NOTE — LETTER
"    5/10/2022        RE: Ingrid Powell  [address]  [place] [address]        [facility]    Chief Complaint   Patient presents with     RECHECK     HPI:  Ingrid Powell is a 76 year old  (1945), who is being seen today for an episodic care visit at: [facility] ([facility]) [[id number]]. Today's concern is:     Diagnoses       Codes Comments    Chronic systolic congestive heart failure (H)    -  Primary I50.22     Angina pectoris (H)     I20.9     Chest wall pain     R07.89     Primary insomnia     F51.01     Slow transit constipation     K59.01         Came to see Ingrid today to follow up with her overall health status as well as she was wondering about having a Nitroglycerin order as needed.  Also wanted to address a few things with her now that she decided to \"live\" versus be on hospice.     Found her in her room as she was sitting in the recliner.  Alert and talkative.  Did not look so tired today.  Stated her daughter was out of the state now so will not call her until she comes home.    Had a list of her medications on hand and wanted to talk about backing her off the MS Contin that she was not on prior to the CPR episode.  She likes the Lidocaine patch as it helps at night.  She is also still on Tylenol ES 3x day.    Spoke about backing her off oxygen if able.  Still gets short of breath but use to be on Lasix as well for her edema/heart failure.  Is starting to retain fluid in her lower legs.      Allergies, and PMH/PSH reviewed in [facility] today.  Current Outpatient Medications   Medication Sig Dispense Refill     furosemide (LASIX) 20 MG tablet Take 1 tablet (20 mg) by mouth daily 30 tablet 11     Lidocaine (LIDOCARE) 4 % Patch Place 1 patch onto the skin every 24 hours To prevent lidocaine toxicity, patient should be patch free for 12 hrs daily. 30 patch 11     morphine (MS CONTIN) 15 MG CR tablet Take 1 tablet (15 mg) by mouth every morning AND 2 " "tablets (30 mg) every evening. 90 tablet 0     nitroGLYcerin (NITROSTAT) 0.4 MG sublingual tablet For chest pain place 1 tablet under the tongue every 5 minutes for 3 doses. If symptoms persist 5 minutes after 1st dose call 911. 6 tablet 4     polyethylene glycol (MIRALAX) 17 g packet Take 17 g by mouth daily Ongoing management/refills per Hospice (Patient taking differently: Take 17 g by mouth daily) 30 each 11     senna-docusate (SENOKOT-S/PERICOLACE) 8.6-50 MG tablet Take 1 tablet by mouth 2 times daily 60 tablet 11     traZODone (DESYREL) 100 MG tablet Take 1 tablet (100 mg) by mouth At Bedtime 30 tablet 11     acetaminophen (TYLENOL) 500 MG tablet Take 2 tablets (1,000 mg) by mouth 3 times daily       bisacodyl (DULCOLAX) 10 MG suppository Place 1 suppository (10 mg) rectally daily as needed for constipation Ongoing management/refills per Hospice 2 suppository 0     carboxymethylcellulose PF (REFRESH PLUS) 0.5 % ophthalmic solution Place 1 drop into both eyes 4 times daily as needed for dry eyes or other (eye irritation) Ongoing management/refills per Hospice 1 each 0     fluticasone (FLONASE) 50 MCG/ACT nasal spray Spray 2 sprays into both nostrils daily 16 g 3     gabapentin (NEURONTIN) 600 MG tablet Take 1 tablet (600 mg) by mouth At Bedtime Ongoing management/refills per Hospice 30 tablet 0     hypromellose-dextran (ARTIFICAL TEARS) 0.1-0.3 % ophthalmic solution Place 1 drop into both eyes 2 times daily Ongoing management/refills per Hospice 15 mL 0     oxyCODONE (ROXICODONE) 5 MG tablet Take 1 tablet (5 mg) by mouth every 4 hours as needed for pain 10 tablet 0     PARoxetine (PAXIL) 20 MG tablet TAKE 1 TABLET AT BEDTIME 90 tablet 1       REVIEW OF SYSTEMS:  4 point ROS including Respiratory, CV, GI and , other than that noted in the HPI,  is negative    Objective:   /80   Pulse 85   Temp 97.3  F (36.3  C)   Resp 18   Ht 1.651 m (5' 5\")   Wt 102.2 kg (225 lb 6.4 oz)   LMP  (LMP Unknown)   " SpO2 96%   BMI 37.51 kg/m    GENERAL APPEARANCE:  Alert, in no distress, oriented, morbidly obese, cooperative  EYES:  EOM normal, conjunctiva and lids normal, PERRL  RESP:  respiratory effort and palpation of chest normal, lungs clear to auscultation , no respiratory distress, oxygen at 3L/min via NC  CV:  Palpation and auscultation of heart done , regular rate and rhythm, no murmur, rub, or gallop, +1+2 edema in lower legs and take in account she has a larger build too  ABDOMEN:  normal bowel sounds, soft, nontender, no hepatosplenomegaly or other masses, no guarding or rebound  M/S:   Gait and station abnormal ambulates with a walker in her room  SKIN:  pink, warm and dry  PSYCH:  oriented X 3, normal insight, judgement and memory, affect and mood normal    Labs from her ED visit on 4/24/22  Component      Latest Ref Rng & Units 4/24/2022   WBC      4.0 - 11.0 10e3/uL 10.8   RBC Count      3.80 - 5.20 10e6/uL 3.73 (L)   Hemoglobin      11.7 - 15.7 g/dL 10.7 (L)   Hematocrit      35.0 - 47.0 % 34.3 (L)   MCV      78 - 100 fL 92   MCH      26.5 - 33.0 pg 28.7   MCHC      31.5 - 36.5 g/dL 31.2 (L)   RDW      10.0 - 15.0 % 15.7 (H)   Platelet Count      150 - 450 10e3/uL 284   % Neutrophils      % 79   % Lymphocytes      % 11   % Monocytes      % 7   % Eosinophils      % 1   % Basophils      % 1   % Immature Granulocytes      % 1   NRBCs per 100 WBC      <1 /100 0   Absolute Neutrophils      1.6 - 8.3 10e3/uL 8.6 (H)   Absolute Lymphocytes      0.8 - 5.3 10e3/uL 1.2   Absolute Monocytes      0.0 - 1.3 10e3/uL 0.7   Absolute Eosinophils      0.0 - 0.7 10e3/uL 0.1   Absolute Basophils      0.0 - 0.2 10e3/uL 0.1   Absolute Immature Granulocytes      <=0.4 10e3/uL 0.1   Absolute NRBCs      10e3/uL 0.0   Sodium      133 - 144 mmol/L 137   Potassium      3.4 - 5.3 mmol/L 5.2   Chloride      94 - 109 mmol/L 102   Carbon Dioxide      20 - 32 mmol/L 29   Anion Gap      3 - 14 mmol/L 6   Urea Nitrogen      7 - 30 mg/dL 14    Creatinine      0.52 - 1.04 mg/dL 0.71   Calcium      8.5 - 10.1 mg/dL 8.8   Glucose      70 - 99 mg/dL 88   Alkaline Phosphatase      40 - 150 U/L 81   AST      0 - 45 U/L 50 (H)   ALT      0 - 50 U/L 31   Protein Total      6.8 - 8.8 g/dL 6.0 (L)   Albumin      3.4 - 5.0 g/dL 2.8 (L)   Bilirubin Total      0.2 - 1.3 mg/dL 0.4   GFR Estimate      >60 mL/min/1.73m2 87       Assessment/Plan:  (I50.22) Chronic systolic congestive heart failure (H)  (primary encounter diagnosis)  Comment: discussed starting her back on Lasix 20mg po daily.  Will have a BMP done on Thursday as she is having a lab done already.  Feel this will help with her shortness of breath.  May need to increase this more but can check with her next week.  Plan: furosemide (LASIX) 20 MG tablet    (I20.9) Angina pectoris (H)  Comment: would be very happy to give her a Nitrostat order prn.  Discussed of just having the staff have it and then she can call for it.  No complaints at this time.    Plan: nitroGLYcerin (NITROSTAT) 0.4 MG sublingual         tablet    (R07.89) Chest wall pain  Comment: the chest wall pain is slowly subsiding.  She likes the lidocaine patch and remains on Tylenol ES 1000mg TID.  Staff noted the daughter is worried about the morphine and her mom's past experiences.  Did not bring that up to Ingrid.  Just told her how the reduction would be done.  For now will lower her morning dose to 15mg and keep the 30mg at HS.  Can talk about next reduction at next visit.  If she was not on this prior to her being ill, then prefer to move her slowly off of it.      Plan: Lidocaine (LIDOCARE) 4 % Patch, morphine (MS         CONTIN) 15 MG CR tablet    (F51.01) Primary insomnia  Comment: sleeps more in her recliner than the bed.  Trazodone is helpful.  No reduction at this time.  Plan: traZODone (DESYREL) 100 MG tablet    (K59.01) Slow transit constipation  Comment: currently on Senna-S and Miralax.  May notice changes if lowering off the  Morphine.  Continue to monitor.  Plan: senna-docusate (SENOKOT-S/PERICOLACE) 8.6-50 MG        tablet, polyethylene glycol (MIRALAX) 17 g         packet    (E03.9) Acquired hypothyroidism  Comment: restarted her on 50mcg of Levothyroxine as she was on 100mcg for some time.  Checking her TSH level Thursday and can get her back to her other levels.  Did not want to restart her on a large level after being off it for how ever long      Orders:  1.  NItrostat 0.4mg sl every 5 minutes x3 as needed for dx of angina  2.  Change O2 order to 1-3L/min via NC  3.  Ok to wean down on O2 to keep O2 >90%  4.  Lasix 20mg po daily for edema  5.  BMP on Thursday for CHF  6.  Lower Morphine 15mg ER to 1 tab in AM and 2 tabs in PM.  Trial reduction    Electronically signed by: NORA Balderas CNP             Sincerely,        NORA Balderas CNP

## 2022-05-10 NOTE — PROGRESS NOTES
"[facility] GERIATRICS    Chief Complaint   Patient presents with     RECHECK     HPI:  Ingrid Powell is a 76 year old  (1945), who is being seen today for an episodic care visit at: [facility]) [[id number]]. Today's concern is:     Diagnoses       Codes Comments    Chronic systolic congestive heart failure (H)    -  Primary I50.22     Angina pectoris (H)     I20.9     Chest wall pain     R07.89     Primary insomnia     F51.01     Slow transit constipation     K59.01         Came to see Ingrid today to follow up with her overall health status as well as she was wondering about having a Nitroglycerin order as needed.  Also wanted to address a few things with her now that she decided to \"live\" versus be on hospice.     Found her in her room as she was sitting in the recliner.  Alert and talkative.  Did not look so tired today.  Stated her daughter was out of the state now so will not call her until she comes home.    Had a list of her medications on hand and wanted to talk about backing her off the MS Contin that she was not on prior to the CPR episode.  She likes the Lidocaine patch as it helps at night.  She is also still on Tylenol ES 3x day.    Spoke about backing her off oxygen if able.  Still gets short of breath but use to be on Lasix as well for her edema/heart failure.  Is starting to retain fluid in her lower legs.      Allergies, and PMH/PSH reviewed in EPIC today.  Current Outpatient Medications   Medication Sig Dispense Refill     furosemide (LASIX) 20 MG tablet Take 1 tablet (20 mg) by mouth daily 30 tablet 11     Lidocaine (LIDOCARE) 4 % Patch Place 1 patch onto the skin every 24 hours To prevent lidocaine toxicity, patient should be patch free for 12 hrs daily. 30 patch 11     morphine (MS CONTIN) 15 MG CR tablet Take 1 tablet (15 mg) by mouth every morning AND 2 tablets (30 mg) every evening. 90 tablet 0     nitroGLYcerin (NITROSTAT) 0.4 MG sublingual tablet For chest " "pain place 1 tablet under the tongue every 5 minutes for 3 doses. If symptoms persist 5 minutes after 1st dose call 911. 6 tablet 4     polyethylene glycol (MIRALAX) 17 g packet Take 17 g by mouth daily Ongoing management/refills per Hospice (Patient taking differently: Take 17 g by mouth daily) 30 each 11     senna-docusate (SENOKOT-S/PERICOLACE) 8.6-50 MG tablet Take 1 tablet by mouth 2 times daily 60 tablet 11     traZODone (DESYREL) 100 MG tablet Take 1 tablet (100 mg) by mouth At Bedtime 30 tablet 11     acetaminophen (TYLENOL) 500 MG tablet Take 2 tablets (1,000 mg) by mouth 3 times daily       bisacodyl (DULCOLAX) 10 MG suppository Place 1 suppository (10 mg) rectally daily as needed for constipation Ongoing management/refills per Hospice 2 suppository 0     carboxymethylcellulose PF (REFRESH PLUS) 0.5 % ophthalmic solution Place 1 drop into both eyes 4 times daily as needed for dry eyes or other (eye irritation) Ongoing management/refills per Hospice 1 each 0     fluticasone (FLONASE) 50 MCG/ACT nasal spray Spray 2 sprays into both nostrils daily 16 g 3     gabapentin (NEURONTIN) 600 MG tablet Take 1 tablet (600 mg) by mouth At Bedtime Ongoing management/refills per Hospice 30 tablet 0     hypromellose-dextran (ARTIFICAL TEARS) 0.1-0.3 % ophthalmic solution Place 1 drop into both eyes 2 times daily Ongoing management/refills per Hospice 15 mL 0     oxyCODONE (ROXICODONE) 5 MG tablet Take 1 tablet (5 mg) by mouth every 4 hours as needed for pain 10 tablet 0     PARoxetine (PAXIL) 20 MG tablet TAKE 1 TABLET AT BEDTIME 90 tablet 1       REVIEW OF SYSTEMS:  4 point ROS including Respiratory, CV, GI and , other than that noted in the HPI,  is negative    Objective:   /80   Pulse 85   Temp 97.3  F (36.3  C)   Resp 18   Ht 1.651 m (5' 5\")   Wt 102.2 kg (225 lb 6.4 oz)   LMP  (LMP Unknown)   SpO2 96%   BMI 37.51 kg/m    GENERAL APPEARANCE:  Alert, in no distress, oriented, morbidly obese, " cooperative  EYES:  EOM normal, conjunctiva and lids normal, PERRL  RESP:  respiratory effort and palpation of chest normal, lungs clear to auscultation , no respiratory distress, oxygen at 3L/min via NC  CV:  Palpation and auscultation of heart done , regular rate and rhythm, no murmur, rub, or gallop, +1+2 edema in lower legs and take in account she has a larger build too  ABDOMEN:  normal bowel sounds, soft, nontender, no hepatosplenomegaly or other masses, no guarding or rebound  M/S:   Gait and station abnormal ambulates with a walker in her room  SKIN:  pink, warm and dry  PSYCH:  oriented X 3, normal insight, judgement and memory, affect and mood normal    Labs from her ED visit on 4/24/22  Component      Latest Ref Rng & Units 4/24/2022   WBC      4.0 - 11.0 10e3/uL 10.8   RBC Count      3.80 - 5.20 10e6/uL 3.73 (L)   Hemoglobin      11.7 - 15.7 g/dL 10.7 (L)   Hematocrit      35.0 - 47.0 % 34.3 (L)   MCV      78 - 100 fL 92   MCH      26.5 - 33.0 pg 28.7   MCHC      31.5 - 36.5 g/dL 31.2 (L)   RDW      10.0 - 15.0 % 15.7 (H)   Platelet Count      150 - 450 10e3/uL 284   % Neutrophils      % 79   % Lymphocytes      % 11   % Monocytes      % 7   % Eosinophils      % 1   % Basophils      % 1   % Immature Granulocytes      % 1   NRBCs per 100 WBC      <1 /100 0   Absolute Neutrophils      1.6 - 8.3 10e3/uL 8.6 (H)   Absolute Lymphocytes      0.8 - 5.3 10e3/uL 1.2   Absolute Monocytes      0.0 - 1.3 10e3/uL 0.7   Absolute Eosinophils      0.0 - 0.7 10e3/uL 0.1   Absolute Basophils      0.0 - 0.2 10e3/uL 0.1   Absolute Immature Granulocytes      <=0.4 10e3/uL 0.1   Absolute NRBCs      10e3/uL 0.0   Sodium      133 - 144 mmol/L 137   Potassium      3.4 - 5.3 mmol/L 5.2   Chloride      94 - 109 mmol/L 102   Carbon Dioxide      20 - 32 mmol/L 29   Anion Gap      3 - 14 mmol/L 6   Urea Nitrogen      7 - 30 mg/dL 14   Creatinine      0.52 - 1.04 mg/dL 0.71   Calcium      8.5 - 10.1 mg/dL 8.8   Glucose      70 - 99  mg/dL 88   Alkaline Phosphatase      40 - 150 U/L 81   AST      0 - 45 U/L 50 (H)   ALT      0 - 50 U/L 31   Protein Total      6.8 - 8.8 g/dL 6.0 (L)   Albumin      3.4 - 5.0 g/dL 2.8 (L)   Bilirubin Total      0.2 - 1.3 mg/dL 0.4   GFR Estimate      >60 mL/min/1.73m2 87       Assessment/Plan:  (I50.22) Chronic systolic congestive heart failure (H)  (primary encounter diagnosis)  Comment: discussed starting her back on Lasix 20mg po daily.  Will have a BMP done on Thursday as she is having a lab done already.  Feel this will help with her shortness of breath.  May need to increase this more but can check with her next week.  Plan: furosemide (LASIX) 20 MG tablet    (I20.9) Angina pectoris (H)  Comment: would be very happy to give her a Nitrostat order prn.  Discussed of just having the staff have it and then she can call for it.  No complaints at this time.    Plan: nitroGLYcerin (NITROSTAT) 0.4 MG sublingual         tablet    (R07.89) Chest wall pain  Comment: the chest wall pain is slowly subsiding.  She likes the lidocaine patch and remains on Tylenol ES 1000mg TID.  Staff noted the daughter is worried about the morphine and her mom's past experiences.  Did not bring that up to Ingrid.  Just told her how the reduction would be done.  For now will lower her morning dose to 15mg and keep the 30mg at HS.  Can talk about next reduction at next visit.  If she was not on this prior to her being ill, then prefer to move her slowly off of it.      Plan: Lidocaine (LIDOCARE) 4 % Patch, morphine (MS         CONTIN) 15 MG CR tablet    (F51.01) Primary insomnia  Comment: sleeps more in her recliner than the bed.  Trazodone is helpful.  No reduction at this time.  Plan: traZODone (DESYREL) 100 MG tablet    (K59.01) Slow transit constipation  Comment: currently on Senna-S and Miralax.  May notice changes if lowering off the Morphine.  Continue to monitor.  Plan: senna-docusate (SENOKOT-S/PERICOLACE) 8.6-50 MG        tablet,  polyethylene glycol (MIRALAX) 17 g         packet    (E03.9) Acquired hypothyroidism  Comment: restarted her on 50mcg of Levothyroxine as she was on 100mcg for some time.  Checking her TSH level Thursday and can get her back to her other levels.  Did not want to restart her on a large level after being off it for how ever long      Orders:  1.  NItrostat 0.4mg sl every 5 minutes x3 as needed for dx of angina  2.  Change O2 order to 1-3L/min via NC  3.  Ok to wean down on O2 to keep O2 >90%  4.  Lasix 20mg po daily for edema  5.  BMP on Thursday for CHF  6.  Lower Morphine 15mg ER to 1 tab in AM and 2 tabs in PM.  Trial reduction    Electronically signed by: NORA Balderas CNP

## 2022-05-12 LAB
ANION GAP SERPL CALCULATED.3IONS-SCNC: 3 MMOL/L (ref 3–14)
BUN SERPL-MCNC: 10 MG/DL (ref 7–30)
CALCIUM SERPL-MCNC: 8.9 MG/DL (ref 8.5–10.1)
CHLORIDE BLD-SCNC: 101 MMOL/L (ref 94–109)
CO2 SERPL-SCNC: 33 MMOL/L (ref 20–32)
CREAT SERPL-MCNC: 0.64 MG/DL (ref 0.52–1.04)
GFR SERPL CREATININE-BSD FRML MDRD: 90 ML/MIN/1.73M2
GLUCOSE BLD-MCNC: 76 MG/DL (ref 70–99)
POTASSIUM BLD-SCNC: 4.4 MMOL/L (ref 3.4–5.3)
SODIUM SERPL-SCNC: 137 MMOL/L (ref 133–144)
TSH SERPL DL<=0.005 MIU/L-ACNC: 62.9 MU/L (ref 0.4–4)

## 2022-05-12 PROCEDURE — 36415 COLL VENOUS BLD VENIPUNCTURE: CPT | Mod: ORL | Performed by: NURSE PRACTITIONER

## 2022-05-12 PROCEDURE — 84443 ASSAY THYROID STIM HORMONE: CPT | Mod: ORL | Performed by: NURSE PRACTITIONER

## 2022-05-12 PROCEDURE — 80048 BASIC METABOLIC PNL TOTAL CA: CPT | Mod: ORL | Performed by: NURSE PRACTITIONER

## 2022-05-12 PROCEDURE — P9603 ONE-WAY ALLOW PRORATED MILES: HCPCS | Mod: ORL | Performed by: NURSE PRACTITIONER

## 2022-05-16 ENCOUNTER — ASSISTED LIVING VISIT (OUTPATIENT)
Dept: GERIATRICS | Facility: CLINIC | Age: 77
End: 2022-05-16
Payer: COMMERCIAL

## 2022-05-16 VITALS
SYSTOLIC BLOOD PRESSURE: 127 MMHG | BODY MASS INDEX: 37.55 KG/M2 | RESPIRATION RATE: 15 BRPM | HEIGHT: 65 IN | DIASTOLIC BLOOD PRESSURE: 83 MMHG | WEIGHT: 225.4 LBS | OXYGEN SATURATION: 96 % | HEART RATE: 81 BPM | TEMPERATURE: 97.5 F

## 2022-05-16 DIAGNOSIS — F33.1 MAJOR DEPRESSIVE DISORDER, RECURRENT EPISODE, MODERATE (H): ICD-10-CM

## 2022-05-16 DIAGNOSIS — Z99.81 OXYGEN DEPENDENT: ICD-10-CM

## 2022-05-16 DIAGNOSIS — I50.22 CHRONIC SYSTOLIC CONGESTIVE HEART FAILURE (H): Primary | ICD-10-CM

## 2022-05-16 DIAGNOSIS — R07.89 CHEST WALL PAIN: ICD-10-CM

## 2022-05-16 DIAGNOSIS — R06.09 DOE (DYSPNEA ON EXERTION): ICD-10-CM

## 2022-05-16 DIAGNOSIS — F41.1 GAD (GENERALIZED ANXIETY DISORDER): ICD-10-CM

## 2022-05-16 DIAGNOSIS — I20.9 ANGINA PECTORIS (H): ICD-10-CM

## 2022-05-16 NOTE — PROGRESS NOTES
[facility] GERIATRICS    Chief Complaint   Patient presents with     RECHECK     HPI:  Ingrid Powell is a 76 year old  (1945), who is being seen today for an episodic care visit at: [facility]) [[id number]]. Today's concern is:     Diagnoses       Codes Comments    Chronic systolic congestive heart failure (H)    -  Primary I50.22     Oxygen dependent     Z99.81     LOZADA (dyspnea on exertion)     R06.00     Angina pectoris (H)     I20.9     Chest wall pain     R07.89     Major depressive disorder, recurrent episode, moderate (H)     F33.1     JAIME (generalized anxiety disorder)     F41.1         Came to see Ingrid today to check overall how she is doing with her medications, mood, as well as management with her oxygen dependence.    Nursing updated this NP that family walked with her outside of her apartment this weekend.  They checked her O2 saturations as doing the walking with her walker and she would go into the low 80's with exertion with being on 3L/min of O2.  Happy to hear she got out of her apartment with assist.    It is being asked to do a titration test and so again called [facility] where she receives her O2 and supplies from.  Ingrid is NOT on hospice anymore and reiterated this again and asked what needed to be done for this titration test.    BASICALLY being asked for a new prescription, description of her oxygen use and O2 saturation, and information on the request to attempt to get a simpler portable O2 devise so she can leave her apartment and be able to carry her own oxygen.      Another topic discussed today is her anxiety/depression and chest wall pain.  Informed her again that she has Nitrostat available for her if she felt she had chest pain.  She did not remember that it was available.    Admits that she feels anxious.  Did not cry but believe that with everything that has happened to her, she is nervous/depressed and now having to use oxygen due to  shortness of breath with exertion.  Spoke about changing her off Paxil and onto Celexa for depression/anxiety.  It was also asked about adding something short acting to help with moments of feeling no control.    Allergies, and PMH/PSH reviewed in [facility] today.    Current Outpatient Medications   Medication Sig Dispense Refill     citalopram (CELEXA) 10 MG tablet 5mg po x7 days       [START ON 5/25/2022] citalopram (CELEXA) 10 MG tablet Take 1 tablet (10 mg) by mouth daily       furosemide (LASIX) 20 MG tablet Take 1 tablet (20 mg) by mouth 2 times daily 30 tablet 11     acetaminophen (TYLENOL) 500 MG tablet Take 2 tablets (1,000 mg) by mouth 3 times daily       bisacodyl (DULCOLAX) 10 MG suppository Place 1 suppository (10 mg) rectally daily as needed for constipation  2 suppository 0     carboxymethylcellulose PF (REFRESH PLUS) 0.5 % ophthalmic solution Place 1 drop into both eyes 4 times daily as needed for dry eyes or other (eye irritation)  1 each 0     fluticasone (FLONASE) 50 MCG/ACT nasal spray Spray 2 sprays into both nostrils daily 16 g 3     gabapentin (NEURONTIN) 600 MG tablet Take 1 tablet (600 mg) by mouth At Bedtime      30 tablet 0        0     Lidocaine (LIDOCARE) 4 % Patch Place 1 patch onto the skin every 24 hours To prevent lidocaine toxicity, patient should be patch free for 12 hrs daily. 30 patch 11     LORazepam (ATIVAN) 0.5 MG tablet   take1 tablet (0.5 mg) every 6 hours as needed for anxiety. 60 tablet 0     morphine (MS CONTIN) 15 MG CR tablet Take 1 tablet (15 mg) by mouth every morning AND 2 tablets (30 mg) every evening. 90 tablet 0     nitroGLYcerin (NITROSTAT) 0.4 MG sublingual tablet For chest pain place 1 tablet under the tongue every 5 minutes for 3 doses. If symptoms persist 5 minutes after 1st dose call 911. 6 tablet 4     oxyCODONE (ROXICODONE) 5 MG tablet Take 1 tablet (5 mg) by mouth every 4 hours as needed for pain 10 tablet 0     polyethylene glycol (MIRALAX) 17 g packet Take  "17 g by mouth daily Ongoing management/refills per Hospice (Patient taking differently: Take 17 g by mouth daily) 30 each 11     senna-docusate (SENOKOT-S/PERICOLACE) 8.6-50 MG tablet Take 1 tablet by mouth 2 times daily 60 tablet 11     traZODone (DESYREL) 100 MG tablet Take 1 tablet (100 mg) by mouth At Bedtime 30 tablet 11       REVIEW OF SYSTEMS:  4 point ROS including Respiratory, CV, GI and , other than that noted in the HPI,  is negative    Objective:   /83   Pulse 81   Temp 97.5  F (36.4  C)   Resp 15   Ht 1.651 m (5' 5\")   Wt 102.2 kg (225 lb 6.4 oz)   LMP  (LMP Unknown)   SpO2 96%   BMI 37.51 kg/m    GENERAL APPEARANCE:  Alert, anxious, cooperative  EYES:  EOM normal, conjunctiva and lids normal, would make eye contact but many times look away and out the window  RESP:  respiratory effort and palpation of chest normal, no respiratory distress, diminished breath sounds in lower left side, can hear air exchange in right lung,   CV:  Palpation and auscultation of heart done , heart rate regular/irregular, trace edema, legs are thick in nature due to her build  ABDOMEN:  normal bowel sounds, soft, nontender, no hepatosplenomegaly or other masses, obese, no guarding or rebound  M/S:   Gait and station abnormal uses a 4WW for mobility.  Sleeps more in the recliner than the bed.  SKIN:  Skin is pink, dry and warm to touch.  PSYCH:  oriented to person, place and time can be off, memory forgetful , sad, crying      Most Recent 3 BMP's:Recent Labs   Lab Test 05/12/22  0625 04/24/22  2224 04/12/22  1147 04/05/22  1826 04/05/22  1823 04/05/22  0847 04/05/22  0507    137  --   --   --   --  138   POTASSIUM 4.4 5.2  --   --  4.6   < > 5.0  5.0   CHLORIDE 101 102  --   --   --   --  109   CO2 33* 29  --   --   --   --  23   BUN 10 14  --   --   --   --  38*   CR 0.64 0.71  --   --   --   --  0.88   ANIONGAP 3 6  --   --   --   --  6   JOSÉ MIGUEL 8.9 8.8  --   --   --   --  8.8   GLC 76 88 92   < >  --    " < > 153*    < > = values in this interval not displayed.       Assessment/Plan:  (I50.22) Chronic systolic congestive heart failure (H)  (primary encounter diagnosis)  (Z99.81) Oxygen dependent  (R06.00) LOZADA (dyspnea on exertion)  Comment: currently has an order for range of Oxygen with able to wean down on oxygen if able to keep her O2 saturations above 90%.  Family ambulated with Ingrid this weekend and with exertion her O2 sats dropped in the low 80's.    What was difficult for her is the heaviness of the 10# portable e-tank to manage on her own and being able to carry this on her shoulders.  Feel she would benefit with a lighter weight portable tank if able.  This way she could have the strength to leave her apartment to enjoy benefits of the facility - like eating in dining room, attending activities, and just to go sit outside.  Discussed with the [facility]-nurse to ambulate with Ingrid and collect a reading with and without O2 with exertion so it can be included in the DME necessity statement below.    Last week added back Lasix 20mg po daily as she use to be on Lasix prior to her hospital event.  [place] this would help her lungs with her breathing as well as the swelling in her legs.  Ingrid was not clear with her answer if she felt it made a difference.  She feels her ankles are showing swelling in which she should elevate her legs more.  Discussed bumping up her Lasix to 20mg BID or 40mg po Daily.   Ingrid decided to do 20mg po BID.    Plan: furosemide (LASIX) 20 MG tablet    (I20.9) Angina pectoris (H)  Comment: reminded Ingrid she does have Nitrostat available to ask for if she felt she was having chest pain.  The way she responded, she did not remember that it was available.      (R07.89) Chest wall pain  Comment: attempting to transition her off the Morphine.  Family would like this to happen.  Will not attempt again today due to changes to other medications and feel it would make her more nervous.  Continues with  the Tylenol, Lidocaine patch, and Morphine 15mg in AM and 30mg in PM.    (F33.1) Major depressive disorder, recurrent episode, moderate (HCC)  (F41.1) JAIME (generalized anxiety disorder)  Comment: currently on Paxil 20mg po daily.  Would like to try Ingrid on a different medication that know helps with anxiety/depression.    Will lower the Paxil to 10mg po daily for 1 week then discontinue  Start Celexa 5mg po daily x7 days  Increase Celexa to 10mg on day 8    Will also add Lorazepam 0.5mg po every 6 hours prn for anxiety.  Enforced to Ingrid she would have to ask for this medication.  Question if she will remember and so informed nursing they may have to anticipate her anxiety.  Plan: citalopram (CELEXA) 10 MG tablet, citalopram         (CELEXA) 10 MG tablet      Orders:  1.  Lorazepam 0.5mg po every 6 hours as needed due to anxiety  2.  Lower Paxil to 10mg po daily for 7 days and then discontinue  3.  Celexa 5mg po daily x7 days then increase to 10mg po daily for anxiety and depression    Electronically signed by: NORA Balderas CNP       Face to Face and Medical Necessity Statement for DME Provider visit    Demographic Information on Ingrid Powell:  Gender: female  : 1945  [address] [address]  [phone] (home)     Medical Record: [id number]  Social Security Number: [id number]  Primary Care Provider: Elas Jeffries  Insurance: Payor: [facility] / Plan: [employer] PLATINUM BLUE / Product Type: PPO /     HPI:   Ingrid Powell is a 76 year old  (1945), who is being seen today for a face to face provider visit at [facility], medical necessity statement for DME included. This patient requires the following:    DME Ordered and Medical Necessity Statement   Oxygen: 1-3 L/min via Nasal [place] continuous and needs portable tank due to mobility in home environment ; Clinical Documentation: (Obtain documented RA sat with in 2 days of  discharge in acute setting or within 30 days of provider visit):  Method 1: Room air at rest: Qualifing sat level is < 88% or below on room air at rest on 5/19/22 date     On 5/19/22 the  ambulated with Ingrid out of her apartment to check her Oxygen Saturation.  At 2L/min her oxygen at 96%.  With movement in her chair, her oxygen will drop to 89-90% with oxygen on.  Ambulated 30 feet without the oxygen and her saturation went down to 84% without dizziness but heart rate noted to go into the 120-130's.  Heart rate would go back into the 80's after 15 seconds of rest.  Her oxygen saturations went back up to 89-90% after a minutes rest.  After going back into her apartment and to her recliner. the oxygen returned to the mid 90's.      This NP is requesting as well as staff and family for Ingrid to have a lighter weight portable oxygen tank that she can carry on her shoulder.  The 10# E-tanks are heavy for her as well as hard for her to manage.  Ingrid is not on hospice anymore and so attempting to return to a independent status as she lives in her own apartment in a AL facility.      Pt needing above DME with expected length of need of 99 due to medical necessity associated with following diagnosis:     Chronic systolic congestive heart failure (H)  Oxygen dependent  LOZADA (dyspnea on exertion)  Angina pectoris (H)  Chest wall pain  Major depressive disorder, recurrent episode, moderate (H)  JAIME (generalized anxiety disorder)      PM   has a past medical history of Angina pectoris (H) (5/2/2022), Arthritis, COPD (chronic obstructive pulmonary disease) (H), Depressive disorder, Gastroesophageal reflux disease, History of lumbar surgery (7/28/2015), Hoarseness, Hypertension, MARGIE (obstructive sleep apnea), Severe, Oxygen dependent, Reduced vision, Sleep apnea, Syncope, and Syncope and collapse.    She has no past medical history of Basal cell carcinoma, Diabetes (H), Malignant melanoma (H), PONV  "(postoperative nausea and vomiting), Squamous cell carcinoma, or Uncomplicated asthma.    ROS:4 point ROS including Respiratory, CV, GI and , other than that noted in the HPI,  is negative    EXAM  Vitals: /83   Pulse 81   Temp 97.5  F (36.4  C)   Resp 15   Ht 1.651 m (5' 5\")   Wt 102.2 kg (225 lb 6.4 oz)   LMP  (LMP Unknown)   SpO2 96%   BMI 37.51 kg/m  ;BMI= Body mass index is 37.51 kg/m .  GENERAL APPEARANCE:  Alert, anxious, cooperative  EYES:  EOM normal, conjunctiva and lids normal, would make eye contact but many times look away and out the window  RESP:  respiratory effort and palpation of chest normal, no respiratory distress, diminished breath sounds in lower left side, can hear air exchange in right lung,   CV:  Palpation and auscultation of heart done , heart rate regular/irregular, trace edema, legs are thick in nature due to her build  ABDOMEN:  normal bowel sounds, soft, nontender, no hepatosplenomegaly or other masses, obese, no guarding or rebound  M/S:   Gait and station abnormal uses a 4WW for mobility.  Sleeps more in the recliner than the bed.  SKIN:  Skin is pink, dry and warm to touch.  PSYCH:  oriented to person, place and time can be off, memory forgetful , sad, crying    ASSESSMENT/PLAN:  1. Chronic systolic congestive heart failure (H)    2. Oxygen dependent    3. LOZADA (dyspnea on exertion)    4. Angina pectoris (H)    5. Chest wall pain    6. Major depressive disorder, recurrent episode, moderate (HCC)    7. JAIME (generalized anxiety disorder)      Titration test done with the  nurse and Ingrid proves that her O2 saturations do drop in the low 80's without oxygen.  Even drops some with movement or transfers out of her recliner.    Oxygen is delivered via concentrator already but requesting to have lighter weight portable tanks other then the 10# e tanks so that she can manage them independently and be able to get out of her apartment.    Orders:  1. " Facility staff/TC to contact DME company to get their order form for provider to fill out  2.  Oxygen 1-3L/min with OK for [facility] medical to titrate liter flow    3.  Light weight portable tank to wear on shoulder when leaving her apartment out in the community.    ELECTRONICALLY SIGNED BY LUCAS CERTIFIED PROVIDER:  NORA Baldersa CNP   NPI: [id number]  [facility]  [address] [place] [address]  [address]  [place] [address]    Fax: [phone]

## 2022-05-16 NOTE — LETTER
5/16/2022        RE: Ingrid Powell  [address]  [place] [address]        [facility] GERIATRICS    Chief Complaint   Patient presents with     RECHECK     HPI:  Ingrid Powell is a 76 year old  (1945), who is being seen today for an episodic care visit at: [facility] [[id number]]. Today's concern is:     Diagnoses       Codes Comments    Chronic systolic congestive heart failure (H)    -  Primary I50.22     Oxygen dependent     Z99.81     LOZADA (dyspnea on exertion)     R06.00     Angina pectoris (H)     I20.9     Chest wall pain     R07.89     Major depressive disorder, recurrent episode, moderate (H)     F33.1     JAIME (generalized anxiety disorder)     F41.1         Came to see Ingrid today to check overall how she is doing with her medications, mood, as well as management with her oxygen dependence.    Nursing updated this NP that family walked with her outside of her apartment this weekend.  They checked her O2 saturations as doing the walking with her walker and she would go into the low 80's with exertion with being on 3L/min of O2.  Happy to hear she got out of her apartment with assist.    It is being asked to do a titration test and so again called [facility] where she receives her O2 and supplies from.  Ingrid is NOT on hospice anymore and reiterated this again and asked what needed to be done for this titration test.    BASICALLY being asked for a new prescription, description of her oxygen use and O2 saturation, and information on the request to attempt to get a simpler portable O2 devise so she can leave her apartment and be able to carry her own oxygen.      Another topic discussed today is her anxiety/depression and chest wall pain.  Informed her again that she has Nitrostat available for her if she felt she had chest pain.  She did not remember that it was available.    Admits that she feels anxious.  Did not cry but believe that with  everything that has happened to her, she is nervous/depressed and now having to use oxygen due to shortness of breath with exertion.  Spoke about changing her off Paxil and onto Celexa for depression/anxiety.  It was also asked about adding something short acting to help with moments of feeling no control.    Allergies, and PMH/PSH reviewed in EPIC today.    Current Outpatient Medications   Medication Sig Dispense Refill     citalopram (CELEXA) 10 MG tablet 5mg po x7 days       [START ON 5/25/2022] citalopram (CELEXA) 10 MG tablet Take 1 tablet (10 mg) by mouth daily       furosemide (LASIX) 20 MG tablet Take 1 tablet (20 mg) by mouth 2 times daily 30 tablet 11     acetaminophen (TYLENOL) 500 MG tablet Take 2 tablets (1,000 mg) by mouth 3 times daily       bisacodyl (DULCOLAX) 10 MG suppository Place 1 suppository (10 mg) rectally daily as needed for constipation  2 suppository 0     carboxymethylcellulose PF (REFRESH PLUS) 0.5 % ophthalmic solution Place 1 drop into both eyes 4 times daily as needed for dry eyes or other (eye irritation)  1 each 0     fluticasone (FLONASE) 50 MCG/ACT nasal spray Spray 2 sprays into both nostrils daily 16 g 3     gabapentin (NEURONTIN) 600 MG tablet Take 1 tablet (600 mg) by mouth At Bedtime      30 tablet 0        0     Lidocaine (LIDOCARE) 4 % Patch Place 1 patch onto the skin every 24 hours To prevent lidocaine toxicity, patient should be patch free for 12 hrs daily. 30 patch 11     LORazepam (ATIVAN) 0.5 MG tablet   take1 tablet (0.5 mg) every 6 hours as needed for anxiety. 60 tablet 0     morphine (MS CONTIN) 15 MG CR tablet Take 1 tablet (15 mg) by mouth every morning AND 2 tablets (30 mg) every evening. 90 tablet 0     nitroGLYcerin (NITROSTAT) 0.4 MG sublingual tablet For chest pain place 1 tablet under the tongue every 5 minutes for 3 doses. If symptoms persist 5 minutes after 1st dose call 911. 6 tablet 4     oxyCODONE (ROXICODONE) 5 MG tablet Take 1 tablet (5 mg) by mouth  "every 4 hours as needed for pain 10 tablet 0     polyethylene glycol (MIRALAX) 17 g packet Take 17 g by mouth daily Ongoing management/refills per Hospice (Patient taking differently: Take 17 g by mouth daily) 30 each 11     senna-docusate (SENOKOT-S/PERICOLACE) 8.6-50 MG tablet Take 1 tablet by mouth 2 times daily 60 tablet 11     traZODone (DESYREL) 100 MG tablet Take 1 tablet (100 mg) by mouth At Bedtime 30 tablet 11       REVIEW OF SYSTEMS:  4 point ROS including Respiratory, CV, GI and , other than that noted in the HPI,  is negative    Objective:   /83   Pulse 81   Temp 97.5  F (36.4  C)   Resp 15   Ht 1.651 m (5' 5\")   Wt 102.2 kg (225 lb 6.4 oz)   LMP  (LMP Unknown)   SpO2 96%   BMI 37.51 kg/m    GENERAL APPEARANCE:  Alert, anxious, cooperative  EYES:  EOM normal, conjunctiva and lids normal, would make eye contact but many times look away and out the window  RESP:  respiratory effort and palpation of chest normal, no respiratory distress, diminished breath sounds in lower left side, can hear air exchange in right lung,   CV:  Palpation and auscultation of heart done , heart rate regular/irregular, trace edema, legs are thick in nature due to her build  ABDOMEN:  normal bowel sounds, soft, nontender, no hepatosplenomegaly or other masses, obese, no guarding or rebound  M/S:   Gait and station abnormal uses a 4WW for mobility.  Sleeps more in the recliner than the bed.  SKIN:  Skin is pink, dry and warm to touch.  PSYCH:  oriented to person, place and time can be off, memory forgetful , sad, crying      Most Recent 3 BMP's:Recent Labs   Lab Test 05/12/22  0625 04/24/22  2224 04/12/22  1147 04/05/22  1826 04/05/22  1823 04/05/22  0847 04/05/22  0507    137  --   --   --   --  138   POTASSIUM 4.4 5.2  --   --  4.6   < > 5.0  5.0   CHLORIDE 101 102  --   --   --   --  109   CO2 33* 29  --   --   --   --  23   BUN 10 14  --   --   --   --  38*   CR 0.64 0.71  --   --   --   --  0.88 "   ANIONGAP 3 6  --   --   --   --  6   JOSÉ MIGUEL 8.9 8.8  --   --   --   --  8.8   GLC 76 88 92   < >  --    < > 153*    < > = values in this interval not displayed.       Assessment/Plan:  (I50.22) Chronic systolic congestive heart failure (H)  (primary encounter diagnosis)  (Z99.81) Oxygen dependent  (R06.00) LOZADA (dyspnea on exertion)  Comment: currently has an order for range of Oxygen with able to wean down on oxygen if able to keep her O2 saturations above 90%.  Family ambulated with Ingrid this weekend and with exertion her O2 sats dropped in the low 80's.    What was difficult for her is the heaviness of the 10# portable e-tank to manage on her own and being able to carry this on her shoulders.  Feel she would benefit with a lighter weight portable tank if able.  This way she could have the strength to leave her apartment to enjoy benefits of the facility - like eating in dining room, attending activities, and just to go sit outside.  Discussed with the [facility]-nurse to ambulate with Ingrid and collect a reading with and without O2 with exertion so it can be included in the DME necessity statement below.    Last week added back Lasix 20mg po daily as she use to be on Lasix prior to her hospital event.  [place] this would help her lungs with her breathing as well as the swelling in her legs.  Ingrid was not clear with her answer if she felt it made a difference.  She feels her ankles are showing swelling in which she should elevate her legs more.  Discussed bumping up her Lasix to 20mg BID or 40mg po Daily.   Ingrid decided to do 20mg po BID.    Plan: furosemide (LASIX) 20 MG tablet    (I20.9) Angina pectoris (H)  Comment: reminded Ingrid she does have Nitrostat available to ask for if she felt she was having chest pain.  The way she responded, she did not remember that it was available.      (R07.89) Chest wall pain  Comment: attempting to transition her off the Morphine.  Family would like this to happen.  Will not attempt  again today due to changes to other medications and feel it would make her more nervous.  Continues with the Tylenol, Lidocaine patch, and Morphine 15mg in AM and 30mg in PM.    (F33.1) Major depressive disorder, recurrent episode, moderate (HCC)  (F41.1) JAIME (generalized anxiety disorder)  Comment: currently on Paxil 20mg po daily.  Would like to try Ingrid on a different medication that know helps with anxiety/depression.    Will lower the Paxil to 10mg po daily for 1 week then discontinue  Start Celexa 5mg po daily x7 days  Increase Celexa to 10mg on day 8    Will also add Lorazepam 0.5mg po every 6 hours prn for anxiety.  Enforced to Ingrid she would have to ask for this medication.  Question if she will remember and so informed nursing they may have to anticipate her anxiety.  Plan: citalopram (CELEXA) 10 MG tablet, citalopram         (CELEXA) 10 MG tablet      Orders:  1.  Lorazepam 0.5mg po every 6 hours as needed due to anxiety  2.  Lower Paxil to 10mg po daily for 7 days and then discontinue  3.  Celexa 5mg po daily x7 days then increase to 10mg po daily for anxiety and depression    Electronically signed by: NORA Balderas CNP       Face to Face and Medical Necessity Statement for DME Provider visit    Demographic Information on Ingrid Powell:  Gender: female  : 1945  [address] [address]  [phone] (home)     Medical Record: [id number]  Social Security Number: [id number]  Primary Care Provider: Elsa Jeffries  Insurance: Payor: BCBS / Plan: BCBS PLATINUM BLUE / Product Type: PPO /     HPI:   Ingrid Powell is a 76 year old  (1945), who is being seen today for a face to face provider visit at [facility], medical necessity statement for DME included. This patient requires the following:    DME Ordered and Medical Necessity Statement   Oxygen: 1-3 L/min via Nasal [place] continuous and needs portable tank due to  mobility in home environment ; Clinical Documentation: (Obtain documented RA sat with in 2 days of discharge in acute setting or within 30 days of provider visit):  Method 1: Room air at rest: Qualifing sat level is < 88% or below on room air at rest on 5/19/22 date     On 5/19/22 the  ambulated with Ingrid out of her apartment to check her Oxygen Saturation.  At 2L/min her oxygen at 96%.  With movement in her chair, her oxygen will drop to 89-90% with oxygen on.  Ambulated 30 feet without the oxygen and her saturation went down to 84% without dizziness but heart rate noted to go into the 120-130's.  Heart rate would go back into the 80's after 15 seconds of rest.  Her oxygen saturations went back up to 89-90% after a minutes rest.  After going back into her apartment and to her recliner. the oxygen returned to the mid 90's.      This NP is requesting as well as staff and family for Ingrid to have a lighter weight portable oxygen tank that she can carry on her shoulder.  The 10# E-tanks are heavy for her as well as hard for her to manage.  Ingrid is not on hospice anymore and so attempting to return to a independent status as she lives in her own apartment in a AL facility.      Pt needing above DME with expected length of need of 99 due to medical necessity associated with following diagnosis:     Chronic systolic congestive heart failure (H)  Oxygen dependent  LOZADA (dyspnea on exertion)  Angina pectoris (H)  Chest wall pain  Major depressive disorder, recurrent episode, moderate (H)  JAIME (generalized anxiety disorder)      PMH   has a past medical history of Angina pectoris (H) (5/2/2022), Arthritis, COPD (chronic obstructive pulmonary disease) (H), Depressive disorder, Gastroesophageal reflux disease, History of lumbar surgery (7/28/2015), Hoarseness, Hypertension, MARGIE (obstructive sleep apnea), Severe, Oxygen dependent, Reduced vision, Sleep apnea, Syncope, and Syncope and collapse.    She has no  "past medical history of Basal cell carcinoma, Diabetes (H), Malignant melanoma (H), PONV (postoperative nausea and vomiting), Squamous cell carcinoma, or Uncomplicated asthma.    ROS:4 point ROS including Respiratory, CV, GI and , other than that noted in the HPI,  is negative    EXAM  Vitals: /83   Pulse 81   Temp 97.5  F (36.4  C)   Resp 15   Ht 1.651 m (5' 5\")   Wt 102.2 kg (225 lb 6.4 oz)   LMP  (LMP Unknown)   SpO2 96%   BMI 37.51 kg/m  ;BMI= Body mass index is 37.51 kg/m .  GENERAL APPEARANCE:  Alert, anxious, cooperative  EYES:  EOM normal, conjunctiva and lids normal, would make eye contact but many times look away and out the window  RESP:  respiratory effort and palpation of chest normal, no respiratory distress, diminished breath sounds in lower left side, can hear air exchange in right lung,   CV:  Palpation and auscultation of heart done , heart rate regular/irregular, trace edema, legs are thick in nature due to her build  ABDOMEN:  normal bowel sounds, soft, nontender, no hepatosplenomegaly or other masses, obese, no guarding or rebound  M/S:   Gait and station abnormal uses a 4WW for mobility.  Sleeps more in the recliner than the bed.  SKIN:  Skin is pink, dry and warm to touch.  PSYCH:  oriented to person, place and time can be off, memory forgetful , sad, crying    ASSESSMENT/PLAN:  1. Chronic systolic congestive heart failure (H)    2. Oxygen dependent    3. LOZADA (dyspnea on exertion)    4. Angina pectoris (H)    5. Chest wall pain    6. Major depressive disorder, recurrent episode, moderate (HCC)    7. JAIME (generalized anxiety disorder)      Titration test done with the  nurse and Ingrid proves that her O2 saturations do drop in the low 80's without oxygen.  Even drops some with movement or transfers out of her recliner.    Oxygen is delivered via concentrator already but requesting to have lighter weight portable tanks other then the 10# e tanks so that she can " manage them independently and be able to get out of her apartment.    Orders:  1. Facility staff/TC to contact DME company to get their order form for provider to fill out  2.  Oxygen 1-3L/min with OK for [facility] medical to titrate liter flow    3.  Light weight portable tank to wear on shoulder when leaving her apartment out in the community.    ELECTRONICALLY SIGNED BY LUCAS CERTIFIED PROVIDER:  NORA Balderas CNP   NPI: [id number]  [facility]  [address] [place] [address]  [id number]  [place] [address]    Fax: [phone]                  Sincerely,        NORA Balderas CNP

## 2022-05-20 DIAGNOSIS — F41.9 ANXIETY: Primary | ICD-10-CM

## 2022-05-20 RX ORDER — LORAZEPAM 0.5 MG/1
TABLET ORAL
Qty: 60 TABLET | Refills: 0 | Status: SHIPPED | OUTPATIENT
Start: 2022-05-20 | End: 2022-05-31

## 2022-05-22 RX ORDER — FUROSEMIDE 20 MG
20 TABLET ORAL 2 TIMES DAILY
Qty: 30 TABLET | Refills: 11
Start: 2022-05-17 | End: 2022-05-24

## 2022-05-22 RX ORDER — CITALOPRAM HYDROBROMIDE 10 MG/1
10 TABLET ORAL DAILY
Start: 2022-05-25 | End: 2023-05-18

## 2022-05-22 RX ORDER — CITALOPRAM HYDROBROMIDE 10 MG/1
TABLET ORAL
Start: 2022-05-16 | End: 2022-07-07

## 2022-05-24 ENCOUNTER — ASSISTED LIVING VISIT (OUTPATIENT)
Dept: GERIATRICS | Facility: CLINIC | Age: 77
End: 2022-05-24
Payer: COMMERCIAL

## 2022-05-24 VITALS
HEIGHT: 65 IN | WEIGHT: 225.4 LBS | TEMPERATURE: 97.5 F | RESPIRATION RATE: 15 BRPM | DIASTOLIC BLOOD PRESSURE: 83 MMHG | SYSTOLIC BLOOD PRESSURE: 127 MMHG | OXYGEN SATURATION: 96 % | BODY MASS INDEX: 37.55 KG/M2 | HEART RATE: 81 BPM

## 2022-05-24 DIAGNOSIS — R07.89 CHEST WALL PAIN: ICD-10-CM

## 2022-05-24 DIAGNOSIS — I50.22 CHRONIC SYSTOLIC CONGESTIVE HEART FAILURE (H): Primary | ICD-10-CM

## 2022-05-24 DIAGNOSIS — F41.1 GAD (GENERALIZED ANXIETY DISORDER): ICD-10-CM

## 2022-05-24 DIAGNOSIS — F33.1 MAJOR DEPRESSIVE DISORDER, RECURRENT EPISODE, MODERATE (H): ICD-10-CM

## 2022-05-24 RX ORDER — FUROSEMIDE 20 MG
TABLET ORAL
Qty: 30 TABLET | Refills: 11
Start: 2022-05-24 | End: 2023-05-18

## 2022-05-24 RX ORDER — MORPHINE SULFATE 15 MG/1
15 TABLET, FILM COATED, EXTENDED RELEASE ORAL EVERY 12 HOURS
Qty: 60 TABLET | Refills: 0 | Status: SHIPPED | OUTPATIENT
Start: 2022-05-25 | End: 2022-07-07

## 2022-05-24 NOTE — LETTER
5/24/2022        RE: Ingrid Powell  [address]  [place] [facility] [address]        [facility] GERIATRICS    Chief Complaint   Patient presents with     RECHECK     HPI:  Ingrid Powell is a 76 year old  (1945), who is being seen today for an episodic care visit at: [facility] ([facility]) [[id number]]. Today's concern is:     Diagnoses       Codes Comments    Chronic systolic congestive heart failure (H)    -  Primary I50.22     Chest wall pain     R07.89     JAIME (generalized anxiety disorder)     F41.1     Major depressive disorder, recurrent episode, moderate (H)     F33.1         Came to see Ingrid today to follow up with her heart failure and possible attempt to reduce her Morphine long acting again.  Her daughter does not want to see her on it and this NP agrees as she was not on this prior to her cardiac event.    Found Ingrid in her recliner.  Reoriented her to whom this NP is.  Very clear that her memory is impaired.  Talked about her anxiety.  Now that the lorazepam is scheduled, reminded her she can ask for extra.  This NP has been conversing with email with daughter and clearly what Ingrid tells her is not close to what has happened.  Now daughter knows that Lorazepam is on board.  Just trying to iron out the oxygen situation to get Ingrid light weight tanks.        Allergies, and PMH/PSH reviewed in EPIC today.    Current Outpatient Medications   Medication Sig Dispense Refill     furosemide (LASIX) 20 MG tablet 40mg by mouth every AM and 20mg by mouth in afternoon 30 tablet 11     [START ON 5/25/2022] morphine (MS CONTIN) 15 MG CR tablet Take 1 tablet (15 mg) by mouth every 12 hours 60 tablet 0     acetaminophen (TYLENOL) 500 MG tablet Take 2 tablets (1,000 mg) by mouth 3 times daily       bisacodyl (DULCOLAX) 10 MG suppository Place 1 suppository (10 mg) rectally daily as needed for constipation Ongoing management/refills per Hospice (Patient taking differently:  Place 10 mg rectally daily as needed for constipation) 2 suppository 0     carboxymethylcellulose PF (REFRESH PLUS) 0.5 % ophthalmic solution Place 1 drop into both eyes 4 times daily as needed for dry eyes or other (eye irritation) Ongoing management/refills per Hospice 1 each 0     citalopram (CELEXA) 10 MG tablet 5mg po x7 days       [START ON 5/25/2022] citalopram (CELEXA) 10 MG tablet Take 1 tablet (10 mg) by mouth daily       fluticasone (FLONASE) 50 MCG/ACT nasal spray Spray 2 sprays into both nostrils daily 16 g 3     gabapentin (NEURONTIN) 600 MG tablet Take 1 tablet (600 mg) by mouth At Bedtime Ongoing management/refills per Hospice (Patient taking differently: Take 600 mg by mouth At Bedtime) 30 tablet 0     hypromellose-dextran (ARTIFICAL TEARS) 0.1-0.3 % ophthalmic solution Place 1 drop into both eyes 2 times daily Ongoing management/refills per Hospice 15 mL 0     Lidocaine (LIDOCARE) 4 % Patch Place 1 patch onto the skin every 24 hours To prevent lidocaine toxicity, patient should be patch free for 12 hrs daily. 30 patch 11     LORazepam (ATIVAN) 0.5 MG tablet Take 1 tablet (0.5 mg) by mouth 2 times daily. May also take 1 tablet (0.5 mg) every 6 hours as needed for anxiety. 60 tablet 0     nitroGLYcerin (NITROSTAT) 0.4 MG sublingual tablet For chest pain place 1 tablet under the tongue every 5 minutes for 3 doses. If symptoms persist 5 minutes after 1st dose call 911. 6 tablet 4     oxyCODONE (ROXICODONE) 5 MG tablet Take 1 tablet (5 mg) by mouth every 4 hours as needed for pain 10 tablet 0     polyethylene glycol (MIRALAX) 17 g packet Take 17 g by mouth daily Ongoing management/refills per Hospice (Patient taking differently: Take 17 g by mouth daily) 30 each 11     senna-docusate (SENOKOT-S/PERICOLACE) 8.6-50 MG tablet Take 1 tablet by mouth 2 times daily 60 tablet 11     traZODone (DESYREL) 100 MG tablet Take 1 tablet (100 mg) by mouth At Bedtime 30 tablet 11       REVIEW OF SYSTEMS:  4 point ROS  "including Respiratory, CV, GI and , other than that noted in the HPI,  is negative    Objective:   /83   Pulse 81   Temp 97.5  F (36.4  C)   Resp 15   Ht 1.651 m (5' 5\")   Wt 102.2 kg (225 lb 6.4 oz)   LMP  (LMP Unknown)   SpO2 96%   BMI 37.51 kg/m    GENERAL APPEARANCE:  Alert, in no distress, morbidly obese, cooperative, looks rested, eating lunch  EYES:  EOM normal, conjunctiva and lids normal, has reading glasses  RESP:  respiratory effort and palpation of chest normal, lungs clear to auscultation , no respiratory distress, oxygen at 3L/min  CV:  Palpation and auscultation of heart done , regular rate and rhythm, no murmur, rub, or gallop, legs show +1 pitting edema with more on tops of feet.  Legs are slightly firm to touch.  No open sores nor discoloration of legs.  ABDOMEN:  normal bowel sounds, soft, nontender, no hepatosplenomegaly or other masses, no complaints of constipation  :    Takes self to restroom  M/S:   Gait and station abnormal uses a 4WW for mobility.  Has to wear O2 when moving around as she drops in saturations.  SKIN:  Pink, dry and warm.  No open areas  PSYCH:  oriented to person, place and time can be vague, memory impaired , affect and mood normal, will shake when really nervous, anxious    Component      Latest Ref Rng & Units 5/12/2022   Sodium      133 - 144 mmol/L 137   Potassium      3.4 - 5.3 mmol/L 4.4   Chloride      94 - 109 mmol/L 101   Carbon Dioxide      20 - 32 mmol/L 33 (H)   Anion Gap      3 - 14 mmol/L 3   Urea Nitrogen      7 - 30 mg/dL 10   Creatinine      0.52 - 1.04 mg/dL 0.64   Calcium      8.5 - 10.1 mg/dL 8.9   Glucose      70 - 99 mg/dL 76   GFR Estimate      >60 mL/min/1.73m2 90       Assessment/Plan:  (I50.22) Chronic systolic congestive heart failure (H)  (primary encounter diagnosis)  Comment: discussed with Ingrid today about adding more Lasix to her regimen.  Feel it will help her lungs/breathing and the edema.  She claims she elevates her " feel but when this NP has come in, typically not.    1.  Increase Lasix to 40mg in morning and keep the afternoon dose at 20mg - heart failure and edema.  BMP on 6/2/22 for heart failure  Plan: furosemide (LASIX) 20 MG tablet    (R07.89) Chest wall pain  Comment: informed Ingrid that another dose reduction will be attempted on the MS Contin as her pain is less.  Still have the lidocaine patch.    1.  Lower MS Contin to 15mg po twice a day for chest wall pain.  Plan: morphine (MS CONTIN) 15 MG CR tablet    (F41.1) JAIME (generalized anxiety disorder)  Comment: now is on Lorazepam 0.5mg twice a day and may have 0.5mg po every 6 hours prn.  Reiterated that a few times for her as she does not remember always.    (F33.1) Major depressive disorder, recurrent episode, moderate (HCC)  Comment: remains on trazodone 100mg at HS for sleep and she knows that helps with anxiety.  She starts 10mg of Celexa tomorrow 5/25/22.      Orders:  1.  Increase the Lasix to 40mg in AM and keep 20mg po in afternoon. - edema and heart failure  2.  BMP on 6/2/22 for heart failure  3.  Lower the MS Contin to 15mg po every 12 hours for chest wall pain    Electronically signed by: NORA Balderas CNP             Sincerely,        NORA Balderas CNP

## 2022-05-24 NOTE — PROGRESS NOTES
[facility] GERIATRICS    Chief Complaint   Patient presents with     RECHECK     HPI:  Ingrid Powell is a 76 year old  (1945), who is being seen today for an episodic care visit at: [facility]) [[id number]]. Today's concern is:     Diagnoses       Codes Comments    Chronic systolic congestive heart failure (H)    -  Primary I50.22     Chest wall pain     R07.89     JAIME (generalized anxiety disorder)     F41.1     Major depressive disorder, recurrent episode, moderate (H)     F33.1         Came to see Ingrid today to follow up with her heart failure and possible attempt to reduce her Morphine long acting again.  Her daughter does not want to see her on it and this NP agrees as she was not on this prior to her cardiac event.    Found Ingrid in her recliner.  Reoriented her to whom this NP is.  Very clear that her memory is impaired.  Talked about her anxiety.  Now that the lorazepam is scheduled, reminded her she can ask for extra.  This NP has been conversing with email with daughter and clearly what Ingrid tells her is not close to what has happened.  Now daughter knows that Lorazepam is on board.  Just trying to iron out the oxygen situation to get Ingrid light weight tanks.        Allergies, and PMH/PSH reviewed in EPIC today.    Current Outpatient Medications   Medication Sig Dispense Refill     furosemide (LASIX) 20 MG tablet 40mg by mouth every AM and 20mg by mouth in afternoon 30 tablet 11     [START ON 5/25/2022] morphine (MS CONTIN) 15 MG CR tablet Take 1 tablet (15 mg) by mouth every 12 hours 60 tablet 0     acetaminophen (TYLENOL) 500 MG tablet Take 2 tablets (1,000 mg) by mouth 3 times daily       bisacodyl (DULCOLAX) 10 MG suppository Place 1 suppository (10 mg) rectally daily as needed for constipation Ongoing management/refills per Hospice (Patient taking differently: Place 10 mg rectally daily as needed for constipation) 2 suppository 0     carboxymethylcellulose PF  (REFRESH PLUS) 0.5 % ophthalmic solution Place 1 drop into both eyes 4 times daily as needed for dry eyes or other (eye irritation) Ongoing management/refills per Hospice 1 each 0     citalopram (CELEXA) 10 MG tablet 5mg po x7 days       [START ON 5/25/2022] citalopram (CELEXA) 10 MG tablet Take 1 tablet (10 mg) by mouth daily       fluticasone (FLONASE) 50 MCG/ACT nasal spray Spray 2 sprays into both nostrils daily 16 g 3     gabapentin (NEURONTIN) 600 MG tablet Take 1 tablet (600 mg) by mouth At Bedtime Ongoing management/refills per Hospice (Patient taking differently: Take 600 mg by mouth At Bedtime) 30 tablet 0     hypromellose-dextran (ARTIFICAL TEARS) 0.1-0.3 % ophthalmic solution Place 1 drop into both eyes 2 times daily Ongoing management/refills per Hospice 15 mL 0     Lidocaine (LIDOCARE) 4 % Patch Place 1 patch onto the skin every 24 hours To prevent lidocaine toxicity, patient should be patch free for 12 hrs daily. 30 patch 11     LORazepam (ATIVAN) 0.5 MG tablet Take 1 tablet (0.5 mg) by mouth 2 times daily. May also take 1 tablet (0.5 mg) every 6 hours as needed for anxiety. 60 tablet 0     nitroGLYcerin (NITROSTAT) 0.4 MG sublingual tablet For chest pain place 1 tablet under the tongue every 5 minutes for 3 doses. If symptoms persist 5 minutes after 1st dose call 911. 6 tablet 4     oxyCODONE (ROXICODONE) 5 MG tablet Take 1 tablet (5 mg) by mouth every 4 hours as needed for pain 10 tablet 0     polyethylene glycol (MIRALAX) 17 g packet Take 17 g by mouth daily Ongoing management/refills per Hospice (Patient taking differently: Take 17 g by mouth daily) 30 each 11     senna-docusate (SENOKOT-S/PERICOLACE) 8.6-50 MG tablet Take 1 tablet by mouth 2 times daily 60 tablet 11     traZODone (DESYREL) 100 MG tablet Take 1 tablet (100 mg) by mouth At Bedtime 30 tablet 11       REVIEW OF SYSTEMS:  4 point ROS including Respiratory, CV, GI and , other than that noted in the HPI,  is negative    Objective:  "  /83   Pulse 81   Temp 97.5  F (36.4  C)   Resp 15   Ht 1.651 m (5' 5\")   Wt 102.2 kg (225 lb 6.4 oz)   LMP  (LMP Unknown)   SpO2 96%   BMI 37.51 kg/m    GENERAL APPEARANCE:  Alert, in no distress, morbidly obese, cooperative, looks rested, eating lunch  EYES:  EOM normal, conjunctiva and lids normal, has reading glasses  RESP:  respiratory effort and palpation of chest normal, lungs clear to auscultation , no respiratory distress, oxygen at 3L/min  CV:  Palpation and auscultation of heart done , regular rate and rhythm, no murmur, rub, or gallop, legs show +1 pitting edema with more on tops of feet.  Legs are slightly firm to touch.  No open sores nor discoloration of legs.  ABDOMEN:  normal bowel sounds, soft, nontender, no hepatosplenomegaly or other masses, no complaints of constipation  :    Takes self to restroom  M/S:   Gait and station abnormal uses a 4WW for mobility.  Has to wear O2 when moving around as she drops in saturations.  SKIN:  Pink, dry and warm.  No open areas  PSYCH:  oriented to person, place and time can be vague, memory impaired , affect and mood normal, will shake when really nervous, anxious    Component      Latest Ref Rng & Units 5/12/2022   Sodium      133 - 144 mmol/L 137   Potassium      3.4 - 5.3 mmol/L 4.4   Chloride      94 - 109 mmol/L 101   Carbon Dioxide      20 - 32 mmol/L 33 (H)   Anion Gap      3 - 14 mmol/L 3   Urea Nitrogen      7 - 30 mg/dL 10   Creatinine      0.52 - 1.04 mg/dL 0.64   Calcium      8.5 - 10.1 mg/dL 8.9   Glucose      70 - 99 mg/dL 76   GFR Estimate      >60 mL/min/1.73m2 90       Assessment/Plan:  (I50.22) Chronic systolic congestive heart failure (H)  (primary encounter diagnosis)  Comment: discussed with Ingrid today about adding more Lasix to her regimen.  Feel it will help her lungs/breathing and the edema.  She claims she elevates her feel but when this NP has come in, typically not.    1.  Increase Lasix to 40mg in morning and keep " the afternoon dose at 20mg - heart failure and edema.  BMP on 6/2/22 for heart failure  Plan: furosemide (LASIX) 20 MG tablet    (R07.89) Chest wall pain  Comment: informed Ingrid that another dose reduction will be attempted on the MS Contin as her pain is less.  Still have the lidocaine patch.    1.  Lower MS Contin to 15mg po twice a day for chest wall pain.  Plan: morphine (MS CONTIN) 15 MG CR tablet    (F41.1) JAIME (generalized anxiety disorder)  Comment: now is on Lorazepam 0.5mg twice a day and may have 0.5mg po every 6 hours prn.  Reiterated that a few times for her as she does not remember always.    (F33.1) Major depressive disorder, recurrent episode, moderate (HCC)  Comment: remains on trazodone 100mg at HS for sleep and she knows that helps with anxiety.  She starts 10mg of Celexa tomorrow 5/25/22.      Orders:  1.  Increase the Lasix to 40mg in AM and keep 20mg po in afternoon. - edema and heart failure  2.  BMP on 6/2/22 for heart failure  3.  Lower the MS Contin to 15mg po every 12 hours for chest wall pain    Electronically signed by: NORA Balderas CNP

## 2022-05-28 ENCOUNTER — HOSPITAL ENCOUNTER (EMERGENCY)
Facility: CLINIC | Age: 77
Discharge: HOME | End: 2022-05-28
Attending: EMERGENCY MEDICINE | Admitting: EMERGENCY MEDICINE
Payer: MEDICARE

## 2022-05-28 VITALS
WEIGHT: 225 LBS | OXYGEN SATURATION: 97 % | TEMPERATURE: 98.3 F | DIASTOLIC BLOOD PRESSURE: 95 MMHG | HEIGHT: 65 IN | HEART RATE: 78 BPM | BODY MASS INDEX: 37.49 KG/M2 | SYSTOLIC BLOOD PRESSURE: 171 MMHG | RESPIRATION RATE: 16 BRPM

## 2022-05-28 DIAGNOSIS — F41.0 ANXIETY ATTACK: ICD-10-CM

## 2022-05-28 PROCEDURE — 250N000013 HC RX MED GY IP 250 OP 250 PS 637: Performed by: EMERGENCY MEDICINE

## 2022-05-28 PROCEDURE — 99284 EMERGENCY DEPT VISIT MOD MDM: CPT | Performed by: EMERGENCY MEDICINE

## 2022-05-28 PROCEDURE — 99283 EMERGENCY DEPT VISIT LOW MDM: CPT | Performed by: EMERGENCY MEDICINE

## 2022-05-28 RX ORDER — LORAZEPAM 0.5 MG/1
0.25 TABLET ORAL ONCE
Status: COMPLETED | OUTPATIENT
Start: 2022-05-28 | End: 2022-05-28

## 2022-05-28 RX ORDER — LORAZEPAM 0.5 MG/1
0.5 TABLET ORAL ONCE
Status: DISCONTINUED | OUTPATIENT
Start: 2022-05-28 | End: 2022-05-28

## 2022-05-28 RX ADMIN — LORAZEPAM 0.25 MG: 0.5 TABLET ORAL at 19:48

## 2022-05-28 ASSESSMENT — ENCOUNTER SYMPTOMS
NAUSEA: 0
SHORTNESS OF BREATH: 0
FEVER: 0
HEADACHES: 0
DYSURIA: 0
CHILLS: 0
CONFUSION: 0
BACK PAIN: 0
VOMITING: 0
COUGH: 0
DIARRHEA: 0
SORE THROAT: 0
PALPITATIONS: 0

## 2022-05-29 NOTE — ED PROVIDER NOTES
History     Chief Complaint   Patient presents with     Anxiety     Pt having anxiety, can only get ativan every 6 hours     HPI  Ingrid Powell is a 76 year old female who presents with anxiety.  She has been having anxiety attacks frequently recently.  She is on standing Ativan at her living facility.  She is able to get this every 6 hours.  She will follow she is having another panic attack today and she was not on scheduled to get Ativan so she called EMS.  She got 0.25 mg of Ativan in route is feeling much better.  She has no other complaints.  No chest pain, shortness of breath, palpitations, dysuria, cough, fevers, chills, back pain, diarrhea, nausea, vomiting, vision changes, headache, confusion, sore throat.  Denies SI.    Allergies:  Allergies   Allergen Reactions     Dilaudid [Hydromorphone Hcl] Other (See Comments)     hallucinations     Fosamax [Alendronate Sodium] Rash            Norvasc [Amlodipine Besylate] Hives and Rash     Tegretol [Carbamazepine] Hives and Rash       Problem List:    Patient Active Problem List    Diagnosis Date Noted     COPD, mild (H) 02/01/2012     Priority: High     8/16/13 Mild Obstruction on PFT       HX of MALIGNANT NEOPL TONSIL 01/26/2012     Priority: High     12/2014: 3 years out. Sees Dr. Senior ENT at [facility]       HTN (hypertension) 10/03/2011     Priority: High     Angina pectoris (H) 05/02/2022     Priority: Medium     LOZADA (dyspnea on exertion) 04/06/2022     Priority: Medium     Status post coronary angiogram 04/04/2022     Priority: Medium     Morbid obesity (H) 10/12/2021     Priority: Medium     DJD of left shoulder 11/06/2019     Priority: Medium     Family history of breast cancer in mother 10/24/2016     Priority: Medium     Diverticulitis of colon 10/10/2016     Priority: Medium     Colonoscopy 3/10/2011       Benign neoplasm of gastrointestinal tract 10/10/2016     Priority: Medium     Polyp rectum        Hemorrhoids, internal 10/10/2016     Priority:  Medium     Rectocele 10/10/2016     Priority: Medium     Tortuous colon 10/10/2016     Priority: Medium     Colonoscopy done 3/10/2011 by [facility]       Adenomatous colon polyp 09/07/2016     Priority: Medium     History of lumbar surgery 04/27/2016     Priority: Medium     Primary insomnia 04/27/2016     Priority: Medium     Lumbosacral radiculopathy at L3 04/27/2016     Priority: Medium     DDD (degenerative disc disease), lumbar 04/27/2016     Priority: Medium     Renal cyst 08/11/2015     Priority: Medium     2012, 13, on ct   Then on lumbar mri , 2 cm largest         Raynaud's syndrome 08/11/2015     Priority: Medium     Disturbance of salivary secretion (XEROSTOMIA) 07/23/2014     Priority: Medium     s/p cancer treatment- stable       MARGIE (obstructive sleep apnea), Severe 10/31/2013     Priority: Medium     12/10/13 CPAP 6-15 cm H2O         Hypothyroidism 08/13/2013     Priority: Medium     Major depressive disorder, recurrent episode, moderate (HCC) 08/24/2012     Priority: Medium     Anxiety 08/24/2012     Priority: Medium     zAdvanced directives, counseling/discussion 02/09/2012     Priority: Medium     Advance Directive Problem List Overview:   Name Relationship Phone    Primary Health Care Agent Lida Farias daughter [phone]         Alternative Health Care Agent Neville Powell son [phone]       Advance Directive received and scanned. Click on Code in the patient header to view. 2/9/2012      Andrew Beasley MD  06/03/22 1436         Hyperlipidemia LDL goal <130 10/03/2011     Priority: Medium        Past Medical History:    Past Medical History:   Diagnosis Date     Angina pectoris (H) 5/2/2022     Arthritis      COPD (chronic obstructive pulmonary disease) (H)      Depressive disorder      Gastroesophageal reflux disease      History of lumbar surgery 7/28/2015     Hoarseness      Hypertension      MARGIE (obstructive sleep apnea), Severe      Oxygen  dependent      Reduced vision      Sleep apnea      Syncope      Syncope and collapse        Past Surgical History:    Past Surgical History:   Procedure Laterality Date     BACK SURGERY       CARPAL TUNNEL RELEASE RT/LT      ?side     CHOLECYSTECTOMY       COLONOSCOPY N/A 4/8/2021    Procedure: COLONOSCOPY;  Surgeon: Walter Liang MD;  Location: [facility]     CV CORONARY ANGIOGRAM N/A 4/4/2022    Procedure: Coronary Angiogram;  Surgeon: Yu Valentine MD;  Location:  HEART CARDIAC CATH LAB     CV LEFT HEART CATH N/A 4/4/2022    Procedure: Left Heart Catheterization;  Surgeon: Yu Valentine MD;  Location:  HEART CARDIAC CATH LAB     CV RIGHT HEART CATH MEASUREMENTS RECORDED N/A 4/4/2022    Procedure: Right Heart Catheterization;  Surgeon: Yu Valentine MD;  Location:  HEART CARDIAC CATH LAB     CV TEMPORARY PACEMAKER INSERTION N/A 4/4/2022    Procedure: Temporary Pacemaker Insertion;  Surgeon: Yu Valentine MD;  Location:  HEART CARDIAC CATH LAB     EP PACEMAKER DEVICE & LEAD IMPLANT- RIGHT ATRIAL & LEFT VENTRICULAR Left 4/4/2022    Procedure: Pacemaker Device & Lead Implant- Right Atrial & Left Ventricular;  Surgeon: Jose Fairbanks MD;  Location:  HEART CARDIAC CATH LAB     EYE SURGERY      cataracts     HERNIA REPAIR       INCISION AND DRAINAGE TRUNK, COMBINED  5/18/2012    Procedure:COMBINED INCISION AND DRAINAGE TRUNK; Incision and Drainage Abdominal Wall Abscess ; Surgeon:ALEXSANDER HANNON; Location:[facility]     INSERT PORT VASCULAR ACCESS  2/8/2012    Procedure:INSERT PORT VASCULAR ACCESS; Surgeon:MARY JANE GUAN; Location:[facility]     JOINT REPLACEMTN, KNEE RT/LT      bilateral     KNEE SURGERY  1995    left- total     KNEE SURGERY  2000    right- total     LAPAROSCOPIC APPENDECTOMY N/A 10/10/2015    Procedure: LAPAROSCOPIC APPENDECTOMY;  Surgeon: Daniel Chamberlain MD;  Location: [facility]     LARYNGOSCOPY, ESOPHAGOSCOPY,  BIOPSY, COMBINED  2/8/2012    Procedure:COMBINED  LARYNGOSCOPY, ESOPHAGOSCOPY,  BIOPSY; Direct Laryngoscopy, Esophagoscopy with Biopsy, Stealth Assisted Left Frontal Sinus Biopsy via Vidal Incision, 8 Setswana Power Port in right subclavian & Percutaneous Endoscopic Gastrostomy Placement * Latex Safe*; Surgeon:LIBORIO CAZARES; Location:[facility]     left ankle fusion       OPTICAL TRACKING SYSTEM ENDOSCOPIC SINUS SURGERY  2012    Procedure:OPTICAL TRACKING SYSTEM ENDOSCOPIC SINUS SURGERY; Surgeon:LIBORIO CAZARES; Location:[facility]     RECTAL SURGERY      ? type     RELEASE DEQUERVAINS WRIST Left 2018    Procedure: RELEASE DEQUERVAINS WRIST;  Left 1st Distal compartment release;  Surgeon: Ronak Biggs MD;  Location: [facility]     REMOVE PORT VASCULAR ACCESS  2012    Procedure: REMOVE PORT VASCULAR ACCESS;  Remove Port Vascular Access ;  Surgeon: Phillip Ye MD;  Location: [facility]     REVERSE ARTHROPLASTY SHOULDER Left 2019    Procedure: Left Reverse Total shoulder arthroplasty;  Surgeon: Oliver Velázquez MD;  Location: [facility]       Family History:    Family History   Problem Relation Age of Onset     Breast Cancer Mother      Arthritis Mother      Cancer Mother      Heart Disease Father          at 72 of heart failure     Emphysema Father      Arthritis Sister      Skin Cancer Sister      Chronic Bronchitis Sister      Arthritis Brother      Pancreatic Cancer Brother      Emphysema Brother      Asthma Brother      Cancer Other         uncle pancreatic/grandmother- colon/aunt ? mat or pat  breast cancer       Social History:  Marital Status:   [4]  Social History     Tobacco Use     Smoking status: Former Smoker     Packs/day: 0.50     Years: 35.00     Pack years: 17.50     Types: Cigarettes     Quit date: 10/3/1995     Years since quittin.6     Smokeless tobacco: Never Used   Substance Use Topics     Alcohol use: Yes     Comment: rare     Drug use: No        Medications:    acetaminophen (TYLENOL) 500  "MG tablet  bisacodyl (DULCOLAX) 10 MG suppository  [START ON 6/12/2022] busPIRone (BUSPAR) 10 MG tablet  busPIRone (BUSPAR) 5 MG tablet  carboxymethylcellulose PF (REFRESH PLUS) 0.5 % ophthalmic solution  citalopram (CELEXA) 10 MG tablet  citalopram (CELEXA) 10 MG tablet  fluticasone (FLONASE) 50 MCG/ACT nasal spray  furosemide (LASIX) 20 MG tablet  gabapentin (NEURONTIN) 600 MG tablet  hypromellose-dextran (ARTIFICAL TEARS) 0.1-0.3 % ophthalmic solution  Lidocaine (LIDOCARE) 4 % Patch  LORazepam (ATIVAN) 0.5 MG tablet  morphine (MS CONTIN) 15 MG CR tablet  nitroGLYcerin (NITROSTAT) 0.4 MG sublingual tablet  oxyCODONE (ROXICODONE) 5 MG tablet  polyethylene glycol (MIRALAX) 17 g packet  senna-docusate (SENOKOT-S/PERICOLACE) 8.6-50 MG tablet  traZODone (DESYREL) 100 MG tablet          Review of Systems   Constitutional: Negative for chills and fever.   HENT: Negative for sore throat.    Eyes: Negative for visual disturbance.   Respiratory: Negative for cough and shortness of breath.    Cardiovascular: Negative for chest pain and palpitations.   Gastrointestinal: Negative for diarrhea, nausea and vomiting.   Genitourinary: Negative for dysuria.   Musculoskeletal: Negative for back pain.   Neurological: Negative for headaches.   Psychiatric/Behavioral: Negative for confusion.        +anxiety  (-) SI       Physical Exam   BP: (!) 172/101  Pulse: 78  Temp: 98.3  F (36.8  C)  Resp: 16  Height: 165.1 cm (5' 5\")  Weight: 102.1 kg (225 lb) (stated)  SpO2: 93 %      Physical Exam  Vitals reviewed.   Constitutional:       General: She is not in acute distress.     Appearance: Normal appearance. She is not ill-appearing.   HENT:      Head: Atraumatic.      Right Ear: External ear normal.      Left Ear: External ear normal.      Nose: No congestion.      Mouth/Throat:      Mouth: Mucous membranes are moist.   Eyes:      Extraocular Movements: Extraocular movements intact.   Cardiovascular:      Rate and Rhythm: Normal rate.      " Heart sounds: No murmur heard.     Comments: HR in 50s on monitor, appears to be sinus rhythm  Pulmonary:      Effort: No respiratory distress.   Abdominal:      General: Abdomen is flat. There is no distension.      Palpations: There is no mass.      Tenderness: There is no abdominal tenderness.      Hernia: No hernia is present.   Musculoskeletal:         General: No swelling or deformity.      Cervical back: No rigidity or tenderness.   Skin:     Capillary Refill: Capillary refill takes less than 2 seconds.      Coloration: Skin is not jaundiced or pale.   Neurological:      General: No focal deficit present.      Mental Status: She is alert.      Cranial Nerves: No cranial nerve deficit.      Motor: No weakness.   Psychiatric:      Comments: Very nice  Denies SI  +anxiety but improving  Asking for some additional ativan         ED Course              ED Course as of 06/03/22 1436   Sat May 28, 2022   2023 Feeling much better. Feels like she can go back to living facility. No complaints at this time. Very calm. Calling sister to pick her up. Looks well. Safe for discharge and is getting a ride home.    2038 Updated patient's discharge paperwork to say that she can get an extra dose of ativan daily this weekend until she can see her PCP next week. I think this is reasonable and may prevent her from needed to return over the weekend. She is agreeable to this and very appreciative. Looks well, very appreciative. Safe for discharge. Getting a ride.      Procedures           No results found for this or any previous visit (from the past 24 hour(s)).    Medications   LORazepam (ATIVAN) half-tab 0.25 mg (0.25 mg Oral Given 5/28/22 1948)       Assessments & Plan (with Medical Decision Making)     I suspect this is anxiety.  The patient does not have any other complaints.  We will give her small amount of Ativan and monitor her.  If she feels well I think she is safe for return to her living facility.    I have reviewed  the nursing notes.    I have reviewed the findings, diagnosis, plan and need for follow up with the patient.  This note was in part created using dictation software in an effort to speed and improve patient care; any typos should be read with the frequent shortcomings of this technology in mind.      Discharge Medication List as of 5/28/2022  9:00 PM          Final diagnoses:   Anxiety attack       5/28/2022   [facility] EMERGENCY DEPT

## 2022-05-29 NOTE — DISCHARGE INSTRUCTIONS
You are seen today for a panic attack.  We gave you a little bit of Ativan and you look good.  It is safe for you to go home.  I want you to follow-up with your regular provider in the near future to discuss other management options for your anxiety.     For the next two days, I think it's safe for you to get three times a day instead of twice a day (every 4 hours instead of every 6) if needed until you can see your primary care provider.      Thank you for your patience, and I am glad you are feeling better.  I hope you have a great weekend.

## 2022-05-31 ENCOUNTER — ASSISTED LIVING VISIT (OUTPATIENT)
Dept: GERIATRICS | Facility: CLINIC | Age: 77
End: 2022-05-31
Payer: COMMERCIAL

## 2022-05-31 VITALS
WEIGHT: 225.4 LBS | RESPIRATION RATE: 15 BRPM | OXYGEN SATURATION: 97 % | BODY MASS INDEX: 37.55 KG/M2 | TEMPERATURE: 97.5 F | DIASTOLIC BLOOD PRESSURE: 83 MMHG | HEART RATE: 81 BPM | HEIGHT: 65 IN | SYSTOLIC BLOOD PRESSURE: 127 MMHG

## 2022-05-31 DIAGNOSIS — Z99.81 OXYGEN DEPENDENT: ICD-10-CM

## 2022-05-31 DIAGNOSIS — I50.22 CHRONIC SYSTOLIC CONGESTIVE HEART FAILURE (H): Primary | ICD-10-CM

## 2022-05-31 DIAGNOSIS — R07.89 CHEST WALL PAIN: ICD-10-CM

## 2022-05-31 DIAGNOSIS — F41.1 GAD (GENERALIZED ANXIETY DISORDER): ICD-10-CM

## 2022-05-31 RX ORDER — BUSPIRONE HYDROCHLORIDE 5 MG/1
TABLET ORAL
Qty: 17 TABLET | Refills: 0 | Status: SHIPPED | OUTPATIENT
Start: 2022-06-01 | End: 2022-07-07

## 2022-05-31 RX ORDER — LORAZEPAM 0.5 MG/1
TABLET ORAL
Qty: 30 TABLET | Refills: 0 | Status: SHIPPED | OUTPATIENT
Start: 2022-05-31 | End: 2022-06-01

## 2022-05-31 RX ORDER — BUSPIRONE HYDROCHLORIDE 10 MG/1
10 TABLET ORAL 3 TIMES DAILY
Qty: 60 TABLET | Refills: 0 | Status: SHIPPED | OUTPATIENT
Start: 2022-06-12 | End: 2022-06-20

## 2022-05-31 NOTE — LETTER
5/31/2022        RE: Ingrid Powell  [address]  [place] [address]        [facility] GERIATR[facility]    Chief Complaint   Patient presents with     RECHECK     HPI:  Ingrid Powell is a 76 year old  (1945), who is being seen today for an episodic care visit at: [facility]) [[id number]]. Today's concern is:     Diagnoses       Codes Comments    Chronic systolic congestive heart failure (H)    -  Primary I50.22     Oxygen dependent     Z99.81     JAIME (generalized anxiety disorder)     F41.1     Chest wall pain     R07.89         Came to see Ingrid today as she ended up in the ED over the holiday weekend due to her anxiety.  Nursing updated this NP that the ED provider ordered 0.5mg every 4 hours x 24 hours to see how her anxiety would be controlled.  The [facility] saw her to follow up and Ingrid stated that she thought the anxiety was in better control with the around the clock dosing.  Now is back to the BID and prn dosing of the Lorazepam.      Spoke at length with nursing about what should be done.  There is the part of dependence on the medication but also do not want her back in the ED if at all can help it.  Most recently the increase of the Celexa to 10mg happened last week and so want to wait to increase to 20mg daily.  Decided to try Buspar to see if that will help with anxiety as well.  If able to get her up on the doses of medication, may be able to lower the Lorazepam off or at least down.    Ingrid was in her apartment.  She was happy to see this NP as she has not seen me in two weeks.  Reminded her that this NP was here last week to see her.  Ingrid admits her memory is forgetful.  Do feel she does not remember when she asks for things.  Think as she is progressing in memory loss, it causes her anxiety and then in a vicious cycle.  Today, Ingrid did have the last one written down on a paper and Lorazepam was given at 1230pm today.    Discussed a plan of care  and had to repeat it a few times for her.  Did review some other medications with her as well as reinforcing that attempting to get her off the long acting Morphine.      Did receive another notice from [facility] that they did not get a script for her oxygen.  They got the notes but no script.  Hand wrote out a 2nd script for O2 at 3L/min via NC continuous for heart failure.  The [facility] sent to [facility] supply.    Incidentally before this NP left the facility, the RA came in the nursing office that Ingrid is already asking for a PRN.  She was reminded that she can not have one at 430pm and wanted to speak with this NP.  The RA went back to tell Ingrid something and came back to state that now Ingrid was shaking nervously.  The DHS stated she will take her for a walk and talk to her now versus after her meeting.      Allergies, and PMH/PSH reviewed in EPIC today.    Current Outpatient Medications   Medication Sig Dispense Refill     [START ON 6/12/2022] busPIRone (BUSPAR) 10 MG tablet Take 1 tablet (10 mg) by mouth 3 times daily 60 tablet 0     [START ON 6/1/2022] busPIRone (BUSPAR) 5 MG tablet 1.5 tabs (7.5) by mouth twice a day for 4 days then 7.5mg by mouth three times a day for 7 days 17 tablet 0     LORazepam (ATIVAN) 0.5 MG tablet Take 1 tablet (0.5 mg) by mouth 4 times daily. May also take 1 tablet (0.5 mg) 2 times daily as needed for anxiety. 30 tablet 0     acetaminophen (TYLENOL) 500 MG tablet Take 2 tablets (1,000 mg) by mouth 3 times daily       bisacodyl (DULCOLAX) 10 MG suppository Place 1 suppository (10 mg) rectally daily as needed for constipation Ongoing management/refills per Hospice (Patient taking differently: Place 10 mg rectally daily as needed for constipation) 2 suppository 0     carboxymethylcellulose PF (REFRESH PLUS) 0.5 % ophthalmic solution Place 1 drop into both eyes 4 times daily as needed for dry eyes or other (eye irritation) Ongoing management/refills per Hospice 1 each 0      "citalopram (CELEXA) 10 MG tablet 5mg po x7 days       citalopram (CELEXA) 10 MG tablet Take 1 tablet (10 mg) by mouth daily       fluticasone (FLONASE) 50 MCG/ACT nasal spray Spray 2 sprays into both nostrils daily 16 g 3     furosemide (LASIX) 20 MG tablet 40mg by mouth every AM and 20mg by mouth in afternoon 30 tablet 11     gabapentin (NEURONTIN) 600 MG tablet Take 1 tablet (600 mg) by mouth At Bedtime Ongoing management/refills per Hospice (Patient taking differently: Take 600 mg by mouth At Bedtime) 30 tablet 0     hypromellose-dextran (ARTIFICAL TEARS) 0.1-0.3 % ophthalmic solution Place 1 drop into both eyes 2 times daily Ongoing management/refills per Hospice 15 mL 0     Lidocaine (LIDOCARE) 4 % Patch Place 1 patch onto the skin every 24 hours To prevent lidocaine toxicity, patient should be patch free for 12 hrs daily. 30 patch 11     morphine (MS CONTIN) 15 MG CR tablet Take 1 tablet (15 mg) by mouth every 12 hours 60 tablet 0     nitroGLYcerin (NITROSTAT) 0.4 MG sublingual tablet For chest pain place 1 tablet under the tongue every 5 minutes for 3 doses. If symptoms persist 5 minutes after 1st dose call 911. 6 tablet 4     oxyCODONE (ROXICODONE) 5 MG tablet Take 1 tablet (5 mg) by mouth every 4 hours as needed for pain 10 tablet 0     polyethylene glycol (MIRALAX) 17 g packet Take 17 g by mouth daily Ongoing management/refills per Hospice (Patient taking differently: Take 17 g by mouth daily) 30 each 11     senna-docusate (SENOKOT-S/PERICOLACE) 8.6-50 MG tablet Take 1 tablet by mouth 2 times daily 60 tablet 11     traZODone (DESYREL) 100 MG tablet Take 1 tablet (100 mg) by mouth At Bedtime 30 tablet 11       REVIEW OF SYSTEMS:  Denied chest wall pain, pain with breathing, trouble with her stomach.  No dizziness.      Objective:   /83   Pulse 81   Temp 97.5  F (36.4  C)   Resp 15   Ht 1.651 m (5' 5\")   Wt 102.2 kg (225 lb 6.4 oz)   LMP  (LMP Unknown)   SpO2 97%   BMI 37.51 kg/m    GENERAL " APPEARANCE:  Alert, in no distress, morbidly obese, cooperative  EYES:  EOM normal, conjunctiva and lids normal, not wearing her glasse  RESP:  respiratory effort and palpation of chest normal, lungs clear to auscultation , no respiratory distress, O2 at 2L/min via NC from her concentrator  CV:  Palpation and auscultation of heart done , regular rate and rhythm, no murmur, rub, or gallop, legs have +1 edema more around the ankle.  both calves are soft to touch and not firm.  no redness or open tyler noted.  :    continent of urine.  can take self to restroom  M/S:   Gait and station abnormal uses a 4WW for mobility.  able to transfer self.    SKIN:  pink, dry and warm.  no open areas  PSYCH:  oriented to person and place but time can be vague or forgotten., memory impaired , affect and mood normal, anxious  She states she gets nervous around 4pm.  She will shake when she is extremely nervous.        Most Recent 3 BMP's:Recent Labs   Lab Test 05/12/22  0625 04/24/22  2224 04/12/22  1147 04/05/22  1826 04/05/22  1823 04/05/22  0847 04/05/22  0507    137  --   --   --   --  138   POTASSIUM 4.4 5.2  --   --  4.6   < > 5.0  5.0   CHLORIDE 101 102  --   --   --   --  109   CO2 33* 29  --   --   --   --  23   BUN 10 14  --   --   --   --  38*   CR 0.64 0.71  --   --   --   --  0.88   ANIONGAP 3 6  --   --   --   --  6   JOSÉ MIGUEL 8.9 8.8  --   --   --   --  8.8   GLC 76 88 92   < >  --    < > 153*    < > = values in this interval not displayed.       Assessment/Plan:  (I50.22) Chronic systolic congestive heart failure (H)  (primary encounter diagnosis)  (Z99.81) Oxygen dependent  Comment: - send another script to the oxygen supplier and hopefully this time they will get it.  Pleased at how she is responding to her Lasix of 40mg in AM and 20mg at 2PM.  Edema is more manageable.  She states she elevates her legs all the time?  See her legs down when this NP typically comes in to see her.    Ingrid has a BMP scheduled for  "this coming Thursday to oversee her kidney function.    (F41.1) JAIME (generalized anxiety disorder)  Comment: discussed in length with Ingrid about the around the clock Lorazepam she just went through and what can be done here.  Afraid she is going to fall and that in time, the Lorazepam will build up and she will be over sedated.    Asked if she thought she needed it during the night.  She knew she was on Trazodone and use to be on Gabapentin.  Informed her she is on 600mg of Gabapentin.  \"Oh I am looking for a yellow pill\"  Knows it may be a different manufacture.      Offered Ingrid that Lorazepam will be give 4x day - 8am- 12N - 4pm - 8pm.  She can have BID prn then.    No change to the Celexa dose this week  Will start Buspar 7.5mg po BID for 4 days, then 7.5mg TID for 7 days then 10mg po 3x day for anxiety.  Ingrid asked if it will help and responded to her that this NP has seen it help folks as evidence of removing the medication, could see the anxiety rise.  If effective, would consider then backing down from the Lorazepam.    Plan: LORazepam (ATIVAN) 0.5 MG tablet, busPIRone         (BUSPAR) 5 MG tablet, busPIRone (BUSPAR) 10 MG         tablet    (R07.89) Chest wall pain  Comment: denied chest wall pain.  Informed her no dose reduction of the MS Contin 15mg twice a day this week.  Her daughter wants her off of it and she was not on it before her CPR event.      Orders:  1.  Change Lorazepam order to 0.5mg po 4x day and twice a day PRN for anxiety  2.  Buspirone 7.5mg po BID for 4 days, then 7.5mg po TID for 7 days then increase to 10mg po BID for anxiety    Electronically signed by: NORA Balderas CNP             Sincerely,        NORA Balderas CNP      "

## 2022-05-31 NOTE — PROGRESS NOTES
[facility] GERIATRICS    Chief Complaint   Patient presents with     RECHECK     HPI:  Ingrid Powell is a 76 year old  (1945), who is being seen today for an episodic care visit at: [facility]) [[id number]]. Today's concern is:     Diagnoses       Codes Comments    Chronic systolic congestive heart failure (H)    -  Primary I50.22     Oxygen dependent     Z99.81     JAIME (generalized anxiety disorder)     F41.1     Chest wall pain     R07.89         Came to see Ingrid today as she ended up in the ED over the holiday weekend due to her anxiety.  Nursing updated this NP that the ED provider ordered 0.5mg every 4 hours x 24 hours to see how her anxiety would be controlled.  The [facility] saw her to follow up and Ingrid stated that she thought the anxiety was in better control with the around the clock dosing.  Now is back to the BID and prn dosing of the Lorazepam.      Spoke at length with nursing about what should be done.  There is the part of dependence on the medication but also do not want her back in the ED if at all can help it.  Most recently the increase of the Celexa to 10mg happened last week and so want to wait to increase to 20mg daily.  Decided to try Buspar to see if that will help with anxiety as well.  If able to get her up on the doses of medication, may be able to lower the Lorazepam off or at least down.    Ingrid was in her apartment.  She was happy to see this NP as she has not seen me in two weeks.  Reminded her that this NP was here last week to see her.  Ingrid admits her memory is forgetful.  Do feel she does not remember when she asks for things.  Think as she is progressing in memory loss, it causes her anxiety and then in a vicious cycle.  Today, Ingrid did have the last one written down on a paper and Lorazepam was given at 1230pm today.    Discussed a plan of care and had to repeat it a few times for her.  Did review some other medications with her as well as  reinforcing that attempting to get her off the long acting Morphine.      Did receive another notice from [facility] that they did not get a script for her oxygen.  They got the notes but no script.  Hand wrote out a 2nd script for O2 at 3L/min via NC continuous for heart failure.  The [facility] sent to [facility] supply.    Incidentally before this NP left the facility, the RA came in the nursing office that Ingrid is already asking for a PRN.  She was reminded that she can not have one at 430pm and wanted to speak with this NP.  The RA went back to tell Ingrid something and came back to state that now Ingrid was shaking nervously.  The DHS stated she will take her for a walk and talk to her now versus after her meeting.      Allergies, and PMH/PSH reviewed in EPIC today.    Current Outpatient Medications   Medication Sig Dispense Refill     [START ON 6/12/2022] busPIRone (BUSPAR) 10 MG tablet Take 1 tablet (10 mg) by mouth 3 times daily 60 tablet 0     [START ON 6/1/2022] busPIRone (BUSPAR) 5 MG tablet 1.5 tabs (7.5) by mouth twice a day for 4 days then 7.5mg by mouth three times a day for 7 days 17 tablet 0     LORazepam (ATIVAN) 0.5 MG tablet Take 1 tablet (0.5 mg) by mouth 4 times daily. May also take 1 tablet (0.5 mg) 2 times daily as needed for anxiety. 30 tablet 0     acetaminophen (TYLENOL) 500 MG tablet Take 2 tablets (1,000 mg) by mouth 3 times daily       bisacodyl (DULCOLAX) 10 MG suppository Place 1 suppository (10 mg) rectally daily as needed for constipation Ongoing management/refills per Hospice (Patient taking differently: Place 10 mg rectally daily as needed for constipation) 2 suppository 0     carboxymethylcellulose PF (REFRESH PLUS) 0.5 % ophthalmic solution Place 1 drop into both eyes 4 times daily as needed for dry eyes or other (eye irritation) Ongoing management/refills per Hospice 1 each 0     citalopram (CELEXA) 10 MG tablet 5mg po x7 days       citalopram (CELEXA) 10 MG tablet Take 1  "tablet (10 mg) by mouth daily       fluticasone (FLONASE) 50 MCG/ACT nasal spray Spray 2 sprays into both nostrils daily 16 g 3     furosemide (LASIX) 20 MG tablet 40mg by mouth every AM and 20mg by mouth in afternoon 30 tablet 11     gabapentin (NEURONTIN) 600 MG tablet Take 1 tablet (600 mg) by mouth At Bedtime Ongoing management/refills per Hospice (Patient taking differently: Take 600 mg by mouth At Bedtime) 30 tablet 0     hypromellose-dextran (ARTIFICAL TEARS) 0.1-0.3 % ophthalmic solution Place 1 drop into both eyes 2 times daily Ongoing management/refills per Hospice 15 mL 0     Lidocaine (LIDOCARE) 4 % Patch Place 1 patch onto the skin every 24 hours To prevent lidocaine toxicity, patient should be patch free for 12 hrs daily. 30 patch 11     morphine (MS CONTIN) 15 MG CR tablet Take 1 tablet (15 mg) by mouth every 12 hours 60 tablet 0     nitroGLYcerin (NITROSTAT) 0.4 MG sublingual tablet For chest pain place 1 tablet under the tongue every 5 minutes for 3 doses. If symptoms persist 5 minutes after 1st dose call 911. 6 tablet 4     oxyCODONE (ROXICODONE) 5 MG tablet Take 1 tablet (5 mg) by mouth every 4 hours as needed for pain 10 tablet 0     polyethylene glycol (MIRALAX) 17 g packet Take 17 g by mouth daily Ongoing management/refills per Hospice (Patient taking differently: Take 17 g by mouth daily) 30 each 11     senna-docusate (SENOKOT-S/PERICOLACE) 8.6-50 MG tablet Take 1 tablet by mouth 2 times daily 60 tablet 11     traZODone (DESYREL) 100 MG tablet Take 1 tablet (100 mg) by mouth At Bedtime 30 tablet 11       REVIEW OF SYSTEMS:  Denied chest wall pain, pain with breathing, trouble with her stomach.  No dizziness.      Objective:   /83   Pulse 81   Temp 97.5  F (36.4  C)   Resp 15   Ht 1.651 m (5' 5\")   Wt 102.2 kg (225 lb 6.4 oz)   LMP  (LMP Unknown)   SpO2 97%   BMI 37.51 kg/m    GENERAL APPEARANCE:  Alert, in no distress, morbidly obese, cooperative  EYES:  EOM normal, conjunctiva " and lids normal, not wearing her glasse  RESP:  respiratory effort and palpation of chest normal, lungs clear to auscultation , no respiratory distress, O2 at 2L/min via NC from her concentrator  CV:  Palpation and auscultation of heart done , regular rate and rhythm, no murmur, rub, or gallop, legs have +1 edema more around the ankle.  both calves are soft to touch and not firm.  no redness or open tyler noted.  :    continent of urine.  can take self to restroom  M/S:   Gait and station abnormal uses a 4WW for mobility.  able to transfer self.    SKIN:  pink, dry and warm.  no open areas  PSYCH:  oriented to person and place but time can be vague or forgotten., memory impaired , affect and mood normal, anxious  She states she gets nervous around 4pm.  She will shake when she is extremely nervous.        Most Recent 3 BMP's:Recent Labs   Lab Test 05/12/22  0625 04/24/22  2224 04/12/22  1147 04/05/22  1826 04/05/22  1823 04/05/22  0847 04/05/22  0507    137  --   --   --   --  138   POTASSIUM 4.4 5.2  --   --  4.6   < > 5.0  5.0   CHLORIDE 101 102  --   --   --   --  109   CO2 33* 29  --   --   --   --  23   BUN 10 14  --   --   --   --  38*   CR 0.64 0.71  --   --   --   --  0.88   ANIONGAP 3 6  --   --   --   --  6   JOSÉ MIGUEL 8.9 8.8  --   --   --   --  8.8   GLC 76 88 92   < >  --    < > 153*    < > = values in this interval not displayed.       Assessment/Plan:  (I50.22) Chronic systolic congestive heart failure (H)  (primary encounter diagnosis)  (Z99.81) Oxygen dependent  Comment: - send another script to the oxygen supplier and hopefully this time they will get it.  Pleased at how she is responding to her Lasix of 40mg in AM and 20mg at 2PM.  Edema is more manageable.  She states she elevates her legs all the time?  See her legs down when this NP typically comes in to see her.    Ingrid has a BMP scheduled for this coming Thursday to oversee her kidney function.    (F41.1) JAIME (generalized anxiety  "disorder)  Comment: discussed in length with Ingrid about the around the clock Lorazepam she just went through and what can be done here.  Afraid she is going to fall and that in time, the Lorazepam will build up and she will be over sedated.    Asked if she thought she needed it during the night.  She knew she was on Trazodone and use to be on Gabapentin.  Informed her she is on 600mg of Gabapentin.  \"Oh I am looking for a yellow pill\"  Knows it may be a different manufacture.      Offered Ingrid that Lorazepam will be give 4x day - 8am- 12N - 4pm - 8pm.  She can have BID prn then.    No change to the Celexa dose this week  Will start Buspar 7.5mg po BID for 4 days, then 7.5mg TID for 7 days then 10mg po 3x day for anxiety.  Ingrid asked if it will help and responded to her that this NP has seen it help folks as evidence of removing the medication, could see the anxiety rise.  If effective, would consider then backing down from the Lorazepam.    Plan: LORazepam (ATIVAN) 0.5 MG tablet, busPIRone         (BUSPAR) 5 MG tablet, busPIRone (BUSPAR) 10 MG         tablet    (R07.89) Chest wall pain  Comment: denied chest wall pain.  Informed her no dose reduction of the MS Contin 15mg twice a day this week.  Her daughter wants her off of it and she was not on it before her CPR event.      Orders:  1.  Change Lorazepam order to 0.5mg po 4x day and twice a day PRN for anxiety  2.  Buspirone 7.5mg po BID for 4 days, then 7.5mg po TID for 7 days then increase to 10mg po BID for anxiety    Electronically signed by: NORA Balderas CNP       "

## 2022-06-01 ENCOUNTER — LAB REQUISITION (OUTPATIENT)
Dept: LAB | Facility: CLINIC | Age: 77
End: 2022-06-01
Payer: COMMERCIAL

## 2022-06-01 DIAGNOSIS — F41.1 GAD (GENERALIZED ANXIETY DISORDER): ICD-10-CM

## 2022-06-01 DIAGNOSIS — I50.33 ACUTE ON CHRONIC DIASTOLIC (CONGESTIVE) HEART FAILURE (H): ICD-10-CM

## 2022-06-01 RX ORDER — LORAZEPAM 0.5 MG/1
TABLET ORAL
Qty: 120 TABLET | Refills: 1 | Status: SHIPPED | OUTPATIENT
Start: 2022-06-01 | End: 2022-07-20

## 2022-06-01 NOTE — TELEPHONE ENCOUNTER
Resent a script for larger quantity of lorazepam    Electronically signed by Elsa Jeffries RN, CNP

## 2022-06-02 LAB
ANION GAP SERPL CALCULATED.3IONS-SCNC: 3 MMOL/L (ref 3–14)
BUN SERPL-MCNC: 10 MG/DL (ref 7–30)
CALCIUM SERPL-MCNC: 9.6 MG/DL (ref 8.5–10.1)
CHLORIDE BLD-SCNC: 101 MMOL/L (ref 94–109)
CO2 SERPL-SCNC: 34 MMOL/L (ref 20–32)
CREAT SERPL-MCNC: 0.68 MG/DL (ref 0.52–1.04)
GFR SERPL CREATININE-BSD FRML MDRD: 89 ML/MIN/1.73M2
GLUCOSE BLD-MCNC: 77 MG/DL (ref 70–99)
POTASSIUM BLD-SCNC: 4.3 MMOL/L (ref 3.4–5.3)
SODIUM SERPL-SCNC: 138 MMOL/L (ref 133–144)

## 2022-06-02 PROCEDURE — 80048 BASIC METABOLIC PNL TOTAL CA: CPT | Mod: ORL | Performed by: NURSE PRACTITIONER

## 2022-06-02 PROCEDURE — P9603 ONE-WAY ALLOW PRORATED MILES: HCPCS | Mod: ORL | Performed by: NURSE PRACTITIONER

## 2022-06-02 PROCEDURE — 36415 COLL VENOUS BLD VENIPUNCTURE: CPT | Mod: ORL | Performed by: NURSE PRACTITIONER

## 2022-06-07 DIAGNOSIS — R52 PAIN: ICD-10-CM

## 2022-06-07 RX ORDER — GABAPENTIN 600 MG/1
600 TABLET ORAL AT BEDTIME
Qty: 30 TABLET | Refills: 1 | Status: SHIPPED | OUTPATIENT
Start: 2022-06-07 | End: 2022-09-08

## 2022-06-07 RX ORDER — PSEUDOEPHED/ACETAMINOPH/DIPHEN 30MG-500MG
TABLET ORAL
Qty: 168 TABLET | Refills: 0 | Status: SHIPPED | OUTPATIENT
Start: 2022-06-07 | End: 2022-09-05

## 2022-06-20 ENCOUNTER — ASSISTED LIVING VISIT (OUTPATIENT)
Dept: GERIATRICS | Facility: CLINIC | Age: 77
End: 2022-06-20
Payer: COMMERCIAL

## 2022-06-20 VITALS
SYSTOLIC BLOOD PRESSURE: 127 MMHG | RESPIRATION RATE: 15 BRPM | TEMPERATURE: 97.5 F | DIASTOLIC BLOOD PRESSURE: 83 MMHG | HEART RATE: 81 BPM | BODY MASS INDEX: 37.51 KG/M2 | WEIGHT: 225.4 LBS

## 2022-06-20 DIAGNOSIS — I10 PRIMARY HYPERTENSION: ICD-10-CM

## 2022-06-20 DIAGNOSIS — F41.1 GAD (GENERALIZED ANXIETY DISORDER): ICD-10-CM

## 2022-06-20 DIAGNOSIS — R41.3 MEMORY DIFFICULTIES: ICD-10-CM

## 2022-06-20 DIAGNOSIS — F22 PARANOIA (H): ICD-10-CM

## 2022-06-20 DIAGNOSIS — I50.22 CHRONIC SYSTOLIC CONGESTIVE HEART FAILURE (H): ICD-10-CM

## 2022-06-20 DIAGNOSIS — F41.1 GAD (GENERALIZED ANXIETY DISORDER): Primary | ICD-10-CM

## 2022-06-20 DIAGNOSIS — R07.89 CHEST WALL PAIN: ICD-10-CM

## 2022-06-20 RX ORDER — BUSPIRONE HYDROCHLORIDE 10 MG/1
10 TABLET ORAL 3 TIMES DAILY
Qty: 60 TABLET | Refills: 0 | Status: SHIPPED | OUTPATIENT
Start: 2022-06-20 | End: 2022-07-19

## 2022-06-20 NOTE — LETTER
"    6/20/2022        RE: Ingrid Powell  [address]  [place] [address]        M [facility] GERIATRICS  ACUTE/EPISODIC VISIT    [facility] Medical Record Number:  [id number]  Place of Service where encounter took place:  [facility] ([facility]) [[id number]]    Chief Complaint   Patient presents with     RECHECK       HPI:    Ingrid Powell is a 76 year old  (1945), who is being seen today for an episodic care visit.  HPI information obtained from: facility chart records, facility staff, patient report and [facility] chart review.    Today's concern is:    Diagnoses       Codes Comments    JAIME (generalized anxiety disorder)    -  Primary F41.1     Paranoia (H)     F22     Chest wall pain     R07.89     Chronic systolic congestive heart failure (H)     I50.22     Primary hypertension     I10     Memory difficulties     R41.3           Came to see Ingrid today about her anxiety and how she was doing in general with her other major health issues.  Knocked on her apartment door and darker in apartment and cool to help with breathing.  First think Ingrid does is \"Oh my gosh, I am glad to see someone, thank you for coming\".  She is just at her \"wits end\" with anxiety that feel she may make bad decisions in her environment.    Had been talking about her anxiety and feeling like the scheduled Ativan QID has helped but what else would benefit her.  Already on Celexa and Buspar.  At least she has not been in the ED with her anxiety.  Last ED visit was 5/28.    Her thoughts were everywhere and so had to slow her down and speak with her one subject at a time.    1.  Oxygen - running out of portable tanks.  Possible she uses them and not shut off.  Attempted to show her the dial for the portable and turn to 0 when not using it or else it is wasted.    2.  No real change in appearance of her legs.  Talked about edema and elevating legs.  Encouraged her to ambulate for circulation. "    3.  Anxiety - informed her of a new medication to be used.  At this point she will not be so aware of it as staff hopefully see a change in her thinking.    4.  Chest wall pain - have been working on getting her off the MS Contin.  Will give orders to get her off the rest of this.        ALLERGIES:    Allergies   Allergen Reactions     Dilaudid [Hydromorphone Hcl] Other (See Comments)     hallucinations     Fosamax [Alendronate Sodium] Rash            Norvasc [Amlodipine Besylate] Hives and Rash     Tegretol [Carbamazepine] Hives and Rash        MEDICATIONS:  Post Discharge Medication Reconciliation Status: patient was not discharged from an inpatient facility. Medications reconciled today.    Current Outpatient Medications   Medication Sig Dispense Refill     QUEtiapine (SEROQUEL) 25 MG tablet Take 0.5 tablets (12.5 mg) by mouth 2 times daily       ACETAMINOPHEN EXTRA STRENGTH 500 MG tablet TAKE 2 TABS (1,000MG) BY MOUTH THREE TIMES DAILY FORARTHRITIS PAIN 168 tablet 0     bisacodyl (DULCOLAX) 10 MG suppository Place 1 suppository (10 mg) rectally daily as needed for constipation Ongoing management/refills per Hospice (Patient taking differently: Place 10 mg rectally daily as needed for constipation) 2 suppository 0     busPIRone (BUSPAR) 10 MG tablet TAKE 1 TABLET (10 MG) BY MOUTH 3 TIMES DAILY 60 tablet 0     carboxymethylcellulose PF (REFRESH PLUS) 0.5 % ophthalmic solution Place 1 drop into both eyes 4 times daily as needed for dry eyes or other (eye irritation) Ongoing management/refills per Hospice 1 each 0     citalopram (CELEXA) 10 MG tablet Take 1 tablet (10 mg) by mouth daily       fluticasone (FLONASE) 50 MCG/ACT nasal spray Spray 2 sprays into both nostrils daily 16 g 3     furosemide (LASIX) 20 MG tablet 40mg by mouth every AM and 20mg by mouth in afternoon 30 tablet 11     gabapentin (NEURONTIN) 600 MG tablet Take 1 tablet (600 mg) by mouth At Bedtime 30 tablet 1     hypromellose-dextran (ARTIFICAL  TEARS) 0.1-0.3 % ophthalmic solution Place 1 drop into both eyes 2 times daily Ongoing management/refills per Hospice 15 mL 0     Lidocaine (LIDOCARE) 4 % Patch Place 1 patch onto the skin every 24 hours To prevent lidocaine toxicity, patient should be patch free for 12 hrs daily. 30 patch 11     LORazepam (ATIVAN) 0.5 MG tablet Take 1 tablet (0.5 mg) by mouth 4 times daily. May also take 1 tablet (0.5 mg) 2 times daily as needed for anxiety. 120 tablet 1     nitroGLYcerin (NITROSTAT) 0.4 MG sublingual tablet For chest pain place 1 tablet under the tongue every 5 minutes for 3 doses. If symptoms persist 5 minutes after 1st dose call 911. 6 tablet 4     nystatin (MYCOSTATIN) 628345 UNIT/GM external powder Apply topically 2 times daily 30 g 4     oxyCODONE (ROXICODONE) 5 MG tablet Take 1 tablet (5 mg) by mouth every 4 hours as needed for pain 10 tablet 0     polyethylene glycol (MIRALAX) 17 g packet Take 17 g by mouth daily Ongoing management/refills per Hospice (Patient taking differently: Take 17 g by mouth daily) 30 each 11     senna-docusate (SENOKOT-S/PERICOLACE) 8.6-50 MG tablet Take 1 tablet by mouth 2 times daily 60 tablet 11     traZODone (DESYREL) 100 MG tablet Take 1 tablet (100 mg) by mouth At Bedtime 30 tablet 11     Medications reviewed:  Medications reconciled to facility chart and changes were made to reflect current medications as identified as above med list. Below are the changes that were made:   Medications stopped since last EPIC medication reconciliation:   There are no discontinued medications.    Medications started since last [facility] medication reconciliation:  No orders of the defined types were placed in this encounter.      REVIEW OF SYSTEMS:  4 point ROS neg other than the symptoms noted above in the HPI.  Her anxiety is her biggest worry.  Denied chest pain or chest wall pain, nervous about her breathing and having oxygen, no stomach complaints.  No headaches.    PHYSICAL EXAM:  /83    Pulse 81   Temp 97.5  F (36.4  C)   Resp 15   Wt 102.2 kg (225 lb 6.4 oz)   LMP  (LMP Unknown)   BMI 37.51 kg/m    Physical Exam  Constitutional:       Appearance: She is obese.   HENT:      Head: Normocephalic.      Ears:      Comments: No hearing impairment     Nose: Nose normal.      Mouth/Throat:      Mouth: Mucous membranes are moist.      Pharynx: Oropharynx is clear.      Comments: Speech is clear  Eyes:      Extraocular Movements: Extraocular movements intact.      Conjunctiva/sclera: Conjunctivae normal.   Cardiovascular:      Rate and Rhythm: Normal rate and regular rhythm.      Heart sounds: Normal heart sounds.   Pulmonary:      Effort: Pulmonary effort is normal.      Breath sounds: Normal breath sounds.      Comments: Oxygen at 2L/min via NC  Abdominal:      Comments: Round, obese, soft, non-tender.  Decreased bowel sounds   Genitourinary:     Comments: Continent of urine  Musculoskeletal:      Comments: +1 edema in ankle area bilaterally  No support socks   Skin:     General: Skin is warm and dry.   Neurological:      Mental Status: She is alert.      Comments: Forgetful which is her baseline.  No neurological deficits seen.    Uses a 4WW for mobility but did have back turned and then this NP turned around and she had jumped out of her recliner with no hesitation.   Psychiatric:      Comments: High anxiety with thoughts all over the place.  Playing with her oxygen tubing with her hands not knowing which oxygen supply she is hooked up too.       Component      Latest Ref Rng & Units 6/2/2022   Sodium      133 - 144 mmol/L 138   Potassium      3.4 - 5.3 mmol/L 4.3   Chloride      94 - 109 mmol/L 101   Carbon Dioxide      20 - 32 mmol/L 34 (H)   Anion Gap      3 - 14 mmol/L 3   Urea Nitrogen      7 - 30 mg/dL 10   Creatinine      0.52 - 1.04 mg/dL 0.68   Calcium      8.5 - 10.1 mg/dL 9.6   Glucose      70 - 99 mg/dL 77   GFR Estimate      >60 mL/min/1.73m2 89       ASSESSMENT / PLAN:  (F41.1) JAIME  (generalized anxiety disorder)  (primary encounter diagnosis)  (F22) Paranoia (H)  Comment: this is a huge stress to her today and not re-directable by staff.  Do not feel she needs to be sent in but she is all over the place.  Has the Lorazepam 0.5mg QID and she feels she wants it more frequent.    Staff were going to take her for a walk to calm her down versus giving her a PRN Ativan.  Is on Celexa 10mg daily (room to move with this) Buspar 10mg TID (room to move with this)  Need to change her thinking and feel adding Seroquel 12.5mg BID will help in this process.    1.  Seroquel 12.5mg po BID - sent a script to pharmacy.  Send the daughter an email to let her know the change of her behavior and medication goals with the Seroquel so daughter can watch out for her thought processes as well.  Daughter is very involved in care and e-mails this NP with any concerns.  Plan:: QUEtiapine (SEROQUEL) 25 MG         tablet    (R07.89) Chest wall pain  Comment: currently taking MS Contin 15mg twice a day.  No expression of chest wall pain from CPR event.  Will lower this to 15mg at HS for 7 days then discontinue.  Has PRN oxycodone available and has not used this.  Will keep it for a while and then remove it if not using.  Updated daughter about finishing out the MS Contin as the daughter has wanted her off the Morphine if possible.    (I50.22) Chronic systolic congestive heart failure (H)  Comment: remains on Lasix 40mg in AM and 20mg in afternoon as a good maintenance dose.  Lungs are clear.  Feet do swell and so teaching her about elevation, ambulation that all help circulate the fluid.  No changes.  Facility working with [facility] about the oxygen tank situation.  Updated daughter in email about the oxygen as well    (I10) Primary hypertension  Comment: 120/80's consistently.  No orders for frequent checks.  No medications.      (R41.3) Memory difficulties  Comment: is forgetful which feel the anxiety feeds into  the forgetfulness and she can not think clearly.  Encouraged her to walk out of her apartment to gain confidence that it is ok to get out and she will be fine.        Orders:  1.  Seroquel 12.5mg po BID for anxiety/paranoia  2.  Decrease MS Contin to 15mg po at HS for 7 nights and then discontinue.      Total time with patient visit: 60 minutes including discussions about the POC and care coordination with the patient and staff and writing an email to daughter. Greater than 50% of total time spent with counseling and coordinating care due to heightened anxiety and going back and forth to her to answer questions, get staff to know her behavior, email daughter about the medication changes and what to look for.  Discussion with nursing as feel Ingrid is at risk of falling given new medications, being alone in apartment and anticipatory behaviors she may display.      Electronically signed by  NORA Balderas CNP               Sincerely,        NORA Balderas CNP

## 2022-06-20 NOTE — PROGRESS NOTES
"[facility] GERIATRICS  ACUTE/EPISODIC VISIT    [facility] Medical Record Number:  [id number]  Place of Service where encounter took place:  [facility] ([facility]) [[id number]]    Chief Complaint   Patient presents with     RECHECK       HPI:    Ingrid Powell is a 76 year old  (1945), who is being seen today for an episodic care visit.  HPI information obtained from: facility chart records, facility staff, patient report and [facility] chart review.    Today's concern is:    Diagnoses       Codes Comments    JAIME (generalized anxiety disorder)    -  Primary F41.1     Paranoia (H)     F22     Chest wall pain     R07.89     Chronic systolic congestive heart failure (H)     I50.22     Primary hypertension     I10     Memory difficulties     R41.3           Came to see Ingrid today about her anxiety and how she was doing in general with her other major health issues.  Knocked on her apartment door and darker in apartment and cool to help with breathing.  First think Ingrid does is \"Oh my gosh, I am glad to see someone, thank you for coming\".  She is just at her \"wits end\" with anxiety that feel she may make bad decisions in her environment.    Had been talking about her anxiety and feeling like the scheduled Ativan QID has helped but what else would benefit her.  Already on Celexa and Buspar.  At least she has not been in the ED with her anxiety.  Last ED visit was 5/28.    Her thoughts were everywhere and so had to slow her down and speak with her one subject at a time.    1.  Oxygen - running out of portable tanks.  Possible she uses them and not shut off.  Attempted to show her the dial for the portable and turn to 0 when not using it or else it is wasted.    2.  No real change in appearance of her legs.  Talked about edema and elevating legs.  Encouraged her to ambulate for circulation.    3.  Anxiety - informed her of a new medication to be used.  At this point she will not be so aware " of it as staff hopefully see a change in her thinking.    4.  Chest wall pain - have been working on getting her off the MS Contin.  Will give orders to get her off the rest of this.        ALLERGIES:    Allergies   Allergen Reactions     Dilaudid [Hydromorphone Hcl] Other (See Comments)     hallucinations     Fosamax [Alendronate Sodium] Rash            Norvasc [Amlodipine Besylate] Hives and Rash     Tegretol [Carbamazepine] Hives and Rash        MEDICATIONS:  Post Discharge Medication Reconciliation Status: patient was not discharged from an inpatient facility. Medications reconciled today.    Current Outpatient Medications   Medication Sig Dispense Refill     QUEtiapine (SEROQUEL) 25 MG tablet Take 0.5 tablets (12.5 mg) by mouth 2 times daily       ACETAMINOPHEN EXTRA STRENGTH 500 MG tablet TAKE 2 TABS (1,000MG) BY MOUTH THREE TIMES DAILY FORARTHRITIS PAIN 168 tablet 0     bisacodyl (DULCOLAX) 10 MG suppository Place 1 suppository (10 mg) rectally daily as needed for constipation Ongoing management/refills per Hospice (Patient taking differently: Place 10 mg rectally daily as needed for constipation) 2 suppository 0     busPIRone (BUSPAR) 10 MG tablet TAKE 1 TABLET (10 MG) BY MOUTH 3 TIMES DAILY 60 tablet 0     carboxymethylcellulose PF (REFRESH PLUS) 0.5 % ophthalmic solution Place 1 drop into both eyes 4 times daily as needed for dry eyes or other (eye irritation) Ongoing management/refills per Hospice 1 each 0     citalopram (CELEXA) 10 MG tablet Take 1 tablet (10 mg) by mouth daily       fluticasone (FLONASE) 50 MCG/ACT nasal spray Spray 2 sprays into both nostrils daily 16 g 3     furosemide (LASIX) 20 MG tablet 40mg by mouth every AM and 20mg by mouth in afternoon 30 tablet 11     gabapentin (NEURONTIN) 600 MG tablet Take 1 tablet (600 mg) by mouth At Bedtime 30 tablet 1     hypromellose-dextran (ARTIFICAL TEARS) 0.1-0.3 % ophthalmic solution Place 1 drop into both eyes 2 times daily Ongoing  management/refills per Hospice 15 mL 0     Lidocaine (LIDOCARE) 4 % Patch Place 1 patch onto the skin every 24 hours To prevent lidocaine toxicity, patient should be patch free for 12 hrs daily. 30 patch 11     LORazepam (ATIVAN) 0.5 MG tablet Take 1 tablet (0.5 mg) by mouth 4 times daily. May also take 1 tablet (0.5 mg) 2 times daily as needed for anxiety. 120 tablet 1     nitroGLYcerin (NITROSTAT) 0.4 MG sublingual tablet For chest pain place 1 tablet under the tongue every 5 minutes for 3 doses. If symptoms persist 5 minutes after 1st dose call 911. 6 tablet 4     nystatin (MYCOSTATIN) 642580 UNIT/GM external powder Apply topically 2 times daily 30 g 4     oxyCODONE (ROXICODONE) 5 MG tablet Take 1 tablet (5 mg) by mouth every 4 hours as needed for pain 10 tablet 0     polyethylene glycol (MIRALAX) 17 g packet Take 17 g by mouth daily Ongoing management/refills per Hospice (Patient taking differently: Take 17 g by mouth daily) 30 each 11     senna-docusate (SENOKOT-S/PERICOLACE) 8.6-50 MG tablet Take 1 tablet by mouth 2 times daily 60 tablet 11     traZODone (DESYREL) 100 MG tablet Take 1 tablet (100 mg) by mouth At Bedtime 30 tablet 11     Medications reviewed:  Medications reconciled to facility chart and changes were made to reflect current medications as identified as above med list. Below are the changes that were made:   Medications stopped since last EPIC medication reconciliation:   There are no discontinued medications.    Medications started since last [facility] medication reconciliation:  No orders of the defined types were placed in this encounter.      REVIEW OF SYSTEMS:  4 point ROS neg other than the symptoms noted above in the HPI.  Her anxiety is her biggest worry.  Denied chest pain or chest wall pain, nervous about her breathing and having oxygen, no stomach complaints.  No headaches.    PHYSICAL EXAM:  /83   Pulse 81   Temp 97.5  F (36.4  C)   Resp 15   Wt 102.2 kg (225 lb 6.4 oz)   LMP   (LMP Unknown)   BMI 37.51 kg/m    Physical Exam  Constitutional:       Appearance: She is obese.   HENT:      Head: Normocephalic.      Ears:      Comments: No hearing impairment     Nose: Nose normal.      Mouth/Throat:      Mouth: Mucous membranes are moist.      Pharynx: Oropharynx is clear.      Comments: Speech is clear  Eyes:      Extraocular Movements: Extraocular movements intact.      Conjunctiva/sclera: Conjunctivae normal.   Cardiovascular:      Rate and Rhythm: Normal rate and regular rhythm.      Heart sounds: Normal heart sounds.   Pulmonary:      Effort: Pulmonary effort is normal.      Breath sounds: Normal breath sounds.      Comments: Oxygen at 2L/min via NC  Abdominal:      Comments: Round, obese, soft, non-tender.  Decreased bowel sounds   Genitourinary:     Comments: Continent of urine  Musculoskeletal:      Comments: +1 edema in ankle area bilaterally  No support socks   Skin:     General: Skin is warm and dry.   Neurological:      Mental Status: She is alert.      Comments: Forgetful which is her baseline.  No neurological deficits seen.    Uses a 4WW for mobility but did have back turned and then this NP turned around and she had jumped out of her recliner with no hesitation.   Psychiatric:      Comments: High anxiety with thoughts all over the place.  Playing with her oxygen tubing with her hands not knowing which oxygen supply she is hooked up too.       Component      Latest Ref Rng & Units 6/2/2022   Sodium      133 - 144 mmol/L 138   Potassium      3.4 - 5.3 mmol/L 4.3   Chloride      94 - 109 mmol/L 101   Carbon Dioxide      20 - 32 mmol/L 34 (H)   Anion Gap      3 - 14 mmol/L 3   Urea Nitrogen      7 - 30 mg/dL 10   Creatinine      0.52 - 1.04 mg/dL 0.68   Calcium      8.5 - 10.1 mg/dL 9.6   Glucose      70 - 99 mg/dL 77   GFR Estimate      >60 mL/min/1.73m2 89       ASSESSMENT / PLAN:  (F41.1) JAIME (generalized anxiety disorder)  (primary encounter diagnosis)  (F22) Paranoia  (H)  Comment: this is a huge stress to her today and not re-directable by staff.  Do not feel she needs to be sent in but she is all over the place.  Has the Lorazepam 0.5mg QID and she feels she wants it more frequent.    Staff were going to take her for a walk to calm her down versus giving her a PRN Ativan.  Is on Celexa 10mg daily (room to move with this) Buspar 10mg TID (room to move with this)  Need to change her thinking and feel adding Seroquel 12.5mg BID will help in this process.    1.  Seroquel 12.5mg po BID - sent a script to pharmacy.  Send the daughter an email to let her know the change of her behavior and medication goals with the Seroquel so daughter can watch out for her thought processes as well.  Daughter is very involved in care and e-mails this NP with any concerns.  Plan:: QUEtiapine (SEROQUEL) 25 MG         tablet    (R07.89) Chest wall pain  Comment: currently taking MS Contin 15mg twice a day.  No expression of chest wall pain from CPR event.  Will lower this to 15mg at HS for 7 days then discontinue.  Has PRN oxycodone available and has not used this.  Will keep it for a while and then remove it if not using.  Updated daughter about finishing out the MS Contin as the daughter has wanted her off the Morphine if possible.    (I50.22) Chronic systolic congestive heart failure (H)  Comment: remains on Lasix 40mg in AM and 20mg in afternoon as a good maintenance dose.  Lungs are clear.  Feet do swell and so teaching her about elevation, ambulation that all help circulate the fluid.  No changes.  Facility working with [facility] about the oxygen tank situation.  Updated daughter in email about the oxygen as well    (I10) Primary hypertension  Comment: 120/80's consistently.  No orders for frequent checks.  No medications.      (R41.3) Memory difficulties  Comment: is forgetful which feel the anxiety feeds into the forgetfulness and she can not think clearly.  Encouraged her to walk out  of her apartment to gain confidence that it is ok to get out and she will be fine.        Orders:  1.  Seroquel 12.5mg po BID for anxiety/paranoia  2.  Decrease MS Contin to 15mg po at HS for 7 nights and then discontinue.      Total time with patient visit: 60 minutes including discussions about the POC and care coordination with the patient and staff and writing an email to daughter. Greater than 50% of total time spent with counseling and coordinating care due to heightened anxiety and going back and forth to her to answer questions, get staff to know her behavior, email daughter about the medication changes and what to look for.  Discussion with nursing as feel Ingrid is at risk of falling given new medications, being alone in apartment and anticipatory behaviors she may display.      Electronically signed by  NORA Balderas CNP

## 2022-06-24 DIAGNOSIS — B37.2 CANDIDIASIS OF SKIN: Primary | ICD-10-CM

## 2022-06-24 RX ORDER — NYSTATIN 100000 [USP'U]/G
POWDER TOPICAL 2 TIMES DAILY
Qty: 30 G | Refills: 4 | Status: SHIPPED | OUTPATIENT
Start: 2022-06-24 | End: 2024-01-01

## 2022-06-27 ENCOUNTER — ASSISTED LIVING VISIT (OUTPATIENT)
Dept: GERIATRICS | Facility: CLINIC | Age: 77
End: 2022-06-27
Payer: COMMERCIAL

## 2022-06-27 VITALS
SYSTOLIC BLOOD PRESSURE: 127 MMHG | BODY MASS INDEX: 37.51 KG/M2 | RESPIRATION RATE: 15 BRPM | WEIGHT: 225.4 LBS | OXYGEN SATURATION: 98 % | HEART RATE: 81 BPM | DIASTOLIC BLOOD PRESSURE: 83 MMHG | TEMPERATURE: 97.5 F

## 2022-06-27 DIAGNOSIS — R07.89 CHEST WALL PAIN: ICD-10-CM

## 2022-06-27 DIAGNOSIS — F41.1 GAD (GENERALIZED ANXIETY DISORDER): ICD-10-CM

## 2022-06-27 DIAGNOSIS — I50.22 CHRONIC SYSTOLIC CONGESTIVE HEART FAILURE (H): ICD-10-CM

## 2022-06-27 DIAGNOSIS — R60.0 BILATERAL LOWER EXTREMITY EDEMA: Primary | ICD-10-CM

## 2022-06-27 DIAGNOSIS — F22 PARANOIA (H): ICD-10-CM

## 2022-06-27 NOTE — PROGRESS NOTES
[facility] GERIATRICS  ACUTE/EPISODIC VISIT    [facility] Medical Record Number:  [id number]  Place of Service where encounter took place:  [facility] ([facility]) [[id number]]    Chief Complaint   Patient presents with     RECHECK       HPI:    Ingrid Powell is a 76 year old  (1945), who is being seen today for an episodic care visit.  HPI information obtained from: facility chart records, facility staff and patient report.    Today's concern is:    Diagnoses       Codes Comments    Bilateral lower extremity edema    -  Primary R60.0     Chronic systolic congestive heart failure (H)     I50.22     Chest wall pain     R07.89     JAIME (generalized anxiety disorder)     F41.1     Paranoia (H)     F22     Hospice care patient     Z51.5         Happened to see Ingrid outside sitting 1/2 in the sun.  Complimented her on being outside versus stuck in her apartment.  She asked for this NP to come over to speak with her as she wanted to talk about her feet and the swelling she sees in them.  Currently wearing flip flops and so the straps cut into her tops of her feet leaving indents.  By now it is the afternoon and so with her heart failure and legs being in a dependent position, reminded her that the swelling will be evident by night time and then by morning they will go down.      Went upstairs to speak with nursing and update them of visiting with Ingrid.  Started Seroquel 12.5mg po BID last week and staff notice she is not on her call light so much and not recording down when she had her last Ativan like she did before.  Feel she is tolerating the new medication and it is doing what is desired.      ALLERGIES:    Allergies   Allergen Reactions     Dilaudid [Hydromorphone Hcl] Other (See Comments)     hallucinations     Fosamax [Alendronate Sodium] Rash            Norvasc [Amlodipine Besylate] Hives and Rash     Tegretol [Carbamazepine] Hives and Rash        MEDICATIONS:  Post Discharge  Medication Reconciliation Status: patient was not discharged from an inpatient facility. Medications reconciled today    Current Outpatient Medications   Medication Sig Dispense Refill     ACETAMINOPHEN EXTRA STRENGTH 500 MG tablet TAKE 2 TABS (1,000MG) BY MOUTH THREE TIMES DAILY FORARTHRITIS PAIN 168 tablet 0     bisacodyl (DULCOLAX) 10 MG suppository Place 1 suppository (10 mg) rectally daily as needed for constipation  2 suppository 0     busPIRone (BUSPAR) 10 MG tablet TAKE 1 TABLET (10 MG) BY MOUTH 3 TIMES DAILY 60 tablet 0     carboxymethylcellulose PF (REFRESH PLUS) 0.5 % ophthalmic solution Place 1 drop into both eyes 4 times daily as needed for dry eyes or other (eye irritation) Ongoing management/refills per Hospice 1 each 0     citalopram (CELEXA) 10 MG tablet Take 1 tablet (10 mg) by mouth daily       fluticasone (FLONASE) 50 MCG/ACT nasal spray Spray 2 sprays into both nostrils daily 16 g 3     furosemide (LASIX) 20 MG tablet 40mg by mouth every AM and 20mg by mouth in afternoon 30 tablet 11     gabapentin (NEURONTIN) 600 MG tablet Take 1 tablet (600 mg) by mouth At Bedtime 30 tablet 1     hypromellose-dextran (ARTIFICAL TEARS) 0.1-0.3 % ophthalmic solution Place 1 drop into both eyes 2 times daily Ongoing management/refills per Hospice 15 mL 0     LORazepam (ATIVAN) 0.5 MG tablet Take 1 tablet (0.5 mg) by mouth 4 times daily. May also take 1 tablet (0.5 mg) 2 times daily as needed for anxiety. 120 tablet 1     nitroGLYcerin (NITROSTAT) 0.4 MG sublingual tablet For chest pain place 1 tablet under the tongue every 5 minutes for 3 doses. If symptoms persist 5 minutes after 1st dose call 911. 6 tablet 4     nystatin (MYCOSTATIN) 536863 UNIT/GM external powder Apply topically 2 times daily 30 g 4     oxyCODONE (ROXICODONE) 5 MG tablet Take 1 tablet (5 mg) by mouth every 4 hours as needed for pain 10 tablet 0     polyethylene glycol (MIRALAX) 17 g packet Take 17 g by mouth daily Ongoing management/refills  per Hospice (Patient taking differently: Take 17 g by mouth daily) 30 each 11     QUEtiapine (SEROQUEL) 25 MG tablet Take 0.5 tablets (12.5 mg) by mouth 2 times daily       senna-docusate (SENOKOT-S/PERICOLACE) 8.6-50 MG tablet Take 1 tablet by mouth 2 times daily 60 tablet 11     traZODone (DESYREL) 100 MG tablet Take 1 tablet (100 mg) by mouth At Bedtime 30 tablet 11     Medications reviewed:  Medications reconciled to facility chart and changes were made to reflect current medications as identified as above med list. Below are the changes that were made:   Medications stopped since last EPIC medication reconciliation:   There are no discontinued medications.    Medications started since last [facility] medication reconciliation:  No orders of the defined types were placed in this encounter.      REVIEW OF SYSTEMS:  4 point ROS neg other than the symptoms noted above in the HPI.    PHYSICAL EXAM:  /83   Pulse 81   Temp 97.5  F (36.4  C)   Resp 15   Wt 102.2 kg (225 lb 6.4 oz)   LMP  (LMP Unknown)   SpO2 98%   BMI 37.51 kg/m    Obese female whom is dependent on oxygen sitting outside by others and enjoying the weather.  Went over weather rules as well to avoid dehydration.  Alert, pleasant but worried about her feet.  High anxiety typically but appeared more calm today.  Skin is pink, dry and warm.    +2 edema on tops of feet with the straps indenting.  Feet are normal skin tone.  Not pink or toes cyanotic.    No breathing issues.  Oxygen at 2L/min via NC.  Speech is clear.  Radial pulse is regular and strong.  Uses the 4WW for mobility as she is independent.      No recent labs since [facility] on 6/2/22    ASSESSMENT / PLAN:  (R60.0) Bilateral lower extremity edema  (primary encounter diagnosis)  (I50.22) Chronic systolic congestive heart failure (H)  Comment: basically educating Ingrid again about edema.  Do not expect her to remember everything but this topic is often.  Will leave her at Lasix 40mg po in AM and  20mg in PM.  Have seen her feet in different stages of swelling and given the time of day, expect now there would be fluid build up.      (R07.89) Chest wall pain  Comment: the Morphine is in the last night or two so will see if behaviors change now that the scheduled narcotic is removed.  Has the PRN oxycodone and still no use of this.    (F41.1) JAIME (generalized anxiety disorder)  (F22) Paranoia (H)  Comment: feel that the Seroquel has made a difference or it is all psychological knowing she is getting more medication.  Continues with the Buspar 10mg TID, Celexa 10mg daily and Ativan 0.5mg QID and bid prn.    Still new with the Seroquel and so will wait longer before moving anything around.  Plan: QUEtiapine (SEROQUEL) 25 MG tablet        Orders:  No new orders - stable    Electronically signed by  NORA Balderas CNP

## 2022-06-27 NOTE — LETTER
6/27/2022        RE: Ingrid Powell  [address]  [place] [address]        [facility] GERIATRICS  ACUTE/EPISODIC VISIT    [facility] Medical Record Number:  [id number]  Place of Service where encounter took place:  [facility] ([facility]) [[id number]]    Chief Complaint   Patient presents with     RECHECK       HPI:    Ingrid Powell is a 76 year old  (1945), who is being seen today for an episodic care visit.  HPI information obtained from: facility chart records, facility staff and patient report.    Today's concern is:    Diagnoses       Codes Comments    Bilateral lower extremity edema    -  Primary R60.0     Chronic systolic congestive heart failure (H)     I50.22     Chest wall pain     R07.89     JAIME (generalized anxiety disorder)     F41.1     Paranoia (H)     F22     Hospice care patient     Z51.5         Happened to see Ingrid outside sitting 1/2 in the sun.  Complimented her on being outside versus stuck in her apartment.  She asked for this NP to come over to speak with her as she wanted to talk about her feet and the swelling she sees in them.  Currently wearing flip flops and so the straps cut into her tops of her feet leaving indents.  By now it is the afternoon and so with her heart failure and legs being in a dependent position, reminded her that the swelling will be evident by night time and then by morning they will go down.      Went upstairs to speak with nursing and update them of visiting with Ingrid.  Started Seroquel 12.5mg po BID last week and staff notice she is not on her call light so much and not recording down when she had her last Ativan like she did before.  Feel she is tolerating the new medication and it is doing what is desired.      ALLERGIES:    Allergies   Allergen Reactions     Dilaudid [Hydromorphone Hcl] Other (See Comments)     hallucinations     Fosamax [Alendronate Sodium] Rash            Norvasc [Amlodipine Besylate]  Hives and Rash     Tegretol [Carbamazepine] Hives and Rash        MEDICATIONS:  Post Discharge Medication Reconciliation Status: patient was not discharged from an inpatient facility. Medications reconciled today    Current Outpatient Medications   Medication Sig Dispense Refill     ACETAMINOPHEN EXTRA STRENGTH 500 MG tablet TAKE 2 TABS (1,000MG) BY MOUTH THREE TIMES DAILY FORARTHRITIS PAIN 168 tablet 0     bisacodyl (DULCOLAX) 10 MG suppository Place 1 suppository (10 mg) rectally daily as needed for constipation  2 suppository 0     busPIRone (BUSPAR) 10 MG tablet TAKE 1 TABLET (10 MG) BY MOUTH 3 TIMES DAILY 60 tablet 0     carboxymethylcellulose PF (REFRESH PLUS) 0.5 % ophthalmic solution Place 1 drop into both eyes 4 times daily as needed for dry eyes or other (eye irritation) Ongoing management/refills per Hospice 1 each 0     citalopram (CELEXA) 10 MG tablet Take 1 tablet (10 mg) by mouth daily       fluticasone (FLONASE) 50 MCG/ACT nasal spray Spray 2 sprays into both nostrils daily 16 g 3     furosemide (LASIX) 20 MG tablet 40mg by mouth every AM and 20mg by mouth in afternoon 30 tablet 11     gabapentin (NEURONTIN) 600 MG tablet Take 1 tablet (600 mg) by mouth At Bedtime 30 tablet 1     hypromellose-dextran (ARTIFICAL TEARS) 0.1-0.3 % ophthalmic solution Place 1 drop into both eyes 2 times daily Ongoing management/refills per Hospice 15 mL 0     LORazepam (ATIVAN) 0.5 MG tablet Take 1 tablet (0.5 mg) by mouth 4 times daily. May also take 1 tablet (0.5 mg) 2 times daily as needed for anxiety. 120 tablet 1     nitroGLYcerin (NITROSTAT) 0.4 MG sublingual tablet For chest pain place 1 tablet under the tongue every 5 minutes for 3 doses. If symptoms persist 5 minutes after 1st dose call 911. 6 tablet 4     nystatin (MYCOSTATIN) 915155 UNIT/GM external powder Apply topically 2 times daily 30 g 4     oxyCODONE (ROXICODONE) 5 MG tablet Take 1 tablet (5 mg) by mouth every 4 hours as needed for pain 10 tablet 0      polyethylene glycol (MIRALAX) 17 g packet Take 17 g by mouth daily Ongoing management/refills per Hospice (Patient taking differently: Take 17 g by mouth daily) 30 each 11     QUEtiapine (SEROQUEL) 25 MG tablet Take 0.5 tablets (12.5 mg) by mouth 2 times daily       senna-docusate (SENOKOT-S/PERICOLACE) 8.6-50 MG tablet Take 1 tablet by mouth 2 times daily 60 tablet 11     traZODone (DESYREL) 100 MG tablet Take 1 tablet (100 mg) by mouth At Bedtime 30 tablet 11     Medications reviewed:  Medications reconciled to facility chart and changes were made to reflect current medications as identified as above med list. Below are the changes that were made:   Medications stopped since last EPIC medication reconciliation:   There are no discontinued medications.    Medications started since last [facility] medication reconciliation:  No orders of the defined types were placed in this encounter.      REVIEW OF SYSTEMS:  4 point ROS neg other than the symptoms noted above in the HPI.    PHYSICAL EXAM:  /83   Pulse 81   Temp 97.5  F (36.4  C)   Resp 15   Wt 102.2 kg (225 lb 6.4 oz)   LMP  (LMP Unknown)   SpO2 98%   BMI 37.51 kg/m    Obese female whom is dependent on oxygen sitting outside by others and enjoying the weather.  Went over weather rules as well to avoid dehydration.  Alert, pleasant but worried about her feet.  High anxiety typically but appeared more calm today.  Skin is pink, dry and warm.    +2 edema on tops of feet with the straps indenting.  Feet are normal skin tone.  Not pink or toes cyanotic.    No breathing issues.  Oxygen at 2L/min via NC.  Speech is clear.  Radial pulse is regular and strong.  Uses the 4WW for mobility as she is independent.      No recent labs since [facility] on 6/2/22    ASSESSMENT / PLAN:  (R60.0) Bilateral lower extremity edema  (primary encounter diagnosis)  (I50.22) Chronic systolic congestive heart failure (H)  Comment: basically educating Ingrid again about edema.  Do not expect  her to remember everything but this topic is often.  Will leave her at Lasix 40mg po in AM and 20mg in PM.  Have seen her feet in different stages of swelling and given the time of day, expect now there would be fluid build up.      (R07.89) Chest wall pain  Comment: the Morphine is in the last night or two so will see if behaviors change now that the scheduled narcotic is removed.  Has the PRN oxycodone and still no use of this.    (F41.1) JAIME (generalized anxiety disorder)  (F22) Paranoia (H)  Comment: feel that the Seroquel has made a difference or it is all psychological knowing she is getting more medication.  Continues with the Buspar 10mg TID, Celexa 10mg daily and Ativan 0.5mg QID and bid prn.    Still new with the Seroquel and so will wait longer before moving anything around.  Plan: QUEtiapine (SEROQUEL) 25 MG tablet        Orders:  No new orders - stable    Electronically signed by  NORA Balderas CNP               Sincerely,        NORA Balderas CNP

## 2022-07-06 ENCOUNTER — LAB REQUISITION (OUTPATIENT)
Dept: LAB | Facility: CLINIC | Age: 77
End: 2022-07-06
Payer: MEDICARE

## 2022-07-06 DIAGNOSIS — E03.9 HYPOTHYROIDISM, UNSPECIFIED: ICD-10-CM

## 2022-07-07 ENCOUNTER — ASSISTED LIVING VISIT (OUTPATIENT)
Dept: GERIATRICS | Facility: CLINIC | Age: 77
End: 2022-07-07

## 2022-07-07 VITALS
SYSTOLIC BLOOD PRESSURE: 127 MMHG | HEART RATE: 81 BPM | OXYGEN SATURATION: 96 % | TEMPERATURE: 97.5 F | BODY MASS INDEX: 37.51 KG/M2 | RESPIRATION RATE: 15 BRPM | DIASTOLIC BLOOD PRESSURE: 83 MMHG | WEIGHT: 225.4 LBS

## 2022-07-07 DIAGNOSIS — J44.9 COPD, MILD (H): ICD-10-CM

## 2022-07-07 DIAGNOSIS — F41.9 ANXIETY: ICD-10-CM

## 2022-07-07 DIAGNOSIS — E03.9 ACQUIRED HYPOTHYROIDISM: ICD-10-CM

## 2022-07-07 DIAGNOSIS — F33.1 MAJOR DEPRESSIVE DISORDER, RECURRENT EPISODE, MODERATE (H): ICD-10-CM

## 2022-07-07 DIAGNOSIS — I50.22 CHRONIC SYSTOLIC CONGESTIVE HEART FAILURE (H): Primary | ICD-10-CM

## 2022-07-07 LAB — TSH SERPL DL<=0.005 MIU/L-ACNC: 3.02 MU/L (ref 0.4–4)

## 2022-07-07 PROCEDURE — 36415 COLL VENOUS BLD VENIPUNCTURE: CPT | Mod: ORL | Performed by: NURSE PRACTITIONER

## 2022-07-07 PROCEDURE — P9603 ONE-WAY ALLOW PRORATED MILES: HCPCS | Mod: ORL | Performed by: NURSE PRACTITIONER

## 2022-07-07 PROCEDURE — 84443 ASSAY THYROID STIM HORMONE: CPT | Mod: ORL | Performed by: NURSE PRACTITIONER

## 2022-07-07 RX ORDER — QUETIAPINE FUMARATE 25 MG/1
12.5 TABLET, FILM COATED ORAL 2 TIMES DAILY
Start: 2022-06-20 | End: 2022-07-07

## 2022-07-07 RX ORDER — QUETIAPINE FUMARATE 25 MG/1
12.5 TABLET, FILM COATED ORAL 3 TIMES DAILY
Qty: 45 TABLET | Refills: 4 | Status: SHIPPED | OUTPATIENT
Start: 2022-07-07 | End: 2022-11-08

## 2022-07-07 RX ORDER — LEVOTHYROXINE SODIUM 100 UG/1
100 TABLET ORAL DAILY
Start: 2022-07-07 | End: 2023-05-18

## 2022-07-07 NOTE — PROGRESS NOTES
[facility] GERIATRICS  ACUTE/EPISODIC VISIT    [facility] Medical Record Number:  [id number]  Place of Service where encounter took place:  [facility] ([facility]) [[id number]]    Chief Complaint   Patient presents with     RECHECK       HPI:    Ingrid Powell is a 76 year old  (1945), who is being seen today for an episodic care visit.  HPI information obtained from: facility chart records, facility staff, patient report, [facility] chart review and family/first contact daughter report.    Today's concern is:    Diagnoses       Codes Comments    Chronic systolic congestive heart failure (H)    -  Primary I50.22     Acquired hypothyroidism     E03.9     COPD, mild (H)     J44.9     Anxiety     F41.9     Major depressive disorder, recurrent episode, moderate (H)     F33.1         Came to see Ingrid today as this NP has been communicating with Ingrid's daughter since last visit.  Ingrid has been expressing to her daughter that she has so much anxiety and had just seen Ingrid and she did not say anything about the anxiety and staff thought she was doing well.  Hardly on the call light for help or questions and not taking PRN Lorazepam at times since the start of the Seroquel.      Found Ingrid outside today and so sat down with her to talk about various issues with the above diagnoses.  She now recognizes this NP.  This NP sees little improvements with her but her anxiety inside of her gets the best of her.  Today she was sitting outside with her oxygen off and right by her side.  She looked happy and open to conversation.    Denied chest pain.  No acute SOB.  No troubles with her stomach.    Asked her about seeing a cardiologist today.  Feeling her anxiety is contributed from her cardiac episode and pacemaker placement / removal.  Ingrid was open to this.  Informed her that this NP will ask her daughter about where to refer too.        ALLERGIES:    Allergies   Allergen Reactions     Dilaudid  [Hydromorphone Hcl] Other (See Comments)     hallucinations     Fosamax [Alendronate Sodium] Rash            Norvasc [Amlodipine Besylate] Hives and Rash     Tegretol [Carbamazepine] Hives and Rash        MEDICATIONS:  Post Discharge Medication Reconciliation Status: patient was not discharged from an inpatient facility. Medications reconciled today.    Current Outpatient Medications   Medication Sig Dispense Refill     levothyroxine (SYNTHROID/LEVOTHROID) 100 MCG tablet Take 1 tablet (100 mcg) by mouth daily       ACETAMINOPHEN EXTRA STRENGTH 500 MG tablet TAKE 2 TABS (1,000MG) BY MOUTH THREE TIMES DAILY FORARTHRITIS PAIN 168 tablet 0     bisacodyl (DULCOLAX) 10 MG suppository Place 1 suppository (10 mg) rectally daily as needed for constipation Ongoing management/refills per Hospice (Patient taking differently: Place 10 mg rectally daily as needed for constipation) 2 suppository 0     busPIRone (BUSPAR) 10 MG tablet TAKE 1 TABLET (10 MG) BY MOUTH 3 TIMES DAILY 60 tablet 0     carboxymethylcellulose PF (REFRESH PLUS) 0.5 % ophthalmic solution Place 1 drop into both eyes 4 times daily as needed for dry eyes or other (eye irritation) Ongoing management/refills per Hospice 1 each 0     citalopram (CELEXA) 10 MG tablet Take 1 tablet (10 mg) by mouth daily       fluticasone (FLONASE) 50 MCG/ACT nasal spray Spray 2 sprays into both nostrils daily 16 g 3     furosemide (LASIX) 20 MG tablet 40mg by mouth every AM and 20mg by mouth in afternoon 30 tablet 11     gabapentin (NEURONTIN) 600 MG tablet Take 1 tablet (600 mg) by mouth At Bedtime 30 tablet 1     hypromellose-dextran (ARTIFICAL TEARS) 0.1-0.3 % ophthalmic solution Place 1 drop into both eyes 2 times daily Ongoing management/refills per Hospice 15 mL 0     LORazepam (ATIVAN) 0.5 MG tablet Take 1 tablet (0.5 mg) by mouth 4 times daily. May also take 1 tablet (0.5 mg) 2 times daily as needed for anxiety. 120 tablet 1     nitroGLYcerin (NITROSTAT) 0.4 MG sublingual  "tablet For chest pain place 1 tablet under the tongue every 5 minutes for 3 doses. If symptoms persist 5 minutes after 1st dose call 911. 6 tablet 4     nystatin (MYCOSTATIN) 522860 UNIT/GM external powder Apply topically 2 times daily 30 g 4     oxyCODONE (ROXICODONE) 5 MG tablet Take 1 tablet (5 mg) by mouth every 4 hours as needed for pain 10 tablet 0     polyethylene glycol (MIRALAX) 17 g packet Take 17 g by mouth daily Ongoing management/refills per Hospice (Patient taking differently: Take 17 g by mouth daily) 30 each 11     QUEtiapine (SEROQUEL) 25 MG tablet Take 0.5 tablets (12.5 mg) by mouth 3 times daily 45 tablet 4     senna-docusate (SENOKOT-S/PERICOLACE) 8.6-50 MG tablet Take 1 tablet by mouth 2 times daily 60 tablet 11     traZODone (DESYREL) 100 MG tablet Take 1 tablet (100 mg) by mouth At Bedtime 30 tablet 11     Medications reviewed:  Medications reconciled to facility chart and changes were made to reflect current medications as identified as above med list. Below are the changes that were made:   Medications stopped since last EPIC medication reconciliation:   There are no discontinued medications.    Medications started since last [facility] medication reconciliation:  No orders of the defined types were placed in this encounter.        REVIEW OF SYSTEMS:  4 point ROS neg other than the symptoms noted above in the HPI.  Her concern was her anxiety \"and what are we going to do about it?\"      PHYSICAL EXAM:  /83   Pulse 81   Temp 97.5  F (36.4  C)   Resp 15   Wt 102.2 kg (225 lb 6.4 oz)   LMP  (LMP Unknown)   SpO2 96%   BMI 37.51 kg/m    Physical Exam  Constitutional:       Appearance: Normal appearance. She is obese.   HENT:      Head: Normocephalic.      Ears:      Comments: No hearing issues at regular voice tone  Cardiovascular:      Rate and Rhythm: Normal rate and regular rhythm.   Pulmonary:      Effort: Pulmonary effort is normal.      Breath sounds: Normal breath sounds.      " Comments: Currently was not wearing her oxygen.  The portable tank was sitting on the seat of her 4WW by her side.  She is trying to go without at times.  Spoke today without signs of SOB  Musculoskeletal:      Comments: No edema at this time.  Morning time and flip flops on.  No indentations as she takes off her flip flops.    Legs thick that matches her body stature.     Skin:     General: Skin is warm and dry.   Neurological:      Mental Status: She is alert.      Comments: Oriented to person, place with time being vague here and there.  Forgetful.  Feel her anxiety heightens her memory loss   Psychiatric:         Thought Content: Thought content normal.      Comments: Anxiety.  She comes right out and states it is not in good control         Lab Results   Component Value Date    TSH 3.02 07/07/2022    TSH 62.90 05/12/2022    TSH 5.04 03/18/2021    TSH 2.56 08/06/2019           ASSESSMENT / PLAN:  (I50.22) Chronic systolic congestive heart failure (H)  (primary encounter diagnosis)  Comment: currently taking Lasix 40mg in AM and 20mg in afternoon.  Spoke about how the swelling will start and by the end of the day the feet have retained fluid.  By morning time the feet are skinny again.  Continue to elevate them, ambulate, and be compliant with medications.    (E03.9) Acquired hypothyroidism  Comment: TSH level done today and was restarted on Levothyroxine last month.  Now her TSH level is WNL.  No changes.  Plan: levothyroxine (SYNTHROID/LEVOTHROID) 100 MCG         tablet    (J44.9) COPD, mild (H)  Comment: does have the oxygen due to the heart failure but now she is testing the water of not having it on at rest.  This NP is fine with this as long as she know her limits.    (F41.9) Anxiety  (F33.1) Major depressive disorder, recurrent episode, moderate (HCC)  Comment:  Is on Buspar 10mg TID, Celexa 10mg daily, Lorazepam 0.5mg QID with BID prn and Seroquel 12.5mg twice a day.  Now is off MS contin and did not know  how that was going to work with her anxiety.  Checked her prn oxycodone and has not taken any.    Informed her that the Seroquel will be increased to 12.5mg TID so she can have a dose in afternoon and see how that makes her feel.  Hopefully more comfortable and not so nervous.  Is continues to have issues, would increase the Celexa or Buspar next.    Did e-mail daughter about the change in medication and cardiologist request.      Orders:  1.  Increase the Seroquel to 12.5mg po TID for paranoia and JAIME    Electronically signed by  NORA Balderas CNP

## 2022-07-07 NOTE — LETTER
7/7/2022        RE: Ingrid Powell  [address]  [place] [address]        [facility] GERIATRICS  ACUTE/EPISODIC VISIT    [facility] Medical Record Number:  [id number]  Place of Service where encounter took place:  [facility] ([facility]) [[id number]]    Chief Complaint   Patient presents with     RECHECK       HPI:    Ingrid Powell is a 76 year old  (1945), who is being seen today for an episodic care visit.  HPI information obtained from: facility chart records, facility staff, patient report, [facility] chart review and family/first contact daughter report.    Today's concern is:    Diagnoses       Codes Comments    Chronic systolic congestive heart failure (H)    -  Primary I50.22     Acquired hypothyroidism     E03.9     COPD, mild (H)     J44.9     Anxiety     F41.9     Major depressive disorder, recurrent episode, moderate (H)     F33.1         Came to see Ingrid today as this NP has been communicating with Ingrid's daughter since last visit.  Ingrid has been expressing to her daughter that she has so much anxiety and had just seen Ingrid and she did not say anything about the anxiety and staff thought she was doing well.  Hardly on the call light for help or questions and not taking PRN Lorazepam at times since the start of the Seroquel.      Found Ingrid outside today and so sat down with her to talk about various issues with the above diagnoses.  She now recognizes this NP.  This NP sees little improvements with her but her anxiety inside of her gets the best of her.  Today she was sitting outside with her oxygen off and right by her side.  She looked happy and open to conversation.    Denied chest pain.  No acute SOB.  No troubles with her stomach.    Asked her about seeing a cardiologist today.  Feeling her anxiety is contributed from her cardiac episode and pacemaker placement / removal.  Ingrid was open to this.  Informed her that this NP will ask  her daughter about where to refer too.        ALLERGIES:    Allergies   Allergen Reactions     Dilaudid [Hydromorphone Hcl] Other (See Comments)     hallucinations     Fosamax [Alendronate Sodium] Rash            Norvasc [Amlodipine Besylate] Hives and Rash     Tegretol [Carbamazepine] Hives and Rash        MEDICATIONS:  Post Discharge Medication Reconciliation Status: patient was not discharged from an inpatient facility. Medications reconciled today.    Current Outpatient Medications   Medication Sig Dispense Refill     levothyroxine (SYNTHROID/LEVOTHROID) 100 MCG tablet Take 1 tablet (100 mcg) by mouth daily       ACETAMINOPHEN EXTRA STRENGTH 500 MG tablet TAKE 2 TABS (1,000MG) BY MOUTH THREE TIMES DAILY FORARTHRITIS PAIN 168 tablet 0     bisacodyl (DULCOLAX) 10 MG suppository Place 1 suppository (10 mg) rectally daily as needed for constipation Ongoing management/refills per Hospice (Patient taking differently: Place 10 mg rectally daily as needed for constipation) 2 suppository 0     busPIRone (BUSPAR) 10 MG tablet TAKE 1 TABLET (10 MG) BY MOUTH 3 TIMES DAILY 60 tablet 0     carboxymethylcellulose PF (REFRESH PLUS) 0.5 % ophthalmic solution Place 1 drop into both eyes 4 times daily as needed for dry eyes or other (eye irritation) Ongoing management/refills per Hospice 1 each 0     citalopram (CELEXA) 10 MG tablet Take 1 tablet (10 mg) by mouth daily       fluticasone (FLONASE) 50 MCG/ACT nasal spray Spray 2 sprays into both nostrils daily 16 g 3     furosemide (LASIX) 20 MG tablet 40mg by mouth every AM and 20mg by mouth in afternoon 30 tablet 11     gabapentin (NEURONTIN) 600 MG tablet Take 1 tablet (600 mg) by mouth At Bedtime 30 tablet 1     hypromellose-dextran (ARTIFICAL TEARS) 0.1-0.3 % ophthalmic solution Place 1 drop into both eyes 2 times daily Ongoing management/refills per Hospice 15 mL 0     LORazepam (ATIVAN) 0.5 MG tablet Take 1 tablet (0.5 mg) by mouth 4 times daily. May also take 1 tablet (0.5  "mg) 2 times daily as needed for anxiety. 120 tablet 1     nitroGLYcerin (NITROSTAT) 0.4 MG sublingual tablet For chest pain place 1 tablet under the tongue every 5 minutes for 3 doses. If symptoms persist 5 minutes after 1st dose call 911. 6 tablet 4     nystatin (MYCOSTATIN) 334912 UNIT/GM external powder Apply topically 2 times daily 30 g 4     oxyCODONE (ROXICODONE) 5 MG tablet Take 1 tablet (5 mg) by mouth every 4 hours as needed for pain 10 tablet 0     polyethylene glycol (MIRALAX) 17 g packet Take 17 g by mouth daily Ongoing management/refills per Hospice (Patient taking differently: Take 17 g by mouth daily) 30 each 11     QUEtiapine (SEROQUEL) 25 MG tablet Take 0.5 tablets (12.5 mg) by mouth 3 times daily 45 tablet 4     senna-docusate (SENOKOT-S/PERICOLACE) 8.6-50 MG tablet Take 1 tablet by mouth 2 times daily 60 tablet 11     traZODone (DESYREL) 100 MG tablet Take 1 tablet (100 mg) by mouth At Bedtime 30 tablet 11     Medications reviewed:  Medications reconciled to facility chart and changes were made to reflect current medications as identified as above med list. Below are the changes that were made:   Medications stopped since last EPIC medication reconciliation:   There are no discontinued medications.    Medications started since last [facility] medication reconciliation:  No orders of the defined types were placed in this encounter.        REVIEW OF SYSTEMS:  4 point ROS neg other than the symptoms noted above in the HPI.  Her concern was her anxiety \"and what are we going to do about it?\"      PHYSICAL EXAM:  /83   Pulse 81   Temp 97.5  F (36.4  C)   Resp 15   Wt 102.2 kg (225 lb 6.4 oz)   LMP  (LMP Unknown)   SpO2 96%   BMI 37.51 kg/m    Physical Exam  Constitutional:       Appearance: Normal appearance. She is obese.   HENT:      Head: Normocephalic.      Ears:      Comments: No hearing issues at regular voice tone  Cardiovascular:      Rate and Rhythm: Normal rate and regular rhythm. "   Pulmonary:      Effort: Pulmonary effort is normal.      Breath sounds: Normal breath sounds.      Comments: Currently was not wearing her oxygen.  The portable tank was sitting on the seat of her 4WW by her side.  She is trying to go without at times.  Spoke today without signs of SOB  Musculoskeletal:      Comments: No edema at this time.  Morning time and flip flops on.  No indentations as she takes off her flip flops.    Legs thick that matches her body stature.     Skin:     General: Skin is warm and dry.   Neurological:      Mental Status: She is alert.      Comments: Oriented to person, place with time being vague here and there.  Forgetful.  Feel her anxiety heightens her memory loss   Psychiatric:         Thought Content: Thought content normal.      Comments: Anxiety.  She comes right out and states it is not in good control         Lab Results   Component Value Date    TSH 3.02 07/07/2022    TSH 62.90 05/12/2022    TSH 5.04 03/18/2021    TSH 2.56 08/06/2019           ASSESSMENT / PLAN:  (I50.22) Chronic systolic congestive heart failure (H)  (primary encounter diagnosis)  Comment: currently taking Lasix 40mg in AM and 20mg in afternoon.  Spoke about how the swelling will start and by the end of the day the feet have retained fluid.  By morning time the feet are skinny again.  Continue to elevate them, ambulate, and be compliant with medications.    (E03.9) Acquired hypothyroidism  Comment: TSH level done today and was restarted on Levothyroxine last month.  Now her TSH level is WNL.  No changes.  Plan: levothyroxine (SYNTHROID/LEVOTHROID) 100 MCG         tablet    (J44.9) COPD, mild (H)  Comment: does have the oxygen due to the heart failure but now she is testing the water of not having it on at rest.  This NP is fine with this as long as she know her limits.    (F41.9) Anxiety  (F33.1) Major depressive disorder, recurrent episode, moderate (HCC)  Comment:  Is on Buspar 10mg TID, Celexa 10mg daily,  Lorazepam 0.5mg QID with BID prn and Seroquel 12.5mg twice a day.  Now is off MS contin and did not know how that was going to work with her anxiety.  Checked her prn oxycodone and has not taken any.    Informed her that the Seroquel will be increased to 12.5mg TID so she can have a dose in afternoon and see how that makes her feel.  Hopefully more comfortable and not so nervous.  Is continues to have issues, would increase the Celexa or Buspar next.    Did e-mail daughter about the change in medication and cardiologist request.      Orders:  1.  Increase the Seroquel to 12.5mg po TID for paranoia and JAIME    Electronically signed by  NORA Balderas CNP               Sincerely,        NORA Balderas CNP

## 2022-07-08 PROBLEM — K59.03 DRUG-INDUCED CONSTIPATION: Status: ACTIVE | Noted: 2022-07-08

## 2022-07-08 RX ORDER — BISACODYL 10 MG
10 SUPPOSITORY, RECTAL RECTAL DAILY PRN
Start: 2022-07-08 | End: 2022-09-05

## 2022-07-14 ENCOUNTER — ASSISTED LIVING VISIT (OUTPATIENT)
Dept: GERIATRICS | Facility: CLINIC | Age: 77
End: 2022-07-14
Payer: MEDICARE

## 2022-07-14 VITALS
TEMPERATURE: 97.5 F | DIASTOLIC BLOOD PRESSURE: 83 MMHG | BODY MASS INDEX: 37.51 KG/M2 | SYSTOLIC BLOOD PRESSURE: 127 MMHG | RESPIRATION RATE: 15 BRPM | HEART RATE: 81 BPM | WEIGHT: 225.4 LBS

## 2022-07-14 DIAGNOSIS — M54.50 LUMBAR SPINE PAIN: ICD-10-CM

## 2022-07-14 DIAGNOSIS — K59.01 SLOW TRANSIT CONSTIPATION: Primary | ICD-10-CM

## 2022-07-14 DIAGNOSIS — F41.9 ANXIETY: ICD-10-CM

## 2022-07-14 DIAGNOSIS — M54.6 ACUTE MIDLINE THORACIC BACK PAIN: ICD-10-CM

## 2022-07-14 NOTE — LETTER
7/14/2022        RE: Ingrid Powell  [address]  [place] [address]        [facility] GERIATRICS  ACUTE/EPISODIC VISIT    [facility] Medical Record Number:  [id number]  Place of Service where encounter took place:  [facility] ([facility]) [[id number]]    Chief Complaint   Patient presents with     RECHECK       HPI:    Ingrid Powell is a 76 year old  (1945), who is being seen today for an episodic care visit.  HPI information obtained from: facility chart records, facility staff and patient report.    Today's concern is:    Diagnoses       Codes Comments    Slow transit constipation    -  Primary K59.01     Acute midline thoracic back pain     M54.6     Lumbar spine pain     M54.50     Anxiety     F41.9         Came to see Ingrid today due to staff updating this NP she is complaining of back pain and making her anxiety elevate.    Ingrid was found in her apartment.  Alert and talkative.  Happy to see this NP as she started to c/o back pain.  She is able to point on this NP's back of where it hurts and sense it might be a muscle versus the spine.    Ingrid also stated she is having loose stools.  Reviewed her medications and offered to have Miralax discontinued.  Staff would prefer it be changed to prn in case it is needed if she reverses the other way.  Sometimes Ingrid's memory does not serve her well and so sometimes go back and forth on her cares.  She does complain to her daughter and then daughter will email this NP with the concerns to address and so that is sometimes where there differences occur.      ALLERGIES:    Allergies   Allergen Reactions     Dilaudid [Hydromorphone Hcl] Other (See Comments)     hallucinations     Fosamax [Alendronate Sodium] Rash            Norvasc [Amlodipine Besylate] Hives and Rash     Tegretol [Carbamazepine] Hives and Rash        MEDICATIONS:  Post Discharge Medication Reconciliation Status: patient was not discharged from an  inpatient facility or TCU.  Medications reconciled.     Current Outpatient Medications   Medication Sig Dispense Refill     ACETAMINOPHEN EXTRA STRENGTH 500 MG tablet TAKE 2 TABS (1,000MG) BY MOUTH THREE TIMES DAILY FORARTHRITIS PAIN 168 tablet 0     bisacodyl (DULCOLAX) 10 MG suppository Place 1 suppository (10 mg) rectally daily as needed for constipation       busPIRone (BUSPAR) 10 MG tablet TAKE 1 TABLET (10 MG) BY MOUTH 3 TIMES DAILY 60 tablet 0     carboxymethylcellulose PF (REFRESH PLUS) 0.5 % ophthalmic solution Place 1 drop into both eyes 4 times daily as needed for dry eyes or other (eye irritation) Ongoing management/refills per Hospice 1 each 0     citalopram (CELEXA) 10 MG tablet Take 1 tablet (10 mg) by mouth daily       fluticasone (FLONASE) 50 MCG/ACT nasal spray Spray 2 sprays into both nostrils daily 16 g 3     furosemide (LASIX) 20 MG tablet 40mg by mouth every AM and 20mg by mouth in afternoon 30 tablet 11     gabapentin (NEURONTIN) 600 MG tablet Take 1 tablet (600 mg) by mouth At Bedtime 30 tablet 1     hypromellose-dextran (ARTIFICAL TEARS) 0.1-0.3 % ophthalmic solution Place 1 drop into both eyes 2 times daily Ongoing management/refills per Hospice 15 mL 0     levothyroxine (SYNTHROID/LEVOTHROID) 100 MCG tablet Take 1 tablet (100 mcg) by mouth daily       LORazepam (ATIVAN) 0.5 MG tablet Take 1 tablet (0.5 mg) by mouth 4 times daily. May also take 1 tablet (0.5 mg) 2 times daily as needed for anxiety. 120 tablet 1     nitroGLYcerin (NITROSTAT) 0.4 MG sublingual tablet For chest pain place 1 tablet under the tongue every 5 minutes for 3 doses. If symptoms persist 5 minutes after 1st dose call 911. 6 tablet 4     nystatin (MYCOSTATIN) 385172 UNIT/GM external powder Apply topically 2 times daily 30 g 4     oxyCODONE (ROXICODONE) 5 MG tablet Take 1 tablet (5 mg) by mouth every 4 hours as needed for pain 10 tablet 0     polyethylene glycol (MIRALAX) 17 g packet Take 17 g by mouth daily prn 30 each  11     QUEtiapine (SEROQUEL) 25 MG tablet Take 0.5 tablets (12.5 mg) by mouth 3 times daily 45 tablet 4     senna-docusate (SENOKOT-S/PERICOLACE) 8.6-50 MG tablet Take 1 tablet by mouth 2 times daily 60 tablet 11     traZODone (DESYREL) 100 MG tablet Take 1 tablet (100 mg) by mouth At Bedtime 30 tablet 11       REVIEW OF SYSTEMS:  4 point ROS neg other than the symptoms noted above in the HPI.  Lumbar/thoracic pain.  No c/o of chest pain.  Some shortness of breath with anxiety.          PHYSICAL EXAM:  /83   Pulse 81   Temp 97.5  F (36.4  C)   Resp 15   Wt 102.2 kg (225 lb 6.4 oz)   LMP  (LMP Unknown)   BMI 37.51 kg/m    Found Ingrid in her apartment.  Sitting in her big reclining chair.  Not using the oxygen at rest.  Has the O2 tank on her 4WW in which she ambulates without issues with a slow gait.    Heart rate regular.  No pitting edema.  Does not wear socks in the summer and has slip on shoes.    On her back, the thoracic area is tender by the spine.  More of a muscle pull?  Abdomen is large round and soft.  Bowel sounds are present.    Skin is pink, warm and dry.      ASSESSMENT / PLAN:  (K59.01) Slow transit constipation  (primary encounter diagnosis)  Comment: currently on Miralax 17gm daily and Senna-S 1 tab twice a day.  Now that she is not on scheduled narcotics, her stools could be more loose.    Will keep her on Senna - S and change the Miralax 17gm daily to PRN.    Plan: polyethylene glycol (MIRALAX) 17 GM/Dose powder    (M54.6) Acute midline thoracic back pain  (M54.50) Lumbar spine pain  Comment: offered Ingrid a x-ray of her back to give an idea if something is wrong with her spine.  She thought it was a good idea.    Is not on a muscle relaxer and do not wish to offer her more medications for pain.  Has scheduled Gabapentin, PRN oxycodone and scheduled Tylenol  Has a hx of using lidocaine patches over her sternum area from CPR done.    1.  Lumbar and Thoracic back x-ray due to acute  pain.    (F41.9) Anxiety  Comment: Ingrid's anxiety can get the best of her as she starts to thick overtime.  Helped to speak with her.  Not changing any of her medications to help her anxiety.  Staff will say something if she needs adjustment.  Last week just increased the Seroquel to 12.5mg po TID to help her obsessive thoughts, anxiety, depression.  Seems a little calmer but feel too early to tell as now dealing with another acute issues with her.      Orders:  1.  Change Miralax to daily PRN - constipation  2.  Thoracic and lumbar back x-ray due to acute pain    Electronically signed by  NORA Balderas CNP               Sincerely,        NORA Balderas CNP

## 2022-07-14 NOTE — PROGRESS NOTES
[facility] GERIATRICS  ACUTE/EPISODIC VISIT    [facility] Medical Record Number:  [id number]  Place of Service where encounter took place:  [facility] ([facility]) [[id number]]    Chief Complaint   Patient presents with     RECHECK       HPI:    Ingrid Powell is a 76 year old  (1945), who is being seen today for an episodic care visit.  HPI information obtained from: facility chart records, facility staff and patient report.    Today's concern is:    Diagnoses       Codes Comments    Slow transit constipation    -  Primary K59.01     Acute midline thoracic back pain     M54.6     Lumbar spine pain     M54.50     Anxiety     F41.9         Came to see Ingrid today due to staff updating this NP she is complaining of back pain and making her anxiety elevate.    Ingrid was found in her apartment.  Alert and talkative.  Happy to see this NP as she started to c/o back pain.  She is able to point on this NP's back of where it hurts and sense it might be a muscle versus the spine.    Ingrid also stated she is having loose stools.  Reviewed her medications and offered to have Miralax discontinued.  Staff would prefer it be changed to prn in case it is needed if she reverses the other way.  Sometimes Ingrid's memory does not serve her well and so sometimes go back and forth on her cares.  She does complain to her daughter and then daughter will email this NP with the concerns to address and so that is sometimes where there differences occur.      ALLERGIES:    Allergies   Allergen Reactions     Dilaudid [Hydromorphone Hcl] Other (See Comments)     hallucinations     Fosamax [Alendronate Sodium] Rash            Norvasc [Amlodipine Besylate] Hives and Rash     Tegretol [Carbamazepine] Hives and Rash        MEDICATIONS:  Post Discharge Medication Reconciliation Status: patient was not discharged from an inpatient facility or TCU.  Medications reconciled.     Current Outpatient Medications   Medication  Sig Dispense Refill     ACETAMINOPHEN EXTRA STRENGTH 500 MG tablet TAKE 2 TABS (1,000MG) BY MOUTH THREE TIMES DAILY FORARTHRITIS PAIN 168 tablet 0     bisacodyl (DULCOLAX) 10 MG suppository Place 1 suppository (10 mg) rectally daily as needed for constipation       busPIRone (BUSPAR) 10 MG tablet TAKE 1 TABLET (10 MG) BY MOUTH 3 TIMES DAILY 60 tablet 0     carboxymethylcellulose PF (REFRESH PLUS) 0.5 % ophthalmic solution Place 1 drop into both eyes 4 times daily as needed for dry eyes or other (eye irritation) Ongoing management/refills per Hospice 1 each 0     citalopram (CELEXA) 10 MG tablet Take 1 tablet (10 mg) by mouth daily       fluticasone (FLONASE) 50 MCG/ACT nasal spray Spray 2 sprays into both nostrils daily 16 g 3     furosemide (LASIX) 20 MG tablet 40mg by mouth every AM and 20mg by mouth in afternoon 30 tablet 11     gabapentin (NEURONTIN) 600 MG tablet Take 1 tablet (600 mg) by mouth At Bedtime 30 tablet 1     hypromellose-dextran (ARTIFICAL TEARS) 0.1-0.3 % ophthalmic solution Place 1 drop into both eyes 2 times daily Ongoing management/refills per Hospice 15 mL 0     levothyroxine (SYNTHROID/LEVOTHROID) 100 MCG tablet Take 1 tablet (100 mcg) by mouth daily       LORazepam (ATIVAN) 0.5 MG tablet Take 1 tablet (0.5 mg) by mouth 4 times daily. May also take 1 tablet (0.5 mg) 2 times daily as needed for anxiety. 120 tablet 1     nitroGLYcerin (NITROSTAT) 0.4 MG sublingual tablet For chest pain place 1 tablet under the tongue every 5 minutes for 3 doses. If symptoms persist 5 minutes after 1st dose call 911. 6 tablet 4     nystatin (MYCOSTATIN) 146185 UNIT/GM external powder Apply topically 2 times daily 30 g 4     oxyCODONE (ROXICODONE) 5 MG tablet Take 1 tablet (5 mg) by mouth every 4 hours as needed for pain 10 tablet 0     polyethylene glycol (MIRALAX) 17 g packet Take 17 g by mouth daily prn 30 each 11     QUEtiapine (SEROQUEL) 25 MG tablet Take 0.5 tablets (12.5 mg) by mouth 3 times daily 45  tablet 4     senna-docusate (SENOKOT-S/PERICOLACE) 8.6-50 MG tablet Take 1 tablet by mouth 2 times daily 60 tablet 11     traZODone (DESYREL) 100 MG tablet Take 1 tablet (100 mg) by mouth At Bedtime 30 tablet 11       REVIEW OF SYSTEMS:  4 point ROS neg other than the symptoms noted above in the HPI.  Lumbar/thoracic pain.  No c/o of chest pain.  Some shortness of breath with anxiety.          PHYSICAL EXAM:  /83   Pulse 81   Temp 97.5  F (36.4  C)   Resp 15   Wt 102.2 kg (225 lb 6.4 oz)   LMP  (LMP Unknown)   BMI 37.51 kg/m    Found Ingrid in her apartment.  Sitting in her big reclining chair.  Not using the oxygen at rest.  Has the O2 tank on her 4WW in which she ambulates without issues with a slow gait.    Heart rate regular.  No pitting edema.  Does not wear socks in the summer and has slip on shoes.    On her back, the thoracic area is tender by the spine.  More of a muscle pull?  Abdomen is large round and soft.  Bowel sounds are present.    Skin is pink, warm and dry.      ASSESSMENT / PLAN:  (K59.01) Slow transit constipation  (primary encounter diagnosis)  Comment: currently on Miralax 17gm daily and Senna-S 1 tab twice a day.  Now that she is not on scheduled narcotics, her stools could be more loose.    Will keep her on Senna - S and change the Miralax 17gm daily to PRN.    Plan: polyethylene glycol (MIRALAX) 17 GM/Dose powder    (M54.6) Acute midline thoracic back pain  (M54.50) Lumbar spine pain  Comment: offered Ingrid a x-ray of her back to give an idea if something is wrong with her spine.  She thought it was a good idea.    Is not on a muscle relaxer and do not wish to offer her more medications for pain.  Has scheduled Gabapentin, PRN oxycodone and scheduled Tylenol  Has a hx of using lidocaine patches over her sternum area from CPR done.    1.  Lumbar and Thoracic back x-ray due to acute pain.    (F41.9) Anxiety  Comment: Ingrid's anxiety can get the best of her as she starts to thick  overtime.  Helped to speak with her.  Not changing any of her medications to help her anxiety.  Staff will say something if she needs adjustment.  Last week just increased the Seroquel to 12.5mg po TID to help her obsessive thoughts, anxiety, depression.  Seems a little calmer but feel too early to tell as now dealing with another acute issues with her.      Orders:  1.  Change Miralax to daily PRN - constipation  2.  Thoracic and lumbar back x-ray due to acute pain    Electronically signed by  NORA Balderas CNP

## 2022-07-15 DIAGNOSIS — R52 GENERALIZED PAIN: ICD-10-CM

## 2022-07-15 RX ORDER — OXYCODONE HYDROCHLORIDE 5 MG/1
5 TABLET ORAL EVERY 4 HOURS PRN
Qty: 10 TABLET | Refills: 0 | Status: SHIPPED | OUTPATIENT
Start: 2022-07-15 | End: 2022-08-08

## 2022-07-19 DIAGNOSIS — F41.1 GAD (GENERALIZED ANXIETY DISORDER): ICD-10-CM

## 2022-07-19 RX ORDER — BUSPIRONE HYDROCHLORIDE 10 MG/1
10 TABLET ORAL 3 TIMES DAILY
Qty: 60 TABLET | Refills: 0 | Status: SHIPPED | OUTPATIENT
Start: 2022-07-19 | End: 2022-07-21

## 2022-07-20 DIAGNOSIS — F41.1 GAD (GENERALIZED ANXIETY DISORDER): ICD-10-CM

## 2022-07-21 ENCOUNTER — ASSISTED LIVING VISIT (OUTPATIENT)
Dept: GERIATRICS | Facility: CLINIC | Age: 77
End: 2022-07-21
Payer: MEDICARE

## 2022-07-21 VITALS
RESPIRATION RATE: 15 BRPM | DIASTOLIC BLOOD PRESSURE: 83 MMHG | HEART RATE: 81 BPM | SYSTOLIC BLOOD PRESSURE: 127 MMHG | TEMPERATURE: 97.5 F

## 2022-07-21 DIAGNOSIS — E03.9 ACQUIRED HYPOTHYROIDISM: ICD-10-CM

## 2022-07-21 DIAGNOSIS — K59.01 SLOW TRANSIT CONSTIPATION: ICD-10-CM

## 2022-07-21 DIAGNOSIS — F41.1 GAD (GENERALIZED ANXIETY DISORDER): ICD-10-CM

## 2022-07-21 DIAGNOSIS — I50.22 CHRONIC SYSTOLIC CONGESTIVE HEART FAILURE (H): ICD-10-CM

## 2022-07-21 DIAGNOSIS — M51.34 DDD (DEGENERATIVE DISC DISEASE), THORACIC: Primary | ICD-10-CM

## 2022-07-21 RX ORDER — BUSPIRONE HYDROCHLORIDE 10 MG/1
10 TABLET ORAL 3 TIMES DAILY
Qty: 90 TABLET | Refills: 11 | Status: SHIPPED | OUTPATIENT
Start: 2022-07-21 | End: 2023-06-14

## 2022-07-21 RX ORDER — LORAZEPAM 0.5 MG/1
TABLET ORAL
Qty: 120 TABLET | Refills: 0 | Status: SHIPPED | OUTPATIENT
Start: 2022-07-21 | End: 2022-08-19

## 2022-07-21 NOTE — LETTER
7/21/2022        RE: Ingrid Powell  [address]  [facility] [address]        [facility] GERIATRICS  ACUTE/EPISODIC VISIT    [facility] Medical Record Number:  [id number]  Place of Service where encounter took place:  [facility] ([facility]) [[id number]]    Chief Complaint   Patient presents with     RECHECK       HPI:    Ingrid Powell is a 76 year old  (1945), who is being seen today for an episodic care visit.  HPI information obtained from: facility chart records, facility staff and patient report.    Today's concern is:  Diagnoses       Codes Comments    DDD (degenerative disc disease), thoracic    -  Primary M51.34     Slow transit constipation     K59.01     Acquired hypothyroidism     E03.9     Chronic systolic congestive heart failure (H)     I50.22     JAIME (generalized anxiety disorder)     F41.1           Came to see Ingrid today to follow up with the thoracic back and lumbar spine x-ray.      Found her today outside sitting by herself.  Sat down to speak with her.  X-ray did not show any acute compression fractures.  DDD at all lumbar spaces and lumbar spaces.  No metastatic areas identified.      Spoke about what could be used and offered Biofreeze to the spine and surrounding area as suspect she has muscle aches as well.      Today when asking about Ingrid's stools, she thinks they are harder and infrequent.  Spoke about increasing the Senna-S by one tablet.        ALLERGIES:    Allergies   Allergen Reactions     Dilaudid [Hydromorphone Hcl] Other (See Comments)     hallucinations     Fosamax [Alendronate Sodium] Rash            Norvasc [Amlodipine Besylate] Hives and Rash     Tegretol [Carbamazepine] Hives and Rash        MEDICATIONS:  Post Discharge Medication Reconciliation Status: patient was not discharged from an inpatient facility or TCU. Medications reconciled today.    Current Outpatient Medications   Medication Sig Dispense Refill      busPIRone (BUSPAR) 10 MG tablet Take 1 tablet (10 mg) by mouth 3 times daily 90 tablet 11     Menthol, Topical Analgesic, (BIOFREEZE) 4 % GEL Externally apply topically 3 times daily       senna-docusate (SENOKOT-S/PERICOLACE) 8.6-50 MG tablet 2 tablets by mouth in AM and 1 tablet by mouth in PM 60 tablet 11     ACETAMINOPHEN EXTRA STRENGTH 500 MG tablet TAKE 2 TABS (1,000MG) BY MOUTH THREE TIMES DAILY FORARTHRITIS PAIN 168 tablet 0     bisacodyl (DULCOLAX) 10 MG suppository Place 1 suppository (10 mg) rectally daily as needed for constipation       carboxymethylcellulose PF (REFRESH PLUS) 0.5 % ophthalmic solution Place 1 drop into both eyes 4 times daily as needed for dry eyes or other (eye irritation) Ongoing management/refills per Hospice 1 each 0     citalopram (CELEXA) 10 MG tablet Take 1 tablet (10 mg) by mouth daily       fluticasone (FLONASE) 50 MCG/ACT nasal spray Spray 2 sprays into both nostrils daily 16 g 3     furosemide (LASIX) 20 MG tablet 40mg by mouth every AM and 20mg by mouth in afternoon 30 tablet 11     gabapentin (NEURONTIN) 600 MG tablet Take 1 tablet (600 mg) by mouth At Bedtime 30 tablet 1     hypromellose-dextran (ARTIFICAL TEARS) 0.1-0.3 % ophthalmic solution Place 1 drop into both eyes 2 times daily Ongoing management/refills per Hospice 15 mL 0     levothyroxine (SYNTHROID/LEVOTHROID) 100 MCG tablet Take 1 tablet (100 mcg) by mouth daily       LORazepam (ATIVAN) 0.5 MG tablet TAKE 1 TABLET (0.5MG) BY MOUTH FOUR TIMES DAILY; TAKE 1 TABLET (0.5MG) BY MOUTH TWICE DAILY AS NEEDEDFOR ANXIETY (EXTRA DOSES) 120 tablet 0     nitroGLYcerin (NITROSTAT) 0.4 MG sublingual tablet For chest pain place 1 tablet under the tongue every 5 minutes for 3 doses. If symptoms persist 5 minutes after 1st dose call 911. 6 tablet 4     nystatin (MYCOSTATIN) 178948 UNIT/GM external powder Apply topically 2 times daily 30 g 4     oxyCODONE (ROXICODONE) 5 MG tablet TAKE 1 TABLET (5 MG) BY MOUTH EVERY 4 HOURS AS  NEEDED FOR PAIN 10 tablet 0     polyethylene glycol (MIRALAX) 17 g packet Take 17 g by mouth daily prn 30 each 11     QUEtiapine (SEROQUEL) 25 MG tablet Take 0.5 tablets (12.5 mg) by mouth 3 times daily 45 tablet 4     traZODone (DESYREL) 100 MG tablet Take 1 tablet (100 mg) by mouth At Bedtime 30 tablet 11       REVIEW OF SYSTEMS:  4 point ROS neg other than the symptoms noted above in the HPI.      PHYSICAL EXAM:  /83   Pulse 81   Temp 97.5  F (36.4  C)   Resp 15   LMP  (LMP Unknown)   Alert and oriented x3 but short term memory is not as great.  Time can be vague for her.    Heart rate regular and strong.  No edema in feet.  Wearing flip flops with a good rubber sole.  Lungs are clear.  Oxygen is by her.  Not using at this time.  Skin is pink, warm and dry.  Abdomen is large round and non-tender.  Ambulates with her 4WW and will choose when to leave her apartment.     Latest Reference Range & Units 07/07/22 05:45   TSH 0.40 - 4.00 mU/L 3.02     ASSESSMENT / PLAN:  (M51.34) DDD (degenerative disc disease), thoracic  (primary encounter diagnosis)  Comment: explained to her the x-ray showed DDD in her thoracic area.  Do not feel she needs more pain medication but can start her on Biofreeze scheduled three times a day and dime size applied.  No changes with other medications.  Plan: Menthol, Topical Analgesic, (BIOFREEZE) 4 % GEL    (K59.01) Slow transit constipation  Comment: by description from Ingrid.  Think she needs more Senna-S.  Right now is on 1 tab twice a day.  Will move up slowly as she could respond quickly.    1.  Increase Senna-S to 2 tabs in AM and 1 tab in evening.  Plan: senna-docusate (SENOKOT-S/PERICOLACE) 8.6-50 MG        tablet    (E03.9) Acquired hypothyroidism  Comment: now her TSH is within limits.  Restarted her on 100mcg daily of levothyroxine.  Was at 62.90 for the TSH level in May 2022.    (I50.22) Chronic systolic congestive heart failure (H)  Comment: doing well.  Managed with  40mg of Lasix in am and 20mg in AM.  No K+ at this time.  No edema.  Stable.     (F41.1) JAIME (generalized anxiety disorder)  Comment: just updating Epic list of medications.  Now on 10mg TID of Buspar.  Do feel it helps keep her calmer but has other medications too that work to take on her challenge of anxiety.  Plan: busPIRone (BUSPAR) 10 MG tablet      Orders:  1.  Biofreeze gel 4% dime size amount to thoracic spine right side TID - DJD  2.  Increase Senna-S to 2 tabs in AM and 1 tab in PM for constipation    Electronically signed by  NORA Balderas CNP               Sincerely,        NORA Balderas CNP

## 2022-07-21 NOTE — PROGRESS NOTES
[facility] GERIATRICS  ACUTE/EPISODIC VISIT    [facility] Medical Record Number:  [id number]  Place of Service where encounter took place:  [facility] ([facility]) [[id number]]    Chief Complaint   Patient presents with     RECHECK       HPI:    Ingrid Powell is a 76 year old  (1945), who is being seen today for an episodic care visit.  HPI information obtained from: facility chart records, facility staff and patient report.    Today's concern is:  Diagnoses       Codes Comments    DDD (degenerative disc disease), thoracic    -  Primary M51.34     Slow transit constipation     K59.01     Acquired hypothyroidism     E03.9     Chronic systolic congestive heart failure (H)     I50.22     JAIME (generalized anxiety disorder)     F41.1           Came to see Ingrid today to follow up with the thoracic back and lumbar spine x-ray.      Found her today outside sitting by herself.  Sat down to speak with her.  X-ray did not show any acute compression fractures.  DDD at all lumbar spaces and lumbar spaces.  No metastatic areas identified.      Spoke about what could be used and offered Biofreeze to the spine and surrounding area as suspect she has muscle aches as well.      Today when asking about Ingrid's stools, she thinks they are harder and infrequent.  Spoke about increasing the Senna-S by one tablet.        ALLERGIES:    Allergies   Allergen Reactions     Dilaudid [Hydromorphone Hcl] Other (See Comments)     hallucinations     Fosamax [Alendronate Sodium] Rash            Norvasc [Amlodipine Besylate] Hives and Rash     Tegretol [Carbamazepine] Hives and Rash        MEDICATIONS:  Post Discharge Medication Reconciliation Status: patient was not discharged from an inpatient facility or TCU. Medications reconciled today.    Current Outpatient Medications   Medication Sig Dispense Refill     busPIRone (BUSPAR) 10 MG tablet Take 1 tablet (10 mg) by mouth 3 times daily 90 tablet 11     Menthol,  Topical Analgesic, (BIOFREEZE) 4 % GEL Externally apply topically 3 times daily       senna-docusate (SENOKOT-S/PERICOLACE) 8.6-50 MG tablet 2 tablets by mouth in AM and 1 tablet by mouth in PM 60 tablet 11     ACETAMINOPHEN EXTRA STRENGTH 500 MG tablet TAKE 2 TABS (1,000MG) BY MOUTH THREE TIMES DAILY FORARTHRITIS PAIN 168 tablet 0     bisacodyl (DULCOLAX) 10 MG suppository Place 1 suppository (10 mg) rectally daily as needed for constipation       carboxymethylcellulose PF (REFRESH PLUS) 0.5 % ophthalmic solution Place 1 drop into both eyes 4 times daily as needed for dry eyes or other (eye irritation) Ongoing management/refills per Hospice 1 each 0     citalopram (CELEXA) 10 MG tablet Take 1 tablet (10 mg) by mouth daily       fluticasone (FLONASE) 50 MCG/ACT nasal spray Spray 2 sprays into both nostrils daily 16 g 3     furosemide (LASIX) 20 MG tablet 40mg by mouth every AM and 20mg by mouth in afternoon 30 tablet 11     gabapentin (NEURONTIN) 600 MG tablet Take 1 tablet (600 mg) by mouth At Bedtime 30 tablet 1     hypromellose-dextran (ARTIFICAL TEARS) 0.1-0.3 % ophthalmic solution Place 1 drop into both eyes 2 times daily Ongoing management/refills per Hospice 15 mL 0     levothyroxine (SYNTHROID/LEVOTHROID) 100 MCG tablet Take 1 tablet (100 mcg) by mouth daily       LORazepam (ATIVAN) 0.5 MG tablet TAKE 1 TABLET (0.5MG) BY MOUTH FOUR TIMES DAILY; TAKE 1 TABLET (0.5MG) BY MOUTH TWICE DAILY AS NEEDEDFOR ANXIETY (EXTRA DOSES) 120 tablet 0     nitroGLYcerin (NITROSTAT) 0.4 MG sublingual tablet For chest pain place 1 tablet under the tongue every 5 minutes for 3 doses. If symptoms persist 5 minutes after 1st dose call 911. 6 tablet 4     nystatin (MYCOSTATIN) 095585 UNIT/GM external powder Apply topically 2 times daily 30 g 4     oxyCODONE (ROXICODONE) 5 MG tablet TAKE 1 TABLET (5 MG) BY MOUTH EVERY 4 HOURS AS NEEDED FOR PAIN 10 tablet 0     polyethylene glycol (MIRALAX) 17 g packet Take 17 g by mouth daily prn 30  each 11     QUEtiapine (SEROQUEL) 25 MG tablet Take 0.5 tablets (12.5 mg) by mouth 3 times daily 45 tablet 4     traZODone (DESYREL) 100 MG tablet Take 1 tablet (100 mg) by mouth At Bedtime 30 tablet 11       REVIEW OF SYSTEMS:  4 point ROS neg other than the symptoms noted above in the HPI.      PHYSICAL EXAM:  /83   Pulse 81   Temp 97.5  F (36.4  C)   Resp 15   LMP  (LMP Unknown)   Alert and oriented x3 but short term memory is not as great.  Time can be vague for her.    Heart rate regular and strong.  No edema in feet.  Wearing flip flops with a good rubber sole.  Lungs are clear.  Oxygen is by her.  Not using at this time.  Skin is pink, warm and dry.  Abdomen is large round and non-tender.  Ambulates with her 4WW and will choose when to leave her apartment.     Latest Reference Range & Units 07/07/22 05:45   TSH 0.40 - 4.00 mU/L 3.02     ASSESSMENT / PLAN:  (M51.34) DDD (degenerative disc disease), thoracic  (primary encounter diagnosis)  Comment: explained to her the x-ray showed DDD in her thoracic area.  Do not feel she needs more pain medication but can start her on Biofreeze scheduled three times a day and dime size applied.  No changes with other medications.  Plan: Menthol, Topical Analgesic, (BIOFREEZE) 4 % GEL    (K59.01) Slow transit constipation  Comment: by description from Ingrid.  Think she needs more Senna-S.  Right now is on 1 tab twice a day.  Will move up slowly as she could respond quickly.    1.  Increase Senna-S to 2 tabs in AM and 1 tab in evening.  Plan: senna-docusate (SENOKOT-S/PERICOLACE) 8.6-50 MG        tablet    (E03.9) Acquired hypothyroidism  Comment: now her TSH is within limits.  Restarted her on 100mcg daily of levothyroxine.  Was at 62.90 for the TSH level in May 2022.    (I50.22) Chronic systolic congestive heart failure (H)  Comment: doing well.  Managed with 40mg of Lasix in am and 20mg in AM.  No K+ at this time.  No edema.  Stable.     (F41.1) JAIME (generalized  anxiety disorder)  Comment: just updating Epic list of medications.  Now on 10mg TID of Buspar.  Do feel it helps keep her calmer but has other medications too that work to take on her challenge of anxiety.  Plan: busPIRone (BUSPAR) 10 MG tablet      Orders:  1.  Biofreeze gel 4% dime size amount to thoracic spine right side TID - DJD  2.  Increase Senna-S to 2 tabs in AM and 1 tab in PM for constipation    Electronically signed by  NORA Balderas CNP

## 2022-07-22 RX ORDER — AMOXICILLIN 250 MG
CAPSULE ORAL
Qty: 60 TABLET | Refills: 11 | Status: SHIPPED | OUTPATIENT
Start: 2022-07-22 | End: 2022-09-05

## 2022-08-08 ENCOUNTER — PATIENT OUTREACH (OUTPATIENT)
Dept: GERIATRIC MEDICINE | Facility: CLINIC | Age: 77
End: 2022-08-08

## 2022-08-08 VITALS
RESPIRATION RATE: 16 BRPM | OXYGEN SATURATION: 98 % | SYSTOLIC BLOOD PRESSURE: 106 MMHG | HEART RATE: 86 BPM | TEMPERATURE: 97.6 F | DIASTOLIC BLOOD PRESSURE: 63 MMHG | BODY MASS INDEX: 34.81 KG/M2 | WEIGHT: 209.2 LBS

## 2022-08-08 DIAGNOSIS — R52 GENERALIZED PAIN: ICD-10-CM

## 2022-08-08 RX ORDER — OXYCODONE HYDROCHLORIDE 5 MG/1
5 TABLET ORAL EVERY 4 HOURS PRN
Qty: 20 TABLET | Refills: 0 | Status: SHIPPED | OUTPATIENT
Start: 2022-08-08 | End: 2023-08-09

## 2022-08-08 RX ORDER — OXYCODONE HYDROCHLORIDE 5 MG/1
5 TABLET ORAL EVERY 4 HOURS PRN
Qty: 10 TABLET | Refills: 0 | Status: SHIPPED | OUTPATIENT
Start: 2022-08-08 | End: 2022-08-08

## 2022-08-08 NOTE — PROGRESS NOTES
[facility] GERIATRICS  ACUTE/EPISODIC VISIT    [facility] Medical Record Number:  [id number]  Place of Service where encounter took place:  [facility] ([facility]) [[id number]]    Chief Complaint   Patient presents with     RECHECK     New to [facility] Care Coordination - Care System Change       HPI:    Ingrid Powell is a 76 year old  (1945), who is being seen today for an episodic care visit.  HPI information obtained from: facility chart records, facility staff, patient report and [facility] chart review.    Today's concern is:      ALLERGIES:    Allergies   Allergen Reactions     Dilaudid [Hydromorphone Hcl] Other (See Comments)     hallucinations     Fosamax [Alendronate Sodium] Rash            Norvasc [Amlodipine Besylate] Hives and Rash     Tegretol [Carbamazepine] Hives and Rash        MEDICATIONS:  Post Discharge Medication Reconciliation Status: patient was not discharged from an inpatient facility or TCU. Medications reconciled today    Current Outpatient Medications   Medication Sig Dispense Refill     acetaminophen (TYLENOL) 650 MG CR tablet 1300mg po twice a day and 1300mg po daily prn       Menthol, Topical Analgesic, (BIOFREEZE) 4 % GEL Dime size gel to elbows and neck at HS and then TID prn       senna-docusate (SENOKOT-S/PERICOLACE) 8.6-50 MG tablet Take 1 tablet by mouth every other day 2 tablets by mouth in AM and 1 tablet by mouth in PM 60 tablet 11     busPIRone (BUSPAR) 10 MG tablet Take 1 tablet (10 mg) by mouth 3 times daily 90 tablet 11     carboxymethylcellulose PF (REFRESH PLUS) 0.5 % ophthalmic solution Place 1 drop into both eyes 4 times daily as needed for dry eyes or other (eye irritation) Ongoing management/refills per Hospice 1 each 0     citalopram (CELEXA) 10 MG tablet Take 1 tablet (10 mg) by mouth daily       fluticasone (FLONASE) 50 MCG/ACT nasal spray Spray 2 sprays into both nostrils daily 16 g 3     furosemide (LASIX) 20 MG tablet 40mg by  mouth every AM and 20mg by mouth in afternoon 30 tablet 11     gabapentin (NEURONTIN) 600 MG tablet Take 1 tablet (600 mg) by mouth At Bedtime 30 tablet 1     hypromellose-dextran (ARTIFICAL TEARS) 0.1-0.3 % ophthalmic solution Place 1 drop into both eyes 2 times daily Ongoing management/refills per Hospice 15 mL 0     levothyroxine (SYNTHROID/LEVOTHROID) 100 MCG tablet Take 1 tablet (100 mcg) by mouth daily       LORazepam (ATIVAN) 0.5 MG tablet TAKE 1 TABLET BY MOUTH FOUR TIMES DAILY; TAKE 1 TABLET BY MOUTH TWICE DAILY IF NEEDED FOR ANXIETY 120 tablet 0     nitroGLYcerin (NITROSTAT) 0.4 MG sublingual tablet For chest pain place 1 tablet under the tongue every 5 minutes for 3 doses. If symptoms persist 5 minutes after 1st dose call 911. 6 tablet 4     nystatin (MYCOSTATIN) 433746 UNIT/GM external powder Apply topically 2 times daily 30 g 4     oxyCODONE (ROXICODONE) 5 MG tablet Take 1 tablet (5 mg) by mouth every 4 hours as needed for pain 20 tablet 0     polyethylene glycol (MIRALAX) 17 GM/Dose powder Take 17 g by mouth daily as needed for constipation 510 g 11     QUEtiapine (SEROQUEL) 25 MG tablet Take 0.5 tablets (12.5 mg) by mouth 3 times daily 45 tablet 4     traZODone (DESYREL) 100 MG tablet Take 1 tablet (100 mg) by mouth At Bedtime 30 tablet 11       The health plan new enrollment has happened. I have reviewed the  MDS, the preventative needs,  and facility care plan. The level of care is appropriate. I have reviewed the code status/advanced directives.     Ingrid is aware of insurance change.  No questions from her.      REVIEW OF SYSTEMS:  4 point ROS neg other than the symptoms noted above in the HPI.  No chest pain no shortness of breath.  Spoke about her neck and her elbows for pain.      PHYSICAL EXAM:  /63   Pulse 86   Temp 97.6  F (36.4  C)   Resp 16   Wt 94.9 kg (209 lb 3.2 oz)   LMP  (LMP Unknown)   SpO2 98%   BMI 34.81 kg/m    Physical Exam  Constitutional:       Appearance: Normal  appearance. She is obese.   HENT:      Head: Normocephalic.      Ears:      Comments: Able to hear normal conversation without HAs     Nose: Nose normal.      Mouth/Throat:      Mouth: Mucous membranes are moist.      Pharynx: Oropharynx is clear.   Eyes:      Extraocular Movements: Extraocular movements intact.      Conjunctiva/sclera: Conjunctivae normal.      Comments: Does have glasses.     Neck:      Comments: Neck is sore by end of the day.  No swelling seen. Neck muscles tense.  Cardiovascular:      Rate and Rhythm: Normal rate and regular rhythm.      Heart sounds: Normal heart sounds.   Pulmonary:      Effort: Pulmonary effort is normal.      Breath sounds: Normal breath sounds.   Abdominal:      General: Bowel sounds are normal.      Palpations: Abdomen is soft.      Comments: Round and obese   Genitourinary:     Comments: Able to take self to bathroom  Musculoskeletal:      Comments: Has aches and pains in back, elbows.  No abnormal joints seen   Skin:     General: Skin is warm and dry.   Neurological:      General: No focal deficit present.      Mental Status: She is alert.      Comments: Oriented to person, place and time but does have facts mixed up or forgetfulness.  Staff help anticipate some of her needs as her anxiety then gets her more confused   Psychiatric:      Comments: Anxiety, depression, paranoia that all play with each other          Latest Reference Range & Units 06/02/22 05:30   Sodium 133 - 144 mmol/L 138   Potassium 3.4 - 5.3 mmol/L 4.3   Chloride 94 - 109 mmol/L 101   Carbon Dioxide 20 - 32 mmol/L 34 (H)   Urea Nitrogen 7 - 30 mg/dL 10   Creatinine 0.52 - 1.04 mg/dL 0.68   GFR Estimate >60 mL/min/1.73m2 89   Calcium 8.5 - 10.1 mg/dL 9.6   Anion Gap 3 - 14 mmol/L 3   (H): Data is abnormally high    ASSESSMENT / PLAN:  (I50.22) Chronic systolic heart failure (H)  (primary encounter diagnosis)  (Z99.81) Oxygen dependent  Comment: managed well with Lasix 40mg in AM and 20mg in afternoon.   Labs acceptable.  She drinks fluids regularly.  No edema noted as she wears flip flops.    Still waiting on a specific oxygen concentrator that is portable and able to recycle the air.  Back up on everything and so needs to wait.    Meanwhile she will do without oxygen during the day if she can but has her portable with her.    (I10) Primary hypertension  Comment: 100-120's/60-80's.  No B/P medications at this time.  Last weight in July was 209 down from weight 225 lbs.    (K59.01) Slow transit constipation  Comment: now stools are loose.  On 7/25/22, Senna-S was changed to 1 tab every other day.  No complaints today.    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  See below for her request to help her pain at night.    Anxiety is a major barrier for Ingrid from day to day.  Feel she is much better with the use of Buspar, Celexa, and Seroquel and schedule lorazepam.    Her daughter is a good advocate for her.  If she feels she needs to let this NP know anything she usually will email a note.  Good communication with the daughter has helped manage her mom's health since coming to the [place].      (R52) Generalized pain  Comment: nursing asked if it would be better for Ingrid to have arthritis Tylenol scheduled as it is more than 1000mg given.    Ingrid specifically asked for Biofreeze at night to be scheduled for her elbows and neck and then leave the TID prn the same.  Plan: acetaminophen (TYLENOL) 650 MG CR tablet,         Menthol, Topical Analgesic, (BIOFREEZE) 4 % GEL      Orders:  1.  Discontinue Tylenol orders once new tylenol starts.  2.  Tylenol arthritis 650mg - give 2 tabs (1300mg) po BID and then daily PRN   Biofreeze gel 4%  Dime size applied to elbows and neck at bedtime and  keep TID prn available.    Electronically signed by  NORA Balderas CNP

## 2022-08-08 NOTE — LETTER
August 8, 2022    INGRID LOUIS  [address]  [place] [address]      Dear Ingrid,    Welcome to [facility] ([facility]) ([facility]) health program. My name is Brittney Hong MA, LIZ. I am your [facility] care coordinator. You are eligible for Care Coordination through [facility] [employer].    Here is how Care Coordination works:    I will meet with you in person to determine your care coordination needs    We will develop a plan of care to meet your needs    We will create a service plan showing the services you will receive    We will talk about and coordinate any preventive care needs you have    I will call you soon to see how you are doing and determine what needs you may have. Our goal is to keep you as healthy and independent as possible.     [facility] combines the benefits you may already receive from Medical Assistance, Medicare and the Prescription Drug Coverage Program.    Soon you will receive a new [facility] member identification (ID) card from [facility]. When you receive it, please use this card where you get your health services.    Being in the [place] [facility] ([facility]) ([facility]) Care Coordination program is voluntary and offered to you at no cost. If you ever wish to stop being in the Care Coordination program or have questions, call me at [phone]. If you reach my voice mail, leave a message and your phone number. If you are hearing impaired, call the [place] Relay at 519 or [phone] (zvjoml-cm-klafah relay service).  Sincerely,    Brittney Hong MA, LIZ    E-mail: [email or web address]  Phone: [phone]      [facility] (hospitals) is a health plan that contracts with both Medicare and the [place] Medical Assistance (Medicaid) program to provide benefits of both programs to enrollees. Enrollment in [facility] depends on contract renewal.    [id number] accepted       (12/2019)

## 2022-08-08 NOTE — PROGRESS NOTES
[facility] Care Coordination Contact    Member became effective with  [facility] on 8/1/22 with Lily CORADO.  Previous Health Plan: MA/Fee For Service  Previous Care System: [facility]  Previous care coordinators name and number: [email or web address]  [phone] Waiver Type: EW  Last MMIS Entry: Date 4/25/22 and Type 02 PERS ASSMT   MMIS visit date (and type) if different from above: NA  Services Listed in MMIS: A3 F [facility]-CSP 35 F CASE MGMT 60 C MH SUPERVN  UTF received: No: Requested on 8/8/22 emailed prev CC for transfer docs.  Emailed twice for transfer docs  WL sent to son Dima BERMAN.    Myra Ornelas  Care Management Specialist   [facility]   [phone]

## 2022-08-16 ENCOUNTER — ASSISTED LIVING VISIT (OUTPATIENT)
Dept: GERIATRICS | Facility: CLINIC | Age: 77
End: 2022-08-16
Payer: COMMERCIAL

## 2022-08-16 DIAGNOSIS — K59.01 SLOW TRANSIT CONSTIPATION: ICD-10-CM

## 2022-08-16 DIAGNOSIS — Z99.81 OXYGEN DEPENDENT: ICD-10-CM

## 2022-08-16 DIAGNOSIS — F99 INSOMNIA DUE TO OTHER MENTAL DISORDER: ICD-10-CM

## 2022-08-16 DIAGNOSIS — R41.3 MEMORY DIFFICULTIES: ICD-10-CM

## 2022-08-16 DIAGNOSIS — I10 PRIMARY HYPERTENSION: ICD-10-CM

## 2022-08-16 DIAGNOSIS — I50.22 CHRONIC SYSTOLIC HEART FAILURE (H): Primary | ICD-10-CM

## 2022-08-16 DIAGNOSIS — F33.1 MAJOR DEPRESSIVE DISORDER, RECURRENT EPISODE, MODERATE (H): ICD-10-CM

## 2022-08-16 DIAGNOSIS — F51.05 INSOMNIA DUE TO OTHER MENTAL DISORDER: ICD-10-CM

## 2022-08-16 DIAGNOSIS — R52 GENERALIZED PAIN: ICD-10-CM

## 2022-08-16 NOTE — LETTER
8/8/2022        RE: Ingrid Powell  [address]  [facility] [address]        [facility] GERIATRICS  ACUTE/EPISODIC VISIT    [facility] Medical Record Number:  [id number]  Place of Service where encounter took place:  [facility] ([facility]) [[id number]]    Chief Complaint   Patient presents with     RECHECK     New to [facility] Care Coordination - Care System Change       HPI:    Ingrid Powell is a 76 year old  (1945), who is being seen today for an episodic care visit.  HPI information obtained from: facility chart records, facility staff, patient report and [facility] chart review.    Today's concern is:      ALLERGIES:    Allergies   Allergen Reactions     Dilaudid [Hydromorphone Hcl] Other (See Comments)     hallucinations     Fosamax [Alendronate Sodium] Rash            Norvasc [Amlodipine Besylate] Hives and Rash     Tegretol [Carbamazepine] Hives and Rash        MEDICATIONS:  Post Discharge Medication Reconciliation Status: patient was not discharged from an inpatient facility or TCU. Medications reconciled today    Current Outpatient Medications   Medication Sig Dispense Refill     acetaminophen (TYLENOL) 650 MG CR tablet 1300mg po twice a day and 1300mg po daily prn       Menthol, Topical Analgesic, (BIOFREEZE) 4 % GEL Dime size gel to elbows and neck at HS and then TID prn       senna-docusate (SENOKOT-S/PERICOLACE) 8.6-50 MG tablet Take 1 tablet by mouth every other day 2 tablets by mouth in AM and 1 tablet by mouth in PM 60 tablet 11     busPIRone (BUSPAR) 10 MG tablet Take 1 tablet (10 mg) by mouth 3 times daily 90 tablet 11     carboxymethylcellulose PF (REFRESH PLUS) 0.5 % ophthalmic solution Place 1 drop into both eyes 4 times daily as needed for dry eyes or other (eye irritation) Ongoing management/refills per Hospice 1 each 0     citalopram (CELEXA) 10 MG tablet Take 1 tablet (10 mg) by mouth daily       fluticasone (FLONASE) 50 MCG/ACT  nasal spray Spray 2 sprays into both nostrils daily 16 g 3     furosemide (LASIX) 20 MG tablet 40mg by mouth every AM and 20mg by mouth in afternoon 30 tablet 11     gabapentin (NEURONTIN) 600 MG tablet Take 1 tablet (600 mg) by mouth At Bedtime 30 tablet 1     hypromellose-dextran (ARTIFICAL TEARS) 0.1-0.3 % ophthalmic solution Place 1 drop into both eyes 2 times daily Ongoing management/refills per Hospice 15 mL 0     levothyroxine (SYNTHROID/LEVOTHROID) 100 MCG tablet Take 1 tablet (100 mcg) by mouth daily       LORazepam (ATIVAN) 0.5 MG tablet TAKE 1 TABLET BY MOUTH FOUR TIMES DAILY; TAKE 1 TABLET BY MOUTH TWICE DAILY IF NEEDED FOR ANXIETY 120 tablet 0     nitroGLYcerin (NITROSTAT) 0.4 MG sublingual tablet For chest pain place 1 tablet under the tongue every 5 minutes for 3 doses. If symptoms persist 5 minutes after 1st dose call 911. 6 tablet 4     nystatin (MYCOSTATIN) 817215 UNIT/GM external powder Apply topically 2 times daily 30 g 4     oxyCODONE (ROXICODONE) 5 MG tablet Take 1 tablet (5 mg) by mouth every 4 hours as needed for pain 20 tablet 0     polyethylene glycol (MIRALAX) 17 GM/Dose powder Take 17 g by mouth daily as needed for constipation 510 g 11     QUEtiapine (SEROQUEL) 25 MG tablet Take 0.5 tablets (12.5 mg) by mouth 3 times daily 45 tablet 4     traZODone (DESYREL) 100 MG tablet Take 1 tablet (100 mg) by mouth At Bedtime 30 tablet 11       The health plan new enrollment has happened. I have reviewed the  MDS, the preventative needs,  and facility care plan. The level of care is appropriate. I have reviewed the code status/advanced directives.     Ingrid is aware of insurance change.  No questions from her.      REVIEW OF SYSTEMS:  4 point ROS neg other than the symptoms noted above in the HPI.  No chest pain no shortness of breath.  Spoke about her neck and her elbows for pain.      PHYSICAL EXAM:  /63   Pulse 86   Temp 97.6  F (36.4  C)   Resp 16   Wt 94.9 kg (209 lb 3.2 oz)   LMP   (LMP Unknown)   SpO2 98%   BMI 34.81 kg/m    Physical Exam  Constitutional:       Appearance: Normal appearance. She is obese.   HENT:      Head: Normocephalic.      Ears:      Comments: Able to hear normal conversation without HAs     Nose: Nose normal.      Mouth/Throat:      Mouth: Mucous membranes are moist.      Pharynx: Oropharynx is clear.   Eyes:      Extraocular Movements: Extraocular movements intact.      Conjunctiva/sclera: Conjunctivae normal.      Comments: Does have glasses.     Neck:      Comments: Neck is sore by end of the day.  No swelling seen. Neck muscles tense.  Cardiovascular:      Rate and Rhythm: Normal rate and regular rhythm.      Heart sounds: Normal heart sounds.   Pulmonary:      Effort: Pulmonary effort is normal.      Breath sounds: Normal breath sounds.   Abdominal:      General: Bowel sounds are normal.      Palpations: Abdomen is soft.      Comments: Round and obese   Genitourinary:     Comments: Able to take self to bathroom  Musculoskeletal:      Comments: Has aches and pains in back, elbows.  No abnormal joints seen   Skin:     General: Skin is warm and dry.   Neurological:      General: No focal deficit present.      Mental Status: She is alert.      Comments: Oriented to person, place and time but does have facts mixed up or forgetfulness.  Staff help anticipate some of her needs as her anxiety then gets her more confused   Psychiatric:      Comments: Anxiety, depression, paranoia that all play with each other          Latest Reference Range & Units 06/02/22 05:30   Sodium 133 - 144 mmol/L 138   Potassium 3.4 - 5.3 mmol/L 4.3   Chloride 94 - 109 mmol/L 101   Carbon Dioxide 20 - 32 mmol/L 34 (H)   Urea Nitrogen 7 - 30 mg/dL 10   Creatinine 0.52 - 1.04 mg/dL 0.68   GFR Estimate >60 mL/min/1.73m2 89   Calcium 8.5 - 10.1 mg/dL 9.6   Anion Gap 3 - 14 mmol/L 3   (H): Data is abnormally high    ASSESSMENT / PLAN:  (I50.22) Chronic systolic heart failure (H)  (primary encounter  diagnosis)  (Z99.81) Oxygen dependent  Comment: managed well with Lasix 40mg in AM and 20mg in afternoon.  Labs acceptable.  She drinks fluids regularly.  No edema noted as she wears flip flops.    Still waiting on a specific oxygen concentrator that is portable and able to recycle the air.  Back up on everything and so needs to wait.    Meanwhile she will do without oxygen during the day if she can but has her portable with her.    (I10) Primary hypertension  Comment: 100-120's/60-80's.  No B/P medications at this time.  Last weight in July was 209 down from weight 225 lbs.    (K59.01) Slow transit constipation  Comment: now stools are loose.  On 7/25/22, Senna-S was changed to 1 tab every other day.  No complaints today.    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  See below for her request to help her pain at night.    Anxiety is a major barrier for Ingrid from day to day.  Feel she is much better with the use of Buspar, Celexa, and Seroquel and schedule lorazepam.    Her daughter is a good advocate for her.  If she feels she needs to let this NP know anything she usually will email a note.  Good communication with the daughter has helped manage her mom's health since coming to the [place].      (R52) Generalized pain  Comment: nursing asked if it would be better for Ingrid to have arthritis Tylenol scheduled as it is more than 1000mg given.    Ingrid specifically asked for Biofreeze at night to be scheduled for her elbows and neck and then leave the TID prn the same.  Plan: acetaminophen (TYLENOL) 650 MG CR tablet,         Menthol, Topical Analgesic, (BIOFREEZE) 4 % GEL      Orders:  1.  Discontinue Tylenol orders once new tylenol starts.  2.  Tylenol arthritis 650mg - give 2 tabs  (1300mg) po BID and then daily PRN   Biofreeze gel 4%  Dime size applied to elbows and neck at bedtime and keep TID prn available.    Electronically signed by  NORA Balderas CNP               Sincerely,        NORA Balderas CNP

## 2022-08-19 DIAGNOSIS — F41.1 GAD (GENERALIZED ANXIETY DISORDER): ICD-10-CM

## 2022-08-19 RX ORDER — LORAZEPAM 0.5 MG/1
TABLET ORAL
Qty: 120 TABLET | Refills: 0 | Status: SHIPPED | OUTPATIENT
Start: 2022-08-19 | End: 2022-09-08

## 2022-08-23 NOTE — PROGRESS NOTES
8-23-22   CMS had requested information from previous CM 2xs and none received as of today.  CC reached out to previous [facility] and requested the information again.  CC will then reached out to supervisor of [employer] [facility] and request this as well.    CC is going out to member facility this coming week for another visit so CC will introduce herself to member and staff at this time as well.   Brittney Hong MA [facility] Care Coordinator   [phone] - work cell phone   [phone] - work fax

## 2022-08-25 ENCOUNTER — PATIENT OUTREACH (OUTPATIENT)
Dept: GERIATRIC MEDICINE | Facility: CLINIC | Age: 77
End: 2022-08-25

## 2022-09-03 ENCOUNTER — HEALTH MAINTENANCE LETTER (OUTPATIENT)
Age: 77
End: 2022-09-03

## 2022-09-05 RX ORDER — POLYETHYLENE GLYCOL 3350 17 G/17G
17 POWDER, FOR SOLUTION ORAL DAILY PRN
Qty: 510 G | Refills: 11
Start: 2022-07-14 | End: 2022-11-12

## 2022-09-05 RX ORDER — SENNOSIDES 8.6 MG
CAPSULE ORAL
Start: 2022-08-16 | End: 2023-02-27

## 2022-09-05 RX ORDER — AMOXICILLIN 250 MG
1 CAPSULE ORAL EVERY OTHER DAY
Qty: 60 TABLET | Refills: 11
Start: 2022-07-25 | End: 2022-10-08

## 2022-09-08 ENCOUNTER — PATIENT OUTREACH (OUTPATIENT)
Dept: GERIATRIC MEDICINE | Facility: CLINIC | Age: 77
End: 2022-09-08

## 2022-09-08 DIAGNOSIS — R52 PAIN: ICD-10-CM

## 2022-09-08 DIAGNOSIS — F41.1 GAD (GENERALIZED ANXIETY DISORDER): ICD-10-CM

## 2022-09-08 RX ORDER — GABAPENTIN 600 MG/1
600 TABLET ORAL AT BEDTIME
Qty: 30 TABLET | Refills: 1 | Status: SHIPPED | OUTPATIENT
Start: 2022-09-08 | End: 2022-11-08

## 2022-09-08 RX ORDER — LORAZEPAM 0.5 MG/1
TABLET ORAL
Qty: 120 TABLET | Refills: 0 | Status: SHIPPED | OUTPATIENT
Start: 2022-09-08 | End: 2022-09-21

## 2022-09-08 NOTE — PROGRESS NOTES
[facility] Care Coordination Contact    Order placed with [facility] (p: [phone]; f: [phone]) for XL pull ups- 2 per day.  Order placed on 9/8/22. Database updated. DME tracking updated.    Myra Ornelas  Care Management Specialist   [facility]   [phone]

## 2022-09-21 ENCOUNTER — TELEPHONE (OUTPATIENT)
Dept: GERIATRICS | Facility: CLINIC | Age: 77
End: 2022-09-21

## 2022-09-21 DIAGNOSIS — F41.1 GAD (GENERALIZED ANXIETY DISORDER): ICD-10-CM

## 2022-09-21 RX ORDER — LORAZEPAM 0.5 MG/1
TABLET ORAL
Qty: 120 TABLET | Refills: 0 | Status: SHIPPED | OUTPATIENT
Start: 2022-09-21 | End: 2022-10-13

## 2022-09-21 NOTE — TELEPHONE ENCOUNTER
Central Prior Authorization Team   Phone: [phone]      PA Initiation    Medication: LORazepam (ATIVAN) 0.5 MG tablet - INITIATED  Insurance Company: [employer] - Phone [phone] Fax [phone]  Pharmacy Filling the Rx: [id number] - [place], [place] - [address]  Filling Pharmacy Phone: [phone]  Filling Pharmacy Fax:    Start Date: 9/21/2022

## 2022-09-21 NOTE — TELEPHONE ENCOUNTER
Prior Authorization Retail Medication Request    Medication/Dose: Lorazepam 0.5mg tablet  ICD code (if different than what is on RX):  F41.9  Previously Tried and Failed:  Seroquel, Trazodone, Gabapentin, Celexa, Buspirone  Rationale:  Take 0.5mg four times daily and 0.5mg twice daily as needed.      Insurance Name:  Medicare  Insurance ID:  [id number]     Secondary Insurance Name:  [facility]  Secondary Insurance ID:  [id number]       Pharmacy Information (if different than what is on RX)  Name:  [facility], [address] [place] [address], [address]  Phone:  [phone]

## 2022-09-21 NOTE — TELEPHONE ENCOUNTER
Central Prior Authorization Team   Phone: [phone]      Prior Authorization Approval    Authorization Effective Date: 8/22/2022  Authorization Expiration Date: 9/21/2023  Medication: LORazepam (ATIVAN) 0.5 MG tablet - APPROVED  Insurance Company: [employer] - Phone [phone] Fax [phone]  Which Pharmacy is filling the prescription (Not needed for infusion/clinic administered): [id number] - [place], [place] - [address] [facility]  Pharmacy Notified: Yes  Patient Notified: Yes (pharmacy will notify patient when ready)

## 2022-09-22 VITALS
SYSTOLIC BLOOD PRESSURE: 106 MMHG | DIASTOLIC BLOOD PRESSURE: 63 MMHG | TEMPERATURE: 97.3 F | HEART RATE: 86 BPM | OXYGEN SATURATION: 96 % | RESPIRATION RATE: 16 BRPM | BODY MASS INDEX: 34.81 KG/M2 | WEIGHT: 209.2 LBS

## 2022-09-22 NOTE — PROGRESS NOTES
[facility] GERIATRICS  ACUTE/EPISODIC VISIT    [facility] Medical Record Number:  [id number]  Place of Service where encounter took place:  [facility] ([facility]) [[id number]]    Chief Complaint   Patient presents with     RECHECK       HPI:    Koko Powell is a 77 year old  (1945), who is being seen today for an episodic care visit.  HPI information obtained from: facility chart records, facility staff and patient report.    Today's concern is:    Diagnoses       Codes Comments    Chronic systolic heart failure (H)    -  Primary I50.22     Slow transit constipation     K59.01     JAIME (generalized anxiety disorder)     F41.1     Memory difficulties     R41.3         Came to see Koko today and how she has been doing overall.  Nursing has not mentioned any real issues with her lately.  Feel her anxiety continues to be in control for the most part.      Had seen koko downstairs in the lobby and told her that this NP would meet her in her room.  Found koko sitting in her big recliner.  One thing noticed was how organized her two TV tray tables were next to her.  This NP takes that as she is doing better.  Things are not thrown around.    Koko still has the O2 concentrator running but the tubing is under the tray table and she picked it up to show this NP.  She will still use the O2 in the early morning and then she has the portables.  Nursing and this NP feel that if the O2 was taken away from her, she probably would have a cardiac event.    Koko states her anxiety is stable.  She can think more clear.  Attends activities and so in and out of her apartment.      ALLERGIES:    Allergies   Allergen Reactions     Dilaudid [Hydromorphone Hcl] Other (See Comments)     hallucinations     Fosamax [Alendronate Sodium] Rash            Norvasc [Amlodipine Besylate] Hives and Rash     Tegretol [Carbamazepine] Hives and Rash        MEDICATIONS:  Post Discharge Medication Reconciliation Status:  patient was not discharged from an inpatient facility or TCU. Medications reconciled today    Current Outpatient Medications   Medication Sig Dispense Refill     senna-docusate (SENOKOT-S/PERICOLACE) 8.6-50 MG tablet Take 1 tablet by mouth every other day 60 tablet 11     acetaminophen (TYLENOL) 650 MG CR tablet 1300mg po twice a day and 1300mg po daily prn       busPIRone (BUSPAR) 10 MG tablet Take 1 tablet (10 mg) by mouth 3 times daily 90 tablet 11     carboxymethylcellulose PF (REFRESH PLUS) 0.5 % ophthalmic solution Place 1 drop into both eyes 4 times daily as needed for dry eyes or other (eye irritation) Ongoing management/refills per Hospice 1 each 0     citalopram (CELEXA) 10 MG tablet Take 1 tablet (10 mg) by mouth daily       fluticasone (FLONASE) 50 MCG/ACT nasal spray Spray 2 sprays into both nostrils daily 16 g 3     furosemide (LASIX) 20 MG tablet 40mg by mouth every AM and 20mg by mouth in afternoon 30 tablet 11     gabapentin (NEURONTIN) 600 MG tablet TAKE 1 TABLET (600 MG) BY MOUTH AT BEDTIME 30 tablet 1     hypromellose-dextran (ARTIFICAL TEARS) 0.1-0.3 % ophthalmic solution Place 1 drop into both eyes 2 times daily Ongoing management/refills per Hospice 15 mL 0     levothyroxine (SYNTHROID/LEVOTHROID) 100 MCG tablet Take 1 tablet (100 mcg) by mouth daily       LORazepam (ATIVAN) 0.5 MG tablet TAKE 1 TABLET BY MOUTH FOUR TIMES DAILY; TAKE 1 TABLET BY MOUTH TWICE DAILY IF NEEDED 120 tablet 0     Menthol, Topical Analgesic, (BIOFREEZE) 4 % GEL Dime size gel to elbows and neck at HS and then TID prn       nitroGLYcerin (NITROSTAT) 0.4 MG sublingual tablet For chest pain place 1 tablet under the tongue every 5 minutes for 3 doses. If symptoms persist 5 minutes after 1st dose call 911. 6 tablet 4     nystatin (MYCOSTATIN) 353646 UNIT/GM external powder Apply topically 2 times daily 30 g 4     oxyCODONE (ROXICODONE) 5 MG tablet Take 1 tablet (5 mg) by mouth every 4 hours as needed for pain 20 tablet 0      polyethylene glycol (MIRALAX) 17 GM/Dose powder Take 17 g by mouth daily as needed for constipation 510 g 11     QUEtiapine (SEROQUEL) 25 MG tablet Take 0.5 tablets (12.5 mg) by mouth 3 times daily 45 tablet 4     traZODone (DESYREL) 100 MG tablet Take 1 tablet (100 mg) by mouth At Bedtime 30 tablet 11         REVIEW OF SYSTEMS:  4 point ROS neg other than the symptoms noted above in the HPI.    PHYSICAL EXAM:  /63   Pulse 86   Temp 97.3  F (36.3  C)   Resp 16   Wt 94.9 kg (209 lb 3.2 oz)   LMP  (LMP Unknown)   SpO2 96%   BMI 34.81 kg/m    Obese female neatly groomed sitting in her recliner.  Able to transfer self as well as be independent with walking with the 4WW.  She is smiling and talking about different subjects but states her memory is not the best.  She will depend on her daughter to help make major decisions.  Heart rate regular and strong.  No pitting edema in feet but by end of the day she does have some accumulated.    Abdomen is large round and soft to touch.  Bowel movements are better now with her regimen.  Continent of bowel and bladder.  Skin is pink, warm and dry.    Does have reading glasses.  EOMs intact, sclera's clear.  No inflamed eyelids.    No recent labs.    ASSESSMENT / PLAN:  (I50.22) Chronic systolic heart failure (H)  (primary encounter diagnosis)  Comment: Ingrid is managed on Lasix 40 mg in the morning and 20 mg in the afternoon.  She does not have any potassium supplement.  No changes with this medication regimen talked about going to a cardiologist in the past but that subject was tabled till another time.    (K59.01) Slow transit constipation  Comment: Ingrid is satisfied.  She does take 1 senna S every other day and then has been daily as needed.  No changes    (F41.1) JAIME (generalized anxiety disorder)  (R41.3) Memory difficulties  Comment: Took a long time to find combination of medications to help with Ingrid's anxiety.  She does take lorazepam 4 times a day  scheduled and can have twice a day as needed.  Has not used the as needed dosing in some time.  Ingrid also takes BuSpar 10 mg 3 times a day, Celexa 10 mg daily and also Seroquel 12.5 mg 3 times a day.  Once the Seroquel was added her behaviors really changed for the positive.      Orders:  No new orders today    Electronically signed by  NORA Balderas CNP

## 2022-09-23 ENCOUNTER — PATIENT OUTREACH (OUTPATIENT)
Dept: GERIATRIC MEDICINE | Facility: CLINIC | Age: 77
End: 2022-09-23

## 2022-09-23 NOTE — PROGRESS NOTES
[facility] Care Coordination Contact    Internal CC change effective 10/01/2022.  Mailed member CC Change letter.  Additional tasks to be completed by CMS include: update database & EPIC, enter CC Change in MMIS, and move member files on Q drive.    Arabella Montgomery  Case Management Specialist  [facility]  [phone]

## 2022-09-23 NOTE — LETTER
September 23, 2022    INGRID LOUIS  [facility] ST[facility]  [address] [address]      Dear Ingrid:    As a member of [facility] ([facility] ([facility]), you are provided a care coordinator. I will be your new care coordinator as of 10/01/2022. I will be calling you soon to see how you are doing and determine your needs.    If you have any questions, please feel free to call me at [phone]. If you reach my voice mail, please leave a message and your phone number. If you are hearing impaired, please call the [place] Relay at 511 or [phone] ([id number] relay service).    I look forward to speaking with you soon.    Sincerely,       Angela Reid RN  [phone]  [email or web address] [email or web address] is a health plan that contracts with both Medicare and the [place] Medical Assistance (Medicaid) program to provide benefits of both programs to enrollees. Enrollment in [facility] depends on contract renewal.      [facility]  [id number] DHS Approved ([id number])  [id number] (11/18)

## 2022-09-26 ENCOUNTER — ASSISTED LIVING VISIT (OUTPATIENT)
Dept: GERIATRICS | Facility: CLINIC | Age: 77
End: 2022-09-26
Payer: COMMERCIAL

## 2022-09-26 DIAGNOSIS — K59.01 SLOW TRANSIT CONSTIPATION: ICD-10-CM

## 2022-09-26 DIAGNOSIS — R41.3 MEMORY DIFFICULTIES: ICD-10-CM

## 2022-09-26 DIAGNOSIS — F41.1 GAD (GENERALIZED ANXIETY DISORDER): ICD-10-CM

## 2022-09-26 DIAGNOSIS — I50.22 CHRONIC SYSTOLIC HEART FAILURE (H): Primary | ICD-10-CM

## 2022-09-26 NOTE — PROGRESS NOTES
[facility] Rehabilitation Service    Outpatient Physical Therapy Discharge Note  Patient: Ingrid Powell  : 1945    Beginning/End Dates of Reporting Period:  21 to     Referring Provider: Josy Mendieta Diagnosis: Neck pain     Client Self Report: Pt relates neck is doing better, no restrictions    Objective Measurements:  Objective Measure: Cervical ROM  Details: (P) R rot: 55, L: 55     Objective Measure: L shoulder ROM  Details: (P) flex: 160, abd 140, IR: L2       Goals:  Goal Identifier Neck ROM   Goal Description Pt will demonstrate 55 deg neck rotation w/o pain to be able look when crossing stretch    Target Date 21   Date Met   21   Progress (detail required for progress note):  goal met     Goal Identifier L shoulder - to do hair   Goal Description Pt will demonstrate 120 deg shoulder flex to be able to style hair   Target Date 21   Date Met  21   Progress (detail required for progress note):  goal met     Goal Identifier IR   Goal Description Pt will dmeonstrate improved IR/ER ROM to be able to wash back.    Target Date 21   Date Met  21   Progress (detail required for progress note):  goal met     Goal Identifier HEP    Goal Description Pt will be compliant and competent in HEP to allow them to achieve maximal strength and reduce pain    Target Date 21   Date Met   21   Progress (detail required for progress note):  goal met     Pt met all goals thus being discharged at this time.         Plan:  Discharge from therapy.    Discharge:    Reason for Discharge: Patient has met all goals.    Equipment Issued: NA    Discharge Plan: Patient to continue home program.

## 2022-09-26 NOTE — LETTER
9/22/2022        RE: Koko Powell  [facility]  [address]  [facility] [address]        [facility] GERIATRICS  ACUTE/EPISODIC VISIT    [facility] Medical Record Number:  [id number]  [facility] [[id number]    Chief Complaint   Patient presents with     RECHECK       HPI:    Koko Powell is a 77 year old  (1945), who is being seen today for an episodic care visit.  HPI information obtained from: facility chart records, facility staff and patient report.    Today's concern is:    Diagnoses       Codes Comments    Chronic systolic heart failure (H)    -  Primary I50.22     Slow transit constipation     K59.01     JAIME (generalized anxiety disorder)     F41.1     Memory difficulties     R41.3         Came to see Koko today and how she has been doing overall.  Nursing has not mentioned any real issues with her lately.  Feel her anxiety continues to be in control for the most part.      Had seen koko downstairs in the lobby and told her that this NP would meet her in her room.  Found koko sitting in her big recliner.  One thing noticed was how organized her two TV tray tables were next to her.  This NP takes that as she is doing better.  Things are not thrown around.    Koko still has the O2 concentrator running but the tubing is under the tray table and she picked it up to show this NP.  She will still use the O2 in the early morning and then she has the portables.  Nursing and this NP feel that if the O2 was taken away from her, she probably would have a cardiac event.    Koko states her anxiety is stable.  She can think more clear.  Attends activities and so in and out of her apartment.      ALLERGIES:    Allergies   Allergen Reactions     Dilaudid [Hydromorphone Hcl] Other (See Comments)     hallucinations     Fosamax [Alendronate Sodium] Rash            Norvasc [Amlodipine Besylate] Hives and Rash      Tegretol [Carbamazepine] Hives and Rash        MEDICATIONS:  Post Discharge Medication Reconciliation Status: patient was not discharged from an inpatient facility or TCU. Medications reconciled today    Current Outpatient Medications   Medication Sig Dispense Refill     senna-docusate (SENOKOT-S/PERICOLACE) 8.6-50 MG tablet Take 1 tablet by mouth every other day 60 tablet 11     acetaminophen (TYLENOL) 650 MG CR tablet 1300mg po twice a day and 1300mg po daily prn       busPIRone (BUSPAR) 10 MG tablet Take 1 tablet (10 mg) by mouth 3 times daily 90 tablet 11     carboxymethylcellulose PF (REFRESH PLUS) 0.5 % ophthalmic solution Place 1 drop into both eyes 4 times daily as needed for dry eyes or other (eye irritation) Ongoing management/refills per Hospice 1 each 0     citalopram (CELEXA) 10 MG tablet Take 1 tablet (10 mg) by mouth daily       fluticasone (FLONASE) 50 MCG/ACT nasal spray Spray 2 sprays into both nostrils daily 16 g 3     furosemide (LASIX) 20 MG tablet 40mg by mouth every AM and 20mg by mouth in afternoon 30 tablet 11     gabapentin (NEURONTIN) 600 MG tablet TAKE 1 TABLET (600 MG) BY MOUTH AT BEDTIME 30 tablet 1     hypromellose-dextran (ARTIFICAL TEARS) 0.1-0.3 % ophthalmic solution Place 1 drop into both eyes 2 times daily Ongoing management/refills per Hospice 15 mL 0     levothyroxine (SYNTHROID/LEVOTHROID) 100 MCG tablet Take 1 tablet (100 mcg) by mouth daily       LORazepam (ATIVAN) 0.5 MG tablet TAKE 1 TABLET BY MOUTH FOUR TIMES DAILY; TAKE 1 TABLET BY MOUTH TWICE DAILY IF NEEDED 120 tablet 0     Menthol, Topical Analgesic, (BIOFREEZE) 4 % GEL Dime size gel to elbows and neck at HS and then TID prn       nitroGLYcerin (NITROSTAT) 0.4 MG sublingual tablet For chest pain place 1 tablet under the tongue every 5 minutes for 3 doses. If symptoms persist 5 minutes after 1st dose call 911. 6 tablet 4     nystatin (MYCOSTATIN) 926635 UNIT/GM external powder Apply topically 2 times daily 30 g 4      oxyCODONE (ROXICODONE) 5 MG tablet Take 1 tablet (5 mg) by mouth every 4 hours as needed for pain 20 tablet 0     polyethylene glycol (MIRALAX) 17 GM/Dose powder Take 17 g by mouth daily as needed for constipation 510 g 11     QUEtiapine (SEROQUEL) 25 MG tablet Take 0.5 tablets (12.5 mg) by mouth 3 times daily 45 tablet 4     traZODone (DESYREL) 100 MG tablet Take 1 tablet (100 mg) by mouth At Bedtime 30 tablet 11         REVIEW OF SYSTEMS:  4 point ROS neg other than the symptoms noted above in the HPI.    PHYSICAL EXAM:  /63   Pulse 86   Temp 97.3  F (36.3  C)   Resp 16   Wt 94.9 kg (209 lb 3.2 oz)   LMP  (LMP Unknown)   SpO2 96%   BMI 34.81 kg/m    Obese female neatly groomed sitting in her recliner.  Able to transfer self as well as be independent with walking with the 4WW.  She is smiling and talking about different subjects but states her memory is not the best.  She will depend on her daughter to help make major decisions.  Heart rate regular and strong.  No pitting edema in feet but by end of the day she does have some accumulated.    Abdomen is large round and soft to touch.  Bowel movements are better now with her regimen.  Continent of bowel and bladder.  Skin is pink, warm and dry.    Does have reading glasses.  EOMs intact, sclera's clear.  No inflamed eyelids.    No recent labs.    ASSESSMENT / PLAN:  (I50.22) Chronic systolic heart failure (H)  (primary encounter diagnosis)  Comment: Ingrid is managed on Lasix 40 mg in the morning and 20 mg in the afternoon.  She does not have any potassium supplement.  No changes with this medication regimen talked about going to a cardiologist in the past but that subject was tabled till another time.    (K59.01) Slow transit constipation  Comment: Ingrid is satisfied.  She does take 1 senna S every other day and then has been daily as needed.  No changes    (F41.1) JAIME (generalized anxiety disorder)  (R41.3) Memory difficulties  Comment: Took a long time  to find combination of medications to help with Ingrid's anxiety.  She does take lorazepam 4 times a day scheduled and can have twice a day as needed.  Has not used the as needed dosing in some time.  Ingrid also takes BuSpar 10 mg 3 times a day, Celexa 10 mg daily and also Seroquel 12.5 mg 3 times a day.  Once the Seroquel was added her behaviors really changed for the positive.      Orders:  No new orders today    Electronically signed by  NORA Balderas CNP               Sincerely,        NORA Balderas CNP

## 2022-09-28 ENCOUNTER — PATIENT OUTREACH (OUTPATIENT)
Dept: GERIATRIC MEDICINE | Facility: CLINIC | Age: 77
End: 2022-09-28

## 2022-09-28 NOTE — PROGRESS NOTES
CC was notified from daughter via text that member has outstanding bill from [facility] and they are unable to provide incontinence supplies until this is resolved.   CC called [facility] and found that O2 was supplied while member as on HO. CC then asked to be transferred to  and CC left VM.     CC called and talked with [facility] and found that member was discharge from Hospice on 4-24 per her own choice due to requesting to go to ED due to SOB.    CC then sent Epic message to NP at the facility asking if she thought that member needed to still have O2 and CC will wait for reply from  to see what the reason is behind the bill from O2.   CC will f/u as needed.   Britteny Hong MA [facility] Care Coordinator   [phone] - work cell phone   [phone] - work fax

## 2022-09-28 NOTE — PROGRESS NOTES
Completed HRA on 8-25-22 with DON at the facility and CC reached out to family as well and left VM   Brittney Hong MA [facility] Coordinator   [phone] - work cell phone   [phone] - work fax

## 2022-09-28 NOTE — PROGRESS NOTES
[facility] Care System Change (Transfer)    Member is new enrollee to [facility] effective 8-1-22 with [facility] health [facility]. Member transferred from [facility].    No home visit required because this care coordinator (CC) has received all required documentation from the previous CC.    CC was at the facility so met with the DON and called placed to member family to introduce.  Member was sleeping when CC attemtped to visit her for introduction.  Confirmed with member that the welcome letter with writer's name and contact information has been received.  Reviewed LTCC/Health Risk Assessment (HRA) and POC with member. No changes noted.  Transitional HRA completed. Care Plan Summary updated and reflects current services.  Required referral authorization information communicated to CMS: Yes  MMIS entry completed by: Care Coordinator  Writer reviewed the following with member:  ER visits: No  Hospitalizations: No  TCU stays: No  Significant health status changes: none noted by DON.  Member is still struggling with anxiety at time but staff is able to address.    Falls/Injuries: No  ADL/IADL changes: No  Changes in services: No    Follow-Up Plan: Member informed of future contact, plan to f/u with member with at next regularly scheduled contact.  Contact information shared with member and family, encouraged member to call with any questions or concerns.    Brittney Hong MA [facility] Care Coordinator   [phone] - zwdl cell phone   [phone] - vtwr fax

## 2022-09-29 NOTE — PROGRESS NOTES
[facility] Care Coordination Contact    [facility] and notified [facility] of new CC.  Angela Reid RN  [facility]  [phone]

## 2022-09-29 NOTE — PROGRESS NOTES
[facility] Care Coordination Contact    Completed following tasks: Submitted referrals/auths for AL per CC.   Myra Ornelas  Care Management Specialist   [facility]   [phone]

## 2022-09-30 ENCOUNTER — PATIENT OUTREACH (OUTPATIENT)
Dept: GERIATRIC MEDICINE | Facility: CLINIC | Age: 77
End: 2022-09-30

## 2022-09-30 NOTE — PROGRESS NOTES
Pull ups delivered per [facility]. CC notified.     Myra Ornelas  Care Management Specialist   [facility]   [phone]

## 2022-10-08 RX ORDER — AMOXICILLIN 250 MG
1 CAPSULE ORAL EVERY OTHER DAY
Qty: 60 TABLET | Refills: 11
Start: 2022-10-08 | End: 2023-02-21

## 2022-10-13 DIAGNOSIS — F41.1 GAD (GENERALIZED ANXIETY DISORDER): ICD-10-CM

## 2022-10-13 RX ORDER — LORAZEPAM 0.5 MG/1
TABLET ORAL
Qty: 120 TABLET | Refills: 0 | Status: SHIPPED | OUTPATIENT
Start: 2022-10-13 | End: 2022-11-02

## 2022-11-02 DIAGNOSIS — F41.1 GAD (GENERALIZED ANXIETY DISORDER): ICD-10-CM

## 2022-11-02 RX ORDER — LORAZEPAM 0.5 MG/1
TABLET ORAL
Qty: 120 TABLET | Refills: 3 | Status: SHIPPED | OUTPATIENT
Start: 2022-11-02 | End: 2023-03-09

## 2022-11-08 ENCOUNTER — ASSISTED LIVING VISIT (OUTPATIENT)
Dept: GERIATRICS | Facility: CLINIC | Age: 77
End: 2022-11-08
Payer: COMMERCIAL

## 2022-11-08 VITALS
OXYGEN SATURATION: 96 % | TEMPERATURE: 97.5 F | DIASTOLIC BLOOD PRESSURE: 64 MMHG | SYSTOLIC BLOOD PRESSURE: 91 MMHG | RESPIRATION RATE: 15 BRPM | HEART RATE: 77 BPM

## 2022-11-08 DIAGNOSIS — R52 PAIN: ICD-10-CM

## 2022-11-08 DIAGNOSIS — K59.01 SLOW TRANSIT CONSTIPATION: ICD-10-CM

## 2022-11-08 DIAGNOSIS — F41.1 GAD (GENERALIZED ANXIETY DISORDER): ICD-10-CM

## 2022-11-08 DIAGNOSIS — Z99.81 OXYGEN DEPENDENT: ICD-10-CM

## 2022-11-08 DIAGNOSIS — I10 PRIMARY HYPERTENSION: ICD-10-CM

## 2022-11-08 DIAGNOSIS — I50.22 CHRONIC SYSTOLIC HEART FAILURE (H): Primary | ICD-10-CM

## 2022-11-08 DIAGNOSIS — F22 PARANOIA (H): ICD-10-CM

## 2022-11-08 RX ORDER — QUETIAPINE FUMARATE 25 MG/1
12.5 TABLET, FILM COATED ORAL 3 TIMES DAILY
Qty: 45 TABLET | Refills: 3 | Status: SHIPPED | OUTPATIENT
Start: 2022-11-08 | End: 2023-02-24

## 2022-11-08 RX ORDER — GABAPENTIN 600 MG/1
600 TABLET ORAL AT BEDTIME
Qty: 30 TABLET | Refills: 3 | Status: SHIPPED | OUTPATIENT
Start: 2022-11-08 | End: 2023-02-24

## 2022-11-08 NOTE — LETTER
11/8/2022        RE: Ingrid Powell  [facility]  [address]  [facility] [address]        [facility] GERIATRICS  ACUTE/EPISODIC VISIT    [facility] Medical Record Number:  [id number]  [facility] [[id number][facility]    Chief Complaint   Patient presents with     RECHECK       HPI:    Ingrid Powell is a 77 year old  (1945), who is being seen today for an episodic care visit.  HPI information obtained from: facility chart records, facility staff and patient report.    Today's concern is:    Diagnoses       Codes Comments    Chronic systolic heart failure (H)    -  Primary I50.22     Oxygen dependent     Z99.81     Primary hypertension     I10     Slow transit constipation     K59.01     JAIME (generalized anxiety disorder)     F41.1         Came to see Ingrid today and how she was doing.  Knocked on her door and the door was locked.  Looked down the limon and she was walking with her walker.  She said she just went to vote next door.    Sat and spoke with her for a little while.  She was in a good mood.  States she still has occasional anxiety and so that crosses out doing a trial reduction of her anxiety medications.      Has the portable O2 tank on her 4WW.  Concentrator in her room.  She uses the O2 at night and might occasionally during the day.  Feel some of it during the day is a anxiety issue.  Otherwise no new concerns.    ALLERGIES:    Allergies   Allergen Reactions     Dilaudid [Hydromorphone Hcl] Other (See Comments)     hallucinations     Fosamax [Alendronate Sodium] Rash            Norvasc [Amlodipine Besylate] Hives and Rash     Tegretol [Carbamazepine] Hives and Rash        MEDICATIONS:  Post Discharge Medication Reconciliation Status: patient was not discharged from an inpatient facility or TCU. Medications reviewed today.    Current Outpatient Medications   Medication Sig Dispense Refill     acetaminophen  (TYLENOL) 650 MG CR tablet 1300mg po twice a day and 1300mg po daily prn       busPIRone (BUSPAR) 10 MG tablet Take 1 tablet (10 mg) by mouth 3 times daily 90 tablet 11     carboxymethylcellulose PF (REFRESH PLUS) 0.5 % ophthalmic solution Place 1 drop into both eyes 4 times daily as needed for dry eyes or other (eye irritation) Ongoing management/refills per Hospice 1 each 0     citalopram (CELEXA) 10 MG tablet Take 1 tablet (10 mg) by mouth daily       fluticasone (FLONASE) 50 MCG/ACT nasal spray Spray 2 sprays into both nostrils daily 16 g 3     furosemide (LASIX) 20 MG tablet 40mg by mouth every AM and 20mg by mouth in afternoon 30 tablet 11     gabapentin (NEURONTIN) 600 MG tablet TAKE 1 TABLET (600 MG) BY MOUTH AT BEDTIME 30 tablet 3     hypromellose-dextran (ARTIFICAL TEARS) 0.1-0.3 % ophthalmic solution Place 1 drop into both eyes 2 times daily Ongoing management/refills per Hospice 15 mL 0     levothyroxine (SYNTHROID/LEVOTHROID) 100 MCG tablet Take 1 tablet (100 mcg) by mouth daily       LORazepam (ATIVAN) 0.5 MG tablet TAKE 1 TABLET BY MOUTH FOUR TIMES DAILY; TAKE 1 TABLET BY MOUTH TWICE DAILY IF NEEDED 120 tablet 3     Menthol, Topical Analgesic, (BIOFREEZE) 4 % GEL Dime size gel to elbows and neck at HS and then TID prn       nitroGLYcerin (NITROSTAT) 0.4 MG sublingual tablet For chest pain place 1 tablet under the tongue every 5 minutes for 3 doses. If symptoms persist 5 minutes after 1st dose call 911. 6 tablet 4     nystatin (MYCOSTATIN) 069551 UNIT/GM external powder Apply topically 2 times daily 30 g 4     oxyCODONE (ROXICODONE) 5 MG tablet Take 1 tablet (5 mg) by mouth every 4 hours as needed for pain 20 tablet 0     QUEtiapine (SEROQUEL) 25 MG tablet TAKE 0.5 TABLETS (12.5 MG) BY MOUTH 3 TIMES DAILY 45 tablet 3     senna-docusate (SENOKOT-S/PERICOLACE) 8.6-50 MG tablet Take 1 tablet by mouth every other day 60 tablet 11     traZODone (DESYREL) 100 MG tablet Take 1 tablet (100 mg) by mouth At  "Bedtime 30 tablet 11         REVIEW OF SYSTEMS:  4 point ROS neg other than the symptoms noted above in the HPI.  Denied chest pain, shortness of breath.      PHYSICAL EXAM:  BP 91/64   Pulse 77   Temp 97.5  F (36.4  C)   Resp 15   LMP  (LMP Unknown)   SpO2 96%   Alert and pleasant.  Admits her memory is not the best but functions in her area.  Sometimes she feels like \"why am I here?\"  \"Everyone is doing well\".  Lonely sometimes.  Heart rate regular and strong.  S1 and S2 heard.  No pitting edema.  Lungs are clear.  Good deep breaths.  Abdomen is large round and soft.  Active bowel sounds.  Ambulates with her 4WW.       No recent labs nor due for any.      ASSESSMENT / PLAN:  (I50.22) Chronic systolic heart failure (H)  (primary encounter diagnosis)  (Z99.81) Oxygen dependent  Comment: no acute issues.  Managed with Lasix 40mg in AM and 20mg in PM.  Uses the O2 at night mostly.  PRN during the day.  Ingrid mentioned her heart is only functions at 11%.  This NP thinks it is better than that.  She would not be moving around like she is doing.  Offered to have a ECHO done but not at this time.  She will think about it.    (I10) Primary hypertension  Comment: blood pressures range from 100-120's/60-70's.  Only has the Lasix but other medications could lower B/P.      (K59.01) Slow transit constipation  Comment: going through her list at the AL of her medications and so ordering her Miralax and supp to be done as no use.  Has the Senna-S 1 tab every other day.      (F41.1) JAIME (generalized anxiety disorder)  Comment: Ingrid is on a lot of medications.  Trazodone to help sleep, Buspar, Ativan, Celexa, and Seroquel to help control her anxiety and help her manage within her environment.  She is doing very well and not over sedated.  Occasionally will have some anxiety.  No changes to the medication.      Orders:  1.  Discontinue dulcolax supp order  2.  Discontinue PRN Miralax  3.  Lower O2 checks to twice a " day    Electronically signed by  NORA Balderas CNP             Sincerely,        NORA Balderas CNP

## 2022-11-08 NOTE — PROGRESS NOTES
[facility] GERIATRICS  ACUTE/EPISODIC VISIT    [facility] Medical Record Number:  [id number]  Place of Service where encounter took place:  [facility] ([facility]) [[id number]]    Chief Complaint   Patient presents with     RECHECK       HPI:    Ingrid Powell is a 77 year old  (1945), who is being seen today for an episodic care visit.  HPI information obtained from: facility chart records, facility staff and patient report.    Today's concern is:    Diagnoses       Codes Comments    Chronic systolic heart failure (H)    -  Primary I50.22     Oxygen dependent     Z99.81     Primary hypertension     I10     Slow transit constipation     K59.01     JAIME (generalized anxiety disorder)     F41.1         Came to see Ingrid today and how she was doing.  Knocked on her door and the door was locked.  Looked down the limon and she was walking with her walker.  She said she just went to vote next door.    Sat and spoke with her for a little while.  She was in a good mood.  States she still has occasional anxiety and so that crosses out doing a trial reduction of her anxiety medications.      Has the portable O2 tank on her 4WW.  Concentrator in her room.  She uses the O2 at night and might occasionally during the day.  Feel some of it during the day is a anxiety issue.  Otherwise no new concerns.    ALLERGIES:    Allergies   Allergen Reactions     Dilaudid [Hydromorphone Hcl] Other (See Comments)     hallucinations     Fosamax [Alendronate Sodium] Rash            Norvasc [Amlodipine Besylate] Hives and Rash     Tegretol [Carbamazepine] Hives and Rash        MEDICATIONS:  Post Discharge Medication Reconciliation Status: patient was not discharged from an inpatient facility or TCU. Medications reviewed today.    Current Outpatient Medications   Medication Sig Dispense Refill     acetaminophen (TYLENOL) 650 MG CR tablet 1300mg po twice a day and 1300mg po daily prn       busPIRone (BUSPAR) 10 MG tablet  Take 1 tablet (10 mg) by mouth 3 times daily 90 tablet 11     carboxymethylcellulose PF (REFRESH PLUS) 0.5 % ophthalmic solution Place 1 drop into both eyes 4 times daily as needed for dry eyes or other (eye irritation) Ongoing management/refills per Hospice 1 each 0     citalopram (CELEXA) 10 MG tablet Take 1 tablet (10 mg) by mouth daily       fluticasone (FLONASE) 50 MCG/ACT nasal spray Spray 2 sprays into both nostrils daily 16 g 3     furosemide (LASIX) 20 MG tablet 40mg by mouth every AM and 20mg by mouth in afternoon 30 tablet 11     gabapentin (NEURONTIN) 600 MG tablet TAKE 1 TABLET (600 MG) BY MOUTH AT BEDTIME 30 tablet 3     hypromellose-dextran (ARTIFICAL TEARS) 0.1-0.3 % ophthalmic solution Place 1 drop into both eyes 2 times daily Ongoing management/refills per Hospice 15 mL 0     levothyroxine (SYNTHROID/LEVOTHROID) 100 MCG tablet Take 1 tablet (100 mcg) by mouth daily       LORazepam (ATIVAN) 0.5 MG tablet TAKE 1 TABLET BY MOUTH FOUR TIMES DAILY; TAKE 1 TABLET BY MOUTH TWICE DAILY IF NEEDED 120 tablet 3     Menthol, Topical Analgesic, (BIOFREEZE) 4 % GEL Dime size gel to elbows and neck at HS and then TID prn       nitroGLYcerin (NITROSTAT) 0.4 MG sublingual tablet For chest pain place 1 tablet under the tongue every 5 minutes for 3 doses. If symptoms persist 5 minutes after 1st dose call 911. 6 tablet 4     nystatin (MYCOSTATIN) 674607 UNIT/GM external powder Apply topically 2 times daily 30 g 4     oxyCODONE (ROXICODONE) 5 MG tablet Take 1 tablet (5 mg) by mouth every 4 hours as needed for pain 20 tablet 0     QUEtiapine (SEROQUEL) 25 MG tablet TAKE 0.5 TABLETS (12.5 MG) BY MOUTH 3 TIMES DAILY 45 tablet 3     senna-docusate (SENOKOT-S/PERICOLACE) 8.6-50 MG tablet Take 1 tablet by mouth every other day 60 tablet 11     traZODone (DESYREL) 100 MG tablet Take 1 tablet (100 mg) by mouth At Bedtime 30 tablet 11         REVIEW OF SYSTEMS:  4 point ROS neg other than the symptoms noted above in the HPI.   "Denied chest pain, shortness of breath.      PHYSICAL EXAM:  BP 91/64   Pulse 77   Temp 97.5  F (36.4  C)   Resp 15   LMP  (LMP Unknown)   SpO2 96%   Alert and pleasant.  Admits her memory is not the best but functions in her area.  Sometimes she feels like \"why am I here?\"  \"Everyone is doing well\".  Lonely sometimes.  Heart rate regular and strong.  S1 and S2 heard.  No pitting edema.  Lungs are clear.  Good deep breaths.  Abdomen is large round and soft.  Active bowel sounds.  Ambulates with her 4WW.       No recent labs nor due for any.      ASSESSMENT / PLAN:  (I50.22) Chronic systolic heart failure (H)  (primary encounter diagnosis)  (Z99.81) Oxygen dependent  Comment: no acute issues.  Managed with Lasix 40mg in AM and 20mg in PM.  Uses the O2 at night mostly.  PRN during the day.  Ingrid mentioned her heart is only functions at 11%.  This NP thinks it is better than that.  She would not be moving around like she is doing.  Offered to have a ECHO done but not at this time.  She will think about it.    (I10) Primary hypertension  Comment: blood pressures range from 100-120's/60-70's.  Only has the Lasix but other medications could lower B/P.      (K59.01) Slow transit constipation  Comment: going through her list at the AL of her medications and so ordering her Miralax and supp to be done as no use.  Has the Senna-S 1 tab every other day.      (F41.1) JAMIE (generalized anxiety disorder)  Comment: Ingrid is on a lot of medications.  Trazodone to help sleep, Buspar, Ativan, Celexa, and Seroquel to help control her anxiety and help her manage within her environment.  She is doing very well and not over sedated.  Occasionally will have some anxiety.  No changes to the medication.      Orders:  1.  Discontinue dulcolax supp order  2.  Discontinue PRN Miralax  3.  Lower O2 checks to twice a day    Electronically signed by  Elas Jeffries, NORA CNP       "

## 2022-11-29 ENCOUNTER — ASSISTED LIVING VISIT (OUTPATIENT)
Dept: GERIATRICS | Facility: CLINIC | Age: 77
End: 2022-11-29
Payer: COMMERCIAL

## 2022-11-29 VITALS
RESPIRATION RATE: 16 BRPM | BODY MASS INDEX: 35.94 KG/M2 | HEART RATE: 77 BPM | TEMPERATURE: 97.5 F | SYSTOLIC BLOOD PRESSURE: 91 MMHG | WEIGHT: 216 LBS | DIASTOLIC BLOOD PRESSURE: 64 MMHG

## 2022-11-29 DIAGNOSIS — R05.1 ACUTE COUGH: ICD-10-CM

## 2022-11-29 DIAGNOSIS — Z99.81 OXYGEN DEPENDENT: ICD-10-CM

## 2022-11-29 DIAGNOSIS — I50.22 CHRONIC SYSTOLIC HEART FAILURE (H): ICD-10-CM

## 2022-11-29 DIAGNOSIS — J06.9 UPPER RESPIRATORY TRACT INFECTION, UNSPECIFIED TYPE: Primary | ICD-10-CM

## 2022-11-29 NOTE — PROGRESS NOTES
[facility] GERIATRICS  ACUTE/EPISODIC VISIT    [facility] Medical Record Number:  [id number]  Place of Service where encounter took place:  [facility] ([facility]) [[id number]]    Chief Complaint   Patient presents with     RECHECK       HPI:    Ingrid Powell is a 77 year old  (1945), who is being seen today for an episodic care visit.  HPI information obtained from: facility chart records, facility staff and patient report.    Today's concern is:    Diagnoses       Codes Comments    Upper respiratory tract infection, unspecified type    -  Primary J06.9     Acute cough     R05.1     Chronic systolic heart failure (H)     I50.22     Oxygen dependent     Z99.81         Nursing updated this NP yesterday that Ingrid was ill and so coming to see her today.  COVID negative test.      Found Ingrid laying in bed which is unusual for her.  She did sit up on side of the bed.  She was dressed for the day.  She clearly stated she was feeling rough.  Was coughing during this visit which was congested but dry as well.  Admits that when she sees phlegm, it is light green.      ALLERGIES:    Allergies   Allergen Reactions     Dilaudid [Hydromorphone Hcl] Other (See Comments)     hallucinations     Fosamax [Alendronate Sodium] Rash            Norvasc [Amlodipine Besylate] Hives and Rash     Tegretol [Carbamazepine] Hives and Rash        MEDICATIONS:  Post Discharge Medication Reconciliation Status: patient was not discharged from an inpatient facility or TCU. Medications reviewed today    Current Outpatient Medications   Medication Sig Dispense Refill     acetaminophen (TYLENOL) 650 MG CR tablet 1300mg po twice a day and 1300mg po daily prn       busPIRone (BUSPAR) 10 MG tablet Take 1 tablet (10 mg) by mouth 3 times daily 90 tablet 11     carboxymethylcellulose PF (REFRESH PLUS) 0.5 % ophthalmic solution Place 1 drop into both eyes 4 times daily as needed for dry eyes or other (eye irritation) Ongoing  management/refills per Hospice 1 each 0     citalopram (CELEXA) 10 MG tablet Take 1 tablet (10 mg) by mouth daily       fluticasone (FLONASE) 50 MCG/ACT nasal spray Spray 2 sprays into both nostrils daily 16 g 3     furosemide (LASIX) 20 MG tablet 40mg by mouth every AM and 20mg by mouth in afternoon 30 tablet 11     gabapentin (NEURONTIN) 600 MG tablet TAKE 1 TABLET (600 MG) BY MOUTH AT BEDTIME 30 tablet 3     hypromellose-dextran (ARTIFICAL TEARS) 0.1-0.3 % ophthalmic solution Place 1 drop into both eyes 2 times daily Ongoing management/refills per Hospice 15 mL 0     levothyroxine (SYNTHROID/LEVOTHROID) 100 MCG tablet Take 1 tablet (100 mcg) by mouth daily       LORazepam (ATIVAN) 0.5 MG tablet TAKE 1 TABLET BY MOUTH FOUR TIMES DAILY; TAKE 1 TABLET BY MOUTH TWICE DAILY IF NEEDED 120 tablet 3     Menthol, Topical Analgesic, (BIOFREEZE) 4 % GEL Dime size gel to elbows and neck at HS and then TID prn       nitroGLYcerin (NITROSTAT) 0.4 MG sublingual tablet For chest pain place 1 tablet under the tongue every 5 minutes for 3 doses. If symptoms persist 5 minutes after 1st dose call 911. 6 tablet 4     nystatin (MYCOSTATIN) 178485 UNIT/GM external powder Apply topically 2 times daily 30 g 4     oxyCODONE (ROXICODONE) 5 MG tablet Take 1 tablet (5 mg) by mouth every 4 hours as needed for pain 20 tablet 0     QUEtiapine (SEROQUEL) 25 MG tablet TAKE 0.5 TABLETS (12.5 MG) BY MOUTH 3 TIMES DAILY 45 tablet 3     senna-docusate (SENOKOT-S/PERICOLACE) 8.6-50 MG tablet Take 1 tablet by mouth every other day 60 tablet 11     traZODone (DESYREL) 100 MG tablet Take 1 tablet (100 mg) by mouth At Bedtime 30 tablet 11       REVIEW OF SYSTEMS:  4 point ROS neg other than the symptoms noted above in the HPI.    PHYSICAL EXAM:  BP 91/64   Pulse 77   Temp 97.5  F (36.4  C)   Resp 16   Wt 98 kg (216 lb)   LMP  (LMP Unknown)   BMI 35.94 kg/m    Ingrid sat up from being under her covers.  Dressed, alert and cognitively at her baseline.   "Able to express her symptoms.  She said she wanted to ask something but could not remember.  \" you will leave, and I will remember\".    Heart rate regular with S1 and S2 heard.    Lungs are diminished.  No wheezing or crackles heard.  Congested cough, but did not produce any phlegm.  Oxygen on at 1L/min.  Have not seen her use the O2 during the day for some time.  She uses it at night.    Abdomen is round, large and soft.    No pitting edema.  Uses a 4WW for mobility.        ASSESSMENT / PLAN:  (J06.9) Upper respiratory tract infection, unspecified type  (primary encounter diagnosis)  (R05.1) Acute cough  Comment: given how she presents, suspect she may have a pneumonia.  Will proceed to treat her with Levaquin 500mg po daily for 7 days.  Will also order Dexamethasone 6mg po daily for 7 days to help with the congestion.    Can check on her Thursday when back in building.      (I50.22) Chronic systolic heart failure (H)  (Z99.81) Oxygen dependent  Comment: do not feel this is her heart failure exacerbating.  Has the O2 and she decides when she uses it.    No changes with the Lasix order she had.  Encouraged her to continue drinking her water that she has at bedside due to easily getting a dry mouth.      Orders:  1.  Levaquin 500mg po daily for 7 days for URI  2.  Dexamethasone 6mg po daily for 7 days for congestion/cough    Electronically signed by  NORA Balderas CNP       "

## 2022-12-01 ENCOUNTER — ASSISTED LIVING VISIT (OUTPATIENT)
Dept: GERIATRICS | Facility: CLINIC | Age: 77
End: 2022-12-01
Payer: COMMERCIAL

## 2022-12-01 DIAGNOSIS — F41.1 GAD (GENERALIZED ANXIETY DISORDER): ICD-10-CM

## 2022-12-01 DIAGNOSIS — F51.01 PRIMARY INSOMNIA: Primary | ICD-10-CM

## 2022-12-01 DIAGNOSIS — J06.9 UPPER RESPIRATORY TRACT INFECTION, UNSPECIFIED TYPE: ICD-10-CM

## 2022-12-01 RX ORDER — TRAZODONE HYDROCHLORIDE 100 MG/1
TABLET ORAL
Qty: 30 TABLET | Refills: 11
Start: 2022-12-01 | End: 2023-03-28

## 2022-12-01 NOTE — LETTER
12/1/2022        RE: Ingrid Powell  [facility]  [address]  [place] [address]        [facility] GERIATRICS  ACUTE/EPISODIC VISIT    [facility] Medical Record Number:  [id number]  Place of Service where encounter took place:  [facility] ([facility]) [[id number]]    Chief Complaint   Patient presents with     RECHECK       HPI:    Ingrid Powell is a 77 year old  (1945), who is being seen today for an episodic care visit.  HPI information obtained from: facility chart records and patient report.    Today's concern is:  Diagnoses       Codes Comments    Primary insomnia    -  Primary F51.01     Upper respiratory tract infection, unspecified type     J06.9     JAIME (generalized anxiety disorder)     F41.1         Came to see how Ingrid was doing today as earlier in the week she was laying in bed and not feeling well at all.  Started her on Levaquin and Dexamethasone.    Today she was up sitting in her recliner.  Voice sounded better.  She was putting items in her calendar which included this NP coming back to do her Medicare Wellness visit.    Today she was not wearing her Oxygen.  She appears tired but good to see her up out of bed moving around.    Ingrid brought up that she is not sleeping well.  Only sleeps like 5 hours at night and wants to be able to go back to sleep after she gets up to go to the bathroom.      ALLERGIES:    Allergies   Allergen Reactions     Dilaudid [Hydromorphone Hcl] Other (See Comments)     hallucinations     Fosamax [Alendronate Sodium] Rash            Norvasc [Amlodipine Besylate] Hives and Rash     Tegretol [Carbamazepine] Hives and Rash        MEDICATIONS:  Post Discharge Medication Reconciliation Status: patient was not discharged from an inpatient facility or TCU. Medications reviewed today.    Current Outpatient Medications   Medication Sig Dispense Refill     traZODone (DESYREL) 100 MG tablet Take 1 tablet (100 mg) by mouth At Bedtime.  May also take 0.5 tablets (50 mg) at bedtime as needed, may repeat once for sleep (if not sleeping by 0300.). 30 tablet 11     acetaminophen (TYLENOL) 650 MG CR tablet 1300mg po twice a day and 1300mg po daily prn       busPIRone (BUSPAR) 10 MG tablet Take 1 tablet (10 mg) by mouth 3 times daily 90 tablet 11     carboxymethylcellulose PF (REFRESH PLUS) 0.5 % ophthalmic solution Place 1 drop into both eyes 4 times daily as needed for dry eyes or other (eye irritation) Ongoing management/refills per Hospice 1 each 0     citalopram (CELEXA) 10 MG tablet Take 1 tablet (10 mg) by mouth daily       fluticasone (FLONASE) 50 MCG/ACT nasal spray Spray 2 sprays into both nostrils daily 16 g 3     furosemide (LASIX) 20 MG tablet 40mg by mouth every AM and 20mg by mouth in afternoon 30 tablet 11     gabapentin (NEURONTIN) 600 MG tablet TAKE 1 TABLET (600 MG) BY MOUTH AT BEDTIME 30 tablet 3     hypromellose-dextran (ARTIFICAL TEARS) 0.1-0.3 % ophthalmic solution Place 1 drop into both eyes 2 times daily Ongoing management/refills per Hospice 15 mL 0     levothyroxine (SYNTHROID/LEVOTHROID) 100 MCG tablet Take 1 tablet (100 mcg) by mouth daily       LORazepam (ATIVAN) 0.5 MG tablet TAKE 1 TABLET BY MOUTH FOUR TIMES DAILY; TAKE 1 TABLET BY MOUTH TWICE DAILY IF NEEDED 120 tablet 3     Menthol, Topical Analgesic, (BIOFREEZE) 4 % GEL Dime size gel to elbows and neck at HS and then TID prn       nitroGLYcerin (NITROSTAT) 0.4 MG sublingual tablet For chest pain place 1 tablet under the tongue every 5 minutes for 3 doses. If symptoms persist 5 minutes after 1st dose call 911. 6 tablet 4     nystatin (MYCOSTATIN) 244133 UNIT/GM external powder Apply topically 2 times daily 30 g 4     oxyCODONE (ROXICODONE) 5 MG tablet Take 1 tablet (5 mg) by mouth every 4 hours as needed for pain 20 tablet 0     QUEtiapine (SEROQUEL) 25 MG tablet TAKE 0.5 TABLETS (12.5 MG) BY MOUTH 3 TIMES DAILY 45 tablet 3     senna-docusate (SENOKOT-S/PERICOLACE)  8.6-50 MG tablet Take 1 tablet by mouth every other day 60 tablet 11     Medications reviewed:  Medications reconciled to facility chart and changes were made to reflect current medications as identified as above med list. Below are the changes that were made:   Medications stopped since last EPIC medication reconciliation:   Medications Discontinued During This Encounter   Medication Reason     traZODone (DESYREL) 100 MG tablet        Medications started since last [facility] medication reconciliation:  Orders Placed This Encounter   Medications     traZODone (DESYREL) 100 MG tablet     Sig: Take 1 tablet (100 mg) by mouth At Bedtime. May also take 0.5 tablets (50 mg) at bedtime as needed, may repeat once for sleep (if not sleeping by 0300.).     Dispense:  30 tablet     Refill:  11       REVIEW OF SYSTEMS:  4 point ROS neg other than the symptoms noted above in the HPI.    PHYSICAL EXAM:  LMP  (LMP Unknown)   No recent vitals  Alert and organizing her thoughts.    Voice does not sound so deep now.    Heart rate regular and strong.  Left base of lungs have faint crackles present.    Abdomen is round and soft.  No bowel issues.  Ambulates with a 4WW.    No recent labs    ASSESSMENT / PLAN:  (F51.01) Primary insomnia  (primary encounter diagnosis)  Comment: reviewed medications and currently on Trazodone 100mg at HS.  Offered to add 50mg prn at night if not sleeping by 3am.  She would have to ask for this and so reinforced this a few times for her.  Hopefully she will remember.    Plan: traZODone (DESYREL) 100 MG tablet    (J06.9) Upper respiratory tract infection, unspecified type  Comment: doing much better today already.  Continue with the Dexamethasone and Levaquin ordered earlier this week.    (F41.1) JAIME (generalized anxiety disorder)  Comment: continues to have her anxiety and is on multiple medications to help keep her calm.  No dose reductions but review with her periodically of a chance of a reduction.  She has  used the PRN Lorazepam twice within the last two months.        Orders:  1.  Trazodone 50mg at HS prn if not sleeping by 3am.  Keep the scheduled Trazodone 100mg at HS.    Electronically signed by  NORA Balderas CNP             Sincerely,        NORA Balderas CNP

## 2022-12-01 NOTE — PROGRESS NOTES
[facility] GERIATRICS  ACUTE/EPISODIC VISIT    [facility] Medical Record Number:  [id number]  Place of Service where encounter took place:  [facility] ([facility]) [[id number]]    Chief Complaint   Patient presents with     RECHECK       HPI:    Ingrid Powell is a 77 year old  (1945), who is being seen today for an episodic care visit.  HPI information obtained from: facility chart records and patient report.    Today's concern is:  Diagnoses       Codes Comments    Primary insomnia    -  Primary F51.01     Upper respiratory tract infection, unspecified type     J06.9     JAIME (generalized anxiety disorder)     F41.1         Came to see how Ingrid was doing today as earlier in the week she was laying in bed and not feeling well at all.  Started her on Levaquin and Dexamethasone.    Today she was up sitting in her recliner.  Voice sounded better.  She was putting items in her calendar which included this NP coming back to do her Medicare Wellness visit.    Today she was not wearing her Oxygen.  She appears tired but good to see her up out of bed moving around.    Ingrid brought up that she is not sleeping well.  Only sleeps like 5 hours at night and wants to be able to go back to sleep after she gets up to go to the bathroom.      ALLERGIES:    Allergies   Allergen Reactions     Dilaudid [Hydromorphone Hcl] Other (See Comments)     hallucinations     Fosamax [Alendronate Sodium] Rash            Norvasc [Amlodipine Besylate] Hives and Rash     Tegretol [Carbamazepine] Hives and Rash        MEDICATIONS:  Post Discharge Medication Reconciliation Status: patient was not discharged from an inpatient facility or TCU. Medications reviewed today.    Current Outpatient Medications   Medication Sig Dispense Refill     traZODone (DESYREL) 100 MG tablet Take 1 tablet (100 mg) by mouth At Bedtime. May also take 0.5 tablets (50 mg) at bedtime as needed, may repeat once for sleep (if not sleeping by  0300.). 30 tablet 11     acetaminophen (TYLENOL) 650 MG CR tablet 1300mg po twice a day and 1300mg po daily prn       busPIRone (BUSPAR) 10 MG tablet Take 1 tablet (10 mg) by mouth 3 times daily 90 tablet 11     carboxymethylcellulose PF (REFRESH PLUS) 0.5 % ophthalmic solution Place 1 drop into both eyes 4 times daily as needed for dry eyes or other (eye irritation) Ongoing management/refills per Hospice 1 each 0     citalopram (CELEXA) 10 MG tablet Take 1 tablet (10 mg) by mouth daily       fluticasone (FLONASE) 50 MCG/ACT nasal spray Spray 2 sprays into both nostrils daily 16 g 3     furosemide (LASIX) 20 MG tablet 40mg by mouth every AM and 20mg by mouth in afternoon 30 tablet 11     gabapentin (NEURONTIN) 600 MG tablet TAKE 1 TABLET (600 MG) BY MOUTH AT BEDTIME 30 tablet 3     hypromellose-dextran (ARTIFICAL TEARS) 0.1-0.3 % ophthalmic solution Place 1 drop into both eyes 2 times daily Ongoing management/refills per Hospice 15 mL 0     levothyroxine (SYNTHROID/LEVOTHROID) 100 MCG tablet Take 1 tablet (100 mcg) by mouth daily       LORazepam (ATIVAN) 0.5 MG tablet TAKE 1 TABLET BY MOUTH FOUR TIMES DAILY; TAKE 1 TABLET BY MOUTH TWICE DAILY IF NEEDED 120 tablet 3     Menthol, Topical Analgesic, (BIOFREEZE) 4 % GEL Dime size gel to elbows and neck at HS and then TID prn       nitroGLYcerin (NITROSTAT) 0.4 MG sublingual tablet For chest pain place 1 tablet under the tongue every 5 minutes for 3 doses. If symptoms persist 5 minutes after 1st dose call 911. 6 tablet 4     nystatin (MYCOSTATIN) 684086 UNIT/GM external powder Apply topically 2 times daily 30 g 4     oxyCODONE (ROXICODONE) 5 MG tablet Take 1 tablet (5 mg) by mouth every 4 hours as needed for pain 20 tablet 0     QUEtiapine (SEROQUEL) 25 MG tablet TAKE 0.5 TABLETS (12.5 MG) BY MOUTH 3 TIMES DAILY 45 tablet 3     senna-docusate (SENOKOT-S/PERICOLACE) 8.6-50 MG tablet Take 1 tablet by mouth every other day 60 tablet 11     Medications reviewed:  Medications  reconciled to facility chart and changes were made to reflect current medications as identified as above med list. Below are the changes that were made:   Medications stopped since last EPIC medication reconciliation:   Medications Discontinued During This Encounter   Medication Reason     traZODone (DESYREL) 100 MG tablet        Medications started since last [facility] medication reconciliation:  Orders Placed This Encounter   Medications     traZODone (DESYREL) 100 MG tablet     Sig: Take 1 tablet (100 mg) by mouth At Bedtime. May also take 0.5 tablets (50 mg) at bedtime as needed, may repeat once for sleep (if not sleeping by 0300.).     Dispense:  30 tablet     Refill:  11       REVIEW OF SYSTEMS:  4 point ROS neg other than the symptoms noted above in the HPI.    PHYSICAL EXAM:  LMP  (LMP Unknown)   No recent vitals  Alert and organizing her thoughts.    Voice does not sound so deep now.    Heart rate regular and strong.  Left base of lungs have faint crackles present.    Abdomen is round and soft.  No bowel issues.  Ambulates with a 4WW.    No recent labs    ASSESSMENT / PLAN:  (F51.01) Primary insomnia  (primary encounter diagnosis)  Comment: reviewed medications and currently on Trazodone 100mg at HS.  Offered to add 50mg prn at night if not sleeping by 3am.  She would have to ask for this and so reinforced this a few times for her.  Hopefully she will remember.    Plan: traZODone (DESYREL) 100 MG tablet    (J06.9) Upper respiratory tract infection, unspecified type  Comment: doing much better today already.  Continue with the Dexamethasone and Levaquin ordered earlier this week.    (F41.1) JAIME (generalized anxiety disorder)  Comment: continues to have her anxiety and is on multiple medications to help keep her calm.  No dose reductions but review with her periodically of a chance of a reduction.  She has used the PRN Lorazepam twice within the last two months.        Orders:  1.  Trazodone 50mg at HS prn if not  sleeping by 3am.  Keep the scheduled Trazodone 100mg at HS.    Electronically signed by  NORA Balderas CNP

## 2022-12-08 ENCOUNTER — ASSISTED LIVING VISIT (OUTPATIENT)
Dept: GERIATRICS | Facility: CLINIC | Age: 77
End: 2022-12-08
Payer: COMMERCIAL

## 2022-12-08 DIAGNOSIS — R53.83 FEELING TIRED: ICD-10-CM

## 2022-12-08 DIAGNOSIS — R06.2 WHEEZING: ICD-10-CM

## 2022-12-08 DIAGNOSIS — J39.8 CONGESTION OF UPPER RESPIRATORY TRACT: Primary | ICD-10-CM

## 2022-12-08 NOTE — PROGRESS NOTES
[facility] GERIATRICS  ACUTE/EPISODIC VISIT    [facility] Medical Record Number:  [id number]  Place of Service where encounter took place:  [facility] ([facility]) [[id number]]    Chief Complaint   Patient presents with     Wellness Visit       HPI:    Ingrid Powell is a 77 year old  (1945), who is being seen today for an episodic care visit.  HPI information obtained from: facility chart records, facility staff and patient report.    Today's concern is:  Diagnoses       Codes Comments    Congestion of upper respiratory tract    -  Primary J39.8     Wheezing     R06.2     Feeling tired     R53.83         Came back to see Ingrid today to do her Medicare wellness visit as planned but found Ingrid in bed.  She answer to her name and sat up on the edge of her bed but stated she is not feeling well.  Last time this NP saw her on the first she seemed to be responding to the antibiotic and dexamethasone, but what ever she has is still lingering on and feels very tired    Plan is today not to do the Medicare wellness visit and pushing off to another time but before the end of this year      ALLERGIES:    Allergies   Allergen Reactions     Dilaudid [Hydromorphone Hcl] Other (See Comments)     hallucinations     Fosamax [Alendronate Sodium] Rash            Norvasc [Amlodipine Besylate] Hives and Rash     Tegretol [Carbamazepine] Hives and Rash        MEDICATIONS:  Post Discharge Medication Reconciliation Status: patient was not discharged from an inpatient facility or TCU. Medications reviewed today    Current Outpatient Medications   Medication Sig Dispense Refill     acetaminophen (TYLENOL) 650 MG CR tablet 1300mg po twice a day and 1300mg po daily prn       busPIRone (BUSPAR) 10 MG tablet Take 1 tablet (10 mg) by mouth 3 times daily 90 tablet 11     carboxymethylcellulose PF (REFRESH PLUS) 0.5 % ophthalmic solution Place 1 drop into both eyes 4 times daily as needed for dry eyes or other (eye  irritation) Ongoing management/refills per Hospice 1 each 0     citalopram (CELEXA) 10 MG tablet Take 1 tablet (10 mg) by mouth daily       fluticasone (FLONASE) 50 MCG/ACT nasal spray Spray 2 sprays into both nostrils daily 16 g 3     furosemide (LASIX) 20 MG tablet 40mg by mouth every AM and 20mg by mouth in afternoon 30 tablet 11     gabapentin (NEURONTIN) 600 MG tablet TAKE 1 TABLET (600 MG) BY MOUTH AT BEDTIME 30 tablet 3     hypromellose-dextran (ARTIFICAL TEARS) 0.1-0.3 % ophthalmic solution Place 1 drop into both eyes 2 times daily Ongoing management/refills per Hospice 15 mL 0     levothyroxine (SYNTHROID/LEVOTHROID) 100 MCG tablet Take 1 tablet (100 mcg) by mouth daily       LORazepam (ATIVAN) 0.5 MG tablet TAKE 1 TABLET BY MOUTH FOUR TIMES DAILY; TAKE 1 TABLET BY MOUTH TWICE DAILY IF NEEDED 120 tablet 3     Menthol, Topical Analgesic, (BIOFREEZE) 4 % GEL Dime size gel to elbows and neck at HS and then TID prn       nitroGLYcerin (NITROSTAT) 0.4 MG sublingual tablet For chest pain place 1 tablet under the tongue every 5 minutes for 3 doses. If symptoms persist 5 minutes after 1st dose call 911. 6 tablet 4     nystatin (MYCOSTATIN) 853543 UNIT/GM external powder Apply topically 2 times daily 30 g 4     oxyCODONE (ROXICODONE) 5 MG tablet Take 1 tablet (5 mg) by mouth every 4 hours as needed for pain 20 tablet 0     QUEtiapine (SEROQUEL) 25 MG tablet TAKE 0.5 TABLETS (12.5 MG) BY MOUTH 3 TIMES DAILY 45 tablet 3     senna-docusate (SENOKOT-S/PERICOLACE) 8.6-50 MG tablet Take 1 tablet by mouth every other day 60 tablet 11     traZODone (DESYREL) 100 MG tablet Take 1 tablet (100 mg) by mouth At Bedtime. May also take 0.5 tablets (50 mg) at bedtime as needed, may repeat once for sleep (if not sleeping by 0300.). 30 tablet 11       REVIEW OF SYSTEMS:  4 point ROS neg other than the symptoms noted above in the HPI.  Admits to being tired and continued to cough.    PHYSICAL EXAM:  LMP  (LMP Unknown)   Alert and  oriented x3.  Does have memory issues but manages within her apartment.  Continent of bowel and bladder.  Today she is wearing oxygen as she is laying in bed.  1 L via nasal cannula  Does have mild wheezing heard in her lungs not have her gasping for air  Heart rate is regular and strong S1 and S2 are heard  Abdomen is large round and nontender.  States her bowel movements are regular  Does walk in her apartment without her walker otherwise will use a four-wheel walker when she is out of her apartment  Skin is pink dry and warm to touch      ASSESSMENT / PLAN:  1. Congestion of upper respiratory tract    2. Wheezing    3. Feeling tired      Ingrid was seen on 11/29 as well as on 12/1 for a URI.  Thought she was getting better but now is back to being very tired and continues with a cough.  She does have bottles of water around her so now that she is drinking.  Using the oxygen as she laying in bed.    Today we will order her prednisone 20 mg daily for 5 days to help out with her cough, open up her airways so she can breathe better.  Will attempt to do Medicare wellness visit when she is feeling much better especially since having to test her memory    Orders:  Prednisone 20mg po daily for 5 days due to cough    Electronically signed by  NORA Balderas CNP

## 2022-12-08 NOTE — LETTER
12/8/2022        RE: Ingrid Powell  [facility]  [address]  [facility] [address]        [facility] GERIATRICS  ACUTE/EPISODIC VISIT    [facility] Medical Record Number:  [id number]  Place of Service where encounter took place:  [facility] [[id number]]    Chief Complaint   Patient presents with     Wellness Visit       HPI:    Ingrid Powell is a 77 year old  (1945), who is being seen today for an episodic care visit.  HPI information obtained from: facility chart records, facility staff and patient report.    Today's concern is:  Diagnoses       Codes Comments    Congestion of upper respiratory tract    -  Primary J39.8     Wheezing     R06.2     Feeling tired     R53.83         Came back to see Ingrid today to do her Medicare wellness visit as planned but found Ingrid in bed.  She answer to her name and sat up on the edge of her bed but stated she is not feeling well.  Last time this NP saw her on the first she seemed to be responding to the antibiotic and dexamethasone, but what ever she has is still lingering on and feels very tired    Plan is today not to do the Medicare wellness visit and pushing off to another time but before the end of this year      ALLERGIES:    Allergies   Allergen Reactions     Dilaudid [Hydromorphone Hcl] Other (See Comments)     hallucinations     Fosamax [Alendronate Sodium] Rash            Norvasc [Amlodipine Besylate] Hives and Rash     Tegretol [Carbamazepine] Hives and Rash        MEDICATIONS:  Post Discharge Medication Reconciliation Status: patient was not discharged from an inpatient facility or TCU. Medications reviewed today    Current Outpatient Medications   Medication Sig Dispense Refill     acetaminophen (TYLENOL) 650 MG CR tablet 1300mg po twice a day and 1300mg po daily prn       busPIRone (BUSPAR) 10 MG tablet Take 1 tablet (10 mg) by mouth 3 times daily 90 tablet 11     carboxymethylcellulose PF (REFRESH PLUS)  0.5 % ophthalmic solution Place 1 drop into both eyes 4 times daily as needed for dry eyes or other (eye irritation) Ongoing management/refills per Hospice 1 each 0     citalopram (CELEXA) 10 MG tablet Take 1 tablet (10 mg) by mouth daily       fluticasone (FLONASE) 50 MCG/ACT nasal spray Spray 2 sprays into both nostrils daily 16 g 3     furosemide (LASIX) 20 MG tablet 40mg by mouth every AM and 20mg by mouth in afternoon 30 tablet 11     gabapentin (NEURONTIN) 600 MG tablet TAKE 1 TABLET (600 MG) BY MOUTH AT BEDTIME 30 tablet 3     hypromellose-dextran (ARTIFICAL TEARS) 0.1-0.3 % ophthalmic solution Place 1 drop into both eyes 2 times daily Ongoing management/refills per Hospice 15 mL 0     levothyroxine (SYNTHROID/LEVOTHROID) 100 MCG tablet Take 1 tablet (100 mcg) by mouth daily       LORazepam (ATIVAN) 0.5 MG tablet TAKE 1 TABLET BY MOUTH FOUR TIMES DAILY; TAKE 1 TABLET BY MOUTH TWICE DAILY IF NEEDED 120 tablet 3     Menthol, Topical Analgesic, (BIOFREEZE) 4 % GEL Dime size gel to elbows and neck at HS and then TID prn       nitroGLYcerin (NITROSTAT) 0.4 MG sublingual tablet For chest pain place 1 tablet under the tongue every 5 minutes for 3 doses. If symptoms persist 5 minutes after 1st dose call 911. 6 tablet 4     nystatin (MYCOSTATIN) 352745 UNIT/GM external powder Apply topically 2 times daily 30 g 4     oxyCODONE (ROXICODONE) 5 MG tablet Take 1 tablet (5 mg) by mouth every 4 hours as needed for pain 20 tablet 0     QUEtiapine (SEROQUEL) 25 MG tablet TAKE 0.5 TABLETS (12.5 MG) BY MOUTH 3 TIMES DAILY 45 tablet 3     senna-docusate (SENOKOT-S/PERICOLACE) 8.6-50 MG tablet Take 1 tablet by mouth every other day 60 tablet 11     traZODone (DESYREL) 100 MG tablet Take 1 tablet (100 mg) by mouth At Bedtime. May also take 0.5 tablets (50 mg) at bedtime as needed, may repeat once for sleep (if not sleeping by 0300.). 30 tablet 11       REVIEW OF SYSTEMS:  4 point ROS neg other than the symptoms noted above in the  HPI.  Admits to being tired and continued to cough.    PHYSICAL EXAM:  LMP  (LMP Unknown)   Alert and oriented x3.  Does have memory issues but manages within her apartment.  Continent of bowel and bladder.  Today she is wearing oxygen as she is laying in bed.  1 L via nasal cannula  Does have mild wheezing heard in her lungs not have her gasping for air  Heart rate is regular and strong S1 and S2 are heard  Abdomen is large round and nontender.  States her bowel movements are regular  Does walk in her apartment without her walker otherwise will use a four-wheel walker when she is out of her apartment  Skin is pink dry and warm to touch      ASSESSMENT / PLAN:  1. Congestion of upper respiratory tract    2. Wheezing    3. Feeling tired      Ingrid was seen on 11/29 as well as on 12/1 for a URI.  Thought she was getting better but now is back to being very tired and continues with a cough.  She does have bottles of water around her so now that she is drinking.  Using the oxygen as she laying in bed.    Today we will order her prednisone 20 mg daily for 5 days to help out with her cough, open up her airways so she can breathe better.  Will attempt to do Medicare wellness visit when she is feeling much better especially since having to test her memory    Orders:  Prednisone 20mg po daily for 5 days due to cough    Electronically signed by  NORA Balderas CNP                       Sincerely,        NORA Balderas CNP

## 2022-12-19 ENCOUNTER — ASSISTED LIVING VISIT (OUTPATIENT)
Dept: GERIATRICS | Facility: CLINIC | Age: 77
End: 2022-12-19
Payer: COMMERCIAL

## 2022-12-19 DIAGNOSIS — R60.0 BILATERAL LOWER EXTREMITY EDEMA: ICD-10-CM

## 2022-12-19 DIAGNOSIS — Z99.81 OXYGEN DEPENDENT: ICD-10-CM

## 2022-12-19 DIAGNOSIS — F41.1 GAD (GENERALIZED ANXIETY DISORDER): ICD-10-CM

## 2022-12-19 DIAGNOSIS — I10 PRIMARY HYPERTENSION: ICD-10-CM

## 2022-12-19 DIAGNOSIS — F33.1 MAJOR DEPRESSIVE DISORDER, RECURRENT EPISODE, MODERATE (H): ICD-10-CM

## 2022-12-19 DIAGNOSIS — F51.05 INSOMNIA DUE TO OTHER MENTAL DISORDER: ICD-10-CM

## 2022-12-19 DIAGNOSIS — I50.22 CHRONIC SYSTOLIC HEART FAILURE (H): Primary | ICD-10-CM

## 2022-12-19 DIAGNOSIS — F99 INSOMNIA DUE TO OTHER MENTAL DISORDER: ICD-10-CM

## 2022-12-19 NOTE — LETTER
"    12/19/2022        RE: Ingrid Powell  [facility]  [address]  [facility] [address]          [facility] GERIATRICS  ACUTE/EPISODIC VISIT    [facility] Medical Record Number:  [id number]  [facility] encounter took [facility] [[id number][facility]    Chief Complaint   Patient presents with     Wellness Visit       HPI:    Ingrid Powell is a 77 year old  (1945), who is being seen today for an episodic care visit.  HPI information obtained from: facility chart records, facility staff, patient report and [facility] chart review.    Today's concern is:    Diagnoses       Codes Comments    Chronic systolic heart failure (H)    -  Primary I50.22     Bilateral lower extremity edema     R60.0     Oxygen dependent     Z99.81     Primary hypertension     I10     Insomnia due to other mental disorder     F51.05, F99     Major depressive disorder, recurrent episode, moderate (H)     F33.1         Came to do Nancy Medicare Wellness visit today.  Ingrid and her daughter inquired if this NP could do the visit here or would she need to go to a clinic.  This NP was able to do the visit and Ingrid was so happy about that.  She has been under the weather lately and finally feels herself again.  Typically see her in the mornings and today in the afternoon so she was lying in bed with her O2 on.  Otherwise only wears the O2 when in bed.    Talked about the Trazodone today to verify with her the orders.  Can have the extra dose of Trazodone before 3am.  \"So 2:55am is ok?\"  \"Yes\".      ALLERGIES:    Allergies   Allergen Reactions     Dilaudid [Hydromorphone Hcl] Other (See Comments)     hallucinations     Fosamax [Alendronate Sodium] Rash            Norvasc [Amlodipine Besylate] Hives and Rash     Tegretol [Carbamazepine] Hives and Rash        MEDICATIONS:  Post Discharge Medication Reconciliation Status: patient was not discharged from an inpatient facility or TCU. No " changes with medications.    Current Outpatient Medications   Medication Sig Dispense Refill     acetaminophen (TYLENOL) 650 MG CR tablet 1300mg po twice a day and 1300mg po daily prn       busPIRone (BUSPAR) 10 MG tablet Take 1 tablet (10 mg) by mouth 3 times daily 90 tablet 11     carboxymethylcellulose PF (REFRESH PLUS) 0.5 % ophthalmic solution Place 1 drop into both eyes 4 times daily as needed for dry eyes or other (eye irritation) Ongoing management/refills per Hospice 1 each 0     citalopram (CELEXA) 10 MG tablet Take 1 tablet (10 mg) by mouth daily       fluticasone (FLONASE) 50 MCG/ACT nasal spray Spray 2 sprays into both nostrils daily 16 g 3     furosemide (LASIX) 20 MG tablet 40mg by mouth every AM and 20mg by mouth in afternoon 30 tablet 11     gabapentin (NEURONTIN) 600 MG tablet TAKE 1 TABLET (600 MG) BY MOUTH AT BEDTIME 30 tablet 3     hypromellose-dextran (ARTIFICAL TEARS) 0.1-0.3 % ophthalmic solution Place 1 drop into both eyes 2 times daily Ongoing management/refills per Hospice 15 mL 0     levothyroxine (SYNTHROID/LEVOTHROID) 100 MCG tablet Take 1 tablet (100 mcg) by mouth daily       LORazepam (ATIVAN) 0.5 MG tablet TAKE 1 TABLET BY MOUTH FOUR TIMES DAILY; TAKE 1 TABLET BY MOUTH TWICE DAILY IF NEEDED 120 tablet 3     Menthol, Topical Analgesic, (BIOFREEZE) 4 % GEL Dime size gel to elbows and neck at HS and then TID prn       nitroGLYcerin (NITROSTAT) 0.4 MG sublingual tablet For chest pain place 1 tablet under the tongue every 5 minutes for 3 doses. If symptoms persist 5 minutes after 1st dose call 911. 6 tablet 4     nystatin (MYCOSTATIN) 576839 UNIT/GM external powder Apply topically 2 times daily 30 g 4     oxyCODONE (ROXICODONE) 5 MG tablet Take 1 tablet (5 mg) by mouth every 4 hours as needed for pain 20 tablet 0     QUEtiapine (SEROQUEL) 25 MG tablet TAKE 0.5 TABLETS (12.5 MG) BY MOUTH 3 TIMES DAILY 45 tablet 3     senna-docusate (SENOKOT-S/PERICOLACE) 8.6-50 MG tablet Take 1 tablet by  mouth every other day 60 tablet 11     traZODone (DESYREL) 100 MG tablet Take 1 tablet (100 mg) by mouth At Bedtime. May also take 0.5 tablets (50 mg) at bedtime as needed, may repeat once for sleep (if not sleeping by 0300.). 30 tablet 11       REVIEW OF SYSTEMS:  4 point ROS neg other than the symptoms noted above in the HPI.  No chest pain, wearing oxygen as she was laying in bed for afternoon nap.    PHYSICAL EXAM:  BP 96/64   Pulse 77   Temp (!) 95.7  F (35.4  C)   Resp 15   Wt 98 kg (216 lb)   LMP  (LMP Unknown)   BMI 35.94 kg/m    Alert and oriented x3 and knows she is memory loss.    Skin is pink, dry and intact.  O2 on at 2L/min.  Lungs are clear.  No coughing now.  Heart rate regular and strong.  S1 and S2 heard.  Abdomen is round, soft and non-tender.    Ambulates in her room without help but when out of room she has her 4WW.      No recent labs      ASSESSMENT / PLAN:  (I50.22) Chronic systolic heart failure (H)  (primary encounter diagnosis)  (R60.0) Bilateral lower extremity edema  (Z99.81) Oxygen dependent  Comment: maintained on Lasix 40mg in AM and 20mg in afternoon.  Effective.  Will update her labs with a BMP and CBC on Thursday.  O2 at night.  Has a portable tank she has with her at all times when out of room.    (I10) Primary hypertension  Comment: 's/50-70's.  No medications.    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  She is much calmer and easy going with her personality now.  See above medications.  No changes.  Daughter will email this NP with any concerns.      Orders:  1.  BMP and CBC on Thursday for CHF    Electronically signed by  NORA Balderas CNP          [facility] GERIATRICS  Chief Complaint   Patient presents with     Wellness Visit     [facility]  "Record Number:  [id number]  Place of Service where encounter took place:  [facility] [[id number]]    Annual Wellness Visit    Are you in the first 12 months of your Medicare Part B coverage?  No    Physical Health:    In general, how would you rate your overall physical health? good    Outside of work, how many days during the week do you exercise?2-3 days/week    Outside of work, approximately how many minutes a day do you exercise?15-30 minutes    If you drink alcohol do you typically have >3 drinks per day or >7 drinks per week? No    Do you usually eat at least 4 servings of fruit and vegetables a day, include whole grains & fiber and avoid regularly eating high fat or \"junk\" foods? Yes    Do you have any problems taking medications regularly? No    Do you have any side effects from medications? none    Needs assistance for the following daily activities: transportation, preparing meals, housework, laundry, money management and taking medicine    Which of the following safety concerns are present in your home?  none identified     Hearing impairment: No    In the past 6 months, have you been bothered by leaking of urine? no    Mental Health:    In general, how would you rate your overall mental or emotional health? excellent      PHQ-2 Score:       PHQ-2 ( 1999 [employer]) 12/19/2022 12/1/2021   Q1: Little interest or pleasure in doing things 0 0   Q2: Feeling down, depressed or hopeless 0 0   PHQ-2 Score 0 0   Q1: Little interest or pleasure in doing things - Not at all   Q2: Feeling down, depressed or hopeless - Not at all   PHQ-2 Score - 0          Do you feel safe in your environment? Yes    Have you ever done Advance Care Planning? (For example, a Health Directive, POLST, or a discussion with a medical provider or your loved ones about your wishes)? Yes, advance care planning is on file.    Fall risk:  Fallen 2 or more times in the past year?: No  Any fall with injury in the past year?: " No    Cognitive Screenin) Repeat 3 items (Leader, Season, Table)    2) Clock draw:   NORMAL  3) 3 item recall: season, table, leader  Recalls 3 objects  Results: 3 items recalled: COGNITIVE IMPAIRMENT LESS LIKELY    Mini-CogTM Copyright S Joni. Licensed by the author for use in [employer]; reprinted with permission (douglas@[facility]). All rights reserved.      Do you have sleep apnea, excessive snoring or daytime drowsiness?: yes  Has been tested but does not use a machine for apnea.    Current providers sharing in care for this patient include:   Patient Care Team:  Elsa Jeffries APRN CNP as PCP - General (Geriatric Medicine)  Yamel Noland, RN as Continuity Care Coordinator (Nurse)  Tejinder Tracy MD as MD (Oncology)  Brody Hanks MD as Assigned Allergy Provider  Nathan Michaels MD as MD (Cardiovascular Disease)  [facility] ([facility] Raisa Piper MA as Medical Assistant (Geriatric Medicine)  Fadi Painter MD as Hospitalist (Family Medicine)  Onelia Alarcon as Case Management Specialist  Nathan Michaels MD as Assigned Heart and Vascular Provider  Angela Reid RN as Lead Care Coordinator  Jose Manuel Sutton MD as Assigned PCP    NORA Balderas CNP                    Sincerely,        NORA Balderas CNP

## 2022-12-19 NOTE — PROGRESS NOTES
"  [facility] GERIATRICS  ACUTE/EPISODIC VISIT    [facility] Medical Record Number:  [id number]  Place of Service where encounter took place:  [facility] [[id number]]    Chief Complaint   Patient presents with     Wellness Visit       HPI:    Ingrid Powell is a 77 year old  (1945), who is being seen today for an episodic care visit.  HPI information obtained from: facility chart records, facility staff, patient report and [facility] chart review.    Today's concern is:    Diagnoses       Codes Comments    Chronic systolic heart failure (H)    -  Primary I50.22     Bilateral lower extremity edema     R60.0     Oxygen dependent     Z99.81     Primary hypertension     I10     Insomnia due to other mental disorder     F51.05, F99     Major depressive disorder, recurrent episode, moderate (H)     F33.1         Came to do Ingrid Medicare Wellness visit today.  Ingrid and her daughter inquired if this NP could do the visit here or would she need to go to a clinic.  This NP was able to do the visit and Ingrid was so happy about that.  She has been under the weather lately and finally feels herself again.  Typically see her in the mornings and today in the afternoon so she was lying in bed with her O2 on.  Otherwise only wears the O2 when in bed.    Talked about the Trazodone today to verify with her the orders.  Can have the extra dose of Trazodone before 3am.  \"So 2:55am is ok?\"  \"Yes\".      ALLERGIES:    Allergies   Allergen Reactions     Dilaudid [Hydromorphone Hcl] Other (See Comments)     hallucinations     Fosamax [Alendronate Sodium] Rash            Norvasc [Amlodipine Besylate] Hives and Rash     Tegretol [Carbamazepine] Hives and Rash        MEDICATIONS:  Post Discharge Medication Reconciliation Status: patient was not discharged from an inpatient facility or TCU. No changes with medications.    Current Outpatient Medications   Medication Sig Dispense Refill     " acetaminophen (TYLENOL) 650 MG CR tablet 1300mg po twice a day and 1300mg po daily prn       busPIRone (BUSPAR) 10 MG tablet Take 1 tablet (10 mg) by mouth 3 times daily 90 tablet 11     carboxymethylcellulose PF (REFRESH PLUS) 0.5 % ophthalmic solution Place 1 drop into both eyes 4 times daily as needed for dry eyes or other (eye irritation) Ongoing management/refills per Hospice 1 each 0     citalopram (CELEXA) 10 MG tablet Take 1 tablet (10 mg) by mouth daily       fluticasone (FLONASE) 50 MCG/ACT nasal spray Spray 2 sprays into both nostrils daily 16 g 3     furosemide (LASIX) 20 MG tablet 40mg by mouth every AM and 20mg by mouth in afternoon 30 tablet 11     gabapentin (NEURONTIN) 600 MG tablet TAKE 1 TABLET (600 MG) BY MOUTH AT BEDTIME 30 tablet 3     hypromellose-dextran (ARTIFICAL TEARS) 0.1-0.3 % ophthalmic solution Place 1 drop into both eyes 2 times daily Ongoing management/refills per Hospice 15 mL 0     levothyroxine (SYNTHROID/LEVOTHROID) 100 MCG tablet Take 1 tablet (100 mcg) by mouth daily       LORazepam (ATIVAN) 0.5 MG tablet TAKE 1 TABLET BY MOUTH FOUR TIMES DAILY; TAKE 1 TABLET BY MOUTH TWICE DAILY IF NEEDED 120 tablet 3     Menthol, Topical Analgesic, (BIOFREEZE) 4 % GEL Dime size gel to elbows and neck at HS and then TID prn       nitroGLYcerin (NITROSTAT) 0.4 MG sublingual tablet For chest pain place 1 tablet under the tongue every 5 minutes for 3 doses. If symptoms persist 5 minutes after 1st dose call 911. 6 tablet 4     nystatin (MYCOSTATIN) 757418 UNIT/GM external powder Apply topically 2 times daily 30 g 4     oxyCODONE (ROXICODONE) 5 MG tablet Take 1 tablet (5 mg) by mouth every 4 hours as needed for pain 20 tablet 0     QUEtiapine (SEROQUEL) 25 MG tablet TAKE 0.5 TABLETS (12.5 MG) BY MOUTH 3 TIMES DAILY 45 tablet 3     senna-docusate (SENOKOT-S/PERICOLACE) 8.6-50 MG tablet Take 1 tablet by mouth every other day 60 tablet 11     traZODone (DESYREL) 100 MG tablet Take 1 tablet (100 mg) by  mouth At Bedtime. May also take 0.5 tablets (50 mg) at bedtime as needed, may repeat once for sleep (if not sleeping by 0300.). 30 tablet 11       REVIEW OF SYSTEMS:  4 point ROS neg other than the symptoms noted above in the HPI.  No chest pain, wearing oxygen as she was laying in bed for afternoon nap.    PHYSICAL EXAM:  BP 96/64   Pulse 77   Temp (!) 95.7  F (35.4  C)   Resp 15   Wt 98 kg (216 lb)   LMP  (LMP Unknown)   BMI 35.94 kg/m    Alert and oriented x3 and knows she is memory loss.    Skin is pink, dry and intact.  O2 on at 2L/min.  Lungs are clear.  No coughing now.  Heart rate regular and strong.  S1 and S2 heard.  Abdomen is round, soft and non-tender.    Ambulates in her room without help but when out of room she has her 4WW.      No recent labs      ASSESSMENT / PLAN:  (I50.22) Chronic systolic heart failure (H)  (primary encounter diagnosis)  (R60.0) Bilateral lower extremity edema  (Z99.81) Oxygen dependent  Comment: maintained on Lasix 40mg in AM and 20mg in afternoon.  Effective.  Will update her labs with a BMP and CBC on Thursday.  O2 at night.  Has a portable tank she has with her at all times when out of room.    (I10) Primary hypertension  Comment: 's/50-70's.  No medications.    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  She is much calmer and easy going with her personality now.  See above medications.  No changes.  Daughter will email this NP with any concerns.      Orders:  1.  BMP and CBC on Thursday for CHF    Electronically signed by  NORA Balderas CNP [facility] GERIATRICS  Chief Complaint   Patient presents with     Wellness Visit     [place] Medical Record Number:  [id number]  Place of Service where encounter took place:  [facility] ([facility]) [[id number]]    Annual Wellness Visit    Are you in the first 12 months of your Medicare Part B coverage?  No    Physical Health:    In general, how would you rate your overall physical health? good    Outside of work, how many days during the week do you exercise?2-3 days/week    Outside of work, approximately how many minutes a day do you exercise?15-30 minutes    If you drink alcohol do you typically have >3 drinks per day or >7 drinks per week? No    Do you usually eat at least 4 servings of fruit and vegetables a day, include whole grains & fiber and avoid regularly eating high fat or \"junk\" foods? Yes    Do you have any problems taking medications regularly? No    Do you have any side effects from medications? none    Needs assistance for the following daily activities: transportation, preparing meals, housework, laundry, money management and taking medicine    Which of the following safety concerns are present in your home?  none identified     Hearing impairment: No    In the past 6 months, have you been bothered by leaking of urine? no    Mental Health:    In general, how would you rate your overall mental or emotional health? excellent      PHQ-2 Score:       PHQ-2 (  [employer]) 2022   Q1: Little interest or pleasure in doing things 0 0   Q2: Feeling down, depressed or hopeless 0 0   PHQ-2 Score 0 0   Q1: Little interest or pleasure in doing things - Not at all   Q2: Feeling down, depressed or hopeless - Not at all   PHQ-2 Score - 0          Do you feel safe in your environment? Yes    Have you ever done Advance Care Planning? (For example, a Health Directive, POLST, or a discussion with a medical provider or your loved ones about your wishes)? Yes, advance care planning is on file.    Fall risk:  Fallen 2 or more times in the past year?: No  Any fall with injury in the past year?: No    Cognitive Screenin) Repeat 3 items (Leader, Season, Table)    2) Clock draw: "   NORMAL  3) 3 item recall: season, table, leader  Recalls 3 objects  Results: 3 items recalled: COGNITIVE IMPAIRMENT LESS LIKELY    Mini-CogTM Copyright S Joni. Licensed by the author for use in [facility]; reprinted with permission ([email or web address]@.[facility]). All rights reserved.      Do you have sleep apnea, excessive snoring or daytime drowsiness?: yes  Has been tested but does not use a machine for apnea.    Current providers sharing in care for this patient include:   Patient Care Team:  Elsa Jeffries APRN CNP as PCP - General (Geriatric Medicine)  Yamel Noland, RN as Continuity Care Coordinator (Nurse)  Tejinder Tracy MD as MD (Oncology)  Brody Hanks MD as Assigned Allergy Provider  Nathan Michaels MD as MD (Cardiovascular Disease)  [facility] ([facility]  Raisa Miranda MA as Medical Assistant (Geriatric Medicine)  Fadi Painter MD as Hospitalist (Family Medicine)  Onelia Alarcon as Case Management Specialist  Nathan Michaels MD as Assigned Heart and Vascular Provider  Angela Reid RN as Lead Care Coordinator  Jose Manuel Sutton MD as Assigned PCP    NORA Balderas CNP

## 2022-12-20 VITALS
TEMPERATURE: 95.7 F | RESPIRATION RATE: 15 BRPM | BODY MASS INDEX: 35.94 KG/M2 | SYSTOLIC BLOOD PRESSURE: 96 MMHG | DIASTOLIC BLOOD PRESSURE: 64 MMHG | WEIGHT: 216 LBS | HEART RATE: 77 BPM

## 2022-12-21 ENCOUNTER — LAB REQUISITION (OUTPATIENT)
Dept: LAB | Facility: CLINIC | Age: 77
End: 2022-12-21
Payer: COMMERCIAL

## 2022-12-21 DIAGNOSIS — I50.9 HEART FAILURE, UNSPECIFIED (H): ICD-10-CM

## 2022-12-21 LAB
ANION GAP SERPL CALCULATED.3IONS-SCNC: 11 MMOL/L (ref 7–15)
BASOPHILS # BLD AUTO: 0 10E3/UL (ref 0–0.2)
BASOPHILS NFR BLD AUTO: 1 %
BUN SERPL-MCNC: 22.1 MG/DL (ref 8–23)
CALCIUM SERPL-MCNC: 9.9 MG/DL (ref 8.8–10.2)
CHLORIDE SERPL-SCNC: 99 MMOL/L (ref 98–107)
CREAT SERPL-MCNC: 0.96 MG/DL (ref 0.51–0.95)
DEPRECATED HCO3 PLAS-SCNC: 30 MMOL/L (ref 22–29)
EOSINOPHIL # BLD AUTO: 0.3 10E3/UL (ref 0–0.7)
EOSINOPHIL NFR BLD AUTO: 4 %
ERYTHROCYTE [DISTWIDTH] IN BLOOD BY AUTOMATED COUNT: 14.6 % (ref 10–15)
GFR SERPL CREATININE-BSD FRML MDRD: 60 ML/MIN/1.73M2
GLUCOSE SERPL-MCNC: 98 MG/DL (ref 70–99)
HCT VFR BLD AUTO: 41.5 % (ref 35–47)
HGB BLD-MCNC: 12.9 G/DL (ref 11.7–15.7)
IMM GRANULOCYTES # BLD: 0 10E3/UL
IMM GRANULOCYTES NFR BLD: 1 %
LYMPHOCYTES # BLD AUTO: 1.3 10E3/UL (ref 0.8–5.3)
LYMPHOCYTES NFR BLD AUTO: 21 %
MCH RBC QN AUTO: 28.4 PG (ref 26.5–33)
MCHC RBC AUTO-ENTMCNC: 31.1 G/DL (ref 31.5–36.5)
MCV RBC AUTO: 91 FL (ref 78–100)
MONOCYTES # BLD AUTO: 0.8 10E3/UL (ref 0–1.3)
MONOCYTES NFR BLD AUTO: 12 %
NEUTROPHILS # BLD AUTO: 3.9 10E3/UL (ref 1.6–8.3)
NEUTROPHILS NFR BLD AUTO: 61 %
NRBC # BLD AUTO: 0 10E3/UL
NRBC BLD AUTO-RTO: 0 /100
PLATELET # BLD AUTO: 212 10E3/UL (ref 150–450)
POTASSIUM SERPL-SCNC: 4.2 MMOL/L (ref 3.4–5.3)
RBC # BLD AUTO: 4.55 10E6/UL (ref 3.8–5.2)
SODIUM SERPL-SCNC: 140 MMOL/L (ref 136–145)
WBC # BLD AUTO: 6.3 10E3/UL (ref 4–11)

## 2022-12-21 PROCEDURE — 85025 COMPLETE CBC W/AUTO DIFF WBC: CPT | Mod: ORL | Performed by: NURSE PRACTITIONER

## 2022-12-21 PROCEDURE — 36415 COLL VENOUS BLD VENIPUNCTURE: CPT | Mod: ORL | Performed by: NURSE PRACTITIONER

## 2022-12-21 PROCEDURE — 80048 BASIC METABOLIC PNL TOTAL CA: CPT | Mod: ORL | Performed by: NURSE PRACTITIONER

## 2022-12-21 PROCEDURE — P9603 ONE-WAY ALLOW PRORATED MILES: HCPCS | Mod: ORL | Performed by: NURSE PRACTITIONER

## 2023-01-01 ENCOUNTER — ASSISTED LIVING VISIT (OUTPATIENT)
Dept: GERIATRICS | Facility: CLINIC | Age: 78
End: 2023-01-01
Payer: COMMERCIAL

## 2023-01-01 ENCOUNTER — LAB REQUISITION (OUTPATIENT)
Dept: LAB | Facility: CLINIC | Age: 78
End: 2023-01-01
Payer: COMMERCIAL

## 2023-01-01 ENCOUNTER — OFFICE VISIT (OUTPATIENT)
Dept: NEUROLOGY | Facility: CLINIC | Age: 78
End: 2023-01-01
Payer: COMMERCIAL

## 2023-01-01 ENCOUNTER — MYC MEDICAL ADVICE (OUTPATIENT)
Dept: SURGERY | Facility: CLINIC | Age: 78
End: 2023-01-01
Payer: COMMERCIAL

## 2023-01-01 ENCOUNTER — TELEPHONE (OUTPATIENT)
Dept: GERIATRICS | Facility: CLINIC | Age: 78
End: 2023-01-01
Payer: COMMERCIAL

## 2023-01-01 ENCOUNTER — TELEPHONE (OUTPATIENT)
Dept: SURGERY | Facility: CLINIC | Age: 78
End: 2023-01-01
Payer: COMMERCIAL

## 2023-01-01 ENCOUNTER — HOSPITAL ENCOUNTER (OUTPATIENT)
Dept: MRI IMAGING | Facility: HOSPITAL | Age: 78
Discharge: HOME | End: 2023-11-14
Attending: PSYCHIATRY & NEUROLOGY
Payer: COMMERCIAL

## 2023-01-01 ENCOUNTER — DOCUMENTATION ONLY (OUTPATIENT)
Dept: GERIATRICS | Facility: CLINIC | Age: 78
End: 2023-01-01
Payer: COMMERCIAL

## 2023-01-01 ENCOUNTER — HOSPITAL ENCOUNTER (OUTPATIENT)
Dept: RADIOLOGY | Facility: HOSPITAL | Age: 78
Discharge: HOME | End: 2023-11-14
Attending: PSYCHIATRY & NEUROLOGY
Payer: COMMERCIAL

## 2023-01-01 ENCOUNTER — LAB (OUTPATIENT)
Dept: LAB | Facility: CLINIC | Age: 78
End: 2023-01-01
Payer: COMMERCIAL

## 2023-01-01 ENCOUNTER — PATIENT OUTREACH (OUTPATIENT)
Dept: GERIATRIC MEDICINE | Facility: CLINIC | Age: 78
End: 2023-01-01
Payer: COMMERCIAL

## 2023-01-01 ENCOUNTER — DOCUMENTATION ONLY (OUTPATIENT)
Dept: CARDIOLOGY | Facility: CLINIC | Age: 78
End: 2023-01-01
Payer: COMMERCIAL

## 2023-01-01 ENCOUNTER — PRE VISIT (OUTPATIENT)
Dept: SURGERY | Facility: CLINIC | Age: 78
End: 2023-01-01

## 2023-01-01 ENCOUNTER — TELEPHONE (OUTPATIENT)
Dept: GERIATRICS | Facility: CLINIC | Age: 78
End: 2023-01-01

## 2023-01-01 VITALS
TEMPERATURE: 97.8 F | DIASTOLIC BLOOD PRESSURE: 62 MMHG | SYSTOLIC BLOOD PRESSURE: 99 MMHG | WEIGHT: 213.8 LBS | RESPIRATION RATE: 17 BRPM | HEART RATE: 100 BPM | BODY MASS INDEX: 35.58 KG/M2 | OXYGEN SATURATION: 96 %

## 2023-01-01 VITALS
HEART RATE: 100 BPM | DIASTOLIC BLOOD PRESSURE: 62 MMHG | SYSTOLIC BLOOD PRESSURE: 99 MMHG | WEIGHT: 213.8 LBS | BODY MASS INDEX: 35.58 KG/M2 | RESPIRATION RATE: 17 BRPM | TEMPERATURE: 97.8 F | OXYGEN SATURATION: 98 %

## 2023-01-01 VITALS
WEIGHT: 213 LBS | HEART RATE: 100 BPM | RESPIRATION RATE: 17 BRPM | BODY MASS INDEX: 35.45 KG/M2 | SYSTOLIC BLOOD PRESSURE: 99 MMHG | DIASTOLIC BLOOD PRESSURE: 62 MMHG | TEMPERATURE: 97.8 F | OXYGEN SATURATION: 96 %

## 2023-01-01 VITALS
HEART RATE: 81 BPM | WEIGHT: 207 LBS | BODY MASS INDEX: 34.45 KG/M2 | SYSTOLIC BLOOD PRESSURE: 121 MMHG | DIASTOLIC BLOOD PRESSURE: 72 MMHG

## 2023-01-01 VITALS — OXYGEN SATURATION: 97 % | SYSTOLIC BLOOD PRESSURE: 99 MMHG | DIASTOLIC BLOOD PRESSURE: 62 MMHG | TEMPERATURE: 97.8 F

## 2023-01-01 VITALS
TEMPERATURE: 97.8 F | SYSTOLIC BLOOD PRESSURE: 99 MMHG | DIASTOLIC BLOOD PRESSURE: 62 MMHG | HEART RATE: 100 BPM | RESPIRATION RATE: 17 BRPM | OXYGEN SATURATION: 96 % | WEIGHT: 213 LBS | BODY MASS INDEX: 35.45 KG/M2

## 2023-01-01 VITALS
WEIGHT: 213.8 LBS | BODY MASS INDEX: 35.58 KG/M2 | RESPIRATION RATE: 17 BRPM | TEMPERATURE: 97.8 F | OXYGEN SATURATION: 96 % | DIASTOLIC BLOOD PRESSURE: 62 MMHG | SYSTOLIC BLOOD PRESSURE: 99 MMHG | HEART RATE: 100 BPM

## 2023-01-01 VITALS — OXYGEN SATURATION: 91 % | HEART RATE: 71 BPM | RESPIRATION RATE: 16 BRPM

## 2023-01-01 DIAGNOSIS — E53.8 FOLIC ACID DEFICIENCY: Primary | ICD-10-CM

## 2023-01-01 DIAGNOSIS — E03.9 ACQUIRED HYPOTHYROIDISM: Chronic | ICD-10-CM

## 2023-01-01 DIAGNOSIS — K62.3 RECTAL PROLAPSE: ICD-10-CM

## 2023-01-01 DIAGNOSIS — R52 GENERALIZED PAIN: ICD-10-CM

## 2023-01-01 DIAGNOSIS — F02.B0 MODERATE MAJOR NEUROCOGNITIVE DISORDER DUE TO ALZHEIMER'S DISEASE WITHOUT BEHAVIORAL DISTURBANCE (H): Primary | ICD-10-CM

## 2023-01-01 DIAGNOSIS — F51.01 PRIMARY INSOMNIA: ICD-10-CM

## 2023-01-01 DIAGNOSIS — G30.0 MODERATE EARLY ONSET ALZHEIMER'S DEMENTIA WITH MOOD DISTURBANCE (H): ICD-10-CM

## 2023-01-01 DIAGNOSIS — D64.9 ANEMIA, UNSPECIFIED: ICD-10-CM

## 2023-01-01 DIAGNOSIS — F02.B3 MODERATE EARLY ONSET ALZHEIMER'S DEMENTIA WITH MOOD DISTURBANCE (H): ICD-10-CM

## 2023-01-01 DIAGNOSIS — F33.1 MAJOR DEPRESSIVE DISORDER, RECURRENT EPISODE, MODERATE (H): ICD-10-CM

## 2023-01-01 DIAGNOSIS — F02.B0 MODERATE MAJOR NEUROCOGNITIVE DISORDER DUE TO ALZHEIMER'S DISEASE WITHOUT BEHAVIORAL DISTURBANCE (H): ICD-10-CM

## 2023-01-01 DIAGNOSIS — K59.03 DRUG-INDUCED CONSTIPATION: ICD-10-CM

## 2023-01-01 DIAGNOSIS — M79.621 PAIN OF RIGHT UPPER ARM: ICD-10-CM

## 2023-01-01 DIAGNOSIS — I50.9 HEART FAILURE, UNSPECIFIED (H): ICD-10-CM

## 2023-01-01 DIAGNOSIS — E03.9 ACQUIRED HYPOTHYROIDISM: Primary | Chronic | ICD-10-CM

## 2023-01-01 DIAGNOSIS — K59.01 SLOW TRANSIT CONSTIPATION: ICD-10-CM

## 2023-01-01 DIAGNOSIS — D62 ANEMIA DUE TO BLOOD LOSS, ACUTE: Primary | ICD-10-CM

## 2023-01-01 DIAGNOSIS — I50.22 CHRONIC SYSTOLIC HEART FAILURE (H): ICD-10-CM

## 2023-01-01 DIAGNOSIS — I44.2 ATRIOVENTRICULAR BLOCK, COMPLETE (H): Primary | ICD-10-CM

## 2023-01-01 DIAGNOSIS — M15.9 GENERALIZED OSTEOARTHRITIS OF MULTIPLE SITES: Primary | ICD-10-CM

## 2023-01-01 DIAGNOSIS — E03.9 HYPOTHYROIDISM, UNSPECIFIED: ICD-10-CM

## 2023-01-01 DIAGNOSIS — H04.123 DRY EYES: ICD-10-CM

## 2023-01-01 DIAGNOSIS — M15.9 GENERALIZED OSTEOARTHRITIS OF MULTIPLE SITES: ICD-10-CM

## 2023-01-01 DIAGNOSIS — G30.9 MODERATE MAJOR NEUROCOGNITIVE DISORDER DUE TO ALZHEIMER'S DISEASE WITHOUT BEHAVIORAL DISTURBANCE (H): ICD-10-CM

## 2023-01-01 DIAGNOSIS — Z95.0 CARDIAC PACEMAKER IN SITU: ICD-10-CM

## 2023-01-01 DIAGNOSIS — M79.621 PAIN OF RIGHT UPPER ARM: Primary | ICD-10-CM

## 2023-01-01 DIAGNOSIS — F41.1 GAD (GENERALIZED ANXIETY DISORDER): ICD-10-CM

## 2023-01-01 DIAGNOSIS — K62.3 RECTAL PROLAPSE: Primary | ICD-10-CM

## 2023-01-01 DIAGNOSIS — G30.9 MODERATE MAJOR NEUROCOGNITIVE DISORDER DUE TO ALZHEIMER'S DISEASE WITHOUT BEHAVIORAL DISTURBANCE (H): Primary | ICD-10-CM

## 2023-01-01 DIAGNOSIS — D62 ANEMIA DUE TO BLOOD LOSS, ACUTE: ICD-10-CM

## 2023-01-01 DIAGNOSIS — F41.9 ANXIETY: Chronic | ICD-10-CM

## 2023-01-01 DIAGNOSIS — I10 PRIMARY HYPERTENSION: Chronic | ICD-10-CM

## 2023-01-01 DIAGNOSIS — E03.9 ACQUIRED HYPOTHYROIDISM: ICD-10-CM

## 2023-01-01 DIAGNOSIS — I50.22 CHRONIC SYSTOLIC CONGESTIVE HEART FAILURE (H): ICD-10-CM

## 2023-01-01 LAB
A-TOCOPHEROL VIT E SERPL-MCNC: 12.1 MG/L
ALBUMIN SERPL BCG-MCNC: 4.1 G/DL (ref 3.5–5.2)
ALP SERPL-CCNC: 105 U/L (ref 35–104)
ALT SERPL W P-5'-P-CCNC: 13 U/L (ref 0–50)
ANION GAP SERPL CALCULATED.3IONS-SCNC: 10 MMOL/L (ref 7–15)
ARSENIC BLD-MCNC: <10 UG/L
AST SERPL W P-5'-P-CCNC: 21 U/L (ref 0–45)
BETA+GAMMA TOCOPHEROL SERPL-MCNC: 1.4 MG/L
BILIRUB DIRECT SERPL-MCNC: <0.2 MG/DL (ref 0–0.3)
BILIRUB SERPL-MCNC: 0.2 MG/DL
BUN SERPL-MCNC: 15.9 MG/DL (ref 8–23)
CALCIUM SERPL-MCNC: 8.9 MG/DL (ref 8.8–10.2)
CHLORIDE SERPL-SCNC: 99 MMOL/L (ref 98–107)
CREAT SERPL-MCNC: 1.12 MG/DL (ref 0.51–0.95)
DEPRECATED HCO3 PLAS-SCNC: 32 MMOL/L (ref 22–29)
EGFRCR SERPLBLD CKD-EPI 2021: 49 ML/MIN/1.73M2
ERYTHROCYTE [DISTWIDTH] IN BLOOD BY AUTOMATED COUNT: 15.2 % (ref 10–15)
ERYTHROCYTE [DISTWIDTH] IN BLOOD BY AUTOMATED COUNT: 18.8 % (ref 10–15)
FOLATE SERPL-MCNC: 4.2 NG/ML (ref 4.6–34.8)
GLUCOSE SERPL-MCNC: 117 MG/DL (ref 70–99)
HCT VFR BLD AUTO: 29.2 % (ref 35–47)
HCT VFR BLD AUTO: 29.5 % (ref 35–47)
HCYS SERPL-SCNC: 29.9 UMOL/L (ref 4–12)
HGB BLD-MCNC: 8.8 G/DL (ref 11.7–15.7)
HGB BLD-MCNC: 8.9 G/DL (ref 11.7–15.7)
HGB BLD-MCNC: 9.1 G/DL (ref 11.7–15.7)
HGB BLD-MCNC: 9.6 G/DL (ref 11.7–15.7)
HGB BLD-MCNC: 9.6 G/DL (ref 11.7–15.7)
HGB BLD-MCNC: 9.8 G/DL (ref 11.7–15.7)
LEAD BLDV-MCNC: <2 UG/DL
MCH RBC QN AUTO: 24.4 PG (ref 26.5–33)
MCH RBC QN AUTO: 24.9 PG (ref 26.5–33)
MCHC RBC AUTO-ENTMCNC: 29.8 G/DL (ref 31.5–36.5)
MCHC RBC AUTO-ENTMCNC: 30.5 G/DL (ref 31.5–36.5)
MCV RBC AUTO: 82 FL (ref 78–100)
MCV RBC AUTO: 82 FL (ref 78–100)
MERCURY BLD-MCNC: <2.5 UG/L
METHYLMALONATE SERPL-SCNC: 0.26 UMOL/L (ref 0–0.4)
PLATELET # BLD AUTO: 272 10E3/UL (ref 150–450)
PLATELET # BLD AUTO: 316 10E3/UL (ref 150–450)
POTASSIUM SERPL-SCNC: 3.4 MMOL/L (ref 3.4–5.3)
POTASSIUM SERPL-SCNC: 3.6 MMOL/L (ref 3.4–5.3)
PROT SERPL-MCNC: 7.1 G/DL (ref 6.4–8.3)
RBC # BLD AUTO: 3.57 10E6/UL (ref 3.8–5.2)
RBC # BLD AUTO: 3.61 10E6/UL (ref 3.8–5.2)
SODIUM SERPL-SCNC: 141 MMOL/L (ref 135–145)
T PALLIDUM AB SER QL: NONREACTIVE
T4 FREE SERPL-MCNC: 0.82 NG/DL (ref 0.9–1.7)
TSH SERPL DL<=0.005 MIU/L-ACNC: 0.56 UIU/ML (ref 0.3–4.2)
TSH SERPL DL<=0.005 MIU/L-ACNC: 11 UIU/ML (ref 0.3–4.2)
VIT B1 PYROPHOSHATE BLD-SCNC: 89 NMOL/L
VIT B12 SERPL-MCNC: 575 PG/ML (ref 232–1245)
WBC # BLD AUTO: 5.5 10E3/UL (ref 4–11)
WBC # BLD AUTO: 6.1 10E3/UL (ref 4–11)

## 2023-01-01 PROCEDURE — 36415 COLL VENOUS BLD VENIPUNCTURE: CPT | Mod: ORL | Performed by: NURSE PRACTITIONER

## 2023-01-01 PROCEDURE — 83090 ASSAY OF HOMOCYSTEINE: CPT

## 2023-01-01 PROCEDURE — 84443 ASSAY THYROID STIM HORMONE: CPT | Mod: ORL | Performed by: NURSE PRACTITIONER

## 2023-01-01 PROCEDURE — 99349 HOME/RES VST EST MOD MDM 40: CPT | Performed by: NURSE PRACTITIONER

## 2023-01-01 PROCEDURE — P9603 ONE-WAY ALLOW PRORATED MILES: HCPCS | Mod: ORL | Performed by: NURSE PRACTITIONER

## 2023-01-01 PROCEDURE — 85018 HEMOGLOBIN: CPT | Mod: ORL | Performed by: NURSE PRACTITIONER

## 2023-01-01 PROCEDURE — 82746 ASSAY OF FOLIC ACID SERUM: CPT

## 2023-01-01 PROCEDURE — 71045 X-RAY EXAM CHEST 1 VIEW: CPT

## 2023-01-01 PROCEDURE — 84425 ASSAY OF VITAMIN B-1: CPT | Mod: 90

## 2023-01-01 PROCEDURE — 84443 ASSAY THYROID STIM HORMONE: CPT

## 2023-01-01 PROCEDURE — 84439 ASSAY OF FREE THYROXINE: CPT | Mod: ORL | Performed by: NURSE PRACTITIONER

## 2023-01-01 PROCEDURE — 85027 COMPLETE CBC AUTOMATED: CPT | Mod: ORL | Performed by: NURSE PRACTITIONER

## 2023-01-01 PROCEDURE — 80048 BASIC METABOLIC PNL TOTAL CA: CPT | Mod: ORL | Performed by: NURSE PRACTITIONER

## 2023-01-01 PROCEDURE — 86780 TREPONEMA PALLIDUM: CPT

## 2023-01-01 PROCEDURE — 83655 ASSAY OF LEAD: CPT | Mod: 90

## 2023-01-01 PROCEDURE — 99205 OFFICE O/P NEW HI 60 MIN: CPT | Performed by: PSYCHIATRY & NEUROLOGY

## 2023-01-01 PROCEDURE — 84446 ASSAY OF VITAMIN E: CPT | Mod: 90

## 2023-01-01 PROCEDURE — 84132 ASSAY OF SERUM POTASSIUM: CPT | Mod: ORL | Performed by: NURSE PRACTITIONER

## 2023-01-01 PROCEDURE — 99000 SPECIMEN HANDLING OFFICE-LAB: CPT

## 2023-01-01 PROCEDURE — 80076 HEPATIC FUNCTION PANEL: CPT

## 2023-01-01 PROCEDURE — 36415 COLL VENOUS BLD VENIPUNCTURE: CPT

## 2023-01-01 PROCEDURE — 82607 VITAMIN B-12: CPT

## 2023-01-01 PROCEDURE — 70551 MRI BRAIN STEM W/O DYE: CPT

## 2023-01-01 PROCEDURE — 83921 ORGANIC ACID SINGLE QUANT: CPT

## 2023-01-01 PROCEDURE — 82175 ASSAY OF ARSENIC: CPT | Mod: 90

## 2023-01-01 PROCEDURE — 83825 ASSAY OF MERCURY: CPT | Mod: 90

## 2023-01-01 RX ORDER — DONEPEZIL HYDROCHLORIDE 5 MG/1
5 TABLET, FILM COATED ORAL AT BEDTIME
Qty: 30 TABLET | Refills: 0 | Status: SHIPPED | OUTPATIENT
Start: 2023-01-01 | End: 2023-01-01

## 2023-01-01 RX ORDER — UBIDECARENONE 75 MG
100 CAPSULE ORAL DAILY
Qty: 30 TABLET | Refills: 11 | Status: SHIPPED | OUTPATIENT
Start: 2023-01-01

## 2023-01-01 RX ORDER — OXYCODONE HYDROCHLORIDE 5 MG/1
TABLET ORAL
Qty: 40 TABLET | Refills: 0 | Status: SHIPPED | OUTPATIENT
Start: 2023-01-01 | End: 2023-01-01

## 2023-01-01 RX ORDER — GLIPIZIDE 10 MG/1
TABLET ORAL
Qty: 15 ML | Refills: 4 | Status: SHIPPED | OUTPATIENT
Start: 2023-01-01

## 2023-01-01 RX ORDER — FUROSEMIDE 20 MG
40 TABLET ORAL 2 TIMES DAILY
Qty: 60 TABLET | Refills: 11 | Status: SHIPPED | OUTPATIENT
Start: 2023-01-01

## 2023-01-01 RX ORDER — SENNOSIDES 8.6 MG
CAPSULE ORAL
Qty: 120 TABLET | Refills: 4 | Status: SHIPPED | OUTPATIENT
Start: 2023-01-01

## 2023-01-01 RX ORDER — FOLIC ACID 1 MG/1
1 TABLET ORAL DAILY
Qty: 30 TABLET | Refills: 11 | Status: SHIPPED | OUTPATIENT
Start: 2023-01-01

## 2023-01-01 RX ORDER — AMOXICILLIN 250 MG
CAPSULE ORAL
Start: 2023-06-05

## 2023-01-01 RX ORDER — LORAZEPAM 0.5 MG/1
TABLET ORAL
Qty: 120 TABLET | Refills: 3 | Status: SHIPPED | OUTPATIENT
Start: 2023-01-01 | End: 2024-01-01

## 2023-01-01 RX ORDER — LEVOTHYROXINE SODIUM 100 UG/1
100 TABLET ORAL DAILY
Qty: 30 TABLET | Refills: 3 | Status: SHIPPED | OUTPATIENT
Start: 2023-01-01 | End: 2024-01-01

## 2023-01-01 RX ORDER — FOLIC ACID 1 MG/1
1 TABLET ORAL DAILY
Qty: 30 TABLET | Refills: 11 | Status: SHIPPED | OUTPATIENT
Start: 2023-01-01 | End: 2023-01-01

## 2023-01-01 RX ORDER — LEVOTHYROXINE SODIUM 88 UG/1
88 TABLET ORAL DAILY
Qty: 30 TABLET | Refills: 11 | Status: SHIPPED | OUTPATIENT
Start: 2023-01-01 | End: 2023-01-01 | Stop reason: DRUGHIGH

## 2023-01-01 RX ORDER — OXYCODONE HYDROCHLORIDE 5 MG/1
TABLET ORAL
Qty: 40 TABLET | Refills: 0 | Status: SHIPPED | OUTPATIENT
Start: 2023-01-01 | End: 2024-01-01

## 2023-01-01 RX ORDER — FLUOXETINE 10 MG/1
20 CAPSULE ORAL DAILY
Start: 2023-09-18 | End: 2023-01-01

## 2023-01-01 RX ORDER — DONEPEZIL HYDROCHLORIDE 10 MG/1
10 TABLET, FILM COATED ORAL AT BEDTIME
Qty: 90 TABLET | Refills: 3 | Status: SHIPPED | OUTPATIENT
Start: 2023-01-01 | End: 2023-01-01 | Stop reason: SINTOL

## 2023-01-01 RX ORDER — FLUOXETINE 10 MG/1
10 CAPSULE ORAL DAILY
Qty: 30 CAPSULE | Refills: 4 | Status: SHIPPED | OUTPATIENT
Start: 2023-01-01

## 2023-01-01 RX ORDER — MEMANTINE HYDROCHLORIDE 5 MG/1
TABLET ORAL
Qty: 70 TABLET | Refills: 4 | Status: SHIPPED | OUTPATIENT
Start: 2023-01-01

## 2023-01-01 RX ORDER — FERROUS SULFATE 325(65) MG
325 TABLET ORAL
Start: 2023-01-01

## 2023-01-01 ASSESSMENT — MONTREAL COGNITIVE ASSESSMENT (MOCA)
WHAT IS THE TOTAL SCORE (OUT OF 30): 18
13. ORIENTATION SUBSCORE: 5
4. NAME EACH OF THE THREE ANIMALS SHOWN: 2
VISUOSPATIAL/EXECUTIVE SUBSCORE: 4
12. MEMORY INDEX SCORE: 0
10. [FLUENCY] NAME WORDS STARTING WITH DESIGNATED LETTER: 0
8. SERIAL SUBTRACTION OF 7S: 0
WHAT LEVEL OF EDUCATION WAS ATTAINED: 1
7. [VIGILENCE] TAP WHEN HEARING DESIGNATED LETTER: 1
9. REPEAT EACH SENTENCE: 2
11. FOR EACH PAIR OF WORDS, WHAT CATEGORY DO THEY BELONG TO (OUT OF 2): 2
6. READ LIST OF DIGITS [FORWARD/BACKWARD]: 1

## 2023-01-15 ENCOUNTER — HEALTH MAINTENANCE LETTER (OUTPATIENT)
Age: 78
End: 2023-01-15

## 2023-01-18 ENCOUNTER — ASSISTED LIVING VISIT (OUTPATIENT)
Dept: GERIATRICS | Facility: CLINIC | Age: 78
End: 2023-01-18
Payer: COMMERCIAL

## 2023-01-18 DIAGNOSIS — R41.3 MEMORY DIFFICULTIES: ICD-10-CM

## 2023-01-18 DIAGNOSIS — F41.1 GAD (GENERALIZED ANXIETY DISORDER): ICD-10-CM

## 2023-01-18 DIAGNOSIS — Z99.81 OXYGEN DEPENDENT: ICD-10-CM

## 2023-01-18 DIAGNOSIS — G25.81 RESTLESS LEGS SYNDROME (RLS): Primary | ICD-10-CM

## 2023-01-18 DIAGNOSIS — F51.01 PRIMARY INSOMNIA: ICD-10-CM

## 2023-01-18 DIAGNOSIS — I50.22 CHRONIC SYSTOLIC HEART FAILURE (H): ICD-10-CM

## 2023-01-18 PROCEDURE — 99349 HOME/RES VST EST MOD MDM 40: CPT | Performed by: NURSE PRACTITIONER

## 2023-01-18 RX ORDER — ROPINIROLE 0.5 MG/1
TABLET, FILM COATED ORAL
Qty: 30 TABLET | Refills: 4 | Status: SHIPPED | OUTPATIENT
Start: 2023-01-18 | End: 2023-02-21

## 2023-01-18 NOTE — LETTER
1/18/2023        RE: Ingrid Powell  [facility]  [address]  [facility] [address]        No notes on file      Sincerely,        NORA Balderas CNP

## 2023-01-18 NOTE — PROGRESS NOTES
[facility] GERIATRICS  ACUTE/EPISODIC VISIT    [facility] Medical Record Number:  [id number]  Place of Service where encounter took place:  [facility] ([facility]) [[id number]]    Chief Complaint   Patient presents with     RECHECK       HPI:    Ingrid Powell is a 77 year old  (1945), who is being seen today for an episodic care visit.  HPI information obtained from: facility chart records, facility staff, patient report and [facility] chart review.    Today's concern is:    Diagnoses       Codes Comments    Restless legs syndrome (RLS)    -  Primary G25.81     Chronic systolic heart failure (H)     I50.22     Oxygen dependent     Z99.81     JAIME (generalized anxiety disorder)     F41.1     Primary insomnia     F51.01     Memory difficulties     R41.3         Came to see Ingrid as she is upset about her sleep qualities.  Reviewing her medications today with her.  She complains that her legs move around often and hurt.  She will get up and pace the floor sometimes.  Asked her questions to distinguish if she is having restless legs or neuropathy type pain at night.    She feels it is restless legs as by description.  Is already on Gabapentin at night.    Ingrid will continue to wear her O2 at night and if napping during the day as she feels it is more of a security blanket for her after having a cardiac arrest.  Feel she thinks she will not wake up.  Not going to convince her otherwise.      Do notice she will say she can't remember things and either this NP will write something down for her or she will.      ALLERGIES:    Allergies   Allergen Reactions     Dilaudid [Hydromorphone Hcl] Other (See Comments)     hallucinations     Fosamax [Alendronate Sodium] Rash            Norvasc [Amlodipine Besylate] Hives and Rash     Tegretol [Carbamazepine] Hives and Rash        MEDICATIONS:  Post Discharge Medication Reconciliation Status: patient was not discharged from an inpatient facility or  TCU.     Current Outpatient Medications   Medication Sig Dispense Refill     acetaminophen (TYLENOL) 650 MG CR tablet 1300mg po twice a day and 1300mg po daily prn       busPIRone (BUSPAR) 10 MG tablet Take 1 tablet (10 mg) by mouth 3 times daily 90 tablet 11     carboxymethylcellulose PF (REFRESH PLUS) 0.5 % ophthalmic solution Place 1 drop into both eyes 4 times daily as needed for dry eyes or other (eye irritation) Ongoing management/refills per Hospice 1 each 0     citalopram (CELEXA) 10 MG tablet Take 1 tablet (10 mg) by mouth daily       fluticasone (FLONASE) 50 MCG/ACT nasal spray Spray 2 sprays into both nostrils daily 16 g 3     furosemide (LASIX) 20 MG tablet 40mg by mouth every AM and 20mg by mouth in afternoon 30 tablet 11     gabapentin (NEURONTIN) 600 MG tablet TAKE 1 TABLET (600 MG) BY MOUTH AT BEDTIME 30 tablet 3     hypromellose-dextran (ARTIFICAL TEARS) 0.1-0.3 % ophthalmic solution Place 1 drop into both eyes 2 times daily Ongoing management/refills per Hospice 15 mL 0     levothyroxine (SYNTHROID/LEVOTHROID) 100 MCG tablet Take 1 tablet (100 mcg) by mouth daily       LORazepam (ATIVAN) 0.5 MG tablet TAKE 1 TABLET BY MOUTH FOUR TIMES DAILY; TAKE 1 TABLET BY MOUTH TWICE DAILY IF NEEDED 120 tablet 3     Menthol, Topical Analgesic, (BIOFREEZE) 4 % GEL Dime size gel to elbows and neck at HS and then TID prn       nitroGLYcerin (NITROSTAT) 0.4 MG sublingual tablet For chest pain place 1 tablet under the tongue every 5 minutes for 3 doses. If symptoms persist 5 minutes after 1st dose call 911. 6 tablet 4     nystatin (MYCOSTATIN) 807907 UNIT/GM external powder Apply topically 2 times daily 30 g 4     oxyCODONE (ROXICODONE) 5 MG tablet Take 1 tablet (5 mg) by mouth every 4 hours as needed for pain 20 tablet 0    Ropinirole 0.5mg Take 0.25mg po at HS for 2 nights then 0.5mg at bedtime       QUEtiapine (SEROQUEL) 25 MG tablet TAKE 0.5 TABLETS (12.5 MG) BY MOUTH 3 TIMES DAILY 45 tablet 3     senna-docusate  "(SENOKOT-S/PERICOLACE) 8.6-50 MG tablet Take 1 tablet by mouth every other day 60 tablet 11     traZODone (DESYREL) 100 MG tablet Take 1 tablet (100 mg) by mouth At Bedtime. May also take 0.5 tablets (50 mg) at bedtime as needed, may repeat once for sleep (if not sleeping by 0300.). 30 tablet 11         REVIEW OF SYSTEMS:  4 point ROS neg other than the symptoms noted above in the HPI.   No chest pain, wears her oxygen when laying in bed.  No stomach troubles.    PHYSICAL EXAM:  /64   Pulse 97   Temp 97.5  F (36.4  C)   Resp 18   Wt 97.6 kg (215 lb 3.2 oz)   LMP  (LMP Unknown)   BMI 35.81 kg/m    Found her laying in bed but easily aroused as she heard the knock on the door.    Able to sit up on her bed and will talk.  Voice is clear.  Heart rate regular and strong.    Lungs are clear.  Abdomen is obese and soft to touch.  Decreased bowel sounds.  Has a regular BM daily.  No pitting edema noted.  Legs are \"thick\" in nature due to her build.    Ambulates without assist in apartment as small area but when out of her room, she has the 4WW with O2 tank present.    Component      Latest Ref Rng 12/21/2022  3:00 PM   Sodium      136 - 145 mmol/L 140    Potassium      3.4 - 5.3 mmol/L 4.2    Chloride      98 - 107 mmol/L 99    Carbon Dioxide (CO2)      22 - 29 mmol/L 30 (H)    Anion Gap      7 - 15 mmol/L 11    Urea Nitrogen      8.0 - 23.0 mg/dL 22.1    Creatinine      0.51 - 0.95 mg/dL 0.96 (H)    Calcium      8.8 - 10.2 mg/dL 9.9    Glucose      70 - 99 mg/dL 98    GFR Estimate      >60 mL/min/1.73m2 60 (L)           ASSESSMENT / PLAN:  (G25.81) Restless legs syndrome (RLS)  (primary encounter diagnosis)  Comment: her description is of restless legs.  Willing to try a small dose of ropinirole to see if helps calm them down and maybe sleep better.    Ropinirole 0.5mg tabs give 0.25mg at HS for 2 nights and then 0.5mg po at night.  Will follow up in future visits  Plan: DISCONTINUED: rOPINIRole (REQUIP) 0.5 MG " tablet    (I50.22) Chronic systolic heart failure (H)  (Z99.81) Oxygen dependent  Comment: stable.  Has the Lasix 40mg in AM and 20mg in afternoon.  Last BMP stable.  Has the oxygen for when she sleeps.      (F41.1) JAIME (generalized anxiety disorder)  Comment: current regimen of Lorazepam and Seroquel have been helpful as she is not on the call light during the daytime so much.    Will add to dx list that her lorazepam and oxycodone can be used for sleep during the night if she asks for something.    (F51.01) Primary insomnia  Comment: already on Trazodone at night to help sleep as well as a PRN dose which gets occasionally.  Her other medications are arranged as well to help with sleep as most can be sedating.      (R41.3) Memory difficulties  Comment: she forgets.  Feel some is her anxiety gets her mind moving and she forgets.  Her sister will remind her of things to bring up as well.        Orders:  1.  ropinirole 0.25mg at HS for 2 nights then increase to 0.5mg po at bedtime for restless legs.  2.  Please add insomnia/sleep disturbance to PRN oxycodone and Lorazepam orders    Electronically signed by  NORA Balderas CNP

## 2023-01-18 NOTE — LETTER
1/18/2023        RE: Ingrid Powell  C/o Daljit Powell  [address] [address]        [facility] GERIATRICS  ACUTE/EPISODIC VISIT    [facility] Medical Record Number:  [id number]  Place of Service where encounter took place:  [facility] ([facility]) [[id number]]    Chief Complaint   Patient presents with     RECHECK       HPI:    Ingrid Powell is a 77 year old  (1945), who is being seen today for an episodic care visit.  HPI information obtained from: facility chart records, facility staff, patient report and [facility] chart review.    Today's concern is:    Diagnoses       Codes Comments    Restless legs syndrome (RLS)    -  Primary G25.81     Chronic systolic heart failure (H)     I50.22     Oxygen dependent     Z99.81     JAIME (generalized anxiety disorder)     F41.1     Primary insomnia     F51.01     Memory difficulties     R41.3         Came to see Ingrid as she is upset about her sleep qualities.  Reviewing her medications today with her.  She complains that her legs move around often and hurt.  She will get up and pace the floor sometimes.  Asked her questions to distinguish if she is having restless legs or neuropathy type pain at night.    She feels it is restless legs as by description.  Is already on Gabapentin at night.    Ingrid will continue to wear her O2 at night and if napping during the day as she feels it is more of a security blanket for her after having a cardiac arrest.  Feel she thinks she will not wake up.  Not going to convince her otherwise.      Do notice she will say she can't remember things and either this NP will write something down for her or she will.      ALLERGIES:    Allergies   Allergen Reactions     Dilaudid [Hydromorphone Hcl] Other (See Comments)     hallucinations     Fosamax [Alendronate Sodium] Rash            Norvasc [Amlodipine Besylate] Hives and Rash     Tegretol [Carbamazepine] Hives and Rash        MEDICATIONS:  Post  Discharge Medication Reconciliation Status: patient was not discharged from an inpatient facility or TCU.     Current Outpatient Medications   Medication Sig Dispense Refill     acetaminophen (TYLENOL) 650 MG CR tablet 1300mg po twice a day and 1300mg po daily prn       busPIRone (BUSPAR) 10 MG tablet Take 1 tablet (10 mg) by mouth 3 times daily 90 tablet 11     carboxymethylcellulose PF (REFRESH PLUS) 0.5 % ophthalmic solution Place 1 drop into both eyes 4 times daily as needed for dry eyes or other (eye irritation) Ongoing management/refills per Hospice 1 each 0     citalopram (CELEXA) 10 MG tablet Take 1 tablet (10 mg) by mouth daily       fluticasone (FLONASE) 50 MCG/ACT nasal spray Spray 2 sprays into both nostrils daily 16 g 3     furosemide (LASIX) 20 MG tablet 40mg by mouth every AM and 20mg by mouth in afternoon 30 tablet 11     gabapentin (NEURONTIN) 600 MG tablet TAKE 1 TABLET (600 MG) BY MOUTH AT BEDTIME 30 tablet 3     hypromellose-dextran (ARTIFICAL TEARS) 0.1-0.3 % ophthalmic solution Place 1 drop into both eyes 2 times daily Ongoing management/refills per Hospice 15 mL 0     levothyroxine (SYNTHROID/LEVOTHROID) 100 MCG tablet Take 1 tablet (100 mcg) by mouth daily       LORazepam (ATIVAN) 0.5 MG tablet TAKE 1 TABLET BY MOUTH FOUR TIMES DAILY; TAKE 1 TABLET BY MOUTH TWICE DAILY IF NEEDED 120 tablet 3     Menthol, Topical Analgesic, (BIOFREEZE) 4 % GEL Dime size gel to elbows and neck at HS and then TID prn       nitroGLYcerin (NITROSTAT) 0.4 MG sublingual tablet For chest pain place 1 tablet under the tongue every 5 minutes for 3 doses. If symptoms persist 5 minutes after 1st dose call 911. 6 tablet 4     nystatin (MYCOSTATIN) 358002 UNIT/GM external powder Apply topically 2 times daily 30 g 4     oxyCODONE (ROXICODONE) 5 MG tablet Take 1 tablet (5 mg) by mouth every 4 hours as needed for pain 20 tablet 0    Ropinirole 0.5mg Take 0.25mg po at HS for 2 nights then 0.5mg at bedtime       QUEtiapine  "(SEROQUEL) 25 MG tablet TAKE 0.5 TABLETS (12.5 MG) BY MOUTH 3 TIMES DAILY 45 tablet 3     senna-docusate (SENOKOT-S/PERICOLACE) 8.6-50 MG tablet Take 1 tablet by mouth every other day 60 tablet 11     traZODone (DESYREL) 100 MG tablet Take 1 tablet (100 mg) by mouth At Bedtime. May also take 0.5 tablets (50 mg) at bedtime as needed, may repeat once for sleep (if not sleeping by 0300.). 30 tablet 11         REVIEW OF SYSTEMS:  4 point ROS neg other than the symptoms noted above in the HPI.   No chest pain, wears her oxygen when laying in bed.  No stomach troubles.    PHYSICAL EXAM:  /64   Pulse 97   Temp 97.5  F (36.4  C)   Resp 18   Wt 97.6 kg (215 lb 3.2 oz)   LMP  (LMP Unknown)   BMI 35.81 kg/m    Found her laying in bed but easily aroused as she heard the knock on the door.    Able to sit up on her bed and will talk.  Voice is clear.  Heart rate regular and strong.    Lungs are clear.  Abdomen is obese and soft to touch.  Decreased bowel sounds.  Has a regular BM daily.  No pitting edema noted.  Legs are \"thick\" in nature due to her build.    Ambulates without assist in apartment as small area but when out of her room, she has the 4WW with O2 tank present.    Component      Latest Ref Rng 12/21/2022  3:00 PM   Sodium      136 - 145 mmol/L 140    Potassium      3.4 - 5.3 mmol/L 4.2    Chloride      98 - 107 mmol/L 99    Carbon Dioxide (CO2)      22 - 29 mmol/L 30 (H)    Anion Gap      7 - 15 mmol/L 11    Urea Nitrogen      8.0 - 23.0 mg/dL 22.1    Creatinine      0.51 - 0.95 mg/dL 0.96 (H)    Calcium      8.8 - 10.2 mg/dL 9.9    Glucose      70 - 99 mg/dL 98    GFR Estimate      >60 mL/min/1.73m2 60 (L)           ASSESSMENT / PLAN:  (G25.81) Restless legs syndrome (RLS)  (primary encounter diagnosis)  Comment: her description is of restless legs.  Willing to try a small dose of ropinirole to see if helps calm them down and maybe sleep better.    Ropinirole 0.5mg tabs give 0.25mg at HS for 2 nights and " then 0.5mg po at night.  Will follow up in future visits  Plan: DISCONTINUED: rOPINIRole (REQUIP) 0.5 MG tablet    (I50.22) Chronic systolic heart failure (H)  (Z99.81) Oxygen dependent  Comment: stable.  Has the Lasix 40mg in AM and 20mg in afternoon.  Last BMP stable.  Has the oxygen for when she sleeps.      (F41.1) JAIME (generalized anxiety disorder)  Comment: current regimen of Lorazepam and Seroquel have been helpful as she is not on the call light during the daytime so much.    Will add to dx list that her lorazepam and oxycodone can be used for sleep during the night if she asks for something.    (F51.01) Primary insomnia  Comment: already on Trazodone at night to help sleep as well as a PRN dose which gets occasionally.  Her other medications are arranged as well to help with sleep as most can be sedating.      (R41.3) Memory difficulties  Comment: she forgets.  Feel some is her anxiety gets her mind moving and she forgets.  Her sister will remind her of things to bring up as well.        Orders:  1.  ropinirole 0.25mg at HS for 2 nights then increase to 0.5mg po at bedtime for restless legs.  2.  Please add insomnia/sleep disturbance to PRN oxycodone and Lorazepam orders    Electronically signed by  NORA Balderas CNP           Sincerely,        NORA Balderas CNP

## 2023-02-15 ENCOUNTER — LAB REQUISITION (OUTPATIENT)
Dept: LAB | Facility: CLINIC | Age: 78
End: 2023-02-15
Payer: COMMERCIAL

## 2023-02-15 DIAGNOSIS — K62.5 HEMORRHAGE OF ANUS AND RECTUM: ICD-10-CM

## 2023-02-16 LAB
ANION GAP SERPL CALCULATED.3IONS-SCNC: 7 MMOL/L (ref 7–15)
BUN SERPL-MCNC: 19 MG/DL (ref 8–23)
CALCIUM SERPL-MCNC: 9.8 MG/DL (ref 8.8–10.2)
CHLORIDE SERPL-SCNC: 101 MMOL/L (ref 98–107)
CREAT SERPL-MCNC: 0.84 MG/DL (ref 0.51–0.95)
DEPRECATED HCO3 PLAS-SCNC: 34 MMOL/L (ref 22–29)
ERYTHROCYTE [DISTWIDTH] IN BLOOD BY AUTOMATED COUNT: 14.6 % (ref 10–15)
GFR SERPL CREATININE-BSD FRML MDRD: 70 ML/MIN/1.73M2
GLUCOSE SERPL-MCNC: 88 MG/DL (ref 70–99)
HCT VFR BLD AUTO: 37.6 % (ref 35–47)
HGB BLD-MCNC: 11.7 G/DL (ref 11.7–15.7)
MCH RBC QN AUTO: 28.5 PG (ref 26.5–33)
MCHC RBC AUTO-ENTMCNC: 31.1 G/DL (ref 31.5–36.5)
MCV RBC AUTO: 92 FL (ref 78–100)
PLATELET # BLD AUTO: 215 10E3/UL (ref 150–450)
POTASSIUM SERPL-SCNC: 4.3 MMOL/L (ref 3.4–5.3)
RBC # BLD AUTO: 4.1 10E6/UL (ref 3.8–5.2)
SODIUM SERPL-SCNC: 142 MMOL/L (ref 136–145)
WBC # BLD AUTO: 4.3 10E3/UL (ref 4–11)

## 2023-02-16 PROCEDURE — P9603 ONE-WAY ALLOW PRORATED MILES: HCPCS | Mod: ORL | Performed by: NURSE PRACTITIONER

## 2023-02-16 PROCEDURE — 80048 BASIC METABOLIC PNL TOTAL CA: CPT | Mod: ORL | Performed by: NURSE PRACTITIONER

## 2023-02-16 PROCEDURE — 85027 COMPLETE CBC AUTOMATED: CPT | Mod: ORL | Performed by: NURSE PRACTITIONER

## 2023-02-16 PROCEDURE — 36415 COLL VENOUS BLD VENIPUNCTURE: CPT | Mod: ORL | Performed by: NURSE PRACTITIONER

## 2023-02-20 ENCOUNTER — PATIENT OUTREACH (OUTPATIENT)
Dept: GERIATRIC MEDICINE | Facility: CLINIC | Age: 78
End: 2023-02-20

## 2023-02-20 ENCOUNTER — ASSISTED LIVING VISIT (OUTPATIENT)
Dept: GERIATRICS | Facility: CLINIC | Age: 78
End: 2023-02-20
Payer: COMMERCIAL

## 2023-02-20 DIAGNOSIS — E66.01 MORBID OBESITY (H): ICD-10-CM

## 2023-02-20 DIAGNOSIS — F33.1 MAJOR DEPRESSIVE DISORDER, RECURRENT EPISODE, MODERATE (H): ICD-10-CM

## 2023-02-20 DIAGNOSIS — K59.01 SLOW TRANSIT CONSTIPATION: ICD-10-CM

## 2023-02-20 DIAGNOSIS — G25.81 RESTLESS LEGS SYNDROME (RLS): ICD-10-CM

## 2023-02-20 DIAGNOSIS — I50.22 CHRONIC SYSTOLIC HEART FAILURE (H): Primary | ICD-10-CM

## 2023-02-20 DIAGNOSIS — K62.5 RECTAL BLEEDING: ICD-10-CM

## 2023-02-20 DIAGNOSIS — H04.123 DRY EYES: ICD-10-CM

## 2023-02-20 DIAGNOSIS — J44.9 COPD, MILD (H): ICD-10-CM

## 2023-02-20 DIAGNOSIS — R52 GENERALIZED PAIN: ICD-10-CM

## 2023-02-20 PROCEDURE — 99350 HOME/RES VST EST HIGH MDM 60: CPT | Performed by: NURSE PRACTITIONER

## 2023-02-20 NOTE — LETTER
2/20/2023        RE: Ingrid Powell  C/o Daljit Powell  [address] [address]        [facility] GERIATRICS  ACUTE/EPISODIC VISIT    [facility] Medical Record Number:  [id number]  Place of Service where encounter took place:  [facility] ([facility]) [[id number]]    Chief Complaint   Patient presents with     RECHECK       HPI:    Ingrid Powell is a 77 year old  (1945), who is being seen today for an episodic care visit.  HPI information obtained from: facility chart records, facility staff, patient report and [facility] chart review.    Today's concern is:    Diagnoses       Codes Comments    Chronic systolic heart failure (H)    -  Primary I50.22     COPD, mild (H)     J44.9     Major depressive disorder, recurrent episode, moderate (H)     F33.1     Morbid obesity (H)     E66.01     Restless legs syndrome (RLS)     G25.81     Slow transit constipation     K59.01     Rectal bleeding     K62.5     Generalized pain     R52     Dry eyes     H04.123         Came to see Ingrid today as it has felt to be some time since last seen.  Typically likes to be checked in on regularly as well.  She was finishing up her shower and so came back a second time today.  Then she was siting in her recliner.  No acute distress.  Alert and pleasant.  Wanted to talk to this NP about something and then she remembered what it was.  Finding some blood rectally that is bright red.  Sometimes not with stool.  She actually has been refusing the Miralax and wants that discontinued.  She also would like her Senna tab to be every other day.    Go through a constipation issue with her often and so tried to make sure she wanted these changes.  If she has internal hemorrhoid or reason why bleeding because stool is too firm, then do not want her to cut her medicine back.  Ingrid wants to change her regimen.    As for the rectal bleeding, a little hard to get a handle on what it could be so spoke about  bleeds in general and what the blood coloring would look like.  She agrees it is bright red and not maroon.        ALLERGIES:    Allergies   Allergen Reactions     Dilaudid [Hydromorphone Hcl] Other (See Comments)     hallucinations     Fosamax [Alendronate Sodium] Rash            Norvasc [Amlodipine Besylate] Hives and Rash     Tegretol [Carbamazepine] Hives and Rash        MEDICATIONS:  Post Discharge Medication Reconciliation Status: patient was not discharged from an inpatient facility or TCU. Medications reconciled today    Current Outpatient Medications   Medication Sig Dispense Refill     acetaminophen (TYLENOL) 650 MG CR tablet 1300mg po every PM and 1300mg po twice a day as needed       hypromellose-dextran (ARTIFICAL TEARS) 0.1-0.3 % ophthalmic solution Place 1 drop into both eyes 2 times daily 15 mL 0     rOPINIRole (REQUIP) 0.5 MG tablet Take 0.5 tablets (0.25 mg) by mouth every morning AND 1 tablet (0.5 mg) At Bedtime. 30 tablet 4     senna-docusate (SENOKOT-S/PERICOLACE) 8.6-50 MG tablet Take 1 tablet by mouth every other day And 1 tab by mouth daily as needed 60 tablet 11     busPIRone (BUSPAR) 10 MG tablet Take 1 tablet (10 mg) by mouth 3 times daily 90 tablet 11     citalopram (CELEXA) 10 MG tablet Take 1 tablet (10 mg) by mouth daily       fluticasone (FLONASE) 50 MCG/ACT nasal spray Spray 2 sprays into both nostrils daily 16 g 3     furosemide (LASIX) 20 MG tablet 40mg by mouth every AM and 20mg by mouth in afternoon 30 tablet 11     gabapentin (NEURONTIN) 600 MG tablet TAKE 1 TABLET (600 MG) BY MOUTH AT BEDTIME 30 tablet 3     levothyroxine (SYNTHROID/LEVOTHROID) 100 MCG tablet Take 1 tablet (100 mcg) by mouth daily       LORazepam (ATIVAN) 0.5 MG tablet TAKE 1 TABLET BY MOUTH FOUR TIMES DAILY; TAKE 1 TABLET BY MOUTH TWICE DAILY IF NEEDED 120 tablet 3     Menthol, Topical Analgesic, (BIOFREEZE) 4 % GEL Dime size gel to elbows and neck at HS and then TID prn       nitroGLYcerin (NITROSTAT) 0.4 MG  "sublingual tablet For chest pain place 1 tablet under the tongue every 5 minutes for 3 doses. If symptoms persist 5 minutes after 1st dose call 911. 6 tablet 4     nystatin (MYCOSTATIN) 486992 UNIT/GM external powder Apply topically 2 times daily 30 g 4     oxyCODONE (ROXICODONE) 5 MG tablet Take 1 tablet (5 mg) by mouth every 4 hours as needed for pain 20 tablet 0     QUEtiapine (SEROQUEL) 25 MG tablet TAKE 1/2 TABLET (12.5 MG) BY MOUTH 3 TIMES DAILY 45 tablet 3     traZODone (DESYREL) 100 MG tablet Take 1 tablet (100 mg) by mouth At Bedtime. May also take 0.5 tablets (50 mg) at bedtime as needed, may repeat once for sleep (if not sleeping by 0300.). 30 tablet 11         REVIEW OF SYSTEMS:  4 point ROS neg other than the symptoms noted above in the HPI.  No chest pain, no acute episodes of shortness of breath.    PHYSICAL EXAM:  /64   Pulse 97   Temp 97.5  F (36.4  C)   Resp 18   Wt 97.5 kg (215 lb)   LMP  (LMP Unknown)   SpO2 92%   BMI 35.78 kg/m    Alert and oriented to person, place and time can be vague.  She will freely admit when in conversation she does not remember everything.  Her sister will call to remind her of things while this NP is present.  Today she expressed her anxiety and how she can  herself \"nuts\" waiting on the staff at night to bring her medications.      Heart rate regular and strong.  No edema around ankle and on feet.  Lower legs are thick in nature due to her stature/obesity.  Lungs are clear.  No cough.  Uses her oxygen at night via concentrator.  Does have portable oxygen tank so she can have it on her 4WW when out of her apartment    Abdomen large round and non-tender.  Decreased bowel sounds.    Skin is pink, warm and dry.  Ambulates with 4WW.  Typically does not fall.    She picks and chooses when or what she likes to do outside of her apartment.      Component      Latest Ref Rng & Units 2/16/2023   Sodium      136 - 145 mmol/L 142   Potassium      3.4 - 5.3 " mmol/L 4.3   Chloride      98 - 107 mmol/L 101   Carbon Dioxide (CO2)      22 - 29 mmol/L 34 (H)   Anion Gap      7 - 15 mmol/L 7   Urea Nitrogen      8.0 - 23.0 mg/dL 19.0   Creatinine      0.51 - 0.95 mg/dL 0.84   Calcium      8.8 - 10.2 mg/dL 9.8   Glucose      70 - 99 mg/dL 88   GFR Estimate      >60 mL/min/1.73m2 70   WBC      4.0 - 11.0 10e3/uL 4.3   RBC Count      3.80 - 5.20 10e6/uL 4.10   Hemoglobin      11.7 - 15.7 g/dL 11.7   Hematocrit      35.0 - 47.0 % 37.6   MCV      78 - 100 fL 92   MCH      26.5 - 33.0 pg 28.5   MCHC      31.5 - 36.5 g/dL 31.1 (L)   RDW      10.0 - 15.0 % 14.6   Platelet Count      150 - 450 10e3/uL 215     ASSESSMENT / PLAN:  (I50.22) Chronic systolic heart failure (H)  (primary encounter diagnosis)  (J44.9) COPD, mild (H)  Comment: remains on Lasix 40mg in AM and 20mg in PM.  Oxygen used at night or when ever she lays down in bed.  She feels it can be her security blanket.  Ingrid mentioned her heart is pumping at 27% from what she was told about a year ago.  Offered to have a ECHO done out here via portable x-ray.  Ingrid became nervous right away and said no as she relates any procedure to having her cardiac arrest but Ingrid was not positive.  Thought maybe it was more with a angiogram done versus a ECHO but said would verify this with her and can talk about having one again.  Assured her it is not invasive and it will give up a good picture of how her heart is functioning.  Guess her function now is better than 27%.      (E66.01) Morbid obesity (H)  Comment: BMI 36%.  Regular diet.  Do not see her as a big snacker.  She does weight about 10 lbs less than at admission.  Weight is not done every month.  Did talk about her weight affecting her heart and breathing as well as her joints for walking.  Ingrid is not interested in loosing weight.  Last worry for her.  Encouraged her to continue to do some walking inside to help balance the energy she takes in and puts out.    (G25.81)  Restless legs syndrome (RLS)  Comment: Ingrid did express that she feels her legs be restless during the say sometimes.  She is on 0.5mg at HS but have room to give a small dose during the day to see if effective.  Inrgid was willing to have a dose during the day to see if that would help.  Will add Requip 0.25mg po every AM.    Plan: rOPINIRole (REQUIP) 0.5 MG tablet    (F33.1) Major depressive disorder, recurrent episode, moderate (H)  Comment: mood and anxiety seem stable.  She is forgetful.  Today though she is frustrated in waiting for her 8pm medications to come and asked if she could have them earlier and did not think that was a problem if it helps relieves some of her anxiety.  Will write an order for this as nursing did not think it was a problem to change the time.    (K59.01) Slow transit constipation  Comment: staff state she has been refusing the Miralax and Ingrid is requesting to have the Senna-S to be only every other day.  After reviewing this in depth with her given that this regimen gets changed often.  She will allow that this NP to add a PRN Senna-S on a daily basis and then the Senna-S to be every other day.  Will discontinue the Miralax.  Plan: senna-docusate (SENOKOT-S/PERICOLACE) 8.6-50 MG        tablet    (K62.5) Rectal bleeding  Comment: decision made to just follow her HgB level to see if there is a major drop or not.  Just had a CBC done and so HgB at 11.7  Will have a HgB on 3/2 and then three weeks later from the 2nd.    Will also communicate with daughter about her mom mentioning this to this NP.    (R52) Generalized pain  Comment: correcting the Tylenol order to match her care home medication list.  Ingrid takes 1300mg Tylenol CR at bedtime and then has twice a day PRN available.  Otherwise no complaints of pain during visits typically.    Plan: acetaminophen (TYLENOL) 650 MG CR tablet    (H04.123) Dry eyes  Comment: has two orders for eye drops.  One is PRN and the other is scheduled and  PRN.  Discontinuing the Refresh PRN eye drops.    Plan: hypromellose-dextran (ARTIFICAL TEARS) 0.1-0.3         % ophthalmic solution      Orders:  1.  Discontinue Miralax  2.  Add Senna 1 tab po daily prn for constipation  3.  Add Requip 0.25mg po every AM for RLS  4.  HgB level on 3/2/23 and then 3 weeks later due to rectal bleeding  5.  Please change 8pm medication time to 7pm per request of Ingrid    Electronically signed by  NORA Balderas CNP             Sincerely,        NORA Balderas CNP

## 2023-02-20 NOTE — PROGRESS NOTES
[facility] GERIATRICS  ACUTE/EPISODIC VISIT    [facility] Medical Record Number:  [id number]  Place of Service where encounter took place:  [facility] ([facility]) [[id number]]    Chief Complaint   Patient presents with     RECHECK       HPI:    Ingrid Powell is a 77 year old  (1945), who is being seen today for an episodic care visit.  HPI information obtained from: facility chart records, facility staff, patient report and [facility] chart review.    Today's concern is:    Diagnoses       Codes Comments    Chronic systolic heart failure (H)    -  Primary I50.22     COPD, mild (H)     J44.9     Major depressive disorder, recurrent episode, moderate (H)     F33.1     Morbid obesity (H)     E66.01     Restless legs syndrome (RLS)     G25.81     Slow transit constipation     K59.01     Rectal bleeding     K62.5     Generalized pain     R52     Dry eyes     H04.123         Came to see Ingrid today as it has felt to be some time since last seen.  Typically likes to be checked in on regularly as well.  She was finishing up her shower and so came back a second time today.  Then she was siting in her recliner.  No acute distress.  Alert and pleasant.  Wanted to talk to this NP about something and then she remembered what it was.  Finding some blood rectally that is bright red.  Sometimes not with stool.  She actually has been refusing the Miralax and wants that discontinued.  She also would like her Senna tab to be every other day.    Go through a constipation issue with her often and so tried to make sure she wanted these changes.  If she has internal hemorrhoid or reason why bleeding because stool is too firm, then do not want her to cut her medicine back.  Ingrid wants to change her regimen.    As for the rectal bleeding, a little hard to get a handle on what it could be so spoke about bleeds in general and what the blood coloring would look like.  She agrees it is bright red and not  pooja.        ALLERGIES:    Allergies   Allergen Reactions     Dilaudid [Hydromorphone Hcl] Other (See Comments)     hallucinations     Fosamax [Alendronate Sodium] Rash            Norvasc [Amlodipine Besylate] Hives and Rash     Tegretol [Carbamazepine] Hives and Rash        MEDICATIONS:  Post Discharge Medication Reconciliation Status: patient was not discharged from an inpatient facility or TCU. Medications reconciled today    Current Outpatient Medications   Medication Sig Dispense Refill     acetaminophen (TYLENOL) 650 MG CR tablet 1300mg po every PM and 1300mg po twice a day as needed       hypromellose-dextran (ARTIFICAL TEARS) 0.1-0.3 % ophthalmic solution Place 1 drop into both eyes 2 times daily 15 mL 0     rOPINIRole (REQUIP) 0.5 MG tablet Take 0.5 tablets (0.25 mg) by mouth every morning AND 1 tablet (0.5 mg) At Bedtime. 30 tablet 4     senna-docusate (SENOKOT-S/PERICOLACE) 8.6-50 MG tablet Take 1 tablet by mouth every other day And 1 tab by mouth daily as needed 60 tablet 11     busPIRone (BUSPAR) 10 MG tablet Take 1 tablet (10 mg) by mouth 3 times daily 90 tablet 11     citalopram (CELEXA) 10 MG tablet Take 1 tablet (10 mg) by mouth daily       fluticasone (FLONASE) 50 MCG/ACT nasal spray Spray 2 sprays into both nostrils daily 16 g 3     furosemide (LASIX) 20 MG tablet 40mg by mouth every AM and 20mg by mouth in afternoon 30 tablet 11     gabapentin (NEURONTIN) 600 MG tablet TAKE 1 TABLET (600 MG) BY MOUTH AT BEDTIME 30 tablet 3     levothyroxine (SYNTHROID/LEVOTHROID) 100 MCG tablet Take 1 tablet (100 mcg) by mouth daily       LORazepam (ATIVAN) 0.5 MG tablet TAKE 1 TABLET BY MOUTH FOUR TIMES DAILY; TAKE 1 TABLET BY MOUTH TWICE DAILY IF NEEDED 120 tablet 3     Menthol, Topical Analgesic, (BIOFREEZE) 4 % GEL Dime size gel to elbows and neck at HS and then TID prn       nitroGLYcerin (NITROSTAT) 0.4 MG sublingual tablet For chest pain place 1 tablet under the tongue every 5 minutes for 3 doses. If  "symptoms persist 5 minutes after 1st dose call 911. 6 tablet 4     nystatin (MYCOSTATIN) 644043 UNIT/GM external powder Apply topically 2 times daily 30 g 4     oxyCODONE (ROXICODONE) 5 MG tablet Take 1 tablet (5 mg) by mouth every 4 hours as needed for pain 20 tablet 0     QUEtiapine (SEROQUEL) 25 MG tablet TAKE 1/2 TABLET (12.5 MG) BY MOUTH 3 TIMES DAILY 45 tablet 3     traZODone (DESYREL) 100 MG tablet Take 1 tablet (100 mg) by mouth At Bedtime. May also take 0.5 tablets (50 mg) at bedtime as needed, may repeat once for sleep (if not sleeping by 0300.). 30 tablet 11         REVIEW OF SYSTEMS:  4 point ROS neg other than the symptoms noted above in the HPI.  No chest pain, no acute episodes of shortness of breath.    PHYSICAL EXAM:  /64   Pulse 97   Temp 97.5  F (36.4  C)   Resp 18   Wt 97.5 kg (215 lb)   LMP  (LMP Unknown)   SpO2 92%   BMI 35.78 kg/m    Alert and oriented to person, place and time can be vague.  She will freely admit when in conversation she does not remember everything.  Her sister will call to remind her of things while this NP is present.  Today she expressed her anxiety and how she can  herself \"nuts\" waiting on the staff at night to bring her medications.      Heart rate regular and strong.  No edema around ankle and on feet.  Lower legs are thick in nature due to her stature/obesity.  Lungs are clear.  No cough.  Uses her oxygen at night via concentrator.  Does have portable oxygen tank so she can have it on her 4WW when out of her apartment    Abdomen large round and non-tender.  Decreased bowel sounds.    Skin is pink, warm and dry.  Ambulates with 4WW.  Typically does not fall.    She picks and chooses when or what she likes to do outside of her apartment.      Component      Latest Ref Rng & Units 2/16/2023   Sodium      136 - 145 mmol/L 142   Potassium      3.4 - 5.3 mmol/L 4.3   Chloride      98 - 107 mmol/L 101   Carbon Dioxide (CO2)      22 - 29 mmol/L 34 (H) "   Anion Gap      7 - 15 mmol/L 7   Urea Nitrogen      8.0 - 23.0 mg/dL 19.0   Creatinine      0.51 - 0.95 mg/dL 0.84   Calcium      8.8 - 10.2 mg/dL 9.8   Glucose      70 - 99 mg/dL 88   GFR Estimate      >60 mL/min/1.73m2 70   WBC      4.0 - 11.0 10e3/uL 4.3   RBC Count      3.80 - 5.20 10e6/uL 4.10   Hemoglobin      11.7 - 15.7 g/dL 11.7   Hematocrit      35.0 - 47.0 % 37.6   MCV      78 - 100 fL 92   MCH      26.5 - 33.0 pg 28.5   MCHC      31.5 - 36.5 g/dL 31.1 (L)   RDW      10.0 - 15.0 % 14.6   Platelet Count      150 - 450 10e3/uL 215     ASSESSMENT / PLAN:  (I50.22) Chronic systolic heart failure (H)  (primary encounter diagnosis)  (J44.9) COPD, mild (H)  Comment: remains on Lasix 40mg in AM and 20mg in PM.  Oxygen used at night or when ever she lays down in bed.  She feels it can be her security blanket.  Ingrid mentioned her heart is pumping at 27% from what she was told about a year ago.  Offered to have a ECHO done out here via portable x-ray.  Ingrid became nervous right away and said no as she relates any procedure to having her cardiac arrest but Ingrid was not positive.  Thought maybe it was more with a angiogram done versus a ECHO but said would verify this with her and can talk about having one again.  Assured her it is not invasive and it will give up a good picture of how her heart is functioning.  Guess her function now is better than 27%.      (E66.01) Morbid obesity (H)  Comment: BMI 36%.  Regular diet.  Do not see her as a big snacker.  She does weight about 10 lbs less than at admission.  Weight is not done every month.  Did talk about her weight affecting her heart and breathing as well as her joints for walking.  Ingrid is not interested in loosing weight.  Last worry for her.  Encouraged her to continue to do some walking inside to help balance the energy she takes in and puts out.    (G25.81) Restless legs syndrome (RLS)  Comment: Ingrid did express that she feels her legs be restless during  the say sometimes.  She is on 0.5mg at HS but have room to give a small dose during the day to see if effective.  Ingrid was willing to have a dose during the day to see if that would help.  Will add Requip 0.25mg po every AM.    Plan: rOPINIRole (REQUIP) 0.5 MG tablet    (F33.1) Major depressive disorder, recurrent episode, moderate (H)  Comment: mood and anxiety seem stable.  She is forgetful.  Today though she is frustrated in waiting for her 8pm medications to come and asked if she could have them earlier and did not think that was a problem if it helps relieves some of her anxiety.  Will write an order for this as nursing did not think it was a problem to change the time.    (K59.01) Slow transit constipation  Comment: staff state she has been refusing the Miralax and Ingrid is requesting to have the Senna-S to be only every other day.  After reviewing this in depth with her given that this regimen gets changed often.  She will allow that this NP to add a PRN Senna-S on a daily basis and then the Senna-S to be every other day.  Will discontinue the Miralax.  Plan: senna-docusate (SENOKOT-S/PERICOLACE) 8.6-50 MG        tablet    (K62.5) Rectal bleeding  Comment: decision made to just follow her HgB level to see if there is a major drop or not.  Just had a CBC done and so HgB at 11.7  Will have a HgB on 3/2 and then three weeks later from the 2nd.    Will also communicate with daughter about her mom mentioning this to this NP.    (R52) Generalized pain  Comment: correcting the Tylenol order to match her [facility] medication list.  Ingrid takes 1300mg Tylenol CR at bedtime and then has twice a day PRN available.  Otherwise no complaints of pain during visits typically.    Plan: acetaminophen (TYLENOL) 650 MG CR tablet    (H04.123) Dry eyes  Comment: has two orders for eye drops.  One is PRN and the other is scheduled and PRN.  Discontinuing the Refresh PRN eye drops.    Plan: hypromellose-dextran (ARTIFICAL TEARS) 0.1-0.3          % ophthalmic solution      Orders:  1.  Discontinue Miralax  2.  Add Senna 1 tab po daily prn for constipation  3.  Add Requip 0.25mg po every AM for RLS  4.  HgB level on 3/2/23 and then 3 weeks later due to rectal bleeding  5.  Please change 8pm medication time to 7pm per request of Ingrid    Electronically signed by  NORA Balderas CNP

## 2023-02-20 NOTE — PROGRESS NOTES
Encounter opened due to Regulatory [employer] [place] Update to close FVP Program.    Myra Ornelas  Care Management Specialist   [facility]   [phone]

## 2023-02-20 NOTE — PROGRESS NOTES
Encounter opened due to Regulatory [employer] [place] Update to open FVP Program.    Myra Ornelas  Care Management Specialist   [facility]   [phone]

## 2023-02-21 ENCOUNTER — LAB REQUISITION (OUTPATIENT)
Dept: LAB | Facility: CLINIC | Age: 78
End: 2023-02-21
Payer: COMMERCIAL

## 2023-02-21 DIAGNOSIS — K62.5 HEMORRHAGE OF ANUS AND RECTUM: ICD-10-CM

## 2023-02-21 PROBLEM — I20.9 ANGINA PECTORIS (H): Status: RESOLVED | Noted: 2022-05-02 | Resolved: 2023-02-21

## 2023-02-21 PROBLEM — I50.22 CHRONIC SYSTOLIC HEART FAILURE (H): Status: ACTIVE | Noted: 2023-02-21

## 2023-02-21 RX ORDER — AMOXICILLIN 250 MG
1 CAPSULE ORAL EVERY OTHER DAY
Qty: 60 TABLET | Refills: 11
Start: 2023-02-21 | End: 2023-01-01

## 2023-02-21 RX ORDER — ROPINIROLE 0.5 MG/1
TABLET, FILM COATED ORAL
Qty: 30 TABLET | Refills: 4
Start: 2023-02-21 | End: 2023-05-01

## 2023-02-22 ENCOUNTER — VIRTUAL VISIT (OUTPATIENT)
Dept: GERIATRICS | Facility: CLINIC | Age: 78
End: 2023-02-22
Payer: COMMERCIAL

## 2023-02-22 ENCOUNTER — TELEPHONE (OUTPATIENT)
Dept: GERIATRICS | Facility: CLINIC | Age: 78
End: 2023-02-22

## 2023-02-22 DIAGNOSIS — I50.22 CHRONIC SYSTOLIC HEART FAILURE (H): ICD-10-CM

## 2023-02-22 DIAGNOSIS — K62.5 RECTAL BLEEDING: ICD-10-CM

## 2023-02-22 DIAGNOSIS — R41.3 MEMORY DIFFICULTIES: ICD-10-CM

## 2023-02-22 DIAGNOSIS — I25.10 CORONARY ARTERY DISEASE INVOLVING NATIVE CORONARY ARTERY OF NATIVE HEART WITHOUT ANGINA PECTORIS: Primary | ICD-10-CM

## 2023-02-22 DIAGNOSIS — F41.1 GAD (GENERALIZED ANXIETY DISORDER): ICD-10-CM

## 2023-02-22 PROCEDURE — 99358 PROLONG SERVICE W/O CONTACT: CPT | Mod: 93 | Performed by: NURSE PRACTITIONER

## 2023-02-22 NOTE — LETTER
"    2/22/2023        RE: Ingrid Powell  C/o Daljit Powell  [address] [address]        Telephone visit  [facility]  Ingrid Powell is a 77 year old year old adult who is being evaluated via a billable telephone visit due to the restrictions of the Covid-19 pandemic. The patient has been notified of following: \"This telephone visit will be conducted via a call between you and your provider. We have found that certain health care needs can be provided without the need for a physical exam. This service lets us provide the care you need with a short phone conversation. If a prescription is necessary we will work with the facility you are at and can send it directly to your pharmacy. If lab work is needed we can place an order to have it drawn at the facility you are at. If during the course of the call the provider feels a telephone visit is not appropriate, you will not be charged for this service.\"   Patient or Patient's first contact has given verbal consent for Telephone visit?  Yes  Place of Location at the time of visit: [facility] ([place])  Ingrid Powell complains of :   Chief Complaint   Patient presents with     Telephone     I have reviewed and updated the patient's Past Medical History, Social History, Family History and Medication List.  ALLERGIES:   Allergies   Allergen Reactions     Dilaudid [Hydromorphone Hcl] Visual Disturbance     Hallucinations     Fosamax [Alendronate Sodium] Rash            Norvasc [Amlodipine Besylate] Hives and Rash     Tegretol [Carbamazepine] Hives and Rash      HPI: HPI information obtained from: facility chart records, patient report, [facility] chart review and family/first contact daughter report.      This NP saw Ingrid yesterday (2/20) for a general check up and did inform her that this NP would speak with her daughter to give her an update.      In detail, spoke with daughter about yesterday's topics went over with " Ingrid.  1.  Ingrid's c/o of bleeding seen from what she felt was her rectum.  Sometimes it was by itself on her incontinent pad and sometimes with hygiene.  Bright red versus maroon.  Discussed what the final decision was to oversee this bleeding as Ingrid can take self to bathroom and does not always remember all details.      2.  CHF/CAD:  Ingrid mentioned her heart is at 27% functioning and that was when she was in the hospital last spring.  Spoke about having a ECHO done and Ingrid became instantly nervous.  She remembered her having her cardiac arrest and thinking it was when she had an ECHO done.  In reality it was when she was in the cath lab.      3.  Anxiety:  Updated the daughter that Ingrid stated she drives herself out of her mind thinking and watching the clock as she wants her HS medications earlier.  Ingrid was going through her medications she could remember.  She was not thinking she was getting Neurontin but nursing states she is and confirmed the color of the medication.      4.  Spoke about her sleeping habits and RLS.  Ingrid has some RLS during the day.  Added a small dose of Requip in the AM.    In return, Ingrid's daughter asked if this NP has seen memory issues with her mother.  This NP and other staff clearly see Ingrid has forgetfulness.  She will say it in conversation and most likely does not realize it.  Ingrid's sister will call her while this NP is around to remind her to ask certain things about her health.  Is it her nerves that causing memory issues or is it her memory issues that cause her anxiety.    Daughter asked if there is anything that can be done.  Spoke about in detail about Aricept and Namenda.  Daughter interested in starting one of them but not sure how her mom would take it if she  Brought it up.  This NP offered to talk to Ingrid about her memory and offer to start a new medication to slow the progression of her memory.  Daughter was fine with that idea.  If Ingrid gets mad, she  can get mad at this NP.      MEDICATIONS:  Current Outpatient Medications   Medication Sig Dispense Refill     acetaminophen (TYLENOL) 650 MG CR tablet 1300mg po every PM and 1300mg po twice a day as needed       busPIRone (BUSPAR) 10 MG tablet Take 1 tablet (10 mg) by mouth 3 times daily 90 tablet 11     citalopram (CELEXA) 10 MG tablet Take 1 tablet (10 mg) by mouth daily       fluticasone (FLONASE) 50 MCG/ACT nasal spray Spray 2 sprays into both nostrils daily 16 g 3     furosemide (LASIX) 20 MG tablet 40mg by mouth every AM and 20mg by mouth in afternoon 30 tablet 11     gabapentin (NEURONTIN) 600 MG tablet TAKE 1 TABLET (600 MG) BY MOUTH AT BEDTIME 30 tablet 3     hypromellose-dextran (ARTIFICAL TEARS) 0.1-0.3 % ophthalmic solution Place 1 drop into both eyes 2 times daily 15 mL 0     levothyroxine (SYNTHROID/LEVOTHROID) 100 MCG tablet Take 1 tablet (100 mcg) by mouth daily       LORazepam (ATIVAN) 0.5 MG tablet TAKE 1 TABLET BY MOUTH FOUR TIMES DAILY; TAKE 1 TABLET BY MOUTH TWICE DAILY IF NEEDED 120 tablet 3     Menthol, Topical Analgesic, (BIOFREEZE) 4 % GEL Dime size gel to elbows and neck at HS and then TID prn       nitroGLYcerin (NITROSTAT) 0.4 MG sublingual tablet For chest pain place 1 tablet under the tongue every 5 minutes for 3 doses. If symptoms persist 5 minutes after 1st dose call 911. 6 tablet 4     nystatin (MYCOSTATIN) 554591 UNIT/GM external powder Apply topically 2 times daily 30 g 4     oxyCODONE (ROXICODONE) 5 MG tablet Take 1 tablet (5 mg) by mouth every 4 hours as needed for pain 20 tablet 0     QUEtiapine (SEROQUEL) 25 MG tablet TAKE 1/2 TABLET (12.5 MG) BY MOUTH 3 TIMES DAILY 45 tablet 3     rOPINIRole (REQUIP) 0.5 MG tablet Take 0.5 tablets (0.25 mg) by mouth every morning AND 1 tablet (0.5 mg) At Bedtime. 30 tablet 4     senna-docusate (SENOKOT-S/PERICOLACE) 8.6-50 MG tablet Take 1 tablet by mouth every other day And 1 tab by mouth daily as needed 60 tablet 11     traZODone (DESYREL)  100 MG tablet Take 1 tablet (100 mg) by mouth At Bedtime. May also take 0.5 tablets (50 mg) at bedtime as needed, may repeat once for sleep (if not sleeping by 0300.). 30 tablet 11       Labs:     Most Recent 3 BMP's:Recent Labs   Lab Test 02/16/23  0535 12/21/22  1500 06/02/22  0530    140 138   POTASSIUM 4.3 4.2 4.3   CHLORIDE 101 99 101   CO2 34* 30* 34*   BUN 19.0 22.1 10   CR 0.84 0.96* 0.68   ANIONGAP 7 11 3   JOSÉ MIGUEL 9.8 9.9 9.6   GLC 88 98 77     Lab Results   Component Value Date    HGB 11.7 02/16/2023    HGB 13.0 03/18/2021         Assessment/Plan:  1. Coronary artery disease involving native coronary artery of native heart without angina pectoris  Daughter was ok with her mom getting a ECHO if Ingrid will accept it.  Was going to talk to Ingrid about it again.  Otherwise Ingrid has not been showing any signs of pain of the chest.  She is moving around better and uses her oxygen at night or even when she lays down in bed during the day.    Will let Ingrid know it was with an angiogram she had the cardiac arrest.  Not that this would change the plan of care to have a ECHO but it may make Ingrid feel better about her health.    2. Chronic systolic heart failure (H)  Has been doing well with Lasix 40mg in AM and 20mg in PM.  Oxygen use is less and more of a security blanket for her to have it as underlying fears of her heart.  She states she does not remember anything of the hospitalization.  Another reason to have a ECHO to see how well her heart is function and if any fluid build up around heart.    3. Rectal bleeding  For now will have the HgB monitored on 3/2 and then 3 weeks later to see if drifting down from previous values.  Daughter thought it could be just a hemorrhoid.  Not a major worry at this time.    4. JAIME (generalized anxiety disorder)  Ok to move the medications to 7pm for her bedtime as then she feels she can go to bed when she wants.  Ingrid is not a TV watcher and so she is ready to go to bed  early.    Koko is doing much better with her anxiety despite all the medication she is taking.  She still wonders why she is so anxious all the time.      5. Memory difficulties  This NP will go and see koko again and speak with her about starting Aricept due to seeing her forgetfulness.  Can talk to her more in details about her memory and the medication.  Will let Koko know that her family sees it as well as staff.  She always states she cant remember things but she is so light hearted about it that do not think she knows she even says it.  Daughter appreciates this NP talking to her about the memory issues and keeping her up to date about her mother.  Daughter will bring up any concerns she sees questionable.      Phone call duration:  42minutes  NORA Balderas CNP                  Sincerely,        NORA Balderas CNP

## 2023-02-22 NOTE — PROGRESS NOTES
"Telephone visit  [facility]  Ingrid Powell is a 77 year old year old adult who is being evaluated via a billable telephone visit due to the restrictions of the Covid-19 pandemic. The patient has been notified of following: \"This telephone visit will be conducted via a call between you and your provider. We have found that certain health care needs can be provided without the need for a physical exam. This service lets us provide the care you need with a short phone conversation. If a prescription is necessary we will work with the facility you are at and can send it directly to your pharmacy. If lab work is needed we can place an order to have it drawn at the facility you are at. If during the course of the call the provider feels a telephone visit is not appropriate, you will not be charged for this service.\"   Patient or Patient's first contact has given verbal consent for Telephone visit?  Yes  Place of Location at the time of visit: [facility] ([place])  Ingrid Powell complains of :   Chief Complaint   Patient presents with     Telephone     I have reviewed and updated the patient's Past Medical History, Social History, Family History and Medication List.  ALLERGIES:   Allergies   Allergen Reactions     Dilaudid [Hydromorphone Hcl] Visual Disturbance     Hallucinations     Fosamax [Alendronate Sodium] Rash            Norvasc [Amlodipine Besylate] Hives and Rash     Tegretol [Carbamazepine] Hives and Rash      HPI: HPI information obtained from: facility chart records, patient report, [facility] chart review and family/first contact daughter report.      This NP saw Ingrid yesterday (2/20) for a general check up and did inform her that this NP would speak with her daughter to give her an update.      In detail, spoke with daughter about yesterday's topics went over with Ingrid.  1.  Ingrid's c/o of bleeding seen from what she felt was her rectum.  Sometimes it was by itself on her " incontinent pad and sometimes with hygiene.  Bright red versus maroon.  Discussed what the final decision was to oversee this bleeding as Ingrid can take self to bathroom and does not always remember all details.      2.  CHF/CAD:  Ingrid mentioned her heart is at 27% functioning and that was when she was in the hospital last spring.  Spoke about having a ECHO done and Ingrid became instantly nervous.  She remembered her having her cardiac arrest and thinking it was when she had an ECHO done.  In reality it was when she was in the cath lab.      3.  Anxiety:  Updated the daughter that Ingrid stated she drives herself out of her mind thinking and watching the clock as she wants her HS medications earlier.  Ingrid was going through her medications she could remember.  She was not thinking she was getting Neurontin but nursing states she is and confirmed the color of the medication.      4.  Spoke about her sleeping habits and RLS.  Ingrid has some RLS during the day.  Added a small dose of Requip in the AM.    In return, Ingrid's daughter asked if this NP has seen memory issues with her mother.  This NP and other staff clearly see Ingrid has forgetfulness.  She will say it in conversation and most likely does not realize it.  Ingrid's sister will call her while this NP is around to remind her to ask certain things about her health.  Is it her nerves that causing memory issues or is it her memory issues that cause her anxiety.    Daughter asked if there is anything that can be done.  Spoke about in detail about Aricept and Namenda.  Daughter interested in starting one of them but not sure how her mom would take it if she  Brought it up.  This NP offered to talk to Ingird about her memory and offer to start a new medication to slow the progression of her memory.  Daughter was fine with that idea.  If Ingrid gets mad, she can get mad at this NP.      MEDICATIONS:  Current Outpatient Medications   Medication Sig Dispense Refill      acetaminophen (TYLENOL) 650 MG CR tablet 1300mg po every PM and 1300mg po twice a day as needed       busPIRone (BUSPAR) 10 MG tablet Take 1 tablet (10 mg) by mouth 3 times daily 90 tablet 11     citalopram (CELEXA) 10 MG tablet Take 1 tablet (10 mg) by mouth daily       fluticasone (FLONASE) 50 MCG/ACT nasal spray Spray 2 sprays into both nostrils daily 16 g 3     furosemide (LASIX) 20 MG tablet 40mg by mouth every AM and 20mg by mouth in afternoon 30 tablet 11     gabapentin (NEURONTIN) 600 MG tablet TAKE 1 TABLET (600 MG) BY MOUTH AT BEDTIME 30 tablet 3     hypromellose-dextran (ARTIFICAL TEARS) 0.1-0.3 % ophthalmic solution Place 1 drop into both eyes 2 times daily 15 mL 0     levothyroxine (SYNTHROID/LEVOTHROID) 100 MCG tablet Take 1 tablet (100 mcg) by mouth daily       LORazepam (ATIVAN) 0.5 MG tablet TAKE 1 TABLET BY MOUTH FOUR TIMES DAILY; TAKE 1 TABLET BY MOUTH TWICE DAILY IF NEEDED 120 tablet 3     Menthol, Topical Analgesic, (BIOFREEZE) 4 % GEL Dime size gel to elbows and neck at HS and then TID prn       nitroGLYcerin (NITROSTAT) 0.4 MG sublingual tablet For chest pain place 1 tablet under the tongue every 5 minutes for 3 doses. If symptoms persist 5 minutes after 1st dose call 911. 6 tablet 4     nystatin (MYCOSTATIN) 701457 UNIT/GM external powder Apply topically 2 times daily 30 g 4     oxyCODONE (ROXICODONE) 5 MG tablet Take 1 tablet (5 mg) by mouth every 4 hours as needed for pain 20 tablet 0     QUEtiapine (SEROQUEL) 25 MG tablet TAKE 1/2 TABLET (12.5 MG) BY MOUTH 3 TIMES DAILY 45 tablet 3     rOPINIRole (REQUIP) 0.5 MG tablet Take 0.5 tablets (0.25 mg) by mouth every morning AND 1 tablet (0.5 mg) At Bedtime. 30 tablet 4     senna-docusate (SENOKOT-S/PERICOLACE) 8.6-50 MG tablet Take 1 tablet by mouth every other day And 1 tab by mouth daily as needed 60 tablet 11     traZODone (DESYREL) 100 MG tablet Take 1 tablet (100 mg) by mouth At Bedtime. May also take 0.5 tablets (50 mg) at bedtime as  needed, may repeat once for sleep (if not sleeping by 0300.). 30 tablet 11       Labs:     Most Recent 3 BMP's:Recent Labs   Lab Test 02/16/23  0535 12/21/22  1500 06/02/22  0530    140 138   POTASSIUM 4.3 4.2 4.3   CHLORIDE 101 99 101   CO2 34* 30* 34*   BUN 19.0 22.1 10   CR 0.84 0.96* 0.68   ANIONGAP 7 11 3   JOSÉ MIGUEL 9.8 9.9 9.6   GLC 88 98 77     Lab Results   Component Value Date    HGB 11.7 02/16/2023    HGB 13.0 03/18/2021         Assessment/Plan:  1. Coronary artery disease involving native coronary artery of native heart without angina pectoris  Daughter was ok with her mom getting a ECHO if Ingrid will accept it.  Was going to talk to Ingrid about it again.  Otherwise Ingrid has not been showing any signs of pain of the chest.  She is moving around better and uses her oxygen at night or even when she lays down in bed during the day.    Will let Ingrid know it was with an angiogram she had the cardiac arrest.  Not that this would change the plan of care to have a ECHO but it may make Ingrid feel better about her health.    2. Chronic systolic heart failure (H)  Has been doing well with Lasix 40mg in AM and 20mg in PM.  Oxygen use is less and more of a security blanket for her to have it as underlying fears of her heart.  She states she does not remember anything of the hospitalization.  Another reason to have a ECHO to see how well her heart is function and if any fluid build up around heart.    3. Rectal bleeding  For now will have the HgB monitored on 3/2 and then 3 weeks later to see if drifting down from previous values.  Daughter thought it could be just a hemorrhoid.  Not a major worry at this time.    4. JAIME (generalized anxiety disorder)  Ok to move the medications to 7pm for her bedtime as then she feels she can go to bed when she wants.  Ingrid is not a TV watcher and so she is ready to go to bed early.    Ingrid is doing much better with her anxiety despite all the medication she is taking.  She still  wonders why she is so anxious all the time.      5. Memory difficulties  This NP will go and see koko again and speak with her about starting Aricept due to seeing her forgetfulness.  Can talk to her more in details about her memory and the medication.  Will let Koko know that her family sees it as well as staff.  She always states she cant remember things but she is so light hearted about it that do not think she knows she even says it.  Daughter appreciates this NP talking to her about the memory issues and keeping her up to date about her mother.  Daughter will bring up any concerns she sees questionable.      Phone call duration:  42minutes  NORA Balderas CNP

## 2023-02-24 DIAGNOSIS — F41.1 GAD (GENERALIZED ANXIETY DISORDER): ICD-10-CM

## 2023-02-24 DIAGNOSIS — R52 PAIN: ICD-10-CM

## 2023-02-24 DIAGNOSIS — F22 PARANOIA (H): ICD-10-CM

## 2023-02-24 RX ORDER — QUETIAPINE FUMARATE 25 MG/1
TABLET, FILM COATED ORAL
Qty: 45 TABLET | Refills: 3 | Status: SHIPPED | OUTPATIENT
Start: 2023-02-24 | End: 2023-06-14

## 2023-02-24 RX ORDER — GABAPENTIN 600 MG/1
600 TABLET ORAL AT BEDTIME
Qty: 30 TABLET | Refills: 3 | Status: SHIPPED | OUTPATIENT
Start: 2023-02-24 | End: 2023-06-14

## 2023-02-27 ENCOUNTER — ASSISTED LIVING VISIT (OUTPATIENT)
Dept: GERIATRICS | Facility: CLINIC | Age: 78
End: 2023-02-27
Payer: COMMERCIAL

## 2023-02-27 VITALS
TEMPERATURE: 97.5 F | HEART RATE: 97 BPM | OXYGEN SATURATION: 92 % | DIASTOLIC BLOOD PRESSURE: 64 MMHG | BODY MASS INDEX: 35.78 KG/M2 | SYSTOLIC BLOOD PRESSURE: 111 MMHG | WEIGHT: 215 LBS | RESPIRATION RATE: 18 BRPM

## 2023-02-27 VITALS
SYSTOLIC BLOOD PRESSURE: 111 MMHG | WEIGHT: 215.2 LBS | RESPIRATION RATE: 18 BRPM | TEMPERATURE: 97.5 F | DIASTOLIC BLOOD PRESSURE: 64 MMHG | HEART RATE: 97 BPM | BODY MASS INDEX: 35.81 KG/M2 | OXYGEN SATURATION: 92 %

## 2023-02-27 DIAGNOSIS — I50.22 CHRONIC SYSTOLIC HEART FAILURE (H): Primary | ICD-10-CM

## 2023-02-27 DIAGNOSIS — I25.10 CORONARY ARTERY DISEASE INVOLVING NATIVE CORONARY ARTERY OF NATIVE HEART WITHOUT ANGINA PECTORIS: ICD-10-CM

## 2023-02-27 DIAGNOSIS — F41.1 GAD (GENERALIZED ANXIETY DISORDER): ICD-10-CM

## 2023-02-27 DIAGNOSIS — G31.84 MILD COGNITIVE IMPAIRMENT: ICD-10-CM

## 2023-02-27 PROCEDURE — 99349 HOME/RES VST EST MOD MDM 40: CPT | Performed by: NURSE PRACTITIONER

## 2023-02-27 RX ORDER — SENNOSIDES 8.6 MG
CAPSULE ORAL
Start: 2023-02-20 | End: 2023-01-01

## 2023-02-27 NOTE — PROGRESS NOTES
[facility] GERIATRICS  ACUTE/EPISODIC VISIT    [facility] Medical Record Number:  [id number]  Place of Service where encounter took place:  [facility] ([facility]) [[id number]]    Chief Complaint   Patient presents with     RECHECK       HPI:    Ingrid Powell is a 77 year old  (1945), who is being seen today for an episodic care visit.  HPI information obtained from: facility chart records, facility staff, patient report, [facility] chart review and family/first contact daughter report.    Today's concern is:    Diagnoses       Codes Comments    Chronic systolic heart failure (H)    -  Primary I50.22     Coronary artery disease involving native coronary artery of native heart without angina pectoris     I25.10     Mild cognitive impairment     G31.84     JAIME (generalized anxiety disorder)     F41.1         Came to see Ingrid today as wanting to speak to her about maybe starting Aricept per discussion this NP had with her daughter last week.  This NP updated the daughter about Ingrid's rectal bleeding and in process she asked if this NP ever noticed memory issues with her mother.  Do feel that Ingrid has memory issues as she cant remember things or it takes her time to think about things she is trying to remember.  She states she cant remember during conversation but does not realize she states it and did point that out today to Ingrid.    Found Ingrid this afternoon laying down in bed around 230pm.  Oxygen on and took off once she sat up and sat on her bed to speak with this NP.  Did bring up again today about having a ECHO done here at the facility.  Assured her that her cardiac arrest did not occur during an ECHO but with her angiogram.  It is not an invasive procedure and it is portable and would come out to her and not her going to the hospital.      As for the Aricept, Ingrid got a little irritated about who thinks she is having memory issues.  Admitted it was a conversation with her  daughter and staff and this NP see it.  Smoothed it out about the conversation with daughter.  As noted above, Ingrid said something about she did not remember or remembers things and pointed that out she just said that.      ALLERGIES:    Allergies   Allergen Reactions     Dilaudid [Hydromorphone Hcl] Visual Disturbance     Hallucinations     Fosamax [Alendronate Sodium] Rash            Norvasc [Amlodipine Besylate] Hives and Rash     Tegretol [Carbamazepine] Hives and Rash        MEDICATIONS:  Post Discharge Medication Reconciliation Status: patient was not discharged from an inpatient facility or TCU. Medication reconciled today    Current Outpatient Medications   Medication Sig Dispense Refill     acetaminophen (TYLENOL) 650 MG CR tablet 1300mg po every PM and 1300mg po twice a day as needed       busPIRone (BUSPAR) 10 MG tablet Take 1 tablet (10 mg) by mouth 3 times daily 90 tablet 11     citalopram (CELEXA) 10 MG tablet Take 1 tablet (10 mg) by mouth daily       fluticasone (FLONASE) 50 MCG/ACT nasal spray Spray 2 sprays into both nostrils daily 16 g 3     furosemide (LASIX) 20 MG tablet 40mg by mouth every AM and 20mg by mouth in afternoon 30 tablet 11     gabapentin (NEURONTIN) 600 MG tablet TAKE 1 TABLET (600 MG) BY MOUTH AT BEDTIME 30 tablet 3     hypromellose-dextran (ARTIFICAL TEARS) 0.1-0.3 % ophthalmic solution Place 1 drop into both eyes 2 times daily 15 mL 0     levothyroxine (SYNTHROID/LEVOTHROID) 100 MCG tablet Take 1 tablet (100 mcg) by mouth daily       LORazepam (ATIVAN) 0.5 MG tablet TAKE 1 TABLET BY MOUTH FOUR TIMES DAILY; TAKE 1 TABLET BY MOUTH TWICE DAILY IF NEEDED 120 tablet 3     Menthol, Topical Analgesic, (BIOFREEZE) 4 % GEL Dime size gel to elbows and neck at HS and then TID prn       nitroGLYcerin (NITROSTAT) 0.4 MG sublingual tablet For chest pain place 1 tablet under the tongue every 5 minutes for 3 doses. If symptoms persist 5 minutes after 1st dose call 911. 6 tablet 4     nystatin  (MYCOSTATIN) 007665 UNIT/GM external powder Apply topically 2 times daily 30 g 4     oxyCODONE (ROXICODONE) 5 MG tablet Take 1 tablet (5 mg) by mouth every 4 hours as needed for pain 20 tablet 0     QUEtiapine (SEROQUEL) 25 MG tablet TAKE 1/2 TABLET (12.5 MG) BY MOUTH 3 TIMES DAILY 45 tablet 3     rOPINIRole (REQUIP) 0.5 MG tablet Take 0.5 tablets (0.25 mg) by mouth every morning AND 1 tablet (0.5 mg) At Bedtime. 30 tablet 4     senna-docusate (SENOKOT-S/PERICOLACE) 8.6-50 MG tablet Take 1 tablet by mouth every other day And 1 tab by mouth daily as needed 60 tablet 11     traZODone (DESYREL) 100 MG tablet Take 1 tablet (100 mg) by mouth At Bedtime. May also take 0.5 tablets (50 mg) at bedtime as needed, may repeat once for sleep (if not sleeping by 0300.). 30 tablet 11         REVIEW OF SYSTEMS:  4 point ROS neg other than the symptoms noted above in the HPI.  No chest pain, no acute shortness of pain.  Does not wear the oxygen 24/7 like she did when she moved in almost a whole year now.    PHYSICAL EXAM:  /64   Pulse 97   Temp 97.5  F (36.4  C)   Resp 18   Wt 97.6 kg (215 lb 3.2 oz)   LMP  (LMP Unknown)   SpO2 92%   BMI 35.81 kg/m    Easily aroused with the knock on the door and she asked who was there.  She recognized this NP right away.  Sat up on her bed.  Ingrid does lay on her side and hugs a pillow.  Oxygen on at 2L/min via concentrator.  Heart rate regular and strong.  No edema in lower ankles/feet.  Abdomen is large, soft, non- tender.  No concerns of bowels.  No complaints of bleeding today.  Skin is pink, warm and dry.    Ambulates with walker when out of her apartment.  Can hold onto furniture in room.        ASSESSMENT / PLAN:  (I50.22) Chronic systolic heart failure (H)  (primary encounter diagnosis)  (I25.10) Coronary artery disease involving native coronary artery of native heart without angina pectoris  Comment: as noted above, did speak about having an ECHO done that vendor will come  out here to preform the ultrasound of the heart.  She was open to have it done and to see what her heart is functioning now.  Do feel there would be some improvement as she is about a year out of her cardiac arrest.    1.  ECHO to be done by [facility] when available for dx of CHF, cardiomyopathy and CAD.    (G31.84) Mild cognitive impairment  Comment: spent time talking about Aricept and what it's purpose.  Ingrid became anxious over this and so tabled the idea for now and can [place] back in the future about it.  Would not doubt she may talk about this with her sister.  Now would be a good time to start her on Aricept as feel she is in beginning of memory loss.  Did speak about does her anxiety cause her to have forgetfulness or does her forgetfulness cause her to have anxiety.  Can go both ways.  With her cardiac arrest a year ago, how much contributed to memory loss?    (F41.1) JAIME (generalized anxiety disorder)  Comment: Ingrid still wonders why she feels anxiety.  Assured her today that she is in way better control than last summer.  Last summer she was out of control and hence all the medications she is on.  Seroquel, Ativan, Buspar, and Celexa.  No changes.      Orders:  1.  Portable ECHO to be done by [facility] due to CHF, cardiomyopathy, CAD    Electronically signed by  NORA Balderas CNP

## 2023-02-27 NOTE — LETTER
2/27/2023        RE: Ingrid Powell  C/o Daljit Powell  [address] [address]        [facility] GERIATRICS  ACUTE/EPISODIC VISIT    [facility] Medical Record Number:  [id number]  Place of Service where encounter took place:  [facility] ([facility]) [[id number]]    Chief Complaint   Patient presents with     RECHECK       HPI:    Ingrid Powell is a 77 year old  (1945), who is being seen today for an episodic care visit.  HPI information obtained from: facility chart records, facility staff, patient report, [facility] chart review and family/first contact daughter report.    Today's concern is:    Diagnoses       Codes Comments    Chronic systolic heart failure (H)    -  Primary I50.22     Coronary artery disease involving native coronary artery of native heart without angina pectoris     I25.10     Mild cognitive impairment     G31.84     JAIME (generalized anxiety disorder)     F41.1         Came to see Ingrid today as wanting to speak to her about maybe starting Aricept per discussion this NP had with her daughter last week.  This NP updated the daughter about Ingrid's rectal bleeding and in process she asked if this NP ever noticed memory issues with her mother.  Do feel that Ingrid has memory issues as she cant remember things or it takes her time to think about things she is trying to remember.  She states she cant remember during conversation but does not realize she states it and did point that out today to Ingrid.    Found Ingrid this afternoon laying down in bed around 230pm.  Oxygen on and took off once she sat up and sat on her bed to speak with this NP.  Did bring up again today about having a ECHO done here at the facility.  Assured her that her cardiac arrest did not occur during an ECHO but with her angiogram.  It is not an invasive procedure and it is portable and would come out to her and not her going to the hospital.      As for the Aricept, Ingrid got a  little irritated about who thinks she is having memory issues.  Admitted it was a conversation with her daughter and staff and this NP see it.  Smoothed it out about the conversation with daughter.  As noted above, Ingrid said something about she did not remember or remembers things and pointed that out she just said that.      ALLERGIES:    Allergies   Allergen Reactions     Dilaudid [Hydromorphone Hcl] Visual Disturbance     Hallucinations     Fosamax [Alendronate Sodium] Rash            Norvasc [Amlodipine Besylate] Hives and Rash     Tegretol [Carbamazepine] Hives and Rash        MEDICATIONS:  Post Discharge Medication Reconciliation Status: patient was not discharged from an inpatient facility or TCU. Medication reconciled today    Current Outpatient Medications   Medication Sig Dispense Refill     acetaminophen (TYLENOL) 650 MG CR tablet 1300mg po every PM and 1300mg po twice a day as needed       busPIRone (BUSPAR) 10 MG tablet Take 1 tablet (10 mg) by mouth 3 times daily 90 tablet 11     citalopram (CELEXA) 10 MG tablet Take 1 tablet (10 mg) by mouth daily       fluticasone (FLONASE) 50 MCG/ACT nasal spray Spray 2 sprays into both nostrils daily 16 g 3     furosemide (LASIX) 20 MG tablet 40mg by mouth every AM and 20mg by mouth in afternoon 30 tablet 11     gabapentin (NEURONTIN) 600 MG tablet TAKE 1 TABLET (600 MG) BY MOUTH AT BEDTIME 30 tablet 3     hypromellose-dextran (ARTIFICAL TEARS) 0.1-0.3 % ophthalmic solution Place 1 drop into both eyes 2 times daily 15 mL 0     levothyroxine (SYNTHROID/LEVOTHROID) 100 MCG tablet Take 1 tablet (100 mcg) by mouth daily       LORazepam (ATIVAN) 0.5 MG tablet TAKE 1 TABLET BY MOUTH FOUR TIMES DAILY; TAKE 1 TABLET BY MOUTH TWICE DAILY IF NEEDED 120 tablet 3     Menthol, Topical Analgesic, (BIOFREEZE) 4 % GEL Dime size gel to elbows and neck at HS and then TID prn       nitroGLYcerin (NITROSTAT) 0.4 MG sublingual tablet For chest pain place 1 tablet under the tongue  every 5 minutes for 3 doses. If symptoms persist 5 minutes after 1st dose call 911. 6 tablet 4     nystatin (MYCOSTATIN) 274333 UNIT/GM external powder Apply topically 2 times daily 30 g 4     oxyCODONE (ROXICODONE) 5 MG tablet Take 1 tablet (5 mg) by mouth every 4 hours as needed for pain 20 tablet 0     QUEtiapine (SEROQUEL) 25 MG tablet TAKE 1/2 TABLET (12.5 MG) BY MOUTH 3 TIMES DAILY 45 tablet 3     rOPINIRole (REQUIP) 0.5 MG tablet Take 0.5 tablets (0.25 mg) by mouth every morning AND 1 tablet (0.5 mg) At Bedtime. 30 tablet 4     senna-docusate (SENOKOT-S/PERICOLACE) 8.6-50 MG tablet Take 1 tablet by mouth every other day And 1 tab by mouth daily as needed 60 tablet 11     traZODone (DESYREL) 100 MG tablet Take 1 tablet (100 mg) by mouth At Bedtime. May also take 0.5 tablets (50 mg) at bedtime as needed, may repeat once for sleep (if not sleeping by 0300.). 30 tablet 11         REVIEW OF SYSTEMS:  4 point ROS neg other than the symptoms noted above in the HPI.  No chest pain, no acute shortness of pain.  Does not wear the oxygen 24/7 like she did when she moved in almost a whole year now.    PHYSICAL EXAM:  /64   Pulse 97   Temp 97.5  F (36.4  C)   Resp 18   Wt 97.6 kg (215 lb 3.2 oz)   LMP  (LMP Unknown)   SpO2 92%   BMI 35.81 kg/m    Easily aroused with the knock on the door and she asked who was there.  She recognized this NP right away.  Sat up on her bed.  Ingrid does lay on her side and hugs a pillow.  Oxygen on at 2L/min via concentrator.  Heart rate regular and strong.  No edema in lower ankles/feet.  Abdomen is large, soft, non- tender.  No concerns of bowels.  No complaints of bleeding today.  Skin is pink, warm and dry.    Ambulates with walker when out of her apartment.  Can hold onto furniture in room.        ASSESSMENT / PLAN:  (I50.22) Chronic systolic heart failure (H)  (primary encounter diagnosis)  (I25.10) Coronary artery disease involving native coronary artery of native heart  without angina pectoris  Comment: as noted above, did speak about having an ECHO done that vendor will come out here to preform the ultrasound of the heart.  She was open to have it done and to see what her heart is functioning now.  Do feel there would be some improvement as she is about a year out of her cardiac arrest.    1.  ECHO to be done by [facility] when available for dx of CHF, cardiomyopathy and CAD.    (G31.84) Mild cognitive impairment  Comment: spent time talking about Aricept and what it's purpose.  Ingrid became anxious over this and so tabled the idea for now and can [place] back in the future about it.  Would not doubt she may talk about this with her sister.  Now would be a good time to start her on Aricept as feel she is in beginning of memory loss.  Did speak about does her anxiety cause her to have forgetfulness or does her forgetfulness cause her to have anxiety.  Can go both ways.  With her cardiac arrest a year ago, how much contributed to memory loss?    (F41.1) JAIME (generalized anxiety disorder)  Comment: Ingrid still wonders why she feels anxiety.  Assured her today that she is in way better control than last summer.  Last summer she was out of control and hence all the medications she is on.  Seroquel, Ativan, Buspar, and Celexa.  No changes.      Orders:  1.  Portable ECHO to be done by [facility] due to CHF, cardiomyopathy, CAD    Electronically signed by  NORA Balderas CNP             Sincerely,        NORA Balderas CNP

## 2023-03-01 ENCOUNTER — LAB REQUISITION (OUTPATIENT)
Dept: LAB | Facility: CLINIC | Age: 78
End: 2023-03-01
Payer: COMMERCIAL

## 2023-03-01 DIAGNOSIS — K62.5 HEMORRHAGE OF ANUS AND RECTUM: ICD-10-CM

## 2023-03-02 LAB — HGB BLD-MCNC: 12.9 G/DL (ref 11.7–15.7)

## 2023-03-02 PROCEDURE — P9603 ONE-WAY ALLOW PRORATED MILES: HCPCS | Mod: ORL | Performed by: NURSE PRACTITIONER

## 2023-03-02 PROCEDURE — 85018 HEMOGLOBIN: CPT | Mod: ORL | Performed by: NURSE PRACTITIONER

## 2023-03-02 PROCEDURE — 36415 COLL VENOUS BLD VENIPUNCTURE: CPT | Mod: ORL | Performed by: NURSE PRACTITIONER

## 2023-03-07 ENCOUNTER — PATIENT OUTREACH (OUTPATIENT)
Dept: GERIATRIC MEDICINE | Facility: CLINIC | Age: 78
End: 2023-03-07
Payer: COMMERCIAL

## 2023-03-07 NOTE — PROGRESS NOTES
[facility] Care Coordination Contact    Called RUPAL Rios to schedule annual HRA home visit. HRA has been scheduled for 3/10/23 at assisted living.     Angela Reid RN  [facility]  [phone]

## 2023-03-09 DIAGNOSIS — F41.1 GAD (GENERALIZED ANXIETY DISORDER): ICD-10-CM

## 2023-03-09 RX ORDER — LORAZEPAM 0.5 MG/1
TABLET ORAL
Qty: 120 TABLET | Refills: 3 | Status: SHIPPED | OUTPATIENT
Start: 2023-03-09 | End: 2023-04-27

## 2023-03-10 ENCOUNTER — PATIENT OUTREACH (OUTPATIENT)
Dept: GERIATRIC MEDICINE | Facility: CLINIC | Age: 78
End: 2023-03-10
Payer: COMMERCIAL

## 2023-03-10 SDOH — ECONOMIC STABILITY: TRANSPORTATION INSECURITY
IN THE PAST 12 MONTHS, HAS LACK OF TRANSPORTATION KEPT YOU FROM MEETINGS, WORK, OR FROM GETTING THINGS NEEDED FOR DAILY LIVING?: NO

## 2023-03-10 SDOH — ECONOMIC STABILITY: INCOME INSECURITY: IN THE LAST 12 MONTHS, WAS THERE A TIME WHEN YOU WERE NOT ABLE TO PAY THE MORTGAGE OR RENT ON TIME?: NO

## 2023-03-10 SDOH — ECONOMIC STABILITY: FOOD INSECURITY: WITHIN THE PAST 12 MONTHS, YOU WORRIED THAT YOUR FOOD WOULD RUN OUT BEFORE YOU GOT MONEY TO BUY MORE.: NEVER TRUE

## 2023-03-10 SDOH — ECONOMIC STABILITY: TRANSPORTATION INSECURITY
IN THE PAST 12 MONTHS, HAS THE LACK OF TRANSPORTATION KEPT YOU FROM MEDICAL APPOINTMENTS OR FROM GETTING MEDICATIONS?: NO

## 2023-03-10 SDOH — HEALTH STABILITY: PHYSICAL HEALTH: ON AVERAGE, HOW MANY MINUTES DO YOU ENGAGE IN EXERCISE AT THIS LEVEL?: 0 MIN

## 2023-03-10 SDOH — ECONOMIC STABILITY: FOOD INSECURITY: WITHIN THE PAST 12 MONTHS, THE FOOD YOU BOUGHT JUST DIDN'T LAST AND YOU DIDN'T HAVE MONEY TO GET MORE.: NEVER TRUE

## 2023-03-10 SDOH — HEALTH STABILITY: PHYSICAL HEALTH: ON AVERAGE, HOW MANY DAYS PER WEEK DO YOU ENGAGE IN MODERATE TO STRENUOUS EXERCISE (LIKE A BRISK WALK)?: 0 DAYS

## 2023-03-10 ASSESSMENT — SOCIAL DETERMINANTS OF HEALTH (SDOH)
WITHIN THE LAST YEAR, HAVE YOU BEEN AFRAID OF YOUR PARTNER OR EX-PARTNER?: NO
DO YOU BELONG TO ANY CLUBS OR ORGANIZATIONS SUCH AS CHURCH GROUPS UNIONS, FRATERNAL OR ATHLETIC GROUPS, OR SCHOOL GROUPS?: NO
HOW OFTEN DO YOU ATTENT MEETINGS OF THE CLUB OR ORGANIZATION YOU BELONG TO?: NEVER
HOW OFTEN DO YOU ATTEND CHURCH OR RELIGIOUS SERVICES?: 1 TO 4 TIMES PER YEAR
HOW HARD IS IT FOR YOU TO PAY FOR THE VERY BASICS LIKE FOOD, HOUSING, MEDICAL CARE, AND HEATING?: NOT VERY HARD
IN A TYPICAL WEEK, HOW MANY TIMES DO YOU TALK ON THE PHONE WITH FAMILY, FRIENDS, OR NEIGHBORS?: THREE TIMES A WEEK
HOW OFTEN DO YOU ATTEND CHURCH OR RELIGIOUS SERVICES?: 1 TO 4 TIMES PER YEAR
WITHIN THE LAST YEAR, HAVE YOU BEEN HUMILIATED OR EMOTIONALLY ABUSED IN OTHER WAYS BY YOUR PARTNER OR EX-PARTNER?: NO
HOW OFTEN DO YOU ATTENT MEETINGS OF THE CLUB OR ORGANIZATION YOU BELONG TO?: NEVER
WITHIN THE LAST YEAR, HAVE YOU BEEN KICKED, HIT, SLAPPED, OR OTHERWISE PHYSICALLY HURT BY YOUR PARTNER OR EX-PARTNER?: NO
HOW OFTEN DO YOU GET TOGETHER WITH FRIENDS OR RELATIVES?: MORE THAN THREE TIMES A WEEK
DO YOU BELONG TO ANY CLUBS OR ORGANIZATIONS SUCH AS CHURCH GROUPS UNIONS, FRATERNAL OR ATHLETIC GROUPS, OR SCHOOL GROUPS?: NO
WITHIN THE LAST YEAR, HAVE TO BEEN RAPED OR FORCED TO HAVE ANY KIND OF SEXUAL ACTIVITY BY YOUR PARTNER OR EX-PARTNER?: NO
IN A TYPICAL WEEK, HOW MANY TIMES DO YOU TALK ON THE PHONE WITH FAMILY, FRIENDS, OR NEIGHBORS?: MORE THAN THREE TIMES A WEEK
HOW OFTEN DO YOU GET TOGETHER WITH FRIENDS OR RELATIVES?: THREE TIMES A WEEK

## 2023-03-10 ASSESSMENT — ACTIVITIES OF DAILY LIVING (ADL)
DEPENDENT_IADLS:: CLEANING;COOKING;LAUNDRY;SHOPPING;MEAL PREPARATION;MEDICATION MANAGEMENT;MONEY MANAGEMENT;TRANSPORTATION

## 2023-03-10 ASSESSMENT — LIFESTYLE VARIABLES
HOW MANY STANDARD DRINKS CONTAINING ALCOHOL DO YOU HAVE ON A TYPICAL DAY: PATIENT DOES NOT DRINK
AUDIT-C TOTAL SCORE: 0
SKIP TO QUESTIONS 9-10: 1
HOW OFTEN DO YOU HAVE SIX OR MORE DRINKS ON ONE OCCASION: NEVER
HOW OFTEN DO YOU HAVE A DRINK CONTAINING ALCOHOL: NEVER

## 2023-03-10 ASSESSMENT — PATIENT HEALTH QUESTIONNAIRE - PHQ9: SUM OF ALL RESPONSES TO PHQ QUESTIONS 1-9: 0

## 2023-03-10 NOTE — PROGRESS NOTES
[facility] Care Coordination Contact    [facility] Home Visit Assessment     Home visit for Health Risk Assessment with Ingrid Powell completed on March 10, 2023    Type of residence:: Assisted living  Current living arrangement:: I live in assisted living     Assessment completed with:: Patient, Care Team Member    Current Care Plan  Member currently receiving the following home care services:     Member currently receiving the following community resources: None    Medication Review  Medication reconciliation completed in Epic: Yes  Medication set-up & administration: RN set up monthly.  Assisting Living staff administers medications.  Medication Risk Assessment Medication (1 or more, place referral to MTM): N/A: No risk factors identified  MTM Referral Placed: No: No risk factors idenified    Mental/Behavioral Health   Depression Screening:   PHQ-2 Total Score (Adult) - Positive if 3 or more points; Administer PHQ-9 if positive: 0  PHQ-9 Total Score: 0    Mental health DX:: Yes ([place])   Mental health DX how managed:: Medication    Falls Assessment:   Fallen 2 or more times in the past year?: No   Any fall with injury in the past year?: No    ADL/IADL Dependencies:      Dependent IADLs:: Cleaning, Cooking, Laundry, Shopping, Meal Preparation, Medication Management, Money Management, Transportation    [facility] Plan sponsored benefits: Shared information re: Silver Sneakers/gym memberships, ASA, Calcium +D.    PCA Assessment completed at visit: Not Applicable     Elderly Waiver Eligibility: Yes-will continue on EW    Care Plan & Recommendations: Member was pleasant and happy during assessments and answered questions appropriately. She reports she really loves where she lives and the staff is very good. She really likes her PCP as well. She would like to have a dental and eye exam completed. She is wondering why she is on light therapy and CC to inquire with PCP and staff. She reports continued  shoulder and neck pain. She reports she enjoys the activities in the building however really enjoys playing Bingo and would play more if she could. Has good support from her family as well.     See [facility] for detailed assessment information.    Follow-Up Plan: Member informed of future contact, plan to f/u with member with a 6 month telephone assessment.  Contact information shared with member and family, encouraged member to call with any questions or concerns at any time.    [facility] care continuum providers: Please see Snapshot and Care Management Flowsheets for Specific details of care plan.    This CC note routed to PCP.    CC emailed RS tool to nurse Rios and she agrees with tool.     Angela Reid RN  [facility]  [phone]

## 2023-03-10 NOTE — Clinical Note
I saw Ingrid on 3.10.23 for her annual reassessment. She was very pleasant and reported she was very happy. She is wondering why she is doing light therapy. She stated no one explained the reason and inquired if she had to be on it. Also she reported she was taking B12 and Vit D3 daily. I added to Epic. She reports she would like a dental and eye exam which I will work with the nursing staff on completing. Thank you for your review. Angela Reid RN

## 2023-03-16 ENCOUNTER — ASSISTED LIVING VISIT (OUTPATIENT)
Dept: GERIATRICS | Facility: CLINIC | Age: 78
End: 2023-03-16
Payer: COMMERCIAL

## 2023-03-16 ENCOUNTER — PATIENT OUTREACH (OUTPATIENT)
Dept: GERIATRIC MEDICINE | Facility: CLINIC | Age: 78
End: 2023-03-16

## 2023-03-16 VITALS
SYSTOLIC BLOOD PRESSURE: 150 MMHG | DIASTOLIC BLOOD PRESSURE: 90 MMHG | BODY MASS INDEX: 36.14 KG/M2 | HEART RATE: 91 BPM | OXYGEN SATURATION: 96 % | WEIGHT: 217.2 LBS | RESPIRATION RATE: 17 BRPM | TEMPERATURE: 97.6 F

## 2023-03-16 DIAGNOSIS — F41.1 GAD (GENERALIZED ANXIETY DISORDER): ICD-10-CM

## 2023-03-16 DIAGNOSIS — I25.10 CORONARY ARTERY DISEASE INVOLVING NATIVE CORONARY ARTERY OF NATIVE HEART WITHOUT ANGINA PECTORIS: ICD-10-CM

## 2023-03-16 DIAGNOSIS — Z99.81 OXYGEN DEPENDENT: ICD-10-CM

## 2023-03-16 DIAGNOSIS — J44.9 COPD, MILD (H): ICD-10-CM

## 2023-03-16 DIAGNOSIS — G31.84 MILD COGNITIVE IMPAIRMENT: ICD-10-CM

## 2023-03-16 DIAGNOSIS — I50.22 CHRONIC SYSTOLIC HEART FAILURE (H): Primary | ICD-10-CM

## 2023-03-16 PROCEDURE — 99349 HOME/RES VST EST MOD MDM 40: CPT | Performed by: NURSE PRACTITIONER

## 2023-03-16 NOTE — LETTER
3/16/2023        RE: Ingrid Powell  C/o Daljit Powell  [address] [address]        M [facility] GERIATRICS  ACUTE/EPISODIC VISIT    [facility] Medical Record Number:  [id number]  Place of Service where encounter took place:  [facility] [[id number]]    Chief Complaint   Patient presents with     RECHECK       HPI:    Ingrid Powell is a 77 year old  (1945), who is being seen today for an episodic care visit.  HPI information obtained from: facility staff, patient report, family/first contact daughter Lida report and Outreach RN.    Today's concern is:    Diagnoses       Codes Comments    Chronic systolic heart failure (H)    -  Primary I50.22     Coronary artery disease involving native coronary artery of native heart without angina pectoris     I25.10     COPD, mild (H)     J44.9     Oxygen dependent     Z99.81     JAIME (generalized anxiety disorder)     F41.1     Mild cognitive impairment     G31.84         Came to see Ingrid today to follow-up with her about the echocardiogram that was done here at the facility through [facility].      Meanwhile outreach RN through [place] had made a visit with Ingrid and then emailed this nurse practitioner questioning a few items.  One of them was Ingrid's memory as this nurse practitioner has approached Ingrid about her memory and noticing that she is forgetful.  Ingrid's daughter has noticed this and has reached out to this nurse practitioner.  The out reach RN questioning if it would be of benefit for Ingrid to have a neurological work-up and Ingrid and her daughter seem to be open to this idea.    Found Ingrid in her apartment today.  She was eating her lunch which is always brought up to her.  She was having a hamburger as well as sweet potato fries.  Ingrid seems to be doing pretty good with the conversation.  Was happy to hear that her ejection fracture is much better than it was a year ago.  Then at the end of the conversation  she started talking about that there was something she needed to ask but cannot remember.  This is very typical conversation with her as far as forgetting things to ask.  This time she did mention her sister even tells her she is forgetful    ALLERGIES:    Allergies   Allergen Reactions     Dilaudid [Hydromorphone Hcl] Visual Disturbance     Hallucinations     Fosamax [Alendronate Sodium] Rash            Norvasc [Amlodipine Besylate] Hives and Rash     Tegretol [Carbamazepine] Hives and Rash        MEDICATIONS:  Post Discharge Medication Reconciliation Status: patient was not discharged from an inpatient facility or TCU. Reviewed medications for her.    Current Outpatient Medications   Medication Sig Dispense Refill     acetaminophen (TYLENOL) 650 MG CR tablet 1300mg po every PM and 1300mg po twice a day as needed       busPIRone (BUSPAR) 10 MG tablet Take 1 tablet (10 mg) by mouth 3 times daily 90 tablet 11     cholecalciferol (VITAMIN D3) 25 mcg (1000 units) capsule Take 1 capsule by mouth daily       citalopram (CELEXA) 10 MG tablet Take 1 tablet (10 mg) by mouth daily       fluticasone (FLONASE) 50 MCG/ACT nasal spray Spray 2 sprays into both nostrils daily 16 g 3     furosemide (LASIX) 20 MG tablet 40mg by mouth every AM and 20mg by mouth in afternoon 30 tablet 11     gabapentin (NEURONTIN) 600 MG tablet TAKE 1 TABLET (600 MG) BY MOUTH AT BEDTIME 30 tablet 3     hypromellose-dextran (ARTIFICAL TEARS) 0.1-0.3 % ophthalmic solution Place 1 drop into both eyes 2 times daily 15 mL 0     levothyroxine (SYNTHROID/LEVOTHROID) 100 MCG tablet Take 1 tablet (100 mcg) by mouth daily       LORazepam (ATIVAN) 0.5 MG tablet TAKE 1 TABLET BY MOUTH FOUR TIMES DAILY; TAKE 1 TABLET BY MOUTH TWICE DAILY IF NEEDED 120 tablet 3     Menthol, Topical Analgesic, (BIOFREEZE) 4 % GEL Dime size gel to elbows and neck at HS and then TID prn       nitroGLYcerin (NITROSTAT) 0.4 MG sublingual tablet For chest pain place 1 tablet under the  tongue every 5 minutes for 3 doses. If symptoms persist 5 minutes after 1st dose call 911. 6 tablet 4     nystatin (MYCOSTATIN) 271059 UNIT/GM external powder Apply topically 2 times daily 30 g 4     oxyCODONE (ROXICODONE) 5 MG tablet Take 1 tablet (5 mg) by mouth every 4 hours as needed for pain 20 tablet 0     QUEtiapine (SEROQUEL) 25 MG tablet TAKE 1/2 TABLET (12.5 MG) BY MOUTH 3 TIMES DAILY 45 tablet 3     rOPINIRole (REQUIP) 0.5 MG tablet Take 0.5 tablets (0.25 mg) by mouth every morning AND 1 tablet (0.5 mg) At Bedtime. 30 tablet 4     senna-docusate (SENOKOT-S/PERICOLACE) 8.6-50 MG tablet Take 1 tablet by mouth every other day And 1 tab by mouth daily as needed 60 tablet 11     traZODone (DESYREL) 100 MG tablet Take 1 tablet (100 mg) by mouth At Bedtime. May also take 0.5 tablets (50 mg) at bedtime as needed, may repeat once for sleep (if not sleeping by 0300.). 30 tablet 11     vitamin B-12 (CYANOCOBALAMIN) 250 MCG tablet Take 250 mcg by mouth daily         REVIEW OF SYSTEMS:  4 point ROS neg other than the symptoms noted above in the HPI.  Denied chest pain stomach discomfort.  Had her oxygen on today while sitting up in the recliner and stated it was more for mental comfort        PHYSICAL EXAM:  BP (!) 150/90   Pulse 91   Temp 97.6  F (36.4  C)   Resp 17   Wt 98.5 kg (217 lb 3.2 oz)   LMP  (LMP Unknown)   SpO2 96%   BMI 36.14 kg/m    Ingrid is well-groomed today.  Spoke about how well her hair looked and even asked if she slept on it over the past week due to her comment that she was getting it redone on Friday.  Heart rate regular and strong S1 and S2 is heard.  No pitting edema in her lower extremities.  Her legs are thicker in nature.  Currently has her oxygen on via nasal cannula at 1 L/minute.  Stated this was more for her comfort mentally.  She was not in any acute respiratory distress    Abdomen is obese and round.  She did not mention any problems with her bowels today.    Ingrid did show  "this nurse practitioner that light therapy lo she turned it on and off to show this NP.  Far cry from the light therapy boxes a long time ago.  Needs to question why she needed to have it.  Reminded her that the facility and her spoke about this and they arranged it for her to be able to use the machine just because she is someone who always went outside in the summer and now is inside staying in her room most days unless she likes an activity occurring.  Ingrid does ambulate with a four-wheel walker.  No recent falls    Copied end report to ECHO done by [facility] on 3/2/23        ASSESSMENT / PLAN:  (I50.22) Chronic systolic heart failure (H)  (primary encounter diagnosis)  (I25.10) Coronary artery disease involving native coronary artery of native heart without angina pectoris  Comment: 1 reason of doing the echocardiogram was because Ingrid always spoke about her heart functioning only at 27% at was when she was in the hospital and did receive CPR at that time.  She is, long ways and so suspected that her ejection fracture was much better.  Would like to see her off the oxygen but it is more of a crutch for her.  Ingrid has been informed of what the results were as noted abov no new orders today.  Only do speak about following up with cardiology at some point but typically that gets put on the \"back burner\".    (J44.9) COPD, mild (H)  (Z99.81) Oxygen dependent  Comment: In all honesty, most likely Ingrid does not need the oxygen, but because of her anxiety and afraid that she is not good with She uses it mainly when she is in bed for a nap or during the night.  She does have portable oxygen that she sits on her wheelchair and has available to her when she leaves her apartment.  Otherwise there is no signs or symptoms of COPD.    (F41.1) JAIME (generalized anxiety disorder)  (G31.84) Mild cognitive impairment  Comment: Ingrid does have fairly severe anxiety that is questionable how much it interferes with her cognition.  Do " believe though since she did have CPR about a year ago her memory is not as sharp as it used to be.  She does enjoy living at the assisted living and knows that she can certainly call the staff to help her out.  Family is very involved in her cares.  Did let the out reach nurse be aware of into [facility] for neurology and will send an email to the daughter as well so a possible phone call in a few days.      Orders:  Will place a neurology referral in [facility] for evaluation of her cognition.    Electronically signed by  NORA Balderas CNP             Sincerely,        NORA Balderas CNP

## 2023-03-16 NOTE — PROGRESS NOTES
[facility] GERIATRICS  ACUTE/EPISODIC VISIT    [facility] Medical Record Number:  [id number]  Place of Service where encounter took place:  [facility]) [[id number]]    Chief Complaint   Patient presents with     RECHECK       HPI:    Ingrid Powell is a 77 year old  (1945), who is being seen today for an episodic care visit.  HPI information obtained from: facility staff, patient report, family/first contact daughter Lida report and Outreach RN.    Today's concern is:    Diagnoses       Codes Comments    Chronic systolic heart failure (H)    -  Primary I50.22     Coronary artery disease involving native coronary artery of native heart without angina pectoris     I25.10     COPD, mild (H)     J44.9     Oxygen dependent     Z99.81     JAIME (generalized anxiety disorder)     F41.1     Mild cognitive impairment     G31.84         Came to see Ingrid today to follow-up with her about the echocardiogram that was done here at the facility through [facility].      Meanwhile outreach RN through [place] had made a visit with Ingrid and then emailed this nurse practitioner questioning a few items.  One of them was Ingrid's memory as this nurse practitioner has approached Ingrid about her memory and noticing that she is forgetful.  Ingrid's daughter has noticed this and has reached out to this nurse practitioner.  The out reach RN questioning if it would be of benefit for Ingrid to have a neurological work-up and Ingrid and her daughter seem to be open to this idea.    Found Ingrid in her apartment today.  She was eating her lunch which is always brought up to her.  She was having a hamburger as well as sweet potato fries.  Ingrid seems to be doing pretty good with the conversation.  Was happy to hear that her ejection fracture is much better than it was a year ago.  Then at the end of the conversation she started talking about that there was something she needed to ask but cannot remember.  This is very  typical conversation with her as far as forgetting things to ask.  This time she did mention her sister even tells her she is forgetful    ALLERGIES:    Allergies   Allergen Reactions     Dilaudid [Hydromorphone Hcl] Visual Disturbance     Hallucinations     Fosamax [Alendronate Sodium] Rash            Norvasc [Amlodipine Besylate] Hives and Rash     Tegretol [Carbamazepine] Hives and Rash        MEDICATIONS:  Post Discharge Medication Reconciliation Status: patient was not discharged from an inpatient facility or TCU. Reviewed medications for her.    Current Outpatient Medications   Medication Sig Dispense Refill     acetaminophen (TYLENOL) 650 MG CR tablet 1300mg po every PM and 1300mg po twice a day as needed       busPIRone (BUSPAR) 10 MG tablet Take 1 tablet (10 mg) by mouth 3 times daily 90 tablet 11     cholecalciferol (VITAMIN D3) 25 mcg (1000 units) capsule Take 1 capsule by mouth daily       citalopram (CELEXA) 10 MG tablet Take 1 tablet (10 mg) by mouth daily       fluticasone (FLONASE) 50 MCG/ACT nasal spray Spray 2 sprays into both nostrils daily 16 g 3     furosemide (LASIX) 20 MG tablet 40mg by mouth every AM and 20mg by mouth in afternoon 30 tablet 11     gabapentin (NEURONTIN) 600 MG tablet TAKE 1 TABLET (600 MG) BY MOUTH AT BEDTIME 30 tablet 3     hypromellose-dextran (ARTIFICAL TEARS) 0.1-0.3 % ophthalmic solution Place 1 drop into both eyes 2 times daily 15 mL 0     levothyroxine (SYNTHROID/LEVOTHROID) 100 MCG tablet Take 1 tablet (100 mcg) by mouth daily       LORazepam (ATIVAN) 0.5 MG tablet TAKE 1 TABLET BY MOUTH FOUR TIMES DAILY; TAKE 1 TABLET BY MOUTH TWICE DAILY IF NEEDED 120 tablet 3     Menthol, Topical Analgesic, (BIOFREEZE) 4 % GEL Dime size gel to elbows and neck at HS and then TID prn       nitroGLYcerin (NITROSTAT) 0.4 MG sublingual tablet For chest pain place 1 tablet under the tongue every 5 minutes for 3 doses. If symptoms persist 5 minutes after 1st dose call 911. 6 tablet 4      nystatin (MYCOSTATIN) 913419 UNIT/GM external powder Apply topically 2 times daily 30 g 4     oxyCODONE (ROXICODONE) 5 MG tablet Take 1 tablet (5 mg) by mouth every 4 hours as needed for pain 20 tablet 0     QUEtiapine (SEROQUEL) 25 MG tablet TAKE 1/2 TABLET (12.5 MG) BY MOUTH 3 TIMES DAILY 45 tablet 3     rOPINIRole (REQUIP) 0.5 MG tablet Take 0.5 tablets (0.25 mg) by mouth every morning AND 1 tablet (0.5 mg) At Bedtime. 30 tablet 4     senna-docusate (SENOKOT-S/PERICOLACE) 8.6-50 MG tablet Take 1 tablet by mouth every other day And 1 tab by mouth daily as needed 60 tablet 11     traZODone (DESYREL) 100 MG tablet Take 1 tablet (100 mg) by mouth At Bedtime. May also take 0.5 tablets (50 mg) at bedtime as needed, may repeat once for sleep (if not sleeping by 0300.). 30 tablet 11     vitamin B-12 (CYANOCOBALAMIN) 250 MCG tablet Take 250 mcg by mouth daily         REVIEW OF SYSTEMS:  4 point ROS neg other than the symptoms noted above in the HPI.  Denied chest pain stomach discomfort.  Had her oxygen on today while sitting up in the recliner and stated it was more for mental comfort        PHYSICAL EXAM:  BP (!) 150/90   Pulse 91   Temp 97.6  F (36.4  C)   Resp 17   Wt 98.5 kg (217 lb 3.2 oz)   LMP  (LMP Unknown)   SpO2 96%   BMI 36.14 kg/m    Ingrid is well-groomed today.  Spoke about how well her hair looked and even asked if she slept on it over the past week due to her comment that she was getting it redone on Friday.  Heart rate regular and strong S1 and S2 is heard.  No pitting edema in her lower extremities.  Her legs are thicker in nature.  Currently has her oxygen on via nasal cannula at 1 L/minute.  Stated this was more for her comfort mentally.  She was not in any acute respiratory distress    Abdomen is obese and round.  She did not mention any problems with her bowels today.    Ingrid did show this nurse practitioner that light therapy lo she turned it on and off to show this NP.  Far cry from the  "light therapy boxes a long time ago.  Needs to question why she needed to have it.  Reminded her that the facility and her spoke about this and they arranged it for her to be able to use the machine just because she is someone who always went outside in the summer and now is inside staying in her room most days unless she likes an activity occurring.  Ingrid does ambulate with a four-wheel walker.  No recent falls    Copied end report to ECHO done by [facility] on 3/2/23        ASSESSMENT / PLAN:  (I50.22) Chronic systolic heart failure (H)  (primary encounter diagnosis)  (I25.10) Coronary artery disease involving native coronary artery of native heart without angina pectoris  Comment: 1 reason of doing the echocardiogram was because Ingrid always spoke about her heart functioning only at 27% at was when she was in the hospital and did receive CPR at that time.  She is, long ways and so suspected that her ejection fracture was much better.  Would like to see her off the oxygen but it is more of a crutch for her.  Ingrid has been informed of what the results were as noted abov no new orders today.  Only do speak about following up with cardiology at some point but typically that gets put on the \"back burner\".    (J44.9) COPD, mild (H)  (Z99.81) Oxygen dependent  Comment: In all honesty, most likely Ingrid does not need the oxygen, but because of her anxiety and afraid that she is not good with She uses it mainly when she is in bed for a nap or during the night.  She does have portable oxygen that she sits on her wheelchair and has available to her when she leaves her apartment.  Otherwise there is no signs or symptoms of COPD.    (F41.1) JAIME (generalized anxiety disorder)  (G31.84) Mild cognitive impairment  Comment: Ingrid does have fairly severe anxiety that is questionable how much it interferes with her cognition.  Do believe though since she did have CPR about a year ago her memory is not as sharp as it used to be.  She " does enjoy living at the assisted living and knows that she can certainly call the staff to help her out.  Family is very involved in her cares.  Did let the out reach nurse be aware of into [facility] for neurology and will send an email to the daughter as well so a possible phone call in a few days.      Orders:  Will place a neurology referral in [facility] for evaluation of her cognition.    Electronically signed by  NORA Balderas CNP

## 2023-03-16 NOTE — PROGRESS NOTES
[facility] Care Coordination Contact    CC has been emailing with member's daughter regarding a dental appointment. Daughter had called [facility] in [place] and was informed that member's insurance isn't covered there. On 3/14/23 CC left a message with [facility] care coordination  asking if [facility] is covered under member's insurance and if not can they assist with finding another dental office that is close by her home.    CC left a second message today asking about the above.    Angela Reid RN  [facility]  [phone]

## 2023-03-22 ENCOUNTER — LAB REQUISITION (OUTPATIENT)
Dept: LAB | Facility: CLINIC | Age: 78
End: 2023-03-22
Payer: COMMERCIAL

## 2023-03-22 DIAGNOSIS — K62.5 HEMORRHAGE OF ANUS AND RECTUM: ICD-10-CM

## 2023-03-23 LAB — HGB BLD-MCNC: 11.4 G/DL (ref 11.7–15.7)

## 2023-03-23 PROCEDURE — 85018 HEMOGLOBIN: CPT | Mod: ORL | Performed by: NURSE PRACTITIONER

## 2023-03-23 PROCEDURE — 36415 COLL VENOUS BLD VENIPUNCTURE: CPT | Mod: ORL | Performed by: NURSE PRACTITIONER

## 2023-03-23 PROCEDURE — P9603 ONE-WAY ALLOW PRORATED MILES: HCPCS | Mod: ORL | Performed by: NURSE PRACTITIONER

## 2023-03-24 ENCOUNTER — PATIENT OUTREACH (OUTPATIENT)
Dept: GERIATRIC MEDICINE | Facility: CLINIC | Age: 78
End: 2023-03-24
Payer: COMMERCIAL

## 2023-03-24 NOTE — PROGRESS NOTES
[facility] Care Coordination Contact    Received after visit chart from care coordinator.  Completed following tasks: Mailed copy of care plan to client, Updated services in Database, Submitted referrals/auths for AL, Mailed Safe Medication Disposal  and mailed member HCD and worksheet per CC   and Mailed copy of CL tool to member, faxed copy to AL facility ([facility]) including Provider Signature Summary letter, and submitted authorization to health plan.    Lavonne Burris  Care Management Specialist  [facility]  [phone]

## 2023-03-24 NOTE — PROGRESS NOTES
[facility] Care Coordination Contact    CC spoke to [facility] who stated [facility] is not taking patients. They are unsure if they are not taking the insurance or just not taking new patients. CC emailed daughter a list of dental providers that [facility] stated were taking new patients. Daughter will let CC know what dentist they want to bring member to.     Angela Reid RN  [facility]  [phone]

## 2023-03-24 NOTE — LETTER
March 24, 2023      INGRID LOUIS  C/O ENDER LOUIS  [address] [address]      Dear Ingrid:    At [facility], we re dedicated to improving your health and wellness. Enclosed is the Care Plan developed with you on 3/10/2023. Please review the Care Plan carefully.    As a reminder, during your visit we talked about:    Ways to manage your physical and mental health    Using health care to maintain and improve your health     Your preventive care needs     Remember to contact your care coordinator if you:    Are hospitalized, or plan to be hospitalized     Have a fall      Have a change in your physical or mental health    Need help finding support or services    If you have questions, or don t agree with your Care Plan, call me at [phone]. You can also call me if your needs change. TTY users, call the [place] [employer] at (584) or 1-149.455.9449 (yhidcf-su-vcxqev relay service).    Sincerely,       Angela Reid RN  [phone]  [email or web address]) is a health plan that contracts with both Medicare and the [place] Medical Assistance (Medicaid) program to provide benefits of both programs to enrollees. Enrollment in [facility] depends on contract renewal.    [id number] accepted    [id number] (07/2022)

## 2023-03-28 DIAGNOSIS — F51.01 PRIMARY INSOMNIA: ICD-10-CM

## 2023-03-28 RX ORDER — TRAZODONE HYDROCHLORIDE 100 MG/1
TABLET ORAL
Qty: 30 TABLET | Refills: 11 | Status: SHIPPED | OUTPATIENT
Start: 2023-03-28

## 2023-04-03 ENCOUNTER — LAB REQUISITION (OUTPATIENT)
Dept: LAB | Facility: CLINIC | Age: 78
End: 2023-04-03
Payer: COMMERCIAL

## 2023-04-03 DIAGNOSIS — K62.5 HEMORRHAGE OF ANUS AND RECTUM: ICD-10-CM

## 2023-04-04 DIAGNOSIS — I20.9 ANGINA PECTORIS (H): ICD-10-CM

## 2023-04-04 RX ORDER — NITROGLYCERIN 0.4 MG/1
TABLET SUBLINGUAL
Qty: 6 TABLET | Refills: 4 | Status: SHIPPED | OUTPATIENT
Start: 2023-04-04

## 2023-04-06 ENCOUNTER — ASSISTED LIVING VISIT (OUTPATIENT)
Dept: GERIATRICS | Facility: CLINIC | Age: 78
End: 2023-04-06
Payer: COMMERCIAL

## 2023-04-06 VITALS
SYSTOLIC BLOOD PRESSURE: 150 MMHG | RESPIRATION RATE: 17 BRPM | OXYGEN SATURATION: 96 % | WEIGHT: 217.2 LBS | DIASTOLIC BLOOD PRESSURE: 90 MMHG | BODY MASS INDEX: 36.14 KG/M2 | TEMPERATURE: 97.6 F | HEART RATE: 91 BPM

## 2023-04-06 DIAGNOSIS — D62 ANEMIA DUE TO BLOOD LOSS, ACUTE: ICD-10-CM

## 2023-04-06 DIAGNOSIS — G31.84 MILD COGNITIVE IMPAIRMENT: ICD-10-CM

## 2023-04-06 DIAGNOSIS — F41.1 GAD (GENERALIZED ANXIETY DISORDER): ICD-10-CM

## 2023-04-06 DIAGNOSIS — K62.5 RECTAL BLEEDING: Primary | ICD-10-CM

## 2023-04-06 LAB — HGB BLD-MCNC: 11.5 G/DL (ref 11.7–15.7)

## 2023-04-06 PROCEDURE — 36415 COLL VENOUS BLD VENIPUNCTURE: CPT | Mod: ORL | Performed by: NURSE PRACTITIONER

## 2023-04-06 PROCEDURE — 85018 HEMOGLOBIN: CPT | Mod: ORL | Performed by: NURSE PRACTITIONER

## 2023-04-06 PROCEDURE — 99349 HOME/RES VST EST MOD MDM 40: CPT | Performed by: NURSE PRACTITIONER

## 2023-04-06 NOTE — LETTER
4/6/2023        RE: Ingrid Powell  C/o Daljit Powell  [address] [address]        No notes on file      Sincerely,        NORA Balderas CNP

## 2023-04-06 NOTE — LETTER
4/6/2023        RE: Ingrid Powell  C/o Daljit Powell  [address] [address]        [facility] GERIATRICS  ACUTE/EPISODIC VISIT    [facility] Medical Record Number:  [id number]  Place of Service where encounter took place:  [facility] ([facility]) [[id number]]    Chief Complaint   Patient presents with     RECHECK       HPI:    Ingrid Powell is a 77 year old  (1945), who is being seen today for an episodic care visit.  HPI information obtained from: facility chart records, patient report, [facility] chart review and family/first contact daughter report.    Today's concern is:    Diagnoses       Codes Comments    Rectal bleeding    -  Primary K62.5     Anemia due to blood loss, acute     D62     JAIME (generalized anxiety disorder)     F41.1     Mild cognitive impairment     G31.84         Have been in touch with daughter as Ingrid forgets to let NP know this or that due to her memory issues.  Ingrid has been having some rectal bleeding and so had ordered a HgB to see if she is seriously low on her blood.  HgB level done today.    Found Ingrid in her apartment.  She was laying in bed and sat up for this NP to talk.  She has small amount of blood on tissue and pads when going to the bathroom.      Ingrid gets nervous quickly and so do talk about that often despite she takes multiple medications for anxiety.    ALLERGIES:    Allergies   Allergen Reactions     Dilaudid [Hydromorphone Hcl] Visual Disturbance     Hallucinations     Fosamax [Alendronate Sodium] Rash            Norvasc [Amlodipine Besylate] Hives and Rash     Tegretol [Carbamazepine] Hives and Rash        MEDICATIONS:  Post Discharge Medication Reconciliation Status: patient was not discharged from an inpatient facility or TCU.     Current Outpatient Medications   Medication Sig Dispense Refill     acetaminophen (TYLENOL) 650 MG CR tablet 1300mg po every PM and 1300mg po twice a day as needed       busPIRone  (BUSPAR) 10 MG tablet Take 1 tablet (10 mg) by mouth 3 times daily 90 tablet 11     cholecalciferol (VITAMIN D3) 25 mcg (1000 units) capsule Take 1 capsule by mouth daily       citalopram (CELEXA) 10 MG tablet Take 1 tablet (10 mg) by mouth daily       fluticasone (FLONASE) 50 MCG/ACT nasal spray Spray 2 sprays into both nostrils daily 16 g 3     furosemide (LASIX) 20 MG tablet 40mg by mouth every AM and 20mg by mouth in afternoon 30 tablet 11     gabapentin (NEURONTIN) 600 MG tablet TAKE 1 TABLET (600 MG) BY MOUTH AT BEDTIME 30 tablet 3     hypromellose-dextran (ARTIFICAL TEARS) 0.1-0.3 % ophthalmic solution Place 1 drop into both eyes 2 times daily 15 mL 0     levothyroxine (SYNTHROID/LEVOTHROID) 100 MCG tablet Take 1 tablet (100 mcg) by mouth daily       LORazepam (ATIVAN) 0.5 MG tablet TAKE 1 TABLET BY MOUTH FOUR TIMES DAILY; TAKE 1 TABLET BY MOUTH TWICE DAILY IF NEEDED 120 tablet 3     Menthol, Topical Analgesic, (BIOFREEZE) 4 % GEL Dime size gel to elbows and neck at HS and then TID prn       nitroGLYcerin (NITROSTAT) 0.4 MG sublingual tablet For chest pain place 1 tablet under the tongue every 5 minutes for 3 doses. If symptoms persist 5 minutes after 1st dose call 911. 6 tablet 4     nystatin (MYCOSTATIN) 816762 UNIT/GM external powder Apply topically 2 times daily 30 g 4     oxyCODONE (ROXICODONE) 5 MG tablet Take 1 tablet (5 mg) by mouth every 4 hours as needed for pain 20 tablet 0     QUEtiapine (SEROQUEL) 25 MG tablet TAKE 1/2 TABLET (12.5 MG) BY MOUTH 3 TIMES DAILY 45 tablet 3     rOPINIRole (REQUIP) 0.5 MG tablet Take 0.5 tablets (0.25 mg) by mouth every morning AND 1 tablet (0.5 mg) At Bedtime. 30 tablet 4     senna-docusate (SENOKOT-S/PERICOLACE) 8.6-50 MG tablet Take 1 tablet by mouth every other day And 1 tab by mouth daily as needed 60 tablet 11     traZODone (DESYREL) 100 MG tablet TAKE 1 TABLET (100 MG) BY MOUTH AT BEDTIME 30 tablet 11     vitamin B-12 (CYANOCOBALAMIN) 250 MCG tablet Take 250  mcg by mouth daily           REVIEW OF SYSTEMS:  4 point ROS neg other than the symptoms noted above in the HPI.  Does not feel any weaker than normal.      PHYSICAL EXAM:  BP (!) 150/90   Pulse 91   Temp 97.6  F (36.4  C)   Resp 17   Wt 98.5 kg (217 lb 3.2 oz)   LMP  (LMP Unknown)   SpO2 96%   BMI 36.14 kg/m    Had Koko ambulate with her 4WW to the bathroom to look at her bottom.  She is able to pull her pants down and bent over the counter.  Could see a rectal prolapse that was small rosebud size protruding.  Otherwise no hemorrhoids.      Heart rate regular and strong.    Lungs are clear.  No pitting edema.    Abdomen is obese, soft, and non-tender.  Able to swallow without difficulty as she drinks fluids during the whole visit.    Skin is pink, warm and dry    Component      Latest Ref Rng 3/23/2023  6:20 AM 4/6/2023  6:24 AM   Hemoglobin      11.7 - 15.7 g/dL 11.4 (L)  11.5 (L)       Legend:  (L) Low      ASSESSMENT / PLAN:  (K62.5) Rectal bleeding  (primary encounter diagnosis)  (D62) Anemia due to blood loss, acute  Comment: now will speak with the daughter about the findings.  Suggestion is to go to a surgeon to see what they have to say.  For now koko just needs to push it back into her rectum.  Koko will do what her daughter wants her to do.  Koko may not be a candidate for surgery but may be new things out there to help out.    Will check a HgB in one month for stability.      (F41.1) JAIME (generalized anxiety disorder)  (G31.84) Mild cognitive impairment  Comment: do not see anything different with her memory.  She is forgetful.  Is going to see Neurology as well.  For now will come see her again to visit.  She can ask the nurses to say something to this NP but a lot of time she will tell her daughter and she will tell this NP in a e-mail.  Works out good.  Address things as they come up.      Orders:  HgB 11.5  Recheck HgB on 5/11/23 for comparison if HgB is dropping.    Will email daughter to  talk to her about the rectal prolapse     Electronically signed by  NORA Balderas CNP             Sincerely,        NORA Balderas CNP

## 2023-04-06 NOTE — PROGRESS NOTES
[facility] GERIATRICS  ACUTE/EPISODIC VISIT    [facility] Medical Record Number:  [id number]  Place of Service where encounter took place:  [facility] ([facility]) [[id number]]    Chief Complaint   Patient presents with     RECHECK       HPI:    Ingrid Powell is a 77 year old  (1945), who is being seen today for an episodic care visit.  HPI information obtained from: facility chart records, patient report, [facility] chart review and family/first contact daughter report.    Today's concern is:    Diagnoses       Codes Comments    Rectal bleeding    -  Primary K62.5     Anemia due to blood loss, acute     D62     JAIME (generalized anxiety disorder)     F41.1     Mild cognitive impairment     G31.84         Have been in touch with daughter as Ingrid forgets to let NP know this or that due to her memory issues.  Ingrid has been having some rectal bleeding and so had ordered a HgB to see if she is seriously low on her blood.  HgB level done today.    Found Ingrid in her apartment.  She was laying in bed and sat up for this NP to talk.  She has small amount of blood on tissue and pads when going to the bathroom.      Ingrid gets nervous quickly and so do talk about that often despite she takes multiple medications for anxiety.    ALLERGIES:    Allergies   Allergen Reactions     Dilaudid [Hydromorphone Hcl] Visual Disturbance     Hallucinations     Fosamax [Alendronate Sodium] Rash            Norvasc [Amlodipine Besylate] Hives and Rash     Tegretol [Carbamazepine] Hives and Rash        MEDICATIONS:  Post Discharge Medication Reconciliation Status: patient was not discharged from an inpatient facility or TCU.     Current Outpatient Medications   Medication Sig Dispense Refill     acetaminophen (TYLENOL) 650 MG CR tablet 1300mg po every PM and 1300mg po twice a day as needed       busPIRone (BUSPAR) 10 MG tablet Take 1 tablet (10 mg) by mouth 3 times daily 90 tablet 11     cholecalciferol  (VITAMIN D3) 25 mcg (1000 units) capsule Take 1 capsule by mouth daily       citalopram (CELEXA) 10 MG tablet Take 1 tablet (10 mg) by mouth daily       fluticasone (FLONASE) 50 MCG/ACT nasal spray Spray 2 sprays into both nostrils daily 16 g 3     furosemide (LASIX) 20 MG tablet 40mg by mouth every AM and 20mg by mouth in afternoon 30 tablet 11     gabapentin (NEURONTIN) 600 MG tablet TAKE 1 TABLET (600 MG) BY MOUTH AT BEDTIME 30 tablet 3     hypromellose-dextran (ARTIFICAL TEARS) 0.1-0.3 % ophthalmic solution Place 1 drop into both eyes 2 times daily 15 mL 0     levothyroxine (SYNTHROID/LEVOTHROID) 100 MCG tablet Take 1 tablet (100 mcg) by mouth daily       LORazepam (ATIVAN) 0.5 MG tablet TAKE 1 TABLET BY MOUTH FOUR TIMES DAILY; TAKE 1 TABLET BY MOUTH TWICE DAILY IF NEEDED 120 tablet 3     Menthol, Topical Analgesic, (BIOFREEZE) 4 % GEL Dime size gel to elbows and neck at HS and then TID prn       nitroGLYcerin (NITROSTAT) 0.4 MG sublingual tablet For chest pain place 1 tablet under the tongue every 5 minutes for 3 doses. If symptoms persist 5 minutes after 1st dose call 911. 6 tablet 4     nystatin (MYCOSTATIN) 007503 UNIT/GM external powder Apply topically 2 times daily 30 g 4     oxyCODONE (ROXICODONE) 5 MG tablet Take 1 tablet (5 mg) by mouth every 4 hours as needed for pain 20 tablet 0     QUEtiapine (SEROQUEL) 25 MG tablet TAKE 1/2 TABLET (12.5 MG) BY MOUTH 3 TIMES DAILY 45 tablet 3     rOPINIRole (REQUIP) 0.5 MG tablet Take 0.5 tablets (0.25 mg) by mouth every morning AND 1 tablet (0.5 mg) At Bedtime. 30 tablet 4     senna-docusate (SENOKOT-S/PERICOLACE) 8.6-50 MG tablet Take 1 tablet by mouth every other day And 1 tab by mouth daily as needed 60 tablet 11     traZODone (DESYREL) 100 MG tablet TAKE 1 TABLET (100 MG) BY MOUTH AT BEDTIME 30 tablet 11     vitamin B-12 (CYANOCOBALAMIN) 250 MCG tablet Take 250 mcg by mouth daily           REVIEW OF SYSTEMS:  4 point ROS neg other than the symptoms noted above  in the HPI.  Does not feel any weaker than normal.      PHYSICAL EXAM:  BP (!) 150/90   Pulse 91   Temp 97.6  F (36.4  C)   Resp 17   Wt 98.5 kg (217 lb 3.2 oz)   LMP  (LMP Unknown)   SpO2 96%   BMI 36.14 kg/m    Had Koko ambulate with her 4WW to the bathroom to look at her bottom.  She is able to pull her pants down and bent over the counter.  Could see a rectal prolapse that was small rosebud size protruding.  Otherwise no hemorrhoids.      Heart rate regular and strong.    Lungs are clear.  No pitting edema.    Abdomen is obese, soft, and non-tender.  Able to swallow without difficulty as she drinks fluids during the whole visit.    Skin is pink, warm and dry    Component      Latest Ref Rng 3/23/2023  6:20 AM 4/6/2023  6:24 AM   Hemoglobin      11.7 - 15.7 g/dL 11.4 (L)  11.5 (L)       Legend:  (L) Low      ASSESSMENT / PLAN:  (K62.5) Rectal bleeding  (primary encounter diagnosis)  (D62) Anemia due to blood loss, acute  Comment: now will speak with the daughter about the findings.  Suggestion is to go to a surgeon to see what they have to say.  For now koko just needs to push it back into her rectum.  Koko will do what her daughter wants her to do.  Koko may not be a candidate for surgery but may be new things out there to help out.    Will check a HgB in one month for stability.      (F41.1) JAIME (generalized anxiety disorder)  (G31.84) Mild cognitive impairment  Comment: do not see anything different with her memory.  She is forgetful.  Is going to see Neurology as well.  For now will come see her again to visit.  She can ask the nurses to say something to this NP but a lot of time she will tell her daughter and she will tell this NP in a e-mail.  Works out good.  Address things as they come up.      Orders:  HgB 11.5  Recheck HgB on 5/11/23 for comparison if HgB is dropping.    Will email daughter to talk to her about the rectal prolapse     Electronically signed by  NORA Balderas  CNP

## 2023-04-25 DIAGNOSIS — J30.0 VASOMOTOR RHINITIS: ICD-10-CM

## 2023-04-25 DIAGNOSIS — H04.123 DRY EYES: Primary | ICD-10-CM

## 2023-04-25 NOTE — PROGRESS NOTES
[facility] Care Coordination Contact    2nd Attempt: Signed Letter not received from [facility] Luis Manuel, resent per process.     Blanca Pike  Care Management Specialist  [facility]  [phone]

## 2023-04-26 RX ORDER — FLUTICASONE PROPIONATE 50 MCG
SPRAY, SUSPENSION (ML) NASAL
Qty: 16 G | Refills: 0 | Status: SHIPPED | OUTPATIENT
Start: 2023-04-26 | End: 2023-05-18

## 2023-04-26 RX ORDER — GLIPIZIDE 10 MG/1
TABLET ORAL
Qty: 15 ML | Refills: 0 | Status: SHIPPED | OUTPATIENT
Start: 2023-04-26 | End: 2023-05-18

## 2023-04-26 NOTE — TELEPHONE ENCOUNTER
"Message routed to provider for consideration. Patient is now seeing Elsa Jeffries NP at [facility].    Routing refill request to provider for review/approval because:  Requested Prescriptions   Pending Prescriptions Disp Refills    artificial tears (GENTEAL) 0.1-0.2-0.3 % ophthalmic solution [Pharmacy Med Name: GENTEAL MODERATE OPHTH DROPS] 15 mL 0     Sig: INSTILL ONE DROP INTO BOTH EYES TWICE DAILY       There is no refill protocol information for this order       fluticasone (FLONASE) 50 MCG/ACT nasal spray [Pharmacy Med Name: FLUTICASONE 50MCG/ACT SPRAY] 16 g 0     Sig: USE 2 SPRAYS INTO BOTH NOSTRILS ONCE DAILY       Nasal Allergy Protocol Failed - 4/25/2023 10:50 AM        Failed - Recent (12 mo) or future (30 days) visit within the authorizing provider's specialty     Patient has had an office visit with the authorizing provider or a provider within the authorizing providers department within the previous 12 mos or has a future within next 30 days. See \"Patient Info\" tab in inbasket, or \"Choose Columns\" in Meds & Orders section of the refill encounter.              Passed - Patient is age 12 or older        Passed - Medication is active on med list          Julie Behrendt RN       "

## 2023-04-27 ENCOUNTER — TELEPHONE (OUTPATIENT)
Dept: GERIATRICS | Facility: CLINIC | Age: 78
End: 2023-04-27
Payer: COMMERCIAL

## 2023-04-27 DIAGNOSIS — F41.1 GAD (GENERALIZED ANXIETY DISORDER): ICD-10-CM

## 2023-04-27 RX ORDER — LORAZEPAM 0.5 MG/1
TABLET ORAL
Qty: 120 TABLET | Refills: 3 | Status: SHIPPED | OUTPATIENT
Start: 2023-04-27 | End: 2023-06-26

## 2023-04-27 NOTE — TELEPHONE ENCOUNTER
Nursing wanted to address Ingrid's anxiety during the night.  The aides states she is on the call light multiple times.   Wants this or that.    Took a look at Ingrid's use of PRN Lorazepam and she has used it once in 5 months.    Current dose is 0.5mg QID and 0.5mg twice a day prn.    Changing her orders to be:    1.  Discontinue current Lorazepam orders  2.  Lorazepam 0.5mg TID (8am 12, and 4pm)  3.  Lorazepam 0.5mg 2 tabs at 7pm  4. Lorazepam 0.5mg po daily PRN      Also made an appointment for her with [facility] for May 18th 10am at the [facility].  Will update daughter about this as well.    Electronically signed by Elsa Jeffries RN, CNP

## 2023-05-01 ENCOUNTER — ASSISTED LIVING VISIT (OUTPATIENT)
Dept: GERIATRICS | Facility: CLINIC | Age: 78
End: 2023-05-01
Payer: COMMERCIAL

## 2023-05-01 VITALS
DIASTOLIC BLOOD PRESSURE: 90 MMHG | TEMPERATURE: 97.6 F | OXYGEN SATURATION: 96 % | BODY MASS INDEX: 36.14 KG/M2 | SYSTOLIC BLOOD PRESSURE: 150 MMHG | HEART RATE: 91 BPM | WEIGHT: 217.2 LBS | RESPIRATION RATE: 17 BRPM

## 2023-05-01 DIAGNOSIS — G47.33 OSA (OBSTRUCTIVE SLEEP APNEA): Chronic | ICD-10-CM

## 2023-05-01 DIAGNOSIS — F99 INSOMNIA DUE TO OTHER MENTAL DISORDER: ICD-10-CM

## 2023-05-01 DIAGNOSIS — K62.3 RECTAL PROLAPSE: ICD-10-CM

## 2023-05-01 DIAGNOSIS — F51.05 INSOMNIA DUE TO OTHER MENTAL DISORDER: ICD-10-CM

## 2023-05-01 DIAGNOSIS — G25.81 RESTLESS LEGS SYNDROME (RLS): Primary | ICD-10-CM

## 2023-05-01 DIAGNOSIS — F41.1 GAD (GENERALIZED ANXIETY DISORDER): ICD-10-CM

## 2023-05-01 DIAGNOSIS — G31.84 MILD COGNITIVE IMPAIRMENT: ICD-10-CM

## 2023-05-01 PROCEDURE — 99350 HOME/RES VST EST HIGH MDM 60: CPT | Performed by: NURSE PRACTITIONER

## 2023-05-01 RX ORDER — ROPINIROLE 0.5 MG/1
TABLET, FILM COATED ORAL
Qty: 90 TABLET | Refills: 4 | Status: SHIPPED | OUTPATIENT
Start: 2023-05-01 | End: 2023-01-01

## 2023-05-01 NOTE — LETTER
5/1/2023        RE: Koko Powell  C/o Daljit Powell  [address] [facility] [address]        [facility] GERIATRICS  ACUTE/EPISODIC VISIT    [facility] Medical Record Number:  [id number]  Place of Service where encounter took place:  [facility] ([facility]) [[id number]]    Chief Complaint   Patient presents with     RECHECK       HPI:    Koko Powell is a 77 year old  (1945), who is being seen today for an episodic care visit.  HPI information obtained from: facility chart records, facility staff, patient report, [facility] chart review and family/first contact daughter - Lida report.    Today's concern is:    Diagnoses       Codes Comments    Restless legs syndrome (RLS)    -  Primary G25.81     Insomnia due to other mental disorder     F51.05, F99     MARGIE (obstructive sleep apnea)     G47.33     JAIME (generalized anxiety disorder)     F41.1     Mild cognitive impairment     G31.84         Came to see Koko today to oversee her cares/medications and communication that this NP has been having with her daughter with emails.   Finally successful for [facility].  Daughter states Koko has had this before.   When this NP made the appointment they had her in their system and so she could have seen them some time ago.  For now it is a consultation for Koko to see what they would say and what options could be.   Most recent communication is that Koko eats over a gallon of ice cream in a week.  Her sister comes and brings this for her.  Daughter afraid she will get Diabetes.  Right now her glucose levels in her BMP lab has been fine and fasting.  This NP would be more worried about the Seroquel causing elevated sugars than anything.      Found Koko in her room sleeping.  Clapped hands to wake her up.  Took her a minute to fully wake up and then sat up on her bed.  Wearing oxygen at 2L/min.  Had a lot to talk about.   When visit done, Ingrid expressed she did not want this NP to leave and felt her world would crumble.      First thing Ingrid stated was that she had a horrible weekend of crying and could not be consoled.  She was on the phone with her daughter and her sister.  Did not know where she was or how she got to this apartment.  Daughter had sent an email while visiting Ingrid and updated this NP as well that on Sunday Ingrid was besides herself.  Anxiety and disorientation.    1.  On call light multiple times during the night - anxiety, MARGIE, bedtime, etc.  2.  Talked about the ice cream  3.  Anxiety  4.  Rectal prolapse  5.  Restless legs    ALLERGIES:    Allergies   Allergen Reactions     Dilaudid [Hydromorphone Hcl] Visual Disturbance     Hallucinations     Fosamax [Alendronate Sodium] Rash            Norvasc [Amlodipine Besylate] Hives and Rash     Tegretol [Carbamazepine] Hives and Rash        MEDICATIONS:  Post Discharge Medication Reconciliation Status: patient was not discharged from an inpatient facility or TCU. Medications reconciled today.    Current Outpatient Medications   Medication Sig Dispense Refill     rOPINIRole (REQUIP) 0.5 MG tablet 0.25mg by mouth every AM and afternoon, 0.75mg po at bedtime 90 tablet 4     acetaminophen (TYLENOL) 650 MG CR tablet 1300mg po every PM and 1300mg po twice a day as needed       artificial tears (GENTEAL) 0.1-0.2-0.3 % ophthalmic solution INSTILL ONE DROP INTO BOTH EYES TWICE DAILY 15 mL 0     busPIRone (BUSPAR) 10 MG tablet Take 1 tablet (10 mg) by mouth 3 times daily 90 tablet 11     cholecalciferol (VITAMIN D3) 25 mcg (1000 units) capsule Take 1 capsule by mouth daily       citalopram (CELEXA) 10 MG tablet Take 1 tablet (10 mg) by mouth daily       fluticasone (FLONASE) 50 MCG/ACT nasal spray USE 2 SPRAYS INTO BOTH NOSTRILS ONCE DAILY 16 g 0     furosemide (LASIX) 20 MG tablet 40mg by mouth every AM and 20mg by mouth in afternoon 30 tablet 11     gabapentin (NEURONTIN) 600  MG tablet TAKE 1 TABLET (600 MG) BY MOUTH AT BEDTIME 30 tablet 3     hypromellose-dextran (ARTIFICAL TEARS) 0.1-0.3 % ophthalmic solution Place 1 drop into both eyes 2 times daily 15 mL 0     levothyroxine (SYNTHROID/LEVOTHROID) 100 MCG tablet Take 1 tablet (100 mcg) by mouth daily       LORazepam (ATIVAN) 0.5 MG tablet TAKE 1 TABLET BY MOUTH THREE TIMES DAILY; 2 TABLETS AT HS; TAKE 1 TABLET BY MOUTH ONCE DAILY IF NEEDED 120 tablet 3     Menthol, Topical Analgesic, (BIOFREEZE) 4 % GEL Dime size gel to elbows and neck at HS and then TID prn       nitroGLYcerin (NITROSTAT) 0.4 MG sublingual tablet For chest pain place 1 tablet under the tongue every 5 minutes for 3 doses. If symptoms persist 5 minutes after 1st dose call 911. 6 tablet 4     nystatin (MYCOSTATIN) 573324 UNIT/GM external powder Apply topically 2 times daily 30 g 4     oxyCODONE (ROXICODONE) 5 MG tablet Take 1 tablet (5 mg) by mouth every 4 hours as needed for pain 20 tablet 0     QUEtiapine (SEROQUEL) 25 MG tablet TAKE 1/2 TABLET (12.5 MG) BY MOUTH 3 TIMES DAILY 45 tablet 3     senna-docusate (SENOKOT-S/PERICOLACE) 8.6-50 MG tablet Take 1 tablet by mouth every other day And 1 tab by mouth daily as needed 60 tablet 11     traZODone (DESYREL) 100 MG tablet TAKE 1 TABLET (100 MG) BY MOUTH AT BEDTIME 30 tablet 11     vitamin B-12 (CYANOCOBALAMIN) 250 MCG tablet Take 250 mcg by mouth daily       Medications reviewed:  Medications reconciled to facility chart and changes were made to reflect current medications as identified as above med list. Below are the changes that were made:   Medications stopped since last EPIC medication reconciliation:   Medications Discontinued During This Encounter   Medication Reason     rOPINIRole (REQUIP) 0.5 MG tablet        Medications started since last [facility] medication reconciliation:  Orders Placed This Encounter   Medications     rOPINIRole (REQUIP) 0.5 MG tablet     Si.25mg by mouth every AM and afternoon, 0.75mg po at  bedtime     Dispense:  90 tablet     Refill:  4         REVIEW OF SYSTEMS:  4 point ROS neg other than the symptoms noted above in the HPI.  No chest pain, no acute SOB.      PHYSICAL EXAM:  BP (!) 150/90   Pulse 91   Temp 97.6  F (36.4  C)   Resp 17   Wt 98.5 kg (217 lb 3.2 oz)   LMP  (LMP Unknown)   SpO2 96%   BMI 36.14 kg/m    Once she was awake, she looked at her lists of things to remember to talk to the NP about.  Heart rate regular and strong.   Trace edema on feet/ankles.  Lungs are diminished in bases.  Voice is clear.  No coughing.  Abdomen is obese, soft and non-tender.  Reports that there is blood still on her pull ups from her rectum.    Able to ambulate independently in room as she went to the bathroom during the visit. 4WW used when out of her room.  Skin is pink, warm and dry.      Component      Latest Ref Rng 3/2/2023  7:05 AM 3/23/2023  6:20 AM 4/6/2023  6:24 AM   Hemoglobin      11.7 - 15.7 g/dL 12.9  11.4 (L)  11.5 (L)           ASSESSMENT / PLAN:  (G25.81) Restless legs syndrome (RLS)  (primary encounter diagnosis)  Comment: Ingrid stated that her restless legs bother her at night and even during the daytime.  She stated she will get up and walk around to help her legs.  This contributes to being up at night as well.    Will increase her dose to 0.25mg twice during the day now and then 0.75mg at HS.  Went up by 0.5mg in 24/hr.  Script sent to pharmacy.  Plan: rOPINIRole (REQUIP) 0.5 MG tablet    (F51.05,  F99) Insomnia due to other mental disorder  Comment:  Spoke to Ingrid about the call light situation.  She mentioned she like to eat her ice cream at night as well.  Comfort food.  Just adjusted the Lorazepam to be 1mg at HS.  Will see how this helps.   Goal to help her anxiety and sleep.    (G47.33) MARGIE (obstructive sleep apnea), Severe  Comment: Ingrid has a CPAP machine in her room but does not use it as she thinks it is loud.  Typically they are not loud.  Ingrid does not use it and it  "has been at least since the start of COVID she has not used it.    Looked back and found some documentation from [facility] where she had the sleep health evaluation.  Ingrid remembered it was from that clinic but not approximate time frame.    Would wish Ingrid to get set up for an evaluation to see if she needs another sleep evaluation and a CPAP needed.  Feel it has been too long since last evaluation and has had heart changes since then.    Did send the daughter a response to her email today and discussed opinion of having an evaluation for her MARGIE again. Even though she wears her O2 at night for \"a security blanket\" feel half of her issues may resolve if she got a good nights sleep.  Will work with daughter to get this going if they agree.    (K62.3) Rectal prolapse  Comment: appointment May 18th in [place] with [facility].  Ingrid stated the information is up on her board on the closet so she knows when it is.  Now that she knows where the bleeding is from she is not dwelling on it as she has in the past.  This NP has seen the blood on pad and it is more like irritation and scan blood.  Do not feel she is a candidate for surgery unless she gets cleared from Cardiology after she had a cardiac arrest back about a year ago now.  Will see what options are and what Ingrid and her daughter decide what to do.    (F41.1) JAIME (generalized anxiety disorder)  (G31.84) Mild cognitive impairment  Comment: was straight with Ingrid that she does have some memory loss.  She thinks she does not but she is always saying she dont remember things and writes them down.  Daughter Lida has made an appointment for her to see Neurology in September for cognitive evaluation.    Last time this NP saw Ingrid did an adjustment to her anxiety medication Lorazepam.  Took time for pharmacy to deliver so may only be on a few days of new regimen.    Lorazepam 0.5mg TID and 1mg at HS to see if it will let her " sleep more.  Ingrid does not quite get the relation that when you go to bed early, you may only need so many hours of sleep and then your done.  She wants to sleep into the morning.  Discussed her being on the call light a lot at night. She does not realize she is or does not remember.  Ingrid thinks it may be due to pain in her shoulders and neck area.   Do feel that the waking up and call light issue is multi factorial.  Trying to address the other issues as well.      Orders:  1. Discontinue Requip orders  2.  Requip 0.25mg po in AM and afternoon, and 0.75mg po at HS for restless legs    Electronically signed by  NORA Balderas CNP         Sincerely,        NORA Balderas CNP

## 2023-05-01 NOTE — PROGRESS NOTES
[facility] GERIATRICS  ACUTE/EPISODIC VISIT    [facility] Medical Record Number:  [id number]  Place of Service where encounter took place:  [facility] ([facility]) [[id number]]    Chief Complaint   Patient presents with     RECHECK       HPI:    Koko Powell is a 77 year old  (1945), who is being seen today for an episodic care visit.  HPI information obtained from: facility chart records, facility staff, patient report, [facility] chart review and family/first contact daughter - Lida report.    Today's concern is:    Diagnoses       Codes Comments    Restless legs syndrome (RLS)    -  Primary G25.81     Insomnia due to other mental disorder     F51.05, F99     MARGIE (obstructive sleep apnea)     G47.33     JAIME (generalized anxiety disorder)     F41.1     Mild cognitive impairment     G31.84         Came to see Koko today to oversee her cares/medications and communication that this NP has been having with her daughter with emails.   Finally successful for [facility].  Daughter states Koko has had this before.   When this NP made the appointment they had her in their system and so she could have seen them some time ago.  For now it is a consultation for Koko to see what they would say and what options could be.   Most recent communication is that Koko eats over a gallon of ice cream in a week.  Her sister comes and brings this for her.  Daughter afraid she will get Diabetes.  Right now her glucose levels in her BMP lab has been fine and fasting.  This NP would be more worried about the Seroquel causing elevated sugars than anything.      Found Koko in her room sleeping.  Clapped hands to wake her up.  Took her a minute to fully wake up and then sat up on her bed.  Wearing oxygen at 2L/min.  Had a lot to talk about.  When visit done, Koko expressed she did not want this NP to leave and felt her world would crumble.       First thing Ignrid stated was that she had a horrible weekend of crying and could not be consoled.  She was on the phone with her daughter and her sister.  Did not know where she was or how she got to this apartment.  Daughter had sent an email while visiting Ingrid and updated this NP as well that on Sunday Ingrid was besides herself.  Anxiety and disorientation.    1.  On call light multiple times during the night - anxiety, MARGIE, bedtime, etc.  2.  Talked about the ice cream  3.  Anxiety  4.  Rectal prolapse  5.  Restless legs    ALLERGIES:    Allergies   Allergen Reactions     Dilaudid [Hydromorphone Hcl] Visual Disturbance     Hallucinations     Fosamax [Alendronate Sodium] Rash            Norvasc [Amlodipine Besylate] Hives and Rash     Tegretol [Carbamazepine] Hives and Rash        MEDICATIONS:  Post Discharge Medication Reconciliation Status: patient was not discharged from an inpatient facility or TCU. Medications reconciled today.    Current Outpatient Medications   Medication Sig Dispense Refill     rOPINIRole (REQUIP) 0.5 MG tablet 0.25mg by mouth every AM and afternoon, 0.75mg po at bedtime 90 tablet 4     acetaminophen (TYLENOL) 650 MG CR tablet 1300mg po every PM and 1300mg po twice a day as needed       artificial tears (GENTEAL) 0.1-0.2-0.3 % ophthalmic solution INSTILL ONE DROP INTO BOTH EYES TWICE DAILY 15 mL 0     busPIRone (BUSPAR) 10 MG tablet Take 1 tablet (10 mg) by mouth 3 times daily 90 tablet 11     cholecalciferol (VITAMIN D3) 25 mcg (1000 units) capsule Take 1 capsule by mouth daily       citalopram (CELEXA) 10 MG tablet Take 1 tablet (10 mg) by mouth daily       fluticasone (FLONASE) 50 MCG/ACT nasal spray USE 2 SPRAYS INTO BOTH NOSTRILS ONCE DAILY 16 g 0     furosemide (LASIX) 20 MG tablet 40mg by mouth every AM and 20mg by mouth in afternoon 30 tablet 11     gabapentin (NEURONTIN) 600 MG tablet TAKE 1 TABLET (600 MG) BY MOUTH AT BEDTIME 30 tablet 3     hypromellose-dextran (ARTIFICAL  TEARS) 0.1-0.3 % ophthalmic solution Place 1 drop into both eyes 2 times daily 15 mL 0     levothyroxine (SYNTHROID/LEVOTHROID) 100 MCG tablet Take 1 tablet (100 mcg) by mouth daily       LORazepam (ATIVAN) 0.5 MG tablet TAKE 1 TABLET BY MOUTH THREE TIMES DAILY; 2 TABLETS AT HS; TAKE 1 TABLET BY MOUTH ONCE DAILY IF NEEDED 120 tablet 3     Menthol, Topical Analgesic, (BIOFREEZE) 4 % GEL Dime size gel to elbows and neck at HS and then TID prn       nitroGLYcerin (NITROSTAT) 0.4 MG sublingual tablet For chest pain place 1 tablet under the tongue every 5 minutes for 3 doses. If symptoms persist 5 minutes after 1st dose call 911. 6 tablet 4     nystatin (MYCOSTATIN) 383181 UNIT/GM external powder Apply topically 2 times daily 30 g 4     oxyCODONE (ROXICODONE) 5 MG tablet Take 1 tablet (5 mg) by mouth every 4 hours as needed for pain 20 tablet 0     QUEtiapine (SEROQUEL) 25 MG tablet TAKE 1/2 TABLET (12.5 MG) BY MOUTH 3 TIMES DAILY 45 tablet 3     senna-docusate (SENOKOT-S/PERICOLACE) 8.6-50 MG tablet Take 1 tablet by mouth every other day And 1 tab by mouth daily as needed 60 tablet 11     traZODone (DESYREL) 100 MG tablet TAKE 1 TABLET (100 MG) BY MOUTH AT BEDTIME 30 tablet 11     vitamin B-12 (CYANOCOBALAMIN) 250 MCG tablet Take 250 mcg by mouth daily       Medications reviewed:  Medications reconciled to facility chart and changes were made to reflect current medications as identified as above med list. Below are the changes that were made:   Medications stopped since last EPIC medication reconciliation:   Medications Discontinued During This Encounter   Medication Reason     rOPINIRole (REQUIP) 0.5 MG tablet        Medications started since last [facility] medication reconciliation:  Orders Placed This Encounter   Medications     rOPINIRole (REQUIP) 0.5 MG tablet     Si.25mg by mouth every AM and afternoon, 0.75mg po at bedtime     Dispense:  90 tablet     Refill:  4         REVIEW OF SYSTEMS:  4 point ROS neg other  than the symptoms noted above in the HPI.  No chest pain, no acute SOB.      PHYSICAL EXAM:  BP (!) 150/90   Pulse 91   Temp 97.6  F (36.4  C)   Resp 17   Wt 98.5 kg (217 lb 3.2 oz)   LMP  (LMP Unknown)   SpO2 96%   BMI 36.14 kg/m    Once she was awake, she looked at her lists of things to remember to talk to the NP about.  Heart rate regular and strong.   Trace edema on feet/ankles.  Lungs are diminished in bases.  Voice is clear.  No coughing.  Abdomen is obese, soft and non-tender.  Reports that there is blood still on her pull ups from her rectum.    Able to ambulate independently in room as she went to the bathroom during the visit. 4WW used when out of her room.  Skin is pink, warm and dry.      Component      Latest Ref Rng 3/2/2023  7:05 AM 3/23/2023  6:20 AM 4/6/2023  6:24 AM   Hemoglobin      11.7 - 15.7 g/dL 12.9  11.4 (L)  11.5 (L)           ASSESSMENT / PLAN:  (G25.81) Restless legs syndrome (RLS)  (primary encounter diagnosis)  Comment: Ingrid stated that her restless legs bother her at night and even during the daytime.  She stated she will get up and walk around to help her legs.  This contributes to being up at night as well.    Will increase her dose to 0.25mg twice during the day now and then 0.75mg at HS.  Went up by 0.5mg in 24/hr.  Script sent to pharmacy.  Plan: rOPINIRole (REQUIP) 0.5 MG tablet    (F51.05,  F99) Insomnia due to other mental disorder  Comment:  Spoke to Ingrid about the call light situation.  She mentioned she like to eat her ice cream at night as well.  Comfort food.  Just adjusted the Lorazepam to be 1mg at HS.  Will see how this helps.   Goal to help her anxiety and sleep.    (G47.33) MARGIE (obstructive sleep apnea), Severe  Comment: Ingrid has a CPAP machine in her room but does not use it as she thinks it is loud.  Typically they are not loud.  Ingrid does not use it and it has been at least since the start of COVID she has not used it.    Looked back and found some  "documentation from [facility] where she had the sleep health evaluation.  Ingrid remembered it was from that clinic but not approximate time frame.    Would wish Ingrid to get set up for an evaluation to see if she needs another sleep evaluation and a CPAP needed.  Feel it has been too long since last evaluation and has had heart changes since then.    Did send the daughter a response to her email today and discussed opinion of having an evaluation for her MARGIE again. Even though she wears her O2 at night for \"a security blanket\" feel half of her issues may resolve if she got a good nights sleep.  Will work with daughter to get this going if they agree.    (K62.3) Rectal prolapse  Comment: appointment May 18th in [place] with [facility].  Ingrid stated the information is up on her board on the closet so she knows when it is.  Now that she knows where the bleeding is from she is not dwelling on it as she has in the past.  This NP has seen the blood on pad and it is more like irritation and scan blood.  Do not feel she is a candidate for surgery unless she gets cleared from Cardiology after she had a cardiac arrest back about a year ago now.  Will see what options are and what Ingrid and her daughter decide what to do.    (F41.1) JAIME (generalized anxiety disorder)  (G31.84) Mild cognitive impairment  Comment: was straight with Ingrid that she does have some memory loss.  She thinks she does not but she is always saying she dont remember things and writes them down.  Daughter Lida has made an appointment for her to see Neurology in September for cognitive evaluation.    Last time this NP saw Ingrid did an adjustment to her anxiety medication Lorazepam.  Took time for pharmacy to deliver so may only be on a few days of new regimen.    Lorazepam 0.5mg TID and 1mg at HS to see if it will let her sleep more.  Ingrid does not quite get the relation that when you go to bed early, you may only " need so many hours of sleep and then your done.  She wants to sleep into the morning.  Discussed her being on the call light a lot at night. She does not realize she is or does not remember.  Ingrid thinks it may be due to pain in her shoulders and neck area.   Do feel that the waking up and call light issue is multi factorial.  Trying to address the other issues as well.      Orders:  1. Discontinue Requip orders  2.  Requip 0.25mg po in AM and afternoon, and 0.75mg po at HS for restless legs    Electronically signed by  NORA Balderas CNP

## 2023-05-08 VITALS
TEMPERATURE: 97.5 F | DIASTOLIC BLOOD PRESSURE: 64 MMHG | SYSTOLIC BLOOD PRESSURE: 111 MMHG | BODY MASS INDEX: 35.81 KG/M2 | RESPIRATION RATE: 18 BRPM | HEART RATE: 97 BPM | WEIGHT: 215.2 LBS

## 2023-05-10 ENCOUNTER — LAB REQUISITION (OUTPATIENT)
Dept: LAB | Facility: CLINIC | Age: 78
End: 2023-05-10
Payer: COMMERCIAL

## 2023-05-10 DIAGNOSIS — K62.5 HEMORRHAGE OF ANUS AND RECTUM: ICD-10-CM

## 2023-05-11 LAB — HGB BLD-MCNC: 11.9 G/DL (ref 11.7–15.7)

## 2023-05-11 PROCEDURE — 85018 HEMOGLOBIN: CPT | Mod: ORL | Performed by: NURSE PRACTITIONER

## 2023-05-11 PROCEDURE — 36415 COLL VENOUS BLD VENIPUNCTURE: CPT | Mod: ORL | Performed by: NURSE PRACTITIONER

## 2023-05-11 PROCEDURE — P9603 ONE-WAY ALLOW PRORATED MILES: HCPCS | Mod: ORL | Performed by: NURSE PRACTITIONER

## 2023-05-18 DIAGNOSIS — E03.9 ACQUIRED HYPOTHYROIDISM: ICD-10-CM

## 2023-05-18 DIAGNOSIS — J30.0 VASOMOTOR RHINITIS: ICD-10-CM

## 2023-05-18 DIAGNOSIS — H04.123 DRY EYES: ICD-10-CM

## 2023-05-18 DIAGNOSIS — F41.1 GAD (GENERALIZED ANXIETY DISORDER): ICD-10-CM

## 2023-05-18 DIAGNOSIS — I50.22 CHRONIC SYSTOLIC CONGESTIVE HEART FAILURE (H): ICD-10-CM

## 2023-05-18 DIAGNOSIS — F33.1 MAJOR DEPRESSIVE DISORDER, RECURRENT EPISODE, MODERATE (H): ICD-10-CM

## 2023-05-18 RX ORDER — CITALOPRAM HYDROBROMIDE 10 MG/1
TABLET ORAL
Qty: 35 TABLET | Refills: 11 | Status: SHIPPED | OUTPATIENT
Start: 2023-05-18 | End: 2023-08-17

## 2023-05-18 RX ORDER — FLUTICASONE PROPIONATE 50 MCG
SPRAY, SUSPENSION (ML) NASAL
Qty: 16 G | Refills: 11 | Status: SHIPPED | OUTPATIENT
Start: 2023-05-18

## 2023-05-18 RX ORDER — GLIPIZIDE 10 MG/1
TABLET ORAL
Qty: 15 ML | Refills: 0 | Status: SHIPPED | OUTPATIENT
Start: 2023-05-18 | End: 2023-06-19

## 2023-05-18 RX ORDER — FUROSEMIDE 20 MG
TABLET ORAL
Qty: 84 TABLET | Refills: 11 | Status: SHIPPED | OUTPATIENT
Start: 2023-05-18 | End: 2023-01-01

## 2023-05-18 RX ORDER — LEVOTHYROXINE SODIUM 100 UG/1
TABLET ORAL
Qty: 28 TABLET | Refills: 11 | Status: SHIPPED | OUTPATIENT
Start: 2023-05-18 | End: 2023-01-01

## 2023-05-22 ENCOUNTER — ASSISTED LIVING VISIT (OUTPATIENT)
Dept: GERIATRICS | Facility: CLINIC | Age: 78
End: 2023-05-22
Payer: COMMERCIAL

## 2023-05-22 VITALS
OXYGEN SATURATION: 96 % | HEART RATE: 91 BPM | TEMPERATURE: 97.6 F | RESPIRATION RATE: 17 BRPM | SYSTOLIC BLOOD PRESSURE: 150 MMHG | DIASTOLIC BLOOD PRESSURE: 90 MMHG

## 2023-05-22 DIAGNOSIS — F41.1 GAD (GENERALIZED ANXIETY DISORDER): ICD-10-CM

## 2023-05-22 DIAGNOSIS — G31.84 MILD COGNITIVE IMPAIRMENT: ICD-10-CM

## 2023-05-22 DIAGNOSIS — I50.22 CHRONIC SYSTOLIC HEART FAILURE (H): Primary | ICD-10-CM

## 2023-05-22 DIAGNOSIS — M25.511 CHRONIC PAIN OF BOTH SHOULDERS: ICD-10-CM

## 2023-05-22 DIAGNOSIS — K62.3 RECTAL PROLAPSE: ICD-10-CM

## 2023-05-22 DIAGNOSIS — G89.29 CHRONIC PAIN OF BOTH SHOULDERS: ICD-10-CM

## 2023-05-22 DIAGNOSIS — M25.512 CHRONIC PAIN OF BOTH SHOULDERS: ICD-10-CM

## 2023-05-22 PROCEDURE — 99349 HOME/RES VST EST MOD MDM 40: CPT | Performed by: NURSE PRACTITIONER

## 2023-05-22 NOTE — PROGRESS NOTES
[facility] GERIATRICS  ACUTE/EPISODIC VISIT    [facility] Medical Record Number:  [id number]  Place of Service where encounter took place:  [facility] ([facility]) [[id number]]    Chief Complaint   Patient presents with     RECHECK       HPI:    Ingrid Powell is a 77 year old  (1945), who is being seen today for an episodic care visit.  HPI information obtained from: facility chart records, facility staff and patient report.    Today's concern is:  Diagnoses       Codes Comments    Chronic systolic heart failure (H)    -  Primary I50.22     Chronic pain of both shoulders     M25.511, G89.29, M25.512     JAIME (generalized anxiety disorder)     F41.1     Mild cognitive impairment     G31.84     Rectal prolapse     K62.3         Came to check on Ingrid and how she was doing.  She was napping on her bed.  Could tell she had her hair fixed recently and real blonde.  She states that is her hair color.  Coming in blonde.    Ingrid sat up in bed and talked.  She usually has water sitting by her bed and she was not believing where her water may be.  Grabbed a bottle out of the fridge for her.  Continues to wear O2 when in bed.  Asked if she has any upcoming appointments for her sleep apnea.  She stated no but did go last week for the rectal prolapse.  What they told her is just to take two fingers and push the tissue back up in the rectum.  Ingrid is not a candidate for surgery or should say she will not allow herself to be and so the alternative is to push the rectum back in.  She states she does this but pops out.  She said she will ignore it then.  Happy she went to see the surgeon as she needed to hear this herself.      ALLERGIES:    Allergies   Allergen Reactions     Dilaudid [Hydromorphone Hcl] Visual Disturbance     Hallucinations     Fosamax [Alendronate Sodium] Rash            Norvasc [Amlodipine Besylate] Hives and Rash     Tegretol [Carbamazepine] Hives and Rash        MEDICATIONS:  Post  Discharge Medication Reconciliation Status: patient was not discharged from an inpatient facility or TCU.     Current Outpatient Medications   Medication Sig Dispense Refill     acetaminophen (TYLENOL) 650 MG CR tablet 1300mg po every PM and 1300mg po twice a day as needed       artificial tears (GENTEAL) 0.1-0.2-0.3 % ophthalmic solution INSTILL ONE DROP INTO BOTH EYES TWICE DAILY 15 mL 0     busPIRone (BUSPAR) 10 MG tablet Take 1 tablet (10 mg) by mouth 3 times daily 90 tablet 11     cholecalciferol (VITAMIN D3) 25 mcg (1000 units) capsule Take 1 capsule by mouth daily       citalopram (CELEXA) 10 MG tablet TAKE 1 TAB BY MOUTH ONCE DAILY 35 tablet 11     fluticasone (FLONASE) 50 MCG/ACT nasal spray USE 2 SPRAYS INTO BOTH NOSTRILS ONCE DAILY 16 g 11     furosemide (LASIX) 20 MG tablet TAKE TWO TABLETS (40MG) BY MOUTH ONCE DAILY IN THE MORNING; TAKE 1 TABLET (20MG) BY MOUTH ONCE DAILY IN THE EARLY AFTERNOON 84 tablet 11     gabapentin (NEURONTIN) 600 MG tablet TAKE 1 TABLET (600 MG) BY MOUTH AT BEDTIME 30 tablet 3     hypromellose-dextran (ARTIFICAL TEARS) 0.1-0.3 % ophthalmic solution Place 1 drop into both eyes 2 times daily 15 mL 0     levothyroxine (SYNTHROID/LEVOTHROID) 100 MCG tablet TAKE 1 TAB BY MOUTH ONCE DAILY AFTER TWO WEEKS OF LEVOTHYROXINE 75MCG 28 tablet 11     LORazepam (ATIVAN) 0.5 MG tablet TAKE 1 TABLET BY MOUTH THREE TIMES DAILY; 2 TABLETS AT HS; TAKE 1 TABLET BY MOUTH ONCE DAILY IF NEEDED 120 tablet 3     Menthol, Topical Analgesic, (BIOFREEZE) 4 % GEL Dime size gel to elbows and neck at HS and then TID prn       nitroGLYcerin (NITROSTAT) 0.4 MG sublingual tablet For chest pain place 1 tablet under the tongue every 5 minutes for 3 doses. If symptoms persist 5 minutes after 1st dose call 911. 6 tablet 4     nystatin (MYCOSTATIN) 860121 UNIT/GM external powder Apply topically 2 times daily 30 g 4     oxyCODONE (ROXICODONE) 5 MG tablet Take 1 tablet (5 mg) by mouth every 4 hours as needed for pain  20 tablet 0     QUEtiapine (SEROQUEL) 25 MG tablet TAKE 1/2 TABLET (12.5 MG) BY MOUTH 3 TIMES DAILY 45 tablet 3     rOPINIRole (REQUIP) 0.5 MG tablet 0.25mg by mouth every AM and afternoon, 0.75mg po at bedtime 90 tablet 4     senna-docusate (SENOKOT-S/PERICOLACE) 8.6-50 MG tablet Take 1 tablet by mouth every other day And 1 tab by mouth daily as needed 60 tablet 11     traZODone (DESYREL) 100 MG tablet TAKE 1 TABLET (100 MG) BY MOUTH AT BEDTIME 30 tablet 11     vitamin B-12 (CYANOCOBALAMIN) 250 MCG tablet Take 250 mcg by mouth daily         REVIEW OF SYSTEMS:  4 point ROS neg other than the symptoms noted above in the HPI.  No chest pain. No real SOB.  Wears the O2 for comfort at night.    PHYSICAL EXAM:  BP (!) 150/90   Pulse 91   Temp 97.6  F (36.4  C)   Resp 17   LMP  (LMP Unknown)   SpO2 96%   Ingrid is alert, pleasant, anxious as well because she cannot remember what she wants to talk about.  She always says this and knows that she will think about it later on.  Heart rate is regular and strong.  No pitting edema in her lower extremities.  Lungs are clear to auscultation.  Abdomen is obese round soft with active bowel sounds    Talked about the weather that she is excited to be able to go back outside and sit in the sun.  She really wants to have a good tan for the summer and stated she had a to her daughter about having a nice chair so that she could lay down and get her back tan.  Do not want to picture this as could be hard to get back up if on stomach for some time.  Got a good laugh out of her thoughts.      Lab Results   Component Value Date    HGB 11.9 05/11/2023    HGB 11.5 04/06/2023       ASSESSMENT / PLAN:  1. Chronic systolic heart failure (H)    2. Chronic pain of both shoulders    3. JAIME (generalized anxiety disorder)    4. Mild cognitive impairment    5. Rectal prolapse      Just talked in general today about how she is doing.  No acute symptoms with her heart failure.  Able to maintain on  Lasix 40 mg in the morning and 20 mg in the afternoon.  Uses oxygen at night more for comfort but also does have a history of obstructive sleep apnea to.  Has a chronic pain in her shoulders in which staff to put Biofreeze on them.  It be nice if able to use lidocaine patches but that would have to be out-of-pocket cost.  Ingrid does have as needed oxycodone but has not used it in a few months does take Tylenol extended release 1300 mg at bedtime and then has as needed dosing.    He is on quite a few medications for her anxiety which she feels has been under pretty good control lately.  Spoke about her sister today that she went down on a 2-1/2-hour drive to the [place] [facility] in [place].  Her sister wanted her to go with but Ingrid did not feel she can make that drive anymore.  Most likely she will go with her to the [place] cemetery to put flowers down at their family's grave sites and so talked about that quite a bit today.  He did talk about being very anxious to get her evening medications.  She would like to have a mat 7 PM which do know they are scheduled at that point but she thinks they still come at 8 PM.  Cannot help if there is circumstances that hinder her getting them on time.  This is just how Ingrid is.    As far as the rectal prolapse, have monitored her hemoglobin which has been stable.  Glad she went to the appointment so she could hear what they have to say otherwise she would dwell on this prolapse.  We will address that as a as needed basis    Orders:  No new orders.  Her medications are set for 7pm at night.    Electronically signed by  NORA Balderas CNP

## 2023-05-22 NOTE — LETTER
5/22/2023        RE: Ingrid Powell  C/o Daljit Powell  [address] [address]        [facility] GERIATRICS  ACUTE/EPISODIC VISIT    [facility] Medical Record Number:  [id number]  Place of Service where encounter took place:  [facility] ([facility]) [[id number]]    Chief Complaint   Patient presents with     RECHECK       HPI:    Ingrid Powell is a 77 year old  (1945), who is being seen today for an episodic care visit.  HPI information obtained from: facility chart records, facility staff and patient report.    Today's concern is:  Diagnoses       Codes Comments    Chronic systolic heart failure (H)    -  Primary I50.22     Chronic pain of both shoulders     M25.511, G89.29, M25.512     JAIME (generalized anxiety disorder)     F41.1     Mild cognitive impairment     G31.84     Rectal prolapse     K62.3         Came to check on Ingrid and how she was doing.  She was napping on her bed.  Could tell she had her hair fixed recently and real blonde.  She states that is her hair color.  Coming in blonde.    Ingrid sat up in bed and talked.  She usually has water sitting by her bed and she was not believing where her water may be.  Grabbed a bottle out of the fridge for her.  Continues to wear O2 when in bed.  Asked if she has any upcoming appointments for her sleep apnea.  She stated no but did go last week for the rectal prolapse.  What they told her is just to take two fingers and push the tissue back up in the rectum.  Ingrid is not a candidate for surgery or should say she will not allow herself to be and so the alternative is to push the rectum back in.  She states she does this but pops out.  She said she will ignore it then.  Happy she went to see the surgeon as she needed to hear this herself.      ALLERGIES:    Allergies   Allergen Reactions     Dilaudid [Hydromorphone Hcl] Visual Disturbance     Hallucinations     Fosamax [Alendronate Sodium] Rash            Norvasc  [Amlodipine Besylate] Hives and Rash     Tegretol [Carbamazepine] Hives and Rash        MEDICATIONS:  Post Discharge Medication Reconciliation Status: patient was not discharged from an inpatient facility or TCU.     Current Outpatient Medications   Medication Sig Dispense Refill     acetaminophen (TYLENOL) 650 MG CR tablet 1300mg po every PM and 1300mg po twice a day as needed       artificial tears (GENTEAL) 0.1-0.2-0.3 % ophthalmic solution INSTILL ONE DROP INTO BOTH EYES TWICE DAILY 15 mL 0     busPIRone (BUSPAR) 10 MG tablet Take 1 tablet (10 mg) by mouth 3 times daily 90 tablet 11     cholecalciferol (VITAMIN D3) 25 mcg (1000 units) capsule Take 1 capsule by mouth daily       citalopram (CELEXA) 10 MG tablet TAKE 1 TAB BY MOUTH ONCE DAILY 35 tablet 11     fluticasone (FLONASE) 50 MCG/ACT nasal spray USE 2 SPRAYS INTO BOTH NOSTRILS ONCE DAILY 16 g 11     furosemide (LASIX) 20 MG tablet TAKE TWO TABLETS (40MG) BY MOUTH ONCE DAILY IN THE MORNING; TAKE 1 TABLET (20MG) BY MOUTH ONCE DAILY IN THE EARLY AFTERNOON 84 tablet 11     gabapentin (NEURONTIN) 600 MG tablet TAKE 1 TABLET (600 MG) BY MOUTH AT BEDTIME 30 tablet 3     hypromellose-dextran (ARTIFICAL TEARS) 0.1-0.3 % ophthalmic solution Place 1 drop into both eyes 2 times daily 15 mL 0     levothyroxine (SYNTHROID/LEVOTHROID) 100 MCG tablet TAKE 1 TAB BY MOUTH ONCE DAILY AFTER TWO WEEKS OF LEVOTHYROXINE 75MCG 28 tablet 11     LORazepam (ATIVAN) 0.5 MG tablet TAKE 1 TABLET BY MOUTH THREE TIMES DAILY; 2 TABLETS AT HS; TAKE 1 TABLET BY MOUTH ONCE DAILY IF NEEDED 120 tablet 3     Menthol, Topical Analgesic, (BIOFREEZE) 4 % GEL Dime size gel to elbows and neck at HS and then TID prn       nitroGLYcerin (NITROSTAT) 0.4 MG sublingual tablet For chest pain place 1 tablet under the tongue every 5 minutes for 3 doses. If symptoms persist 5 minutes after 1st dose call 911. 6 tablet 4     nystatin (MYCOSTATIN) [id number] UNIT/GM external powder Apply topically 2 times daily 30  g 4     oxyCODONE (ROXICODONE) 5 MG tablet Take 1 tablet (5 mg) by mouth every 4 hours as needed for pain 20 tablet 0     QUEtiapine (SEROQUEL) 25 MG tablet TAKE 1/2 TABLET (12.5 MG) BY MOUTH 3 TIMES DAILY 45 tablet 3     rOPINIRole (REQUIP) 0.5 MG tablet 0.25mg by mouth every AM and afternoon, 0.75mg po at bedtime 90 tablet 4     senna-docusate (SENOKOT-S/PERICOLACE) 8.6-50 MG tablet Take 1 tablet by mouth every other day And 1 tab by mouth daily as needed 60 tablet 11     traZODone (DESYREL) 100 MG tablet TAKE 1 TABLET (100 MG) BY MOUTH AT BEDTIME 30 tablet 11     vitamin B-12 (CYANOCOBALAMIN) 250 MCG tablet Take 250 mcg by mouth daily         REVIEW OF SYSTEMS:  4 point ROS neg other than the symptoms noted above in the HPI.  No chest pain. No real SOB.  Wears the O2 for comfort at night.    PHYSICAL EXAM:  BP (!) 150/90   Pulse 91   Temp 97.6  F (36.4  C)   Resp 17   LMP  (LMP Unknown)   SpO2 96%   Ingrid is alert, pleasant, anxious as well because she cannot remember what she wants to talk about.  She always says this and knows that she will think about it later on.  Heart rate is regular and strong.  No pitting edema in her lower extremities.  Lungs are clear to auscultation.  Abdomen is obese round soft with active bowel sounds    Talked about the weather that she is excited to be able to go back outside and sit in the sun.  She really wants to have a good tan for the summer and stated she had a to her daughter about having a nice chair so that she could lay down and get her back tan.  Do not want to picture this as could be hard to get back up if on stomach for some time.  Got a good laugh out of her thoughts.      Lab Results   Component Value Date    HGB 11.9 05/11/2023    HGB 11.5 04/06/2023       ASSESSMENT / PLAN:  1. Chronic systolic heart failure (H)    2. Chronic pain of both shoulders    3. JAIME (generalized anxiety disorder)    4. Mild cognitive impairment    5. Rectal prolapse      Just talked  in general today about how she is doing.  No acute symptoms with her heart failure.  Able to maintain on Lasix 40 mg in the morning and 20 mg in the afternoon.  Uses oxygen at night more for comfort but also does have a history of obstructive sleep apnea to.  Has a chronic pain in her shoulders in which staff to put Biofreeze on them.  It be nice if able to use lidocaine patches but that would have to be out-of-pocket cost.  Ingrid does have as needed oxycodone but has not used it in a few months does take Tylenol extended release 1300 mg at bedtime and then has as needed dosing.    He is on quite a few medications for her anxiety which she feels has been under pretty good control lately.  Spoke about her sister today that she went down on a 2-1/2-hour drive to the [place] [facility] in [place].  Her sister wanted her to go with but Ingrid did not feel she can make that drive anymore.  Most likely she will go with her to the [employer] cemetery to put flowers down at their family's grave sites and so talked about that quite a bit today.  He did talk about being very anxious to get her evening medications.  She would like to have a mat 7 PM which do know they are scheduled at that point but she thinks they still come at 8 PM.  Cannot help if there is circumstances that hinder her getting them on time.  This is just how Ingrid is.    As far as the rectal prolapse, have monitored her hemoglobin which has been stable.  Glad she went to the appointment so she could hear what they have to say otherwise she would dwell on this prolapse.  We will address that as a as needed basis    Orders:  No new orders.  Her medications are set for 7pm at night.    Electronically signed by  NORA Balderas CNP         Sincerely,        NORA Balderas CNP

## 2023-05-31 ENCOUNTER — ASSISTED LIVING VISIT (OUTPATIENT)
Dept: GERIATRICS | Facility: CLINIC | Age: 78
End: 2023-05-31
Payer: COMMERCIAL

## 2023-05-31 VITALS
DIASTOLIC BLOOD PRESSURE: 90 MMHG | RESPIRATION RATE: 17 BRPM | TEMPERATURE: 97.6 F | HEART RATE: 91 BPM | BODY MASS INDEX: 36.14 KG/M2 | SYSTOLIC BLOOD PRESSURE: 150 MMHG | OXYGEN SATURATION: 96 % | WEIGHT: 217.2 LBS

## 2023-05-31 DIAGNOSIS — F41.1 GAD (GENERALIZED ANXIETY DISORDER): ICD-10-CM

## 2023-05-31 DIAGNOSIS — D49.2 ABNORMAL SKIN GROWTH: Primary | ICD-10-CM

## 2023-05-31 DIAGNOSIS — I50.22 CHRONIC SYSTOLIC HEART FAILURE (H): ICD-10-CM

## 2023-05-31 PROCEDURE — 99349 HOME/RES VST EST MOD MDM 40: CPT | Performed by: NURSE PRACTITIONER

## 2023-05-31 NOTE — PROGRESS NOTES
[facility] GERIATRICS  ACUTE/EPISODIC VISIT    [facility] Medical Record Number:  [id number]  Place of Service where encounter took place:  [facility] ([facility]) [[id number]]    Chief Complaint   Patient presents with     RECHECK       HPI:    Ingrid Powell is a 77 year old  (1945), who is being seen today for an episodic care visit.  HPI information obtained from: facility staff, patient report and family/first contact daughter report.    Today's concern is:    Diagnoses       Codes Comments    Abnormal skin growth    -  Primary D49.2     Chronic systolic heart failure (H)     I50.22     JAIME (generalized anxiety disorder)     F41.1         Received a e-mail from Inrgid's daughter asking if the next time this NP was out to facility if able to look at the back ofher leg as she has a spot present and wanted to know if she should take her mother to dermatology.     Came to see Ingrid as she was sitting waiting to go downstairs to get her hair done.  Had time in order look at the back of her right thigh before staff came to give her medications and remind her to go downstairs.    Ingrid is not sure how long this scabbed area has been present.  The aide that came in to the apartment stated she just saw it today and was not aware of it before today.      ALLERGIES:    Allergies   Allergen Reactions     Dilaudid [Hydromorphone Hcl] Visual Disturbance     Hallucinations     Fosamax [Alendronate Sodium] Rash            Norvasc [Amlodipine Besylate] Hives and Rash     Tegretol [Carbamazepine] Hives and Rash        MEDICATIONS:  Post Discharge Medication Reconciliation Status: patient was not discharged from an inpatient facility or TCU.     Current Outpatient Medications   Medication Sig Dispense Refill     acetaminophen (TYLENOL) 650 MG CR tablet 1300mg po every PM and 1300mg po twice a day as needed       artificial tears (GENTEAL) 0.1-0.2-0.3 % ophthalmic solution INSTILL ONE DROP INTO BOTH  EYES TWICE DAILY 15 mL 0     busPIRone (BUSPAR) 10 MG tablet Take 1 tablet (10 mg) by mouth 3 times daily 90 tablet 11     cholecalciferol (VITAMIN D3) 25 mcg (1000 units) capsule Take 1 capsule by mouth daily       citalopram (CELEXA) 10 MG tablet TAKE 1 TAB BY MOUTH ONCE DAILY 35 tablet 11     fluticasone (FLONASE) 50 MCG/ACT nasal spray USE 2 SPRAYS INTO BOTH NOSTRILS ONCE DAILY 16 g 11     furosemide (LASIX) 20 MG tablet TAKE TWO TABLETS (40MG) BY MOUTH ONCE DAILY IN THE MORNING; TAKE 1 TABLET (20MG) BY MOUTH ONCE DAILY IN THE EARLY AFTERNOON 84 tablet 11     gabapentin (NEURONTIN) 600 MG tablet TAKE 1 TABLET (600 MG) BY MOUTH AT BEDTIME 30 tablet 3     hypromellose-dextran (ARTIFICAL TEARS) 0.1-0.3 % ophthalmic solution Place 1 drop into both eyes 2 times daily 15 mL 0     levothyroxine (SYNTHROID/LEVOTHROID) 100 MCG tablet TAKE 1 TAB BY MOUTH ONCE DAILY AFTER TWO WEEKS OF LEVOTHYROXINE 75MCG 28 tablet 11     LORazepam (ATIVAN) 0.5 MG tablet TAKE 1 TABLET BY MOUTH THREE TIMES DAILY; 2 TABLETS AT HS; TAKE 1 TABLET BY MOUTH ONCE DAILY IF NEEDED 120 tablet 3     Menthol, Topical Analgesic, (BIOFREEZE) 4 % GEL Dime size gel to elbows and neck at HS and then TID prn       nitroGLYcerin (NITROSTAT) 0.4 MG sublingual tablet For chest pain place 1 tablet under the tongue every 5 minutes for 3 doses. If symptoms persist 5 minutes after 1st dose call 911. 6 tablet 4     nystatin (MYCOSTATIN) [id number] UNIT/GM external powder Apply topically 2 times daily 30 g 4     oxyCODONE (ROXICODONE) 5 MG tablet Take 1 tablet (5 mg) by mouth every 4 hours as needed for pain 20 tablet 0     QUEtiapine (SEROQUEL) 25 MG tablet TAKE 1/2 TABLET (12.5 MG) BY MOUTH 3 TIMES DAILY 45 tablet 3     rOPINIRole (REQUIP) 0.5 MG tablet 0.25mg by mouth every AM and afternoon, 0.75mg po at bedtime 90 tablet 4     senna-docusate (SENOKOT-S/PERICOLACE) 8.6-50 MG tablet Take 1 tablet by mouth every other day And 1 tab by mouth daily as needed 60 tablet 11      traZODone (DESYREL) 100 MG tablet TAKE 1 TABLET (100 MG) BY MOUTH AT BEDTIME 30 tablet 11     vitamin B-12 (CYANOCOBALAMIN) 250 MCG tablet Take 250 mcg by mouth daily         REVIEW OF SYSTEMS:  4 point ROS neg other than the symptoms noted above in the HPI.  No chest pain, no acute shortness of breath.    PHYSICAL EXAM:  BP (!) 150/90   Pulse 91   Temp 97.6  F (36.4  C)   Resp 17   Wt 98.5 kg (217 lb 3.2 oz)   LMP  (LMP Unknown)   SpO2 96%   BMI 36.14 kg/m    Able to transfer self out of the recliner.  Stood up and pulled down her pants to look at the back of her thighs.   On the back of the right mid thigh is a small scabbed spot that is approximately 0.3mm round.  Not abnormally shaped.  No drainage noted.  Does not appear infected.    Heart rate regular and strong.  No pitting edema.  Lungs are clear and no use of oxygen except if she is in bed.  Abdomen round, obese, non tender.  Soft to touch.  Ambulates with a 4WW.        ASSESSMENT / PLAN:  (D49.2) Abnormal skin growth  (primary encounter diagnosis)  Comment: when palpating the small spot, it reminds this NP of a small irritated pimple.  Would prefer to monitor this area and if worsens then would like to send to the dermatologist.  Sent the daughter a message back in e-mail about recommendations.      (I50.22) Chronic systolic heart failure (H)  Comment: heart failure is stable.  No change to Lasix 40mg in AM and 20mg in afternoon.  She does have portable oxygen that she can take with her when out of the apartment.      (F41.1) JAIME (generalized anxiety disorder)  Comment:  Mood was good today and accepting of he plan of watching the back of her leg. She did no have other complaints today as her focus was to get downstairs. Remains on multiple medications to help with her anxiety.  Sometimes she can get overwhelmed and using the call light a lot.  Have adjusted her scheduled medications per her request because she can not handle waiting around for  them like at bedtime.  Instead of 8pm, she gets them at 7pm.  She has forgotten that that order was started because there are times she gets anxious and tells this NP to check when she gets her medicine.  Does not have the best patience.      Orders:  No new orders, will come back to see leg for monitoring purposes    Electronically signed by  NROA Balderas CNP

## 2023-05-31 NOTE — LETTER
5/31/2023        RE: Ingrid Powell  C/o Daljit Powell  [address] [address]        [facility] GERIATRICS  ACUTE/EPISODIC VISIT    [facility] Medical Record Number:  [id number]  Place of Service where encounter took place:  [facility] ([facility]) [[id number]]    Chief Complaint   Patient presents with     RECHECK       HPI:    Ingrid Powell is a 77 year old  (1945), who is being seen today for an episodic care visit.  HPI information obtained from: facility staff, patient report and family/first contact daughter report.    Today's concern is:    Diagnoses       Codes Comments    Abnormal skin growth    -  Primary D49.2     Chronic systolic heart failure (H)     I50.22     JAIME (generalized anxiety disorder)     F41.1         Received a e-mail from Ingrid's daughter asking if the next time this NP was out to facility if able to look at the back ofher leg as she has a spot present and wanted to know if she should take her mother to dermatology.     Came to see Ingrid as she was sitting waiting to go downstairs to get her hair done.  Had time in order look at the back of her right thigh before staff came to give her medications and remind her to go downstairs.    Ingrid is not sure how long this scabbed area has been present.  The aide that came in to the apartment stated she just saw it today and was not aware of it before today.      ALLERGIES:    Allergies   Allergen Reactions     Dilaudid [Hydromorphone Hcl] Visual Disturbance     Hallucinations     Fosamax [Alendronate Sodium] Rash            Norvasc [Amlodipine Besylate] Hives and Rash     Tegretol [Carbamazepine] Hives and Rash        MEDICATIONS:  Post Discharge Medication Reconciliation Status: patient was not discharged from an inpatient facility or TCU.     Current Outpatient Medications   Medication Sig Dispense Refill     acetaminophen (TYLENOL) 650 MG CR tablet 1300mg po every PM and 1300mg po twice a day as  needed       artificial tears (GENTEAL) 0.1-0.2-0.3 % ophthalmic solution INSTILL ONE DROP INTO BOTH EYES TWICE DAILY 15 mL 0     busPIRone (BUSPAR) 10 MG tablet Take 1 tablet (10 mg) by mouth 3 times daily 90 tablet 11     cholecalciferol (VITAMIN D3) 25 mcg (1000 units) capsule Take 1 capsule by mouth daily       citalopram (CELEXA) 10 MG tablet TAKE 1 TAB BY MOUTH ONCE DAILY 35 tablet 11     fluticasone (FLONASE) 50 MCG/ACT nasal spray USE 2 SPRAYS INTO BOTH NOSTRILS ONCE DAILY 16 g 11     furosemide (LASIX) 20 MG tablet TAKE TWO TABLETS (40MG) BY MOUTH ONCE DAILY IN THE MORNING; TAKE 1 TABLET (20MG) BY MOUTH ONCE DAILY IN THE EARLY AFTERNOON 84 tablet 11     gabapentin (NEURONTIN) 600 MG tablet TAKE 1 TABLET (600 MG) BY MOUTH AT BEDTIME 30 tablet 3     hypromellose-dextran (ARTIFICAL TEARS) 0.1-0.3 % ophthalmic solution Place 1 drop into both eyes 2 times daily 15 mL 0     levothyroxine (SYNTHROID/LEVOTHROID) 100 MCG tablet TAKE 1 TAB BY MOUTH ONCE DAILY AFTER TWO WEEKS OF LEVOTHYROXINE 75MCG 28 tablet 11     LORazepam (ATIVAN) 0.5 MG tablet TAKE 1 TABLET BY MOUTH THREE TIMES DAILY; 2 TABLETS AT HS; TAKE 1 TABLET BY MOUTH ONCE DAILY IF NEEDED 120 tablet 3     Menthol, Topical Analgesic, (BIOFREEZE) 4 % GEL Dime size gel to elbows and neck at HS and then TID prn       nitroGLYcerin (NITROSTAT) 0.4 MG sublingual tablet For chest pain place 1 tablet under the tongue every 5 minutes for 3 doses. If symptoms persist 5 minutes after 1st dose call 911. 6 tablet 4     nystatin (MYCOSTATIN) 272630 UNIT/GM external powder Apply topically 2 times daily 30 g 4     oxyCODONE (ROXICODONE) 5 MG tablet Take 1 tablet (5 mg) by mouth every 4 hours as needed for pain 20 tablet 0     QUEtiapine (SEROQUEL) 25 MG tablet TAKE 1/2 TABLET (12.5 MG) BY MOUTH 3 TIMES DAILY 45 tablet 3     rOPINIRole (REQUIP) 0.5 MG tablet 0.25mg by mouth every AM and afternoon, 0.75mg po at bedtime 90 tablet 4     senna-docusate (SENOKOT-S/PERICOLACE)  8.6-50 MG tablet Take 1 tablet by mouth every other day And 1 tab by mouth daily as needed 60 tablet 11     traZODone (DESYREL) 100 MG tablet TAKE 1 TABLET (100 MG) BY MOUTH AT BEDTIME 30 tablet 11     vitamin B-12 (CYANOCOBALAMIN) 250 MCG tablet Take 250 mcg by mouth daily         REVIEW OF SYSTEMS:  4 point ROS neg other than the symptoms noted above in the HPI.  No chest pain, no acute shortness of breath.    PHYSICAL EXAM:  BP (!) 150/90   Pulse 91   Temp 97.6  F (36.4  C)   Resp 17   Wt 98.5 kg (217 lb 3.2 oz)   LMP  (LMP Unknown)   SpO2 96%   BMI 36.14 kg/m    Able to transfer self out of the recliner.  Stood up and pulled down her pants to look at the back of her thighs.   On the back of the right mid thigh is a small scabbed spot that is approximately 0.3mm round.  Not abnormally shaped.  No drainage noted.  Does not appear infected.    Heart rate regular and strong.  No pitting edema.  Lungs are clear and no use of oxygen except if she is in bed.  Abdomen round, obese, non tender.  Soft to touch.  Ambulates with a 4WW.        ASSESSMENT / PLAN:  (D49.2) Abnormal skin growth  (primary encounter diagnosis)  Comment: when palpating the small spot, it reminds this NP of a small irritated pimple.  Would prefer to monitor this area and if worsens then would like to send to the dermatologist.  Sent the daughter a message back in e-mail about recommendations.      (I50.22) Chronic systolic heart failure (H)  Comment: heart failure is stable.  No change to Lasix 40mg in AM and 20mg in afternoon.  She does have portable oxygen that she can take with her when out of the apartment.      (F41.1) JAIME (generalized anxiety disorder)  Comment:  Mood was good today and accepting of he plan of watching the back of her leg. She did no have other complaints today as her focus was to get downstairs. Remains on multiple medications to help with her anxiety.  Sometimes she can get overwhelmed and using the call light a lot.   Have adjusted her scheduled medications per her request because she can not handle waiting around for them like at bedtime.  Instead of 8pm, she gets them at 7pm.  She has forgotten that that order was started because there are times she gets anxious and tells this NP to check when she gets her medicine.  Does not have the best patience.      Orders:  No new orders, will come back to see leg for monitoring purposes    Electronically signed by  NORA Balderas CNP         Sincerely,        NORA Balderas CNP

## 2023-06-06 ENCOUNTER — PATIENT OUTREACH (OUTPATIENT)
Dept: GERIATRIC MEDICINE | Facility: CLINIC | Age: 78
End: 2023-06-06
Payer: COMMERCIAL

## 2023-06-06 NOTE — PROGRESS NOTES
[facility] Care Coordination Contact    CC received an email from member's daughter Lida who inquired about who orders briefs for member and she is down to one pack. She was wondering if they will come automatically or does she need someone to place the order. CC emailed Lida back and included nurse Blanca stating that usually the DME provider calls the nurse to verify to send the order. CC did call [facility] and member had received an order the first part of May and another order will drop this week. She is getting 144 a month that is the max that insurance will pay for and if she needs more we can get more through [facility].     Daughter replied: If another box is coming next week, then she only seems to be a pack or 2 short.  I think she changes them more often when her prolapse is out, which is often due to constipation caused by too much ice cream. lol When she puts it back in, she bleeds a lot and the aides have told her they cannot do it for her only an RN can.   I've left Dea a message regarding all this and asked her to up her fiber intake to 3 times a week and see if that helps get her regular again.      Angela Reid, RN  [facility]  [phone]

## 2023-06-14 DIAGNOSIS — F41.1 GAD (GENERALIZED ANXIETY DISORDER): ICD-10-CM

## 2023-06-14 DIAGNOSIS — F22 PARANOIA (H): ICD-10-CM

## 2023-06-14 DIAGNOSIS — R52 PAIN: ICD-10-CM

## 2023-06-14 RX ORDER — BUSPIRONE HYDROCHLORIDE 10 MG/1
10 TABLET ORAL 3 TIMES DAILY
Qty: 90 TABLET | Refills: 11 | Status: SHIPPED | OUTPATIENT
Start: 2023-06-14 | End: 2023-07-12

## 2023-06-14 RX ORDER — QUETIAPINE FUMARATE 25 MG/1
TABLET, FILM COATED ORAL
Qty: 45 TABLET | Refills: 11 | Status: SHIPPED | OUTPATIENT
Start: 2023-06-14 | End: 2023-07-12

## 2023-06-14 RX ORDER — GABAPENTIN 600 MG/1
600 TABLET ORAL AT BEDTIME
Qty: 30 TABLET | Refills: 11 | Status: SHIPPED | OUTPATIENT
Start: 2023-06-14 | End: 2023-07-12

## 2023-06-19 DIAGNOSIS — H04.123 DRY EYES: ICD-10-CM

## 2023-06-19 RX ORDER — GLIPIZIDE 10 MG/1
TABLET ORAL
Qty: 15 ML | Refills: 4 | Status: SHIPPED | OUTPATIENT
Start: 2023-06-19 | End: 2023-01-01

## 2023-06-26 DIAGNOSIS — F41.1 GAD (GENERALIZED ANXIETY DISORDER): ICD-10-CM

## 2023-06-26 RX ORDER — LORAZEPAM 0.5 MG/1
TABLET ORAL
Qty: 120 TABLET | Refills: 3 | Status: SHIPPED | OUTPATIENT
Start: 2023-06-26 | End: 2023-01-01

## 2023-06-29 ENCOUNTER — ASSISTED LIVING VISIT (OUTPATIENT)
Dept: GERIATRICS | Facility: CLINIC | Age: 78
End: 2023-06-29
Payer: COMMERCIAL

## 2023-06-29 VITALS
OXYGEN SATURATION: 95 % | SYSTOLIC BLOOD PRESSURE: 108 MMHG | HEART RATE: 97 BPM | BODY MASS INDEX: 34.91 KG/M2 | DIASTOLIC BLOOD PRESSURE: 62 MMHG | WEIGHT: 209.8 LBS | RESPIRATION RATE: 18 BRPM | TEMPERATURE: 98.2 F

## 2023-06-29 DIAGNOSIS — F33.1 MAJOR DEPRESSIVE DISORDER, RECURRENT EPISODE, MODERATE (H): ICD-10-CM

## 2023-06-29 DIAGNOSIS — G31.84 MILD COGNITIVE IMPAIRMENT: ICD-10-CM

## 2023-06-29 DIAGNOSIS — I50.22 CHRONIC SYSTOLIC HEART FAILURE (H): Primary | ICD-10-CM

## 2023-06-29 DIAGNOSIS — Z99.81 OXYGEN DEPENDENT: ICD-10-CM

## 2023-06-29 DIAGNOSIS — F41.1 GAD (GENERALIZED ANXIETY DISORDER): ICD-10-CM

## 2023-06-29 PROCEDURE — 99349 HOME/RES VST EST MOD MDM 40: CPT | Performed by: NURSE PRACTITIONER

## 2023-06-29 NOTE — LETTER
6/29/2023        RE: Ingrid Powell  C/o Daljit Powell  [address] [address]        [facility] GERIATRICS  ACUTE/EPISODIC VISIT    [facility] Medical Record Number:  [id number]  Place of Service where encounter took place:  [facility] ([facility]) [[id number]]    Chief Complaint   Patient presents with     RECHECK       HPI:    Ingrid Powell is a 77 year old  (1945), who is being seen today for an episodic care visit.  HPI information obtained from: facility chart records, facility staff, patient report, and [facility] chart review.    Today's concern is:    Diagnoses         Codes Comments    Chronic systolic heart failure (H)    -  Primary I50.22     Oxygen dependent     Z99.81     Major depressive disorder, recurrent episode, moderate (H)     F33.1     JAIME (generalized anxiety disorder)     F41.1     Mild cognitive impairment     G31.84           Came to see Ingrid today and how she has been doing since last visit about a month ago.   Last time checking her skin on the back of her right thigh.  Since then, the skin issue has resolved.  Appears as a small irritated skin pour which now is gone.      Found Ingrid eating lunch in her room.  Supper is usually downstairs.  No trouble eating regular texture food.  Talked while she ate.      Ingrid acknowledges her anxiety gets her in the evening as she does not like to wait for her medications.  She wants to go to bed but may be awake after midnight.  Sometimes she will eat ice cream then.  No plans to change her medications.  She is on multiple medications to control her anxiety.      Wearing her oxygen which usually she will only do if she crawls in bed.  This NP is fine with her wearing it more but feel it is more of a anxiety crutch for her as she does dwell silently on what has occurred a couple of years ago prior to coming to live at [facility].        ALLERGIES:    Allergies   Allergen Reactions     Dilaudid  [Hydromorphone Hcl] Visual Disturbance     Hallucinations     Fosamax [Alendronate Sodium] Rash            Norvasc [Amlodipine Besylate] Hives and Rash     Tegretol [Carbamazepine] Hives and Rash        MEDICATIONS:  Post Discharge Medication Reconciliation Status: patient was not discharged from an inpatient facility or TCU.   Current Outpatient Medications   Medication Sig Dispense Refill     acetaminophen (TYLENOL) 650 MG CR tablet 1300mg po every PM and 1300mg po twice a day as needed       artificial tears (GENTEAL) 0.1-0.2-0.3 % ophthalmic solution INSTILL ONE DROP INTO BOTH EYES TWICE DAILY 15 mL 4     busPIRone (BUSPAR) 10 MG tablet TAKE 1 TABLET (10 MG) BY MOUTH 3 TIMES DAILY 90 tablet 11     cholecalciferol (VITAMIN D3) 25 mcg (1000 units) capsule Take 1 capsule by mouth daily       citalopram (CELEXA) 10 MG tablet TAKE 1 TAB BY MOUTH ONCE DAILY 35 tablet 11     fluticasone (FLONASE) 50 MCG/ACT nasal spray USE 2 SPRAYS INTO BOTH NOSTRILS ONCE DAILY 16 g 11     furosemide (LASIX) 20 MG tablet TAKE TWO TABLETS (40MG) BY MOUTH ONCE DAILY IN THE MORNING; TAKE 1 TABLET (20MG) BY MOUTH ONCE DAILY IN THE EARLY AFTERNOON 84 tablet 11     gabapentin (NEURONTIN) 600 MG tablet TAKE 1 TABLET (600 MG) BY MOUTH AT BEDTIME 30 tablet 11     hypromellose-dextran (ARTIFICAL TEARS) 0.1-0.3 % ophthalmic solution Place 1 drop into both eyes 2 times daily 15 mL 0     levothyroxine (SYNTHROID/LEVOTHROID) 100 MCG tablet TAKE 1 TAB BY MOUTH ONCE DAILY AFTER TWO WEEKS OF LEVOTHYROXINE 75MCG 28 tablet 11     LORazepam (ATIVAN) 0.5 MG tablet TAKE 1 TABLET BY MOUTH THREE TIMES DAILY; 2 TABLETS AT HS; TAKE 1 TABLET BY MOUTH ONCE DAILY IF NEEDED 120 tablet 3     Menthol, Topical Analgesic, (BIOFREEZE) 4 % GEL Dime size gel to elbows and neck at HS and then TID prn       nitroGLYcerin (NITROSTAT) 0.4 MG sublingual tablet For chest pain place 1 tablet under the tongue every 5 minutes for 3 doses. If symptoms persist 5 minutes after 1st  dose call 911. 6 tablet 4     nystatin (MYCOSTATIN) 557345 UNIT/GM external powder Apply topically 2 times daily 30 g 4     oxyCODONE (ROXICODONE) 5 MG tablet Take 1 tablet (5 mg) by mouth every 4 hours as needed for pain 20 tablet 0     QUEtiapine (SEROQUEL) 25 MG tablet TAKE 1/2 TABLET (12.5 MG) BY MOUTH 3 TIMES DAILY 45 tablet 11     rOPINIRole (REQUIP) 0.5 MG tablet 0.25mg by mouth every AM and afternoon, 0.75mg po at bedtime 90 tablet 4     senna-docusate (SENOKOT-S/PERICOLACE) 8.6-50 MG tablet Take 1 tablet by mouth every other day And 1 tab by mouth daily as needed 60 tablet 11     traZODone (DESYREL) 100 MG tablet TAKE 1 TABLET (100 MG) BY MOUTH AT BEDTIME 30 tablet 11     vitamin B-12 (CYANOCOBALAMIN) 250 MCG tablet Take 250 mcg by mouth daily         REVIEW OF SYSTEMS:  4 point ROS neg other than the symptoms noted above in the HPI.  No chest pain, no stomach issues.  Continues with her prolapsed colon with occasional blood present.      PHYSICAL EXAM:  /62   Pulse 97   Temp 98.2  F (36.8  C)   Resp 18   Wt 95.2 kg (209 lb 12.8 oz)   LMP  (LMP Unknown)   SpO2 95%   BMI 34.91 kg/m    Weight is down.  Has been about 10 lbs heavier 2 months ago.    Alert and oriented x3 with forgetfulness/anxiety.  She picks and chooses her activities.  Has not really wanted to do things out of the building of late with her sister.    Heart rate regular and strong.  No edema in lower legs.    Lungs are clear with good expansion.  Abdomen is round, large, soft and non-tender to touch.  Active bowel sounds.  Denies straining.      Ambulates with her 4WW but will transfer on own and ambulate in apartment without walker as very short distances.    ASSESSMENT / PLAN:  (I50.22) Chronic systolic heart failure (H)  (primary encounter diagnosis)  (Z99.81) Oxygen dependent  Comment: remains on Lasix 40mg in AM and 20mg in PM.  Stable.  Drinks fluids for hydration as she always as a water bottle near her.  Oxygen for when  she sleeps and during the day as she feels she needs it.  She may forget to take it off when she gets up.    No changes.    (F33.1) Major depressive disorder, recurrent episode, moderate (HCC)  (F41.1) JAIME (generalized anxiety disorder)  (G31.84) Mild cognitive impairment  Comment: encouraging Ingrid to get out of her apartment more.  Do think she coups herself up some.  Do not see her sit outside like she use too but depends on weather.    Sometimes her anxiety gets the best of her and staff update this NP.  Does not mean changes in medications but wish Ingrid would have more interest in activities.  Loves her BINGO.    Today just gave her time to talk as she enjoys visiting within her environment.    Orders:  No new orders.    Electronically signed by  NORA Balderas CNP           Sincerely,        NORA Balderas CNP

## 2023-06-29 NOTE — PROGRESS NOTES
[facility] GERIATRICS  ACUTE/EPISODIC VISIT    [facility] Medical Record Number:  [id number]  Place of Service where encounter took place:  [facility] ([facility]) [[id number]]    Chief Complaint   Patient presents with    RECHECK       HPI:    Ingrid Powell is a 77 year old  (1945), who is being seen today for an episodic care visit.  HPI information obtained from: facility chart records, facility staff, patient report, and [facility] chart review.    Today's concern is:    Diagnoses         Codes Comments    Chronic systolic heart failure (H)    -  Primary I50.22     Oxygen dependent     Z99.81     Major depressive disorder, recurrent episode, moderate (H)     F33.1     JAIME (generalized anxiety disorder)     F41.1     Mild cognitive impairment     G31.84           Came to see Ingrid today and how she has been doing since last visit about a month ago.   Last time checking her skin on the back of her right thigh.  Since then, the skin issue has resolved.  Appears as a small irritated skin pour which now is gone.      Found Ingrid eating lunch in her room.  Supper is usually downstairs.  No trouble eating regular texture food.  Talked while she ate.      Ingrid acknowledges her anxiety gets her in the evening as she does not like to wait for her medications.  She wants to go to bed but may be awake after midnight.  Sometimes she will eat ice cream then.  No plans to change her medications.  She is on multiple medications to control her anxiety.      Wearing her oxygen which usually she will only do if she crawls in bed.  This NP is fine with her wearing it more but feel it is more of a anxiety crutch for her as she does dwell silently on what has occurred a couple of years ago prior to coming to live at [facility].        ALLERGIES:    Allergies   Allergen Reactions    Dilaudid [Hydromorphone Hcl] Visual Disturbance     Hallucinations    Fosamax [Alendronate Sodium] Rash           Norvasc  [Amlodipine Besylate] Hives and Rash    Tegretol [Carbamazepine] Hives and Rash        MEDICATIONS:  Post Discharge Medication Reconciliation Status: patient was not discharged from an inpatient facility or TCU.   Current Outpatient Medications   Medication Sig Dispense Refill    acetaminophen (TYLENOL) 650 MG CR tablet 1300mg po every PM and 1300mg po twice a day as needed      artificial tears (GENTEAL) 0.1-0.2-0.3 % ophthalmic solution INSTILL ONE DROP INTO BOTH EYES TWICE DAILY 15 mL 4    busPIRone (BUSPAR) 10 MG tablet TAKE 1 TABLET (10 MG) BY MOUTH 3 TIMES DAILY 90 tablet 11    cholecalciferol (VITAMIN D3) 25 mcg (1000 units) capsule Take 1 capsule by mouth daily      citalopram (CELEXA) 10 MG tablet TAKE 1 TAB BY MOUTH ONCE DAILY 35 tablet 11    fluticasone (FLONASE) 50 MCG/ACT nasal spray USE 2 SPRAYS INTO BOTH NOSTRILS ONCE DAILY 16 g 11    furosemide (LASIX) 20 MG tablet TAKE TWO TABLETS (40MG) BY MOUTH ONCE DAILY IN THE MORNING; TAKE 1 TABLET (20MG) BY MOUTH ONCE DAILY IN THE EARLY AFTERNOON 84 tablet 11    gabapentin (NEURONTIN) 600 MG tablet TAKE 1 TABLET (600 MG) BY MOUTH AT BEDTIME 30 tablet 11    hypromellose-dextran (ARTIFICAL TEARS) 0.1-0.3 % ophthalmic solution Place 1 drop into both eyes 2 times daily 15 mL 0    levothyroxine (SYNTHROID/LEVOTHROID) 100 MCG tablet TAKE 1 TAB BY MOUTH ONCE DAILY AFTER TWO WEEKS OF LEVOTHYROXINE 75MCG 28 tablet 11    LORazepam (ATIVAN) 0.5 MG tablet TAKE 1 TABLET BY MOUTH THREE TIMES DAILY; 2 TABLETS AT HS; TAKE 1 TABLET BY MOUTH ONCE DAILY IF NEEDED 120 tablet 3    Menthol, Topical Analgesic, (BIOFREEZE) 4 % GEL Dime size gel to elbows and neck at HS and then TID prn      nitroGLYcerin (NITROSTAT) 0.4 MG sublingual tablet For chest pain place 1 tablet under the tongue every 5 minutes for 3 doses. If symptoms persist 5 minutes after 1st dose call 911. 6 tablet 4    nystatin (MYCOSTATIN) [id number] UNIT/GM external powder Apply topically 2 times daily 30 g 4    oxyCODONE  (ROXICODONE) 5 MG tablet Take 1 tablet (5 mg) by mouth every 4 hours as needed for pain 20 tablet 0    QUEtiapine (SEROQUEL) 25 MG tablet TAKE 1/2 TABLET (12.5 MG) BY MOUTH 3 TIMES DAILY 45 tablet 11    rOPINIRole (REQUIP) 0.5 MG tablet 0.25mg by mouth every AM and afternoon, 0.75mg po at bedtime 90 tablet 4    senna-docusate (SENOKOT-S/PERICOLACE) 8.6-50 MG tablet Take 1 tablet by mouth every other day And 1 tab by mouth daily as needed 60 tablet 11    traZODone (DESYREL) 100 MG tablet TAKE 1 TABLET (100 MG) BY MOUTH AT BEDTIME 30 tablet 11    vitamin B-12 (CYANOCOBALAMIN) 250 MCG tablet Take 250 mcg by mouth daily         REVIEW OF SYSTEMS:  4 point ROS neg other than the symptoms noted above in the HPI.  No chest pain, no stomach issues.  Continues with her prolapsed colon with occasional blood present.      PHYSICAL EXAM:  /62   Pulse 97   Temp 98.2  F (36.8  C)   Resp 18   Wt 95.2 kg (209 lb 12.8 oz)   LMP  (LMP Unknown)   SpO2 95%   BMI 34.91 kg/m    Weight is down.  Has been about 10 lbs heavier 2 months ago.    Alert and oriented x3 with forgetfulness/anxiety.  She picks and chooses her activities.  Has not really wanted to do things out of the building of late with her sister.    Heart rate regular and strong.  No edema in lower legs.    Lungs are clear with good expansion.  Abdomen is round, large, soft and non-tender to touch.  Active bowel sounds.  Denies straining.      Ambulates with her 4WW but will transfer on own and ambulate in apartment without walker as very short distances.    ASSESSMENT / PLAN:  (I50.22) Chronic systolic heart failure (H)  (primary encounter diagnosis)  (Z99.81) Oxygen dependent  Comment: remains on Lasix 40mg in AM and 20mg in PM.  Stable.  Drinks fluids for hydration as she always as a water bottle near her.  Oxygen for when she sleeps and during the day as she feels she needs it.  She may forget to take it off when she gets up.    No changes.    (F33.1) Major  depressive disorder, recurrent episode, moderate (HCC)  (F41.1) JAIME (generalized anxiety disorder)  (G31.84) Mild cognitive impairment  Comment: encouraging Ingrid to get out of her apartment more.  Do think she coups herself up some.  Do not see her sit outside like she use too but depends on weather.    Sometimes her anxiety gets the best of her and staff update this NP.  Does not mean changes in medications but wish Ingrid would have more interest in activities.  Loves her BINGO.    Today just gave her time to talk as she enjoys visiting within her environment.    Orders:  No new orders.    Electronically signed by  NORA Balderas CNP

## 2023-06-29 NOTE — LETTER
6/29/2023        RE: Ingrid Powell  C/o Daljit Powell  [address] [address]        No notes on file      Sincerely,        NORA Balderas CNP

## 2023-07-12 DIAGNOSIS — F41.1 GAD (GENERALIZED ANXIETY DISORDER): ICD-10-CM

## 2023-07-12 DIAGNOSIS — F22 PARANOIA (H): ICD-10-CM

## 2023-07-12 DIAGNOSIS — R52 PAIN: ICD-10-CM

## 2023-07-12 RX ORDER — QUETIAPINE FUMARATE 25 MG/1
TABLET, FILM COATED ORAL
Qty: 45 TABLET | Refills: 11 | Status: SHIPPED | OUTPATIENT
Start: 2023-07-12 | End: 2023-09-07

## 2023-07-12 RX ORDER — GABAPENTIN 600 MG/1
600 TABLET ORAL AT BEDTIME
Qty: 31 TABLET | Refills: 11 | Status: SHIPPED | OUTPATIENT
Start: 2023-07-12

## 2023-07-12 RX ORDER — BUSPIRONE HYDROCHLORIDE 10 MG/1
10 TABLET ORAL 3 TIMES DAILY
Qty: 90 TABLET | Refills: 11 | Status: SHIPPED | OUTPATIENT
Start: 2023-07-12

## 2023-07-12 NOTE — PROGRESS NOTES
[facility] Care Coordination Contact    Late entry. On 6.28.23 CC emailed Angelita at [facility] and asked to increase members orders of monthly briefs. CC had been in contact with member's daughter as she had reported member needed more. CC had received an email from [employer] stating that the amount of briefs that member's can receive every month was increased and Angelita was going to increase the order. She also reported she did not need a new order for the increase in briefs.    7.12.23 CC received an email from daughter stating member has not received an increase in briefs. CC emailed daughter and stated CC will inquire as CC had emailed [facility] and requested the increase. CC emailed Angelita at [facility] inquiring when the member will get the increased order.    7/25/23 CC emailed back and forth with [facility] regarding the member's incontinent product. CC had received an email form member's daughter stating member ran out of her product again. CC inquired about how many member's receiving and [facility] stated they never increased the order and didn't have anything from CC that an increase was needed. CC had emailed several time regarding the order. On 7.28.23 [facility] emailed CC and stated they would send the increased amount. CC notified daughter of the above.     Email from [facility]: The shipments are sent out on the 8th of each month. I'm not sure why it wasn't updated either, I'm sorry about that. I don't see any notes in our system so that must have been a mistake on our end. [facility] now allows a total of 400 incontinence supplies per month, how many does she use on average? If she wants us to send 400/month we can do that, but she may want to look into alternative options if she needs that many.    9/6/23 CC received email from daughter that member has still not received the extra incontinence product. CC had emailed [facility] on 7/27/23 asking where the increase order was and was told that they did not get any  correspondence from CC and [facility] stated they would get the order completed. CC received an email from member's daughter stating member has not received the extra incontinence products. CC emailed [facility] again asking what the hold up is.    Angela Reid RN  [facility]  [phone]

## 2023-07-24 ENCOUNTER — ASSISTED LIVING VISIT (OUTPATIENT)
Dept: GERIATRICS | Facility: CLINIC | Age: 78
End: 2023-07-24
Payer: COMMERCIAL

## 2023-07-24 VITALS
DIASTOLIC BLOOD PRESSURE: 62 MMHG | OXYGEN SATURATION: 96 % | RESPIRATION RATE: 18 BRPM | SYSTOLIC BLOOD PRESSURE: 108 MMHG | HEART RATE: 97 BPM | BODY MASS INDEX: 34.91 KG/M2 | TEMPERATURE: 98.2 F | WEIGHT: 209.8 LBS

## 2023-07-24 DIAGNOSIS — K62.3 RECTAL PROLAPSE: ICD-10-CM

## 2023-07-24 DIAGNOSIS — K62.5 RECTAL BLEEDING: ICD-10-CM

## 2023-07-24 DIAGNOSIS — I50.22 CHRONIC SYSTOLIC HEART FAILURE (H): Primary | ICD-10-CM

## 2023-07-24 DIAGNOSIS — F41.1 GAD (GENERALIZED ANXIETY DISORDER): ICD-10-CM

## 2023-07-24 PROCEDURE — 99349 HOME/RES VST EST MOD MDM 40: CPT | Performed by: NURSE PRACTITIONER

## 2023-07-24 NOTE — LETTER
7/24/2023        RE: Ingrid Powell  C/o Daljit Powell  [address] [address]        [facility] GERIATRICS  ACUTE/EPISODIC VISIT    [facility] Medical Record Number:  [id number]  Place of Service where encounter took place:  [facility] ([facility]) [[id number]]    Chief Complaint   Patient presents with     RECHECK       HPI:    Ingrid Powell is a 77 year old  (1945), who is being seen today for an episodic care visit.  HPI information obtained from: facility chart records, facility staff, patient report, and family/first contact daughterLida report.    Today's concern is:    Diagnoses         Codes Comments    Rectal prolapse    -  Primary K62.3     Chronic systolic heart failure (H)     I50.22     Rectal bleeding     K62.5     JAIME (generalized anxiety disorder)     F41.1           Came to see Ingrid today to follow-up with reports of rectal bleeding with her rectal prolapse.  Ingrid happens to be using more pull-ups/briefs due to the rectal bleeding as she does not want to stay on those incontinent product.  Daughter has been working with facility and  for an increase of incontinence product delivery.  This nurse practitioner told the daughter via email that will check how her rectum looks, gauge how much she is bleeding, and check a hemoglobin.    Found Ingrid sitting in her recliner in her apartment which she did have nice and cool given the hot weather.  She is wearing her oxygen which was fine but did question her.  She got to the point of only wearing it if she was laying down or at night but she states lately she has been using it during the daytime.  She was in no acute distress.  At one point she did take the oxygen off and did not put it back on after the exam    ALLERGIES:    Allergies   Allergen Reactions     Dilaudid [Hydromorphone Hcl] Visual Disturbance     Hallucinations     Fosamax [Alendronate Sodium] Rash            Norvasc  [Amlodipine Besylate] Hives and Rash     Tegretol [Carbamazepine] Hives and Rash        MEDICATIONS:  Post Discharge Medication Reconciliation Status: patient was not discharged from an inpatient facility or TCU. No medication changes today    Current Outpatient Medications   Medication Sig Dispense Refill     acetaminophen (TYLENOL) 650 MG CR tablet 1300mg po every PM and 1300mg po twice a day as needed       artificial tears (GENTEAL) 0.1-0.2-0.3 % ophthalmic solution INSTILL ONE DROP INTO BOTH EYES TWICE DAILY 15 mL 4     busPIRone (BUSPAR) 10 MG tablet TAKE 1 TABLET (10 MG) BY MOUTH 3 TIMES DAILY 90 tablet 11     cholecalciferol (VITAMIN D3) 25 mcg (1000 units) capsule Take 1 capsule by mouth daily       citalopram (CELEXA) 10 MG tablet TAKE 1 TAB BY MOUTH ONCE DAILY 35 tablet 11     fluticasone (FLONASE) 50 MCG/ACT nasal spray USE 2 SPRAYS INTO BOTH NOSTRILS ONCE DAILY 16 g 11     furosemide (LASIX) 20 MG tablet TAKE TWO TABLETS (40MG) BY MOUTH ONCE DAILY IN THE MORNING; TAKE 1 TABLET (20MG) BY MOUTH ONCE DAILY IN THE EARLY AFTERNOON 84 tablet 11     gabapentin (NEURONTIN) 600 MG tablet TAKE 1 TABLET (600 MG) BY MOUTH AT BEDTIME 31 tablet 11     hypromellose-dextran (ARTIFICAL TEARS) 0.1-0.3 % ophthalmic solution Place 1 drop into both eyes 2 times daily 15 mL 0     levothyroxine (SYNTHROID/LEVOTHROID) 100 MCG tablet TAKE 1 TAB BY MOUTH ONCE DAILY AFTER TWO WEEKS OF LEVOTHYROXINE 75MCG 28 tablet 11     LORazepam (ATIVAN) 0.5 MG tablet TAKE 1 TABLET BY MOUTH THREE TIMES DAILY; 2 TABLETS AT HS; TAKE 1 TABLET BY MOUTH ONCE DAILY IF NEEDED 120 tablet 3     Menthol, Topical Analgesic, (BIOFREEZE) 4 % GEL Dime size gel to elbows and neck at HS and then TID prn       nitroGLYcerin (NITROSTAT) 0.4 MG sublingual tablet For chest pain place 1 tablet under the tongue every 5 minutes for 3 doses. If symptoms persist 5 minutes after 1st dose call 911. 6 tablet 4     nystatin (MYCOSTATIN) 641129 UNIT/GM external powder Apply  topically 2 times daily 30 g 4     oxyCODONE (ROXICODONE) 5 MG tablet Take 1 tablet (5 mg) by mouth every 4 hours as needed for pain 20 tablet 0     QUEtiapine (SEROQUEL) 25 MG tablet TAKE 1/2 TABLET (12.5 MG) BY MOUTH 3 TIMES DAILY 45 tablet 11     rOPINIRole (REQUIP) 0.5 MG tablet 0.25mg by mouth every AM and afternoon, 0.75mg po at bedtime 90 tablet 4     senna-docusate (SENOKOT-S/PERICOLACE) 8.6-50 MG tablet Take 1 tablet by mouth every other day And 1 tab by mouth daily as needed 60 tablet 11     traZODone (DESYREL) 100 MG tablet TAKE 1 TABLET (100 MG) BY MOUTH AT BEDTIME 30 tablet 11     vitamin B-12 (CYANOCOBALAMIN) 250 MCG tablet Take 250 mcg by mouth daily           REVIEW OF SYSTEMS:  4 point ROS neg other than the symptoms noted above in the HPI.  Denies any chest pain, no acute shortness of breath, states that she still gets nervous waiting for her medications.      PHYSICAL EXAM:  /62   Pulse 97   Temp 98.2  F (36.8  C)   Resp 18   Wt 95.2 kg (209 lb 12.8 oz)   LMP  (LMP Unknown)   SpO2 96%   BMI 34.91 kg/m    Ingrid is alert and oriented to person place and time.  Time can be vague but she is able to function within her environment.  She was invited to go out to lunch today but declined.  She does recognize that she is not as active as she was before.  Did talk about sitting outside on the nice days, in which she says she still does, but this nurse practitioner does not see her doing this when coming to the building.    Heart rate regular and strong S1 and S2 were heard.  No edema in her lower extremities.  Abdomen is obese and is proportioned to the rest of her body.  Soft, nontender, bowel sounds present.  She denies straining with her bowels and states sometimes she is not aware when she goes.  Did look at her rectum area.  Her opening is about the size of a golf ball and the prolapse is visible but not outside.  It appears to look nice beefy red.  No evidence of bleeding on her pull-up.   Did see 2 pull-ups or briefs folded up nicely in her trash can    Does ambulate with a 4 wheeled walker, in her apartment she can ambulate for short distances without a walker.  In fact she got up to go to the fridge to show this nurse practitioner what kind of ice cream she had as laughing she did not like Vanilla Bean flavor.    Lab Results   Component Value Date    HGB 11.9 05/11/2023       ASSESSMENT / PLAN:  (I50.22) Chronic systolic heart failure (H)  (primary encounter diagnosis)  Comment: Overall Ingrid is stable with her heart failure.  Has not had any acute exacerbations.  She continues to be on Lasix in the morning and afternoon.  Does have the oxygen which gives her mentally more comfort.  In the past she has admitted that with her history of cardiac issues she does not want to give up the oxygen.  No changes    (K62.3) Rectal prolapse  (K62.5) Rectal bleeding  Comment: Right now her rectal prolapse seems stable.  Do believe that there are times where she may have more bleeding or protrusion of the prolapse.  She is not a candidate to have for surgery and so the way it is going to be.  Right now it is a matter of getting the right supplies for incontinence product because of periodic bleeding.  Otherwise her rectal area looks nice and clean.  Explained to her how it looked and will send a message to her daughter as well to ease her mind.    (F41.1) JAIME (generalized anxiety disorder)  Comment: Ingrid continues to be on multiple medications for her anxiety and have not changed anything recently.  She still admits to being nervous in the evening time waiting for her medications.  Many times have talked her about the time she goes to bed she may wake up at a earlier time as well.  Do know that sometimes she can be on the call light at night but have not heard anything most recently.  Family continues to bring her ice cream which does give her comfort.  Feel that that is okay.  Do always look at her glucose  level whenever she gets a basic metabolic panel done.  Family is afraid she might get diabetes but at this point in her life not too worried.    Orders:  No new orders today.  Will be having a hemoglobin level done on Thursday which that order was put in yesterday.    Electronically signed by  NORA Balderas CNP            Sincerely,        NORA Balderas CNP

## 2023-07-24 NOTE — PROGRESS NOTES
[facility] GERIATRICS  ACUTE/EPISODIC VISIT    [facility] Medical Record Number:  [id number]  Place of Service where encounter took place:  [facility] ([facility]) [[id number]]    Chief Complaint   Patient presents with    RECHECK       HPI:    Ingrid Powell is a 77 year old  (1945), who is being seen today for an episodic care visit.  HPI information obtained from: facility chart records, facility staff, patient report, and family/first contact daughterLida report.    Today's concern is:    Diagnoses         Codes Comments    Rectal prolapse    -  Primary K62.3     Chronic systolic heart failure (H)     I50.22     Rectal bleeding     K62.5     JAIME (generalized anxiety disorder)     F41.1           Came to see Ingrid today to follow-up with reports of rectal bleeding with her rectal prolapse.  Ingrid happens to be using more pull-ups/briefs due to the rectal bleeding as she does not want to stay on those incontinent product.  Daughter has been working with facility and  for an increase of incontinence product delivery.  This nurse practitioner told the daughter via email that will check how her rectum looks, gauge how much she is bleeding, and check a hemoglobin.    Found Ingrid sitting in her recliner in her apartment which she did have nice and cool given the hot weather.  She is wearing her oxygen which was fine but did question her.  She got to the point of only wearing it if she was laying down or at night but she states lately she has been using it during the daytime.  She was in no acute distress.  At one point she did take the oxygen off and did not put it back on after the exam    ALLERGIES:    Allergies   Allergen Reactions    Dilaudid [Hydromorphone Hcl] Visual Disturbance     Hallucinations    Fosamax [Alendronate Sodium] Rash           Norvasc [Amlodipine Besylate] Hives and Rash    Tegretol [Carbamazepine] Hives and Rash        MEDICATIONS:  Post  Discharge Medication Reconciliation Status: patient was not discharged from an inpatient facility or TCU. No medication changes today    Current Outpatient Medications   Medication Sig Dispense Refill    acetaminophen (TYLENOL) 650 MG CR tablet 1300mg po every PM and 1300mg po twice a day as needed      artificial tears (GENTEAL) 0.1-0.2-0.3 % ophthalmic solution INSTILL ONE DROP INTO BOTH EYES TWICE DAILY 15 mL 4    busPIRone (BUSPAR) 10 MG tablet TAKE 1 TABLET (10 MG) BY MOUTH 3 TIMES DAILY 90 tablet 11    cholecalciferol (VITAMIN D3) 25 mcg (1000 units) capsule Take 1 capsule by mouth daily      citalopram (CELEXA) 10 MG tablet TAKE 1 TAB BY MOUTH ONCE DAILY 35 tablet 11    fluticasone (FLONASE) 50 MCG/ACT nasal spray USE 2 SPRAYS INTO BOTH NOSTRILS ONCE DAILY 16 g 11    furosemide (LASIX) 20 MG tablet TAKE TWO TABLETS (40MG) BY MOUTH ONCE DAILY IN THE MORNING; TAKE 1 TABLET (20MG) BY MOUTH ONCE DAILY IN THE EARLY AFTERNOON 84 tablet 11    gabapentin (NEURONTIN) 600 MG tablet TAKE 1 TABLET (600 MG) BY MOUTH AT BEDTIME 31 tablet 11    hypromellose-dextran (ARTIFICAL TEARS) 0.1-0.3 % ophthalmic solution Place 1 drop into both eyes 2 times daily 15 mL 0    levothyroxine (SYNTHROID/LEVOTHROID) 100 MCG tablet TAKE 1 TAB BY MOUTH ONCE DAILY AFTER TWO WEEKS OF LEVOTHYROXINE 75MCG 28 tablet 11    LORazepam (ATIVAN) 0.5 MG tablet TAKE 1 TABLET BY MOUTH THREE TIMES DAILY; 2 TABLETS AT HS; TAKE 1 TABLET BY MOUTH ONCE DAILY IF NEEDED 120 tablet 3    Menthol, Topical Analgesic, (BIOFREEZE) 4 % GEL Dime size gel to elbows and neck at HS and then TID prn      nitroGLYcerin (NITROSTAT) 0.4 MG sublingual tablet For chest pain place 1 tablet under the tongue every 5 minutes for 3 doses. If symptoms persist 5 minutes after 1st dose call 911. 6 tablet 4    nystatin (MYCOSTATIN) 672633 UNIT/GM external powder Apply topically 2 times daily 30 g 4    oxyCODONE (ROXICODONE) 5 MG tablet Take 1 tablet (5 mg) by mouth every 4 hours as  needed for pain 20 tablet 0    QUEtiapine (SEROQUEL) 25 MG tablet TAKE 1/2 TABLET (12.5 MG) BY MOUTH 3 TIMES DAILY 45 tablet 11    rOPINIRole (REQUIP) 0.5 MG tablet 0.25mg by mouth every AM and afternoon, 0.75mg po at bedtime 90 tablet 4    senna-docusate (SENOKOT-S/PERICOLACE) 8.6-50 MG tablet Take 1 tablet by mouth every other day And 1 tab by mouth daily as needed 60 tablet 11    traZODone (DESYREL) 100 MG tablet TAKE 1 TABLET (100 MG) BY MOUTH AT BEDTIME 30 tablet 11    vitamin B-12 (CYANOCOBALAMIN) 250 MCG tablet Take 250 mcg by mouth daily           REVIEW OF SYSTEMS:  4 point ROS neg other than the symptoms noted above in the HPI.  Denies any chest pain, no acute shortness of breath, states that she still gets nervous waiting for her medications.      PHYSICAL EXAM:  /62   Pulse 97   Temp 98.2  F (36.8  C)   Resp 18   Wt 95.2 kg (209 lb 12.8 oz)   LMP  (LMP Unknown)   SpO2 96%   BMI 34.91 kg/m    Ingrid is alert and oriented to person place and time.  Time can be vague but she is able to function within her environment.  She was invited to go out to lunch today but declined.  She does recognize that she is not as active as she was before.  Did talk about sitting outside on the nice days, in which she says she still does, but this nurse practitioner does not see her doing this when coming to the building.    Heart rate regular and strong S1 and S2 were heard.  No edema in her lower extremities.  Abdomen is obese and is proportioned to the rest of her body.  Soft, nontender, bowel sounds present.  She denies straining with her bowels and states sometimes she is not aware when she goes.  Did look at her rectum area.  Her opening is about the size of a golf ball and the prolapse is visible but not outside.  It appears to look nice beefy red.  No evidence of bleeding on her pull-up.  Did see 2 pull-ups or briefs folded up nicely in her trash can    Does ambulate with a 4 wheeled walker, in her  apartment she can ambulate for short distances without a walker.  In fact she got up to go to the fridge to show this nurse practitioner what kind of ice cream she had as laughing she did not like Vanilla Bean flavor.    Lab Results   Component Value Date    HGB 11.9 05/11/2023       ASSESSMENT / PLAN:  (I50.22) Chronic systolic heart failure (H)  (primary encounter diagnosis)  Comment: Overall Ingrid is stable with her heart failure.  Has not had any acute exacerbations.  She continues to be on Lasix in the morning and afternoon.  Does have the oxygen which gives her mentally more comfort.  In the past she has admitted that with her history of cardiac issues she does not want to give up the oxygen.  No changes    (K62.3) Rectal prolapse  (K62.5) Rectal bleeding  Comment: Right now her rectal prolapse seems stable.  Do believe that there are times where she may have more bleeding or protrusion of the prolapse.  She is not a candidate to have for surgery and so the way it is going to be.  Right now it is a matter of getting the right supplies for incontinence product because of periodic bleeding.  Otherwise her rectal area looks nice and clean.  Explained to her how it looked and will send a message to her daughter as well to ease her mind.    (F41.1) JAIME (generalized anxiety disorder)  Comment: Ingrid continues to be on multiple medications for her anxiety and have not changed anything recently.  She still admits to being nervous in the evening time waiting for her medications.  Many times have talked her about the time she goes to bed she may wake up at a earlier time as well.  Do know that sometimes she can be on the call light at night but have not heard anything most recently.  Family continues to bring her ice cream which does give her comfort.  Feel that that is okay.  Do always look at her glucose level whenever she gets a basic metabolic panel done.  Family is afraid she might get diabetes but at this point in  her life not too worried.    Orders:  No new orders today.  Will be having a hemoglobin level done on Thursday which that order was put in yesterday.    Electronically signed by  NORA Balderas CNP

## 2023-07-26 ENCOUNTER — LAB REQUISITION (OUTPATIENT)
Dept: LAB | Facility: CLINIC | Age: 78
End: 2023-07-26
Payer: COMMERCIAL

## 2023-07-26 DIAGNOSIS — K62.5 HEMORRHAGE OF ANUS AND RECTUM: ICD-10-CM

## 2023-07-27 LAB — HGB BLD-MCNC: 10.7 G/DL (ref 11.7–15.7)

## 2023-07-27 PROCEDURE — P9603 ONE-WAY ALLOW PRORATED MILES: HCPCS | Mod: ORL | Performed by: NURSE PRACTITIONER

## 2023-07-27 PROCEDURE — 36415 COLL VENOUS BLD VENIPUNCTURE: CPT | Mod: ORL | Performed by: NURSE PRACTITIONER

## 2023-07-27 PROCEDURE — 85018 HEMOGLOBIN: CPT | Mod: ORL | Performed by: NURSE PRACTITIONER

## 2023-08-09 ENCOUNTER — OFFICE VISIT (OUTPATIENT)
Dept: FAMILY MEDICINE | Facility: CLINIC | Age: 78
End: 2023-08-09
Payer: COMMERCIAL

## 2023-08-09 ENCOUNTER — ANCILLARY PROCEDURE (OUTPATIENT)
Dept: GENERAL RADIOLOGY | Facility: CLINIC | Age: 78
End: 2023-08-09
Attending: NURSE PRACTITIONER
Payer: COMMERCIAL

## 2023-08-09 ENCOUNTER — LAB REQUISITION (OUTPATIENT)
Dept: LAB | Facility: CLINIC | Age: 78
End: 2023-08-09
Payer: COMMERCIAL

## 2023-08-09 VITALS
BODY MASS INDEX: 35.99 KG/M2 | RESPIRATION RATE: 20 BRPM | DIASTOLIC BLOOD PRESSURE: 70 MMHG | HEART RATE: 94 BPM | WEIGHT: 216 LBS | SYSTOLIC BLOOD PRESSURE: 138 MMHG | TEMPERATURE: 97.7 F | HEIGHT: 65 IN | OXYGEN SATURATION: 96 %

## 2023-08-09 DIAGNOSIS — R52 GENERALIZED PAIN: ICD-10-CM

## 2023-08-09 DIAGNOSIS — E03.9 HYPOTHYROIDISM, UNSPECIFIED TYPE: ICD-10-CM

## 2023-08-09 DIAGNOSIS — K62.3 RECTAL PROLAPSE: ICD-10-CM

## 2023-08-09 DIAGNOSIS — M47.812 SPONDYLOSIS OF CERVICAL REGION WITHOUT MYELOPATHY OR RADICULOPATHY: ICD-10-CM

## 2023-08-09 DIAGNOSIS — E78.5 HYPERLIPIDEMIA LDL GOAL <130: ICD-10-CM

## 2023-08-09 DIAGNOSIS — D62 ACUTE POSTHEMORRHAGIC ANEMIA: ICD-10-CM

## 2023-08-09 DIAGNOSIS — M54.2 CERVICALGIA: ICD-10-CM

## 2023-08-09 DIAGNOSIS — Z23 NEED FOR SHINGLES VACCINE: ICD-10-CM

## 2023-08-09 DIAGNOSIS — I25.10 CORONARY ARTERY DISEASE INVOLVING NATIVE CORONARY ARTERY OF NATIVE HEART WITHOUT ANGINA PECTORIS: ICD-10-CM

## 2023-08-09 DIAGNOSIS — I50.20 UNSPECIFIED SYSTOLIC (CONGESTIVE) HEART FAILURE (H): ICD-10-CM

## 2023-08-09 DIAGNOSIS — Z00.01 ENCOUNTER FOR MEDICARE ANNUAL EXAMINATION WITH ABNORMAL FINDINGS: Primary | ICD-10-CM

## 2023-08-09 LAB
LDLC SERPL DIRECT ASSAY-MCNC: 102 MG/DL
TSH SERPL DL<=0.005 MIU/L-ACNC: 0.85 UIU/ML (ref 0.3–4.2)

## 2023-08-09 PROCEDURE — 36415 COLL VENOUS BLD VENIPUNCTURE: CPT | Performed by: NURSE PRACTITIONER

## 2023-08-09 PROCEDURE — G0438 PPPS, INITIAL VISIT: HCPCS | Performed by: NURSE PRACTITIONER

## 2023-08-09 PROCEDURE — 83721 ASSAY OF BLOOD LIPOPROTEIN: CPT | Performed by: NURSE PRACTITIONER

## 2023-08-09 PROCEDURE — 72040 X-RAY EXAM NECK SPINE 2-3 VW: CPT | Mod: TC | Performed by: RADIOLOGY

## 2023-08-09 PROCEDURE — 84443 ASSAY THYROID STIM HORMONE: CPT | Performed by: NURSE PRACTITIONER

## 2023-08-09 PROCEDURE — 99214 OFFICE O/P EST MOD 30 MIN: CPT | Mod: 25 | Performed by: NURSE PRACTITIONER

## 2023-08-09 RX ORDER — OXYCODONE HYDROCHLORIDE 5 MG/1
5 TABLET ORAL EVERY 4 HOURS PRN
Qty: 20 TABLET | Refills: 0 | Status: SHIPPED | OUTPATIENT
Start: 2023-08-09 | End: 2023-08-17

## 2023-08-09 ASSESSMENT — PATIENT HEALTH QUESTIONNAIRE - PHQ9
SUM OF ALL RESPONSES TO PHQ QUESTIONS 1-9: 17
5. POOR APPETITE OR OVEREATING: MORE THAN HALF THE DAYS

## 2023-08-09 ASSESSMENT — ANXIETY QUESTIONNAIRES
6. BECOMING EASILY ANNOYED OR IRRITABLE: NOT AT ALL
IF YOU CHECKED OFF ANY PROBLEMS ON THIS QUESTIONNAIRE, HOW DIFFICULT HAVE THESE PROBLEMS MADE IT FOR YOU TO DO YOUR WORK, TAKE CARE OF THINGS AT HOME, OR GET ALONG WITH OTHER PEOPLE: NOT DIFFICULT AT ALL
3. WORRYING TOO MUCH ABOUT DIFFERENT THINGS: MORE THAN HALF THE DAYS
1. FEELING NERVOUS, ANXIOUS, OR ON EDGE: NEARLY EVERY DAY
5. BEING SO RESTLESS THAT IT IS HARD TO SIT STILL: MORE THAN HALF THE DAYS
2. NOT BEING ABLE TO STOP OR CONTROL WORRYING: NEARLY EVERY DAY
GAD7 TOTAL SCORE: 15
GAD7 TOTAL SCORE: 15
7. FEELING AFRAID AS IF SOMETHING AWFUL MIGHT HAPPEN: NEARLY EVERY DAY

## 2023-08-09 ASSESSMENT — PAIN SCALES - GENERAL: PAINLEVEL: EXTREME PAIN (8)

## 2023-08-09 NOTE — LETTER
August 9, 2023      Ingrid Louis  C/O ENDER LOUIS  [address] [address]        To Whom It May Concern,       Ingrid Louis was seen in clinic today  Recommend she start Physical Therapy for her neck and shoulder pain.  Plain films done today  Here is a copy of her previous imaging   XR CERVICAL SPINE TWO-THREE VIEWS 1/11/2021 3:40 PM      COMPARISON: None     HISTORY: Cervicalgia                                                                      IMPRESSION: There is normal alignment of the cervical vertebrae;  however, there is straightening of normal cervical lordosis. Vertebral  body heights of the cervical spine are normal. Craniocervical  alignment is normal. There is no evidence for fracture of the cervical  spine. Moderate facet arthropathy throughout the cervical spine.  Moderate degenerative endplate spurring at the C3-C4, C5-C6 and C6-C7  levels.          Call with any questions or concerns.     Sincerely,        NORA Becker CNP

## 2023-08-09 NOTE — LETTER
August 10, 2023      Ingrid Louis  C/O ENDER LOUIS  [address] [address]        Dear ,    We are writing to inform you of your test results.    Normal thyroid function  Continue levothyroxine 100 mcg daily  LDL cholesterol meeting goal    Resulted Orders   LDL cholesterol direct   Result Value Ref Range    LDL Cholesterol Direct 102 (H) <100 mg/dL      Comment:      Age 2-19 years:  Desirable: 0-110 mg/dL   Borderline high: 110-129 mg/dL   High: >= 130 mg/dL    Age 20 years and older:  Desirable: <100mg/dL  Above desirable: 100-129 mg/dL   Borderline high: 130-159 mg/dL   High: 160-189 mg/dL   Very high: >= 190 mg/dL   TSH with free T4 reflex   Result Value Ref Range    TSH 0.85 0.30 - 4.20 uIU/mL       If you have any questions or concerns, please call the clinic at the number listed above.       Take Care,      Ary Sims, APRN CNP/dw

## 2023-08-09 NOTE — NURSING NOTE
"Chief Complaint   Patient presents with    Shoulder Pain     Neck    Rectal Problem    Medication Reconciliation     Lives at [facility] and unsure of medications       Initial /70   Pulse 94   Temp 97.7  F (36.5  C) (Tympanic)   Resp 20   Ht 1.651 m (5' 5\")   Wt 98 kg (216 lb)   LMP 06/15/1984 (Approximate)   SpO2 96%   BMI 35.94 kg/m   Estimated body mass index is 35.94 kg/m  as calculated from the following:    Height as of this encounter: 1.651 m (5' 5\").    Weight as of this encounter: 98 kg (216 lb).    Patient presents to the clinic using Walker    Is there anyone who you would like to be able to receive your results? No  If yes have patient fill out ELIZABETH      "

## 2023-08-09 NOTE — PROGRESS NOTES
"  Assessment & Plan     Encounter for Medicare annual examination with abnormal findings      Rectal prolapse    - Adult Colorectal Surgery  Referral    Coronary artery disease involving native coronary artery of native heart without angina pectoris  - LDL cholesterol direct    Hyperlipidemia LDL goal <130  - LDL cholesterol direct    Cervicalgia  Spondylosis of cervical region without myelopathy or radiculopathy  Symptomatic care strategies reviewed    - XR Cervical Spine 2/3 Views  - Physical Therapy Referral        Hypothyroidism, unspecified type  - TSH with free T4 reflex    Need for shingles vaccine  Thru pharmacy          BMI:   Estimated body mass index is 35.94 kg/m  as calculated from the following:    Height as of this encounter: 1.651 m (5' 5\").    Weight as of this encounter: 98 kg (216 lb).   Weight management plan: Discussed healthy diet and exercise guidelines    Work on weight loss  Regular exercise  Call or return to the clinic with any worsening of symptoms or no resolution. Patient/Parent verbalized understanding and is in agreement. Medication side effects reviewed.   Current Outpatient Medications   Medication Sig Dispense Refill    acetaminophen (TYLENOL) 650 MG CR tablet 1300mg po every PM and 1300mg po twice a day as needed      artificial tears (GENTEAL) 0.1-0.2-0.3 % ophthalmic solution INSTILL ONE DROP INTO BOTH EYES TWICE DAILY 15 mL 4    busPIRone (BUSPAR) 10 MG tablet TAKE 1 TABLET (10 MG) BY MOUTH 3 TIMES DAILY 90 tablet 11    cholecalciferol (VITAMIN D3) 25 mcg (1000 units) capsule Take 1 capsule by mouth daily      citalopram (CELEXA) 20 MG tablet Take 1 tablet (20 mg) by mouth daily 30 tablet 4    fluticasone (FLONASE) 50 MCG/ACT nasal spray USE 2 SPRAYS INTO BOTH NOSTRILS ONCE DAILY 16 g 11    furosemide (LASIX) 20 MG tablet TAKE TWO TABLETS (40MG) BY MOUTH ONCE DAILY IN THE MORNING; TAKE 1 TABLET (20MG) BY MOUTH ONCE DAILY IN THE EARLY AFTERNOON 84 tablet 11    " gabapentin (NEURONTIN) 600 MG tablet TAKE 1 TABLET (600 MG) BY MOUTH AT BEDTIME 31 tablet 11    hypromellose-dextran (ARTIFICAL TEARS) 0.1-0.3 % ophthalmic solution Place 1 drop into both eyes 2 times daily 15 mL 0    levothyroxine (SYNTHROID/LEVOTHROID) 100 MCG tablet TAKE 1 TAB BY MOUTH ONCE DAILY AFTER TWO WEEKS OF LEVOTHYROXINE 75MCG 28 tablet 11    LORazepam (ATIVAN) 0.5 MG tablet TAKE 1 TABLET BY MOUTH THREE TIMES DAILY; 2 TABLETS AT HS; TAKE 1 TABLET BY MOUTH ONCE DAILY IF NEEDED 120 tablet 3    Menthol, Topical Analgesic, (BIOFREEZE) 4 % GEL Dime size gel to elbows and neck at HS and then TID prn 150 mL 11    nitroGLYcerin (NITROSTAT) 0.4 MG sublingual tablet For chest pain place 1 tablet under the tongue every 5 minutes for 3 doses. If symptoms persist 5 minutes after 1st dose call 911. 6 tablet 4    nystatin (MYCOSTATIN) 788846 UNIT/GM external powder Apply topically 2 times daily 30 g 4    oxyCODONE (ROXICODONE) 5 MG tablet Take 1 tablet (5 mg) by mouth every 30 days. May also take 1 tablet (5 mg) every 4 hours as needed for pain. 30 tablet 0    QUEtiapine (SEROQUEL) 25 MG tablet TAKE 1/2 TABLET (12.5 MG) BY MOUTH 3 TIMES DAILY 45 tablet 11    rOPINIRole (REQUIP) 0.25 MG tablet TAKE 1 TABLET (0.25MG) BY MOUTH EVERY MORNING; TAKE 3 TABLETS (0.75MG) BY MOUTH EACH NIGHT AT BEDTIME; TAKE 1 TABLET (0.25MG) BY MOUTH EVERY AFTERNOON 140 tablet 11    rOPINIRole (REQUIP) 0.5 MG tablet 0.25mg by mouth every AM and afternoon, 0.75mg po at bedtime 90 tablet 4    senna-docusate (SENOKOT-S/PERICOLACE) 8.6-50 MG tablet Take 1 tablet by mouth every other day And 1 tab by mouth daily as needed 60 tablet 11    traZODone (DESYREL) 100 MG tablet TAKE 1 TABLET (100 MG) BY MOUTH AT BEDTIME 30 tablet 11    vitamin B-12 (CYANOCOBALAMIN) 250 MCG tablet Take 250 mcg by mouth daily       Chart documentation with Dragon Voice recognition Software. Although reviewed after completion, some words and grammatical errors may  remain.    See Patient Instructions    NORA Becker CNP [facility]    Km Quintero is a 77 year old, presenting for the following health issues:  Shoulder Pain (Neck), Rectal Problem, and Medication Reconciliation (Lives at [facility] and unsure of medications)        8/9/2023     2:15 PM   Additional Questions   Roomed by Raisa SAMUELS   Accompanied by Sister Gayla       Shoulder Pain    History of Present Illness       Reason for visit:  Neck shoulder pain and rectal bleeding  Symptom onset:  More than a month  Symptoms include:  Pain in neck shoulder and bleeding  Symptom intensity:  Severe  Symptom progression:  Staying the same  Had these symptoms before:  Yes  Has tried/received treatment for these symptoms:  Yes  Previous treatment was successful:  No    She eats 2-3 servings of fruits and vegetables daily.She consumes 0 sweetened beverage(s) daily.She exercises with enough effort to increase her heart rate 9 or less minutes per day.  She exercises with enough effort to increase her heart rate 3 or less days per week.   She is taking medications regularly.     Neck pain right side and left shoulder   No lee      Narrative & Impression   XR CERVICAL SPINE TWO-THREE VIEWS 1/11/2021 3:40 PM      COMPARISON: None     HISTORY: Cervicalgia                                                                      IMPRESSION: There is normal alignment of the cervical vertebrae;  however, there is straightening of normal cervical lordosis. Vertebral  body heights of the cervical spine are normal. Craniocervical  alignment is normal. There is no evidence for fracture of the cervical  spine. Moderate facet arthropathy throughout the cervical spine.  Moderate degenerative endplate spurring at the C3-C4, C5-C6 and C6-C7  levels.        Concern - Shoulder and neck pain mainly rt  Onset: months  Description: shoulder pain across both shoulders neck pain mainly rt  Intensity: 8/10 shoulders  " Neck 8/10  Progression of Symptoms:  worsening and constant  Accompanying Signs & Symptoms: all internal pain  Previous history of similar problem: yes  Precipitating factors:        Worsened by: Certain motions  Alleviating factors:        Improved by: none  Therapies tried and outcome: heat helps short term    Concern - Rectal bleeding  Rectal prolapse    Onset: ???? Getting worse  Description: Bright red blood can be with just urination not a BM  Intensity: mild, moderate, severe  Progression of Symptoms:  worsening  Accompanying Signs & Symptoms: none  Previous history of similar problem: yes  Precipitating factors:        Worsened by:   Alleviating factors:        Improved by:   Therapies tried and outcome: Was seen by someone at [place] and rectal prolapse was pushed back in place. Approx 3 months ago. [facility].    [facility] [place] since 5/2022      Annual Wellness Visit    Patient has been advised of split billing requirements and indicates understanding: Yes     Are you in the first 12 months of your Medicare Part B coverage?  No    Physical Health:  In general, how would you rate your overall physical health? poor  Outside of work, how many days during the week do you exercise?none  Outside of work, approximately how many minutes a day do you exercise?not applicable  If you drink alcohol do you typically have >3 drinks per day or >7 drinks per week? No  Do you usually eat at least 4 servings of fruit and vegetables a day, include whole grains & fiber and avoid regularly eating high fat or \"junk\" foods? No  Do you have any problems taking medications regularly? No  Do you have any side effects from medications? none  Needs assistance for the following daily activities:  [facility]  needs help  Which of the following safety concerns are present in your home?  none identified   Hearing impairment: No  In the past 6 months, have you been bothered by leaking of urine? " no    Mental Health:  In general, how would you rate your overall mental or emotional health? fair  PHQ-2 Score:      Do you feel safe in your environment? Yes    Have you ever done Advance Care Planning? (For example, a Health Directive, POLST, or a discussion with a medical provider or your loved ones about your wishes)? No, advance care planning information given to patient to review.  Patient plans to discuss their wishes with loved ones or provider.      Fall risk:  Fallen 2 or more times in the past year?: No  Any fall with injury in the past year?: No    Cognitive Screenin) Repeat 3 items (Leader, Season, Table)     2) Clock draw: NORMAL  3) 3 item recall: Recalls 1 object   Results: NORMAL clock, 1-2 items recalled: COGNITIVE IMPAIRMENT LESS LIKELY    Mini-CogTM Copyright S Joni. Licensed by the author for use in [facility]; reprinted with permission (douglas@[facility]). All rights reserved.      Do you have sleep apnea, excessive snoring or daytime drowsiness? : yes    Social History     Tobacco Use    Smoking status: Former     Packs/day: 0.50     Years: 35.00     Pack years: 17.50     Types: Cigarettes     Quit date: 10/3/1995     Years since quittin.8    Smokeless tobacco: Never   Substance Use Topics    Alcohol use: Yes     Comment: rare             2021     9:58 AM   Alcohol Use   Prescreen: >3 drinks/day or >7 drinks/week? Not Applicable          No data to display              Do you have a current opioid prescription? No  Do you use any other controlled substances or medications that are not prescribed by a provider? None    Current providers sharing in care for this patient include:   Patient Care Team:  Elsa Jeffries APRN CNP as PCP - General (Geriatric Medicine)  Yamel Noland, RN as Continuity Care Coordinator (Nurse)  Tejinder Tracy MD as MD (Oncology)  Nathan Michaels MD as MD (Cardiovascular Disease)  [facility]  "MD Fadi as Hospitalist (Family Medicine)  Onelia Alarcon as Case Management Specialist  Nathan Michaels MD as Assigned Heart and Vascular Provider  Angela Reid RN as Lead Care Coordinator  Gisella Goerge Umair Ur, MD as Assigned PCP  Cas Flores MD as MD (Neurology)    Patient has been advised of split billing requirements and indicates understanding: Yes        Review of Systems   Constitutional, HEENT, cardiovascular, pulmonary, GI, , musculoskeletal, neuro, skin, endocrine and psych systems are negative, except as otherwise noted.      Objective    /70   Pulse 94   Temp 97.7  F (36.5  C) (Tympanic)   Resp 20   Ht 1.651 m (5' 5\")   Wt 98 kg (216 lb)   LMP 06/15/1984 (Approximate)   SpO2 96%   BMI 35.94 kg/m    Body mass index is 35.94 kg/m .  Physical Exam   GENERAL: alert, pale, and sitting upright in WC  EYES: Eyes grossly normal to inspection, PERRL and conjunctivae and sclerae normal  HENT: ear canals and TM's normal, nose and mouth without ulcers or lesions  NECK: no adenopathy, no asymmetry, masses, or scars and thyroid normal to palpation  RESP: lungs clear to auscultation - no rales, rhonchi or wheezes  CV: regular rate and rhythm, normal S1 S2, no S3 or S4, no murmur, click or rub, no peripheral edema and peripheral pulses strong  ABDOMEN: soft, nontender, no hepatosplenomegaly, no masses and bowel sounds normal  MS: neck exam shows range of motion limited due to pain   SKIN: no suspicious lesions or rashes  NEURO: Normal strength and tone, mentation intact and speech normal  PSYCH: mentation appears normal, affect normal/bright    Results for orders placed or performed in visit on 08/09/23   XR Cervical Spine 2/3 Views     Status: None    Narrative    CERVICAL SPINE 2/3 VIEWS   8/9/2023 3:34 PM     HISTORY: Cervicalgia.    COMPARISON: Cervical spine x-rays 1/11/2021, cervical spine MRI  7/30/2015      Impression    IMPRESSION: Multilevel severe degenerative disc " disease (with relative  sparing at C2-3 and C7-T1), slightly worsened compared to the prior,  particularly at C4-5. Multilevel moderate to severe facet arthropathy.  Multilevel subtle grade 1 spondylolisthesis. The supraglottic larynx  appears thickened, potentially related to the vallecular cyst/cysts  with correlate on MRI from 2015, unchanged compared to the prior  x-ray. Direct visualization would be typically recommended if not  already performed. Mild height loss of multiple partially visualized  thoracic vertebral bodies, presumably chronic, similar compared to the  prior.    CAROLINA VALLEJO MD         SYSTEM ID:  [id number]   Results for orders placed or performed in visit on 08/09/23   LDL cholesterol direct     Status: Abnormal   Result Value Ref Range    LDL Cholesterol Direct 102 (H) <100 mg/dL   TSH with free T4 reflex     Status: Normal   Result Value Ref Range    TSH 0.85 0.30 - 4.20 uIU/mL   Results for orders placed or performed in visit on 08/10/23   Basic metabolic panel     Status: Abnormal   Result Value Ref Range    Sodium 140 136 - 145 mmol/L    Potassium 4.1 3.4 - 5.3 mmol/L    Chloride 98 98 - 107 mmol/L    Carbon Dioxide (CO2) 32 (H) 22 - 29 mmol/L    Anion Gap 10 7 - 15 mmol/L    Urea Nitrogen 15.3 8.0 - 23.0 mg/dL    Creatinine 0.82 0.51 - 0.95 mg/dL    Calcium 9.2 8.8 - 10.2 mg/dL    Glucose 97 70 - 99 mg/dL    GFR Estimate 72 >60 mL/min/1.73m2   CBC with platelets     Status: Abnormal   Result Value Ref Range    WBC Count 6.5 4.0 - 11.0 10e3/uL    RBC Count 3.82 3.80 - 5.20 10e6/uL    Hemoglobin 10.1 (L) 11.7 - 15.7 g/dL    Hematocrit 32.6 (L) 35.0 - 47.0 %    MCV 85 78 - 100 fL    MCH 26.4 (L) 26.5 - 33.0 pg    MCHC 31.0 (L) 31.5 - 36.5 g/dL    RDW 13.8 10.0 - 15.0 %    Platelet Count 261 150 - 450 10e3/uL

## 2023-08-10 LAB
ANION GAP SERPL CALCULATED.3IONS-SCNC: 10 MMOL/L (ref 7–15)
BUN SERPL-MCNC: 15.3 MG/DL (ref 8–23)
CALCIUM SERPL-MCNC: 9.2 MG/DL (ref 8.8–10.2)
CHLORIDE SERPL-SCNC: 98 MMOL/L (ref 98–107)
CREAT SERPL-MCNC: 0.82 MG/DL (ref 0.51–0.95)
DEPRECATED HCO3 PLAS-SCNC: 32 MMOL/L (ref 22–29)
ERYTHROCYTE [DISTWIDTH] IN BLOOD BY AUTOMATED COUNT: 13.8 % (ref 10–15)
GFR SERPL CREATININE-BSD FRML MDRD: 72 ML/MIN/1.73M2
GLUCOSE SERPL-MCNC: 97 MG/DL (ref 70–99)
HCT VFR BLD AUTO: 32.6 % (ref 35–47)
HGB BLD-MCNC: 10.1 G/DL (ref 11.7–15.7)
MCH RBC QN AUTO: 26.4 PG (ref 26.5–33)
MCHC RBC AUTO-ENTMCNC: 31 G/DL (ref 31.5–36.5)
MCV RBC AUTO: 85 FL (ref 78–100)
PLATELET # BLD AUTO: 261 10E3/UL (ref 150–450)
POTASSIUM SERPL-SCNC: 4.1 MMOL/L (ref 3.4–5.3)
RBC # BLD AUTO: 3.82 10E6/UL (ref 3.8–5.2)
SODIUM SERPL-SCNC: 140 MMOL/L (ref 136–145)
WBC # BLD AUTO: 6.5 10E3/UL (ref 4–11)

## 2023-08-10 PROCEDURE — 36415 COLL VENOUS BLD VENIPUNCTURE: CPT | Mod: ORL | Performed by: NURSE PRACTITIONER

## 2023-08-10 PROCEDURE — P9603 ONE-WAY ALLOW PRORATED MILES: HCPCS | Mod: ORL | Performed by: NURSE PRACTITIONER

## 2023-08-10 PROCEDURE — 80048 BASIC METABOLIC PNL TOTAL CA: CPT | Mod: ORL | Performed by: NURSE PRACTITIONER

## 2023-08-10 PROCEDURE — 85027 COMPLETE CBC AUTOMATED: CPT | Mod: ORL | Performed by: NURSE PRACTITIONER

## 2023-08-11 DIAGNOSIS — G25.81 RESTLESS LEG SYNDROME: Primary | ICD-10-CM

## 2023-08-11 RX ORDER — ROPINIROLE 0.25 MG/1
TABLET, FILM COATED ORAL
Qty: 140 TABLET | Refills: 11 | Status: SHIPPED | OUTPATIENT
Start: 2023-08-11

## 2023-08-23 ENCOUNTER — LAB REQUISITION (OUTPATIENT)
Dept: LAB | Facility: CLINIC | Age: 78
End: 2023-08-23
Payer: COMMERCIAL

## 2023-08-23 DIAGNOSIS — K62.5 HEMORRHAGE OF ANUS AND RECTUM: ICD-10-CM

## 2023-08-23 NOTE — TELEPHONE ENCOUNTER
Diagnosis, Referred by & from: Rectal Prolapse   Appt date: 2023   NOTES STATUS DETAILS   OFFICE NOTE from referring provider Internal [facility]:  23, 22 - PCC OV with Ary Sims NP   OFFICE NOTE from other specialist Internal [facility]:  10/5/23, 23 - GERIATRIC OV with Elsa Jeffries NP  1/10/18 - ONC OV with Dr. Tracy   DISCHARGE SUMMARY from hospital In process [facility]:  10/10/15 - Admission with Dr. Urbina   DISCHARGE REPORT from the ER [facility]:  17 - ED OV with Dr. Vazquez   OPERATIVE REPORT Care Everywhere / [facility]:  10/10/15 - OP Note for LAPAROSCOPIC APPENDECTOMY with Dr. Chamberlain  12 - OP Note for I&D ABDOMINAL WALL ABSCESS with Dr. Ephraim Andrea:  11 - OP Note for RECTOPEXY with Dr. Bueno   MEDICATION LIST Internal    LABS     ANAL PAP/CEA [facility]:  12 - Abdominal Wall (Case: )   BIOPSIES/PATHOLOGY RELATED TO DIAGNOSIS [facility]:  10/10/15 - Appendix Biopsy (Case: [id number])   DIAGNOSTIC PROCEDURES     COLONOSCOPY Received / [facility]:  21 - Colonoscopy    [facility]:  3/16/16 - Colonoscopy   IMAGING (DISC & REPORT)      XRAY [facility]:  22 - XR Abdomen  22 - XR Abdomen  20 - XR Pelvis/Hip   ULTRASOUND  (ENDOANAL/ENDORECTAL) [facility]:  19 - US Abdomen

## 2023-08-24 ENCOUNTER — ASSISTED LIVING VISIT (OUTPATIENT)
Dept: GERIATRICS | Facility: CLINIC | Age: 78
End: 2023-08-24
Payer: COMMERCIAL

## 2023-08-24 VITALS
BODY MASS INDEX: 35.58 KG/M2 | HEART RATE: 82 BPM | RESPIRATION RATE: 18 BRPM | TEMPERATURE: 98.9 F | OXYGEN SATURATION: 98 % | DIASTOLIC BLOOD PRESSURE: 88 MMHG | SYSTOLIC BLOOD PRESSURE: 178 MMHG | WEIGHT: 213.8 LBS

## 2023-08-24 DIAGNOSIS — F41.1 GAD (GENERALIZED ANXIETY DISORDER): ICD-10-CM

## 2023-08-24 DIAGNOSIS — R41.3 MEMORY DIFFICULTIES: ICD-10-CM

## 2023-08-24 DIAGNOSIS — D62 ANEMIA DUE TO BLOOD LOSS, ACUTE: ICD-10-CM

## 2023-08-24 DIAGNOSIS — F22 PARANOIA (H): Primary | ICD-10-CM

## 2023-08-24 DIAGNOSIS — R52 GENERALIZED PAIN: ICD-10-CM

## 2023-08-24 DIAGNOSIS — F33.1 MAJOR DEPRESSIVE DISORDER, RECURRENT EPISODE, MODERATE (H): ICD-10-CM

## 2023-08-24 LAB
ERYTHROCYTE [DISTWIDTH] IN BLOOD BY AUTOMATED COUNT: 13.8 % (ref 10–15)
HCT VFR BLD AUTO: 33.8 % (ref 35–47)
HGB BLD-MCNC: 10.5 G/DL (ref 11.7–15.7)
MCH RBC QN AUTO: 26.3 PG (ref 26.5–33)
MCHC RBC AUTO-ENTMCNC: 31.1 G/DL (ref 31.5–36.5)
MCV RBC AUTO: 85 FL (ref 78–100)
PLATELET # BLD AUTO: 271 10E3/UL (ref 150–450)
RBC # BLD AUTO: 3.99 10E6/UL (ref 3.8–5.2)
WBC # BLD AUTO: 9.4 10E3/UL (ref 4–11)

## 2023-08-24 PROCEDURE — 99349 HOME/RES VST EST MOD MDM 40: CPT | Performed by: NURSE PRACTITIONER

## 2023-08-24 PROCEDURE — P9603 ONE-WAY ALLOW PRORATED MILES: HCPCS | Mod: ORL | Performed by: NURSE PRACTITIONER

## 2023-08-24 PROCEDURE — 36415 COLL VENOUS BLD VENIPUNCTURE: CPT | Mod: ORL | Performed by: NURSE PRACTITIONER

## 2023-08-24 PROCEDURE — 85027 COMPLETE CBC AUTOMATED: CPT | Mod: ORL | Performed by: NURSE PRACTITIONER

## 2023-08-24 NOTE — LETTER
8/24/2023        RE: Ingrid Powell  C/o Daljit Powell  [address] [place] [address]        No notes on file      Sincerely,        NORA Balderas CNP

## 2023-08-24 NOTE — LETTER
8/24/2023        RE: Ingrid Powell  C/o Daljit Powell   [address] [facility] [address]        [facility] GERIATRICS  ACUTE/EPISODIC VISIT    [facility] Medical Record Number:  [id number]  Place of Service where encounter took place:  [facility] ([facility]) [[id number]]    Chief Complaint   Patient presents with     RECHECK       HPI:    Ingrid Powell is a 77 year old  (1945), who is being seen today for an episodic care visit.  HPI information obtained from: facility chart records, facility staff, and patient report.    Today's concern is:    Diagnoses         Codes Comments    Paranoia (H)    -  Primary F22     JAIME (generalized anxiety disorder)     F41.1     Major depressive disorder, recurrent episode, moderate (H)     F33.1     Memory difficulties     R41.3           Came to see Ingrid today due to concerns from nursing that she is using the call light quite often now with showing signs of increased anxiety and paranoia.  Reviewed her medications with nursing after the visit to make a decision about what would be best of how to treat Ingrid.    Ingrid has a long history of anxiety/depression.  She is on multiple medications to help with her anxiety and even despite she still has moments that we will breakthrough.  Do notice that this summer she has been more isolating in her room and not going outside to sit in the front area.  Even if the weather is too warm, she does not venture out of her apartment too often.  She will go downstairs for bingo, but that is the extent of it.  Most of her meals are brought up to her.    Found Ingrid laying in bed.  Easily aroused and she sat up on the bed to walk over to her recliner.  Typically this nurse practitioner and Ingrid can talk about anything that she enjoys having the visits.  A lot of times will go through her pictures that are up on the wall and on her book case as she enjoys talking about her family.    Main contact is her  daughter through email, often update her after the visits.  Ingrid sister comes to visit her as well and tries to get Ingrid to go out on outings.  Noticing that Ingrid is not doing that so much anymore.    It is noted that she was seen in the [place] clinic earlier this month for a Medicare wellness visit.  Also that provider referred her to a different colorectal provider for second opinion about Ingrid's rectal prolapse.    ALLERGIES:    Allergies   Allergen Reactions     Dilaudid [Hydromorphone Hcl] Visual Disturbance     Hallucinations     Fosamax [Alendronate Sodium] Rash            Norvasc [Amlodipine Besylate] Hives and Rash     Tegretol [Carbamazepine] Hives and Rash        MEDICATIONS:  Post Discharge Medication Reconciliation Status: patient was not discharged from an inpatient facility or TCU.     Current Outpatient Medications   Medication Sig Dispense Refill     acetaminophen (TYLENOL) 650 MG CR tablet 1300mg po every PM and 1300mg po twice a day as needed       artificial tears (GENTEAL) 0.1-0.2-0.3 % ophthalmic solution INSTILL ONE DROP INTO BOTH EYES TWICE DAILY 15 mL 4     busPIRone (BUSPAR) 10 MG tablet TAKE 1 TABLET (10 MG) BY MOUTH 3 TIMES DAILY 90 tablet 11     cholecalciferol (VITAMIN D3) 25 mcg (1000 units) capsule Take 1 capsule by mouth daily       citalopram (CELEXA) 20 MG tablet Take 1 tablet (20 mg) by mouth daily 30 tablet 4     fluticasone (FLONASE) 50 MCG/ACT nasal spray USE 2 SPRAYS INTO BOTH NOSTRILS ONCE DAILY 16 g 11     furosemide (LASIX) 20 MG tablet TAKE TWO TABLETS (40MG) BY MOUTH ONCE DAILY IN THE MORNING; TAKE 1 TABLET (20MG) BY MOUTH ONCE DAILY IN THE EARLY AFTERNOON 84 tablet 11     gabapentin (NEURONTIN) 600 MG tablet TAKE 1 TABLET (600 MG) BY MOUTH AT BEDTIME 31 tablet 11     hypromellose-dextran (ARTIFICAL TEARS) 0.1-0.3 % ophthalmic solution Place 1 drop into both eyes 2 times daily 15 mL 0     levothyroxine (SYNTHROID/LEVOTHROID) 100 MCG tablet TAKE 1 TAB BY MOUTH  ONCE DAILY AFTER TWO WEEKS OF LEVOTHYROXINE 75MCG 28 tablet 11     LORazepam (ATIVAN) 0.5 MG tablet TAKE 1 TABLET BY MOUTH THREE TIMES DAILY; 2 TABLETS AT HS; TAKE 1 TABLET BY MOUTH ONCE DAILY IF NEEDED 120 tablet 3     Menthol, Topical Analgesic, (BIOFREEZE) 4 % GEL Dime size gel to elbows and neck at HS and then TID prn 150 mL 11     nitroGLYcerin (NITROSTAT) 0.4 MG sublingual tablet For chest pain place 1 tablet under the tongue every 5 minutes for 3 doses. If symptoms persist 5 minutes after 1st dose call 911. 6 tablet 4     nystatin (MYCOSTATIN) 540140 UNIT/GM external powder Apply topically 2 times daily 30 g 4     oxyCODONE (ROXICODONE) 5 MG tablet Take 1 tablet (5 mg) by mouth every 30 days. May also take 1 tablet (5 mg) every 4 hours as needed for pain. 30 tablet 0     QUEtiapine (SEROQUEL) 25 MG tablet TAKE 1/2 TABLET (12.5 MG) BY MOUTH EVERY am AND 25MG IN AFTERNOON AND EVENING 45 tablet 11     rOPINIRole (REQUIP) 0.25 MG tablet TAKE 1 TABLET (0.25MG) BY MOUTH EVERY MORNING; TAKE 3 TABLETS (0.75MG) BY MOUTH EACH NIGHT AT BEDTIME; TAKE 1 TABLET (0.25MG) BY MOUTH EVERY AFTERNOON 140 tablet 11     rOPINIRole (REQUIP) 0.5 MG tablet 0.25mg by mouth every AM and afternoon, 0.75mg po at bedtime 90 tablet 4     senna-docusate (SENOKOT-S/PERICOLACE) 8.6-50 MG tablet Take 1 tablet by mouth every other day And 1 tab by mouth daily as needed 60 tablet 11     traZODone (DESYREL) 100 MG tablet TAKE 1 TABLET (100 MG) BY MOUTH AT BEDTIME 30 tablet 11     vitamin B-12 (CYANOCOBALAMIN) 250 MCG tablet Take 250 mcg by mouth daily         REVIEW OF SYSTEMS:  4 point ROS neg other than the symptoms noted above in the HPI.  Denies chest pain.  No acute shortness of breath but wears her oxygen almost continuously now.      PHYSICAL EXAM:  BP (!) 178/88   Pulse 82   Temp 98.9  F (37.2  C)   Resp 18   Wt 97 kg (213 lb 12.8 oz)   LMP 06/15/1984 (Approximate)   SpO2 98%   BMI 35.58 kg/m    Ingrid is alert and oriented to  person, place and most of time.  Often she will note herself that she is forgetful and will repeat the comment.  Skin is pink, dry, warm to touch.  No pitting edema in her lower extremities though her legs look thick in nature due to her body stature  Heart rate/rhythm is regular and strong  Lungs are clear to auscultation.  Does wear oxygen at 2 L/min.  Typically she does wear when she is sleeping but will see her have it on even when she is in her recliner.  Abdomen is round, soft, nontender to touch.  States her bowels are moving fine.  Occasional bleeding from her rectal prolapse.  Monitoring HgB levels.    Component      Latest Ref Rng 8/10/2023  6:10 AM 8/24/2023  6:11 AM   Sodium      136 - 145 mmol/L 140     Potassium      3.4 - 5.3 mmol/L 4.1     Chloride      98 - 107 mmol/L 98     Carbon Dioxide (CO2)      22 - 29 mmol/L 32 (H)     Anion Gap      7 - 15 mmol/L 10     Urea Nitrogen      8.0 - 23.0 mg/dL 15.3     Creatinine      0.51 - 0.95 mg/dL 0.82     Calcium      8.8 - 10.2 mg/dL 9.2     Glucose      70 - 99 mg/dL 97     GFR Estimate      >60 mL/min/1.73m2 72     WBC      4.0 - 11.0 10e3/uL  9.4    RBC Count      3.80 - 5.20 10e6/uL  3.99    Hemoglobin      11.7 - 15.7 g/dL  10.5 (L)    Hematocrit      35.0 - 47.0 %  33.8 (L)    MCV      78 - 100 fL  85    MCH      26.5 - 33.0 pg  26.3 (L)    MCHC      31.5 - 36.5 g/dL  31.1 (L)    RDW      10.0 - 15.0 %  13.8    Platelet Count      150 - 450 10e3/uL  271         ASSESSMENT / PLAN:  (F22) Paranoia (H)  (primary encounter diagnosis)  (F41.1) JAIME (generalized anxiety disorder)  (F33.1) Major depressive disorder, recurrent episode, moderate (HCC)  (R41.3) Memory difficulties  Comment: Most the time this nurse practitioner visits Ingrid to talk about her anxiety that she experiences on every day visit.  Ingrid expresses that she is very paranoid that they are not can to be bringing her medications in the evening on time as she claims that she sits and waits  and just stares at the clock for the 7:00 hour to arrive.  Spoke about her sleeping habits as well.  She likes her medications early so she thinks she can go to bed but then she wakes up in the middle the night and will eat ice cream and eventually go back to sleep.  Tried telling her that maybe she should sit up in not go to bed so early but did not feel that Ingrid was connecting the reason behind this.  Did mention to her that staff do notice that she has been putting on the call light more often.  It did make Ingrid feel bad but that was not the purpose of letting her know.  With her behaviors it just shows to the staff that things are changing for Ingrid and what can be done to make her life more comfortable.  Decided today to increase her Seroquel from 12.5 mg 3 times a day to the followin.5 mg in the morning and 25 mg in the afternoon and evening.  No other changes with medications    (D62) Anemia due to blood loss, acute  Comment: Monitoring her hemoglobin level due to bleeding that is being noticed by Ingrid herself but also family is questioning the amount of incontinent products that she has been using as she has been going through quite a bit of it.  So far her hemoglobin is staying in the 10's.  Has been higher but now seeing it drift down slowly.    Has an appointment for a second opinion for the colorectal surgeon to see if other alternatives for the prolapse rectum as Ingrid feels she can not tolerate surgery.      Orders:  Change Seroquel order to the followin.5mg in AM and 25mg in afternoon and evening for increased anxiety/paranoia    Electronically signed by  NORA Balderas CNP            Sincerely,        NORA Balderas CNP

## 2023-08-24 NOTE — PROGRESS NOTES
[facility] GERIATRICS  ACUTE/EPISODIC VISIT    [facility] Medical Record Number:  [id number]  Place of Service where encounter took place:  [facility] ([facility]) [[id number]]    Chief Complaint   Patient presents with    RECHECK       HPI:    Ingrid Powell is a 77 year old  (1945), who is being seen today for an episodic care visit.  HPI information obtained from: facility chart records, facility staff, and patient report.    Today's concern is:    Diagnoses         Codes Comments    Paranoia (H)    -  Primary F22     JAIME (generalized anxiety disorder)     F41.1     Major depressive disorder, recurrent episode, moderate (H)     F33.1     Memory difficulties     R41.3           Came to see Ingrid today due to concerns from nursing that she is using the call light quite often now with showing signs of increased anxiety and paranoia.  Reviewed her medications with nursing after the visit to make a decision about what would be best of how to treat Ingrid.    Ingrid has a long history of anxiety/depression.  She is on multiple medications to help with her anxiety and even despite she still has moments that we will breakthrough.  Do notice that this summer she has been more isolating in her room and not going outside to sit in the front area.  Even if the weather is too warm, she does not venture out of her apartment too often.  She will go downstairs for bingo, but that is the extent of it.  Most of her meals are brought up to her.    Found Ingrid laying in bed.  Easily aroused and she sat up on the bed to walk over to her recliner.  Typically this nurse practitioner and Ingrid can talk about anything that she enjoys having the visits.  A lot of times will go through her pictures that are up on the wall and on her book case as she enjoys talking about her family.    Main contact is her daughter through email, often update her after the visits.  Ingrid sister comes to visit her as well and tries  to get Ingrid to go out on outings.  Noticing that Ingrid is not doing that so much anymore.    It is noted that she was seen in the [place] clinic earlier this month for a Medicare wellness visit.  Also that provider referred her to a different colorectal provider for second opinion about Ingrid's rectal prolapse.    ALLERGIES:    Allergies   Allergen Reactions    Dilaudid [Hydromorphone Hcl] Visual Disturbance     Hallucinations    Fosamax [Alendronate Sodium] Rash           Norvasc [Amlodipine Besylate] Hives and Rash    Tegretol [Carbamazepine] Hives and Rash        MEDICATIONS:  Post Discharge Medication Reconciliation Status: patient was not discharged from an inpatient facility or TCU.     Current Outpatient Medications   Medication Sig Dispense Refill    acetaminophen (TYLENOL) 650 MG CR tablet 1300mg po every PM and 1300mg po twice a day as needed      artificial tears (GENTEAL) 0.1-0.2-0.3 % ophthalmic solution INSTILL ONE DROP INTO BOTH EYES TWICE DAILY 15 mL 4    busPIRone (BUSPAR) 10 MG tablet TAKE 1 TABLET (10 MG) BY MOUTH 3 TIMES DAILY 90 tablet 11    cholecalciferol (VITAMIN D3) 25 mcg (1000 units) capsule Take 1 capsule by mouth daily      citalopram (CELEXA) 20 MG tablet Take 1 tablet (20 mg) by mouth daily 30 tablet 4    fluticasone (FLONASE) 50 MCG/ACT nasal spray USE 2 SPRAYS INTO BOTH NOSTRILS ONCE DAILY 16 g 11    furosemide (LASIX) 20 MG tablet TAKE TWO TABLETS (40MG) BY MOUTH ONCE DAILY IN THE MORNING; TAKE 1 TABLET (20MG) BY MOUTH ONCE DAILY IN THE EARLY AFTERNOON 84 tablet 11    gabapentin (NEURONTIN) 600 MG tablet TAKE 1 TABLET (600 MG) BY MOUTH AT BEDTIME 31 tablet 11    hypromellose-dextran (ARTIFICAL TEARS) 0.1-0.3 % ophthalmic solution Place 1 drop into both eyes 2 times daily 15 mL 0    levothyroxine (SYNTHROID/LEVOTHROID) 100 MCG tablet TAKE 1 TAB BY MOUTH ONCE DAILY AFTER TWO WEEKS OF LEVOTHYROXINE 75MCG 28 tablet 11    LORazepam (ATIVAN) 0.5 MG tablet TAKE 1 TABLET BY MOUTH  THREE TIMES DAILY; 2 TABLETS AT HS; TAKE 1 TABLET BY MOUTH ONCE DAILY IF NEEDED 120 tablet 3    Menthol, Topical Analgesic, (BIOFREEZE) 4 % GEL Dime size gel to elbows and neck at HS and then TID prn 150 mL 11    nitroGLYcerin (NITROSTAT) 0.4 MG sublingual tablet For chest pain place 1 tablet under the tongue every 5 minutes for 3 doses. If symptoms persist 5 minutes after 1st dose call 911. 6 tablet 4    nystatin (MYCOSTATIN) 241967 UNIT/GM external powder Apply topically 2 times daily 30 g 4    oxyCODONE (ROXICODONE) 5 MG tablet Take 1 tablet (5 mg) by mouth every 30 days. May also take 1 tablet (5 mg) every 4 hours as needed for pain. 30 tablet 0    QUEtiapine (SEROQUEL) 25 MG tablet TAKE 1/2 TABLET (12.5 MG) BY MOUTH EVERY am AND 25MG IN AFTERNOON AND EVENING 45 tablet 11    rOPINIRole (REQUIP) 0.25 MG tablet TAKE 1 TABLET (0.25MG) BY MOUTH EVERY MORNING; TAKE 3 TABLETS (0.75MG) BY MOUTH EACH NIGHT AT BEDTIME; TAKE 1 TABLET (0.25MG) BY MOUTH EVERY AFTERNOON 140 tablet 11    rOPINIRole (REQUIP) 0.5 MG tablet 0.25mg by mouth every AM and afternoon, 0.75mg po at bedtime 90 tablet 4    senna-docusate (SENOKOT-S/PERICOLACE) 8.6-50 MG tablet Take 1 tablet by mouth every other day And 1 tab by mouth daily as needed 60 tablet 11    traZODone (DESYREL) 100 MG tablet TAKE 1 TABLET (100 MG) BY MOUTH AT BEDTIME 30 tablet 11    vitamin B-12 (CYANOCOBALAMIN) 250 MCG tablet Take 250 mcg by mouth daily         REVIEW OF SYSTEMS:  4 point ROS neg other than the symptoms noted above in the HPI.  Denies chest pain.  No acute shortness of breath but wears her oxygen almost continuously now.      PHYSICAL EXAM:  BP (!) 178/88   Pulse 82   Temp 98.9  F (37.2  C)   Resp 18   Wt 97 kg (213 lb 12.8 oz)   LMP 06/15/1984 (Approximate)   SpO2 98%   BMI 35.58 kg/m    Ingrid is alert and oriented to person, place and most of time.  Often she will note herself that she is forgetful and will repeat the comment.  Skin is pink, dry, warm  to touch.  No pitting edema in her lower extremities though her legs look thick in nature due to her body stature  Heart rate/rhythm is regular and strong  Lungs are clear to auscultation.  Does wear oxygen at 2 L/min.  Typically she does wear when she is sleeping but will see her have it on even when she is in her recliner.  Abdomen is round, soft, nontender to touch.  States her bowels are moving fine.  Occasional bleeding from her rectal prolapse.  Monitoring HgB levels.    Component      Latest Ref Rng 8/10/2023  6:10 AM 8/24/2023  6:11 AM   Sodium      136 - 145 mmol/L 140     Potassium      3.4 - 5.3 mmol/L 4.1     Chloride      98 - 107 mmol/L 98     Carbon Dioxide (CO2)      22 - 29 mmol/L 32 (H)     Anion Gap      7 - 15 mmol/L 10     Urea Nitrogen      8.0 - 23.0 mg/dL 15.3     Creatinine      0.51 - 0.95 mg/dL 0.82     Calcium      8.8 - 10.2 mg/dL 9.2     Glucose      70 - 99 mg/dL 97     GFR Estimate      >60 mL/min/1.73m2 72     WBC      4.0 - 11.0 10e3/uL  9.4    RBC Count      3.80 - 5.20 10e6/uL  3.99    Hemoglobin      11.7 - 15.7 g/dL  10.5 (L)    Hematocrit      35.0 - 47.0 %  33.8 (L)    MCV      78 - 100 fL  85    MCH      26.5 - 33.0 pg  26.3 (L)    MCHC      31.5 - 36.5 g/dL  31.1 (L)    RDW      10.0 - 15.0 %  13.8    Platelet Count      150 - 450 10e3/uL  271         ASSESSMENT / PLAN:  (F22) Paranoia (H)  (primary encounter diagnosis)  (F41.1) JAIME (generalized anxiety disorder)  (F33.1) Major depressive disorder, recurrent episode, moderate (HCC)  (R41.3) Memory difficulties  Comment: Most the time this nurse practitioner visits Ingrid to talk about her anxiety that she experiences on every day visit.  Ingrid expresses that she is very paranoid that they are not can to be bringing her medications in the evening on time as she claims that she sits and waits and just stares at the clock for the 7:00 hour to arrive.  Spoke about her sleeping habits as well.  She likes her medications early so  she thinks she can go to bed but then she wakes up in the middle the night and will eat ice cream and eventually go back to sleep.  Tried telling her that maybe she should sit up in not go to bed so early but did not feel that Ingrid was connecting the reason behind this.  Did mention to her that staff do notice that she has been putting on the call light more often.  It did make Ingrid feel bad but that was not the purpose of letting her know.  With her behaviors it just shows to the staff that things are changing for Ingrid and what can be done to make her life more comfortable.  Decided today to increase her Seroquel from 12.5 mg 3 times a day to the followin.5 mg in the morning and 25 mg in the afternoon and evening.  No other changes with medications    (D62) Anemia due to blood loss, acute  Comment: Monitoring her hemoglobin level due to bleeding that is being noticed by Ingrid herself but also family is questioning the amount of incontinent products that she has been using as she has been going through quite a bit of it.  So far her hemoglobin is staying in the 10's.  Has been higher but now seeing it drift down slowly.    Has an appointment for a second opinion for the colorectal surgeon to see if other alternatives for the prolapse rectum as Ingrid feels she can not tolerate surgery.      Orders:  Change Seroquel order to the followin.5mg in AM and 25mg in afternoon and evening for increased anxiety/paranoia    Electronically signed by  NORA Balderas CNP

## 2023-09-06 ENCOUNTER — LAB REQUISITION (OUTPATIENT)
Dept: LAB | Facility: CLINIC | Age: 78
End: 2023-09-06
Payer: COMMERCIAL

## 2023-09-06 DIAGNOSIS — D64.9 ANEMIA, UNSPECIFIED: ICD-10-CM

## 2023-09-07 ENCOUNTER — ASSISTED LIVING VISIT (OUTPATIENT)
Dept: GERIATRICS | Facility: CLINIC | Age: 78
End: 2023-09-07
Payer: COMMERCIAL

## 2023-09-07 VITALS
HEART RATE: 82 BPM | DIASTOLIC BLOOD PRESSURE: 88 MMHG | OXYGEN SATURATION: 98 % | TEMPERATURE: 98.9 F | SYSTOLIC BLOOD PRESSURE: 178 MMHG | WEIGHT: 213.8 LBS | BODY MASS INDEX: 35.58 KG/M2 | RESPIRATION RATE: 18 BRPM

## 2023-09-07 DIAGNOSIS — F33.1 MAJOR DEPRESSIVE DISORDER, RECURRENT EPISODE, MODERATE (H): ICD-10-CM

## 2023-09-07 DIAGNOSIS — K62.5 RECTAL BLEEDING: ICD-10-CM

## 2023-09-07 DIAGNOSIS — F22 PARANOIA (H): ICD-10-CM

## 2023-09-07 DIAGNOSIS — I50.22 CHRONIC SYSTOLIC HEART FAILURE (H): Primary | ICD-10-CM

## 2023-09-07 DIAGNOSIS — D62 ANEMIA DUE TO BLOOD LOSS, ACUTE: ICD-10-CM

## 2023-09-07 DIAGNOSIS — G31.84 MILD COGNITIVE IMPAIRMENT: ICD-10-CM

## 2023-09-07 DIAGNOSIS — F41.1 GAD (GENERALIZED ANXIETY DISORDER): ICD-10-CM

## 2023-09-07 LAB — HGB BLD-MCNC: 10 G/DL (ref 11.7–15.7)

## 2023-09-07 PROCEDURE — 99349 HOME/RES VST EST MOD MDM 40: CPT | Performed by: NURSE PRACTITIONER

## 2023-09-07 PROCEDURE — 85018 HEMOGLOBIN: CPT | Mod: ORL | Performed by: NURSE PRACTITIONER

## 2023-09-07 PROCEDURE — 36415 COLL VENOUS BLD VENIPUNCTURE: CPT | Mod: ORL | Performed by: NURSE PRACTITIONER

## 2023-09-07 PROCEDURE — P9603 ONE-WAY ALLOW PRORATED MILES: HCPCS | Mod: ORL | Performed by: NURSE PRACTITIONER

## 2023-09-07 RX ORDER — CITALOPRAM HYDROBROMIDE 10 MG/1
TABLET ORAL
Qty: 11 TABLET | Refills: 0 | Status: SHIPPED | OUTPATIENT
Start: 2023-09-07 | End: 2023-01-01

## 2023-09-07 RX ORDER — FLUOXETINE 10 MG/1
CAPSULE ORAL
Qty: 30 CAPSULE | Refills: 4 | Status: SHIPPED | OUTPATIENT
Start: 2023-09-11 | End: 2023-01-01

## 2023-09-07 RX ORDER — QUETIAPINE FUMARATE 25 MG/1
TABLET, FILM COATED ORAL
Qty: 45 TABLET | Refills: 11
Start: 2023-08-24

## 2023-09-07 NOTE — LETTER
9/7/2023        RE: Ingrid Powell  C/o Daljit Powell  [address] [address]        [facility] GERIATRICS  ACUTE/EPISODIC VISIT    [facility] Medical Record Number:  [id number]  Place of Service where encounter took place:  [facility] ([facility]) [[id number]]    Chief Complaint   Patient presents with     RECHECK       HPI:    Ingrid Powell is a 77 year old  (1945), who is being seen today for an episodic care visit.  HPI information obtained from: facility chart records, facility staff, patient report, [facility] chart review, and family/first contact sister was present report.    Today's concern is:    Diagnoses         Codes Comments    Chronic systolic heart failure (H)    -  Primary I50.22     Anemia due to blood loss, acute     D62     Rectal bleeding     K62.5     Major depressive disorder, recurrent episode, moderate (H)     F33.1     JAIME (generalized anxiety disorder)     F41.1     Mild cognitive impairment     G31.84           Came to see how Ingrid was doing today.  Her sister was about to leave and tried to go see someone else but the sister (Ranulfo) wanted to meet this NP.  Nice to put a face with a name has she and this NP hear a lot about each other all the time.    Today Ingrid's sister brought her 4 - half gallons of ice cream.  She told Ingrid she did not want to be called until the 20th asking for more ice cream.  On the calendar, there was a entry of 2 ice creams brought to her on Monday and it was already gone.  Ingrid stated she will call down to the kitchen at night to bright up some ice cream in a large cup.  The other issue is that Ingrid has lost interest in anything she use to do.  Her sister counted 5 things that she use to do as she enjoyed them and is not doing now.  No one can figure out why?  Do not think cognitively she has advanced with her memory loss but it is a thought?      Did speak about her mother having Depression pretty bad but  Ingrid could not remember what she took and so she called her sister and it was Prozac.  Asked if Ingrid wanted to try Prozac instead of Celexa?  Maybe what helped her mother may help Ingrid and she thought it was a good thought as she does not understand why she does not do things she use to like to do.  She got new glasses so encouraged her to start reading again.      After the visit, she got ready for [employer] and this NP was down in the game room and she walked in.  Almost did not recognized her as she really fixed her hair and was in a really good mood to the other residents.      ALLERGIES:    Allergies   Allergen Reactions     Dilaudid [Hydromorphone Hcl] Visual Disturbance     Hallucinations     Fosamax [Alendronate Sodium] Rash            Norvasc [Amlodipine Besylate] Hives and Rash     Tegretol [Carbamazepine] Hives and Rash        MEDICATIONS:  Post Discharge Medication Reconciliation Status: patient was not discharged from an inpatient facility or TCU.     Current Outpatient Medications   Medication Sig Dispense Refill     citalopram (CELEXA) 10 MG tablet Starting on 9/11/23,10mg by mouth daily x 7 days then decrease to 10mg every other day x 4 doses 11 tablet 0     [START ON 9/11/2023] FLUoxetine (PROZAC) 10 MG capsule On 9/11, start Prozac 10mg po daily x 7 days and then increase to 20mg po daily 30 capsule 4     acetaminophen (TYLENOL) 650 MG CR tablet 1300mg po every PM and 1300mg po twice a day as needed       artificial tears (GENTEAL) 0.1-0.2-0.3 % ophthalmic solution INSTILL ONE DROP INTO BOTH EYES TWICE DAILY 15 mL 4     busPIRone (BUSPAR) 10 MG tablet TAKE 1 TABLET (10 MG) BY MOUTH 3 TIMES DAILY 90 tablet 11     cholecalciferol (VITAMIN D3) 25 mcg (1000 units) capsule Take 1 capsule by mouth daily       fluticasone (FLONASE) 50 MCG/ACT nasal spray USE 2 SPRAYS INTO BOTH NOSTRILS ONCE DAILY 16 g 11     furosemide (LASIX) 20 MG tablet TAKE TWO TABLETS (40MG) BY MOUTH ONCE DAILY IN THE MORNING; TAKE 1  TABLET (20MG) BY MOUTH ONCE DAILY IN THE EARLY AFTERNOON 84 tablet 11     gabapentin (NEURONTIN) 600 MG tablet TAKE 1 TABLET (600 MG) BY MOUTH AT BEDTIME 31 tablet 11     hypromellose-dextran (ARTIFICAL TEARS) 0.1-0.3 % ophthalmic solution Place 1 drop into both eyes 2 times daily 15 mL 0     levothyroxine (SYNTHROID/LEVOTHROID) 100 MCG tablet TAKE 1 TAB BY MOUTH ONCE DAILY AFTER TWO WEEKS OF LEVOTHYROXINE 75MCG 28 tablet 11     LORazepam (ATIVAN) 0.5 MG tablet TAKE 1 TABLET BY MOUTH THREE TIMES DAILY; 2 TABLETS AT HS; TAKE 1 TABLET BY MOUTH ONCE DAILY IF NEEDED 120 tablet 3     Menthol, Topical Analgesic, (BIOFREEZE) 4 % GEL Dime size gel to elbows and neck at HS and then TID prn 150 mL 11     nitroGLYcerin (NITROSTAT) 0.4 MG sublingual tablet For chest pain place 1 tablet under the tongue every 5 minutes for 3 doses. If symptoms persist 5 minutes after 1st dose call 911. 6 tablet 4     nystatin (MYCOSTATIN) 107056 UNIT/GM external powder Apply topically 2 times daily 30 g 4     oxyCODONE (ROXICODONE) 5 MG tablet Take 1 tablet (5 mg) by mouth every 30 days. May also take 1 tablet (5 mg) every 4 hours as needed for pain. 30 tablet 0     QUEtiapine (SEROQUEL) 25 MG tablet TAKE 1/2 TABLET (12.5 MG) BY MOUTH 3 TIMES DAILY 45 tablet 11     rOPINIRole (REQUIP) 0.25 MG tablet TAKE 1 TABLET (0.25MG) BY MOUTH EVERY MORNING; TAKE 3 TABLETS (0.75MG) BY MOUTH EACH NIGHT AT BEDTIME; TAKE 1 TABLET (0.25MG) BY MOUTH EVERY AFTERNOON 140 tablet 11     rOPINIRole (REQUIP) 0.5 MG tablet 0.25mg by mouth every AM and afternoon, 0.75mg po at bedtime 90 tablet 4     senna-docusate (SENOKOT-S/PERICOLACE) 8.6-50 MG tablet Take 1 tablet by mouth every other day And 1 tab by mouth daily as needed 60 tablet 11     traZODone (DESYREL) 100 MG tablet TAKE 1 TABLET (100 MG) BY MOUTH AT BEDTIME 30 tablet 11     vitamin B-12 (CYANOCOBALAMIN) 250 MCG tablet Take 250 mcg by mouth daily         REVIEW OF SYSTEMS:  4 point ROS neg other than the  symptoms noted above in the HPI.  No chest pain, no shortness of breath.  No stomach pain.      PHYSICAL EXAM:  BP (!) 178/88   Pulse 82   Temp 98.9  F (37.2  C)   Resp 18   Wt 97 kg (213 lb 12.8 oz)   LMP 06/15/1984 (Approximate)   SpO2 98%   BMI 35.58 kg/m    Alert and oriented x3.  She needs a calendar to know date etc.  She is forgetful but she did not say that today out of frustration.   Her hair looks like she was sleeping all morning, but for BINGO, she had it fixed really nice.    Ambulates with a 4WW but around her apartment she typically does not use it.    Heart rate regular and strong.    Lungs are clear.  No wearing her oxygen this time like she did the last visit.    No edema in feet.    Lab Results   Component Value Date    HGB 10.0 09/07/2023    HGB 10.5 08/24/2023           ASSESSMENT / PLAN:  (I50.22) Chronic systolic heart failure (H)  (primary encounter diagnosis)  Comment: remains on Lasix 40mg in AM and 20mg in afternoon.  Wears the O2 at night as she does not want to die.  More of a security blanket.  No acute exacerbations noted.      (D62) Anemia due to blood loss, acute  (K62.5) Rectal bleeding  Comment: Ingrid will have a second opinion for her rectal prolapse in November?  For the time being will follow her HgB level every other week for about 6-8 weeks.  Ingrid will wear briefs in case there is bleeding present.  HgB fairly stable at this time.  She has been in the 10's most of the time.    (F33.1) Major depressive disorder, recurrent episode, moderate (HCC)  (F41.1) JAIME (generalized anxiety disorder)  (G31.84) Mild cognitive impairment  Comment: did not have the intention today of changing her medications but conversation flowed and suggested changing her Celexa to something else since not recommended to go any higher than 20mg.    Her mother was on Prozac and so will try Ingrid on that medication.  Explained to her while tapering her on medication, moving one down and one up.     Will send her daughter a email to let her know of the changes.    Plan: citalopram (CELEXA) 10 MG tablet, FLUoxetine         (PROZAC) 10 MG capsule      Orders:   On 9/11/23, lower Celexa to 10mg po daily x7 days then 10mg every other day x4 doses then discontinue  On 9/11/23, start Prozac 10mg po daily for 7 days then increase to 20mg po daily - depression with anxiety      Electronically signed by  NORA Balderas CNP            Sincerely,        NORA Balderas CNP

## 2023-09-07 NOTE — PROGRESS NOTES
[facility] GERIATRICS  ACUTE/EPISODIC VISIT    [facility] Medical Record Number:  [id number]  Place of Service where encounter took place:  [facility] ([facility]) [[id number]]    Chief Complaint   Patient presents with    RECHECK       HPI:    Ingrid Powell is a 77 year old  (1945), who is being seen today for an episodic care visit.  HPI information obtained from: facility chart records, facility staff, patient report, [facility] chart review, and family/first contact sister was present report.    Today's concern is:    Diagnoses         Codes Comments    Chronic systolic heart failure (H)    -  Primary I50.22     Anemia due to blood loss, acute     D62     Rectal bleeding     K62.5     Major depressive disorder, recurrent episode, moderate (H)     F33.1     JAIME (generalized anxiety disorder)     F41.1     Mild cognitive impairment     G31.84           Came to see how Ingrid was doing today.  Her sister was about to leave and tried to go see someone else but the sister (Ranulfo) wanted to meet this NP.  Nice to put a face with a name has she and this NP hear a lot about each other all the time.    Today Ingrid's sister brought her 4 - half gallons of ice cream.  She told Ingrid she did not want to be called until the 20th asking for more ice cream.  On the calendar, there was a entry of 2 ice creams brought to her on Monday and it was already gone.  Ingrid stated she will call down to the kitchen at night to bright up some ice cream in a large cup.  The other issue is that Ingrid has lost interest in anything she use to do.  Her sister counted 5 things that she use to do as she enjoyed them and is not doing now.  No one can figure out why?  Do not think cognitively she has advanced with her memory loss but it is a thought?      Did speak about her mother having Depression pretty bad but Ingrid could not remember what she took and so she called her sister and it was Prozac.  Asked if Ingrid  wanted to try Prozac instead of Celexa?  Maybe what helped her mother may help Ingrid and she thought it was a good thought as she does not understand why she does not do things she use to like to do.  She got new glasses so encouraged her to start reading again.      After the visit, she got ready for [employer] and this NP was down in the game room and she walked in.  Almost did not recognized her as she really fixed her hair and was in a really good mood to the other residents.      ALLERGIES:    Allergies   Allergen Reactions    Dilaudid [Hydromorphone Hcl] Visual Disturbance     Hallucinations    Fosamax [Alendronate Sodium] Rash           Norvasc [Amlodipine Besylate] Hives and Rash    Tegretol [Carbamazepine] Hives and Rash        MEDICATIONS:  Post Discharge Medication Reconciliation Status: patient was not discharged from an inpatient facility or TCU.     Current Outpatient Medications   Medication Sig Dispense Refill    citalopram (CELEXA) 10 MG tablet Starting on 9/11/23,10mg by mouth daily x 7 days then decrease to 10mg every other day x 4 doses 11 tablet 0    [START ON 9/11/2023] FLUoxetine (PROZAC) 10 MG capsule On 9/11, start Prozac 10mg po daily x 7 days and then increase to 20mg po daily 30 capsule 4    acetaminophen (TYLENOL) 650 MG CR tablet 1300mg po every PM and 1300mg po twice a day as needed      artificial tears (GENTEAL) 0.1-0.2-0.3 % ophthalmic solution INSTILL ONE DROP INTO BOTH EYES TWICE DAILY 15 mL 4    busPIRone (BUSPAR) 10 MG tablet TAKE 1 TABLET (10 MG) BY MOUTH 3 TIMES DAILY 90 tablet 11    cholecalciferol (VITAMIN D3) 25 mcg (1000 units) capsule Take 1 capsule by mouth daily      fluticasone (FLONASE) 50 MCG/ACT nasal spray USE 2 SPRAYS INTO BOTH NOSTRILS ONCE DAILY 16 g 11    furosemide (LASIX) 20 MG tablet TAKE TWO TABLETS (40MG) BY MOUTH ONCE DAILY IN THE MORNING; TAKE 1 TABLET (20MG) BY MOUTH ONCE DAILY IN THE EARLY AFTERNOON 84 tablet 11    gabapentin (NEURONTIN) 600 MG tablet TAKE  1 TABLET (600 MG) BY MOUTH AT BEDTIME 31 tablet 11    hypromellose-dextran (ARTIFICAL TEARS) 0.1-0.3 % ophthalmic solution Place 1 drop into both eyes 2 times daily 15 mL 0    levothyroxine (SYNTHROID/LEVOTHROID) 100 MCG tablet TAKE 1 TAB BY MOUTH ONCE DAILY AFTER TWO WEEKS OF LEVOTHYROXINE 75MCG 28 tablet 11    LORazepam (ATIVAN) 0.5 MG tablet TAKE 1 TABLET BY MOUTH THREE TIMES DAILY; 2 TABLETS AT HS; TAKE 1 TABLET BY MOUTH ONCE DAILY IF NEEDED 120 tablet 3    Menthol, Topical Analgesic, (BIOFREEZE) 4 % GEL Dime size gel to elbows and neck at HS and then TID prn 150 mL 11    nitroGLYcerin (NITROSTAT) 0.4 MG sublingual tablet For chest pain place 1 tablet under the tongue every 5 minutes for 3 doses. If symptoms persist 5 minutes after 1st dose call 911. 6 tablet 4    nystatin (MYCOSTATIN) 346499 UNIT/GM external powder Apply topically 2 times daily 30 g 4    oxyCODONE (ROXICODONE) 5 MG tablet Take 1 tablet (5 mg) by mouth every 30 days. May also take 1 tablet (5 mg) every 4 hours as needed for pain. 30 tablet 0    QUEtiapine (SEROQUEL) 25 MG tablet TAKE 1/2 TABLET (12.5 MG) BY MOUTH 3 TIMES DAILY 45 tablet 11    rOPINIRole (REQUIP) 0.25 MG tablet TAKE 1 TABLET (0.25MG) BY MOUTH EVERY MORNING; TAKE 3 TABLETS (0.75MG) BY MOUTH EACH NIGHT AT BEDTIME; TAKE 1 TABLET (0.25MG) BY MOUTH EVERY AFTERNOON 140 tablet 11    rOPINIRole (REQUIP) 0.5 MG tablet 0.25mg by mouth every AM and afternoon, 0.75mg po at bedtime 90 tablet 4    senna-docusate (SENOKOT-S/PERICOLACE) 8.6-50 MG tablet Take 1 tablet by mouth every other day And 1 tab by mouth daily as needed 60 tablet 11    traZODone (DESYREL) 100 MG tablet TAKE 1 TABLET (100 MG) BY MOUTH AT BEDTIME 30 tablet 11    vitamin B-12 (CYANOCOBALAMIN) 250 MCG tablet Take 250 mcg by mouth daily         REVIEW OF SYSTEMS:  4 point ROS neg other than the symptoms noted above in the HPI.  No chest pain, no shortness of breath.  No stomach pain.      PHYSICAL EXAM:  BP (!) 178/88   Pulse  82   Temp 98.9  F (37.2  C)   Resp 18   Wt 97 kg (213 lb 12.8 oz)   LMP 06/15/1984 (Approximate)   SpO2 98%   BMI 35.58 kg/m    Alert and oriented x3.  She needs a calendar to know date etc.  She is forgetful but she did not say that today out of frustration.   Her hair looks like she was sleeping all morning, but for BINGO, she had it fixed really nice.    Ambulates with a 4WW but around her apartment she typically does not use it.    Heart rate regular and strong.    Lungs are clear.  No wearing her oxygen this time like she did the last visit.    No edema in feet.    Lab Results   Component Value Date    HGB 10.0 09/07/2023    HGB 10.5 08/24/2023           ASSESSMENT / PLAN:  (I50.22) Chronic systolic heart failure (H)  (primary encounter diagnosis)  Comment: remains on Lasix 40mg in AM and 20mg in afternoon.  Wears the O2 at night as she does not want to die.  More of a security blanket.  No acute exacerbations noted.      (D62) Anemia due to blood loss, acute  (K62.5) Rectal bleeding  Comment: Ingrid will have a second opinion for her rectal prolapse in November?  For the time being will follow her HgB level every other week for about 6-8 weeks.  Ingrid will wear briefs in case there is bleeding present.  HgB fairly stable at this time.  She has been in the 10's most of the time.    (F33.1) Major depressive disorder, recurrent episode, moderate (HCC)  (F41.1) JAIME (generalized anxiety disorder)  (G31.84) Mild cognitive impairment  Comment: did not have the intention today of changing her medications but conversation flowed and suggested changing her Celexa to something else since not recommended to go any higher than 20mg.    Her mother was on Prozac and so will try Ingrid on that medication.  Explained to her while tapering her on medication, moving one down and one up.    Will send her daughter a email to let her know of the changes.    Plan: citalopram (CELEXA) 10 MG tablet, FLUoxetine         (PROZAC) 10  MG capsule      Orders:   On 9/11/23, lower Celexa to 10mg po daily x7 days then 10mg every other day x4 doses then discontinue  On 9/11/23, start Prozac 10mg po daily for 7 days then increase to 20mg po daily - depression with anxiety      Electronically signed by  NORA Balderas CNP

## 2023-09-20 ENCOUNTER — LAB REQUISITION (OUTPATIENT)
Dept: LAB | Facility: CLINIC | Age: 78
End: 2023-09-20
Payer: COMMERCIAL

## 2023-09-20 DIAGNOSIS — D64.9 ANEMIA, UNSPECIFIED: ICD-10-CM

## 2023-09-21 LAB — HGB BLD-MCNC: 9.5 G/DL (ref 11.7–15.7)

## 2023-09-21 PROCEDURE — 36415 COLL VENOUS BLD VENIPUNCTURE: CPT | Mod: ORL | Performed by: NURSE PRACTITIONER

## 2023-09-21 PROCEDURE — 85018 HEMOGLOBIN: CPT | Mod: ORL | Performed by: NURSE PRACTITIONER

## 2023-09-21 PROCEDURE — P9603 ONE-WAY ALLOW PRORATED MILES: HCPCS | Mod: ORL | Performed by: NURSE PRACTITIONER

## 2023-09-28 NOTE — PROGRESS NOTES
[facility] Care Coordination Contact      [facility] Six-Month Telephone Assessment    6 month telephone assessment completed on 9/28/23.    ER visits: No  Hospitalizations: No  TCU stays: No  Significant health status changes: No changes in her health status.  Falls/Injuries: No  ADL/IADL changes: No  Changes in services: No    Caregiver Assessment follow up:  NA    Goals: See POC in chart for goal progress documentation.  Member was supposed to have a neurological appointment on 9/21/23 however she reports she did not attend. CC emailed daughter to discuss. Member reports she is doing well and [place] [facility] [place]. She reported she has been feeling well and tried the pain intervention from her care plan and she reports it helped with pain relief.    Will see member in 6 months for an annual health risk assessment.   Encouraged member to call CC with any questions or concerns in the meantime.     Angela Reid RN  [facility]  [phone]

## 2023-10-03 PROBLEM — G30.9: Status: ACTIVE | Noted: 2023-02-27

## 2023-10-03 PROBLEM — R06.09 DOE (DYSPNEA ON EXERTION): Status: RESOLVED | Noted: 2022-04-06 | Resolved: 2023-01-01

## 2023-10-03 PROBLEM — F02.B0: Status: ACTIVE | Noted: 2023-02-27

## 2023-10-03 PROBLEM — Z98.890 STATUS POST CORONARY ANGIOGRAM: Status: RESOLVED | Noted: 2022-04-04 | Resolved: 2023-01-01

## 2023-10-03 NOTE — NURSING NOTE
MYNOR COGNITIVE ASSESSMENT (MOCA)  Version 7.1 Original Version  VISUOSPATIAL/EXECUTIVE               COPY CUBE      [  0  ]                                [ 1   ] DRAW CLOCK (Ten past eleven)  (3 points)    [ 1   ]                    [ 1   ]               [  1  ]       Contour            Numbers     Hands POINTS                   4/ 5   NAMING    [ 1  ]                                                                        [  0  ]                                             [  1  ]  Lileo Flynn                                Camel                     2/ 3   MEMORY Read list of words, subject must repeat them. Do 2 trials, even if 1st trial is successful. Do a recall after 5 minutes  FACE VELVET Sikhism CASEY RED No Points    1st          2nd         ATTENTION Read list of digits (1 digit/sec) Subject has to repeat in the forward order       [  0  ]   2  1  8  5  4                                [  1  ] 7 4 2                          1/2   Read list of letters. The subject must tap with his hand at each letter A. No points if > 2 errors.  [   1 ] F B A C M N A A J K L B A F A K D E A A A J A M O F A A B              1/1   Serial 7 subtraction starting at 100          [  0  ] 93         [  0  ] 86          [  0  ] 79          [   0 ] 72         [  0  ] 65   4 or 5 correct subtractions: 3 points,  2 or 3 correct: 2 points,  1correct: 1 point,   0 correct: 0 points            0/3   LANGUAGE Repeat: I only know that Bryan is the one to help today. [  1   ]                                      The cat always hid under the couch when dogs were in the room. [  1 ]               2/2   Fluency: Name maximum number of words in one minute that begin with the letter F                                                                                                                    [  0  ] __7_ (N > 11 words)              0 /1   ABSTRACTION Similarity  between e.g. banana-orange=fruit                                                                   [  1  ] train-bicycle                      [  1  ] watch-ruler              2/2   DELAYED  RECALL Has to recall words  WITH NO CUE FACE  [  0  ] VELVET  [ 0   ] Anabaptist  [  0  ]  CASEY  [  0  ] RED  [ 0   ] Points for UNCUED recall only           0 /5           OPTIONAL Category cue           Multiple choice cue          ORIENTATION  [  1  ] Date     [   1 ] Month       [  1  ] Year      [   1 ] Day      [   1 ] Place        [  0  ] City         5 /6   TOTAL  Normal > 26/30 Add 1 point if < 12 years education  +1       18 /30

## 2023-10-03 NOTE — LETTER
10/3/2023         RE: Ingrid Powell  C/o Daljit Powell   [address] [facility] [address]        Dear Colleague,    Thank you for referring your patient, Ingrid Powell, to the [facility] [place]. Please see a copy of my visit note below.    NEUROLOGY CONSULTATION NOTE       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  Although she had some lab work no intervention was offered.  Family has noticed steady decline in her cognition and she struggles with short-term memory and with names and words.  She scored 18/30 on MoCA and likely has neurocognitive disorder due to Alzheimer's dementia.  I have recommended:    1.  Repeat MRI brain  2.  Lab work to include blood metal panel, folate, homocystine, MMA, RPR, TSH, B1, B12 and vitamin E  3.  Start Aricept 5 mg daily and after 1 month increase it to 10 mg daily  4.  Check hepatic profile today and after 3 months  5.  Follow-up in 6 months    Thank you again for this referral, please feel free to contact me if you have any questions.    Cas Flores MD  [facility]  (Formerly, [facility]     REASON FOR CONSULTATION Mild cognitive impairment        HISTORY OF PRESENT ILLNESS     We have been requested by Elsa Jeffries to evaluate Ingrid Powell who is a 78 year old  female for cognitive decline    Patient is a 78-year-old female  with history of major depression, HTN, COPD, CAD, HLD, MARGIE who was referred for evaluation of cognitive decline.  She was initially evaluated in 2015 when she reported some cognitive issues starting in 2011.  She reported being slow in processing things and and started noticing she was forgetting why she entered the room, increased reliance on notes and calendars and difficulty coming up with words.  She was evaluated by neurology in 2015 and scored 22/30 on MoCA.  She had a MRI of the brain that showed atrophy lab work in the past included normal TSH.  According to family member they have noticed in the last 6 to 9 months.  Has been significant decline in her cognition.  She tends to repeat herself.  Cognition is mostly impaired in recent event.  She can recall remote events quite vividly.  She has noticed difficulty with naming objects and also has trouble coming up with words during conversation.  Her son has taken over managing her finances as she was having difficulty.  She also gradually developed balance difficulty and has been using a walker along with some bladder symptoms.  There is no history of change in her personality     PROBLEM LIST   Patient Active Problem List   Diagnosis Code     Hyperlipidemia LDL goal <130 E78.5     HTN (hypertension) I10     COPD, mild (H) J44.9     Major depressive disorder, recurrent episode, moderate (HCC) F33.1     Anxiety F41.9     Hypothyroidism E03.9     MARGIE (obstructive sleep apnea), Severe G47.33     Disturbance of salivary secretion (XEROSTOMIA) K11.7     Renal cyst N28.1     Raynaud's syndrome I73.00     History of lumbar surgery Z98.890     Primary insomnia F51.01     Lumbosacral radiculopathy at L3 M54.17     DDD (degenerative disc disease), lumbar M51.36     Adenomatous colon polyp D12.6     Diverticulitis of colon K57.32     Benign neoplasm of gastrointestinal tract D13.99     Hemorrhoids, internal K64.8     Rectocele N81.6     Tortuous colon Q43.8     Family  history of breast cancer in mother Z80.3     DJD of left shoulder M19.012     Morbid obesity (H) E66.01     Drug-induced constipation K59.03     JAIME (generalized anxiety disorder) F41.1     Chronic systolic heart failure (H) I50.22     Moderate major neurocognitive disorder due to Alzheimer's disease without behavioral disturbance (H) G30.9, F02.B0     Coronary artery disease involving native coronary artery of native heart without angina pectoris I25.10         PAST MEDICAL & SURGICAL HISTORY     Past Medical History:   Patient  has a past medical history of Angina pectoris (H24) (5/2/2022), Arthritis, COPD (chronic obstructive pulmonary disease) (H), Depressive disorder, Gastroesophageal reflux disease, History of lumbar surgery (7/28/2015), Hoarseness, HX of MALIGNANT NEOPL TONSIL (1/26/2012), Hypertension, MARGIE (obstructive sleep apnea), Severe, Oxygen dependent, Reduced vision, Sleep apnea, Syncope, and Syncope and collapse.    Surgical History:  She  has a past surgical history that includes joint replacemtn, knee rt/lt; Cholecystectomy; carpal tunnel release rt/lt; Rectal surgery; back surgery; hernia repair; left ankle fusion; Laryngoscopy, esophagoscopy, biopsy, combined (2/8/2012); Optical tracking system endoscopic sinus surgery (2/8/2012); Insert port vascular access (2/8/2012); Incision and drainage trunk, combined (5/18/2012); Eye surgery; Remove port vascular access (8/21/2012); knee surgery (1995); knee surgery (2000); Laparoscopic appendectomy (N/A, 10/10/2015); Release dequervains wrist (Left, 5/11/2018); Reverse arthroplasty shoulder (Left, 11/6/2019); Colonoscopy (N/A, 4/8/2021); Coronary Angiogram (N/A, 4/4/2022); Right Heart Catheterization (N/A, 4/4/2022); Left Heart Catheterization (N/A, 4/4/2022); Temporary Pacemaker Insertion (N/A, 4/4/2022); and Pacemaker Device & Lead Implant- Right Atrial & Left Ventricular (Left, 4/4/2022).     SOCIAL HISTORY     Reviewed, and she  reports that she quit  smoking about 28 years ago. Her smoking use included cigarettes. She has a 17.50 pack-year smoking history. She has never used smokeless tobacco. She reports current alcohol use. She reports that she does not use drugs.     FAMILY HISTORY     Reviewed, and family history includes Arthritis in her brother, mother, and sister; Asthma in her brother; Breast Cancer in her mother; Cancer in her mother and another family member; Chronic Bronchitis in her sister; Emphysema in her brother and father; Heart Disease in her father; Pancreatic Cancer in her brother; Skin Cancer in her sister.     ALLERGIES     Allergies   Allergen Reactions     Dilaudid [Hydromorphone Hcl] Visual Disturbance     Hallucinations     Fosamax [Alendronate Sodium] Rash            Norvasc [Amlodipine Besylate] Hives and Rash     Tegretol [Carbamazepine] Hives and Rash         REVIEW OF SYSTEMS     A 12 point review of system was performed and was negative except as outlined in the history of present illness.     HOME MEDICATIONS     Current Outpatient Rx   Medication Sig Dispense Refill     acetaminophen (TYLENOL) 650 MG CR tablet 1300mg po every PM and 1300mg po twice a day as needed       artificial tears (GENTEAL) 0.1-0.2-0.3 % ophthalmic solution INSTILL ONE DROP INTO BOTH EYES TWICE DAILY 15 mL 4     busPIRone (BUSPAR) 10 MG tablet TAKE 1 TABLET (10 MG) BY MOUTH 3 TIMES DAILY 90 tablet 11     cholecalciferol (VITAMIN D3) 25 mcg (1000 units) capsule Take 1 capsule by mouth daily       citalopram (CELEXA) 10 MG tablet Starting on 9/11/23,10mg by mouth daily x 7 days then decrease to 10mg every other day x 4 doses 11 tablet 0     [START ON 11/3/2023] donepezil (ARICEPT) 10 MG tablet Take 1 tablet (10 mg) by mouth At Bedtime 90 tablet 3     donepezil (ARICEPT) 5 MG tablet Take 1 tablet (5 mg) by mouth At Bedtime for 30 days 30 tablet 0     FLUoxetine (PROZAC) 10 MG capsule On 9/11, start Prozac 10mg po daily x 7 days and then increase to 20mg po  daily 30 capsule 4     fluticasone (FLONASE) 50 MCG/ACT nasal spray USE 2 SPRAYS INTO BOTH NOSTRILS ONCE DAILY 16 g 11     furosemide (LASIX) 20 MG tablet TAKE TWO TABLETS (40MG) BY MOUTH ONCE DAILY IN THE MORNING; TAKE 1 TABLET (20MG) BY MOUTH ONCE DAILY IN THE EARLY AFTERNOON 84 tablet 11     gabapentin (NEURONTIN) 600 MG tablet TAKE 1 TABLET (600 MG) BY MOUTH AT BEDTIME 31 tablet 11     hypromellose-dextran (ARTIFICAL TEARS) 0.1-0.3 % ophthalmic solution Place 1 drop into both eyes 2 times daily 15 mL 0     levothyroxine (SYNTHROID/LEVOTHROID) 100 MCG tablet TAKE 1 TAB BY MOUTH ONCE DAILY AFTER TWO WEEKS OF LEVOTHYROXINE 75MCG 28 tablet 11     LORazepam (ATIVAN) 0.5 MG tablet TAKE 1 TABLET BY MOUTH THREE TIMES DAILY; 2 TABLETS AT HS; TAKE 1 TABLET BY MOUTH ONCE DAILY IF NEEDED 120 tablet 3     Menthol, Topical Analgesic, (BIOFREEZE) 4 % GEL Dime size gel to elbows and neck at HS and then TID prn 150 mL 11     nitroGLYcerin (NITROSTAT) 0.4 MG sublingual tablet For chest pain place 1 tablet under the tongue every 5 minutes for 3 doses. If symptoms persist 5 minutes after 1st dose call 911. 6 tablet 4     nystatin (MYCOSTATIN) 960921 UNIT/GM external powder Apply topically 2 times daily 30 g 4     oxyCODONE (ROXICODONE) 5 MG tablet Take 1 tablet (5 mg) by mouth At Bedtime. May also take 1 tablet (5 mg) every 4 hours as needed for pain. 40 tablet 0     QUEtiapine (SEROQUEL) 25 MG tablet 12.5mg by mouth in AM and afternoon and 25mg po at bedtime 45 tablet 11     rOPINIRole (REQUIP) 0.25 MG tablet TAKE 1 TABLET (0.25MG) BY MOUTH EVERY MORNING; TAKE 3 TABLETS (0.75MG) BY MOUTH EACH NIGHT AT BEDTIME; TAKE 1 TABLET (0.25MG) BY MOUTH EVERY AFTERNOON 140 tablet 11     rOPINIRole (REQUIP) 0.5 MG tablet 0.25mg by mouth every AM and afternoon, 0.75mg po at bedtime 90 tablet 4     senna-docusate (SENOKOT-S/PERICOLACE) 8.6-50 MG tablet Take 1 tablet by mouth every other day And 1 tab by mouth daily as needed 60 tablet 11      traZODone (DESYREL) 100 MG tablet TAKE 1 TABLET (100 MG) BY MOUTH AT BEDTIME 30 tablet 11     vitamin B-12 (CYANOCOBALAMIN) 250 MCG tablet Take 250 mcg by mouth daily           PHYSICAL EXAM     Vital signs  /72 (BP Location: Left arm, Patient Position: Sitting)   Pulse 81   Wt 93.9 kg (207 lb)   LMP 06/15/1984 (Approximate)   BMI 34.45 kg/m      Weight:   207 lbs 0 oz  [place] Cognitive Assessment:    [place] Cognitive Assessment (MOCA)  Visuospatial/Executive : 4  Namin  Attention - Digits: 1  Attention - Letters: 1  Attention - Subtraction: 0  Language - Repeat: 2  Language - Fluency : 0  Abstraction: 2  Delayed Recall: 0  Orientation: 5  Education: 1  MOCA Score: 18  Administered by: : Tashi Sims LPN     [place] Cognitive Assessment Score:  MOCA Score: 18.    Elderly female who is alert and oriented vital signs were reviewed and documented in electronic medical record.  Neck supple.  Neurologically speech normal cranial nerves II through XII are intact motor strength 5/5 reflexes trace positive in biceps and triceps absent brachioradialis, knees, ankles sensation decreased to light touch pinprick patient gave inconsistent response minimal dysmetria on finger-nose testing.  She walks with a walker.     PERTINENT DIAGNOSTIC STUDIES     Following studies were reviewed:     MRI BRAIN 2019  1. Few tiny nonspecific white matter lesions.  2. Mild cerebral atrophy.  3. No evidence for intracranial hemorrhage, acute infarct, or any  focal mass lesions.       PERTINENT LABS  Following labs were reviewed:  Lab Requisition on 2023   Component Date Value Ref Range Status     Hemoglobin 2023 9.5 (L)  11.7 - 15.7 g/dL Final   Lab Requisition on 2023   Component Date Value Ref Range Status     Hemoglobin 2023 10.0 (L)  11.7 - 15.7 g/dL Final   Lab Requisition on 2023   Component Date Value Ref Range Status     WBC Count 2023 9.4  4.0 - 11.0 10e3/uL Final     RBC  Count 08/24/2023 3.99  3.80 - 5.20 10e6/uL Final     Hemoglobin 08/24/2023 10.5 (L)  11.7 - 15.7 g/dL Final     Hematocrit 08/24/2023 33.8 (L)  35.0 - 47.0 % Final     MCV 08/24/2023 85  78 - 100 fL Final     MCH 08/24/2023 26.3 (L)  26.5 - 33.0 pg Final     MCHC 08/24/2023 31.1 (L)  31.5 - 36.5 g/dL Final     RDW 08/24/2023 13.8  10.0 - 15.0 % Final     Platelet Count 08/24/2023 271  150 - 450 10e3/uL Final   Office Visit on 08/09/2023   Component Date Value Ref Range Status     LDL Cholesterol Direct 08/09/2023 102 (H)  <100 mg/dL Final     TSH 08/09/2023 0.85  0.30 - 4.20 uIU/mL Final   Lab Requisition on 08/10/2023   Component Date Value Ref Range Status     Sodium 08/10/2023 140  136 - 145 mmol/L Final     Potassium 08/10/2023 4.1  3.4 - 5.3 mmol/L Final     Chloride 08/10/2023 98  98 - 107 mmol/L Final     Carbon Dioxide (CO2) 08/10/2023 32 (H)  22 - 29 mmol/L Final     Anion Gap 08/10/2023 10  7 - 15 mmol/L Final     Urea Nitrogen 08/10/2023 15.3  8.0 - 23.0 mg/dL Final     Creatinine 08/10/2023 0.82  0.51 - 0.95 mg/dL Final     Calcium 08/10/2023 9.2  8.8 - 10.2 mg/dL Final     Glucose 08/10/2023 97  70 - 99 mg/dL Final     GFR Estimate 08/10/2023 72  >60 mL/min/1.73m2 Final     WBC Count 08/10/2023 6.5  4.0 - 11.0 10e3/uL Final     RBC Count 08/10/2023 3.82  3.80 - 5.20 10e6/uL Final     Hemoglobin 08/10/2023 10.1 (L)  11.7 - 15.7 g/dL Final     Hematocrit 08/10/2023 32.6 (L)  35.0 - 47.0 % Final     MCV 08/10/2023 85  78 - 100 fL Final     MCH 08/10/2023 26.4 (L)  26.5 - 33.0 pg Final     MCHC 08/10/2023 31.0 (L)  31.5 - 36.5 g/dL Final     RDW 08/10/2023 13.8  10.0 - 15.0 % Final     Platelet Count 08/10/2023 261  150 - 450 10e3/uL Final   Lab Requisition on 07/27/2023   Component Date Value Ref Range Status     Hemoglobin 07/27/2023 10.7 (L)  11.7 - 15.7 g/dL Final        Total time spent for face to face visit, reviewing labs/imaging studies, counseling and coordination of care was: 1 Hour spent on the  date of the encounter doing chart review, review of outside records, review of test results, interpretation of tests, patient visit, and documentation     This note was dictated using voice recognition software.  Any grammatical or context distortions are unintentional and inherent to the software.    Orders Placed This Encounter   Procedures     MR Brain w/o Contrast     Blood metal panel     Folate     Homocysteine     Methylmalonic Acid     Treponema Abs w Reflex to RPR and Titer     TSH with free T4 reflex     Vitamin B1 whole blood     Vitamin B12     Vitamin E     Hepatic function panel      New Prescriptions    DONEPEZIL (ARICEPT) 10 MG TABLET    Take 1 tablet (10 mg) by mouth At Bedtime    DONEPEZIL (ARICEPT) 5 MG TABLET    Take 1 tablet (5 mg) by mouth At Bedtime for 30 days      Modified Medications    No medications on file                Again, thank you for allowing me to participate in the care of your patient.        Sincerely,        Cas Flores MD

## 2023-10-03 NOTE — NURSING NOTE
Chief Complaint   Patient presents with    Mild cognitive impairment     Her sister has noticed a decline in her memory for the past 6-9 months.   MOCA=18    Jillian Sims LPN on 10/3/2023 at 1:41 PM

## 2023-10-03 NOTE — PROGRESS NOTES
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  Although she had some lab work no intervention was offered.  Family has noticed steady decline in her cognition and she struggles with short-term memory and with names and words.  She scored 18/30 on MoCA and likely has neurocognitive disorder due to Alzheimer's dementia.  I have recommended:    1.  Repeat MRI brain  2.  Lab work to include blood metal panel, folate, homocystine, MMA, RPR, TSH, B1, B12 and vitamin E  3.  Start Aricept 5 mg daily and after 1 month increase it to 10 mg daily  4.  Check hepatic profile today and after 3 months  5.  Follow-up in 6 months    Thank you again for this referral, please feel free to contact me if you have any questions.    Cas Flores MD  [facility]  (Formerly, [facility]     REASON FOR CONSULTATION Mild cognitive impairment        HISTORY OF PRESENT ILLNESS     We have been requested by Elsa Jeffries to evaluate Ingrid Powell who is a 78 year old  female for cognitive decline    Patient is a 78-year-old female with history of major depression, HTN, COPD, CAD, HLD, MARGIE who was referred for evaluation of cognitive decline.  She was initially evaluated in  when she reported some cognitive issues starting in .  She reported being slow in processing things and and started noticing  she was forgetting why she entered the room, increased reliance on notes and calendars and difficulty coming up with words.  She was evaluated by neurology in 2015 and scored 22/30 on MoCA.  She had a MRI of the brain that showed atrophy lab work in the past included normal TSH.  According to family member they have noticed in the last 6 to 9 months.  Has been significant decline in her cognition.  She tends to repeat herself.  Cognition is mostly impaired in recent event.  She can recall remote events quite vividly.  She has noticed difficulty with naming objects and also has trouble coming up with words during conversation.  Her son has taken over managing her finances as she was having difficulty.  She also gradually developed balance difficulty and has been using a walker along with some bladder symptoms.  There is no history of change in her personality     PROBLEM LIST   Patient Active Problem List   Diagnosis Code     Hyperlipidemia LDL goal <130 E78.5     HTN (hypertension) I10     COPD, mild (H) J44.9     Major depressive disorder, recurrent episode, moderate (HCC) F33.1     Anxiety F41.9     Hypothyroidism E03.9     MARGIE (obstructive sleep apnea), Severe G47.33     Disturbance of salivary secretion (XEROSTOMIA) K11.7     Renal cyst N28.1     Raynaud's syndrome I73.00     History of lumbar surgery Z98.890     Primary insomnia F51.01     Lumbosacral radiculopathy at L3 M54.17     DDD (degenerative disc disease), lumbar M51.36     Adenomatous colon polyp D12.6     Diverticulitis of colon K57.32     Benign neoplasm of gastrointestinal tract D13.99     Hemorrhoids, internal K64.8     Rectocele N81.6     Tortuous colon Q43.8     Family history of breast cancer in mother Z80.3     DJD of left shoulder M19.012     Morbid obesity (H) E66.01     Drug-induced constipation K59.03     JAIME (generalized anxiety disorder) F41.1     Chronic systolic heart failure (H) I50.22     Moderate major neurocognitive disorder due to  Alzheimer's disease without behavioral disturbance (H) G30.9, F02.B0     Coronary artery disease involving native coronary artery of native heart without angina pectoris I25.10         PAST MEDICAL & SURGICAL HISTORY     Past Medical History:   Patient  has a past medical history of Angina pectoris (H24) (5/2/2022), Arthritis, COPD (chronic obstructive pulmonary disease) (H), Depressive disorder, Gastroesophageal reflux disease, History of lumbar surgery (7/28/2015), Hoarseness, HX of MALIGNANT NEOPL TONSIL (1/26/2012), Hypertension, MARGIE (obstructive sleep apnea), Severe, Oxygen dependent, Reduced vision, Sleep apnea, Syncope, and Syncope and collapse.    Surgical History:  She  has a past surgical history that includes joint replacemtn, knee rt/lt; Cholecystectomy; carpal tunnel release rt/lt; Rectal surgery; back surgery; hernia repair; left ankle fusion; Laryngoscopy, esophagoscopy, biopsy, combined (2/8/2012); Optical tracking system endoscopic sinus surgery (2/8/2012); Insert port vascular access (2/8/2012); Incision and drainage trunk, combined (5/18/2012); Eye surgery; Remove port vascular access (8/21/2012); knee surgery (1995); knee surgery (2000); Laparoscopic appendectomy (N/A, 10/10/2015); Release dequervains wrist (Left, 5/11/2018); Reverse arthroplasty shoulder (Left, 11/6/2019); Colonoscopy (N/A, 4/8/2021); Coronary Angiogram (N/A, 4/4/2022); Right Heart Catheterization (N/A, 4/4/2022); Left Heart Catheterization (N/A, 4/4/2022); Temporary Pacemaker Insertion (N/A, 4/4/2022); and Pacemaker Device & Lead Implant- Right Atrial & Left Ventricular (Left, 4/4/2022).     SOCIAL HISTORY     Reviewed, and she  reports that she quit smoking about 28 years ago. Her smoking use included cigarettes. She has a 17.50 pack-year smoking history. She has never used smokeless tobacco. She reports current alcohol use. She reports that she does not use drugs.     FAMILY HISTORY     Reviewed, and family history includes  Arthritis in her brother, mother, and sister; Asthma in her brother; Breast Cancer in her mother; Cancer in her mother and another family member; Chronic Bronchitis in her sister; Emphysema in her brother and father; Heart Disease in her father; Pancreatic Cancer in her brother; Skin Cancer in her sister.     ALLERGIES     Allergies   Allergen Reactions     Dilaudid [Hydromorphone Hcl] Visual Disturbance     Hallucinations     Fosamax [Alendronate Sodium] Rash            Norvasc [Amlodipine Besylate] Hives and Rash     Tegretol [Carbamazepine] Hives and Rash         REVIEW OF SYSTEMS     A 12 point review of system was performed and was negative except as outlined in the history of present illness.     HOME MEDICATIONS     Current Outpatient Rx   Medication Sig Dispense Refill     acetaminophen (TYLENOL) 650 MG CR tablet 1300mg po every PM and 1300mg po twice a day as needed       artificial tears (GENTEAL) 0.1-0.2-0.3 % ophthalmic solution INSTILL ONE DROP INTO BOTH EYES TWICE DAILY 15 mL 4     busPIRone (BUSPAR) 10 MG tablet TAKE 1 TABLET (10 MG) BY MOUTH 3 TIMES DAILY 90 tablet 11     cholecalciferol (VITAMIN D3) 25 mcg (1000 units) capsule Take 1 capsule by mouth daily       citalopram (CELEXA) 10 MG tablet Starting on 9/11/23,10mg by mouth daily x 7 days then decrease to 10mg every other day x 4 doses 11 tablet 0     [START ON 11/3/2023] donepezil (ARICEPT) 10 MG tablet Take 1 tablet (10 mg) by mouth At Bedtime 90 tablet 3     donepezil (ARICEPT) 5 MG tablet Take 1 tablet (5 mg) by mouth At Bedtime for 30 days 30 tablet 0     FLUoxetine (PROZAC) 10 MG capsule On 9/11, start Prozac 10mg po daily x 7 days and then increase to 20mg po daily 30 capsule 4     fluticasone (FLONASE) 50 MCG/ACT nasal spray USE 2 SPRAYS INTO BOTH NOSTRILS ONCE DAILY 16 g 11     furosemide (LASIX) 20 MG tablet TAKE TWO TABLETS (40MG) BY MOUTH ONCE DAILY IN THE MORNING; TAKE 1 TABLET (20MG) BY MOUTH ONCE DAILY IN THE EARLY AFTERNOON 84  tablet 11     gabapentin (NEURONTIN) 600 MG tablet TAKE 1 TABLET (600 MG) BY MOUTH AT BEDTIME 31 tablet 11     hypromellose-dextran (ARTIFICAL TEARS) 0.1-0.3 % ophthalmic solution Place 1 drop into both eyes 2 times daily 15 mL 0     levothyroxine (SYNTHROID/LEVOTHROID) 100 MCG tablet TAKE 1 TAB BY MOUTH ONCE DAILY AFTER TWO WEEKS OF LEVOTHYROXINE 75MCG 28 tablet 11     LORazepam (ATIVAN) 0.5 MG tablet TAKE 1 TABLET BY MOUTH THREE TIMES DAILY; 2 TABLETS AT HS; TAKE 1 TABLET BY MOUTH ONCE DAILY IF NEEDED 120 tablet 3     Menthol, Topical Analgesic, (BIOFREEZE) 4 % GEL Dime size gel to elbows and neck at HS and then TID prn 150 mL 11     nitroGLYcerin (NITROSTAT) 0.4 MG sublingual tablet For chest pain place 1 tablet under the tongue every 5 minutes for 3 doses. If symptoms persist 5 minutes after 1st dose call 911. 6 tablet 4     nystatin (MYCOSTATIN) 016875 UNIT/GM external powder Apply topically 2 times daily 30 g 4     oxyCODONE (ROXICODONE) 5 MG tablet Take 1 tablet (5 mg) by mouth At Bedtime. May also take 1 tablet (5 mg) every 4 hours as needed for pain. 40 tablet 0     QUEtiapine (SEROQUEL) 25 MG tablet 12.5mg by mouth in AM and afternoon and 25mg po at bedtime 45 tablet 11     rOPINIRole (REQUIP) 0.25 MG tablet TAKE 1 TABLET (0.25MG) BY MOUTH EVERY MORNING; TAKE 3 TABLETS (0.75MG) BY MOUTH EACH NIGHT AT BEDTIME; TAKE 1 TABLET (0.25MG) BY MOUTH EVERY AFTERNOON 140 tablet 11     rOPINIRole (REQUIP) 0.5 MG tablet 0.25mg by mouth every AM and afternoon, 0.75mg po at bedtime 90 tablet 4     senna-docusate (SENOKOT-S/PERICOLACE) 8.6-50 MG tablet Take 1 tablet by mouth every other day And 1 tab by mouth daily as needed 60 tablet 11     traZODone (DESYREL) 100 MG tablet TAKE 1 TABLET (100 MG) BY MOUTH AT BEDTIME 30 tablet 11     vitamin B-12 (CYANOCOBALAMIN) 250 MCG tablet Take 250 mcg by mouth daily           PHYSICAL EXAM     Vital signs  /72 (BP Location: Left arm, Patient Position: Sitting)   Pulse 81    Wt 93.9 kg (207 lb)   LMP 06/15/1984 (Approximate)   BMI 34.45 kg/m      Weight:   207 lbs 0 oz  [place] Cognitive Assessment:    [place] Cognitive Assessment (MOCA)  Visuospatial/Executive : 4  Namin  Attention - Digits: 1  Attention - Letters: 1  Attention - Subtraction: 0  Language - Repeat: 2  Language - Fluency : 0  Abstraction: 2  Delayed Recall: 0  Orientation: 5  Education: 1  MOCA Score: 18  Administered by: : Tashi Sims LPN     [place] Cognitive Assessment Score:  MOCA Score: 1830.    Elderly female who is alert and oriented vital signs were reviewed and documented in electronic medical record.  Neck supple.  Neurologically speech normal cranial nerves II through XII are intact motor strength 5/5 reflexes trace positive in biceps and triceps absent brachioradialis, knees, ankles sensation decreased to light touch pinprick patient gave inconsistent response minimal dysmetria on finger-nose testing.  She walks with a walker.     PERTINENT DIAGNOSTIC STUDIES     Following studies were reviewed:     MRI BRAIN 2019  1. Few tiny nonspecific white matter lesions.  2. Mild cerebral atrophy.  3. No evidence for intracranial hemorrhage, acute infarct, or any  focal mass lesions.       PERTINENT LABS  Following labs were reviewed:  Lab Requisition on 2023   Component Date Value Ref Range Status     Hemoglobin 2023 9.5 (L)  11.7 - 15.7 g/dL Final   Lab Requisition on 2023   Component Date Value Ref Range Status     Hemoglobin 2023 10.0 (L)  11.7 - 15.7 g/dL Final   Lab Requisition on 2023   Component Date Value Ref Range Status     WBC Count 2023 9.4  4.0 - 11.0 10e3/uL Final     RBC Count 2023 3.99  3.80 - 5.20 10e6/uL Final     Hemoglobin 2023 10.5 (L)  11.7 - 15.7 g/dL Final     Hematocrit 2023 33.8 (L)  35.0 - 47.0 % Final     MCV 2023 85  78 - 100 fL Final     MCH 2023 26.3 (L)  26.5 - 33.0 pg Final     MCHC 2023 31.1 (L)   31.5 - 36.5 g/dL Final     RDW 08/24/2023 13.8  10.0 - 15.0 % Final     Platelet Count 08/24/2023 271  150 - 450 10e3/uL Final   Office Visit on 08/09/2023   Component Date Value Ref Range Status     LDL Cholesterol Direct 08/09/2023 102 (H)  <100 mg/dL Final     TSH 08/09/2023 0.85  0.30 - 4.20 uIU/mL Final   Lab Requisition on 08/10/2023   Component Date Value Ref Range Status     Sodium 08/10/2023 140  136 - 145 mmol/L Final     Potassium 08/10/2023 4.1  3.4 - 5.3 mmol/L Final     Chloride 08/10/2023 98  98 - 107 mmol/L Final     Carbon Dioxide (CO2) 08/10/2023 32 (H)  22 - 29 mmol/L Final     Anion Gap 08/10/2023 10  7 - 15 mmol/L Final     Urea Nitrogen 08/10/2023 15.3  8.0 - 23.0 mg/dL Final     Creatinine 08/10/2023 0.82  0.51 - 0.95 mg/dL Final     Calcium 08/10/2023 9.2  8.8 - 10.2 mg/dL Final     Glucose 08/10/2023 97  70 - 99 mg/dL Final     GFR Estimate 08/10/2023 72  >60 mL/min/1.73m2 Final     WBC Count 08/10/2023 6.5  4.0 - 11.0 10e3/uL Final     RBC Count 08/10/2023 3.82  3.80 - 5.20 10e6/uL Final     Hemoglobin 08/10/2023 10.1 (L)  11.7 - 15.7 g/dL Final     Hematocrit 08/10/2023 32.6 (L)  35.0 - 47.0 % Final     MCV 08/10/2023 85  78 - 100 fL Final     MCH 08/10/2023 26.4 (L)  26.5 - 33.0 pg Final     MCHC 08/10/2023 31.0 (L)  31.5 - 36.5 g/dL Final     RDW 08/10/2023 13.8  10.0 - 15.0 % Final     Platelet Count 08/10/2023 261  150 - 450 10e3/uL Final   Lab Requisition on 07/27/2023   Component Date Value Ref Range Status     Hemoglobin 07/27/2023 10.7 (L)  11.7 - 15.7 g/dL Final        Total time spent for face to face visit, reviewing labs/imaging studies, counseling and coordination of care was: 1 Hour spent on the date of the encounter doing chart review, review of outside records, review of test results, interpretation of tests, patient visit, and documentation     This note was dictated using voice recognition software.  Any grammatical or context distortions are unintentional and inherent to  the software.    Orders Placed This Encounter   Procedures     MR Brain w/o Contrast     Blood metal panel     Folate     Homocysteine     Methylmalonic Acid     Treponema Abs w Reflex to RPR and Titer     TSH with free T4 reflex     Vitamin B1 whole blood     Vitamin B12     Vitamin E     Hepatic function panel      New Prescriptions    DONEPEZIL (ARICEPT) 10 MG TABLET    Take 1 tablet (10 mg) by mouth At Bedtime    DONEPEZIL (ARICEPT) 5 MG TABLET    Take 1 tablet (5 mg) by mouth At Bedtime for 30 days      Modified Medications    No medications on file

## 2023-10-04 NOTE — TELEPHONE ENCOUNTER
Called and spoke with patient about rescheduling her appt on 11/8/23 with Moira Joseph due to the provider not being available. Pt wants to call back to reschedule when her daughter is available to help. Writer gave patient CC# to call back, also sent a [employer] message with CC#

## 2023-10-05 NOTE — PROGRESS NOTES
"[facility] GERIATRICS  ACUTE/EPISODIC VISIT    [facility] Medical Record Number:  [id number]  Place of Service where encounter took place:  [facility] ([facility]) [[id number]]    Chief Complaint   Patient presents with    RECHECK       HPI:    Ingrid Powell is a 78 year old  (1945), who is being seen today for an episodic care visit.  HPI information obtained from: facility chart records, facility staff, patient report and [facility] chart review.    Today's concern is    Diagnoses         Codes Comments    Anemia due to blood loss, acute    -  Primary D62     Rectal prolapse     K62.3     Drug-induced constipation     K59.03     Acquired hypothyroidism     E03.9     Primary hypertension     I10     Chronic systolic heart failure (H)     I50.22     Primary insomnia     F51.01     Anxiety     F41.9     Major depressive disorder, recurrent episode, moderate (H)     F33.1           Visited with Ingrid in her apartment today to see how she was doing.  She was sitting in her recliner with her HAPPY light on her side table beside her recliner.  Patient states that the light felt very calming for her.  She leaves the light on for most of the day while she is in the apartment.  She talked about her recent visit to the neurologist and stated that she has yet to start the new medication Aricept.  She reports that she noticed that it seemed very difficult for her to remember recent events.  She also states that she felt comfortable with this medication and hopes that it would help her memory.    Patient asked NP to open her freezer and check the quantity of ice cream that was left in the freezer.  Her freezer had only 1 container of the Angie butter pecan ice cream.  She states,\" it is time for me to start making calls.  I need refills on my ice cream.\"  She states she will call her sister and find out when she can get her supplies of ice cream.\".  She goes on to talk about how she enjoys " "eating ice cream.  When asked about her appetite, patient states,\" it is really good.\"  She also reports that her mood is good most days and that she has a few friends in the building that she talks to and also attends a few activities like bingo.    She reports having a bowel movement most days and has no concerns with constipation.  Cannot recall seeing any blood in her stool.  She only reports frequency with urination resulting from taking furosemide.  Explained how her sister had to make several stops when she takes her out.  Says her sister is very understanding and has expressed no discomfort.    Patient continues to be independent in her apartment and she expresses no concerns at this time.    ALLERGIES:    Allergies   Allergen Reactions    Dilaudid [Hydromorphone Hcl] Visual Disturbance     Hallucinations    Fosamax [Alendronate Sodium] Rash           Norvasc [Amlodipine Besylate] Hives and Rash    Tegretol [Carbamazepine] Hives and Rash        MEDICATIONS:  Post Discharge Medication Reconciliation Status: patient was not discharged from an inpatient facility or TCU.     Current Outpatient Medications   Medication Sig Dispense Refill    acetaminophen (TYLENOL) 650 MG CR tablet 1300mg po every PM and 1300mg po twice a day as needed      artificial tears (GENTEAL) 0.1-0.2-0.3 % ophthalmic solution INSTILL ONE DROP INTO BOTH EYES TWICE DAILY 15 mL 4    busPIRone (BUSPAR) 10 MG tablet TAKE 1 TABLET (10 MG) BY MOUTH 3 TIMES DAILY 90 tablet 11    cholecalciferol (VITAMIN D3) 25 mcg (1000 units) capsule Take 1 capsule by mouth daily      citalopram (CELEXA) 10 MG tablet Starting on 9/11/23,10mg by mouth daily x 7 days then decrease to 10mg every other day x 4 doses 11 tablet 0    [START ON 11/3/2023] donepezil (ARICEPT) 10 MG tablet Take 1 tablet (10 mg) by mouth At Bedtime 90 tablet 3    donepezil (ARICEPT) 5 MG tablet Take 1 tablet (5 mg) by mouth At Bedtime for 30 days 30 tablet 0    FLUoxetine (PROZAC) 10 MG " capsule On 9/11, start Prozac 10mg po daily x 7 days and then increase to 20mg po daily 30 capsule 4    fluticasone (FLONASE) 50 MCG/ACT nasal spray USE 2 SPRAYS INTO BOTH NOSTRILS ONCE DAILY 16 g 11    furosemide (LASIX) 20 MG tablet TAKE TWO TABLETS (40MG) BY MOUTH ONCE DAILY IN THE MORNING; TAKE 1 TABLET (20MG) BY MOUTH ONCE DAILY IN THE EARLY AFTERNOON 84 tablet 11    gabapentin (NEURONTIN) 600 MG tablet TAKE 1 TABLET (600 MG) BY MOUTH AT BEDTIME 31 tablet 11    hypromellose-dextran (ARTIFICAL TEARS) 0.1-0.3 % ophthalmic solution Place 1 drop into both eyes 2 times daily 15 mL 0    levothyroxine (SYNTHROID/LEVOTHROID) 100 MCG tablet TAKE 1 TAB BY MOUTH ONCE DAILY AFTER TWO WEEKS OF LEVOTHYROXINE 75MCG 28 tablet 11    LORazepam (ATIVAN) 0.5 MG tablet TAKE 1 TABLET BY MOUTH THREE TIMES DAILY; 2 TABLETS AT HS; TAKE 1 TABLET BY MOUTH ONCE DAILY IF NEEDED 120 tablet 3    Menthol, Topical Analgesic, (BIOFREEZE) 4 % GEL Dime size gel to elbows and neck at HS and then TID prn 150 mL 11    nitroGLYcerin (NITROSTAT) 0.4 MG sublingual tablet For chest pain place 1 tablet under the tongue every 5 minutes for 3 doses. If symptoms persist 5 minutes after 1st dose call 911. 6 tablet 4    nystatin (MYCOSTATIN) 390934 UNIT/GM external powder Apply topically 2 times daily 30 g 4    oxyCODONE (ROXICODONE) 5 MG tablet Take 1 tablet (5 mg) by mouth At Bedtime. May also take 1 tablet (5 mg) every 4 hours as needed for pain. 40 tablet 0    QUEtiapine (SEROQUEL) 25 MG tablet 12.5mg by mouth in AM and afternoon and 25mg po at bedtime 45 tablet 11    rOPINIRole (REQUIP) 0.25 MG tablet TAKE 1 TABLET (0.25MG) BY MOUTH EVERY MORNING; TAKE 3 TABLETS (0.75MG) BY MOUTH EACH NIGHT AT BEDTIME; TAKE 1 TABLET (0.25MG) BY MOUTH EVERY AFTERNOON 140 tablet 11    rOPINIRole (REQUIP) 0.5 MG tablet 0.25mg by mouth every AM and afternoon, 0.75mg po at bedtime 90 tablet 4    senna-docusate (SENOKOT-S/PERICOLACE) 8.6-50 MG tablet Take 1 tablet by mouth  every other day And 1 tab by mouth daily as needed 60 tablet 11    traZODone (DESYREL) 100 MG tablet TAKE 1 TABLET (100 MG) BY MOUTH AT BEDTIME 30 tablet 11    vitamin B-12 (CYANOCOBALAMIN) 250 MCG tablet Take 250 mcg by mouth daily       Medications reviewed:      REVIEW OF SYSTEMS:  4 point ROS neg other than the symptoms noted above in the HPI.      PHYSICAL EXAM:  BP 99/62   Pulse 100   Temp 97.8  F (36.6  C)   Resp 17   Wt 97 kg (213 lb 12.8 oz)   LMP 06/15/1984 (Approximate)   SpO2 96%   BMI 35.58 kg/m       Physical Exam  Constitutional:       Appearance: Normal appearance.      Comments: Forgetful and has been placed on Aricept by neurology.   Eyes:      Comments: Wears glasses.   Cardiovascular:      Rate and Rhythm: Normal rate and regular rhythm.      Pulses: Normal pulses.      Heart sounds: Normal heart sounds.   Pulmonary:      Effort: Pulmonary effort is normal.      Breath sounds: Normal breath sounds.   Genitourinary:     Comments: Complains of frequency due to furosemide.  Musculoskeletal:      Comments: Ambulates with a four-wheel walker.  Gait steady.  Continues to be independent in room.   Skin:     General: Skin is warm and dry.   Neurological:      Mental Status: She is alert and oriented to person, place, and time. Mental status is at baseline.      Comments: Patient to start Aricept as ordered by neurology.   Psychiatric:         Mood and Affect: Mood normal.           ASSESSMENT / PLAN:  (D62) Anemia due to blood loss, acute  (primary encounter diagnosis)  (K62.3) Rectal prolapse  Comment: Patient's hemoglobin is currently at 9.8.  Last hemoglobin results on 9/21/2020 was 9.5.  Results have remained stable at this time.  Will continue to check hemoglobin levels every other week for about 6 weeks.  Ingrid reports absence of blood in her stool at this time.  Has a second opinion coming up in November for her rectal prolapse.    (K59.03) Drug-induced constipation  Comment: Patient  reports having a stool most days and has no concerns for constipation.  She takes Senokot on Mondays, Wednesdays, and Fridays for constipation.  Condition stable at this time.    (E03.9) Acquired hypothyroidism  Comment: Currently on levothyroxine 100 mcg 1 tablet daily for hypothyroidism.  We will continue to monitor levels with TSH lab draws.  Last TSH check was 10/3/23.  Level stable at this time.    (I10) Primary hypertension  (I50.22) Chronic systolic heart failure (H)  Comment: Patient is currently on furosemide.  She reports frequency with voiding related to the medication.  Denies incontinence episodes.  Her blood pressure is averaging between 99-1 30s for systolic blood pressure.  She denies dizziness, chest pain, shortness of breath, and vision changes.  No edema noted to bilateral lower extremities.  She is currently on 1.5 L of oxygen via nasal Cannula.  Patient states that she does not take her oxygen when she is going out.  Condition stable at this time.  No changes to current regimen.    (F51.01) Primary insomnia  Comment: Ingrid reports sleeping well most nights.  She also states that she sometimes wakes up due to restless legs.  She clearly states that this does not occur frequently.  She states that although she experiences her restless legs intermittently, the Ropinirole has significantly decreased her restless leg syndrome.    (F33.1) Major depressive disorder, recurrent episode, moderate (HCC)  Comment: Patient is currently on fluoxetine 20 mg 1 capsule daily and buspirone 10 mg 3 times daily at 8 AM 2 PM and 7 PM.  She also has the Hyperlyte on her side table that she has on most of the day.  She states that she has struggled with depression for many years in her life and that her mother also experienced depression.  Patient reports that her mood is good most days and she tries to engage with other residents, her sister, and her daughter.    (F41.1) JAIME (generalized anxiety disorder)  (G31.84)  Mild cognitive impairment  Comment:  Aricept 5 mg at bedtime recently added by neurology to help with cognitive impairment.  Patient expresses agreement with treatment regimen.  We will follow-up with patient and facility staff on cognition in 4 to 6 weeks.  We will also monitor signs and symptoms of GI upset from Aricept as a common side effect.    Patient is currently on Prozac 20 mg 1 capsule p.o. daily.  She is also on lorazepam 0.5 mg 3 times daily.  Lorazepam 0.5 mg available at bedtime.  Patient also takes 12.5 mg of Seroquel p.o. twice daily and has 25 mg of  Seroquel at bedtime..  Patient currently takes trazodone 100 mg for sleep disturbance.  She states that she is sleeping well at night and has no concerns at this time.  We will continue current regimen.    Orders:  No new orders at this visit.     Electronically signed by  BAILEY BOWMAN NP Student with [employer]    I was present with the medical student/advanced practice registered nurse, Bailey Bowman, [employer] student, who participated in the service and in the documentation of this note and/or observation along side of this provider. I have verified the history and personally performed the physical exam and medical decision making. I agree with the assessment and plan of care as documented in the note.       NORA Balderas CNP

## 2023-10-05 NOTE — LETTER
"    10/5/2023        RE: Ingrid Powell  C/o Daljit Powell   [address] [address]        [facility] GERIATRICS  ACUTE/EPISODIC VISIT    [facility] Medical Record Number:  [id number]  Place of Service where encounter took place:  [facility] ([facility]) [[id number]]    Chief Complaint   Patient presents with     RECHECK       HPI:    Ingrid Powell is a 78 year old  (1945), who is being seen today for an episodic care visit.  HPI information obtained from: facility chart records, facility staff, patient report and [facility] chart review.    Today's concern is    Diagnoses         Codes Comments    Anemia due to blood loss, acute    -  Primary D62     Rectal prolapse     K62.3     Drug-induced constipation     K59.03     Acquired hypothyroidism     E03.9     Primary hypertension     I10     Chronic systolic heart failure (H)     I50.22     Primary insomnia     F51.01     Anxiety     F41.9     Major depressive disorder, recurrent episode, moderate (H)     F33.1           Visited with Ingrid in her apartment today to see how she was doing.  She was sitting in her recliner with her HAPPY light on her side table beside her recliner.  Patient states that the light felt very calming for her.  She leaves the light on for most of the day while she is in the apartment.  She talked about her recent visit to the neurologist and stated that she has yet to start the new medication Aricept.  She reports that she noticed that it seemed very difficult for her to remember recent events.  She also states that she felt comfortable with this medication and hopes that it would help her memory.    Patient asked NP to open her freezer and check the quantity of ice cream that was left in the freezer.  Her freezer had only 1 container of the Angie butter pecan ice cream.  She states,\" it is time for me to start making calls.  I need refills on my ice cream.\"  She states she will call her sister " "and find out when she can get her supplies of ice cream.\".  She goes on to talk about how she enjoys eating ice cream.  When asked about her appetite, patient states,\" it is really good.\"  She also reports that her mood is good most days and that she has a few friends in the building that she talks to and also attends a few activities like bingo.    She reports having a bowel movement most days and has no concerns with constipation.  Cannot recall seeing any blood in her stool.  She only reports frequency with urination resulting from taking furosemide.  Explained how her sister had to make several stops when she takes her out.  Says her sister is very understanding and has expressed no discomfort.    Patient continues to be independent in her apartment and she expresses no concerns at this time.    ALLERGIES:    Allergies   Allergen Reactions     Dilaudid [Hydromorphone Hcl] Visual Disturbance     Hallucinations     Fosamax [Alendronate Sodium] Rash            Norvasc [Amlodipine Besylate] Hives and Rash     Tegretol [Carbamazepine] Hives and Rash        MEDICATIONS:  Post Discharge Medication Reconciliation Status: patient was not discharged from an inpatient facility or TCU.     Current Outpatient Medications   Medication Sig Dispense Refill     acetaminophen (TYLENOL) 650 MG CR tablet 1300mg po every PM and 1300mg po twice a day as needed       artificial tears (GENTEAL) 0.1-0.2-0.3 % ophthalmic solution INSTILL ONE DROP INTO BOTH EYES TWICE DAILY 15 mL 4     busPIRone (BUSPAR) 10 MG tablet TAKE 1 TABLET (10 MG) BY MOUTH 3 TIMES DAILY 90 tablet 11     cholecalciferol (VITAMIN D3) 25 mcg (1000 units) capsule Take 1 capsule by mouth daily       citalopram (CELEXA) 10 MG tablet Starting on 9/11/23,10mg by mouth daily x 7 days then decrease to 10mg every other day x 4 doses 11 tablet 0     [START ON 11/3/2023] donepezil (ARICEPT) 10 MG tablet Take 1 tablet (10 mg) by mouth At Bedtime 90 tablet 3     donepezil " (ARICEPT) 5 MG tablet Take 1 tablet (5 mg) by mouth At Bedtime for 30 days 30 tablet 0     FLUoxetine (PROZAC) 10 MG capsule On 9/11, start Prozac 10mg po daily x 7 days and then increase to 20mg po daily 30 capsule 4     fluticasone (FLONASE) 50 MCG/ACT nasal spray USE 2 SPRAYS INTO BOTH NOSTRILS ONCE DAILY 16 g 11     furosemide (LASIX) 20 MG tablet TAKE TWO TABLETS (40MG) BY MOUTH ONCE DAILY IN THE MORNING; TAKE 1 TABLET (20MG) BY MOUTH ONCE DAILY IN THE EARLY AFTERNOON 84 tablet 11     gabapentin (NEURONTIN) 600 MG tablet TAKE 1 TABLET (600 MG) BY MOUTH AT BEDTIME 31 tablet 11     hypromellose-dextran (ARTIFICAL TEARS) 0.1-0.3 % ophthalmic solution Place 1 drop into both eyes 2 times daily 15 mL 0     levothyroxine (SYNTHROID/LEVOTHROID) 100 MCG tablet TAKE 1 TAB BY MOUTH ONCE DAILY AFTER TWO WEEKS OF LEVOTHYROXINE 75MCG 28 tablet 11     LORazepam (ATIVAN) 0.5 MG tablet TAKE 1 TABLET BY MOUTH THREE TIMES DAILY; 2 TABLETS AT HS; TAKE 1 TABLET BY MOUTH ONCE DAILY IF NEEDED 120 tablet 3     Menthol, Topical Analgesic, (BIOFREEZE) 4 % GEL Dime size gel to elbows and neck at HS and then TID prn 150 mL 11     nitroGLYcerin (NITROSTAT) 0.4 MG sublingual tablet For chest pain place 1 tablet under the tongue every 5 minutes for 3 doses. If symptoms persist 5 minutes after 1st dose call 911. 6 tablet 4     nystatin (MYCOSTATIN) 124572 UNIT/GM external powder Apply topically 2 times daily 30 g 4     oxyCODONE (ROXICODONE) 5 MG tablet Take 1 tablet (5 mg) by mouth At Bedtime. May also take 1 tablet (5 mg) every 4 hours as needed for pain. 40 tablet 0     QUEtiapine (SEROQUEL) 25 MG tablet 12.5mg by mouth in AM and afternoon and 25mg po at bedtime 45 tablet 11     rOPINIRole (REQUIP) 0.25 MG tablet TAKE 1 TABLET (0.25MG) BY MOUTH EVERY MORNING; TAKE 3 TABLETS (0.75MG) BY MOUTH EACH NIGHT AT BEDTIME; TAKE 1 TABLET (0.25MG) BY MOUTH EVERY AFTERNOON 140 tablet 11     rOPINIRole (REQUIP) 0.5 MG tablet 0.25mg by mouth every AM  and afternoon, 0.75mg po at bedtime 90 tablet 4     senna-docusate (SENOKOT-S/PERICOLACE) 8.6-50 MG tablet Take 1 tablet by mouth every other day And 1 tab by mouth daily as needed 60 tablet 11     traZODone (DESYREL) 100 MG tablet TAKE 1 TABLET (100 MG) BY MOUTH AT BEDTIME 30 tablet 11     vitamin B-12 (CYANOCOBALAMIN) 250 MCG tablet Take 250 mcg by mouth daily       Medications reviewed:      REVIEW OF SYSTEMS:  4 point ROS neg other than the symptoms noted above in the HPI.      PHYSICAL EXAM:  BP 99/62   Pulse 100   Temp 97.8  F (36.6  C)   Resp 17   Wt 97 kg (213 lb 12.8 oz)   LMP 06/15/1984 (Approximate)   SpO2 96%   BMI 35.58 kg/m       Physical Exam  Constitutional:       Appearance: Normal appearance.      Comments: Forgetful and has been placed on Aricept by neurology.   Eyes:      Comments: Wears glasses.   Cardiovascular:      Rate and Rhythm: Normal rate and regular rhythm.      Pulses: Normal pulses.      Heart sounds: Normal heart sounds.   Pulmonary:      Effort: Pulmonary effort is normal.      Breath sounds: Normal breath sounds.   Genitourinary:     Comments: Complains of frequency due to furosemide.  Musculoskeletal:      Comments: Ambulates with a four-wheel walker.  Gait steady.  Continues to be independent in room.   Skin:     General: Skin is warm and dry.   Neurological:      Mental Status: She is alert and oriented to person, place, and time. Mental status is at baseline.      Comments: Patient to start Aricept as ordered by neurology.   Psychiatric:         Mood and Affect: Mood normal.           ASSESSMENT / PLAN:  (D62) Anemia due to blood loss, acute  (primary encounter diagnosis)  (K62.3) Rectal prolapse  Comment: Patient's hemoglobin is currently at 9.8.  Last hemoglobin results on 9/21/2020 was 9.5.  Results have remained stable at this time.  Will continue to check hemoglobin levels every other week for about 6 weeks.  Ingrid reports absence of blood in her stool at this time.   Has a second opinion coming up in November for her rectal prolapse.    (K59.03) Drug-induced constipation  Comment: Patient reports having a stool most days and has no concerns for constipation.  She takes Senokot on Mondays, Wednesdays, and Fridays for constipation.  Condition stable at this time.    (E03.9) Acquired hypothyroidism  Comment: Currently on levothyroxine 100 mcg 1 tablet daily for hypothyroidism.  We will continue to monitor levels with TSH lab draws.  Last TSH check was 10/3/23.  Level stable at this time.    (I10) Primary hypertension  (I50.22) Chronic systolic heart failure (H)  Comment: Patient is currently on furosemide.  She reports frequency with voiding related to the medication.  Denies incontinence episodes.  Her blood pressure is averaging between 99-1 30s for systolic blood pressure.  She denies dizziness, chest pain, shortness of breath, and vision changes.  No edema noted to bilateral lower extremities.  She is currently on 1.5 L of oxygen via nasal Cannula.  Patient states that she does not take her oxygen when she is going out.  Condition stable at this time.  No changes to current regimen.    (F51.01) Primary insomnia  Comment: Ingrid reports sleeping well most nights.  She also states that she sometimes wakes up due to restless legs.  She clearly states that this does not occur frequently.  She states that although she experiences her restless legs intermittently, the Ropinirole has significantly decreased her restless leg syndrome.    (F33.1) Major depressive disorder, recurrent episode, moderate (HCC)  Comment: Patient is currently on fluoxetine 20 mg 1 capsule daily and buspirone 10 mg 3 times daily at 8 AM 2 PM and 7 PM.  She also has the Hyperlyte on her side table that she has on most of the day.  She states that she has struggled with depression for many years in her life and that her mother also experienced depression.  Patient reports that her mood is good most days and she  tries to engage with other residents, her sister, and her daughter.    (F41.1) JAIME (generalized anxiety disorder)  (G31.84) Mild cognitive impairment  Comment:  Aricept 5 mg at bedtime recently added by neurology to help with cognitive impairment.  Patient expresses agreement with treatment regimen.  We will follow-up with patient and facility staff on cognition in 4 to 6 weeks.  We will also monitor signs and symptoms of GI upset from Aricept as a common side effect.    Patient is currently on Prozac 20 mg 1 capsule p.o. daily.  She is also on lorazepam 0.5 mg 3 times daily.  Lorazepam 0.5 mg available at bedtime.  Patient also takes 12.5 mg of Seroquel p.o. twice daily and has 25 mg of  Seroquel at bedtime..  Patient currently takes trazodone 100 mg for sleep disturbance.  She states that she is sleeping well at night and has no concerns at this time.  We will continue current regimen.    Orders:  No new orders at this visit.     Electronically signed by  BAILEY BOWMAN NP Student with [employer]    I was present with the medical student/advanced practice registered nurse, Bailey Bowman, [employer] student, who participated in the service and in the documentation of this note and/or observation along side of this provider. I have verified the history and personally performed the physical exam and medical decision making. I agree with the assessment and plan of care as documented in the note.       NORA Balderas CNP          Sincerely,        NORA Balderas CNP

## 2023-10-06 NOTE — PROGRESS NOTES
10/6/2023:     Is the implanted device safe for MRI Exam?  Yes  Is this device 3T compatible? Yes  Device Type: Pacemaker      Device Information: [employer],  CRT-P Visionist MRI      Cardiology Orders for Device Programming     -- Yes -- The patient has a MRI conditional pulse generator and leads from the same     -- Yes -- The pulse generator and leads have been implanted for at least 6 weeks. (Does not apply to [employer] devices)    -- Yes-- The device is implanted in the right or left pectoral region    -- Yes -- There are not any additional active cardiac devices, abandoned leads, lead extenders or adapters    -- Yes -- The device lead impedance measurements are within the normal range. (Manufacture recommendations: Medtronic Advisa and Revo 200-1,500 ohms; Medtronic ICD and CRT's 200-3000 ohms and defibrillation lead impedance   ohms)    -- N/A -- If the patient is pacemaker dependent the thresholds are less than or equal to 2.0V @ 0.4ms.    -- N/A -- RA and RV leads are programmed to bipolar pacing operation or pacing off. If no, CONTACT THE DEVICE REP FOR PROGRAMMING         Date of last In-clinic Device check: 4/8/2022  Results of last Device check:  1.   Right atrium impedance: 700  2.   Right ventricle impedance: 800  3.   Left ventricle impedance: 500     4.   Right atrium threshold: 0.6V@0.5ms  5.   Right ventricle threshold: 0.6V@0.5ms  6.   Left ventricle threshold: 1.2V@1.0ms     Device programming during the scan guidelines   Pacing Mode (check one): OFF  Pacing Rate: 0  bpm or   Per note on 4/8/22: The [employer] rep, Tushar, and Dr. Boyd were present in pt's room to deactivate the device. Upon interrogation of the device, the findings were that the pt is no longer being 100% paced and the heart is actually beating on it's own. The pt still wanted to proceed and the device was turned off.   Julianna Saavedra, Device RN

## 2023-10-10 NOTE — TELEPHONE ENCOUNTER
LVM, sent mychart x2, sent letter. Appt is canceled. Reschedule to next available appt with the same provider.

## 2023-10-18 NOTE — TELEPHONE ENCOUNTER
Diagnosis, Referred by & from: Rectal Prolapse   Appt date: 2024   NOTES STATUS DETAILS   OFFICE NOTE from referring provider [facility]:  23, 22 - PCC OV with Ary Sims NP   OFFICE NOTE from other specialist [facility]:  10/5/23, 23 - GERIATRIC OV with Elsa Jeffries NP  1/10/18 - ONC OV with Dr. Tracy   DISCHARGE SUMMARY from [facility]:  10/10/15 - Admission with Dr. Urbina   DISCHARGE REPORT from the ER [facility]:  17 - ED OV with Dr. Vazquez   OPERATIVE REPORT [facility]:  10/10/15 - OP Note for LAPAROSCOPIC APPENDECTOMY with Dr. Chamberlain  12 - OP Note for I&D ABDOMINAL WALL ABSCESS with Dr. Ephraim Andrea:  11 - OP Note for RECTOPEXY with Dr. Bueno   MEDICATION LIST Internal    LABS     ANAL PAP/CEA [facility]:  12 - Abdominal Wall (Case: )   BIOPSIES/PATHOLOGY RELATED TO DIAGNOSIS [facility]:  10/10/15 - Appendix Biopsy (Case: [id number])   DIAGNOSTIC PROCEDURES     COLONOSCOPY Received / [facility]:  21 - Colonoscopy     [facility]:  3/16/16 - Colonoscopy   IMAGING (DISC & REPORT)      XRAY [facility]:  22 - XR Abdomen  22 - XR Abdomen  20 - XR Pelvis/Hip   ULTRASOUND  (ENDOANAL/ENDORECTAL) [facility]:  19 - US Abdomen

## 2023-10-19 NOTE — LETTER
10/19/2023        RE: Koko Powell  C/o Daljit Powell   [address] [address]        [facility] GERIATRICS  ACUTE/EPISODIC VISIT    [facility] Medical Record Number:  [id number]  Place of Service where encounter took place:  [facility] ([facility]) [[id number]]    Chief Complaint   Patient presents with     RECHECK       HPI:    Koko Powell is a 78 year old  (1945), who is being seen today for an episodic care visit.  HPI information obtained from: facility staff, patient report, and [facility] chart review.    Today's concern is:    Diagnoses         Codes Comments    Anemia due to blood loss, acute    -  Primary D62     Rectal prolapse     K62.3     Chronic systolic heart failure (H)     I50.22     Drug-induced constipation     K59.03     Major depressive disorder, recurrent episode, moderate (H)     F33.1           Came today to follow up with Koko about her rectal prolapse and ,current HgB level.  Found Koko eating lunch which they bring to her.  Alert and pleasant.  Somewhat quiet today and that could be due to eating lunch.      Looked at her calendar and saw her appointment for a 2nd opinion of the rectal prolapse was 11/8/23 and a week later she has a brain MRI scheduled.      Asked koko if she is losing blood each time she uses the bathroom and she did not think so.  She tried not to rub her prolapse as she knows it bleeds.  The prolapse does stick out as she does not or it can not go back in.      ALLERGIES:    Allergies   Allergen Reactions     Dilaudid [Hydromorphone Hcl] Visual Disturbance     Hallucinations     Fosamax [Alendronate Sodium] Rash            Norvasc [Amlodipine Besylate] Hives and Rash     Tegretol [Carbamazepine] Hives and Rash        MEDICATIONS:  Post Discharge Medication Reconciliation Status: patient was not discharged from an inpatient facility or TCU. Reconciled today    Current Outpatient Medications   Medication Sig  Dispense Refill     FLUoxetine (PROZAC) 10 MG capsule Take 2 capsules (20 mg) by mouth daily       acetaminophen (TYLENOL) 650 MG CR tablet 1300mg po every PM and 1300mg po twice a day as needed       artificial tears (GENTEAL) 0.1-0.2-0.3 % ophthalmic solution INSTILL ONE DROP INTO BOTH EYES TWICE DAILY 15 mL 4     busPIRone (BUSPAR) 10 MG tablet TAKE 1 TABLET (10 MG) BY MOUTH 3 TIMES DAILY 90 tablet 11     cholecalciferol (VITAMIN D3) 25 mcg (1000 units) capsule Take 1 capsule by mouth daily       [START ON 11/3/2023] donepezil (ARICEPT) 10 MG tablet Take 1 tablet (10 mg) by mouth At Bedtime 90 tablet 3     donepezil (ARICEPT) 5 MG tablet Take 1 tablet (5 mg) by mouth At Bedtime for 30 days 30 tablet 0     fluticasone (FLONASE) 50 MCG/ACT nasal spray USE 2 SPRAYS INTO BOTH NOSTRILS ONCE DAILY 16 g 11     furosemide (LASIX) 20 MG tablet TAKE TWO TABLETS (40MG) BY MOUTH ONCE DAILY IN THE MORNING; TAKE 1 TABLET (20MG) BY MOUTH ONCE DAILY IN THE EARLY AFTERNOON 84 tablet 11     gabapentin (NEURONTIN) 600 MG tablet TAKE 1 TABLET (600 MG) BY MOUTH AT BEDTIME 31 tablet 11     hypromellose-dextran (ARTIFICAL TEARS) 0.1-0.3 % ophthalmic solution Place 1 drop into both eyes 2 times daily 15 mL 0     levothyroxine (SYNTHROID/LEVOTHROID) 100 MCG tablet TAKE 1 TAB BY MOUTH ONCE DAILY AFTER TWO WEEKS OF LEVOTHYROXINE 75MCG 28 tablet 11     LORazepam (ATIVAN) 0.5 MG tablet TAKE 1 TABLET BY MOUTH THREE TIMES DAILY; 2 TABLETS AT HS; TAKE 1 TABLET BY MOUTH ONCE DAILY IF NEEDED 120 tablet 3     Menthol, Topical Analgesic, (BIOFREEZE) 4 % GEL Dime size gel to elbows and neck at HS and then TID prn 150 mL 11     nitroGLYcerin (NITROSTAT) 0.4 MG sublingual tablet For chest pain place 1 tablet under the tongue every 5 minutes for 3 doses. If symptoms persist 5 minutes after 1st dose call 911. 6 tablet 4     nystatin (MYCOSTATIN) 926903 UNIT/GM external powder Apply topically 2 times daily 30 g 4     oxyCODONE (ROXICODONE) 5 MG tablet  Take 1 tablet (5 mg) by mouth At Bedtime. May also take 1 tablet (5 mg) every 4 hours as needed for pain. 40 tablet 0     QUEtiapine (SEROQUEL) 25 MG tablet 12.5mg by mouth in AM and afternoon and 25mg po at bedtime 45 tablet 11     rOPINIRole (REQUIP) 0.25 MG tablet TAKE 1 TABLET (0.25MG) BY MOUTH EVERY MORNING; TAKE 3 TABLETS (0.75MG) BY MOUTH EACH NIGHT AT BEDTIME; TAKE 1 TABLET (0.25MG) BY MOUTH EVERY AFTERNOON 140 tablet 11     rOPINIRole (REQUIP) 0.5 MG tablet 0.25mg by mouth every AM and afternoon, 0.75mg po at bedtime 90 tablet 4     senna-docusate (SENOKOT-S/PERICOLACE) 8.6-50 MG tablet Take 1 tablet by mouth every other day And 1 tab by mouth daily as needed 60 tablet 11     traZODone (DESYREL) 100 MG tablet TAKE 1 TABLET (100 MG) BY MOUTH AT BEDTIME 30 tablet 11     vitamin B-12 (CYANOCOBALAMIN) 250 MCG tablet Take 250 mcg by mouth daily           REVIEW OF SYSTEMS:  4 point ROS neg other than the symptoms noted above in the HPI.denied chest pain or any acute SOB.  Wears the oxygen for comfort as she does become anxious and does not want to die.        PHYSICAL EXAM:  BP 99/62   Pulse 100   Temp 97.8  F (36.6  C)   Resp 17   Wt 96.6 kg (213 lb)   LMP 06/15/1984 (Approximate)   SpO2 96%   BMI 35.45 kg/m    Ingrid was sitting in her recliner with her feet down, eating soup and fruit.    Alert and knows people and most of place and time.  She did not say she felt forgetful or having something to ask but can not remember.    O2 at 1-2L/min via NC and concentrator.  Lungs are clear.  No coughing.  Heart rate regular and strong.  No pitting edema in ankles.  Legs are Thick in nature.  Abdomen is large, round and non-tender.      Lab Results   Component Value Date    HGB 9.1 10/19/2023    HGB 9.8 10/05/2023       ASSESSMENT / PLAN:  (D62) Anemia due to blood loss, acute  (primary encounter diagnosis)  (K62.3) Rectal prolapse  Comment: did email daughter to ask if appointment is beginning of November or the  end but then able to see in Ingrid bauman when it will be.  Decided to do one more HgB level prior to the appointment on 11/2/23.  Slowly her HgB is drifting down and so wanted to know in her opinion how bad.  She seems use to it and not bothered by it.  Had to think how often she may find blood in the stool or on toilet paper.    Will see what 2nd opinion with lead too.  She is scared if she had to have surgery due to her death experience she has had already.  Told her if she truly need surgery would get her cleared by cardiology first to give her a piece of mind.    (I50.22) Chronic systolic heart failure (H)  Comment: no acute symptoms.  Managed with Lasix 40mg in AM and 20mg in afternoon.  Has the O2 for comfort and occasionally will see her go without.    (K59.03) Drug-induced constipation  Comment: remains on Senna-S 1 tab on M/W/F.  She knows she needs to keep the stool soft so not to irritate the prolapse.  No complaints.    (F33.1) Major depressive disorder, recurrent episode, moderate (HCC)  Comment: have now switched her over from Celexa to Prozac.  Does take other medications for anxiety.  Monitor her mood and signs of lethargy as she is on a lot of things.  Will keep her at 20mg of Prozac for now.  May be working as she did not seem anxious today.  Plan: FLUoxetine (PROZAC) 10 MG capsule     Orders:  HgB in 2 weeks 11/2/23 due to rectal bleeding, anemia    Electronically signed by  NORA Balderas CNP            Sincerely,        NORA Balderas CNP

## 2023-10-19 NOTE — PROGRESS NOTES
[facility] GERIATRICS  ACUTE/EPISODIC VISIT    [facility] Medical Record Number:  [id number]  Place of Service where encounter took place:  [facility] ([facility]) [[id number]]    Chief Complaint   Patient presents with    RECHECK       HPI:    Koko Powell is a 78 year old  (1945), who is being seen today for an episodic care visit.  HPI information obtained from: facility staff, patient report, and [facility] chart review.    Today's concern is:    Diagnoses         Codes Comments    Anemia due to blood loss, acute    -  Primary D62     Rectal prolapse     K62.3     Chronic systolic heart failure (H)     I50.22     Drug-induced constipation     K59.03     Major depressive disorder, recurrent episode, moderate (H)     F33.1           Came today to follow up with Koko about her rectal prolapse and ,current HgB level.  Found Koko eating lunch which they bring to her.  Alert and pleasant.  Somewhat quiet today and that could be due to eating lunch.      Looked at her calendar and saw her appointment for a 2nd opinion of the rectal prolapse was 11/8/23 and a week later she has a brain MRI scheduled.      Asked koko if she is losing blood each time she uses the bathroom and she did not think so.  She tried not to rub her prolapse as she knows it bleeds.  The prolapse does stick out as she does not or it can not go back in.      ALLERGIES:    Allergies   Allergen Reactions    Dilaudid [Hydromorphone Hcl] Visual Disturbance     Hallucinations    Fosamax [Alendronate Sodium] Rash           Norvasc [Amlodipine Besylate] Hives and Rash    Tegretol [Carbamazepine] Hives and Rash        MEDICATIONS:  Post Discharge Medication Reconciliation Status: patient was not discharged from an inpatient facility or TCU. Reconciled today    Current Outpatient Medications   Medication Sig Dispense Refill    FLUoxetine (PROZAC) 10 MG capsule Take 2 capsules (20 mg) by mouth daily      acetaminophen  (TYLENOL) 650 MG CR tablet 1300mg po every PM and 1300mg po twice a day as needed      artificial tears (GENTEAL) 0.1-0.2-0.3 % ophthalmic solution INSTILL ONE DROP INTO BOTH EYES TWICE DAILY 15 mL 4    busPIRone (BUSPAR) 10 MG tablet TAKE 1 TABLET (10 MG) BY MOUTH 3 TIMES DAILY 90 tablet 11    cholecalciferol (VITAMIN D3) 25 mcg (1000 units) capsule Take 1 capsule by mouth daily      [START ON 11/3/2023] donepezil (ARICEPT) 10 MG tablet Take 1 tablet (10 mg) by mouth At Bedtime 90 tablet 3    donepezil (ARICEPT) 5 MG tablet Take 1 tablet (5 mg) by mouth At Bedtime for 30 days 30 tablet 0    fluticasone (FLONASE) 50 MCG/ACT nasal spray USE 2 SPRAYS INTO BOTH NOSTRILS ONCE DAILY 16 g 11    furosemide (LASIX) 20 MG tablet TAKE TWO TABLETS (40MG) BY MOUTH ONCE DAILY IN THE MORNING; TAKE 1 TABLET (20MG) BY MOUTH ONCE DAILY IN THE EARLY AFTERNOON 84 tablet 11    gabapentin (NEURONTIN) 600 MG tablet TAKE 1 TABLET (600 MG) BY MOUTH AT BEDTIME 31 tablet 11    hypromellose-dextran (ARTIFICAL TEARS) 0.1-0.3 % ophthalmic solution Place 1 drop into both eyes 2 times daily 15 mL 0    levothyroxine (SYNTHROID/LEVOTHROID) 100 MCG tablet TAKE 1 TAB BY MOUTH ONCE DAILY AFTER TWO WEEKS OF LEVOTHYROXINE 75MCG 28 tablet 11    LORazepam (ATIVAN) 0.5 MG tablet TAKE 1 TABLET BY MOUTH THREE TIMES DAILY; 2 TABLETS AT HS; TAKE 1 TABLET BY MOUTH ONCE DAILY IF NEEDED 120 tablet 3    Menthol, Topical Analgesic, (BIOFREEZE) 4 % GEL Dime size gel to elbows and neck at HS and then TID prn 150 mL 11    nitroGLYcerin (NITROSTAT) 0.4 MG sublingual tablet For chest pain place 1 tablet under the tongue every 5 minutes for 3 doses. If symptoms persist 5 minutes after 1st dose call 911. 6 tablet 4    nystatin (MYCOSTATIN) 415019 UNIT/GM external powder Apply topically 2 times daily 30 g 4    oxyCODONE (ROXICODONE) 5 MG tablet Take 1 tablet (5 mg) by mouth At Bedtime. May also take 1 tablet (5 mg) every 4 hours as needed for pain. 40 tablet 0     QUEtiapine (SEROQUEL) 25 MG tablet 12.5mg by mouth in AM and afternoon and 25mg po at bedtime 45 tablet 11    rOPINIRole (REQUIP) 0.25 MG tablet TAKE 1 TABLET (0.25MG) BY MOUTH EVERY MORNING; TAKE 3 TABLETS (0.75MG) BY MOUTH EACH NIGHT AT BEDTIME; TAKE 1 TABLET (0.25MG) BY MOUTH EVERY AFTERNOON 140 tablet 11    rOPINIRole (REQUIP) 0.5 MG tablet 0.25mg by mouth every AM and afternoon, 0.75mg po at bedtime 90 tablet 4    senna-docusate (SENOKOT-S/PERICOLACE) 8.6-50 MG tablet Take 1 tablet by mouth every other day And 1 tab by mouth daily as needed 60 tablet 11    traZODone (DESYREL) 100 MG tablet TAKE 1 TABLET (100 MG) BY MOUTH AT BEDTIME 30 tablet 11    vitamin B-12 (CYANOCOBALAMIN) 250 MCG tablet Take 250 mcg by mouth daily           REVIEW OF SYSTEMS:  4 point ROS neg other than the symptoms noted above in the HPI.denied chest pain or any acute SOB.  Wears the oxygen for comfort as she does become anxious and does not want to die.        PHYSICAL EXAM:  BP 99/62   Pulse 100   Temp 97.8  F (36.6  C)   Resp 17   Wt 96.6 kg (213 lb)   LMP 06/15/1984 (Approximate)   SpO2 96%   BMI 35.45 kg/m    Ingrid was sitting in her recliner with her feet down, eating soup and fruit.    Alert and knows people and most of place and time.  She did not say she felt forgetful or having something to ask but can not remember.    O2 at 1-2L/min via NC and concentrator.  Lungs are clear.  No coughing.  Heart rate regular and strong.  No pitting edema in ankles.  Legs are Thick in nature.  Abdomen is large, round and non-tender.      Lab Results   Component Value Date    HGB 9.1 10/19/2023    HGB 9.8 10/05/2023       ASSESSMENT / PLAN:  (D62) Anemia due to blood loss, acute  (primary encounter diagnosis)  (K62.3) Rectal prolapse  Comment: did email daughter to ask if appointment is beginning of November or the end but then able to see in Ingrid apt when it will be.  Decided to do one more HgB level prior to the appointment on 11/2/23.   Slowly her HgB is drifting down and so wanted to know in her opinion how bad.  She seems use to it and not bothered by it.  Had to think how often she may find blood in the stool or on toilet paper.    Will see what 2nd opinion with lead too.  She is scared if she had to have surgery due to her death experience she has had already.  Told her if she truly need surgery would get her cleared by cardiology first to give her a piece of mind.    (I50.22) Chronic systolic heart failure (H)  Comment: no acute symptoms.  Managed with Lasix 40mg in AM and 20mg in afternoon.  Has the O2 for comfort and occasionally will see her go without.    (K59.03) Drug-induced constipation  Comment: remains on Senna-S 1 tab on M/W/F.  She knows she needs to keep the stool soft so not to irritate the prolapse.  No complaints.    (F33.1) Major depressive disorder, recurrent episode, moderate (HCC)  Comment: have now switched her over from Celexa to Prozac.  Does take other medications for anxiety.  Monitor her mood and signs of lethargy as she is on a lot of things.  Will keep her at 20mg of Prozac for now.  May be working as she did not seem anxious today.  Plan: FLUoxetine (PROZAC) 10 MG capsule     Orders:  HgB in 2 weeks 11/2/23 due to rectal bleeding, anemia    Electronically signed by  NORA Balderas CNP

## 2023-10-24 PROBLEM — E53.8 FOLIC ACID DEFICIENCY: Status: ACTIVE | Noted: 2023-01-01

## 2023-10-24 NOTE — PROGRESS NOTES
Saw that Ingrid had labs done per direction of her Neurologist.  Daughter emailed this provider to update on lab results.    Had seen her Folic level was low, B12 level in the 500's, and TSH low normal side.    Neurologist placed an order for Folic Acid 1mg daily but not sure where script sent.    This NP sent script for Folic, B12 100mcg daily and then lowered the LEvothyroxine to 88mcg daily to [facility]    Orders for facility:   Folic Acid 1mg po daily due to Folic deficiency  B12 100 mcg po daily for Neurocognitive disorder  Decrease Levothyroxine to 88mcg po daily for hypothyroidism  Discontinue the Levothyroxine 100mcg daily  TSH and Free T4 in 6 weeks on regular lab day - hypothyroidism.      NORA Balderas CNP

## 2023-10-24 NOTE — RESULT ENCOUNTER NOTE
Dear Ingrid,     Your recent test results showed a low folic acid level.  Please start folic acid 1 mg daily.  I have sent a prescription to your pharmacy.  Please continue with your current plan of care and let us know if you have any questions or concerns.    Cas Flores MD

## 2023-10-25 NOTE — RESULT ENCOUNTER NOTE
Dear Ingrid    In addition to low folic acid level, homocystine level is elevated.  As your vitamin B12 level is normal this combination of low folate and elevated homocysteine suggests folic acid deficiency.  Please start supplements as advised previously.  Please continue with the current plan of care and let us know if there are any questions or concerns.    Regards,  Cas Flores MD

## 2023-10-30 NOTE — TELEPHONE ENCOUNTER
Daughter sent a email to nursing and this NP at the facility about her mother's use of undergarments excessively.  Could be as high as 18 a day.  Change would be every 1.5 hours.  It is known she bleeds from her rectal prolapse but Koko can even tell her family why she changes them often.    More and more they see memory loss with koko.    Most recently changed Koko from Celexa to Prozac for depression/anxiety to see if that would be more effective.  Did notice that Koko seemed more despondent last visit.  Question if the 20mg po Prozac is to much for her.    Question if she has a UTI? Due to frequency and confusion.    Sent back an email stating the following orders:     Lower the Prozac to 10mg po daily from 20mg daily.  Script sent to pharmacy.  UA/UC due to frequency and confusion      Elsa Jeffries, NORA CNP

## 2023-11-02 NOTE — TELEPHONE ENCOUNTER
Nursing asking for orders to admit Ingrid down on the memory care unit as they have an apartment open.      Orders:  Ok to admit to memory care unit due to Alzheimer's Dementia with mood disorder      NORA Balderas CNP

## 2023-11-06 NOTE — LETTER
11/6/2023        RE: Ingrid Powell  C/o Daljit Powell   [address] [address]        [facility] GERIATRICS  ACUTE/EPISODIC VISIT    [facility] Medical Record Number:  [id number]  Place of Service where encounter took place:  [facility] ([facility]) [[id number]]    Chief Complaint   Patient presents with     RECHECK       HPI:    Ingrid Powell is a 78 year old  (1945), who is being seen today for an episodic care visit.  HPI information obtained from: facility chart records, facility staff, patient report, [facility] chart review, and family/first contact sister report.    Today's concern is:    Diagnoses         Codes Comments    Generalized osteoarthritis of multiple sites    -  Primary M15.9     Major depressive disorder, recurrent episode, moderate (H)     F33.1     Moderate early onset Alzheimer's dementia with mood disturbance (H)     G30.0, F02.B3     Anemia due to blood loss, acute     D62     Rectal prolapse     K62.3           Major changes occurring for Ingrid this week as today she moved down to the memory care unit as she is requiring more assistance and cognitively showing changes that most likely is not safe for her to be up in the assisted living side of the building.  There was a permanent open down in memory care and so family was offered the opportunity to move her and decided it was the appropriate time.    Also following up today from recent labs done on 2 November.  Hemoglobin dropped to 8.8.  Wanted to take a peek at her rectal prolapse to check on how much bleeding she has been having.    Her apartment is busy at the moment and so try to make this a simple visit for Ingrid so that she did not feel overwhelmed.  A good family friend was cleaning her kitchen where and told her she did not have to leave as Ingrid and this nurse practitioner working to go into her bathroom.    After the visit ran into Ingrid's sister in the hallway and spoke with her  for a while in regards to the recent neurology visit and memory changes that family has been noticing.  Sister states that Alzheimer's runs in their family.  Their mother was this way as well and so it is hard for Ingrid sister right now to see Ingrid go through this.  Spoke about ways that Ingrid can get supported as most likely she may continue not to want to go out on outings.    ALLERGIES:    Allergies   Allergen Reactions     Dilaudid [Hydromorphone Hcl] Visual Disturbance     Hallucinations     Fosamax [Alendronate Sodium] Rash            Norvasc [Amlodipine Besylate] Hives and Rash     Tegretol [Carbamazepine] Hives and Rash        MEDICATIONS:  Post Discharge Medication Reconciliation Status: patient was not discharged from an inpatient facility or TCU.     Current Outpatient Medications   Medication Sig Dispense Refill     acetaminophen (TYLENOL) 650 MG CR tablet 1300mg po every PM and 1300mg po twice a day as needed       artificial tears (GENTEAL) 0.1-0.2-0.3 % ophthalmic solution INSTILL ONE DROP INTO BOTH EYES TWICE DAILY 15 mL 4     busPIRone (BUSPAR) 10 MG tablet TAKE 1 TABLET (10 MG) BY MOUTH 3 TIMES DAILY 90 tablet 11     cholecalciferol (VITAMIN D3) 25 mcg (1000 units) capsule Take 1 capsule by mouth daily       cyanocobalamin (VITAMIN B-12) 100 MCG tablet Take 1 tablet (100 mcg) by mouth daily 30 tablet 11     donepezil (ARICEPT) 10 MG tablet Take 1 tablet (10 mg) by mouth At Bedtime 90 tablet 3     FLUoxetine (PROZAC) 10 MG capsule Take 1 capsule (10 mg) by mouth daily 30 capsule 4     fluticasone (FLONASE) 50 MCG/ACT nasal spray USE 2 SPRAYS INTO BOTH NOSTRILS ONCE DAILY 16 g 11     folic acid (FOLVITE) 1 MG tablet Take 1 tablet (1 mg) by mouth daily 30 tablet 11     furosemide (LASIX) 20 MG tablet TAKE TWO TABLETS (40MG) BY MOUTH ONCE DAILY IN THE MORNING; TAKE 1 TABLET (20MG) BY MOUTH ONCE DAILY IN THE EARLY AFTERNOON 84 tablet 11     gabapentin (NEURONTIN) 600 MG tablet TAKE 1 TABLET (600 MG) BY  MOUTH AT BEDTIME 31 tablet 11     hypromellose-dextran (ARTIFICAL TEARS) 0.1-0.3 % ophthalmic solution Place 1 drop into both eyes 2 times daily 15 mL 0     levothyroxine (SYNTHROID/LEVOTHROID) 88 MCG tablet Take 1 tablet (88 mcg) by mouth daily 30 tablet 11     LORazepam (ATIVAN) 0.5 MG tablet TAKE 1 TABLET BY MOUTH THREE TIMES DAILY; 2 TABLETS AT HS; TAKE 1 TABLET BY MOUTH ONCE DAILY IF NEEDED 120 tablet 3     Menthol, Topical Analgesic, (BIOFREEZE) 4 % GEL Dime size gel to elbows and neck at HS and then TID prn 150 mL 11     nitroGLYcerin (NITROSTAT) 0.4 MG sublingual tablet For chest pain place 1 tablet under the tongue every 5 minutes for 3 doses. If symptoms persist 5 minutes after 1st dose call 911. 6 tablet 4     nystatin (MYCOSTATIN) 299235 UNIT/GM external powder Apply topically 2 times daily 30 g 4     oxyCODONE (ROXICODONE) 5 MG tablet Take 1 tablet (5 mg) by mouth At Bedtime. May also take 1 tablet (5 mg) every 4 hours as needed for pain. 40 tablet 0     QUEtiapine (SEROQUEL) 25 MG tablet 12.5mg by mouth in AM and afternoon and 25mg po at bedtime 45 tablet 11     rOPINIRole (REQUIP) 0.25 MG tablet TAKE 1 TABLET (0.25MG) BY MOUTH EVERY MORNING; TAKE 3 TABLETS (0.75MG) BY MOUTH EACH NIGHT AT BEDTIME; TAKE 1 TABLET (0.25MG) BY MOUTH EVERY AFTERNOON 140 tablet 11     senna-docusate (SENOKOT-S/PERICOLACE) 8.6-50 MG tablet Take 1 tablet by mouth every other day And 1 tab by mouth daily as needed 60 tablet 11     traZODone (DESYREL) 100 MG tablet TAKE 1 TABLET (100 MG) BY MOUTH AT BEDTIME 30 tablet 11     vitamin B-12 (CYANOCOBALAMIN) 250 MCG tablet Take 250 mcg by mouth daily         REVIEW OF SYSTEMS:  4 point ROS neg other than the symptoms noted above in the HPI. Denied chest pain, shortness of breath.  Wearing O2.        PHYSICAL EXAM:  BP 99/62   Pulse 100   Temp 97.8  F (36.6  C)   Resp 17   Wt 96.6 kg (213 lb)   LMP 06/15/1984 (Approximate)   SpO2 96%   BMI 35.45 kg/m    Ingrid was sitting in her  recliner and so able to stand up on her own and use her four-wheel walker to going to the bathroom.  Gait was steady when using her walker.  Able to pull down her pant and pull up.  Did look in the pull-up and saw scant amount of blood on the pull up itself.  Able to bend down over her walker to expose her rectal area.  Her prolapse is exposed and is red and moist in appearance.  No visible blood is seen.  This nurse practitioner was able to push the prolapse back in with a glove.  No blood on the glove.  Prolapse stated in for short time.  Ingrid was going to use the bathroom and so left her and there to be alone.    Ingrid was not in any respiratory distress.  Her mood seemed flat.  A lot was going on and so did not really get to speak with her about how she felt in moving down there.  Most likely would not asked her in front of her family as feel she probably would not give the most direct answer.    Most of the aides know Ingrid well from working between the [facility] and the [facility] unit.  Component      Latest Ref Rng 11/2/2023  5:46 AM   WBC      4.0 - 11.0 10e3/uL 6.1    RBC Count      3.80 - 5.20 10e6/uL 3.61 (L)    Hemoglobin      11.7 - 15.7 g/dL 8.8 (L)    Hematocrit      35.0 - 47.0 % 29.5 (L)    MCV      78 - 100 fL 82    MCH      26.5 - 33.0 pg 24.4 (L)    MCHC      31.5 - 36.5 g/dL 29.8 (L)    RDW      10.0 - 15.0 % 15.2 (H)    Platelet Count      150 - 450 10e3/uL 272         ASSESSMENT / PLAN:  (M15.9) Generalized osteoarthritis of multiple sites  (primary encounter diagnosis)  Comment: Even though Ingrid's apartment is across from the [facility] area, feel that this is a good opportunity for Ingrid to get out of her apartment more, attend activities, and ambulate around with her walker.  She was getting to the point where she was isolating herself and feels she could have easily lost her ability for exercise and mobility.  Ingrid remains on Tylenol CR 1300 mg at bedtime as well as oxycodone 5 mg at  bedtime and as needed.  Today is her first day down in memory care and so we will need to come see her again after she has settled in.    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  Ingrid most recently was seen by neurology to evaluate for her cognitive status.  Was placed on Aricept 10 mg daily or that is the dose she is on now.  Now with being in memory care, staff can monitor her most closely and assist with any ADLs, cues to come out for meals and more socialization.  No changes with her medications today  Most likely will have a conversation with family once she is moved into the memory care unit to see how she is doing and how family is coping with changes.      (D62) Anemia due to blood loss, acute  (K62.3) Rectal prolapse  Comment: Hemoglobin is slowly drifting down.  This nurse practitioner most likely will consult with a geriatrician on the fact of her rectal prolapse and her hemoglobin loss.  Does not have her second evaluation now for the prolapse until January as it got pushed back per the clinic.  Will have a CBC done on 11/16/2023.    Orders:  CBC on 11/16/23 for acute blood loss anemia    Electronically signed by  NORA Balderas CNP            Sincerely,        NORA Balderas CNP

## 2023-11-06 NOTE — PROGRESS NOTES
[facility] GERIATRICS  ACUTE/EPISODIC VISIT    [facility] Medical Record Number:  [id number]  Place of Service where encounter took place:  [facility] ([facility]) [[id number]]    Chief Complaint   Patient presents with    RECHECK       HPI:    Ingrid Powell is a 78 year old  (1945), who is being seen today for an episodic care visit.  HPI information obtained from: facility chart records, facility staff, patient report, [facility] chart review, and family/first contact sister report.    Today's concern is:    Diagnoses         Codes Comments    Generalized osteoarthritis of multiple sites    -  Primary M15.9     Major depressive disorder, recurrent episode, moderate (H)     F33.1     Moderate early onset Alzheimer's dementia with mood disturbance (H)     G30.0, F02.B3     Anemia due to blood loss, acute     D62     Rectal prolapse     K62.3           Major changes occurring for Ingrid this week as today she moved down to the memory care unit as she is requiring more assistance and cognitively showing changes that most likely is not safe for her to be up in the assisted living side of the building.  There was a permanent open down in memory care and so family was offered the opportunity to move her and decided it was the appropriate time.    Also following up today from recent labs done on 2 November.  Hemoglobin dropped to 8.8.  Wanted to take a peek at her rectal prolapse to check on how much bleeding she has been having.    Her apartment is busy at the moment and so try to make this a simple visit for Ingrid so that she did not feel overwhelmed.  A good family friend was cleaning her kitchen where and told her she did not have to leave as Ingrid and this nurse practitioner working to go into her bathroom.    After the visit ran into Ingrid's sister in the hallway and spoke with her for a while in regards to the recent neurology visit and memory changes that family has been noticing.   Sister states that Alzheimer's runs in their family.  Their mother was this way as well and so it is hard for Ingrid sister right now to see Ingrid go through this.  Spoke about ways that Ingrid can get supported as most likely she may continue not to want to go out on outings.    ALLERGIES:    Allergies   Allergen Reactions    Dilaudid [Hydromorphone Hcl] Visual Disturbance     Hallucinations    Fosamax [Alendronate Sodium] Rash           Norvasc [Amlodipine Besylate] Hives and Rash    Tegretol [Carbamazepine] Hives and Rash        MEDICATIONS:  Post Discharge Medication Reconciliation Status: patient was not discharged from an inpatient facility or TCU.     Current Outpatient Medications   Medication Sig Dispense Refill    acetaminophen (TYLENOL) 650 MG CR tablet 1300mg po every PM and 1300mg po twice a day as needed      artificial tears (GENTEAL) 0.1-0.2-0.3 % ophthalmic solution INSTILL ONE DROP INTO BOTH EYES TWICE DAILY 15 mL 4    busPIRone (BUSPAR) 10 MG tablet TAKE 1 TABLET (10 MG) BY MOUTH 3 TIMES DAILY 90 tablet 11    cholecalciferol (VITAMIN D3) 25 mcg (1000 units) capsule Take 1 capsule by mouth daily      cyanocobalamin (VITAMIN B-12) 100 MCG tablet Take 1 tablet (100 mcg) by mouth daily 30 tablet 11    donepezil (ARICEPT) 10 MG tablet Take 1 tablet (10 mg) by mouth At Bedtime 90 tablet 3    FLUoxetine (PROZAC) 10 MG capsule Take 1 capsule (10 mg) by mouth daily 30 capsule 4    fluticasone (FLONASE) 50 MCG/ACT nasal spray USE 2 SPRAYS INTO BOTH NOSTRILS ONCE DAILY 16 g 11    folic acid (FOLVITE) 1 MG tablet Take 1 tablet (1 mg) by mouth daily 30 tablet 11    furosemide (LASIX) 20 MG tablet TAKE TWO TABLETS (40MG) BY MOUTH ONCE DAILY IN THE MORNING; TAKE 1 TABLET (20MG) BY MOUTH ONCE DAILY IN THE EARLY AFTERNOON 84 tablet 11    gabapentin (NEURONTIN) 600 MG tablet TAKE 1 TABLET (600 MG) BY MOUTH AT BEDTIME 31 tablet 11    hypromellose-dextran (ARTIFICAL TEARS) 0.1-0.3 % ophthalmic solution Place 1 drop  into both eyes 2 times daily 15 mL 0    levothyroxine (SYNTHROID/LEVOTHROID) 88 MCG tablet Take 1 tablet (88 mcg) by mouth daily 30 tablet 11    LORazepam (ATIVAN) 0.5 MG tablet TAKE 1 TABLET BY MOUTH THREE TIMES DAILY; 2 TABLETS AT HS; TAKE 1 TABLET BY MOUTH ONCE DAILY IF NEEDED 120 tablet 3    Menthol, Topical Analgesic, (BIOFREEZE) 4 % GEL Dime size gel to elbows and neck at HS and then TID prn 150 mL 11    nitroGLYcerin (NITROSTAT) 0.4 MG sublingual tablet For chest pain place 1 tablet under the tongue every 5 minutes for 3 doses. If symptoms persist 5 minutes after 1st dose call 911. 6 tablet 4    nystatin (MYCOSTATIN) 425314 UNIT/GM external powder Apply topically 2 times daily 30 g 4    oxyCODONE (ROXICODONE) 5 MG tablet Take 1 tablet (5 mg) by mouth At Bedtime. May also take 1 tablet (5 mg) every 4 hours as needed for pain. 40 tablet 0    QUEtiapine (SEROQUEL) 25 MG tablet 12.5mg by mouth in AM and afternoon and 25mg po at bedtime 45 tablet 11    rOPINIRole (REQUIP) 0.25 MG tablet TAKE 1 TABLET (0.25MG) BY MOUTH EVERY MORNING; TAKE 3 TABLETS (0.75MG) BY MOUTH EACH NIGHT AT BEDTIME; TAKE 1 TABLET (0.25MG) BY MOUTH EVERY AFTERNOON 140 tablet 11    senna-docusate (SENOKOT-S/PERICOLACE) 8.6-50 MG tablet Take 1 tablet by mouth every other day And 1 tab by mouth daily as needed 60 tablet 11    traZODone (DESYREL) 100 MG tablet TAKE 1 TABLET (100 MG) BY MOUTH AT BEDTIME 30 tablet 11    vitamin B-12 (CYANOCOBALAMIN) 250 MCG tablet Take 250 mcg by mouth daily         REVIEW OF SYSTEMS:  4 point ROS neg other than the symptoms noted above in the HPI. Denied chest pain, shortness of breath.  Wearing O2.        PHYSICAL EXAM:  BP 99/62   Pulse 100   Temp 97.8  F (36.6  C)   Resp 17   Wt 96.6 kg (213 lb)   LMP 06/15/1984 (Approximate)   SpO2 96%   BMI 35.45 kg/m    Ingrid was sitting in her recliner and so able to stand up on her own and use her four-wheel walker to going to the bathroom.  Gait was steady when  using her walker.  Able to pull down her pant and pull up.  Did look in the pull-up and saw scant amount of blood on the pull up itself.  Able to bend down over her walker to expose her rectal area.  Her prolapse is exposed and is red and moist in appearance.  No visible blood is seen.  This nurse practitioner was able to push the prolapse back in with a glove.  No blood on the glove.  Prolapse stated in for short time.  Ingrid was going to use the bathroom and so left her and there to be alone.    Ingrid was not in any respiratory distress.  Her mood seemed flat.  A lot was going on and so did not really get to speak with her about how she felt in moving down there.  Most likely would not asked her in front of her family as feel she probably would not give the most direct answer.    Most of the aides know Ingrid well from working between the [facility] care and the [facility] unit.  Component      Latest Ref Rng 11/2/2023  5:46 AM   WBC      4.0 - 11.0 10e3/uL 6.1    RBC Count      3.80 - 5.20 10e6/uL 3.61 (L)    Hemoglobin      11.7 - 15.7 g/dL 8.8 (L)    Hematocrit      35.0 - 47.0 % 29.5 (L)    MCV      78 - 100 fL 82    MCH      26.5 - 33.0 pg 24.4 (L)    MCHC      31.5 - 36.5 g/dL 29.8 (L)    RDW      10.0 - 15.0 % 15.2 (H)    Platelet Count      150 - 450 10e3/uL 272         ASSESSMENT / PLAN:  (M15.9) Generalized osteoarthritis of multiple sites  (primary encounter diagnosis)  Comment: Even though Ingrid's apartment is across from the [facility] area, feel that this is a good opportunity for Ingrid to get out of her apartment more, attend activities, and ambulate around with her walker.  She was getting to the point where she was isolating herself and feels she could have easily lost her ability for exercise and mobility.  Ingrid remains on Tylenol CR 1300 mg at bedtime as well as oxycodone 5 mg at bedtime and as needed.  Today is her first day down in [facility] and so we will need to come see her again after she has  settled in.    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  Ingrid most recently was seen by neurology to evaluate for her cognitive status.  Was placed on Aricept 10 mg daily or that is the dose she is on now.  Now with being in memory care, staff can monitor her most closely and assist with any ADLs, cues to come out for meals and more socialization.  No changes with her medications today  Most likely will have a conversation with family once she is moved into the memory care unit to see how she is doing and how family is coping with changes.      (D62) Anemia due to blood loss, acute  (K62.3) Rectal prolapse  Comment: Hemoglobin is slowly drifting down.  This nurse practitioner most likely will consult with a geriatrician on the fact of her rectal prolapse and her hemoglobin loss.  Does not have her second evaluation now for the prolapse until January as it got pushed back per the clinic.  Will have a CBC done on 11/16/2023.    Orders:  CBC on 11/16/23 for acute blood loss anemia    Electronically signed by  NORA Balderas CNP

## 2023-11-06 NOTE — LETTER
11/6/2023        RE: Ingrid Powell  C/o Daljit Powell   [address] [address]        No notes on file      Sincerely,        NORA Balderas CNP

## 2023-11-14 NOTE — PROGRESS NOTES
Pt. Tolerated MRI well.  After break for sats of 85%, O2sats returned to 92% on room air.  Pacemaker reprogrammed by device RN remotely back to [facility] with a low limit of 30 and a high limit of 180.  Pt. Denies pain after being repositioned off of MRI cart.

## 2023-11-14 NOTE — PROGRESS NOTES
"Pt. Here for MRI.  Pacemaker turned off per order for MRI.  Pt. States her pacemaker is not usually \"on\".  Per the device RN, pacemaker is on set at [facility] with a low limit of 30 and a high rate of 180.  Pt. With O2 sats high 80's to low 90's prior to MRI starting while laying flat.  "

## 2023-11-15 NOTE — RESULT ENCOUNTER NOTE
Dear Ingrid    MRI brain shows atrophy with age related changes.  No dementia specific abnormality noted on MRI scan.  Please continue with the current plan of care and let us know if there are any questions or concerns.    Regards,  Cas Flores MD

## 2023-11-20 NOTE — LETTER
11/20/2023        RE: Ingrid Powell  C/o Daljit Powell   [address] [facility] [address]        No notes on file      Sincerely,        NORA Balderas CNP

## 2023-11-20 NOTE — LETTER
"    11/20/2023        RE: Ingrid Powell  C/o Daljit Powell   [address] [facility] [address]        [facility] GERIATRICS  ACUTE/EPISODIC VISIT    [facility] Medical Record Number:  [id number]  Place of Service where encounter took place:  [facility] ([facility]) [[id number]]    Chief Complaint   Patient presents with     RECHECK       HPI:    Ingrid Powell is a 78 year old  (1945), who is being seen today for an episodic care visit.  HPI information obtained from: facility chart records, facility staff, patient report, and [facility] chart review.    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    Ingrid now resides down in the memory care unit and found her in her recliner.  She was awake at the time of the visit and first thing she said when she saw this nurse practitioner is, \"so now I have to be down here\".  She was inquiring about details to why she moved down here.  Informed her that family and staff are seeing changes cognitively with her.  Felt there was some safety issues and better off to have more assistance with day-to-day activities.  He seemed to be satisfied with that comment.    Updated her that the hemoglobin level was drifting down.  Contacted a colleague of this nurse practitioner to get some ideas.  When they reviewed the medications I saw that she was on Aricept and that can cause GI bleeding.  Suspected that this medication may have been affecting the bleeding she already was experiencing from the rectal prolapse.  Do not expect Ingrid to remember that this nurse practitioner " told her about the risk of Aricept and putting her on a different memory medication.    ALLERGIES:    Allergies   Allergen Reactions     Dilaudid [Hydromorphone Hcl] Visual Disturbance     Hallucinations     Fosamax [Alendronate Sodium] Rash            Norvasc [Amlodipine Besylate] Hives and Rash     Tegretol [Carbamazepine] Hives and Rash        MEDICATIONS:  Post Discharge Medication Reconciliation Status: patient was not discharged from an inpatient facility or TCU.     Current Outpatient Medications   Medication Sig Dispense Refill     ferrous sulfate (FEROSUL) 325 (65 Fe) MG tablet Take 1 tablet (325 mg) by mouth daily (with breakfast)       acetaminophen (TYLENOL) 650 MG CR tablet 1300mg po every AM & PM and 1300mg po daily as needed 120 tablet 4     artificial tears (GENTEAL) 0.1-0.2-0.3 % ophthalmic solution INSTILL ONE DROP INTO BOTH EYES TWICE DAILY 15 mL 4     busPIRone (BUSPAR) 10 MG tablet TAKE 1 TABLET (10 MG) BY MOUTH 3 TIMES DAILY 90 tablet 11     cholecalciferol (VITAMIN D3) 25 mcg (1000 units) capsule Take 1 capsule by mouth daily       cyanocobalamin (VITAMIN B-12) 100 MCG tablet Take 1 tablet (100 mcg) by mouth daily 30 tablet 11     FLUoxetine (PROZAC) 10 MG capsule Take 1 capsule (10 mg) by mouth daily 30 capsule 4     fluticasone (FLONASE) 50 MCG/ACT nasal spray USE 2 SPRAYS INTO BOTH NOSTRILS ONCE DAILY 16 g 11     folic acid (FOLVITE) 1 MG tablet Take 1 tablet (1 mg) by mouth daily 30 tablet 11     furosemide (LASIX) 20 MG tablet Take 2 tablets (40 mg) by mouth 2 times daily 60 tablet 11     gabapentin (NEURONTIN) 600 MG tablet TAKE 1 TABLET (600 MG) BY MOUTH AT BEDTIME 31 tablet 11     hypromellose-dextran (ARTIFICAL TEARS) 0.1-0.3 % ophthalmic solution Place 1 drop into both eyes 2 times daily 15 mL 0     levothyroxine (SYNTHROID/LEVOTHROID) 100 MCG tablet Take 1 tablet (100 mcg) by mouth daily 30 tablet 3     LORazepam (ATIVAN) 0.5 MG tablet TAKE 1 TABLET BY MOUTH THREE TIMES DAILY; 2  TABLETS AT HS; TAKE 1 TABLET BY MOUTH ONCE DAILY IF NEEDED 120 tablet 3     memantine (NAMENDA) 5 MG tablet 5mg po every HS x7 day, 5mg po twice a day x7 days, 5mg in AM and 10mg in PM x7 days then 10mg po twice a day 70 tablet 4     Menthol, Topical Analgesic, (BIOFREEZE) 4 % GEL Dime size gel to elbows and neck at HS and then TID prn 150 mL 11     nitroGLYcerin (NITROSTAT) 0.4 MG sublingual tablet For chest pain place 1 tablet under the tongue every 5 minutes for 3 doses. If symptoms persist 5 minutes after 1st dose call 911. 6 tablet 4     nystatin (MYCOSTATIN) 339291 UNIT/GM external powder Apply topically 2 times daily 30 g 4     oxyCODONE (ROXICODONE) 5 MG tablet Take 1 tablet (5 mg) by mouth at bedtime. May also take 1 tablet (5 mg) every 4 hours as needed for pain. 40 tablet 0     QUEtiapine (SEROQUEL) 25 MG tablet 12.5mg by mouth in AM and afternoon and 25mg po at bedtime 45 tablet 11     rOPINIRole (REQUIP) 0.25 MG tablet TAKE 1 TABLET (0.25MG) BY MOUTH EVERY MORNING; TAKE 3 TABLETS (0.75MG) BY MOUTH EACH NIGHT AT BEDTIME; TAKE 1 TABLET (0.25MG) BY MOUTH EVERY AFTERNOON 140 tablet 11     senna-docusate (SENOKOT-S/PERICOLACE) 8.6-50 MG tablet Take 1 tablet by mouth every other day And 1 tab by mouth daily as needed 60 tablet 11     traZODone (DESYREL) 100 MG tablet TAKE 1 TABLET (100 MG) BY MOUTH AT BEDTIME 30 tablet 11     vitamin B-12 (CYANOCOBALAMIN) 250 MCG tablet Take 250 mcg by mouth daily         REVIEW OF SYSTEMS:  4 point ROS neg other than the symptoms noted above in the HPI.  Denies any chest pain.  No stomach cramps, no headaches.      PHYSICAL EXAM:  BP 99/62   Pulse 100   Temp 97.8  F (36.6  C)   Resp 17   Wt 97 kg (213 lb 12.8 oz)   LMP 06/15/1984 (Approximate)   SpO2 98%   BMI 35.58 kg/m    Ingrid was sitting in her recliner eating a snack at time of visit.  In no acute distress.  Feel her mood was a little irritating as her initial comment she made when she saw this NP.  Not new to  talk about her mood/memory as it usually was a topic while up in AL side.  Feel she has not been able to see this NP for some time to ask what has happened.      Skin is pink/pale, warm and dry.    Heart rate/rhythm is regular and strong.    Abdomen is round, soft, large, non-tenders.  No edema in lower legs.    Ambulates with 4WW to destinations.      The other day when this nurse practitioner is passing through the unit, heard someone call my name which was startling as the residents do not call this nurse practitioner by name.  Turned out to be Ingrid sitting out at the table playing bingo with her peers.  Did not go over to talk to her as felt that was important for her to be involved in an activity.    Component      Latest Ref Rng 11/2/2023  5:46 AM 11/16/2023  5:34 AM   WBC      4.0 - 11.0 10e3/uL 6.1  5.5    RBC Count      3.80 - 5.20 10e6/uL 3.61 (L)  3.57 (L)    Hemoglobin      11.7 - 15.7 g/dL 8.8 (L)  8.9 (L)    Hematocrit      35.0 - 47.0 % 29.5 (L)  29.2 (L)    MCV      78 - 100 fL 82  82    MCH      26.5 - 33.0 pg 24.4 (L)  24.9 (L)    MCHC      31.5 - 36.5 g/dL 29.8 (L)  30.5 (L)    RDW      10.0 - 15.0 % 15.2 (H)  18.8 (H)    Platelet Count      150 - 450 10e3/uL 272  316          ASSESSMENT / PLAN:  (D62) Anemia due to blood loss, acute  (primary encounter diagnosis)  (K62.3) Rectal prolapse  Comment: CBC from last week showed that her hemoglobin did not drift past 8.8.  Currently now is on ferrous sulfate 325 mg daily since 10/23/2023.  Will wait to see if stopping the Aricept will make a difference in her hemoglobin level.  Already has an order for hemoglobin lab draw on 11/30/2023.    (K59.03) Drug-induced constipation  Comment: Reinforced to Ingrid to make sure she is not straining with stooling due to her rectal prolapse.  Currently is taking senna S1 tablet every Monday, Wednesday, Friday    (I50.22) Chronic systolic heart failure (H)  Comment: Ingrid continues to be managed on Lasix 40 mg every  morning and 20 mg in the afternoon.  Does not take a potassium supplement.  Has not shown any acute exacerbations.  Remains on oxygen especially at nighttime when she is sleeping as more of a security blanket and probably her really needing to have it.  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  Aricept has ended due to the high risk of GI bleeding and with her falling hemoglobin level.  Family was aware of this change and was open to it if it meant that her hemoglobin was can stabilize.  Do feel she is an appropriate place at this time can tell the conversations that were had today versus what were in the past are very different.  Would like to give her more time to settle in and maybe consider pulling back on some of the medications that she is on if staff feel are warranted.  These medications would include her antianxiety medications    Orders:  No new orders today    Electronically signed by  NORA Balderas CNP            Sincerely,        NORA Balderas CNP

## 2023-11-20 NOTE — PROGRESS NOTES
"[facility] GERIATRICS  ACUTE/EPISODIC VISIT    [facility] Medical Record Number:  [id number]  Place of Service where encounter took place:  [facility] ([facility]) [[id number]]    Chief Complaint   Patient presents with    RECHECK       HPI:    Ingrid Powell is a 78 year old  (1945), who is being seen today for an episodic care visit.  HPI information obtained from: facility chart records, facility staff, patient report, and [facility] chart review.    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    Ingrid now resides down in the memory care unit and found her in her recliner.  She was awake at the time of the visit and first thing she said when she saw this nurse practitioner is, \"so now I have to be down here\".  She was inquiring about details to why she moved down here.  Informed her that family and staff are seeing changes cognitively with her.  Felt there was some safety issues and better off to have more assistance with day-to-day activities.  He seemed to be satisfied with that comment.    Updated her that the hemoglobin level was drifting down.  Contacted a colleague of this nurse practitioner to get some ideas.  When they reviewed the medications I saw that she was on Aricept and that can cause GI bleeding.  Suspected that this medication may have been affecting the bleeding she already was experiencing from the rectal prolapse.  Do not expect Ingrid to remember that this nurse practitioner told her about the risk of Aricept and putting her on a different memory medication.    ALLERGIES:  "   Allergies   Allergen Reactions    Dilaudid [Hydromorphone Hcl] Visual Disturbance     Hallucinations    Fosamax [Alendronate Sodium] Rash           Norvasc [Amlodipine Besylate] Hives and Rash    Tegretol [Carbamazepine] Hives and Rash        MEDICATIONS:  Post Discharge Medication Reconciliation Status: patient was not discharged from an inpatient facility or TCU.     Current Outpatient Medications   Medication Sig Dispense Refill    ferrous sulfate (FEROSUL) 325 (65 Fe) MG tablet Take 1 tablet (325 mg) by mouth daily (with breakfast)      acetaminophen (TYLENOL) 650 MG CR tablet 1300mg po every AM & PM and 1300mg po daily as needed 120 tablet 4    artificial tears (GENTEAL) 0.1-0.2-0.3 % ophthalmic solution INSTILL ONE DROP INTO BOTH EYES TWICE DAILY 15 mL 4    busPIRone (BUSPAR) 10 MG tablet TAKE 1 TABLET (10 MG) BY MOUTH 3 TIMES DAILY 90 tablet 11    cholecalciferol (VITAMIN D3) 25 mcg (1000 units) capsule Take 1 capsule by mouth daily      cyanocobalamin (VITAMIN B-12) 100 MCG tablet Take 1 tablet (100 mcg) by mouth daily 30 tablet 11    FLUoxetine (PROZAC) 10 MG capsule Take 1 capsule (10 mg) by mouth daily 30 capsule 4    fluticasone (FLONASE) 50 MCG/ACT nasal spray USE 2 SPRAYS INTO BOTH NOSTRILS ONCE DAILY 16 g 11    folic acid (FOLVITE) 1 MG tablet Take 1 tablet (1 mg) by mouth daily 30 tablet 11    furosemide (LASIX) 20 MG tablet Take 2 tablets (40 mg) by mouth 2 times daily 60 tablet 11    gabapentin (NEURONTIN) 600 MG tablet TAKE 1 TABLET (600 MG) BY MOUTH AT BEDTIME 31 tablet 11    hypromellose-dextran (ARTIFICAL TEARS) 0.1-0.3 % ophthalmic solution Place 1 drop into both eyes 2 times daily 15 mL 0    levothyroxine (SYNTHROID/LEVOTHROID) 100 MCG tablet Take 1 tablet (100 mcg) by mouth daily 30 tablet 3    LORazepam (ATIVAN) 0.5 MG tablet TAKE 1 TABLET BY MOUTH THREE TIMES DAILY; 2 TABLETS AT HS; TAKE 1 TABLET BY MOUTH ONCE DAILY IF NEEDED 120 tablet 3    memantine (NAMENDA) 5 MG tablet 5mg po every  HS x7 day, 5mg po twice a day x7 days, 5mg in AM and 10mg in PM x7 days then 10mg po twice a day 70 tablet 4    Menthol, Topical Analgesic, (BIOFREEZE) 4 % GEL Dime size gel to elbows and neck at HS and then TID prn 150 mL 11    nitroGLYcerin (NITROSTAT) 0.4 MG sublingual tablet For chest pain place 1 tablet under the tongue every 5 minutes for 3 doses. If symptoms persist 5 minutes after 1st dose call 911. 6 tablet 4    nystatin (MYCOSTATIN) 570813 UNIT/GM external powder Apply topically 2 times daily 30 g 4    oxyCODONE (ROXICODONE) 5 MG tablet Take 1 tablet (5 mg) by mouth at bedtime. May also take 1 tablet (5 mg) every 4 hours as needed for pain. 40 tablet 0    QUEtiapine (SEROQUEL) 25 MG tablet 12.5mg by mouth in AM and afternoon and 25mg po at bedtime 45 tablet 11    rOPINIRole (REQUIP) 0.25 MG tablet TAKE 1 TABLET (0.25MG) BY MOUTH EVERY MORNING; TAKE 3 TABLETS (0.75MG) BY MOUTH EACH NIGHT AT BEDTIME; TAKE 1 TABLET (0.25MG) BY MOUTH EVERY AFTERNOON 140 tablet 11    senna-docusate (SENOKOT-S/PERICOLACE) 8.6-50 MG tablet Take 1 tablet by mouth every other day And 1 tab by mouth daily as needed 60 tablet 11    traZODone (DESYREL) 100 MG tablet TAKE 1 TABLET (100 MG) BY MOUTH AT BEDTIME 30 tablet 11    vitamin B-12 (CYANOCOBALAMIN) 250 MCG tablet Take 250 mcg by mouth daily         REVIEW OF SYSTEMS:  4 point ROS neg other than the symptoms noted above in the HPI.  Denies any chest pain.  No stomach cramps, no headaches.      PHYSICAL EXAM:  BP 99/62   Pulse 100   Temp 97.8  F (36.6  C)   Resp 17   Wt 97 kg (213 lb 12.8 oz)   LMP 06/15/1984 (Approximate)   SpO2 98%   BMI 35.58 kg/m    Ingrid was sitting in her recliner eating a snack at time of visit.  In no acute distress.  Feel her mood was a little irritating as her initial comment she made when she saw this NP.  Not new to talk about her mood/memory as it usually was a topic while up in AL side.  Feel she has not been able to see this NP for some time  to ask what has happened.      Skin is pink/pale, warm and dry.    Heart rate/rhythm is regular and strong.    Abdomen is round, soft, large, non-tenders.  No edema in lower legs.    Ambulates with 4WW to destinations.      The other day when this nurse practitioner is passing through the unit, heard someone call my name which was startling as the residents do not call this nurse practitioner by name.  Turned out to be Ingrid sitting out at the table playing bingo with her peers.  Did not go over to talk to her as felt that was important for her to be involved in an activity.    Component      Latest Ref Rng 11/2/2023  5:46 AM 11/16/2023  5:34 AM   WBC      4.0 - 11.0 10e3/uL 6.1  5.5    RBC Count      3.80 - 5.20 10e6/uL 3.61 (L)  3.57 (L)    Hemoglobin      11.7 - 15.7 g/dL 8.8 (L)  8.9 (L)    Hematocrit      35.0 - 47.0 % 29.5 (L)  29.2 (L)    MCV      78 - 100 fL 82  82    MCH      26.5 - 33.0 pg 24.4 (L)  24.9 (L)    MCHC      31.5 - 36.5 g/dL 29.8 (L)  30.5 (L)    RDW      10.0 - 15.0 % 15.2 (H)  18.8 (H)    Platelet Count      150 - 450 10e3/uL 272  316          ASSESSMENT / PLAN:  (D62) Anemia due to blood loss, acute  (primary encounter diagnosis)  (K62.3) Rectal prolapse  Comment: CBC from last week showed that her hemoglobin did not drift past 8.8.  Currently now is on ferrous sulfate 325 mg daily since 10/23/2023.  Will wait to see if stopping the Aricept will make a difference in her hemoglobin level.  Already has an order for hemoglobin lab draw on 11/30/2023.    (K59.03) Drug-induced constipation  Comment: Reinforced to Ingrid to make sure she is not straining with stooling due to her rectal prolapse.  Currently is taking senna S1 tablet every Monday, Wednesday, Friday    (I50.22) Chronic systolic heart failure (H)  Comment: Ingrid continues to be managed on Lasix 40 mg every morning and 20 mg in the afternoon.  Does not take a potassium supplement.  Has not shown any acute exacerbations.  Remains on  oxygen especially at nighttime when she is sleeping as more of a security blanket and probably her really needing to have it.  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  Aricept has ended due to the high risk of GI bleeding and with her falling hemoglobin level.  Family was aware of this change and was open to it if it meant that her hemoglobin was can stabilize.  Do feel she is an appropriate place at this time can tell the conversations that were had today versus what were in the past are very different.  Would like to give her more time to settle in and maybe consider pulling back on some of the medications that she is on if staff feel are warranted.  These medications would include her antianxiety medications    Orders:  No new orders today    Electronically signed by  NORA Balderas CNP

## 2023-11-30 NOTE — PROGRESS NOTES
[facility] GERIATRICS  ACUTE/EPISODIC VISIT    [facility] Medical Record Number:  [id number]  Place of Service where encounter took place:  [facility] ([facility]) [[id number]]    Chief Complaint   Patient presents with    RECHECK       HPI:    Ingrid Powell is a 78 year old  (1945), who is being seen today for an episodic care visit.  HPI information obtained from: facility chart records, facility staff, patient report, and [facility] chart review.    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    Ingrid was sleeping in her recliner and woke her up.  Took her a little time to wake up and get her eyes focused.  She acted like she knew who this NP was but did not call by name.      Right away saw the heating pad on her right arm which she was using the last time this NP saw her.  This time though her upper right arm is in discomfort.  Hard for her to lift her arm up at the shoulder and discomfort down to her elbow.  Told her that an x-ray will be obtained today.  She does not recall having a fall recently or other injury to the right shoulder.    ALLERGIES:    Allergies   Allergen Reactions    Dilaudid [Hydromorphone Hcl] Visual Disturbance     Hallucinations    Fosamax [Alendronate Sodium] Rash           Norvasc [Amlodipine Besylate] Hives and Rash    Tegretol [Carbamazepine] Hives and Rash        MEDICATIONS:  Post Discharge Medication Reconciliation Status: patient was not discharged from an inpatient facility or TCU.     Current Outpatient Medications   Medication Sig Dispense Refill    acetaminophen (TYLENOL) 650 MG CR tablet 1300mg po every PM and 1300mg  po twice a day as needed      artificial tears (GENTEAL) 0.1-0.2-0.3 % ophthalmic solution INSTILL ONE DROP INTO BOTH EYES TWICE DAILY 15 mL 4    busPIRone (BUSPAR) 10 MG tablet TAKE 1 TABLET (10 MG) BY MOUTH 3 TIMES DAILY 90 tablet 11    cholecalciferol (VITAMIN D3) 25 mcg (1000 units) capsule Take 1 capsule by mouth daily      cyanocobalamin (VITAMIN B-12) 100 MCG tablet Take 1 tablet (100 mcg) by mouth daily 30 tablet 11    FLUoxetine (PROZAC) 10 MG capsule Take 1 capsule (10 mg) by mouth daily 30 capsule 4    fluticasone (FLONASE) 50 MCG/ACT nasal spray USE 2 SPRAYS INTO BOTH NOSTRILS ONCE DAILY 16 g 11    folic acid (FOLVITE) 1 MG tablet Take 1 tablet (1 mg) by mouth daily 30 tablet 11    furosemide (LASIX) 20 MG tablet TAKE TWO TABLETS (40MG) BY MOUTH ONCE DAILY IN THE MORNING; TAKE 1 TABLET (20MG) BY MOUTH ONCE DAILY IN THE EARLY AFTERNOON 84 tablet 11    gabapentin (NEURONTIN) 600 MG tablet TAKE 1 TABLET (600 MG) BY MOUTH AT BEDTIME 31 tablet 11    hypromellose-dextran (ARTIFICAL TEARS) 0.1-0.3 % ophthalmic solution Place 1 drop into both eyes 2 times daily 15 mL 0    levothyroxine (SYNTHROID/LEVOTHROID) 88 MCG tablet Take 1 tablet (88 mcg) by mouth daily 30 tablet 11    LORazepam (ATIVAN) 0.5 MG tablet TAKE 1 TABLET BY MOUTH THREE TIMES DAILY; 2 TABLETS AT HS; TAKE 1 TABLET BY MOUTH ONCE DAILY IF NEEDED 120 tablet 3    memantine (NAMENDA) 5 MG tablet 5mg po every HS x7 day, 5mg po twice a day x7 days, 5mg in AM and 10mg in PM x7 days then 10mg po twice a day 70 tablet 4    Menthol, Topical Analgesic, (BIOFREEZE) 4 % GEL Dime size gel to elbows and neck at HS and then TID prn 150 mL 11    nitroGLYcerin (NITROSTAT) 0.4 MG sublingual tablet For chest pain place 1 tablet under the tongue every 5 minutes for 3 doses. If symptoms persist 5 minutes after 1st dose call 911. 6 tablet 4    nystatin (MYCOSTATIN) 768312 UNIT/GM external powder Apply topically 2 times daily 30 g 4    oxyCODONE (ROXICODONE) 5 MG tablet  Take 1 tablet (5 mg) by mouth at bedtime. May also take 1 tablet (5 mg) every 4 hours as needed for pain. 40 tablet 0    QUEtiapine (SEROQUEL) 25 MG tablet 12.5mg by mouth in AM and afternoon and 25mg po at bedtime 45 tablet 11    rOPINIRole (REQUIP) 0.25 MG tablet TAKE 1 TABLET (0.25MG) BY MOUTH EVERY MORNING; TAKE 3 TABLETS (0.75MG) BY MOUTH EACH NIGHT AT BEDTIME; TAKE 1 TABLET (0.25MG) BY MOUTH EVERY AFTERNOON 140 tablet 11    rOPINIRole (REQUIP) 0.5 MG tablet 0.25mg by mouth every AM and afternoon, 0.75mg po at bedtime 90 tablet 4    senna-docusate (SENOKOT-S/PERICOLACE) 8.6-50 MG tablet Take 1 tablet by mouth every other day And 1 tab by mouth daily as needed 60 tablet 11    traZODone (DESYREL) 100 MG tablet TAKE 1 TABLET (100 MG) BY MOUTH AT BEDTIME 30 tablet 11    vitamin B-12 (CYANOCOBALAMIN) 250 MCG tablet Take 250 mcg by mouth daily         REVIEW OF SYSTEMS:  4 point ROS neg other than the symptoms noted above in the HPI.  No chest pain.  No respiratory distress beyond that she wears oxygen.  Warm in her room.      PHYSICAL EXAM:  BP 99/62   Pulse 100   Temp 97.8  F (36.6  C)   Resp 17   Wt 97 kg (213 lb 12.8 oz)   LMP 06/15/1984 (Approximate)   SpO2 96%   BMI 35.58 kg/m    Ingrid up in her recliner sleeping soundly but able to arouse.  Ingrid pulled her arm out of the heating pad that she had loosely wrapped on her upper arm.  Inspected the skin and no redness of skin.  No firmness of the skin.  Skin same color as the left arm.  Shoulder stiff moving, she lifts her right hand with the left to move her arm from arm rest to her lap.      Heart rate regular and strong.    No respiratory distress.  O2 at 2L/min via NC.  Abdomen is obese, soft, and non-tender.    Ambulates with the 4WW.  Comes out for meals and activities of her interest.    Lab Results   Component Value Date    HGB 9.6 11/30/2023    HGB 8.9 11/16/2023       ASSESSMENT / PLAN:  (L36.621) Pain of right upper arm  (primary encounter  diagnosis)  Comment: feel her pain is more significant than the last time this NP saw her.  Unclear why hurting but suspect it is more osteoarthritis in nature.    Will obtain a right shoulder, humerus, and elbow x-ray before making further decisions.  She has oxycodone PRN beyond the one scheduled dose at HS.    (D62) Anemia due to blood loss, acute  (K62.3) Rectal prolapse  Comment: HgB level improved from two weeks ago.  Part could be that the Aricept was discontinued and on a iron supplement.  Did not have her get up today to see her prolapse.  Koko feels the bleeding is less frequent.    Will check a CBC in 4 weeks.  1/11/24 is her appointment for her second opinion on the rectal prolapse.  Meanwhile monitoring her HgB levels until then as not wanting her to fall 7 or below.      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  Today NP student with me and had not seen koko in some time and surprised she was downstairs.  Explained that koko mind was slipping and anticipated she would eventually be down there in memory care.  Opening occurred and between facility and family, felt it was time to move Koko.  Since then can see her memory fluctuating.  Feel the timing was good for Koko as she needs more assist physically but socially as well since she was isolating self with her depression/anxiety.  Took her off the Aricept due to potential GIB with a drop in her HgB level.  Do not feel she was on it long enough to preserve her memory       Orders:   CBC in 4 weeks for anemia, acute blood loss  X-ray of right shoulder, humerus, and elbow due to pain and limited ROM at shoulder    Electronically signed by  NORA Balderas CNP

## 2023-11-30 NOTE — LETTER
11/30/2023        RE: Ingrid Powell  C/o Daljit Powell   [address] [address]        [facility] GERIATRICS  ACUTE/EPISODIC VISIT    [facility] Medical Record Number:  [id number]  Place of Service where encounter took place:  [facility] ([facility]) [[id number]]    Chief Complaint   Patient presents with     RECHECK       HPI:    Ingrid Powell is a 78 year old  (1945), who is being seen today for an episodic care visit.  HPI information obtained from: facility chart records, facility staff, patient report, and [facility] chart review.    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    Ingrid was sleeping in her recliner and woke her up.  Took her a little time to wake up and get her eyes focused.  She acted like she knew who this NP was but did not call by name.      Right away saw the heating pad on her right arm which she was using the last time this NP saw her.  This time though her upper right arm is in discomfort.  Hard for her to lift her arm up at the shoulder and discomfort down to her elbow.  Told her that an x-ray will be obtained today.  She does not recall having a fall recently or other injury to the right shoulder.    ALLERGIES:    Allergies   Allergen Reactions     Dilaudid [Hydromorphone Hcl] Visual Disturbance     Hallucinations     Fosamax [Alendronate Sodium] Rash            Norvasc [Amlodipine Besylate] Hives and Rash     Tegretol [Carbamazepine] Hives and Rash        MEDICATIONS:  Post Discharge Medication Reconciliation Status: patient was not discharged from an inpatient facility or TCU.     Current Outpatient Medications    Medication Sig Dispense Refill     acetaminophen (TYLENOL) 650 MG CR tablet 1300mg po every PM and 1300mg po twice a day as needed       artificial tears (GENTEAL) 0.1-0.2-0.3 % ophthalmic solution INSTILL ONE DROP INTO BOTH EYES TWICE DAILY 15 mL 4     busPIRone (BUSPAR) 10 MG tablet TAKE 1 TABLET (10 MG) BY MOUTH 3 TIMES DAILY 90 tablet 11     cholecalciferol (VITAMIN D3) 25 mcg (1000 units) capsule Take 1 capsule by mouth daily       cyanocobalamin (VITAMIN B-12) 100 MCG tablet Take 1 tablet (100 mcg) by mouth daily 30 tablet 11     FLUoxetine (PROZAC) 10 MG capsule Take 1 capsule (10 mg) by mouth daily 30 capsule 4     fluticasone (FLONASE) 50 MCG/ACT nasal spray USE 2 SPRAYS INTO BOTH NOSTRILS ONCE DAILY 16 g 11     folic acid (FOLVITE) 1 MG tablet Take 1 tablet (1 mg) by mouth daily 30 tablet 11     furosemide (LASIX) 20 MG tablet TAKE TWO TABLETS (40MG) BY MOUTH ONCE DAILY IN THE MORNING; TAKE 1 TABLET (20MG) BY MOUTH ONCE DAILY IN THE EARLY AFTERNOON 84 tablet 11     gabapentin (NEURONTIN) 600 MG tablet TAKE 1 TABLET (600 MG) BY MOUTH AT BEDTIME 31 tablet 11     hypromellose-dextran (ARTIFICAL TEARS) 0.1-0.3 % ophthalmic solution Place 1 drop into both eyes 2 times daily 15 mL 0     levothyroxine (SYNTHROID/LEVOTHROID) 88 MCG tablet Take 1 tablet (88 mcg) by mouth daily 30 tablet 11     LORazepam (ATIVAN) 0.5 MG tablet TAKE 1 TABLET BY MOUTH THREE TIMES DAILY; 2 TABLETS AT HS; TAKE 1 TABLET BY MOUTH ONCE DAILY IF NEEDED 120 tablet 3     memantine (NAMENDA) 5 MG tablet 5mg po every HS x7 day, 5mg po twice a day x7 days, 5mg in AM and 10mg in PM x7 days then 10mg po twice a day 70 tablet 4     Menthol, Topical Analgesic, (BIOFREEZE) 4 % GEL Dime size gel to elbows and neck at HS and then TID prn 150 mL 11     nitroGLYcerin (NITROSTAT) 0.4 MG sublingual tablet For chest pain place 1 tablet under the tongue every 5 minutes for 3 doses. If symptoms persist 5 minutes after 1st dose call 911. 6 tablet 4      nystatin (MYCOSTATIN) 630947 UNIT/GM external powder Apply topically 2 times daily 30 g 4     oxyCODONE (ROXICODONE) 5 MG tablet Take 1 tablet (5 mg) by mouth at bedtime. May also take 1 tablet (5 mg) every 4 hours as needed for pain. 40 tablet 0     QUEtiapine (SEROQUEL) 25 MG tablet 12.5mg by mouth in AM and afternoon and 25mg po at bedtime 45 tablet 11     rOPINIRole (REQUIP) 0.25 MG tablet TAKE 1 TABLET (0.25MG) BY MOUTH EVERY MORNING; TAKE 3 TABLETS (0.75MG) BY MOUTH EACH NIGHT AT BEDTIME; TAKE 1 TABLET (0.25MG) BY MOUTH EVERY AFTERNOON 140 tablet 11     rOPINIRole (REQUIP) 0.5 MG tablet 0.25mg by mouth every AM and afternoon, 0.75mg po at bedtime 90 tablet 4     senna-docusate (SENOKOT-S/PERICOLACE) 8.6-50 MG tablet Take 1 tablet by mouth every other day And 1 tab by mouth daily as needed 60 tablet 11     traZODone (DESYREL) 100 MG tablet TAKE 1 TABLET (100 MG) BY MOUTH AT BEDTIME 30 tablet 11     vitamin B-12 (CYANOCOBALAMIN) 250 MCG tablet Take 250 mcg by mouth daily         REVIEW OF SYSTEMS:  4 point ROS neg other than the symptoms noted above in the HPI.  No chest pain.  No respiratory distress beyond that she wears oxygen.  Warm in her room.      PHYSICAL EXAM:  BP 99/62   Pulse 100   Temp 97.8  F (36.6  C)   Resp 17   Wt 97 kg (213 lb 12.8 oz)   LMP 06/15/1984 (Approximate)   SpO2 96%   BMI 35.58 kg/m    Ingrid up in her recliner sleeping soundly but able to arouse.  Ingrid pulled her arm out of the heating pad that she had loosely wrapped on her upper arm.  Inspected the skin and no redness of skin.  No firmness of the skin.  Skin same color as the left arm.  Shoulder stiff moving, she lifts her right hand with the left to move her arm from arm rest to her lap.      Heart rate regular and strong.    No respiratory distress.  O2 at 2L/min via NC.  Abdomen is obese, soft, and non-tender.    Ambulates with the 4WW.  Comes out for meals and activities of her interest.    Lab Results   Component Value  Date    HGB 9.6 11/30/2023    HGB 8.9 11/16/2023       ASSESSMENT / PLAN:  (M79.621) Pain of right upper arm  (primary encounter diagnosis)  Comment: feel her pain is more significant than the last time this NP saw her.  Unclear why hurting but suspect it is more osteoarthritis in nature.    Will obtain a right shoulder, humerus, and elbow x-ray before making further decisions.  She has oxycodone PRN beyond the one scheduled dose at HS.    (D62) Anemia due to blood loss, acute  (K62.3) Rectal prolapse  Comment: HgB level improved from two weeks ago.  Part could be that the Aricept was discontinued and on a iron supplement.  Did not have her get up today to see her prolapse.  Koko feels the bleeding is less frequent.    Will check a CBC in 4 weeks.  1/11/24 is her appointment for her second opinion on the rectal prolapse.  Meanwhile monitoring her HgB levels until then as not wanting her to fall 7 or below.      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  Today NP student with me and had not seen koko in some time and surprised she was downstairs.  Explained that koko mind was slipping and anticipated she would eventually be down there in memory care.  Opening occurred and between facility and family, felt it was time to move Koko.  Since then can see her memory fluctuating.  Feel the timing was good for Koko as she needs more assist physically but socially as well since she was isolating self with her depression/anxiety.  Took her off the Aricept due to potential GIB with a drop in her HgB level.  Do not feel she was on it long enough to preserve her memory       Orders:   CBC in 4 weeks for anemia, acute blood loss  X-ray of right shoulder, humerus, and elbow due to pain and limited ROM at shoulder    Electronically signed by  NORA Balderas CNP            Sincerely,        NORA Balderas  CNP

## 2023-12-07 NOTE — PROGRESS NOTES
New orders for Ingrid:    Lab Results   Component Value Date    TSH 11.00 12/07/2023       Increase Levothyroxine to 100mcg po daily for hypothyroidism  TSH level in 4 weeks for hypothyroidism    NORA Balderas CNP

## 2023-12-18 NOTE — PROGRESS NOTES
[facility] GERIATRICS  ACUTE/EPISODIC VISIT    [facility] Medical Record Number:  [id number]  Place of Service where encounter took place:  [facility] ([facility]) [[id number]]    Chief Complaint   Patient presents with    RECHECK       HPI:    Ingrid Powell is a 78 year old  (1945), who is being seen today for an episodic care visit.  HPI information obtained from: facility staff and patient report.    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  Girls were fretting over it.  Unfortunately, staff can not touch the prolapse to push it back in the rectum.      Ingrid has an upcoming 2nd opinion for the rectal prolapse come beginning of January.  Have spoken about using sugar on the exposed prolapse but chose not to attempt that today as upcoming appointment and do not want anything to hinder the opinion of the provider.      Asked staff to have Ingrid go into the bathroom so able to look at rectal area.    Ingrid recognizes this NP but does not call by name.  Can tell she is more forgetful.    When walking back with her to the recliner, told her it was really hot in her room and so she opened the controller on the vent and it said 90 degrees.  Turned it down and at least she put it to below 75 degrees.  She is sitting under a warm blanket and has a heating pad on her right arm for comfort.      ALLERGIES:    Allergies   Allergen Reactions    Dilaudid [Hydromorphone Hcl] Visual Disturbance     Hallucinations    Fosamax [Alendronate Sodium] Rash           Norvasc [Amlodipine Besylate] Hives and Rash    Tegretol [Carbamazepine] Hives and Rash        MEDICATIONS:  Post Discharge Medication Reconciliation  Status: patient was not discharged from an inpatient facility or TCU.     Current Outpatient Medications   Medication Sig Dispense Refill    acetaminophen (TYLENOL) 650 MG CR tablet 1300mg po every PM and 1300mg po twice a day as needed      artificial tears (GENTEAL) 0.1-0.2-0.3 % ophthalmic solution INSTILL ONE DROP INTO BOTH EYES TWICE DAILY 15 mL 4    busPIRone (BUSPAR) 10 MG tablet TAKE 1 TABLET (10 MG) BY MOUTH 3 TIMES DAILY 90 tablet 11    cholecalciferol (VITAMIN D3) 25 mcg (1000 units) capsule Take 1 capsule by mouth daily      cyanocobalamin (VITAMIN B-12) 100 MCG tablet Take 1 tablet (100 mcg) by mouth daily 30 tablet 11    FLUoxetine (PROZAC) 10 MG capsule Take 1 capsule (10 mg) by mouth daily 30 capsule 4    fluticasone (FLONASE) 50 MCG/ACT nasal spray USE 2 SPRAYS INTO BOTH NOSTRILS ONCE DAILY 16 g 11    folic acid (FOLVITE) 1 MG tablet Take 1 tablet (1 mg) by mouth daily 30 tablet 11    furosemide (LASIX) 20 MG tablet TAKE TWO TABLETS (40MG) BY MOUTH ONCE DAILY IN THE MORNING; TAKE 1 TABLET (20MG) BY MOUTH ONCE DAILY IN THE EARLY AFTERNOON 84 tablet 11    gabapentin (NEURONTIN) 600 MG tablet TAKE 1 TABLET (600 MG) BY MOUTH AT BEDTIME 31 tablet 11    hypromellose-dextran (ARTIFICAL TEARS) 0.1-0.3 % ophthalmic solution Place 1 drop into both eyes 2 times daily 15 mL 0    levothyroxine (SYNTHROID/LEVOTHROID) 100 MCG tablet Take 1 tablet (100 mcg) by mouth daily 30 tablet 3    LORazepam (ATIVAN) 0.5 MG tablet TAKE 1 TABLET BY MOUTH THREE TIMES DAILY; 2 TABLETS AT HS; TAKE 1 TABLET BY MOUTH ONCE DAILY IF NEEDED 120 tablet 3    memantine (NAMENDA) 5 MG tablet 5mg po every HS x7 day, 5mg po twice a day x7 days, 5mg in AM and 10mg in PM x7 days then 10mg po twice a day 70 tablet 4    Menthol, Topical Analgesic, (BIOFREEZE) 4 % GEL Dime size gel to elbows and neck at HS and then TID prn 150 mL 11    nitroGLYcerin (NITROSTAT) 0.4 MG sublingual tablet For chest pain place 1 tablet under the tongue every 5  minutes for 3 doses. If symptoms persist 5 minutes after 1st dose call 911. 6 tablet 4    nystatin (MYCOSTATIN) 914584 UNIT/GM external powder Apply topically 2 times daily 30 g 4    oxyCODONE (ROXICODONE) 5 MG tablet Take 1 tablet (5 mg) by mouth at bedtime. May also take 1 tablet (5 mg) every 4 hours as needed for pain. 40 tablet 0    QUEtiapine (SEROQUEL) 25 MG tablet 12.5mg by mouth in AM and afternoon and 25mg po at bedtime 45 tablet 11    rOPINIRole (REQUIP) 0.25 MG tablet TAKE 1 TABLET (0.25MG) BY MOUTH EVERY MORNING; TAKE 3 TABLETS (0.75MG) BY MOUTH EACH NIGHT AT BEDTIME; TAKE 1 TABLET (0.25MG) BY MOUTH EVERY AFTERNOON 140 tablet 11    rOPINIRole (REQUIP) 0.5 MG tablet 0.25mg by mouth every AM and afternoon, 0.75mg po at bedtime 90 tablet 4    senna-docusate (SENOKOT-S/PERICOLACE) 8.6-50 MG tablet Take 1 tablet by mouth every other day And 1 tab by mouth daily as needed 60 tablet 11    traZODone (DESYREL) 100 MG tablet TAKE 1 TABLET (100 MG) BY MOUTH AT BEDTIME 30 tablet 11    vitamin B-12 (CYANOCOBALAMIN) 250 MCG tablet Take 250 mcg by mouth daily         REVIEW OF SYSTEMS:  4 point ROS neg other than the symptoms noted above in the HPI.  Denied chest pain.  No complaints of SOB.  Her right upper right arm continues to bother her.        PHYSICAL EXAM:  BP 99/62   Temp 97.8  F (36.6  C)   LMP 06/15/1984 (Approximate)   SpO2 97%   Ingrid ambulates with a 4WW.  Able to sit down and get up by self.    Once in the bathroom she lowered her pant and pull up down and bent over the walker.  With a glove this NP pushed the prolapse back in but it came back out.  Prolapse is healthy pink/red.  No open areas.  Does have a lot of lubrication on glove when pulling fingers back out.      She was able to pull up her pull up that had no signs of blood present.      Moves right arm and has discomfort.  Can see on her face.  Ingrid back down and put the heating pad back over her upper arm.    Heart rate regular and strong.     Lungs are clear.  Uses O2 continuous.  Was used only at night and then it became more often.  No signs of heart failure in this whole time.      ASSESSMENT / PLAN:  (K62.3) Rectal prolapse  (primary encounter diagnosis)  Comment: the prolapse may be out a little farther but can go easily in but does not stay in.  Would like not to hinder the prolapse by trying sugar, wait 15 minutes and then re-insert to see if stays.    If after the appointment and no intervention to be done, then will try the sugar treatment and see how that does for her.      (M79.621) Pain of right upper arm  Comment: family took her to the orthopedic clinic for evaluation.  No fractures.  Believe she received an injection but still sore.  Has not used any PRN oxycodone and PRN tylenol once.  Heating pad helps her the best.  No changes today.  Has oxycodone 5mg scheduled at night.  Tylenol CR 1300mg at HS and twice a day PRN.    Will schedule the Tylenol CR 650mg - 2 tabs in the AM to help through the daytime.  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  Now is on the memory care unit and she seems to fit in and does not worry about being out in the long term area.       Orders:  Change Tylenol CR 650mg to 2 tabs BID and daily PRN due to right arm pain/arthritis    Electronically signed by  NORA Balderas CNP

## 2023-12-18 NOTE — LETTER
12/18/2023        RE: Ingrid Powell  C/o Daljit Powell   [address] [address]        M [facility] GERIATRICS  ACUTE/EPISODIC VISIT    M [facility] Medical Record Number:  [id number]  Place of Service where encounter took place:  [facility] ([facility]) [[id number]]    Chief Complaint   Patient presents with     RECHECK       HPI:    Ingrid Powell is a 78 year old  (1945), who is being seen today for an episodic care visit.  HPI information obtained from: facility staff and patient report.    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  Girls were fretting over it.  Unfortunately, staff can not touch the prolapse to push it back in the rectum.      Ingrid has an upcoming 2nd opinion for the rectal prolapse come beginning of January.  Have spoken about using sugar on the exposed prolapse but chose not to attempt that today as upcoming appointment and do not want anything to hinder the opinion of the provider.      Asked staff to have Ingrid go into the bathroom so able to look at rectal area.    Ingrid recognizes this NP but does not call by name.  Can tell she is more forgetful.    When walking back with her to the recliner, told her it was really hot in her room and so she opened the controller on the vent and it said 90 degrees.  Turned it down and at least she put it to below 75 degrees.  She is sitting under a warm blanket and has a heating pad on her right arm for comfort.      ALLERGIES:    Allergies   Allergen Reactions     Dilaudid [Hydromorphone Hcl] Visual Disturbance     Hallucinations     Fosamax [Alendronate Sodium] Rash            Norvasc [Amlodipine Besylate] Hives and Rash      Tegretol [Carbamazepine] Hives and Rash        MEDICATIONS:  Post Discharge Medication Reconciliation Status: patient was not discharged from an inpatient facility or TCU.     Current Outpatient Medications   Medication Sig Dispense Refill     acetaminophen (TYLENOL) 650 MG CR tablet 1300mg po every PM and 1300mg po twice a day as needed       artificial tears (GENTEAL) 0.1-0.2-0.3 % ophthalmic solution INSTILL ONE DROP INTO BOTH EYES TWICE DAILY 15 mL 4     busPIRone (BUSPAR) 10 MG tablet TAKE 1 TABLET (10 MG) BY MOUTH 3 TIMES DAILY 90 tablet 11     cholecalciferol (VITAMIN D3) 25 mcg (1000 units) capsule Take 1 capsule by mouth daily       cyanocobalamin (VITAMIN B-12) 100 MCG tablet Take 1 tablet (100 mcg) by mouth daily 30 tablet 11     FLUoxetine (PROZAC) 10 MG capsule Take 1 capsule (10 mg) by mouth daily 30 capsule 4     fluticasone (FLONASE) 50 MCG/ACT nasal spray USE 2 SPRAYS INTO BOTH NOSTRILS ONCE DAILY 16 g 11     folic acid (FOLVITE) 1 MG tablet Take 1 tablet (1 mg) by mouth daily 30 tablet 11     furosemide (LASIX) 20 MG tablet TAKE TWO TABLETS (40MG) BY MOUTH ONCE DAILY IN THE MORNING; TAKE 1 TABLET (20MG) BY MOUTH ONCE DAILY IN THE EARLY AFTERNOON 84 tablet 11     gabapentin (NEURONTIN) 600 MG tablet TAKE 1 TABLET (600 MG) BY MOUTH AT BEDTIME 31 tablet 11     hypromellose-dextran (ARTIFICAL TEARS) 0.1-0.3 % ophthalmic solution Place 1 drop into both eyes 2 times daily 15 mL 0     levothyroxine (SYNTHROID/LEVOTHROID) 100 MCG tablet Take 1 tablet (100 mcg) by mouth daily 30 tablet 3     LORazepam (ATIVAN) 0.5 MG tablet TAKE 1 TABLET BY MOUTH THREE TIMES DAILY; 2 TABLETS AT HS; TAKE 1 TABLET BY MOUTH ONCE DAILY IF NEEDED 120 tablet 3     memantine (NAMENDA) 5 MG tablet 5mg po every HS x7 day, 5mg po twice a day x7 days, 5mg in AM and 10mg in PM x7 days then 10mg po twice a day 70 tablet 4     Menthol, Topical Analgesic, (BIOFREEZE) 4 % GEL Dime size gel to elbows and neck at HS and then TID prn 150  mL 11     nitroGLYcerin (NITROSTAT) 0.4 MG sublingual tablet For chest pain place 1 tablet under the tongue every 5 minutes for 3 doses. If symptoms persist 5 minutes after 1st dose call 911. 6 tablet 4     nystatin (MYCOSTATIN) 019226 UNIT/GM external powder Apply topically 2 times daily 30 g 4     oxyCODONE (ROXICODONE) 5 MG tablet Take 1 tablet (5 mg) by mouth at bedtime. May also take 1 tablet (5 mg) every 4 hours as needed for pain. 40 tablet 0     QUEtiapine (SEROQUEL) 25 MG tablet 12.5mg by mouth in AM and afternoon and 25mg po at bedtime 45 tablet 11     rOPINIRole (REQUIP) 0.25 MG tablet TAKE 1 TABLET (0.25MG) BY MOUTH EVERY MORNING; TAKE 3 TABLETS (0.75MG) BY MOUTH EACH NIGHT AT BEDTIME; TAKE 1 TABLET (0.25MG) BY MOUTH EVERY AFTERNOON 140 tablet 11     rOPINIRole (REQUIP) 0.5 MG tablet 0.25mg by mouth every AM and afternoon, 0.75mg po at bedtime 90 tablet 4     senna-docusate (SENOKOT-S/PERICOLACE) 8.6-50 MG tablet Take 1 tablet by mouth every other day And 1 tab by mouth daily as needed 60 tablet 11     traZODone (DESYREL) 100 MG tablet TAKE 1 TABLET (100 MG) BY MOUTH AT BEDTIME 30 tablet 11     vitamin B-12 (CYANOCOBALAMIN) 250 MCG tablet Take 250 mcg by mouth daily         REVIEW OF SYSTEMS:  4 point ROS neg other than the symptoms noted above in the HPI.  Denied chest pain.  No complaints of SOB.  Her right upper right arm continues to bother her.        PHYSICAL EXAM:  BP 99/62   Temp 97.8  F (36.6  C)   LMP 06/15/1984 (Approximate)   SpO2 97%   Ingrid ambulates with a 4WW.  Able to sit down and get up by self.    Once in the bathroom she lowered her pant and pull up down and bent over the walker.  With a glove this NP pushed the prolapse back in but it came back out.  Prolapse is healthy pink/red.  No open areas.  Does have a lot of lubrication on glove when pulling fingers back out.      She was able to pull up her pull up that had no signs of blood present.      Moves right arm and has  discomfort.  Can see on her face.  Ingrid back down and put the heating pad back over her upper arm.    Heart rate regular and strong.    Lungs are clear.  Uses O2 continuous.  Was used only at night and then it became more often.  No signs of heart failure in this whole time.      ASSESSMENT / PLAN:  (K62.3) Rectal prolapse  (primary encounter diagnosis)  Comment: the prolapse may be out a little farther but can go easily in but does not stay in.  Would like not to hinder the prolapse by trying sugar, wait 15 minutes and then re-insert to see if stays.    If after the appointment and no intervention to be done, then will try the sugar treatment and see how that does for her.      (M79.621) Pain of right upper arm  Comment: family took her to the orthopedic clinic for evaluation.  No fractures.  Believe she received an injection but still sore.  Has not used any PRN oxycodone and PRN tylenol once.  Heating pad helps her the best.  No changes today.  Has oxycodone 5mg scheduled at night.  Tylenol CR 1300mg at HS and twice a day PRN.    Will schedule the Tylenol CR 650mg - 2 tabs in the AM to help through the daytime.  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  Now is on the memory care unit and she seems to fit in and does not worry about being out in the custodial area.       Orders:  Change Tylenol CR 650mg to 2 tabs BID and daily PRN due to right arm pain/arthritis    Electronically signed by  NORA Balderas CNP            Sincerely,        NORA Balderas CNP

## 2024-01-01 ENCOUNTER — ASSISTED LIVING VISIT (OUTPATIENT)
Dept: GERIATRICS | Facility: CLINIC | Age: 79
End: 2024-01-01
Payer: COMMERCIAL

## 2024-01-01 ENCOUNTER — PATIENT OUTREACH (OUTPATIENT)
Dept: GERIATRIC MEDICINE | Facility: CLINIC | Age: 79
End: 2024-01-01

## 2024-01-01 ENCOUNTER — LAB REQUISITION (OUTPATIENT)
Dept: LAB | Facility: CLINIC | Age: 79
End: 2024-01-01
Payer: COMMERCIAL

## 2024-01-01 ENCOUNTER — DOCUMENTATION ONLY (OUTPATIENT)
Dept: OTHER | Facility: CLINIC | Age: 79
End: 2024-01-01
Payer: COMMERCIAL

## 2024-01-01 ENCOUNTER — PATIENT OUTREACH (OUTPATIENT)
Dept: GERIATRIC MEDICINE | Facility: CLINIC | Age: 79
End: 2024-01-01
Payer: COMMERCIAL

## 2024-01-01 ENCOUNTER — APPOINTMENT (OUTPATIENT)
Dept: GENERAL RADIOLOGY | Facility: CLINIC | Age: 79
End: 2024-01-01
Attending: EMERGENCY MEDICINE
Payer: COMMERCIAL

## 2024-01-01 ENCOUNTER — PRE VISIT (OUTPATIENT)
Dept: SURGERY | Facility: CLINIC | Age: 79
End: 2024-01-01

## 2024-01-01 ENCOUNTER — DOCUMENTATION ONLY (OUTPATIENT)
Dept: GERIATRICS | Facility: CLINIC | Age: 79
End: 2024-01-01

## 2024-01-01 ENCOUNTER — HOSPITAL ENCOUNTER (EMERGENCY)
Facility: CLINIC | Age: 79
Discharge: HOME | End: 2024-01-12
Attending: EMERGENCY MEDICINE | Admitting: EMERGENCY MEDICINE
Payer: COMMERCIAL

## 2024-01-01 ENCOUNTER — OFFICE VISIT (OUTPATIENT)
Dept: SURGERY | Facility: CLINIC | Age: 79
End: 2024-01-01
Payer: COMMERCIAL

## 2024-01-01 ENCOUNTER — HEALTH MAINTENANCE LETTER (OUTPATIENT)
Age: 79
End: 2024-01-01

## 2024-01-01 ENCOUNTER — HOSPITAL ENCOUNTER (EMERGENCY)
Facility: CLINIC | Age: 79
Discharge: HOME | End: 2024-07-19
Attending: EMERGENCY MEDICINE | Admitting: EMERGENCY MEDICINE
Payer: COMMERCIAL

## 2024-01-01 VITALS
SYSTOLIC BLOOD PRESSURE: 129 MMHG | TEMPERATURE: 97.3 F | OXYGEN SATURATION: 95 % | DIASTOLIC BLOOD PRESSURE: 73 MMHG | HEIGHT: 64 IN | WEIGHT: 200 LBS | BODY MASS INDEX: 34.15 KG/M2 | HEART RATE: 78 BPM | RESPIRATION RATE: 18 BRPM

## 2024-01-01 VITALS
BODY MASS INDEX: 36.7 KG/M2 | OXYGEN SATURATION: 95 % | WEIGHT: 213.8 LBS | TEMPERATURE: 98.7 F | DIASTOLIC BLOOD PRESSURE: 52 MMHG | SYSTOLIC BLOOD PRESSURE: 128 MMHG | HEART RATE: 87 BPM | RESPIRATION RATE: 18 BRPM

## 2024-01-01 VITALS
RESPIRATION RATE: 14 BRPM | HEART RATE: 88 BPM | DIASTOLIC BLOOD PRESSURE: 65 MMHG | BODY MASS INDEX: 36.94 KG/M2 | OXYGEN SATURATION: 98 % | SYSTOLIC BLOOD PRESSURE: 101 MMHG | WEIGHT: 215.2 LBS | TEMPERATURE: 97 F

## 2024-01-01 VITALS
OXYGEN SATURATION: 95 % | DIASTOLIC BLOOD PRESSURE: 52 MMHG | BODY MASS INDEX: 36.7 KG/M2 | HEART RATE: 87 BPM | SYSTOLIC BLOOD PRESSURE: 128 MMHG | TEMPERATURE: 98.7 F | RESPIRATION RATE: 18 BRPM | WEIGHT: 213.8 LBS

## 2024-01-01 VITALS — DIASTOLIC BLOOD PRESSURE: 74 MMHG | HEART RATE: 103 BPM | OXYGEN SATURATION: 94 % | SYSTOLIC BLOOD PRESSURE: 137 MMHG

## 2024-01-01 VITALS — HEART RATE: 94 BPM | OXYGEN SATURATION: 93 % | DIASTOLIC BLOOD PRESSURE: 74 MMHG | SYSTOLIC BLOOD PRESSURE: 145 MMHG

## 2024-01-01 VITALS
TEMPERATURE: 97.8 F | DIASTOLIC BLOOD PRESSURE: 83 MMHG | RESPIRATION RATE: 16 BRPM | HEART RATE: 81 BPM | OXYGEN SATURATION: 100 % | WEIGHT: 205 LBS | SYSTOLIC BLOOD PRESSURE: 151 MMHG | BODY MASS INDEX: 35 KG/M2 | HEIGHT: 64 IN

## 2024-01-01 VITALS
WEIGHT: 213.84 LBS | SYSTOLIC BLOOD PRESSURE: 99 MMHG | HEART RATE: 100 BPM | BODY MASS INDEX: 35.58 KG/M2 | DIASTOLIC BLOOD PRESSURE: 62 MMHG | OXYGEN SATURATION: 96 % | RESPIRATION RATE: 17 BRPM | TEMPERATURE: 97.8 F

## 2024-01-01 DIAGNOSIS — F33.1 MAJOR DEPRESSIVE DISORDER, RECURRENT EPISODE, MODERATE (H): ICD-10-CM

## 2024-01-01 DIAGNOSIS — K62.3 RECTAL PROLAPSE: ICD-10-CM

## 2024-01-01 DIAGNOSIS — I50.22 CHRONIC SYSTOLIC HEART FAILURE (H): ICD-10-CM

## 2024-01-01 DIAGNOSIS — F51.01 PRIMARY INSOMNIA: Primary | ICD-10-CM

## 2024-01-01 DIAGNOSIS — K62.3 RECTAL PROLAPSE: Primary | ICD-10-CM

## 2024-01-01 DIAGNOSIS — E03.9 ACQUIRED HYPOTHYROIDISM: Primary | Chronic | ICD-10-CM

## 2024-01-01 DIAGNOSIS — E03.9 HYPOTHYROIDISM, UNSPECIFIED: ICD-10-CM
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
## 2024-01-01 DIAGNOSIS — K59.03 DRUG-INDUCED CONSTIPATION: ICD-10-CM

## 2024-01-01 DIAGNOSIS — E03.0 CONGENITAL HYPOTHYROIDISM WITH DIFFUSE GOITER: ICD-10-CM

## 2024-01-01 DIAGNOSIS — J44.9 CHRONIC OBSTRUCTIVE PULMONARY DISEASE, UNSPECIFIED COPD TYPE (H): ICD-10-CM

## 2024-01-01 DIAGNOSIS — J44.9 CHRONIC OBSTRUCTIVE PULMONARY DISEASE, UNSPECIFIED (H): ICD-10-CM

## 2024-01-01 DIAGNOSIS — F41.1 GAD (GENERALIZED ANXIETY DISORDER): ICD-10-CM

## 2024-01-01 DIAGNOSIS — R60.0 BILATERAL LOWER EXTREMITY EDEMA: ICD-10-CM

## 2024-01-01 DIAGNOSIS — I50.22 CHRONIC SYSTOLIC HEART FAILURE (H): Primary | ICD-10-CM

## 2024-01-01 DIAGNOSIS — B37.2 CANDIDIASIS OF SKIN: ICD-10-CM

## 2024-01-01 DIAGNOSIS — M15.9 GENERALIZED OSTEOARTHRITIS OF MULTIPLE SITES: ICD-10-CM

## 2024-01-01 DIAGNOSIS — E03.8 OTHER SPECIFIED HYPOTHYROIDISM: ICD-10-CM

## 2024-01-01 DIAGNOSIS — F02.B3 MODERATE EARLY ONSET ALZHEIMER'S DEMENTIA WITH MOOD DISTURBANCE (H): ICD-10-CM

## 2024-01-01 DIAGNOSIS — F51.01 PRIMARY INSOMNIA: ICD-10-CM

## 2024-01-01 DIAGNOSIS — I10 ESSENTIAL (PRIMARY) HYPERTENSION: ICD-10-CM

## 2024-01-01 DIAGNOSIS — E03.9 ACQUIRED HYPOTHYROIDISM: Chronic | ICD-10-CM

## 2024-01-01 DIAGNOSIS — F22 PARANOIA (H): ICD-10-CM

## 2024-01-01 DIAGNOSIS — I50.9 CONGESTIVE HEART FAILURE, UNSPECIFIED HF CHRONICITY, UNSPECIFIED HEART FAILURE TYPE (H): ICD-10-CM

## 2024-01-01 DIAGNOSIS — E66.01 MORBID (SEVERE) OBESITY DUE TO EXCESS CALORIES (H): ICD-10-CM

## 2024-01-01 DIAGNOSIS — G30.0 MODERATE EARLY ONSET ALZHEIMER'S DEMENTIA WITH MOOD DISTURBANCE (H): ICD-10-CM

## 2024-01-01 DIAGNOSIS — E66.01 MORBID OBESITY (H): ICD-10-CM

## 2024-01-01 DIAGNOSIS — I50.22 CHRONIC SYSTOLIC (CONGESTIVE) HEART FAILURE (H): ICD-10-CM

## 2024-01-01 DIAGNOSIS — I50.9 HEART FAILURE, UNSPECIFIED (H): ICD-10-CM

## 2024-01-01 DIAGNOSIS — L85.3 DRY SKIN: ICD-10-CM

## 2024-01-01 DIAGNOSIS — D64.9 ANEMIA, UNSPECIFIED: ICD-10-CM

## 2024-01-01 DIAGNOSIS — F02.B3 DEMENTIA IN OTHER DISEASES CLASSIFIED ELSEWHERE, MODERATE, WITH MOOD DISTURBANCE (H): ICD-10-CM

## 2024-01-01 DIAGNOSIS — I15.9 SECONDARY HYPERTENSION: ICD-10-CM

## 2024-01-01 DIAGNOSIS — M79.621 PAIN OF RIGHT UPPER ARM: ICD-10-CM

## 2024-01-01 DIAGNOSIS — G30.0 MODERATE EARLY ONSET ALZHEIMER'S DEMENTIA WITH MOOD DISTURBANCE (H): Primary | ICD-10-CM

## 2024-01-01 DIAGNOSIS — F02.B3 MODERATE EARLY ONSET ALZHEIMER'S DEMENTIA WITH MOOD DISTURBANCE (H): Primary | ICD-10-CM

## 2024-01-01 DIAGNOSIS — J44.9 COPD, MILD (H): ICD-10-CM

## 2024-01-01 DIAGNOSIS — K62.5 RECTAL BLEEDING: ICD-10-CM

## 2024-01-01 DIAGNOSIS — E78.5 HYPERLIPIDEMIA, UNSPECIFIED HYPERLIPIDEMIA TYPE: ICD-10-CM

## 2024-01-01 DIAGNOSIS — E78.49 OTHER HYPERLIPIDEMIA: ICD-10-CM

## 2024-01-01 LAB
ALBUMIN SERPL BCG-MCNC: 3.5 G/DL (ref 3.5–5.2)
ALP SERPL-CCNC: 71 U/L (ref 40–150)
ALT SERPL W P-5'-P-CCNC: 10 U/L (ref 0–50)
ANION GAP SERPL CALCULATED.3IONS-SCNC: 10 MMOL/L (ref 7–15)
ANION GAP SERPL CALCULATED.3IONS-SCNC: 7 MMOL/L (ref 7–15)
ANION GAP SERPL CALCULATED.3IONS-SCNC: 9 MMOL/L (ref 7–15)
AST SERPL W P-5'-P-CCNC: 20 U/L (ref 0–45)
BILIRUB SERPL-MCNC: <0.2 MG/DL
BUN SERPL-MCNC: 12.7 MG/DL (ref 8–23)
BUN SERPL-MCNC: 15.7 MG/DL (ref 8–23)
BUN SERPL-MCNC: 15.8 MG/DL (ref 8–23)
CALCIUM SERPL-MCNC: 8.9 MG/DL (ref 8.8–10.2)
CALCIUM SERPL-MCNC: 9.1 MG/DL (ref 8.8–10.2)
CALCIUM SERPL-MCNC: 9.3 MG/DL (ref 8.8–10.2)
CHLORIDE SERPL-SCNC: 100 MMOL/L (ref 98–107)
CHLORIDE SERPL-SCNC: 100 MMOL/L (ref 98–107)
CHLORIDE SERPL-SCNC: 95 MMOL/L (ref 98–107)
CHOLEST SERPL-MCNC: 182 MG/DL
CREAT SERPL-MCNC: 0.78 MG/DL (ref 0.51–0.95)
CREAT SERPL-MCNC: 0.81 MG/DL (ref 0.51–0.95)
CREAT SERPL-MCNC: 0.86 MG/DL (ref 0.51–0.95)
DEPRECATED HCO3 PLAS-SCNC: 31 MMOL/L (ref 22–29)
DEPRECATED HCO3 PLAS-SCNC: 33 MMOL/L (ref 22–29)
DEPRECATED HCO3 PLAS-SCNC: 34 MMOL/L (ref 22–29)
EGFRCR SERPLBLD CKD-EPI 2021: 68 ML/MIN/1.73M2
EGFRCR SERPLBLD CKD-EPI 2021: 73 ML/MIN/1.73M2
EGFRCR SERPLBLD CKD-EPI 2021: 76 ML/MIN/1.73M2
ERYTHROCYTE [DISTWIDTH] IN BLOOD BY AUTOMATED COUNT: 14.2 % (ref 10–15)
FASTING STATUS PATIENT QL REPORTED: ABNORMAL
FERRITIN SERPL-MCNC: 17 NG/ML (ref 11–328)
FOLATE SERPL-MCNC: 29.1 NG/ML (ref 4.6–34.8)
GLUCOSE SERPL-MCNC: 103 MG/DL (ref 70–99)
GLUCOSE SERPL-MCNC: 87 MG/DL (ref 70–99)
GLUCOSE SERPL-MCNC: 88 MG/DL (ref 70–99)
HBA1C MFR BLD: 5.2 %
HCT VFR BLD AUTO: 28.8 % (ref 35–47)
HDLC SERPL-MCNC: 66 MG/DL
HGB BLD-MCNC: 8.6 G/DL (ref 11.7–15.7)
HGB BLD-MCNC: 8.8 G/DL (ref 11.7–15.7)
HGB BLD-MCNC: 9.9 G/DL (ref 11.7–15.7)
IRON BINDING CAPACITY (ROCHE): 339 UG/DL (ref 240–430)
IRON SATN MFR SERPL: 5 % (ref 15–46)
IRON SERPL-MCNC: 17 UG/DL (ref 37–145)
LDLC SERPL CALC-MCNC: 101 MG/DL
MAGNESIUM SERPL-MCNC: 1.5 MG/DL (ref 1.7–2.3)
MCH RBC QN AUTO: 26.2 PG (ref 26.5–33)
MCHC RBC AUTO-ENTMCNC: 30.6 G/DL (ref 31.5–36.5)
MCV RBC AUTO: 86 FL (ref 78–100)
NONHDLC SERPL-MCNC: 116 MG/DL
NT-PROBNP SERPL-MCNC: 74 PG/ML (ref 0–1800)
PLATELET # BLD AUTO: 227 10E3/UL (ref 150–450)
POTASSIUM SERPL-SCNC: 3.4 MMOL/L (ref 3.4–5.3)
POTASSIUM SERPL-SCNC: 3.5 MMOL/L (ref 3.4–5.3)
POTASSIUM SERPL-SCNC: 3.7 MMOL/L (ref 3.4–5.3)
PROT SERPL-MCNC: 5.9 G/DL (ref 6.4–8.3)
RBC # BLD AUTO: 3.36 10E6/UL (ref 3.8–5.2)
SODIUM SERPL-SCNC: 138 MMOL/L (ref 135–145)
SODIUM SERPL-SCNC: 140 MMOL/L (ref 135–145)
SODIUM SERPL-SCNC: 141 MMOL/L (ref 135–145)
T4 FREE SERPL-MCNC: 0.8 NG/DL (ref 0.9–1.7)
TRANSFERRIN SERPL-MCNC: 265 MG/DL (ref 200–360)
TRIGL SERPL-MCNC: 74 MG/DL
TSH SERPL DL<=0.005 MIU/L-ACNC: 1.32 UIU/ML (ref 0.3–4.2)
TSH SERPL DL<=0.005 MIU/L-ACNC: 11.81 UIU/ML (ref 0.3–4.2)
TSH SERPL DL<=0.005 MIU/L-ACNC: 13.8 UIU/ML (ref 0.3–4.2)
TSH SERPL DL<=0.005 MIU/L-ACNC: 25.59 UIU/ML (ref 0.3–4.2)
VIT B12 SERPL-MCNC: 344 PG/ML (ref 232–1245)
VIT D+METAB SERPL-MCNC: 12 NG/ML (ref 20–50)
WBC # BLD AUTO: 5.6 10E3/UL (ref 4–11)

## 2024-01-01 PROCEDURE — P9603 ONE-WAY ALLOW PRORATED MILES: HCPCS | Mod: ORL | Performed by: NURSE PRACTITIONER

## 2024-01-01 PROCEDURE — P9603 ONE-WAY ALLOW PRORATED MILES: HCPCS | Mod: ORL

## 2024-01-01 PROCEDURE — 99349 HOME/RES VST EST MOD MDM 40: CPT | Performed by: NURSE PRACTITIONER

## 2024-01-01 PROCEDURE — 36415 COLL VENOUS BLD VENIPUNCTURE: CPT | Mod: ORL

## 2024-01-01 PROCEDURE — 99282 EMERGENCY DEPT VISIT SF MDM: CPT | Performed by: EMERGENCY MEDICINE

## 2024-01-01 PROCEDURE — 82607 VITAMIN B-12: CPT | Mod: ORL

## 2024-01-01 PROCEDURE — 99283 EMERGENCY DEPT VISIT LOW MDM: CPT | Performed by: EMERGENCY MEDICINE

## 2024-01-01 PROCEDURE — 36415 COLL VENOUS BLD VENIPUNCTURE: CPT | Mod: ORL | Performed by: NURSE PRACTITIONER

## 2024-01-01 PROCEDURE — 85018 HEMOGLOBIN: CPT | Mod: ORL | Performed by: NURSE PRACTITIONER

## 2024-01-01 PROCEDURE — 80053 COMPREHEN METABOLIC PANEL: CPT | Mod: ORL

## 2024-01-01 PROCEDURE — 80048 BASIC METABOLIC PNL TOTAL CA: CPT | Mod: ORL | Performed by: NURSE PRACTITIONER

## 2024-01-01 PROCEDURE — 85027 COMPLETE CBC AUTOMATED: CPT | Mod: ORL

## 2024-01-01 PROCEDURE — 84439 ASSAY OF FREE THYROXINE: CPT | Mod: ORL | Performed by: NURSE PRACTITIONER

## 2024-01-01 PROCEDURE — 83735 ASSAY OF MAGNESIUM: CPT | Mod: ORL

## 2024-01-01 PROCEDURE — 84443 ASSAY THYROID STIM HORMONE: CPT | Mod: ORL

## 2024-01-01 PROCEDURE — 84443 ASSAY THYROID STIM HORMONE: CPT | Mod: ORL | Performed by: NURSE PRACTITIONER

## 2024-01-01 PROCEDURE — 83036 HEMOGLOBIN GLYCOSYLATED A1C: CPT | Mod: ORL

## 2024-01-01 PROCEDURE — 83550 IRON BINDING TEST: CPT | Mod: ORL

## 2024-01-01 PROCEDURE — 73060 X-RAY EXAM OF HUMERUS: CPT | Mod: RT

## 2024-01-01 PROCEDURE — 73030 X-RAY EXAM OF SHOULDER: CPT | Mod: RT

## 2024-01-01 PROCEDURE — 84466 ASSAY OF TRANSFERRIN: CPT | Mod: ORL

## 2024-01-01 PROCEDURE — 82306 VITAMIN D 25 HYDROXY: CPT | Mod: ORL

## 2024-01-01 PROCEDURE — 82746 ASSAY OF FOLIC ACID SERUM: CPT | Mod: ORL

## 2024-01-01 PROCEDURE — 99203 OFFICE O/P NEW LOW 30 MIN: CPT | Performed by: NURSE PRACTITIONER

## 2024-01-01 PROCEDURE — 99215 OFFICE O/P EST HI 40 MIN: CPT | Mod: 25 | Performed by: SURGERY

## 2024-01-01 PROCEDURE — 80061 LIPID PANEL: CPT | Mod: ORL

## 2024-01-01 PROCEDURE — 82728 ASSAY OF FERRITIN: CPT | Mod: ORL

## 2024-01-01 PROCEDURE — 83880 ASSAY OF NATRIURETIC PEPTIDE: CPT | Mod: ORL

## 2024-01-01 PROCEDURE — 45330 DIAGNOSTIC SIGMOIDOSCOPY: CPT | Performed by: SURGERY

## 2024-01-01 RX ORDER — LORAZEPAM 0.5 MG/1
TABLET ORAL
Qty: 120 TABLET | Refills: 3 | Status: SHIPPED | OUTPATIENT
Start: 2024-01-01 | End: 2024-01-01

## 2024-01-01 RX ORDER — TRAZODONE HYDROCHLORIDE 100 MG/1
TABLET ORAL
Qty: 30 TABLET | Refills: 11 | OUTPATIENT
Start: 2024-01-01

## 2024-01-01 RX ORDER — TRAZODONE HYDROCHLORIDE 50 MG/1
50 TABLET, FILM COATED ORAL
COMMUNITY
End: 2024-01-01

## 2024-01-01 RX ORDER — LORAZEPAM 0.5 MG/1
TABLET ORAL
Qty: 120 TABLET | Refills: 3 | Status: SHIPPED | OUTPATIENT
Start: 2024-01-01

## 2024-01-01 RX ORDER — OXYCODONE HYDROCHLORIDE 5 MG/1
TABLET ORAL
Qty: 40 TABLET | Refills: 0 | Status: SHIPPED | OUTPATIENT
Start: 2024-01-01

## 2024-01-01 RX ORDER — LEVOTHYROXINE SODIUM 125 UG/1
125 TABLET ORAL DAILY
Qty: 30 TABLET | Refills: 4 | Status: SHIPPED | OUTPATIENT
Start: 2024-01-01

## 2024-01-01 RX ORDER — LEVOTHYROXINE SODIUM 112 UG/1
112 TABLET ORAL DAILY
Qty: 30 TABLET | Refills: 4 | Status: SHIPPED | OUTPATIENT
Start: 2024-01-01 | End: 2024-01-01

## 2024-01-01 RX ORDER — TRAZODONE HYDROCHLORIDE 50 MG/1
50 TABLET, FILM COATED ORAL
Qty: 31 TABLET | Refills: 11 | Status: SHIPPED | OUTPATIENT
Start: 2024-01-01

## 2024-01-01 RX ORDER — NYSTATIN 100000 [USP'U]/G
POWDER TOPICAL 2 TIMES DAILY PRN
Qty: 60 G | Refills: 0 | Status: SHIPPED | OUTPATIENT
Start: 2024-01-01

## 2024-01-01 ASSESSMENT — ACTIVITIES OF DAILY LIVING (ADL)
DEPENDENT_IADLS:: CLEANING;COOKING;LAUNDRY;SHOPPING;MEAL PREPARATION;MEDICATION MANAGEMENT;MONEY MANAGEMENT;TRANSPORTATION
ADLS_ACUITY_SCORE: 37
ADLS_ACUITY_SCORE: 40
ADLS_ACUITY_SCORE: 40

## 2024-01-01 ASSESSMENT — PAIN SCALES - GENERAL
PAINLEVEL: NO PAIN (0)
PAINLEVEL: NO PAIN (0)

## 2024-01-01 ASSESSMENT — COLUMBIA-SUICIDE SEVERITY RATING SCALE - C-SSRS
1. IN THE PAST MONTH, HAVE YOU WISHED YOU WERE DEAD OR WISHED YOU COULD GO TO SLEEP AND NOT WAKE UP?: NO
2. HAVE YOU ACTUALLY HAD ANY THOUGHTS OF KILLING YOURSELF IN THE PAST MONTH?: NO
6. HAVE YOU EVER DONE ANYTHING, STARTED TO DO ANYTHING, OR PREPARED TO DO ANYTHING TO END YOUR LIFE?: NO

## 2024-01-01 ASSESSMENT — PATIENT HEALTH QUESTIONNAIRE - PHQ9: SUM OF ALL RESPONSES TO PHQ QUESTIONS 1-9: 0

## 2024-01-01 ASSESSMENT — LIFESTYLE VARIABLES
AUDIT-C TOTAL SCORE: 0
SKIP TO QUESTIONS 9-10: 1

## 2024-01-04 NOTE — PROGRESS NOTES
Lab Results   Component Value Date    TSH 25.59 01/04/2024     BMP was fine today.  HgB = 9.9 so increasing little by little    Orders:  Discontinue levythyroxine 100mcg daily  Start levothyroxine 112mcg po daily for hypothyroidism  TSH level in 5 weeks for hypothyroidism    Elsa Jeffries, NORA CNP

## 2024-01-05 NOTE — LETTER
1/5/2024        RE: Ingrid Powell  C/o Daljit Powell   [address] [facility] [address]        M [facility] GERIATRICS  ACUTE/EPISODIC VISIT    [facility] Medical Record Number:  [id number]  Place of Service where encounter took place:  [facility] ([facility]) [[id number]]    Chief Complaint   Patient presents with     RECHECK       HPI:    Ingrid Powell is a 78 year old  (1945), who is being seen today for an episodic care visit.  HPI information obtained from: facility chart records, facility staff, patient report, and [facility] chart review.    Today's concern is:    Diagnoses         Codes Comments    Chronic systolic heart failure (H)    -  Primary I50.22     Morbid obesity (H)     E66.01     COPD, mild (H)     J44.9     JAIME (generalized anxiety disorder)     F41.1     Paranoia (H)     F22     Major depressive disorder, recurrent episode, moderate (H)     F33.1     Moderate early onset Alzheimer's dementia with mood disturbance (H)     G30.0, F02.B3     Dry skin     L85.3     Generalized osteoarthritis of multiple sites     M15.9           Came to see Ingrid today due to I requested that she wanted to have Bartolo stockings ordered due to swelling in her lower extremities.  Had already increased her furosemide up to 40 mg twice a day.    Ingrid now resides down in the memory care unit.  The door of her apartment is open but right away can tell it is warm in her apartment.  Looked at the thermostat of where the heater is and it was set to 88.  Turned down to 82.  She did not have a blanket on her today, but do not really understand why all of a sudden she has a pattern in her apartment where she used to always have it cool.    She is not reclined back in her recliner but she makes a note that her recliner is also hitting the wall.  This nurse practitioner was done with the visit did go out to asked the girls to move her chair for her.  Noted her skin to be pretty dry and so was  going to order some Eucerin cream to be applied to her skin daily to hopefully help prevent cracking and blisters to start.    Next week she goes to her appointment for second opinion of the rectal prolapse.  Did inform her her recent hemoglobin was still in the high nines but getting better than when she was on the donezepil.  Suspect that that was causing some GI bleeding beyond the bleeding she has from her rectal prolapse.  ALLERGIES:    Allergies   Allergen Reactions     Dilaudid [Hydromorphone Hcl] Visual Disturbance     Hallucinations     Fosamax [Alendronate Sodium] Rash            Norvasc [Amlodipine Besylate] Hives and Rash     Tegretol [Carbamazepine] Hives and Rash        MEDICATIONS:  Post Discharge Medication Reconciliation Status: patient was not discharged from an inpatient facility or TCU.     Current Outpatient Medications   Medication Sig Dispense Refill     LORazepam (ATIVAN) 0.5 MG tablet TAKE 1 TABLET BY MOUTH THREE TIMES DAILY; 2 TABLETS AT HS; TAKE 1 TABLET BY MOUTH ONCE DAILY IF NEEDED 120 tablet 3     mineral oil-white petrolatum (EUCERIN/MINERIN) cream Apply topically to legs every day for dry skin 240 g 3     oxyCODONE (ROXICODONE) 5 MG tablet Take 1 tablet (5 mg) by mouth at bedtime. May also take 1 tablet (5 mg) every 4 hours as needed for pain. 40 tablet 0     acetaminophen (TYLENOL) 650 MG CR tablet 1300mg po every AM & PM and 1300mg po daily as needed 120 tablet 4     artificial tears (GENTEAL) 0.1-0.2-0.3 % ophthalmic solution INSTILL ONE DROP INTO BOTH EYES TWICE DAILY 15 mL 4     busPIRone (BUSPAR) 10 MG tablet TAKE 1 TABLET (10 MG) BY MOUTH 3 TIMES DAILY 90 tablet 11     cholecalciferol (VITAMIN D3) 25 mcg (1000 units) capsule Take 1 capsule by mouth daily       cyanocobalamin (VITAMIN B-12) 100 MCG tablet Take 1 tablet (100 mcg) by mouth daily 30 tablet 11     ferrous sulfate (FEROSUL) 325 (65 Fe) MG tablet Take 1 tablet (325 mg) by mouth daily (with breakfast)       FLUoxetine  (PROZAC) 10 MG capsule Take 1 capsule (10 mg) by mouth daily 30 capsule 4     fluticasone (FLONASE) 50 MCG/ACT nasal spray USE 2 SPRAYS INTO BOTH NOSTRILS ONCE DAILY 16 g 11     folic acid (FOLVITE) 1 MG tablet Take 1 tablet (1 mg) by mouth daily 30 tablet 11     furosemide (LASIX) 20 MG tablet Take 2 tablets (40 mg) by mouth 2 times daily 60 tablet 11     gabapentin (NEURONTIN) 600 MG tablet TAKE 1 TABLET (600 MG) BY MOUTH AT BEDTIME 31 tablet 11     hypromellose-dextran (ARTIFICAL TEARS) 0.1-0.3 % ophthalmic solution Place 1 drop into both eyes 2 times daily 15 mL 0     levothyroxine (SYNTHROID/LEVOTHROID) 112 MCG tablet Take 1 tablet (112 mcg) by mouth daily 30 tablet 4     memantine (NAMENDA) 5 MG tablet 5mg po every HS x7 day, 5mg po twice a day x7 days, 5mg in AM and 10mg in PM x7 days then 10mg po twice a day 70 tablet 4     Menthol, Topical Analgesic, (BIOFREEZE) 4 % GEL Dime size gel to elbows and neck at HS and then TID prn 150 mL 11     nitroGLYcerin (NITROSTAT) 0.4 MG sublingual tablet For chest pain place 1 tablet under the tongue every 5 minutes for 3 doses. If symptoms persist 5 minutes after 1st dose call 911. 6 tablet 4     nystatin (MYCOSTATIN) 063809 UNIT/GM external powder Apply topically 2 times daily 30 g 4     QUEtiapine (SEROQUEL) 25 MG tablet 12.5mg by mouth in AM and afternoon and 25mg po at bedtime 45 tablet 11     rOPINIRole (REQUIP) 0.25 MG tablet TAKE 1 TABLET (0.25MG) BY MOUTH EVERY MORNING; TAKE 3 TABLETS (0.75MG) BY MOUTH EACH NIGHT AT BEDTIME; TAKE 1 TABLET (0.25MG) BY MOUTH EVERY AFTERNOON 140 tablet 11     senna-docusate (SENOKOT-S/PERICOLACE) 8.6-50 MG tablet 1 tab by mouth on M.W.F       traZODone (DESYREL) 100 MG tablet TAKE 1 TABLET (100 MG) BY MOUTH AT BEDTIME 30 tablet 11     vitamin B-12 (CYANOCOBALAMIN) 250 MCG tablet Take 250 mcg by mouth daily       Medications reviewed:  Medications reconciled to facility chart and changes were made to reflect current medications as  identified as above med list. Below are the changes that were made:   Medications stopped since last EPIC medication reconciliation:   Medications Discontinued During This Encounter   Medication Reason     LORazepam (ATIVAN) 0.5 MG tablet Reorder (No AVS)     oxyCODONE (ROXICODONE) 5 MG tablet Reorder (No AVS)       Medications started since last [facility] medication reconciliation:  Orders Placed This Encounter   Medications     LORazepam (ATIVAN) 0.5 MG tablet     Sig: TAKE 1 TABLET BY MOUTH THREE TIMES DAILY; 2 TABLETS AT HS; TAKE 1 TABLET BY MOUTH ONCE DAILY IF NEEDED     Dispense:  120 tablet     Refill:  3     oxyCODONE (ROXICODONE) 5 MG tablet     Sig: Take 1 tablet (5 mg) by mouth at bedtime. May also take 1 tablet (5 mg) every 4 hours as needed for pain.     Dispense:  40 tablet     Refill:  0     mineral oil-white petrolatum (EUCERIN/MINERIN) cream     Sig: Apply topically to legs every day for dry skin     Dispense:  240 g     Refill:  3         REVIEW OF SYSTEMS:  4 point ROS neg other than the symptoms noted above in the HPI.  No complaints of acute chest pain or shortness of breath.  Her concern is the swelling in her feet.      PHYSICAL EXAM:  BP 99/62   Pulse 100   Temp 97.8  F (36.6  C)   Resp 17   Wt 97 kg (213 lb 13.4 oz)   LMP 06/15/1984 (Approximate)   SpO2 96%   BMI 35.58 kg/m    Ingrid is sitting upright in her recliner with a heating pad her right forearm as she states that is the only thing that helps with the discomfort in that arm.  Has gone to see orthopedics as well as doing a x-ray here at the facility.  She can move her left arm without any difficulties at the shoulder but then her right arm is limited at the shoulder  Ingrid uses a four-wheel walker for mobility.  She can move independently around with a walker.  Heart rate/rhythm is regular and strong.  From her ankles down to the tops of her feet she has +1 edema.  Her lower extremities are vague in nature because of her stature and  obesity.  Skin is pink and dry but no open areas.  Wearing slip on slippers today    Lungs are clear to auscultation.  Does wear oxygen at 2 L/min.  This is more of a security blanket for her as she is had some trauma with dying in the past and being brought back to life.  Abdomen is obese, soft, nontender to touch, states her bowel movements are soft and she is not straining.  Occasional blood found in her pad because of the prolapsed uterus but she states it is more from wiping than it dripping blood    Component      Latest Ref Rng 1/4/2024  6:11 AM   Sodium      135 - 145 mmol/L 138    Potassium      3.4 - 5.3 mmol/L 3.4    Chloride      98 - 107 mmol/L 95 (L)    Carbon Dioxide (CO2)      22 - 29 mmol/L 34 (H)    Anion Gap      7 - 15 mmol/L 9    Urea Nitrogen      8.0 - 23.0 mg/dL 12.7    Creatinine      0.51 - 0.95 mg/dL 0.78    GFR Estimate      >60 mL/min/1.73m2 76    Calcium      8.8 - 10.2 mg/dL 9.3    Glucose      70 - 99 mg/dL 103 (H)    TSH      0.30 - 4.20 uIU/mL 25.59 (H)    Hemoglobin      11.7 - 15.7 g/dL 9.9 (L)       No recent B/P's or weights    ASSESSMENT / PLAN:  (I50.22) Chronic systolic heart failure (H)  (primary encounter diagnosis)  (E66.01) Morbid obesity (H)  Comment: Even though Ingrid is pretty good about drinking fluids, do not want to increase her Lasix 40 mg twice a day.  Did give an order for TRENT stockings this week already.  Knee-high length that should be put on in the morning and off in the evening.  Encouraged her to elevate her legs above her heart.  Just had a BMP done yesterday and it was satisfactory.    (J44.9) COPD, mild (H)  Comment: Ingrid does not take any medications for this diagnosis.  She does take Flonase for her sinus passages but otherwise no other inhalers.  Has not had any acute exacerbations.  Oxygen is used for her heart failure.    (F41.1) JAIME (generalized anxiety disorder)  (F22) Paranoia (H)  (F33.1) Major depressive disorder, recurrent episode, moderate  (H)  Comment: Prior to moving down to the memory care unit, Ingrid began struggling with isolation and further depression as she was in the process of knowing she was losing her memory more more.  Anxiety was a big issue for her but then she started becoming paranoid as well.  Have not decreased any of her medications since she has been down here.  She seems to be more calm today and accepting of being in this unit because the door can stay open now and she can see what is out in the commons area as well as they can see what she is doing in her room.  Have seen her come out for meals as well as activities of her choice but other things she used to do she has no interest in like reading and even going out with family members to go eat or visit other people.  Plan: LORazepam (ATIVAN) 0.5 MG tablet      (G30.0,  F02.B3) Moderate early onset Alzheimer's dementia with mood disturbance (H)  Comment: Ingrid did go through the neurologist to be diagnosed with neurodegenerative disorder/Alzheimer's dementia.  She was placed on Aricept but then her hemoglobin was dropping unexpectedly.  Due to it having GI bleed effects, did discontinue the Aricept and put her on Namenda.  Seems to be tolerating the Namenda.  Admits that she still enjoys eating ice cream.  Would not expect her to do anything to help her obesity out as it can impede so many other diagnoses like osteoarthritis and her heart failure.  Ingrid has a strong history in her family of Alzheimer's disease that it is hard for family to watch her have this but they understand what is going on as they have seen it and there parents and aunts and uncles.  Do not think that she is ready for hospice at this point but has been a discussion in the past with family.    (E03.9) Acquired hypothyroidism  Comment: Ingrid's TSH level jumped up this last 4 weeks to be over 25.0.  Did bump up her levothyroxine yesterday to be 112 mcg daily with a TSH level in 4 weeks.    (L85.3) Dry  skin  Comment: Giving orders today for Eucerin/mineral cream to apply to her lower extremities every day for dry skin.  Want to prevent open sores or cracking of the skin.  Plan: mineral oil-white petrolatum (EUCERIN/MINERIN)         cream    (M15.9) Generalized osteoarthritis of multiple sites  Comment: Just reordering her oxycodone today from the pharmacy otherwise tolerating her oxycodone 5 mg at bedtime and then as needed dosing.  Plan: oxyCODONE (ROXICODONE) 5 MG tablet     Orders:  Eucerin/mineral cream topically to lower extremities daily for dry skin.    Electronically signed by  NORA Balderas CNP            Sincerely,        NORA Balderas CNP

## 2024-01-05 NOTE — PROGRESS NOTES
[facility] GERIATRICS  ACUTE/EPISODIC VISIT    [facility] Medical Record Number:  [id number]  Place of Service where encounter took place:  [facility] ([facility]) [[id number]]    Chief Complaint   Patient presents with    RECHECK       HPI:    Ingrid Powell is a 78 year old  (1945), who is being seen today for an episodic care visit.  HPI information obtained from: facility chart records, facility staff, patient report, and [facility] chart review.    Today's concern is:    Diagnoses         Codes Comments    Chronic systolic heart failure (H)    -  Primary I50.22     Morbid obesity (H)     E66.01     COPD, mild (H)     J44.9     JAIME (generalized anxiety disorder)     F41.1     Paranoia (H)     F22     Major depressive disorder, recurrent episode, moderate (H)     F33.1     Moderate early onset Alzheimer's dementia with mood disturbance (H)     G30.0, F02.B3     Dry skin     L85.3     Generalized osteoarthritis of multiple sites     M15.9           Came to see Ingrid today due to I requested that she wanted to have Bartolo stockings ordered due to swelling in her lower extremities.  Had already increased her furosemide up to 40 mg twice a day.    Ingrid now resides down in the memory care unit.  The door of her apartment is open but right away can tell it is warm in her apartment.  Looked at the thermostat of where the heater is and it was set to 88.  Turned down to 82.  She did not have a blanket on her today, but do not really understand why all of a sudden she has a pattern in her apartment where she used to always have it cool.    She is not reclined back in her recliner but she makes a note that her recliner is also hitting the wall.  This nurse practitioner was done with the visit did go out to asked the girls to move her chair for her.  Noted her skin to be pretty dry and so was going to order some Eucerin cream to be applied to her skin daily to hopefully help prevent cracking and  blisters to start.    Next week she goes to her appointment for second opinion of the rectal prolapse.  Did inform her her recent hemoglobin was still in the high nines but getting better than when she was on the donezepil.  Suspect that that was causing some GI bleeding beyond the bleeding she has from her rectal prolapse.  ALLERGIES:    Allergies   Allergen Reactions    Dilaudid [Hydromorphone Hcl] Visual Disturbance     Hallucinations    Fosamax [Alendronate Sodium] Rash           Norvasc [Amlodipine Besylate] Hives and Rash    Tegretol [Carbamazepine] Hives and Rash        MEDICATIONS:  Post Discharge Medication Reconciliation Status: patient was not discharged from an inpatient facility or TCU.     Current Outpatient Medications   Medication Sig Dispense Refill    LORazepam (ATIVAN) 0.5 MG tablet TAKE 1 TABLET BY MOUTH THREE TIMES DAILY; 2 TABLETS AT HS; TAKE 1 TABLET BY MOUTH ONCE DAILY IF NEEDED 120 tablet 3    mineral oil-white petrolatum (EUCERIN/MINERIN) cream Apply topically to legs every day for dry skin 240 g 3    oxyCODONE (ROXICODONE) 5 MG tablet Take 1 tablet (5 mg) by mouth at bedtime. May also take 1 tablet (5 mg) every 4 hours as needed for pain. 40 tablet 0    acetaminophen (TYLENOL) 650 MG CR tablet 1300mg po every AM & PM and 1300mg po daily as needed 120 tablet 4    artificial tears (GENTEAL) 0.1-0.2-0.3 % ophthalmic solution INSTILL ONE DROP INTO BOTH EYES TWICE DAILY 15 mL 4    busPIRone (BUSPAR) 10 MG tablet TAKE 1 TABLET (10 MG) BY MOUTH 3 TIMES DAILY 90 tablet 11    cholecalciferol (VITAMIN D3) 25 mcg (1000 units) capsule Take 1 capsule by mouth daily      cyanocobalamin (VITAMIN B-12) 100 MCG tablet Take 1 tablet (100 mcg) by mouth daily 30 tablet 11    ferrous sulfate (FEROSUL) 325 (65 Fe) MG tablet Take 1 tablet (325 mg) by mouth daily (with breakfast)      FLUoxetine (PROZAC) 10 MG capsule Take 1 capsule (10 mg) by mouth daily 30 capsule 4    fluticasone (FLONASE) 50 MCG/ACT nasal  spray USE 2 SPRAYS INTO BOTH NOSTRILS ONCE DAILY 16 g 11    folic acid (FOLVITE) 1 MG tablet Take 1 tablet (1 mg) by mouth daily 30 tablet 11    furosemide (LASIX) 20 MG tablet Take 2 tablets (40 mg) by mouth 2 times daily 60 tablet 11    gabapentin (NEURONTIN) 600 MG tablet TAKE 1 TABLET (600 MG) BY MOUTH AT BEDTIME 31 tablet 11    hypromellose-dextran (ARTIFICAL TEARS) 0.1-0.3 % ophthalmic solution Place 1 drop into both eyes 2 times daily 15 mL 0    levothyroxine (SYNTHROID/LEVOTHROID) 112 MCG tablet Take 1 tablet (112 mcg) by mouth daily 30 tablet 4    memantine (NAMENDA) 5 MG tablet 5mg po every HS x7 day, 5mg po twice a day x7 days, 5mg in AM and 10mg in PM x7 days then 10mg po twice a day 70 tablet 4    Menthol, Topical Analgesic, (BIOFREEZE) 4 % GEL Dime size gel to elbows and neck at HS and then TID prn 150 mL 11    nitroGLYcerin (NITROSTAT) 0.4 MG sublingual tablet For chest pain place 1 tablet under the tongue every 5 minutes for 3 doses. If symptoms persist 5 minutes after 1st dose call 911. 6 tablet 4    nystatin (MYCOSTATIN) 177262 UNIT/GM external powder Apply topically 2 times daily 30 g 4    QUEtiapine (SEROQUEL) 25 MG tablet 12.5mg by mouth in AM and afternoon and 25mg po at bedtime 45 tablet 11    rOPINIRole (REQUIP) 0.25 MG tablet TAKE 1 TABLET (0.25MG) BY MOUTH EVERY MORNING; TAKE 3 TABLETS (0.75MG) BY MOUTH EACH NIGHT AT BEDTIME; TAKE 1 TABLET (0.25MG) BY MOUTH EVERY AFTERNOON 140 tablet 11    senna-docusate (SENOKOT-S/PERICOLACE) 8.6-50 MG tablet 1 tab by mouth on M.W.F      traZODone (DESYREL) 100 MG tablet TAKE 1 TABLET (100 MG) BY MOUTH AT BEDTIME 30 tablet 11    vitamin B-12 (CYANOCOBALAMIN) 250 MCG tablet Take 250 mcg by mouth daily       Medications reviewed:  Medications reconciled to facility chart and changes were made to reflect current medications as identified as above med list. Below are the changes that were made:   Medications stopped since last EPIC medication reconciliation:    Medications Discontinued During This Encounter   Medication Reason    LORazepam (ATIVAN) 0.5 MG tablet Reorder (No AVS)    oxyCODONE (ROXICODONE) 5 MG tablet Reorder (No AVS)       Medications started since last [facility] medication reconciliation:  Orders Placed This Encounter   Medications    LORazepam (ATIVAN) 0.5 MG tablet     Sig: TAKE 1 TABLET BY MOUTH THREE TIMES DAILY; 2 TABLETS AT HS; TAKE 1 TABLET BY MOUTH ONCE DAILY IF NEEDED     Dispense:  120 tablet     Refill:  3    oxyCODONE (ROXICODONE) 5 MG tablet     Sig: Take 1 tablet (5 mg) by mouth at bedtime. May also take 1 tablet (5 mg) every 4 hours as needed for pain.     Dispense:  40 tablet     Refill:  0    mineral oil-white petrolatum (EUCERIN/MINERIN) cream     Sig: Apply topically to legs every day for dry skin     Dispense:  240 g     Refill:  3         REVIEW OF SYSTEMS:  4 point ROS neg other than the symptoms noted above in the HPI.  No complaints of acute chest pain or shortness of breath.  Her concern is the swelling in her feet.      PHYSICAL EXAM:  BP 99/62   Pulse 100   Temp 97.8  F (36.6  C)   Resp 17   Wt 97 kg (213 lb 13.4 oz)   LMP 06/15/1984 (Approximate)   SpO2 96%   BMI 35.58 kg/m    Ingrid is sitting upright in her recliner with a heating pad her right forearm as she states that is the only thing that helps with the discomfort in that arm.  Has gone to see orthopedics as well as doing a x-ray here at the facility.  She can move her left arm without any difficulties at the shoulder but then her right arm is limited at the shoulder  Ingrid uses a four-wheel walker for mobility.  She can move independently around with a walker.  Heart rate/rhythm is regular and strong.  From her ankles down to the tops of her feet she has +1 edema.  Her lower extremities are vague in nature because of her stature and obesity.  Skin is pink and dry but no open areas.  Wearing slip on slippers today    Lungs are clear to auscultation.  Does wear oxygen  at 2 L/min.  This is more of a security blanket for her as she is had some trauma with dying in the past and being brought back to life.  Abdomen is obese, soft, nontender to touch, states her bowel movements are soft and she is not straining.  Occasional blood found in her pad because of the prolapsed uterus but she states it is more from wiping than it dripping blood    Component      Latest Ref Rng 1/4/2024  6:11 AM   Sodium      135 - 145 mmol/L 138    Potassium      3.4 - 5.3 mmol/L 3.4    Chloride      98 - 107 mmol/L 95 (L)    Carbon Dioxide (CO2)      22 - 29 mmol/L 34 (H)    Anion Gap      7 - 15 mmol/L 9    Urea Nitrogen      8.0 - 23.0 mg/dL 12.7    Creatinine      0.51 - 0.95 mg/dL 0.78    GFR Estimate      >60 mL/min/1.73m2 76    Calcium      8.8 - 10.2 mg/dL 9.3    Glucose      70 - 99 mg/dL 103 (H)    TSH      0.30 - 4.20 uIU/mL 25.59 (H)    Hemoglobin      11.7 - 15.7 g/dL 9.9 (L)       No recent B/P's or weights    ASSESSMENT / PLAN:  (I50.22) Chronic systolic heart failure (H)  (primary encounter diagnosis)  (E66.01) Morbid obesity (H)  Comment: Even though Ingrid is pretty good about drinking fluids, do not want to increase her Lasix 40 mg twice a day.  Did give an order for TRENT stockings this week already.  Knee-high length that should be put on in the morning and off in the evening.  Encouraged her to elevate her legs above her heart.  Just had a BMP done yesterday and it was satisfactory.    (J44.9) COPD, mild (H)  Comment: Ingrid does not take any medications for this diagnosis.  She does take Flonase for her sinus passages but otherwise no other inhalers.  Has not had any acute exacerbations.  Oxygen is used for her heart failure.    (F41.1) JAIME (generalized anxiety disorder)  (F22) Paranoia (H)  (F33.1) Major depressive disorder, recurrent episode, moderate (H)  Comment: Prior to moving down to the memory care unit, Ingrid began struggling with isolation and further depression as she was in the  process of knowing she was losing her memory more more.  Anxiety was a big issue for her but then she started becoming paranoid as well.  Have not decreased any of her medications since she has been down here.  She seems to be more calm today and accepting of being in this unit because the door can stay open now and she can see what is out in the commons area as well as they can see what she is doing in her room.  Have seen her come out for meals as well as activities of her choice but other things she used to do she has no interest in like reading and even going out with family members to go eat or visit other people.  Plan: LORazepam (ATIVAN) 0.5 MG tablet      (G30.0,  F02.B3) Moderate early onset Alzheimer's dementia with mood disturbance (H)  Comment: Ingrid did go through the neurologist to be diagnosed with neurodegenerative disorder/Alzheimer's dementia.  She was placed on Aricept but then her hemoglobin was dropping unexpectedly.  Due to it having GI bleed effects, did discontinue the Aricept and put her on Namenda.  Seems to be tolerating the Namenda.  Admits that she still enjoys eating ice cream.  Would not expect her to do anything to help her obesity out as it can impede so many other diagnoses like osteoarthritis and her heart failure.  Ingrid has a strong history in her family of Alzheimer's disease that it is hard for family to watch her have this but they understand what is going on as they have seen it and there parents and aunts and uncles.  Do not think that she is ready for hospice at this point but has been a discussion in the past with family.    (E03.9) Acquired hypothyroidism  Comment: Ingrid's TSH level jumped up this last 4 weeks to be over 25.0.  Did bump up her levothyroxine yesterday to be 112 mcg daily with a TSH level in 4 weeks.    (L85.3) Dry skin  Comment: Giving orders today for Eucerin/mineral cream to apply to her lower extremities every day for dry skin.  Want to prevent open  sores or cracking of the skin.  Plan: mineral oil-white petrolatum (EUCERIN/MINERIN)         cream    (M15.9) Generalized osteoarthritis of multiple sites  Comment: Just reordering her oxycodone today from the pharmacy otherwise tolerating her oxycodone 5 mg at bedtime and then as needed dosing.  Plan: oxyCODONE (ROXICODONE) 5 MG tablet     Orders:  Eucerin/mineral cream topically to lower extremities daily for dry skin.    Electronically signed by  NORA Balderas CNP

## 2024-01-07 PROBLEM — F22 PARANOIA (H): Status: ACTIVE | Noted: 2024-01-01

## 2024-01-07 PROBLEM — G30.0 MODERATE EARLY ONSET ALZHEIMER'S DEMENTIA WITH MOOD DISTURBANCE (H): Status: ACTIVE | Noted: 2024-01-01

## 2024-01-07 PROBLEM — F02.B3 MODERATE EARLY ONSET ALZHEIMER'S DEMENTIA WITH MOOD DISTURBANCE (H): Status: ACTIVE | Noted: 2024-01-01

## 2024-01-11 NOTE — LETTER
2024       RE: Ingrid Powell  C/o Daljit Powell   [address] [facility] [address]       Dear Colleague,    Thank you for referring your patient, Ingrid Powell, to the [facility] [place] [facility]. Please see a copy of my visit note below.    Colon and Rectal Surgery Consult Clinic Note    Date: 2024     Referring provider:  Ary Sims, APRN CNP  [address] [place] [id number]     RE: Ingrid Powell  : 1945  JUANJO: 2024    Ingrid Powell is a very pleasant 78 year old female here for recurrent rectal prolapse.    HPI:  Has had a rectal prolapse for about 6 months. She is having bleeding with this. No pain. Does not go back in. No fecal incontinence per patient but daughter feels like there may be some. No leakage of mucous. No vaginal prolapse. Bowel movements are normal. She used to have straining with bowel movements. Having some diarrhea as she is trying to regulate her bowel movements. Is on a fiber supplement.  Had a rectopexy in  with Dr. Bueno.   History of appendectomy and cholecystectomy. No hysterectomy.  Has had two vaginal births. Biggest baby was 7 lb 10 oz. No tearing or episiotomy but one was breech.  PMH:  CHF, Alzheimer's, COPD, MI in  during angiogram, pacemaker.  Family notes that patient has been told she cannot have anesthesia    Physical Examination:  /74 (BP Location: Left arm, Patient Position: Sitting, Cuff Size: Adult Large)   Pulse 103   LMP 06/15/1984 (Approximate)   SpO2 94%         General: Alert, oriented, in no acute distress, sitting comfortably  HEENT: mucous membranes moist    Perianal external examination:  Perianal skin: Intact with no excoriation or lichenification.  Lesions: No evidence of an external lesion, nodularity, or induration in the perianal region.  Patient was examined after straining on the commode. Full  thickness rectal prolapse extending about 2 inches outside the anal verge    Assessment/Plan: 78 year old female with recurrent rectal prolapse with bleeding. History of MI and CHF and has been told that she cannot undergo anesthesia. Discussed with Dr. Oleary who is willing to discuss surgical options in clinic with her. Continue to avoid constipation and straining.     Medical history:  Past Medical History:   Diagnosis Date    Angina pectoris (H24) 5/2/2022    Arthritis     COPD (chronic obstructive pulmonary disease) (H)     Depressive disorder     Gastroesophageal reflux disease     History of lumbar surgery 7/28/2015    Hoarseness     HX of MALIGNANT NEOPL TONSIL 1/26/2012 12/2014: 3 years out. Sees Dr. Senior ENT at [facility]    Hypertension     MARGIE (obstructive sleep apnea), Severe     Oxygen dependent     at night    Reduced vision     Sleep apnea     Syncope     Syncope and collapse        Surgical history:  Past Surgical History:   Procedure Laterality Date    BACK SURGERY      CARPAL TUNNEL RELEASE RT/LT      ?side    CHOLECYSTECTOMY      COLONOSCOPY N/A 4/8/2021    Procedure: COLONOSCOPY;  Surgeon: Walter Liang MD;  [facility]    CV CORONARY ANGIOGRAM N/A 4/4/2022    Procedure: Coronary Angiogram;  Surgeon: Yu Valentine MD;  Location: [facility] CARDIAC CATH LAB    CV LEFT HEART CATH N/A 4/4/2022    Procedure: Left Heart Catheterization;  Surgeon: Yu Valentine MD;  Location:  HEART CARDIAC CATH LAB    CV RIGHT HEART CATH MEASUREMENTS RECORDED N/A 4/4/2022    Procedure: Right Heart Catheterization;  Surgeon: Yu Valentine MD;  Location:  HEART CARDIAC CATH LAB    CV TEMPORARY PACEMAKER INSERTION N/A 4/4/2022    Procedure: Temporary Pacemaker Insertion;  Surgeon: Yu Valentine MD;  Location:  HEART CARDIAC CATH LAB    EP PACEMAKER DEVICE & LEAD IMPLANT- RIGHT ATRIAL & LEFT VENTRICULAR Left 4/4/2022    Procedure: Pacemaker Device & Lead Implant- Right Atrial & Left  Ventricular;  Surgeon: Jose Fairbanks MD;  Location:  HEART CARDIAC CATH LAB    EYE SURGERY      cataracts    HERNIA REPAIR      INCISION AND DRAINAGE TRUNK, COMBINED  5/18/2012    Procedure:COMBINED INCISION AND DRAINAGE TRUNK; Incision and Drainage Abdominal Wall Abscess ; Surgeon:ALEXSANDER HANNON; Location:[facility] OR    INSERT PORT VASCULAR ACCESS  2/8/2012    Procedure:INSERT PORT VASCULAR ACCESS; Surgeon:MARY JANE GUAN; Location:[facility]    JOINT REPLACEMTN, KNEE RT/LT      bilateral    KNEE SURGERY  1995    left- total    KNEE SURGERY  2000    right- total    LAPAROSCOPIC APPENDECTOMY N/A 10/10/2015    Procedure: LAPAROSCOPIC APPENDECTOMY;  Surgeon: Daniel Chamberlain MD;  Location: [facility]    LARYNGOSCOPY, ESOPHAGOSCOPY,  BIOPSY, COMBINED  2/8/2012    Procedure:COMBINED LARYNGOSCOPY, ESOPHAGOSCOPY,  BIOPSY; Direct Laryngoscopy, Esophagoscopy with Biopsy, Stealth Assisted Left Frontal Sinus Biopsy via Vidal Incision, 8 [place] Power Port in right subclavian & Percutaneous Endoscopic Gastrostomy Placement * Latex Safe*; Surgeon:LIBORIO CAZARES; [facility]    left ankle fusion      OPTICAL TRACKING SYSTEM ENDOSCOPIC SINUS SURGERY  2/8/2012    Procedure:OPTICAL TRACKING SYSTEM ENDOSCOPIC SINUS SURGERY; Surgeon:LIBORIO CAZARES; Location:[facility] OR    RECTAL SURGERY      ? type    RELEASE DEQUERVAINS WRIST Left 5/11/2018    Procedure: RELEASE DEQUERVAINS WRIST;  Left 1st Distal compartment release;  Surgeon: Ronak Biggs MD;  Location: [facility]    REMOVE PORT VASCULAR ACCESS  8/21/2012    Procedure: REMOVE PORT VASCULAR ACCESS;  Remove Port Vascular Access ;  Surgeon: Alexsander Hannon MD;  Location: [facility] OR    REVERSE ARTHROPLASTY SHOULDER Left 11/6/2019    Procedure: Left Reverse Total shoulder arthroplasty;  Surgeon: Oliver Velázquez MD;  Location: [facility]       Problem list:    Patient Active Problem List    Diagnosis Date Noted    COPD, mild (H) 02/01/2012      Priority: High     8/16/13 Mild Obstruction on PFT      HTN (hypertension) 10/03/2011     Priority: High    Paranoia (H) 01/07/2024     Priority: Medium    Moderate early onset Alzheimer's dementia with mood disturbance (H) 01/07/2024     Priority: Medium    Folic acid deficiency 10/24/2023     Priority: Medium    Moderate major neurocognitive disorder due to Alzheimer's disease without behavioral disturbance (H) 02/27/2023     Priority: Medium    Coronary artery disease involving native coronary artery of native heart without angina pectoris 02/27/2023     Priority: Medium    Chronic systolic heart failure (H) 02/21/2023     Priority: Medium    JAIME (generalized anxiety disorder) 09/26/2022     Priority: Medium    Drug-induced constipation 07/08/2022     Priority: Medium    Morbid obesity (H) 10/12/2021     Priority: Medium    DJD of left shoulder 11/06/2019     Priority: Medium    Family history of breast cancer in mother 10/24/2016     Priority: Medium    Diverticulitis of colon 10/10/2016     Priority: Medium     Colonoscopy 3/10/2011      Benign neoplasm of gastrointestinal tract 10/10/2016     Priority: Medium     Polyp rectum       Hemorrhoids, internal 10/10/2016     Priority: Medium    Rectocele 10/10/2016     Priority: Medium    Tortuous colon 10/10/2016     Priority: Medium     Colonoscopy done 3/10/2011 by [facility]      Adenomatous colon polyp 09/07/2016     Priority: Medium    History of lumbar surgery 04/27/2016     Priority: Medium    Primary insomnia 04/27/2016     Priority: Medium    Lumbosacral radiculopathy at L3 04/27/2016     Priority: Medium    DDD (degenerative disc disease), lumbar 04/27/2016     Priority: Medium    Renal cyst 08/11/2015     Priority: Medium     2012, 13, on ct   Then on lumbar mri , 2 cm largest        Raynaud's syndrome 08/11/2015     Priority: Medium    Disturbance of salivary secretion (XEROSTOMIA) 07/23/2014     Priority: Medium     s/p cancer treatment-  stable      MARGIE (obstructive sleep apnea), Severe 10/31/2013     Priority: Medium     12/10/13 CPAP 6-15 cm H2O        Hypothyroidism 08/13/2013     Priority: Medium    Major depressive disorder, recurrent episode, moderate (HCC) 08/24/2012     Priority: Medium    Anxiety 08/24/2012     Priority: Medium    Hyperlipidemia LDL goal <130 10/03/2011     Priority: Medium       Medications:  Current Outpatient Medications   Medication Sig Dispense Refill    acetaminophen (TYLENOL) 650 MG CR tablet 1300mg po every AM & PM and 1300mg po daily as needed 120 tablet 4    artificial tears (GENTEAL) 0.1-0.2-0.3 % ophthalmic solution INSTILL ONE DROP INTO BOTH EYES TWICE DAILY 15 mL 4    busPIRone (BUSPAR) 10 MG tablet TAKE 1 TABLET (10 MG) BY MOUTH 3 TIMES DAILY 90 tablet 11    cholecalciferol (VITAMIN D3) 25 mcg (1000 units) capsule Take 1 capsule by mouth daily      cyanocobalamin (VITAMIN B-12) 100 MCG tablet Take 1 tablet (100 mcg) by mouth daily 30 tablet 11    ferrous sulfate (FEROSUL) 325 (65 Fe) MG tablet Take 1 tablet (325 mg) by mouth daily (with breakfast)      FLUoxetine (PROZAC) 10 MG capsule Take 1 capsule (10 mg) by mouth daily 30 capsule 4    fluticasone (FLONASE) 50 MCG/ACT nasal spray USE 2 SPRAYS INTO BOTH NOSTRILS ONCE DAILY 16 g 11    folic acid (FOLVITE) 1 MG tablet Take 1 tablet (1 mg) by mouth daily 30 tablet 11    furosemide (LASIX) 20 MG tablet Take 2 tablets (40 mg) by mouth 2 times daily 60 tablet 11    gabapentin (NEURONTIN) 600 MG tablet TAKE 1 TABLET (600 MG) BY MOUTH AT BEDTIME 31 tablet 11    hypromellose-dextran (ARTIFICAL TEARS) 0.1-0.3 % ophthalmic solution Place 1 drop into both eyes 2 times daily 15 mL 0    levothyroxine (SYNTHROID/LEVOTHROID) 112 MCG tablet Take 1 tablet (112 mcg) by mouth daily 30 tablet 4    LORazepam (ATIVAN) 0.5 MG tablet TAKE 1 TABLET BY MOUTH THREE TIMES DAILY; 2 TABLETS AT HS; TAKE 1 TABLET BY MOUTH ONCE DAILY IF NEEDED 120 tablet 3    memantine (NAMENDA) 5 MG tablet  5mg po every HS x7 day, 5mg po twice a day x7 days, 5mg in AM and 10mg in PM x7 days then 10mg po twice a day 70 tablet 4    Menthol, Topical Analgesic, (BIOFREEZE) 4 % GEL Dime size gel to elbows and neck at HS and then TID prn 150 mL 11    mineral oil-white petrolatum (EUCERIN/MINERIN) cream Apply topically to legs every day for dry skin 240 g 3    nitroGLYcerin (NITROSTAT) 0.4 MG sublingual tablet For chest pain place 1 tablet under the tongue every 5 minutes for 3 doses. If symptoms persist 5 minutes after 1st dose call 911. 6 tablet 4    nystatin (MYCOSTATIN) 976128 UNIT/GM external powder Apply topically 2 times daily 30 g 4    oxyCODONE (ROXICODONE) 5 MG tablet Take 1 tablet (5 mg) by mouth at bedtime. May also take 1 tablet (5 mg) every 4 hours as needed for pain. 40 tablet 0    QUEtiapine (SEROQUEL) 25 MG tablet 12.5mg by mouth in AM and afternoon and 25mg po at bedtime 45 tablet 11    rOPINIRole (REQUIP) 0.25 MG tablet TAKE 1 TABLET (0.25MG) BY MOUTH EVERY MORNING; TAKE 3 TABLETS (0.75MG) BY MOUTH EACH NIGHT AT BEDTIME; TAKE 1 TABLET (0.25MG) BY MOUTH EVERY AFTERNOON 140 tablet 11    senna-docusate (SENOKOT-S/PERICOLACE) 8.6-50 MG tablet 1 tab by mouth on ..      traZODone (DESYREL) 100 MG tablet TAKE 1 TABLET (100 MG) BY MOUTH AT BEDTIME 30 tablet 11    vitamin B-12 (CYANOCOBALAMIN) 250 MCG tablet Take 250 mcg by mouth daily         Allergies:  Allergies   Allergen Reactions    Dilaudid [Hydromorphone Hcl] Visual Disturbance     Hallucinations    Fosamax [Alendronate Sodium] Rash           Norvasc [Amlodipine Besylate] Hives and Rash    Tegretol [Carbamazepine] Hives and Rash       Family history:  Family History   Problem Relation Age of Onset    Breast Cancer Mother     Arthritis Mother     Cancer Mother     Heart Disease Father          at 72 of heart failure    Emphysema Father     Arthritis Sister     Skin Cancer Sister     Chronic Bronchitis Sister     Arthritis Brother     Pancreatic Cancer  Brother     Emphysema Brother     Asthma Brother     Cancer Other         uncle pancreatic/grandmother- colon/aunt ? mat or pat  breast cancer       Social history:  Social History     Tobacco Use    Smoking status: Former     Packs/day: 0.50     Years: 35.00     Additional pack years: 0.00     Total pack years: 17.50     Types: Cigarettes     Quit date: 10/3/1995     Years since quittin.2    Smokeless tobacco: Never   Substance Use Topics    Alcohol use: Yes     Comment: rare    Marital status: .    Nursing Notes:   Hiro Oliveira, EMT  2024  1:17 PM  Signed  Chief Complaint   Patient presents with    Consult       Vitals:    24 1314   BP: 137/74   BP Location: Left arm   Patient Position: Sitting   Cuff Size: Adult Large   Pulse: 103   SpO2: 94%       There is no height or weight on file to calculate BMI.    Hiro Oliveira EMT-P       30 minutes spent on the date of encounter performing chart review, history and exam, documentation and further activities as noted above      This note was created using speech recognition software and may contain unintended word substitutions.        Again, thank you for allowing me to participate in the care of your patient.      Sincerely,    NORA Yoon CNP

## 2024-01-11 NOTE — PROGRESS NOTES
Colon and Rectal Surgery Consult Clinic Note    Date: 2024     Referring provider:  Ary Sims, APRN CNP  [address] [place], [place] [id number]     RE: Ingrid Powell  : 1945  JUANJO: 2024    Ingrid Powell is a very pleasant 78 year old female here for recurrent rectal prolapse.    HPI:  Has had a rectal prolapse for about 6 months. She is having bleeding with this. No pain. Does not go back in. No fecal incontinence per patient but daughter feels like there may be some. No leakage of mucous. No vaginal prolapse. Bowel movements are normal. She used to have straining with bowel movements. Having some diarrhea as she is trying to regulate her bowel movements. Is on a fiber supplement.  Had a rectopexy in  with Dr. Bueno.   History of appendectomy and cholecystectomy. No hysterectomy.  Has had two vaginal births. Biggest baby was 7 lb 10 oz. No tearing or episiotomy but one was breech.  PMH:  CHF, Alzheimer's, COPD, MI in  during angiogram, pacemaker.  Family notes that patient has been told she cannot have anesthesia    Physical Examination:  /74 (BP Location: Left arm, Patient Position: Sitting, Cuff Size: Adult Large)   Pulse 103   LMP 06/15/1984 (Approximate)   SpO2 94%         General: Alert, oriented, in no acute distress, sitting comfortably  HEENT: mucous membranes moist    Perianal external examination:  Perianal skin: Intact with no excoriation or lichenification.  Lesions: No evidence of an external lesion, nodularity, or induration in the perianal region.  Patient was examined after straining on the commode. Full thickness rectal prolapse extending about 2 inches outside the anal verge    Assessment/Plan: 78 year old female with recurrent rectal prolapse with bleeding. History of MI and CHF and has been told that she cannot undergo anesthesia. Discussed with Dr. Oleary who is willing to discuss surgical options in clinic with her. Continue to avoid  constipation and straining.     Medical history:  Past Medical History:   Diagnosis Date    Angina pectoris (H24) 5/2/2022    Arthritis     COPD (chronic obstructive pulmonary disease) (H)     Depressive disorder     Gastroesophageal reflux disease     History of lumbar surgery 7/28/2015    Hoarseness     HX of MALIGNANT NEOPL TONSIL 1/26/2012 12/2014: 3 years out. Sees Dr. Senior ENT at [facility]    Hypertension     MARGIE (obstructive sleep apnea), Severe     Oxygen dependent     at night    Reduced vision     Sleep apnea     Syncope     Syncope and collapse        Surgical history:  Past Surgical History:   Procedure Laterality Date    BACK SURGERY      CARPAL TUNNEL RELEASE RT/LT      ?side    CHOLECYSTECTOMY      COLONOSCOPY N/A 4/8/2021    Procedure: COLONOSCOPY;  Surgeon: Walter Liang MD;  Location: [facility]    CV CORONARY ANGIOGRAM N/A 4/4/2022    Procedure: Coronary Angiogram;  Surgeon: Yu Valentine MD;  Location:  HEART CARDIAC CATH LAB    CV LEFT HEART CATH N/A 4/4/2022    Procedure: Left Heart Catheterization;  Surgeon: Yu Valentine MD;  Location:  HEART CARDIAC CATH LAB    CV RIGHT HEART CATH MEASUREMENTS RECORDED N/A 4/4/2022    Procedure: Right Heart Catheterization;  Surgeon: Yu Valentine MD;  Location:  HEART CARDIAC CATH LAB    CV TEMPORARY PACEMAKER INSERTION N/A 4/4/2022    Procedure: Temporary Pacemaker Insertion;  Surgeon: Yu Valentine MD;  Location: [facility] CARDIAC CATH LAB    EP PACEMAKER DEVICE & LEAD IMPLANT- RIGHT ATRIAL & LEFT VENTRICULAR Left 4/4/2022    Procedure: Pacemaker Device & Lead Implant- Right Atrial & Left Ventricular;  Surgeon: Jose Fairbanks MD;  Location:  HEART CARDIAC CATH LAB    EYE SURGERY      cataracts    HERNIA REPAIR      INCISION AND DRAINAGE TRUNK, COMBINED  5/18/2012    Procedure:COMBINED INCISION AND DRAINAGE TRUNK; Incision and Drainage Abdominal Wall Abscess ; Surgeon:ALEXSANDER HANNON; Location:[facility] OR    INSERT PORT  VASCULAR ACCESS  2/8/2012    Procedure:INSERT PORT VASCULAR ACCESS; Surgeon:MARY JANE GUAN; Location:[facility] OR    JOINT REPLACEMTN, KNEE RT/LT      bilateral    KNEE SURGERY  1995    left- total    KNEE SURGERY  2000    right- total    LAPAROSCOPIC APPENDECTOMY N/A 10/10/2015    Procedure: LAPAROSCOPIC APPENDECTOMY;  Surgeon: Daniel Chamberlain MD;  Location: [facility]    LARYNGOSCOPY, ESOPHAGOSCOPY,  BIOPSY, COMBINED  2/8/2012    Procedure:COMBINED LARYNGOSCOPY, ESOPHAGOSCOPY,  BIOPSY; Direct Laryngoscopy, Esophagoscopy with Biopsy, Stealth Assisted Left Frontal Sinus Biopsy via Vidal Incision, 8 Maori Power Port in right subclavian & Percutaneous Endoscopic Gastrostomy Placement * Latex Safe*; Surgeon:LIBORIO CAZARES; Location:[facility] OR    left ankle fusion      OPTICAL TRACKING SYSTEM ENDOSCOPIC SINUS SURGERY  2/8/2012    Procedure:OPTICAL TRACKING SYSTEM ENDOSCOPIC SINUS SURGERY; Surgeon:LIBORIO CAZARES; Location:[facility] OR    RECTAL SURGERY      ? type    RELEASE DEQUERVAINS WRIST Left 5/11/2018    Procedure: RELEASE DEQUERVAINS WRIST;  Left 1st Distal compartment release;  Surgeon: Ronak Biggs MD;  Location: [facility]    REMOVE PORT VASCULAR ACCESS  8/21/2012    Procedure: REMOVE PORT VASCULAR ACCESS;  Remove Port Vascular Access ;  Surgeon: Phillip Ye MD;  Location: [facility] OR    REVERSE ARTHROPLASTY SHOULDER Left 11/6/2019    Procedure: Left Reverse Total shoulder arthroplasty;  Surgeon: Oliver Velázquez MD;  Location: [facility]       Problem list:    Patient Active Problem List    Diagnosis Date Noted    COPD, mild (H) 02/01/2012     Priority: High     8/16/13 Mild Obstruction on PFT      HTN (hypertension) 10/03/2011     Priority: High    Paranoia (H) 01/07/2024     Priority: Medium    Moderate early onset Alzheimer's dementia with mood disturbance (H) 01/07/2024     Priority: Medium    Folic acid deficiency 10/24/2023     Priority: Medium    Moderate major neurocognitive disorder  due to Alzheimer's disease without behavioral disturbance (H) 02/27/2023     Priority: Medium    Coronary artery disease involving native coronary artery of native heart without angina pectoris 02/27/2023     Priority: Medium    Chronic systolic heart failure (H) 02/21/2023     Priority: Medium    JAIME (generalized anxiety disorder) 09/26/2022     Priority: Medium    Drug-induced constipation 07/08/2022     Priority: Medium    Morbid obesity (H) 10/12/2021     Priority: Medium    DJD of left shoulder 11/06/2019     Priority: Medium    Family history of breast cancer in mother 10/24/2016     Priority: Medium    Diverticulitis of colon 10/10/2016     Priority: Medium     Colonoscopy 3/10/2011      Benign neoplasm of gastrointestinal tract 10/10/2016     Priority: Medium     Polyp rectum       Hemorrhoids, internal 10/10/2016     Priority: Medium    Rectocele 10/10/2016     Priority: Medium    Tortuous colon 10/10/2016     Priority: Medium     Colonoscopy done 3/10/2011 by [facility]      Adenomatous colon polyp 09/07/2016     Priority: Medium    History of lumbar surgery 04/27/2016     Priority: Medium    Primary insomnia 04/27/2016     Priority: Medium    Lumbosacral radiculopathy at L3 04/27/2016     Priority: Medium    DDD (degenerative disc disease), lumbar 04/27/2016     Priority: Medium    Renal cyst 08/11/2015     Priority: Medium     2012, 13, on ct   Then on lumbar mri , 2 cm largest        Raynaud's syndrome 08/11/2015     Priority: Medium    Disturbance of salivary secretion (XEROSTOMIA) 07/23/2014     Priority: Medium     s/p cancer treatment- stable      MARGIE (obstructive sleep apnea), Severe 10/31/2013     Priority: Medium     12/10/13 CPAP 6-15 cm H2O        Hypothyroidism 08/13/2013     Priority: Medium    Major depressive disorder, recurrent episode, moderate (HCC) 08/24/2012     Priority: Medium    Anxiety 08/24/2012     Priority: Medium    Hyperlipidemia LDL goal <130 10/03/2011      Priority: Medium       Medications:  Current Outpatient Medications   Medication Sig Dispense Refill    acetaminophen (TYLENOL) 650 MG CR tablet 1300mg po every AM & PM and 1300mg po daily as needed 120 tablet 4    artificial tears (GENTEAL) 0.1-0.2-0.3 % ophthalmic solution INSTILL ONE DROP INTO BOTH EYES TWICE DAILY 15 mL 4    busPIRone (BUSPAR) 10 MG tablet TAKE 1 TABLET (10 MG) BY MOUTH 3 TIMES DAILY 90 tablet 11    cholecalciferol (VITAMIN D3) 25 mcg (1000 units) capsule Take 1 capsule by mouth daily      cyanocobalamin (VITAMIN B-12) 100 MCG tablet Take 1 tablet (100 mcg) by mouth daily 30 tablet 11    ferrous sulfate (FEROSUL) 325 (65 Fe) MG tablet Take 1 tablet (325 mg) by mouth daily (with breakfast)      FLUoxetine (PROZAC) 10 MG capsule Take 1 capsule (10 mg) by mouth daily 30 capsule 4    fluticasone (FLONASE) 50 MCG/ACT nasal spray USE 2 SPRAYS INTO BOTH NOSTRILS ONCE DAILY 16 g 11    folic acid (FOLVITE) 1 MG tablet Take 1 tablet (1 mg) by mouth daily 30 tablet 11    furosemide (LASIX) 20 MG tablet Take 2 tablets (40 mg) by mouth 2 times daily 60 tablet 11    gabapentin (NEURONTIN) 600 MG tablet TAKE 1 TABLET (600 MG) BY MOUTH AT BEDTIME 31 tablet 11    hypromellose-dextran (ARTIFICAL TEARS) 0.1-0.3 % ophthalmic solution Place 1 drop into both eyes 2 times daily 15 mL 0    levothyroxine (SYNTHROID/LEVOTHROID) 112 MCG tablet Take 1 tablet (112 mcg) by mouth daily 30 tablet 4    LORazepam (ATIVAN) 0.5 MG tablet TAKE 1 TABLET BY MOUTH THREE TIMES DAILY; 2 TABLETS AT HS; TAKE 1 TABLET BY MOUTH ONCE DAILY IF NEEDED 120 tablet 3    memantine (NAMENDA) 5 MG tablet 5mg po every HS x7 day, 5mg po twice a day x7 days, 5mg in AM and 10mg in PM x7 days then 10mg po twice a day 70 tablet 4    Menthol, Topical Analgesic, (BIOFREEZE) 4 % GEL Dime size gel to elbows and neck at HS and then TID prn 150 mL 11    mineral oil-white petrolatum (EUCERIN/MINERIN) cream Apply topically to legs every day for dry skin 240 g 3     nitroGLYcerin (NITROSTAT) 0.4 MG sublingual tablet For chest pain place 1 tablet under the tongue every 5 minutes for 3 doses. If symptoms persist 5 minutes after 1st dose call 911. 6 tablet 4    nystatin (MYCOSTATIN) 571749 UNIT/GM external powder Apply topically 2 times daily 30 g 4    oxyCODONE (ROXICODONE) 5 MG tablet Take 1 tablet (5 mg) by mouth at bedtime. May also take 1 tablet (5 mg) every 4 hours as needed for pain. 40 tablet 0    QUEtiapine (SEROQUEL) 25 MG tablet 12.5mg by mouth in AM and afternoon and 25mg po at bedtime 45 tablet 11    rOPINIRole (REQUIP) 0.25 MG tablet TAKE 1 TABLET (0.25MG) BY MOUTH EVERY MORNING; TAKE 3 TABLETS (0.75MG) BY MOUTH EACH NIGHT AT BEDTIME; TAKE 1 TABLET (0.25MG) BY MOUTH EVERY AFTERNOON 140 tablet 11    senna-docusate (SENOKOT-S/PERICOLACE) 8.6-50 MG tablet 1 tab by mouth on       traZODone (DESYREL) 100 MG tablet TAKE 1 TABLET (100 MG) BY MOUTH AT BEDTIME 30 tablet 11    vitamin B-12 (CYANOCOBALAMIN) 250 MCG tablet Take 250 mcg by mouth daily         Allergies:  Allergies   Allergen Reactions    Dilaudid [Hydromorphone Hcl] Visual Disturbance     Hallucinations    Fosamax [Alendronate Sodium] Rash           Norvasc [Amlodipine Besylate] Hives and Rash    Tegretol [Carbamazepine] Hives and Rash       Family history:  Family History   Problem Relation Age of Onset    Breast Cancer Mother     Arthritis Mother     Cancer Mother     Heart Disease Father          at 72 of heart failure    Emphysema Father     Arthritis Sister     Skin Cancer Sister     Chronic Bronchitis Sister     Arthritis Brother     Pancreatic Cancer Brother     Emphysema Brother     Asthma Brother     Cancer Other         uncle pancreatic/grandmother- colon/aunt ? mat or pat  breast cancer       Social history:  Social History     Tobacco Use    Smoking status: Former     Packs/day: 0.50     Years: 35.00     Additional pack years: 0.00     Total pack years: 17.50     Types: Cigarettes     Quit  date: 10/3/1995     Years since quittin.2    Smokeless tobacco: Never   Substance Use Topics    Alcohol use: Yes     Comment: rare    Marital status: .    Nursing Notes:   Hiro Oliveira, EMT  2024  1:17 PM  Signed  Chief Complaint   Patient presents with    Consult       Vitals:    24 1314   BP: 137/74   BP Location: Left arm   Patient Position: Sitting   Cuff Size: Adult Large   Pulse: 103   SpO2: 94%       There is no height or weight on file to calculate BMI.    Hiro Oliveira EMT-P       30 minutes spent on the date of encounter performing chart review, history and exam, documentation and further activities as noted above    NORA Sage, NP-C  Colon and Rectal Surgery   [facility]    This note was created using speech recognition software and may contain unintended word substitutions.

## 2024-01-11 NOTE — NURSING NOTE
Chief Complaint   Patient presents with    Consult       Vitals:    01/11/24 1314   BP: 137/74   BP Location: Left arm   Patient Position: Sitting   Cuff Size: Adult Large   Pulse: 103   SpO2: 94%       There is no height or weight on file to calculate BMI.    Hiro Oliveira EMT-P

## 2024-01-12 NOTE — PROGRESS NOTES
Colon and Rectal Surgery Clinic Note    RE: Ingrid Powell.  : 1945.  JUANJO: 2024.    Reason for visit: rectal prolapse.    HPI: Ingrid Powell is a 78 year old female who presents today for rectal prolapse. She has a past medical history of arthritis, hypothyroidism, Raynaud's, HLD, CHF, CAD s/p pacemaker (2022) with an EF of 12%, COPD, depression, GERD, tonsil neoplasm in  (s/p resection, XRT, chemo with carbo/taxol), HTN, Alzheimer's (on a memory care unit), lower extremity edema on 40mg of lasix, and MARGIE. Her last colonoscopy was 2021 which only demonstrated a few diverticula in the sigmoid colon.     Today Ingrid feels fine, she is minimally interactive during the visit. Her sister is here with her and her daughter Lida is on the phone.    Hemoglobin trend:    Component  Ref Range & Units 8 d ago  (24) 2 wk ago  (23) 1 mo ago  (23) 1 mo ago  (23) 2 mo ago  (23) 2 mo ago  (10/19/23) 3 mo ago  (10/5/23)     Hemoglobin  11.7 - 15.7 g/dL 9.9 Low  9.6 Low  9.6 Low  8.9 Low  8.8 Low  9.1 Low  9.8 Low        Colonoscopy (2021):  Findings:       The perianal and digital rectal examinations were normal.        A few small-mouthed diverticula were found in the sigmoid colon.        The exam was otherwise normal throughout the examined colon.        There is no endoscopic evidence of mass or polyps in the entire colon.        The retroflexed view of the distal rectum and anal verge was normal and        showed no anal or rectal abnormalities.                                                                                    Impression:          - Diverticulosis in the sigmoid colon.                        - The distal rectum and anal verge are normal on                        retroflexion view.                        - No specimens collected.     ECHO (2022):  Interpretation Summary     Left ventricular systolic function is severely reduced.  Biplane LVEF is  12%.  There is severe global hypokinesia of the left ventricle.  Small pericardial effusion. This is unchanged compared to prior study from  earlier today.  EF lower compared to prior echo from 1.48pm. The study was technically  difficult.    Exam (2024):      Medical history:  Arthiritis   COPD  Depression   GERD  Tonsil neoplasm   HTN   MARGIE  Hypothyroidism   CAD   Pacemaker  HLD  Raynaud's  Alzheimer  Lower extremity edema     Surgical history:  Cholecystectomy   Coronary angiogram with pacemaker in   Left and right knee replacement   Laparoscopic appendectomy in       Family history:  Family History   Problem Relation Age of Onset    Breast Cancer Mother     Arthritis Mother     Cancer Mother     Heart Disease Father          at 72 of heart failure    Emphysema Father     Arthritis Sister     Skin Cancer Sister     Chronic Bronchitis Sister     Arthritis Brother     Pancreatic Cancer Brother     Emphysema Brother     Asthma Brother     Cancer Other         uncle pancreatic/grandmother- colon/aunt ? mat or pat  breast cancer       Medications:  Current Outpatient Medications   Medication Sig Dispense Refill    acetaminophen (TYLENOL) 650 MG CR tablet 1300mg po every AM & PM and 1300mg po daily as needed 120 tablet 4    artificial tears (GENTEAL) 0.1-0.2-0.3 % ophthalmic solution INSTILL ONE DROP INTO BOTH EYES TWICE DAILY 15 mL 4    busPIRone (BUSPAR) 10 MG tablet TAKE 1 TABLET (10 MG) BY MOUTH 3 TIMES DAILY 90 tablet 11    cholecalciferol (VITAMIN D3) 25 mcg (1000 units) capsule Take 1 capsule by mouth daily      cyanocobalamin (VITAMIN B-12) 100 MCG tablet Take 1 tablet (100 mcg) by mouth daily 30 tablet 11    ferrous sulfate (FEROSUL) 325 (65 Fe) MG tablet Take 1 tablet (325 mg) by mouth daily (with breakfast)      FLUoxetine (PROZAC) 10 MG capsule Take 1 capsule (10 mg) by mouth daily 30 capsule 4    fluticasone (FLONASE) 50 MCG/ACT nasal spray USE 2 SPRAYS INTO BOTH NOSTRILS ONCE DAILY 16 g  11    folic acid (FOLVITE) 1 MG tablet Take 1 tablet (1 mg) by mouth daily 30 tablet 11    furosemide (LASIX) 20 MG tablet Take 2 tablets (40 mg) by mouth 2 times daily 60 tablet 11    gabapentin (NEURONTIN) 600 MG tablet TAKE 1 TABLET (600 MG) BY MOUTH AT BEDTIME 31 tablet 11    hypromellose-dextran (ARTIFICAL TEARS) 0.1-0.3 % ophthalmic solution Place 1 drop into both eyes 2 times daily 15 mL 0    levothyroxine (SYNTHROID/LEVOTHROID) 112 MCG tablet Take 1 tablet (112 mcg) by mouth daily 30 tablet 4    LORazepam (ATIVAN) 0.5 MG tablet TAKE 1 TABLET BY MOUTH THREE TIMES DAILY; 2 TABLETS AT HS; TAKE 1 TABLET BY MOUTH ONCE DAILY IF NEEDED 120 tablet 3    memantine (NAMENDA) 5 MG tablet 5mg po every HS x7 day, 5mg po twice a day x7 days, 5mg in AM and 10mg in PM x7 days then 10mg po twice a day 70 tablet 4    Menthol, Topical Analgesic, (BIOFREEZE) 4 % GEL Dime size gel to elbows and neck at HS and then TID prn 150 mL 11    mineral oil-white petrolatum (EUCERIN/MINERIN) cream Apply topically to legs every day for dry skin 240 g 3    nitroGLYcerin (NITROSTAT) 0.4 MG sublingual tablet For chest pain place 1 tablet under the tongue every 5 minutes for 3 doses. If symptoms persist 5 minutes after 1st dose call 911. 6 tablet 4    nystatin (MYCOSTATIN) 893847 UNIT/GM external powder Apply topically 2 times daily 30 g 4    oxyCODONE (ROXICODONE) 5 MG tablet Take 1 tablet (5 mg) by mouth at bedtime. May also take 1 tablet (5 mg) every 4 hours as needed for pain. 40 tablet 0    QUEtiapine (SEROQUEL) 25 MG tablet 12.5mg by mouth in AM and afternoon and 25mg po at bedtime 45 tablet 11    rOPINIRole (REQUIP) 0.25 MG tablet TAKE 1 TABLET (0.25MG) BY MOUTH EVERY MORNING; TAKE 3 TABLETS (0.75MG) BY MOUTH EACH NIGHT AT BEDTIME; TAKE 1 TABLET (0.25MG) BY MOUTH EVERY AFTERNOON 140 tablet 11    senna-docusate (SENOKOT-S/PERICOLACE) 8.6-50 MG tablet 1 tab by mouth on M.W.F      traZODone (DESYREL) 100 MG tablet TAKE 1 TABLET (100 MG) BY  "MOUTH AT BEDTIME 30 tablet 11    vitamin B-12 (CYANOCOBALAMIN) 250 MCG tablet Take 250 mcg by mouth daily         Allergies:  Allergies   Allergen Reactions    Dilaudid [Hydromorphone Hcl] Visual Disturbance     Hallucinations    Fosamax [Alendronate Sodium] Rash           Norvasc [Amlodipine Besylate] Hives and Rash    Tegretol [Carbamazepine] Hives and Rash       Social history:   Social History     Tobacco Use    Smoking status: Former     Packs/day: 0.50     Years: 35.00     Additional pack years: 0.00     Total pack years: 17.50     Types: Cigarettes     Quit date: 10/3/1995     Years since quittin.3    Smokeless tobacco: Never   Substance Use Topics    Alcohol use: Yes     Comment: rare     Marital status: .    ROS:  A complete review of systems was performed with the patient and all systems negative except as per HPI.    Physical Examination:  Exam was chaperoned by Hiro Oliveira, EMT-P  BP (!) 145/74 (BP Location: Left arm, Patient Position: Sitting, Cuff Size: Adult Large)   Pulse 94   LMP 06/15/1984 (Approximate)   SpO2 93%   General: Well hydrated. No acute distress.  CV: RRR  Lung: Non-labored breathing on RA  Abdomen: Soft, NT. No inguinal adenopathy palpated.  Perianal external examination:  Perianal skin: intact.  Lesions: multiple traumatic pseudo-polyps on the rectal segment that is prolapsed.  Eversion of buttocks: There was not evidence of an anal fissure. Details: N/A.  Skin tags or external hemorrhoids: No.  Digital rectal examination: Was performed.   Sphincter tone: Poor.  Palpable lesions: No.  Other: None.      Procedures:  Flexible sigmoidoscopy: after obtaining informed consent and performing a \"time out\", an adult flexible sigmoidoscope was introduced through the anus and passed up to the mid sigmoid colon. The quality of the prep was fair. Findings: multiple traumatic pseudo-polyps. There was no active ulceration, inflammation or bleeding. No additional abnormalities were " seen. Total scope time: 5 minutes. The patient tolerated the procedure well.  .    ASSESSMENT    This is a 78 year old F with significant comorbidities, now with symptomatic rectal prolapse. I had a honest conversation with the patient and her family. Risks, benefits, and alternatives of operative treatment were thoroughly discussed with them. They would like more time to think about the options.     All pertinent labs and imaging were personally reviewed by me.      PLAN  - Start a daily fiber supplement such as Citrucel or Metamucil. Start with once a day and slowly increase up to three times a day, if needed, over the next 4-6 weeks  - Prolapse should be reduced when able. They were educated on the risk of incarceration.  - RTC when ready to discuss next steps      45 minutes spent on the date of the encounter doing chart review, history and exam, imaging review, documentation and further activities as noted above, excluding the procedure.      Antonio Oleary MD    Division of Colon and Rectal Surgery  [facility]    Referring Provider:  NORA Becker CNP  [address] [place] [address]     Primary Care Provider:  Elsa Jeffries

## 2024-01-12 NOTE — PROGRESS NOTES
[facility] Care Coordination Contact    CC received notification of Emergency Room visit.  ER visit occurred on 1/12/24 at [facility] with Dx of fall with right upper arm pain.    CC contacted  assisted living nurse Blanca  and reviewed discharge summary.  Member has a follow-up appointment with PCP: Yes: scheduled on next week NP will see her  Member has had a change in condition: No  New referrals placed: No  Home Visit Needed: No  Care plan reviewed and updated.  PCP notified of ED visit via EMR.    Angela Reid RN, [facility]  [phone]

## 2024-01-12 NOTE — ED PROVIDER NOTES
ED Provider Note  [facility]      History     Chief Complaint   Patient presents with    Fall     HPI  Ingrid Powell is a 78 year old female who has history significant for dementia, residing in memory care unit, presenting the emergency department with concerns regarding right arm pain in the context of a fall.  Patient provides history information.  States that she was going to the bathroom, and subsequently got up from the toilet, lost her balance, and fell, hitting the shower.  Patient did not hit her head.  There was no loss of consciousness.  Currently only complaining of right arm pain.  No injury elsewhere.        Independent Historian:        Review of External Notes:          Allergies:  Allergies   Allergen Reactions    Dilaudid [Hydromorphone Hcl] Visual Disturbance     Hallucinations    Fosamax [Alendronate Sodium] Rash           Norvasc [Amlodipine Besylate] Hives and Rash    Tegretol [Carbamazepine] Hives and Rash       Problem List:    Patient Active Problem List    Diagnosis Date Noted    COPD, mild (H) 02/01/2012     Priority: High     8/16/13 Mild Obstruction on PFT      HTN (hypertension) 10/03/2011     Priority: High    Paranoia (H) 01/07/2024     Priority: Medium    Moderate early onset Alzheimer's dementia with mood disturbance (H) 01/07/2024     Priority: Medium    Folic acid deficiency 10/24/2023     Priority: Medium    Moderate major neurocognitive disorder due to Alzheimer's disease without behavioral disturbance (H) 02/27/2023     Priority: Medium    Coronary artery disease involving native coronary artery of native heart without angina pectoris 02/27/2023     Priority: Medium    Chronic systolic heart failure (H) 02/21/2023     Priority: Medium    JAIME (generalized anxiety disorder) 09/26/2022     Priority: Medium    Drug-induced constipation 07/08/2022     Priority: Medium    Morbid obesity (H) 10/12/2021     Priority: Medium    DJD of left shoulder  11/06/2019     Priority: Medium    Family history of breast cancer in mother 10/24/2016     Priority: Medium    Diverticulitis of colon 10/10/2016     Priority: Medium     Colonoscopy 3/10/2011      Benign neoplasm of gastrointestinal tract 10/10/2016     Priority: Medium     Polyp rectum       Hemorrhoids, internal 10/10/2016     Priority: Medium    Rectocele 10/10/2016     Priority: Medium    Tortuous colon 10/10/2016     Priority: Medium     Colonoscopy done 3/10/2011 by [facility]      Adenomatous colon polyp 09/07/2016     Priority: Medium    History of lumbar surgery 04/27/2016     Priority: Medium    Primary insomnia 04/27/2016     Priority: Medium    Lumbosacral radiculopathy at L3 04/27/2016     Priority: Medium    DDD (degenerative disc disease), lumbar 04/27/2016     Priority: Medium    Renal cyst 08/11/2015     Priority: Medium     2012, 13, on ct   Then on lumbar mri , 2 cm largest        Raynaud's syndrome 08/11/2015     Priority: Medium    Disturbance of salivary secretion (XEROSTOMIA) 07/23/2014     Priority: Medium     s/p cancer treatment- stable      MARGIE (obstructive sleep apnea), Severe 10/31/2013     Priority: Medium     12/10/13 CPAP 6-15 cm H2O        Hypothyroidism 08/13/2013     Priority: Medium    Major depressive disorder, recurrent episode, moderate (HCC) 08/24/2012     Priority: Medium    Anxiety 08/24/2012     Priority: Medium    Hyperlipidemia LDL goal <130 10/03/2011     Priority: Medium        Past Medical History:    Past Medical History:   Diagnosis Date    Angina pectoris (H24) 5/2/2022    Arthritis     COPD (chronic obstructive pulmonary disease) (H)     Depressive disorder     Gastroesophageal reflux disease     History of lumbar surgery 7/28/2015    Hoarseness     HX of MALIGNANT NEOPL TONSIL 1/26/2012    Hypertension     MARGIE (obstructive sleep apnea), Severe     Oxygen dependent     Reduced vision     Sleep apnea     Syncope     Syncope and collapse        Past  Surgical History:    Past Surgical History:   Procedure Laterality Date    BACK SURGERY      CARPAL TUNNEL RELEASE RT/LT      ?side    CHOLECYSTECTOMY      COLONOSCOPY N/A 4/8/2021    Procedure: COLONOSCOPY;  Surgeon: Walter Liang MD;  Location: [facility]    CV CORONARY ANGIOGRAM N/A 4/4/2022    Procedure: Coronary Angiogram;  Surgeon: Yu Valentine MD;  Location:  HEART CARDIAC CATH LAB    CV LEFT HEART CATH N/A 4/4/2022    Procedure: Left Heart Catheterization;  Surgeon: Yu Valentine MD;  Location:  HEART CARDIAC CATH LAB    CV RIGHT HEART CATH MEASUREMENTS RECORDED N/A 4/4/2022    Procedure: Right Heart Catheterization;  Surgeon: Yu Valentine MD;  Location:  HEART CARDIAC CATH LAB    CV TEMPORARY PACEMAKER INSERTION N/A 4/4/2022    Procedure: Temporary Pacemaker Insertion;  Surgeon: Yu Valentine MD;  Location:  HEART CARDIAC CATH LAB    EP PACEMAKER DEVICE & LEAD IMPLANT- RIGHT ATRIAL & LEFT VENTRICULAR Left 4/4/2022    Procedure: Pacemaker Device & Lead Implant- Right Atrial & Left Ventricular;  Surgeon: Jose Fairbanks MD;  Location:  HEART CARDIAC CATH LAB    EYE SURGERY      cataracts    HERNIA REPAIR      INCISION AND DRAINAGE TRUNK, COMBINED  5/18/2012    Procedure:COMBINED INCISION AND DRAINAGE TRUNK; Incision and Drainage Abdominal Wall Abscess ; Surgeon:ALEXSANDER HANNON; Location:[facility]    INSERT PORT VASCULAR ACCESS  2/8/2012    Procedure:INSERT PORT VASCULAR ACCESS; Surgeon:MARY JANE GUAN; [facility]    JOINT REPLACEMTN, KNEE RT/LT      bilateral    KNEE SURGERY  1995    left- total    KNEE SURGERY  2000    right- total    LAPAROSCOPIC APPENDECTOMY N/A 10/10/2015    Procedure: LAPAROSCOPIC APPENDECTOMY;  Surgeon: Daniel Chamberlain MD;  Location: [facility]    LARYNGOSCOPY, ESOPHAGOSCOPY,  BIOPSY, COMBINED  2/8/2012    Procedure:COMBINED LARYNGOSCOPY, ESOPHAGOSCOPY,  BIOPSY; Direct Laryngoscopy, Esophagoscopy with Biopsy, Stealth Assisted Left Frontal Sinus  Biopsy via Vidal Incision, 8 [place] Power Port in right subclavian & Percutaneous Endoscopic Gastrostomy Placement * Latex Safe*; Surgeon:LIBORIO CAZARES; Location:[facility]    left ankle fusion      OPTICAL TRACKING SYSTEM ENDOSCOPIC SINUS SURGERY  2012    Procedure:OPTICAL TRACKING SYSTEM ENDOSCOPIC SINUS SURGERY; Surgeon:LIBORIO CAZARES; Location[facility]    RECTAL SURGERY      ? type    RELEASE DEQUERVAINS WRIST Left 2018    Procedure: RELEASE DEQUERVAINS WRIST;  Left 1st Distal compartment release;  Surgeon: Ronak Biggs MD;  Location: [facility]    REMOVE PORT VASCULAR ACCESS  2012    Procedure: REMOVE PORT VASCULAR ACCESS;  Remove Port Vascular Access ;  Surgeon: Phillip Ye MD;  [facility]: [facility]    REVERSE ARTHROPLASTY SHOULDER Left 2019    Procedure: Left Reverse Total shoulder arthroplasty;  Surgeon: Oliver Velázquez MD;  Location: [facility]       Family History:    Family History   Problem Relation Age of Onset    Breast Cancer Mother     Arthritis Mother     Cancer Mother     Heart Disease Father          at 72 of heart failure    Emphysema Father     Arthritis Sister     Skin Cancer Sister     Chronic Bronchitis Sister     Arthritis Brother     Pancreatic Cancer Brother     Emphysema Brother     Asthma Brother     Cancer Other         uncle pancreatic/grandmother- colon/aunt ? mat or pat  breast cancer       Social History:  Marital Status:   [4]  Social History     Tobacco Use    Smoking status: Former     Packs/day: 0.50     Years: 35.00     Additional pack years: 0.00     Total pack years: 17.50     Types: Cigarettes     Quit date: 10/3/1995     Years since quittin.2    Smokeless tobacco: Never   Substance Use Topics    Alcohol use: Yes     Comment: rare    Drug use: No        Medications:    acetaminophen (TYLENOL) 650 MG CR tablet  artificial tears (GENTEAL) 0.1-0.2-0.3 % ophthalmic solution  busPIRone (BUSPAR) 10 MG  "tablet  cholecalciferol (VITAMIN D3) 25 mcg (1000 units) capsule  cyanocobalamin (VITAMIN B-12) 100 MCG tablet  ferrous sulfate (FEROSUL) 325 (65 Fe) MG tablet  FLUoxetine (PROZAC) 10 MG capsule  fluticasone (FLONASE) 50 MCG/ACT nasal spray  folic acid (FOLVITE) 1 MG tablet  furosemide (LASIX) 20 MG tablet  gabapentin (NEURONTIN) 600 MG tablet  hypromellose-dextran (ARTIFICAL TEARS) 0.1-0.3 % ophthalmic solution  levothyroxine (SYNTHROID/LEVOTHROID) 112 MCG tablet  LORazepam (ATIVAN) 0.5 MG tablet  memantine (NAMENDA) 5 MG tablet  Menthol, Topical Analgesic, (BIOFREEZE) 4 % GEL  mineral oil-white petrolatum (EUCERIN/MINERIN) cream  nitroGLYcerin (NITROSTAT) 0.4 MG sublingual tablet  nystatin (MYCOSTATIN) 573958 UNIT/GM external powder  oxyCODONE (ROXICODONE) 5 MG tablet  QUEtiapine (SEROQUEL) 25 MG tablet  rOPINIRole (REQUIP) 0.25 MG tablet  senna-docusate (SENOKOT-S/PERICOLACE) 8.6-50 MG tablet  traZODone (DESYREL) 100 MG tablet  vitamin B-12 (CYANOCOBALAMIN) 250 MCG tablet          Review of Systems  A medically appropriate review of systems was performed with pertinent positives and negatives noted in the HPI, and all other systems negative.    Physical Exam   Patient Vitals for the past 24 hrs:   BP Temp Temp src Pulse Resp SpO2 Height Weight   01/12/24 0130 129/73 -- -- 78 -- 95 % -- --   01/12/24 0100 127/76 -- -- 74 -- 96 % -- --   01/12/24 0045 (!) 142/102 97.3  F (36.3  C) Oral 76 18 97 % 1.626 m (5' 4\") 90.7 kg (200 lb)          Physical Exam  General: alert and in no acute distress on arrival  Head: atraumatic, normocephalic  Lungs:  nonlabored  CV:  extremities warm and perfused  Abd: nondistended  Right arm with proximal to mid right upper arm tenderness.  Normal distal pulses, perfusion  Skin: no rashes, no diaphoresis and skin color normal  Neuro: Patient awake, alert, speech is fluent,   Psychiatric: affect/mood normal,        ED Course                 Procedures                           Results for " orders placed or performed during the hospital encounter of 01/12/24 (from the past 24 hour(s))   Shoulder XR, 2 view, right    Narrative    EXAM: XR SHOULDER RIGHT 2 VIEWS  LOCATION: [facility]  DATE: 1/12/2024    INDICATION: fall with pain  COMPARISON: None.      Impression    IMPRESSION: No fracture. Normal alignment. Moderate degenerative changes of the glenohumeral joint.   Humerus XR, G/E 2 views, right    Narrative    EXAM: XR HUMERUS RIGHT G/E 2 VIEWS  LOCATION: [facility]  DATE: 1/12/2024    INDICATION: fall with pain  COMPARISON: None.      Impression    IMPRESSION: Within normal limits. No fracture.     *Note: Due to a large number of results and/or encounters for the requested time period, some results have not been displayed. A complete set of results can be found in Results Review.       MEDICATIONS GIVEN IN THE EMERGENCY DEPARTMENT:  Medications - No data to display        Independent Interpretation (X-rays, CTs, rhythm strip):  Xrays:  no fracture    Consultations/Discussion of Management or Tests:         Social Determinants of Health affecting care:         Assessments & Plan (with Medical Decision Making)  78 year old female who presents to the Emergency Department for evaluation of right arm pain in the context of a fall at her memory care unit.  Patient arrives afebrile, slightly hypertensive.  Only complaining of right arm pain.  No external signs of trauma of the head.  Left arm normal.  Bilateral lower extremities are normal.  Not on blood thinning medications.  No indication for head imaging.  Patient will have x-rays of the right shoulder, and right humerus.  X-rays personally reviewed in addition to radiology impression.  No evidence of acute fracture.  Patient will follow-up as needed.  Tylenol recommended as needed for pain.       I have reviewed the nursing notes.    I have reviewed the findings, diagnosis, plan and need for follow  up with the patient.      Critical Care time:        NEW PRESCRIPTIONS STARTED AT TODAY'S ER VISIT  New Prescriptions    No medications on file       Final diagnoses:   Fall, initial encounter   Pain of right upper arm       1/12/2024   [facility] EMERGENCY DEPT       Stan Ba MD  01/12/24 0204

## 2024-01-12 NOTE — ED TRIAGE NOTES
Patient live at a memory care unit, had an unwitnessed fall, staff members found patient on floor in bathroom. EMS stated stroke scale was negative. Patient complains of no pain. Right forearm bruising was noted.     Triage Assessment (Adult)       Row Name 01/12/24 0046          Triage Assessment    Airway WDL WDL        Respiratory WDL    Respiratory WDL WDL        Skin Circulation/Temperature WDL    Skin Circulation/Temperature WDL WDL        Cardiac WDL    Cardiac WDL WDL     Cardiac Rhythm NSR        Peripheral/Neurovascular WDL    Peripheral Neurovascular WDL WDL        Cognitive/Neuro/Behavioral WDL    Cognitive/Neuro/Behavioral WDL WDL

## 2024-01-16 NOTE — NURSING NOTE
Chief Complaint   Patient presents with    Consult       Vitals:    01/16/24 0831   BP: (!) 145/74   BP Location: Left arm   Patient Position: Sitting   Cuff Size: Adult Large   Pulse: 94   SpO2: 93%       There is no height or weight on file to calculate BMI.    Hiro Oliveira EMT-P

## 2024-01-16 NOTE — LETTER
2024       RE: Ingrid Powell  [address] [facility] [address]       Dear Colleague,    Thank you for referring your patient, Ingrid Powell, to the [facility] COLON [facility]IN[facility] [place] [facility]. Please see a copy of my visit note below.    Colon and Rectal Surgery Clinic Note    RE: Ingrid Powell.  : 1945.  JUANJO: 2024.    Reason for visit: rectal prolapse.    HPI: Ingrid Powell is a 78 year old female who presents today for rectal prolapse. She has a past medical history of arthritis, hypothyroidism, Raynaud's, HLD, CHF, CAD s/p pacemaker (2022) with an EF of 12%, COPD, depression, GERD, tonsil neoplasm in  (s/p resection, XRT, chemo with carbo/taxol), HTN, Alzheimer's (on a memory care unit), lower extremity edema on 40mg of lasix, and MARGIE. Her last colonoscopy was 2021 which only demonstrated a few diverticula in the sigmoid colon.     Today Ingrid feels fine, she is minimally interactive during the visit. Her sister is here with her and her daughter Lida is on the phone.    Hemoglobin trend:    Component  Ref Range & Units 8 d ago  (24) 2 wk ago  (23) 1 mo ago  (23) 1 mo ago  (23) 2 mo ago  (23) 2 mo ago  (10/19/23) 3 mo ago  (10/5/23)     Hemoglobin  11.7 - 15.7 g/dL 9.9 Low  9.6 Low  9.6 Low  8.9 Low  8.8 Low  9.1 Low  9.8 Low        Colonoscopy (2021):  Findings:       The perianal and digital rectal examinations were normal.        A few small-mouthed diverticula were found in the sigmoid colon.        The exam was otherwise normal throughout the examined colon.        There is no endoscopic evidence of mass or polyps in the entire colon.        The retroflexed view of the distal rectum and anal verge was normal and        showed no anal or rectal abnormalities.                                                                                    Impression:           - Diverticulosis in the sigmoid colon.                        - The distal rectum and anal verge are normal on                        retroflexion view.                        - No specimens collected.     ECHO (2022):  Interpretation Summary     Left ventricular systolic function is severely reduced.  Biplane LVEF is 12%.  There is severe global hypokinesia of the left ventricle.  Small pericardial effusion. This is unchanged compared to prior study from  earlier today.  EF lower compared to prior echo from 1.48pm. The study was technically  difficult.    Exam (2024):      Medical history:  Arthiritis   COPD  Depression   GERD  Tonsil neoplasm   HTN   MARGIE  Hypothyroidism   CAD   Pacemaker  HLD  Raynaud's  Alzheimer  Lower extremity edema     Surgical history:  Cholecystectomy   Coronary angiogram with pacemaker in   Left and right knee replacement   Laparoscopic appendectomy in       Family history:  Family History   Problem Relation Age of Onset    Breast Cancer Mother     Arthritis Mother     Cancer Mother     Heart Disease Father          at 72 of heart failure    Emphysema Father     Arthritis Sister     Skin Cancer Sister     Chronic Bronchitis Sister     Arthritis Brother     Pancreatic Cancer Brother     Emphysema Brother     Asthma Brother     Cancer Other         uncle pancreatic/grandmother- colon/aunt ? mat or pat  breast cancer       Medications:  Current Outpatient Medications   Medication Sig Dispense Refill    acetaminophen (TYLENOL) 650 MG CR tablet 1300mg po every AM & PM and 1300mg po daily as needed 120 tablet 4    artificial tears (GENTEAL) 0.1-0.2-0.3 % ophthalmic solution INSTILL ONE DROP INTO BOTH EYES TWICE DAILY 15 mL 4    busPIRone (BUSPAR) 10 MG tablet TAKE 1 TABLET (10 MG) BY MOUTH 3 TIMES DAILY 90 tablet 11    cholecalciferol (VITAMIN D3) 25 mcg (1000 units) capsule Take 1 capsule by mouth daily      cyanocobalamin (VITAMIN B-12) 100 MCG tablet Take 1 tablet  (100 mcg) by mouth daily 30 tablet 11    ferrous sulfate (FEROSUL) 325 (65 Fe) MG tablet Take 1 tablet (325 mg) by mouth daily (with breakfast)      FLUoxetine (PROZAC) 10 MG capsule Take 1 capsule (10 mg) by mouth daily 30 capsule 4    fluticasone (FLONASE) 50 MCG/ACT nasal spray USE 2 SPRAYS INTO BOTH NOSTRILS ONCE DAILY 16 g 11    folic acid (FOLVITE) 1 MG tablet Take 1 tablet (1 mg) by mouth daily 30 tablet 11    furosemide (LASIX) 20 MG tablet Take 2 tablets (40 mg) by mouth 2 times daily 60 tablet 11    gabapentin (NEURONTIN) 600 MG tablet TAKE 1 TABLET (600 MG) BY MOUTH AT BEDTIME 31 tablet 11    hypromellose-dextran (ARTIFICAL TEARS) 0.1-0.3 % ophthalmic solution Place 1 drop into both eyes 2 times daily 15 mL 0    levothyroxine (SYNTHROID/LEVOTHROID) 112 MCG tablet Take 1 tablet (112 mcg) by mouth daily 30 tablet 4    LORazepam (ATIVAN) 0.5 MG tablet TAKE 1 TABLET BY MOUTH THREE TIMES DAILY; 2 TABLETS AT HS; TAKE 1 TABLET BY MOUTH ONCE DAILY IF NEEDED 120 tablet 3    memantine (NAMENDA) 5 MG tablet 5mg po every HS x7 day, 5mg po twice a day x7 days, 5mg in AM and 10mg in PM x7 days then 10mg po twice a day 70 tablet 4    Menthol, Topical Analgesic, (BIOFREEZE) 4 % GEL Dime size gel to elbows and neck at HS and then TID prn 150 mL 11    mineral oil-white petrolatum (EUCERIN/MINERIN) cream Apply topically to legs every day for dry skin 240 g 3    nitroGLYcerin (NITROSTAT) 0.4 MG sublingual tablet For chest pain place 1 tablet under the tongue every 5 minutes for 3 doses. If symptoms persist 5 minutes after 1st dose call 911. 6 tablet 4    nystatin (MYCOSTATIN) 834875 UNIT/GM external powder Apply topically 2 times daily 30 g 4    oxyCODONE (ROXICODONE) 5 MG tablet Take 1 tablet (5 mg) by mouth at bedtime. May also take 1 tablet (5 mg) every 4 hours as needed for pain. 40 tablet 0    QUEtiapine (SEROQUEL) 25 MG tablet 12.5mg by mouth in AM and afternoon and 25mg po at bedtime 45 tablet 11    rOPINIRole  (REQUIP) 0.25 MG tablet TAKE 1 TABLET (0.25MG) BY MOUTH EVERY MORNING; TAKE 3 TABLETS (0.75MG) BY MOUTH EACH NIGHT AT BEDTIME; TAKE 1 TABLET (0.25MG) BY MOUTH EVERY AFTERNOON 140 tablet 11    senna-docusate (SENOKOT-S/PERICOLACE) 8.6-50 MG tablet 1 tab by mouth on       traZODone (DESYREL) 100 MG tablet TAKE 1 TABLET (100 MG) BY MOUTH AT BEDTIME 30 tablet 11    vitamin B-12 (CYANOCOBALAMIN) 250 MCG tablet Take 250 mcg by mouth daily         Allergies:  Allergies   Allergen Reactions    Dilaudid [Hydromorphone Hcl] Visual Disturbance     Hallucinations    Fosamax [Alendronate Sodium] Rash           Norvasc [Amlodipine Besylate] Hives and Rash    Tegretol [Carbamazepine] Hives and Rash       Social history:   Social History     Tobacco Use    Smoking status: Former     Packs/day: 0.50     Years: 35.00     Additional pack years: 0.00     Total pack years: 17.50     Types: Cigarettes     Quit date: 10/3/1995     Years since quittin.3    Smokeless tobacco: Never   Substance Use Topics    Alcohol use: Yes     Comment: rare     Marital status: .    ROS:  A complete review of systems was performed with the patient and all systems negative except as per HPI.    Physical Examination:  Exam was chaperoned by Hiro Oliveira, EMT-P  BP (!) 145/74 (BP Location: Left arm, Patient Position: Sitting, Cuff Size: Adult Large)   Pulse 94   LMP 06/15/1984 (Approximate)   SpO2 93%   General: Well hydrated. No acute distress.  CV: RRR  Lung: Non-labored breathing on RA  Abdomen: Soft, NT. No inguinal adenopathy palpated.  Perianal external examination:  Perianal skin: intact.  Lesions: multiple traumatic pseudo-polyps on the rectal segment that is prolapsed.  Eversion of buttocks: There was not evidence of an anal fissure. Details: N/A.  Skin tags or external hemorrhoids: No.  Digital rectal examination: Was performed.   Sphincter tone: Poor.  Palpable lesions: No.  Other: None.      Procedures:  Flexible sigmoidoscopy:  "after obtaining informed consent and performing a \"time out\", an adult flexible sigmoidoscope was introduced through the anus and passed up to the mid sigmoid colon. The quality of the prep was fair. Findings: multiple traumatic pseudo-polyps. There was no active ulceration, inflammation or bleeding. No additional abnormalities were seen. Total scope time: 5 minutes. The patient tolerated the procedure well.  .    ASSESSMENT    This is a 78 year old F with significant comorbidities, now with symptomatic rectal prolapse. I had a honest conversation with the patient and her family. Risks, benefits, and alternatives of operative treatment were thoroughly discussed with them. They would like more time to think about the options.     All pertinent labs and imaging were personally reviewed by me.      PLAN  - Start a daily fiber supplement such as Citrucel or Metamucil. Start with once a day and slowly increase up to three times a day, if needed, over the next 4-6 weeks  - Prolapse should be reduced when able. They were educated on the risk of incarceration.  - RTC when ready to discuss next steps      45 minutes spent on the date of the encounter doing chart review, history and exam, imaging review, documentation and further activities as noted above, excluding the procedure.        Referring Provider:  NORA Becker CNP  [address] [place], [place] [id number]     Primary Care Provider:  Elsa Jeffries      Again, thank you for allowing me to participate in the care of your patient.      Sincerely,    Antonio Oleary MD    "

## 2024-01-16 NOTE — PATIENT INSTRUCTIONS
Start a daily fiber supplement such as Citrucel or Metamucil. Start with once a day and slowly increase up to three times a day, if needed, over the next 4-6 weeks  The nursing facility should push the prolapse back in when it is out  Please call me if you decide to move forward with surgery

## 2024-01-22 NOTE — LETTER
"    1/22/2024        RE: Ingrid Powell  [address] [facility] [address]        [facility] GERIATRICS  ACUTE/EPISODIC VISIT    [facility] Medical Record Number:  [id number]  Place of Service where encounter took place:  [facility] ([facility]) [[id number]]    Chief Complaint   Patient presents with     RECHECK       HPI:    Ingrid Powell is a 78 year old  (1945), who is being seen today for an episodic care visit.  HPI information obtained from: facility staff and patient report.    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      Ingrid recently went for a 2nd opinion of her rectal prolapse as thinking it could be operated on with a local block.  Unfortunately, Ingrid would have to lay on her stomach and given her past health issues with cardiac, it was passed on.  She did not think she could do it.    \"I dont care, I am so use to it\".    Ingrid was cooperative with trying the sugar method to help absorb the wetness of the prolapse and see if able to put back in.        ALLERGIES:    Allergies   Allergen Reactions     Dilaudid [Hydromorphone Hcl] Visual Disturbance     Hallucinations     Fosamax [Alendronate Sodium] Rash            Norvasc [Amlodipine Besylate] Hives and Rash     Tegretol [Carbamazepine] Hives and Rash        MEDICATIONS:  Post Discharge Medication Reconciliation Status: patient was not discharged from an inpatient facility or TCU.     Current Outpatient Medications   Medication Sig Dispense Refill     acetaminophen (TYLENOL) 650 MG CR tablet 1300mg po every AM & PM and 1300mg po daily as needed 120 tablet 4     " artificial tears (GENTEAL) 0.1-0.2-0.3 % ophthalmic solution INSTILL ONE DROP INTO BOTH EYES TWICE DAILY 15 mL 4     busPIRone (BUSPAR) 10 MG tablet TAKE 1 TABLET (10 MG) BY MOUTH 3 TIMES DAILY 90 tablet 11     cholecalciferol (VITAMIN D3) 25 mcg (1000 units) capsule Take 1 capsule by mouth daily       cyanocobalamin (VITAMIN B-12) 100 MCG tablet Take 1 tablet (100 mcg) by mouth daily 30 tablet 11     ferrous sulfate (FEROSUL) 325 (65 Fe) MG tablet Take 1 tablet (325 mg) by mouth daily (with breakfast)       FLUoxetine (PROZAC) 10 MG capsule Take 1 capsule (10 mg) by mouth daily 30 capsule 4     fluticasone (FLONASE) 50 MCG/ACT nasal spray USE 2 SPRAYS INTO BOTH NOSTRILS ONCE DAILY 16 g 11     folic acid (FOLVITE) 1 MG tablet Take 1 tablet (1 mg) by mouth daily 30 tablet 11     furosemide (LASIX) 20 MG tablet Take 2 tablets (40 mg) by mouth 2 times daily 60 tablet 11     gabapentin (NEURONTIN) 600 MG tablet TAKE 1 TABLET (600 MG) BY MOUTH AT BEDTIME 31 tablet 11     hypromellose-dextran (ARTIFICAL TEARS) 0.1-0.3 % ophthalmic solution Place 1 drop into both eyes 2 times daily 15 mL 0     levothyroxine (SYNTHROID/LEVOTHROID) 112 MCG tablet Take 1 tablet (112 mcg) by mouth daily 30 tablet 4     LORazepam (ATIVAN) 0.5 MG tablet TAKE 1 TABLET BY MOUTH THREE TIMES DAILY; 2 TABLETS AT HS; TAKE 1 TABLET BY MOUTH ONCE DAILY IF NEEDED 120 tablet 3     memantine (NAMENDA) 5 MG tablet 5mg po every HS x7 day, 5mg po twice a day x7 days, 5mg in AM and 10mg in PM x7 days then 10mg po twice a day 70 tablet 4     Menthol, Topical Analgesic, (BIOFREEZE) 4 % GEL Dime size gel to elbows and neck at HS and then TID prn 150 mL 11     mineral oil-white petrolatum (EUCERIN/MINERIN) cream Apply topically to legs every day for dry skin 240 g 3     nitroGLYcerin (NITROSTAT) 0.4 MG sublingual tablet For chest pain place 1 tablet under the tongue every 5 minutes for 3 doses. If symptoms persist 5 minutes after 1st dose call 671. 6 tablet 4      nystatin (MYCOSTATIN) 900619 UNIT/GM external powder Apply topically 2 times daily 30 g 4     oxyCODONE (ROXICODONE) 5 MG tablet Take 1 tablet (5 mg) by mouth at bedtime. May also take 1 tablet (5 mg) every 4 hours as needed for pain. 40 tablet 0     QUEtiapine (SEROQUEL) 25 MG tablet 12.5mg by mouth in AM and afternoon and 25mg po at bedtime 45 tablet 11     rOPINIRole (REQUIP) 0.25 MG tablet TAKE 1 TABLET (0.25MG) BY MOUTH EVERY MORNING; TAKE 3 TABLETS (0.75MG) BY MOUTH EACH NIGHT AT BEDTIME; TAKE 1 TABLET (0.25MG) BY MOUTH EVERY AFTERNOON 140 tablet 11     senna-docusate (SENOKOT-S/PERICOLACE) 8.6-50 MG tablet 1 tab by mouth on M.W.F       traZODone (DESYREL) 100 MG tablet TAKE 1 TABLET (100 MG) BY MOUTH AT BEDTIME 30 tablet 11     vitamin B-12 (CYANOCOBALAMIN) 250 MCG tablet Take 250 mcg by mouth daily           REVIEW OF SYSTEMS:  4 point ROS neg other than the symptoms noted above in the HPI.        PHYSICAL EXAM:  /52   Pulse 87   Temp 98.7  F (37.1  C)   Resp 18   Wt 97 kg (213 lb 12.8 oz)   LMP 06/15/1984 (Approximate)   SpO2 95%   BMI 36.70 kg/m    Ingrid is sitting in her apartment with the door open and once again could tell it was really hot.  Her heater under the window set to 76.  Turned it down for her.   She transferred self to get up and use 4WW and go into bathroom.  Could see small amount of blood on her pull up.  Did spread a considerable amount of sugar on her rectal prolapse as it melted almost right away.  Then proceeded to go sit back down in her apartment and talked for about 15 minutes until going back into the bathroom to see about pushing her large rectal prolapse back in.  She stated she had to go to the bathroom and so let her go by her self.  She did not need help changing her pull-up.  Once this nurse practitioner went back into the bathroom she was stating that she was bleeding and from her rectum.  She stood up and the toilet paper was very full of blood as she also was  dripping all over the floor.  Proceeded to push her prolapse back in and it actually stayed in.  For how long do not know, but in the past when pushing it back in if proceeds to come back out.    Heart rate/rhythm regular and strong. No pitting edema.  Legs are thick in nature  Lungs are clear.  Uses O2 at 1L/min via NC continuous.  Abdomen is large, soft and non-tender.    Component      Latest Ref Rng 1/4/2024  6:11 AM   Sodium      135 - 145 mmol/L 138    Potassium      3.4 - 5.3 mmol/L 3.4    Chloride      98 - 107 mmol/L 95 (L)    Carbon Dioxide (CO2)      22 - 29 mmol/L 34 (H)    Anion Gap      7 - 15 mmol/L 9    Urea Nitrogen      8.0 - 23.0 mg/dL 12.7    Creatinine      0.51 - 0.95 mg/dL 0.78    GFR Estimate      >60 mL/min/1.73m2 76    Calcium      8.8 - 10.2 mg/dL 9.3    Glucose      70 - 99 mg/dL 103 (H)    TSH      0.30 - 4.20 uIU/mL 25.59 (H)    Hemoglobin      11.7 - 15.7 g/dL 9.9 (L)           ASSESSMENT / PLAN:  (K62.3) Rectal prolapse  (primary encounter diagnosis)  (K62.5) Rectal bleeding  Comment: So the sugar method worked as far as keeping the prolapse back in but do not know for how long.  Not sure if the sugar caused her to have rectal bleeding or if she was rubbing herself pretty hard she would not probably really remember what she did.  Did ask the staff to go in and help her as there was blood all over the floor.  Will follow-up with staff if they recognize how long it stayed in for.    (K59.03) Drug-induced constipation  Comment: With the second opinion for her rectal prolapse, did read about the fact of being suggested to start with Citrucel or Metamucil.  Did bring this up to the daughter about starting 1 of those 2 medications.  She spoke with her mom and her mom feels that her bowels are satisfactory and so not can to start Citrucel or Metamucil.  Remains on senna S1 tablet every Monday Wednesday and Friday.    (G30.0,  F02.B3) Moderate early onset Alzheimer's dementia with mood  disturbance (H)  Comment: Ingrid is now down in the memory care unit just so that she has more assistance and socialization.  Her affect seems a little bit more flatter and part of it could be that she just does not remember as much.  She did bring up about her ice cream that her family was going to bring some Kristen for her.  The staff mention that Ingrid ate a whole box of ice cream bars within a matter of 2 days.  So the box contained either 6 or 12 ice cream bars.  At this point, she is making her life more comfort focused and addressing issues as they come up.    Orders:  No new orders    Electronically signed by  NORA Balderas CNP            Sincerely,        NORA Balderas CNP

## 2024-01-22 NOTE — PROGRESS NOTES
"[facility] GERIATRICS  ACUTE/EPISODIC VISIT    [facility] Medical Record Number:  [id number]  Place of Service where encounter took place:  [facility] ([facility]) [[id number]]    Chief Complaint   Patient presents with    RECHECK       HPI:    Ingrid Powell is a 78 year old  (1945), who is being seen today for an episodic care visit.  HPI information obtained from: facility staff and patient report.    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      Ingrid recently went for a 2nd opinion of her rectal prolapse as thinking it could be operated on with a local block.  Unfortunately, Ingrid would have to lay on her stomach and given her past health issues with cardiac, it was passed on.  She did not think she could do it.    \"I dont care, I am so use to it\".    Ingrid was cooperative with trying the sugar method to help absorb the wetness of the prolapse and see if able to put back in.        ALLERGIES:    Allergies   Allergen Reactions    Dilaudid [Hydromorphone Hcl] Visual Disturbance     Hallucinations    Fosamax [Alendronate Sodium] Rash           Norvasc [Amlodipine Besylate] Hives and Rash    Tegretol [Carbamazepine] Hives and Rash        MEDICATIONS:  Post Discharge Medication Reconciliation Status: patient was not discharged from an inpatient facility or TCU.     Current Outpatient Medications   Medication Sig Dispense Refill    acetaminophen (TYLENOL) 650 MG CR tablet 1300mg po every AM & PM and 1300mg po daily as needed 120 tablet 4    artificial tears (GENTEAL) 0.1-0.2-0.3 % ophthalmic solution INSTILL ONE DROP INTO BOTH EYES " TWICE DAILY 15 mL 4    busPIRone (BUSPAR) 10 MG tablet TAKE 1 TABLET (10 MG) BY MOUTH 3 TIMES DAILY 90 tablet 11    cholecalciferol (VITAMIN D3) 25 mcg (1000 units) capsule Take 1 capsule by mouth daily      cyanocobalamin (VITAMIN B-12) 100 MCG tablet Take 1 tablet (100 mcg) by mouth daily 30 tablet 11    ferrous sulfate (FEROSUL) 325 (65 Fe) MG tablet Take 1 tablet (325 mg) by mouth daily (with breakfast)      FLUoxetine (PROZAC) 10 MG capsule Take 1 capsule (10 mg) by mouth daily 30 capsule 4    fluticasone (FLONASE) 50 MCG/ACT nasal spray USE 2 SPRAYS INTO BOTH NOSTRILS ONCE DAILY 16 g 11    folic acid (FOLVITE) 1 MG tablet Take 1 tablet (1 mg) by mouth daily 30 tablet 11    furosemide (LASIX) 20 MG tablet Take 2 tablets (40 mg) by mouth 2 times daily 60 tablet 11    gabapentin (NEURONTIN) 600 MG tablet TAKE 1 TABLET (600 MG) BY MOUTH AT BEDTIME 31 tablet 11    hypromellose-dextran (ARTIFICAL TEARS) 0.1-0.3 % ophthalmic solution Place 1 drop into both eyes 2 times daily 15 mL 0    levothyroxine (SYNTHROID/LEVOTHROID) 112 MCG tablet Take 1 tablet (112 mcg) by mouth daily 30 tablet 4    LORazepam (ATIVAN) 0.5 MG tablet TAKE 1 TABLET BY MOUTH THREE TIMES DAILY; 2 TABLETS AT HS; TAKE 1 TABLET BY MOUTH ONCE DAILY IF NEEDED 120 tablet 3    memantine (NAMENDA) 5 MG tablet 5mg po every HS x7 day, 5mg po twice a day x7 days, 5mg in AM and 10mg in PM x7 days then 10mg po twice a day 70 tablet 4    Menthol, Topical Analgesic, (BIOFREEZE) 4 % GEL Dime size gel to elbows and neck at HS and then TID prn 150 mL 11    mineral oil-white petrolatum (EUCERIN/MINERIN) cream Apply topically to legs every day for dry skin 240 g 3    nitroGLYcerin (NITROSTAT) 0.4 MG sublingual tablet For chest pain place 1 tablet under the tongue every 5 minutes for 3 doses. If symptoms persist 5 minutes after 1st dose call 911. 6 tablet 4    nystatin (MYCOSTATIN) 885232 UNIT/GM external powder Apply topically 2 times daily 30 g 4    oxyCODONE  (ROXICODONE) 5 MG tablet Take 1 tablet (5 mg) by mouth at bedtime. May also take 1 tablet (5 mg) every 4 hours as needed for pain. 40 tablet 0    QUEtiapine (SEROQUEL) 25 MG tablet 12.5mg by mouth in AM and afternoon and 25mg po at bedtime 45 tablet 11    rOPINIRole (REQUIP) 0.25 MG tablet TAKE 1 TABLET (0.25MG) BY MOUTH EVERY MORNING; TAKE 3 TABLETS (0.75MG) BY MOUTH EACH NIGHT AT BEDTIME; TAKE 1 TABLET (0.25MG) BY MOUTH EVERY AFTERNOON 140 tablet 11    senna-docusate (SENOKOT-S/PERICOLACE) 8.6-50 MG tablet 1 tab by mouth on M.W.F      traZODone (DESYREL) 100 MG tablet TAKE 1 TABLET (100 MG) BY MOUTH AT BEDTIME 30 tablet 11    vitamin B-12 (CYANOCOBALAMIN) 250 MCG tablet Take 250 mcg by mouth daily           REVIEW OF SYSTEMS:  4 point ROS neg other than the symptoms noted above in the HPI.        PHYSICAL EXAM:  /52   Pulse 87   Temp 98.7  F (37.1  C)   Resp 18   Wt 97 kg (213 lb 12.8 oz)   LMP 06/15/1984 (Approximate)   SpO2 95%   BMI 36.70 kg/m    Ingrid is sitting in her apartment with the door open and once again could tell it was really hot.  Her heater under the window set to 76.  Turned it down for her.   She transferred self to get up and use 4WW and go into bathroom.  Could see small amount of blood on her pull up.  Did spread a considerable amount of sugar on her rectal prolapse as it melted almost right away.  Then proceeded to go sit back down in her apartment and talked for about 15 minutes until going back into the bathroom to see about pushing her large rectal prolapse back in.  She stated she had to go to the bathroom and so let her go by her self.  She did not need help changing her pull-up.  Once this nurse practitioner went back into the bathroom she was stating that she was bleeding and from her rectum.  She stood up and the toilet paper was very full of blood as she also was dripping all over the floor.  Proceeded to push her prolapse back in and it actually stayed in.  For how  long do not know, but in the past when pushing it back in if proceeds to come back out.    Heart rate/rhythm regular and strong. No pitting edema.  Legs are thick in nature  Lungs are clear.  Uses O2 at 1L/min via NC continuous.  Abdomen is large, soft and non-tender.    Component      Latest Ref Rng 1/4/2024  6:11 AM   Sodium      135 - 145 mmol/L 138    Potassium      3.4 - 5.3 mmol/L 3.4    Chloride      98 - 107 mmol/L 95 (L)    Carbon Dioxide (CO2)      22 - 29 mmol/L 34 (H)    Anion Gap      7 - 15 mmol/L 9    Urea Nitrogen      8.0 - 23.0 mg/dL 12.7    Creatinine      0.51 - 0.95 mg/dL 0.78    GFR Estimate      >60 mL/min/1.73m2 76    Calcium      8.8 - 10.2 mg/dL 9.3    Glucose      70 - 99 mg/dL 103 (H)    TSH      0.30 - 4.20 uIU/mL 25.59 (H)    Hemoglobin      11.7 - 15.7 g/dL 9.9 (L)           ASSESSMENT / PLAN:  (K62.3) Rectal prolapse  (primary encounter diagnosis)  (K62.5) Rectal bleeding  Comment: So the sugar method worked as far as keeping the prolapse back in but do not know for how long.  Not sure if the sugar caused her to have rectal bleeding or if she was rubbing herself pretty hard she would not probably really remember what she did.  Did ask the staff to go in and help her as there was blood all over the floor.  Will follow-up with staff if they recognize how long it stayed in for.    (K59.03) Drug-induced constipation  Comment: With the second opinion for her rectal prolapse, did read about the fact of being suggested to start with Citrucel or Metamucil.  Did bring this up to the daughter about starting 1 of those 2 medications.  She spoke with her mom and her mom feels that her bowels are satisfactory and so not can to start Citrucel or Metamucil.  Remains on senna S1 tablet every Monday Wednesday and Friday.    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  Her affect seems a little bit more flatter and part of it could be that she just does not remember as much.  She did bring up about her ice cream that her family was going to bring some Kristen for her.  The staff mention that Ingrid ate a whole box of ice cream bars within a matter of 2 days.  So the box contained either 6 or 12 ice cream bars.  At this point, she is making her life more comfort focused and addressing issues as they come up.    Orders:  No new orders    Electronically signed by  NORA Balderas CNP

## 2024-01-31 NOTE — PROGRESS NOTES
[facility] Care Coordination Contact    Called  nurse Rios  to schedule annual HRA home visit. HRA has been scheduled for 2/6/24 at 10 am at [facility].    Angela Reid RN, [facility]  [phone]

## 2024-02-01 NOTE — LETTER
"    2/1/2024        RE: Ingrid Powell  [address] [facility] [address]        [facility] GERIATRICS  ACUTE/EPISODIC VISIT    [facility] Medical Record Number:  [id number]  Place of Service where encounter took place:  [facility] ([facility]) [[id number]]    Chief Complaint   Patient presents with     RECHECK       HPI:    Ingrid Powell is a 78 year old  (1945), who is being seen today for an episodic care visit.  HPI information obtained from: facility chart records, facility staff, patient report, and [facility] chart review.    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  Found her in her recliner with legs in a dependent position.  Woke her up as seemed to recognize this NP.  Had to gets her \"orientation\" around her.      Explained to her about adjusting her Levothyroxine medication to get her TSH level within acceptable lab range.    Also spoke with nursing after the visit about adjusting her anxiety medication as feel she may not need the Lorazepam at the dose she is at.  Want to do a small trial reduction.    ALLERGIES:    Allergies   Allergen Reactions     Dilaudid [Hydromorphone Hcl] Visual Disturbance     Hallucinations     Fosamax [Alendronate Sodium] Rash            Norvasc [Amlodipine Besylate] Hives and Rash     Tegretol [Carbamazepine] Hives and Rash        MEDICATIONS:  Post Discharge Medication Reconciliation Status: patient was not discharged from an inpatient facility or TCU.     Current Outpatient Medications   Medication Sig Dispense Refill     levothyroxine (SYNTHROID/LEVOTHROID) 125 MCG tablet Take 1 tablet (125 mcg) by mouth " daily 30 tablet 4     LORazepam (ATIVAN) 0.5 MG tablet TAKE 1 TABLET BY MOUTH FOUR TIMES DAILY; TAKE 1 TABLET BY MOUTH ONCE DAILY IF NEEDED 120 tablet 3     acetaminophen (TYLENOL) 650 MG CR tablet 1300mg po every AM & PM and 1300mg po daily as needed 120 tablet 4     artificial tears (GENTEAL) 0.1-0.2-0.3 % ophthalmic solution INSTILL ONE DROP INTO BOTH EYES TWICE DAILY 15 mL 4     busPIRone (BUSPAR) 10 MG tablet TAKE 1 TABLET (10 MG) BY MOUTH 3 TIMES DAILY 90 tablet 11     cholecalciferol (VITAMIN D3) 25 mcg (1000 units) capsule Take 1 capsule by mouth daily       cyanocobalamin (VITAMIN B-12) 100 MCG tablet Take 1 tablet (100 mcg) by mouth daily 30 tablet 11     ferrous sulfate (FEROSUL) 325 (65 Fe) MG tablet Take 1 tablet (325 mg) by mouth daily (with breakfast)       FLUoxetine (PROZAC) 10 MG capsule Take 1 capsule (10 mg) by mouth daily 30 capsule 4     fluticasone (FLONASE) 50 MCG/ACT nasal spray USE 2 SPRAYS INTO BOTH NOSTRILS ONCE DAILY 16 g 11     folic acid (FOLVITE) 1 MG tablet Take 1 tablet (1 mg) by mouth daily 30 tablet 11     furosemide (LASIX) 20 MG tablet Take 2 tablets (40 mg) by mouth 2 times daily 60 tablet 11     gabapentin (NEURONTIN) 600 MG tablet TAKE 1 TABLET (600 MG) BY MOUTH AT BEDTIME 31 tablet 11     hypromellose-dextran (ARTIFICAL TEARS) 0.1-0.3 % ophthalmic solution Place 1 drop into both eyes 2 times daily 15 mL 0     memantine (NAMENDA) 5 MG tablet 5mg po every HS x7 day, 5mg po twice a day x7 days, 5mg in AM and 10mg in PM x7 days then 10mg po twice a day 70 tablet 4     Menthol, Topical Analgesic, (BIOFREEZE) 4 % GEL Dime size gel to elbows and neck at HS and then TID prn 150 mL 11     mineral oil-white petrolatum (EUCERIN/MINERIN) cream Apply topically to legs every day for dry skin 240 g 3     nitroGLYcerin (NITROSTAT) 0.4 MG sublingual tablet For chest pain place 1 tablet under the tongue every 5 minutes for 3 doses. If symptoms persist 5 minutes after 1st dose call [phone]. [phone]  "tablet 4     nystatin (MYCOSTATIN) 049380 UNIT/GM external powder Apply topically 2 times daily 30 g 4     oxyCODONE (ROXICODONE) 5 MG tablet Take 1 tablet (5 mg) by mouth at bedtime. May also take 1 tablet (5 mg) every 4 hours as needed for pain. 40 tablet 0     QUEtiapine (SEROQUEL) 25 MG tablet 12.5mg by mouth in AM and afternoon and 25mg po at bedtime 45 tablet 11     rOPINIRole (REQUIP) 0.25 MG tablet TAKE 1 TABLET (0.25MG) BY MOUTH EVERY MORNING; TAKE 3 TABLETS (0.75MG) BY MOUTH EACH NIGHT AT BEDTIME; TAKE 1 TABLET (0.25MG) BY MOUTH EVERY AFTERNOON 140 tablet 11     senna-docusate (SENOKOT-S/PERICOLACE) 8.6-50 MG tablet 1 tab by mouth on M.W.F       traZODone (DESYREL) 100 MG tablet TAKE 1 TABLET (100 MG) BY MOUTH AT BEDTIME 30 tablet 11     vitamin B-12 (CYANOCOBALAMIN) 250 MCG tablet Take 250 mcg by mouth daily       Medications reviewed:  Medications reconciled to facility chart and changes were made to reflect current medications as identified as above med list. Below are the changes that were made:   Medications stopped since last EPIC medication reconciliation:   Medications Discontinued During This Encounter   Medication Reason     levothyroxine (SYNTHROID/LEVOTHROID) 112 MCG tablet      LORazepam (ATIVAN) 0.5 MG tablet        Medications started since last [facility] medication reconciliation:  Orders Placed This Encounter   Medications     LORazepam (ATIVAN) 0.5 MG tablet     Sig: TAKE 1 TABLET BY MOUTH FOUR TIMES DAILY; TAKE 1 TABLET BY MOUTH ONCE DAILY IF NEEDED     Dispense:  120 tablet     Refill:  3     levothyroxine (SYNTHROID/LEVOTHROID) 125 MCG tablet     Sig: Take 1 tablet (125 mcg) by mouth daily     Dispense:  30 tablet     Refill:  4       REVIEW OF SYSTEMS:  4 point ROS neg other than the symptoms noted above in the HPI.  Denied chest pain.  Uses the oxygen as more as a \"comfort blanket\"        PHYSICAL EXAM:  /52   Pulse 87   Temp 98.7  F (37.1  C)   Resp 18   Wt 97 kg (213 lb 12.8 " "oz)   LMP 06/15/1984 (Approximate)   SpO2 95%   BMI 36.70 kg/m    Demeanor seems more flat.  Pleasant but says she \"does not know\" more often.  Skin is pink/pale per her baseline.  Lungs are clear.  Heart rate/rhythm is regular and strong.    Abdomen is round, soft, and non-tender.  Rectal prolapse had come back out. Staff not sure how long it stayed in but suspect not long after trying the sugar method to absorb mucous and push the prolapse back in.  Lower legs are \"thick\" in nature due to her stature but has +1 edema.  Legs are pink and dry.    Able to transfer self and use her 4WW to ambulate.    Lab Results   Component Value Date    TSH 11.81 02/01/2024    TSH 25.59 01/04/2024       ASSESSMENT / PLAN:  (E03.9) Acquired hypothyroidism  (primary encounter diagnosis)  Comment: today will increase her levothyroxine dose from 112mcg to 125mcg po daily.  TSH and Free T4 on 3/7/24.  New provider will address labs after 2/19/24.    Plan: levothyroxine (SYNTHROID/LEVOTHROID) 125 MCG         Tablet    (I50.22) chronic systolic heart Failure  Comment:  managed with Lasix 40mg twice a day.  Will add for a CBC and BMP to be drawn on 3/7/24 as well for ongoing monitoring of chemistry and electrolytes.  Best if she can elevate her legs but not consistent.      (F41.1) JAIME (generalized anxiety disorder)  (F33.1) Major depressive disorder, recurrent episode, moderate (HCC)  (G30.0,  F02.B3) Moderate early onset Alzheimer's dementia with mood disturbance (H)  Comment: would like to lower her Lorazepam to 0.5mg to QID which would lower the bedtime dose from 1mg to 0.5mg.    Will leave the Buspar, trazodone, Namenda, and Seroquel dose alone.  Small dose reductions at a time.  Plan: LORazepam (ATIVAN) 0.5 MG tablet    Orders:   Change Levothyroxine to 125mcg po daily.  Hypothyroidism  TSH, Free T4, CBC, and BMP for hypothyroidism and heart failure  Lower the HS dose of Lorazepam to 0.5mg.  then that makes it 0.5mg po QID.  Keep " the daily PRN dose.    Electronically signed by  NORA Baldears CNP            Sincerely,        NORA Balderas CNP

## 2024-02-01 NOTE — PROGRESS NOTES
"[facility] GERIATRICS  ACUTE/EPISODIC VISIT    [facility] Medical Record Number:  [id number]  Place of Service where encounter took place:  [facility] ([facility]) [[id number]]    Chief Complaint   Patient presents with    RECHECK       HPI:    Ingrid Powell is a 78 year old  (1945), who is being seen today for an episodic care visit.  HPI information obtained from: facility chart records, facility staff, patient report, and [facility] chart review.    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  Found her in her recliner with legs in a dependent position.  Woke her up as seemed to recognize this NP.  Had to gets her \"orientation\" around her.      Explained to her about adjusting her Levothyroxine medication to get her TSH level within acceptable lab range.    Also spoke with nursing after the visit about adjusting her anxiety medication as feel she may not need the Lorazepam at the dose she is at.  Want to do a small trial reduction.    ALLERGIES:    Allergies   Allergen Reactions    Dilaudid [Hydromorphone Hcl] Visual Disturbance     Hallucinations    Fosamax [Alendronate Sodium] Rash           Norvasc [Amlodipine Besylate] Hives and Rash    Tegretol [Carbamazepine] Hives and Rash        MEDICATIONS:  Post Discharge Medication Reconciliation Status: patient was not discharged from an inpatient facility or TCU.     Current Outpatient Medications   Medication Sig Dispense Refill    levothyroxine (SYNTHROID/LEVOTHROID) 125 MCG tablet Take 1 tablet (125 mcg) by mouth daily 30 tablet 4    LORazepam (ATIVAN) 0.5 MG tablet TAKE 1 TABLET BY MOUTH FOUR TIMES " DAILY; TAKE 1 TABLET BY MOUTH ONCE DAILY IF NEEDED 120 tablet 3    acetaminophen (TYLENOL) 650 MG CR tablet 1300mg po every AM & PM and 1300mg po daily as needed 120 tablet 4    artificial tears (GENTEAL) 0.1-0.2-0.3 % ophthalmic solution INSTILL ONE DROP INTO BOTH EYES TWICE DAILY 15 mL 4    busPIRone (BUSPAR) 10 MG tablet TAKE 1 TABLET (10 MG) BY MOUTH 3 TIMES DAILY 90 tablet 11    cholecalciferol (VITAMIN D3) 25 mcg (1000 units) capsule Take 1 capsule by mouth daily      cyanocobalamin (VITAMIN B-12) 100 MCG tablet Take 1 tablet (100 mcg) by mouth daily 30 tablet 11    ferrous sulfate (FEROSUL) 325 (65 Fe) MG tablet Take 1 tablet (325 mg) by mouth daily (with breakfast)      FLUoxetine (PROZAC) 10 MG capsule Take 1 capsule (10 mg) by mouth daily 30 capsule 4    fluticasone (FLONASE) 50 MCG/ACT nasal spray USE 2 SPRAYS INTO BOTH NOSTRILS ONCE DAILY 16 g 11    folic acid (FOLVITE) 1 MG tablet Take 1 tablet (1 mg) by mouth daily 30 tablet 11    furosemide (LASIX) 20 MG tablet Take 2 tablets (40 mg) by mouth 2 times daily 60 tablet 11    gabapentin (NEURONTIN) 600 MG tablet TAKE 1 TABLET (600 MG) BY MOUTH AT BEDTIME 31 tablet 11    hypromellose-dextran (ARTIFICAL TEARS) 0.1-0.3 % ophthalmic solution Place 1 drop into both eyes 2 times daily 15 mL 0    memantine (NAMENDA) 5 MG tablet 5mg po every HS x7 day, 5mg po twice a day x7 days, 5mg in AM and 10mg in PM x7 days then 10mg po twice a day 70 tablet 4    Menthol, Topical Analgesic, (BIOFREEZE) 4 % GEL Dime size gel to elbows and neck at HS and then TID prn 150 mL 11    mineral oil-white petrolatum (EUCERIN/MINERIN) cream Apply topically to legs every day for dry skin 240 g 3    nitroGLYcerin (NITROSTAT) 0.4 MG sublingual tablet For chest pain place 1 tablet under the tongue every 5 minutes for 3 doses. If symptoms persist 5 minutes after 1st dose call 911. 6 tablet 4    nystatin (MYCOSTATIN) 752752 UNIT/GM external powder Apply topically 2 times daily 30 g 4     "oxyCODONE (ROXICODONE) 5 MG tablet Take 1 tablet (5 mg) by mouth at bedtime. May also take 1 tablet (5 mg) every 4 hours as needed for pain. 40 tablet 0    QUEtiapine (SEROQUEL) 25 MG tablet 12.5mg by mouth in AM and afternoon and 25mg po at bedtime 45 tablet 11    rOPINIRole (REQUIP) 0.25 MG tablet TAKE 1 TABLET (0.25MG) BY MOUTH EVERY MORNING; TAKE 3 TABLETS (0.75MG) BY MOUTH EACH NIGHT AT BEDTIME; TAKE 1 TABLET (0.25MG) BY MOUTH EVERY AFTERNOON 140 tablet 11    senna-docusate (SENOKOT-S/PERICOLACE) 8.6-50 MG tablet 1 tab by mouth on M.W.F      traZODone (DESYREL) 100 MG tablet TAKE 1 TABLET (100 MG) BY MOUTH AT BEDTIME 30 tablet 11    vitamin B-12 (CYANOCOBALAMIN) 250 MCG tablet Take 250 mcg by mouth daily       Medications reviewed:  Medications reconciled to facility chart and changes were made to reflect current medications as identified as above med list. Below are the changes that were made:   Medications stopped since last EPIC medication reconciliation:   Medications Discontinued During This Encounter   Medication Reason    levothyroxine (SYNTHROID/LEVOTHROID) 112 MCG tablet     LORazepam (ATIVAN) 0.5 MG tablet        Medications started since last [facility] medication reconciliation:  Orders Placed This Encounter   Medications    LORazepam (ATIVAN) 0.5 MG tablet     Sig: TAKE 1 TABLET BY MOUTH FOUR TIMES DAILY; TAKE 1 TABLET BY MOUTH ONCE DAILY IF NEEDED     Dispense:  120 tablet     Refill:  3    levothyroxine (SYNTHROID/LEVOTHROID) 125 MCG tablet     Sig: Take 1 tablet (125 mcg) by mouth daily     Dispense:  30 tablet     Refill:  4       REVIEW OF SYSTEMS:  4 point ROS neg other than the symptoms noted above in the HPI.  Denied chest pain.  Uses the oxygen as more as a \"comfort blanket\"        PHYSICAL EXAM:  /52   Pulse 87   Temp 98.7  F (37.1  C)   Resp 18   Wt 97 kg (213 lb 12.8 oz)   LMP 06/15/1984 (Approximate)   SpO2 95%   BMI 36.70 kg/m    Demeanor seems more flat.  Pleasant but says she " "\"does not know\" more often.  Skin is pink/pale per her baseline.  Lungs are clear.  Heart rate/rhythm is regular and strong.    Abdomen is round, soft, and non-tender.  Rectal prolapse had come back out. Staff not sure how long it stayed in but suspect not long after trying the sugar method to absorb mucous and push the prolapse back in.  Lower legs are \"thick\" in nature due to her stature but has +1 edema.  Legs are pink and dry.    Able to transfer self and use her 4WW to ambulate.    Lab Results   Component Value Date    TSH 11.81 02/01/2024    TSH 25.59 01/04/2024       ASSESSMENT / PLAN:  (E03.9) Acquired hypothyroidism  (primary encounter diagnosis)  Comment: today will increase her levothyroxine dose from 112mcg to 125mcg po daily.  TSH and Free T4 on 3/7/24.  New provider will address labs after 2/19/24.    Plan: levothyroxine (SYNTHROID/LEVOTHROID) 125 MCG         Tablet    (I50.22) chronic systolic heart Failure  Comment:  managed with Lasix 40mg twice a day.  Will add for a CBC and BMP to be drawn on 3/7/24 as well for ongoing monitoring of chemistry and electrolytes.  Best if she can elevate her legs but not consistent.      (F41.1) JAIME (generalized anxiety disorder)  (F33.1) Major depressive disorder, recurrent episode, moderate (HCC)  (G30.0,  F02.B3) Moderate early onset Alzheimer's dementia with mood disturbance (H)  Comment: would like to lower her Lorazepam to 0.5mg to QID which would lower the bedtime dose from 1mg to 0.5mg.    Will leave the Buspar, trazodone, Namenda, and Seroquel dose alone.  Small dose reductions at a time.  Plan: LORazepam (ATIVAN) 0.5 MG tablet    Orders:   Change Levothyroxine to 125mcg po daily.  Hypothyroidism  TSH, Free T4, CBC, and BMP for hypothyroidism and heart failure  Lower the HS dose of Lorazepam to 0.5mg.  then that makes it 0.5mg po QID.  Keep the daily PRN dose.    Electronically signed by  NORA Balderas CNP        "

## 2024-02-06 NOTE — Clinical Note
I had my LTCC assessment with Ingrid today. I did flag some items in the Epic medication list. Artificial tears is listed twice. B12 is on there twice one at 100mcg and one at 250mcg. Assisted living MAR has 100mcg. Also D3 is listed I Epic however it is not in the MAR. Ingrid would like to try compression stockings and staff is requesting michael pads as Ingrid found one and placed it on her chair. She has been putting pullups on the chair. Can you place an order for the chux and compression stockings in Epic please unless you feel she would not benefit from the compression stockings. I'm not sure what insurance will cover for the chucks but maybe have one a day on the order. Thank you for your time. Angela Reid RN [facility].

## 2024-02-07 NOTE — PROGRESS NOTES
[facility] Care Coordination Contact    [facility] Home Visit Assessment     Home visit for Health Risk Assessment with Ingrid Powell completed on February 6, 2024    Type of residence:: Assisted living  Current living arrangement:: I live in assisted living     Assessment completed with:: Patient, Care Team Member, Other (Assisted living staff)    Current Care Plan  Member currently receiving the following home care services:     Member currently receiving the following community resources: None    Medication Review  Medication reconciliation completed in Epic: Yes  Medication set-up & administration: RN set up  Unknown .  Assisting Living staff administers medications.  Medication Risk Assessment Medication (1 or more, place referral to MTM): N/A: No risk factors identified  MTM Referral Placed: No: No risk factors idenified    Mental/Behavioral Health   Depression Screening:      PHQ-9 Total Score: 0    Mental health DX:: Yes (Depression and anxiety)   Mental health DX how managed:: Medication    Falls Assessment:   Fallen 2 or more times in the past year?: Yes   Any fall with injury in the past year?: No    ADL/IADL Dependencies:   Dependent ADLs:: Bathing  Dependent IADLs:: Cleaning, Cooking, Laundry, Shopping, Meal Preparation, Medication Management, Money Management, Transportation    Health Plan sponsored benefits: [employer]: Shared information regarding [employer]. Reviewed preventative health screening and health plan supplemental benefits/incentives. Reviewed medication disposal form.    PCA Assessment completed at visit: Not Applicable     Elderly Waiver Eligibility: Yes-will continue on EW    Care Plan & Recommendations: Member was moved to memory care November 2023. She reports she enjoys the staff and enjoys going to [facility]. Member would like to wear compression stockings and staff reports member has found chux and is putting them on her seat due to her prolapsed  uterus. Staff reports she takes her brief and will sit on that to help prevent any leakage. CC to inquire about a monthly order of chucks and will work with NP regarding the compression stockings.     See [facility] for detailed assessment information.    Follow-Up Plan: Member informed of future contact, plan to f/u with member with a 6 month telephone assessment.  Contact information shared with member and family, encouraged member to call with any questions or concerns at any time.    [place] care continuum providers: Please see Snapshot and Care Management Flowsheets for Specific details of care plan.    This CC note routed to PCP, Elsa Jeffries.    Angela Reid RN, [facility]  [phone]

## 2024-02-15 NOTE — PROGRESS NOTES
[facility] Care Coordination Contact    Received after visit chart from care coordinator.  Completed following tasks: Mailed copy of care plan/support plan to member, Mailed Safe Medication Disposal and Updated services in Database   and Mailed copy of CL tool to member and submitted authorization to health plan.  Care Coordinator shared copy with facility and obtained signed provider signature summary letter - no tracking required. Uploaded to Onbase.     Myra Ornelas  Care Management Specialist   [facility]   [phone]

## 2024-02-15 NOTE — PROGRESS NOTES
"[facility] GERIATRICS  ACUTE/EPISODIC VISIT    [facility] Medical Record Number:  [id number]  Place of Service where encounter took place:  [facility] ([facility]) [[id number]]    Chief Complaint   Patient presents with    RECHECK       HPI:    Ingrid Powell is a 78 year old  (1945), who is being seen today for an episodic care visit.  HPI information obtained from: facility chart records, facility staff, patient report, and [facility] chart review.    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  She is coherent and able to statements.  Visit completed in patient's room while she was sitting in her recliner.  Patient states \" doing great with no complaints\".  She denies chest pain, shortness of breath, decreased appetite abdominal pain, headache, nausea, vomiting, fever, chills and sore throat.    Ingrid reports cutting down on her daily ice cream intake.  She reports eating 1 small dish of ice cream every day.  States that it was very difficult at the beginning but got a lot of encouragement from her sister and her daughter.    Patient was very cheerful and pleasant throughout the visit.  Walked with patient to the common area at the end of the visit    ALLERGIES:    Allergies   Allergen Reactions    Dilaudid [Hydromorphone Hcl] Visual Disturbance     Hallucinations    Fosamax [Alendronate Sodium] Rash           Norvasc [Amlodipine Besylate] Hives and Rash    Tegretol [Carbamazepine] Hives and Rash        MEDICATIONS:  Post Discharge Medication Reconciliation Status: patient was not discharged from an inpatient facility or TCU.     Current " Outpatient Medications   Medication Sig Dispense Refill    acetaminophen (TYLENOL) 650 MG CR tablet 1300mg po every AM & PM and 1300mg po daily as needed 120 tablet 4    artificial tears (GENTEAL) 0.1-0.2-0.3 % ophthalmic solution INSTILL ONE DROP INTO BOTH EYES TWICE DAILY 15 mL 4    busPIRone (BUSPAR) 10 MG tablet TAKE 1 TABLET (10 MG) BY MOUTH 3 TIMES DAILY 90 tablet 11    cholecalciferol (VITAMIN D3) 25 mcg (1000 units) capsule Take 1 capsule by mouth daily      cyanocobalamin (VITAMIN B-12) 100 MCG tablet Take 1 tablet (100 mcg) by mouth daily 30 tablet 11    ferrous sulfate (FEROSUL) 325 (65 Fe) MG tablet Take 1 tablet (325 mg) by mouth daily (with breakfast)      FLUoxetine (PROZAC) 10 MG capsule Take 1 capsule (10 mg) by mouth daily 30 capsule 4    fluticasone (FLONASE) 50 MCG/ACT nasal spray USE 2 SPRAYS INTO BOTH NOSTRILS ONCE DAILY 16 g 11    folic acid (FOLVITE) 1 MG tablet Take 1 tablet (1 mg) by mouth daily 30 tablet 11    furosemide (LASIX) 20 MG tablet Take 2 tablets (40 mg) by mouth 2 times daily 60 tablet 11    gabapentin (NEURONTIN) 600 MG tablet TAKE 1 TABLET (600 MG) BY MOUTH AT BEDTIME 31 tablet 11    hypromellose-dextran (ARTIFICAL TEARS) 0.1-0.3 % ophthalmic solution Place 1 drop into both eyes 2 times daily 15 mL 0    levothyroxine (SYNTHROID/LEVOTHROID) 125 MCG tablet Take 1 tablet (125 mcg) by mouth daily 30 tablet 4    LORazepam (ATIVAN) 0.5 MG tablet TAKE 1 TABLET BY MOUTH FOUR TIMES DAILY; TAKE 1 TABLET BY MOUTH ONCE DAILY IF NEEDED 120 tablet 3    memantine (NAMENDA) 5 MG tablet 5mg po every HS x7 day, 5mg po twice a day x7 days, 5mg in AM and 10mg in PM x7 days then 10mg po twice a day 70 tablet 4    Menthol, Topical Analgesic, (BIOFREEZE) 4 % GEL Dime size gel to elbows and neck at HS and then TID prn 150 mL 11    mineral oil-white petrolatum (EUCERIN/MINERIN) cream Apply topically to legs every day for dry skin 240 g 3    nitroGLYcerin (NITROSTAT) 0.4 MG sublingual tablet For chest  pain place 1 tablet under the tongue every 5 minutes for 3 doses. If symptoms persist 5 minutes after 1st dose call 911. 6 tablet 4    nystatin (NYSTOP) 863117 UNIT/GM external powder Apply topically 2 times daily as needed (yeast infection) 60 g 0    oxyCODONE (ROXICODONE) 5 MG tablet Take 1 tablet (5 mg) by mouth at bedtime. May also take 1 tablet (5 mg) every 4 hours as needed for pain. 40 tablet 0    QUEtiapine (SEROQUEL) 25 MG tablet 12.5mg by mouth in AM and afternoon and 25mg po at bedtime 45 tablet 11    rOPINIRole (REQUIP) 0.25 MG tablet TAKE 1 TABLET (0.25MG) BY MOUTH EVERY MORNING; TAKE 3 TABLETS (0.75MG) BY MOUTH EACH NIGHT AT BEDTIME; TAKE 1 TABLET (0.25MG) BY MOUTH EVERY AFTERNOON 140 tablet 11    senna-docusate (SENOKOT-S/PERICOLACE) 8.6-50 MG tablet 1 tab by mouth on M.W.F      traZODone (DESYREL) 100 MG tablet TAKE 1 TABLET (100 MG) BY MOUTH AT BEDTIME 30 tablet 11    traZODone (DESYREL) 50 MG tablet Take 1 tablet (50 mg) by mouth nightly as needed for sleep (if not sleeping by 3am) 31 tablet 11    vitamin B-12 (CYANOCOBALAMIN) 250 MCG tablet Take 250 mcg by mouth daily         REVIEW OF SYSTEMS:  4 point ROS neg other than the symptoms noted above in the HPI.    PHYSICAL EXAM:  /65   Pulse 88   Temp 97  F (36.1  C)   Resp 14   Wt 97.6 kg (215 lb 3.2 oz)   LMP 06/15/1984 (Approximate)   SpO2 98%   BMI 36.94 kg/m    Physical Exam  Constitutional:       Appearance: Normal appearance.   Cardiovascular:      Rate and Rhythm: Normal rate and regular rhythm.      Heart sounds: Normal heart sounds.   Pulmonary:      Effort: Pulmonary effort is normal.      Breath sounds: Normal breath sounds.   Abdominal:      General: Bowel sounds are normal.      Palpations: Abdomen is soft.   Skin:     General: Skin is warm and dry.   Neurological:      Mental Status: She is alert.   Psychiatric:         Mood and Affect: Mood normal.         Behavior: Behavior normal.       ASSESSMENT / PLAN:  (I50.22)   Chronic systolic heart Failure  (R60.0)  Bilateral lower extremity edema  Comment:  does have legs in a dependent position often.  Legs are thick in nature but only recently has fluid been staying in lower legs.  Diuretic Lasix 40mg twice a day.  Recently seen by  [facility] and spoke about getting knee high TRENT stockings in which would need to measure and get her fitted for.  Will check with nursing about this.  Otherwise no new issues.  O2 at 1L/min for comfort and nervousness she will not breath if she has the O2 off due to past experiences.    (G30.0,  F02.B3) Moderate early onset Alzheimer's dementia with mood disturbance (H)  (primary encounter diagnosis)  Comment: Condition stable at this time.  Patient reports no new concerns.  Will continue lorazepam, Buspar, trazodone, Namenda, and Seroquel doses at this time.    (F51.01) Primary insomnia  Comment: Patient reports restful sleep.  Denies waking up at night due to restless legs.  Continue current trazodone dosing.    (F33.1) Major depressive disorder, recurrent episode, moderate (HCC)  Comment: Expresses no new concerns.  Reports good mood most days.  Will continue Prozac, Seroquel and BuSpar, and lorazepam doses    (F41.1) JAIME (generalized anxiety disorder)  Comment: Condition stable.  Denies anxiety attack.  Continue lorazepam as ordered.      Orders:  No new orders at this visit.    Electronically signed by  BAILEY BOWMAN NP Student    I was present with the medical student/advanced practice registered nurse, Bailey Bowman NP Student, who participated in the service and in the documentation of this note and/or observation along side of this provider. I have verified the history and personally performed the physical exam and medical decision making. I agree with the assessment and plan of care as documented in the note.     Electronically signed by Elsa Jeffries RN, CNP

## 2024-02-15 NOTE — LETTER
February 15, 2024      Ingrid Powell  c/o Daljit Powell  [address], [place] [address]      Dear Ingrid:    At [facility], we re dedicated to improving your health and wellness. Enclosed is the Care Plan developed with you on 2/6/2024. Please review the Care Plan carefully.    As a reminder, during your visit we talked about:  Ways to manage your physical and mental health  Using health care to maintain and improve your health   Your preventive care needs     Remember to contact your care coordinator if you:  Are hospitalized, or plan to be hospitalized   Have a fall    Have a change in your physical or mental health  Need help finding support or services    If you have questions, or don t agree with your Care Plan, call me at [phone]. You can also call me if your needs change. TTY users, call the [place] [employer] at (829) or 1-391.913.8025 (wfzwqp-ig-eshnoe relay service).    Sincerely,       Angela Reid RN  [phone]  [email or web address] ([facility]) is a health plan that contracts with both Medicare and the [place] Medical Assistance (Medicaid) program to provide benefits of both programs to enrollees. Enrollment in [facility] depends on contract renewal.    [id number] accepted    [id number] (07/2022)

## 2024-02-15 NOTE — LETTER
"    2/15/2024        RE: Ingrid Powell  [address] [facility] [address]        [facility] GERIATRICS  ACUTE/EPISODIC VISIT    [facility] Medical Record Number:  [id number]  Place of Service where encounter took place:  [facility] [[id number]]    Chief Complaint   Patient presents with     RECHECK       HPI:    Ingrid Powell is a 78 year old  (1945), who is being seen today for an episodic care visit.  HPI information obtained from: facility chart records, facility staff, patient report, and [facility] chart review.    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  She is coherent and able to statements.  Visit completed in patient's room while she was sitting in her recliner.  Patient states \" doing great with no complaints\".  She denies chest pain, shortness of breath, decreased appetite abdominal pain, headache, nausea, vomiting, fever, chills and sore throat.    Ingrid reports cutting down on her daily ice cream intake.  She reports eating 1 small dish of ice cream every day.  States that it was very difficult at the beginning but got a lot of encouragement from her sister and her daughter.    Patient was very cheerful and pleasant throughout the visit.  Walked with patient to the common area at the end of the visit    ALLERGIES:    Allergies   Allergen Reactions     Dilaudid [Hydromorphone Hcl] Visual Disturbance     Hallucinations     Fosamax [Alendronate Sodium] Rash            Norvasc [Amlodipine Besylate] Hives and Rash     Tegretol [Carbamazepine] Hives and Rash        MEDICATIONS:  Post Discharge Medication " Reconciliation Status: patient was not discharged from an inpatient facility or TCU.     Current Outpatient Medications   Medication Sig Dispense Refill     acetaminophen (TYLENOL) 650 MG CR tablet 1300mg po every AM & PM and 1300mg po daily as needed 120 tablet 4     artificial tears (GENTEAL) 0.1-0.2-0.3 % ophthalmic solution INSTILL ONE DROP INTO BOTH EYES TWICE DAILY 15 mL 4     busPIRone (BUSPAR) 10 MG tablet TAKE 1 TABLET (10 MG) BY MOUTH 3 TIMES DAILY 90 tablet 11     cholecalciferol (VITAMIN D3) 25 mcg (1000 units) capsule Take 1 capsule by mouth daily       cyanocobalamin (VITAMIN B-12) 100 MCG tablet Take 1 tablet (100 mcg) by mouth daily 30 tablet 11     ferrous sulfate (FEROSUL) 325 (65 Fe) MG tablet Take 1 tablet (325 mg) by mouth daily (with breakfast)       FLUoxetine (PROZAC) 10 MG capsule Take 1 capsule (10 mg) by mouth daily 30 capsule 4     fluticasone (FLONASE) 50 MCG/ACT nasal spray USE 2 SPRAYS INTO BOTH NOSTRILS ONCE DAILY 16 g 11     folic acid (FOLVITE) 1 MG tablet Take 1 tablet (1 mg) by mouth daily 30 tablet 11     furosemide (LASIX) 20 MG tablet Take 2 tablets (40 mg) by mouth 2 times daily 60 tablet 11     gabapentin (NEURONTIN) 600 MG tablet TAKE 1 TABLET (600 MG) BY MOUTH AT BEDTIME 31 tablet 11     hypromellose-dextran (ARTIFICAL TEARS) 0.1-0.3 % ophthalmic solution Place 1 drop into both eyes 2 times daily 15 mL 0     levothyroxine (SYNTHROID/LEVOTHROID) 125 MCG tablet Take 1 tablet (125 mcg) by mouth daily 30 tablet 4     LORazepam (ATIVAN) 0.5 MG tablet TAKE 1 TABLET BY MOUTH FOUR TIMES DAILY; TAKE 1 TABLET BY MOUTH ONCE DAILY IF NEEDED 120 tablet 3     memantine (NAMENDA) 5 MG tablet 5mg po every HS x7 day, 5mg po twice a day x7 days, 5mg in AM and 10mg in PM x7 days then 10mg po twice a day 70 tablet 4     Menthol, Topical Analgesic, (BIOFREEZE) 4 % GEL Dime size gel to elbows and neck at HS and then TID prn 150 mL 11     mineral oil-white petrolatum (EUCERIN/MINERIN) cream Apply  topically to legs every day for dry skin 240 g 3     nitroGLYcerin (NITROSTAT) 0.4 MG sublingual tablet For chest pain place 1 tablet under the tongue every 5 minutes for 3 doses. If symptoms persist 5 minutes after 1st dose call 911. 6 tablet 4     nystatin (NYSTOP) 704586 UNIT/GM external powder Apply topically 2 times daily as needed (yeast infection) 60 g 0     oxyCODONE (ROXICODONE) 5 MG tablet Take 1 tablet (5 mg) by mouth at bedtime. May also take 1 tablet (5 mg) every 4 hours as needed for pain. 40 tablet 0     QUEtiapine (SEROQUEL) 25 MG tablet 12.5mg by mouth in AM and afternoon and 25mg po at bedtime 45 tablet 11     rOPINIRole (REQUIP) 0.25 MG tablet TAKE 1 TABLET (0.25MG) BY MOUTH EVERY MORNING; TAKE 3 TABLETS (0.75MG) BY MOUTH EACH NIGHT AT BEDTIME; TAKE 1 TABLET (0.25MG) BY MOUTH EVERY AFTERNOON 140 tablet 11     senna-docusate (SENOKOT-S/PERICOLACE) 8.6-50 MG tablet 1 tab by mouth on M.W.F       traZODone (DESYREL) 100 MG tablet TAKE 1 TABLET (100 MG) BY MOUTH AT BEDTIME 30 tablet 11     traZODone (DESYREL) 50 MG tablet Take 1 tablet (50 mg) by mouth nightly as needed for sleep (if not sleeping by 3am) 31 tablet 11     vitamin B-12 (CYANOCOBALAMIN) 250 MCG tablet Take 250 mcg by mouth daily       Medications reviewed:  Medications reconciled to facility chart and changes were made to reflect current medications as identified as above med list. Below are the changes that were made:   Medications stopped since last EPIC medication reconciliation:   There are no discontinued medications.    Medications started since last [facility] medication reconciliation:  No orders of the defined types were placed in this encounter.      REVIEW OF SYSTEMS:  4 point ROS neg other than the symptoms noted above in the HPI.    PHYSICAL EXAM:  /65   Pulse 88   Temp 97  F (36.1  C)   Resp 14   Wt 97.6 kg (215 lb 3.2 oz)   LMP 06/15/1984 (Approximate)   SpO2 98%   BMI 36.94 kg/m    Physical Exam  Constitutional:        Appearance: Normal appearance.   Cardiovascular:      Rate and Rhythm: Normal rate and regular rhythm.      Heart sounds: Normal heart sounds.   Pulmonary:      Effort: Pulmonary effort is normal.      Breath sounds: Normal breath sounds.   Abdominal:      General: Bowel sounds are normal.      Palpations: Abdomen is soft.   Skin:     General: Skin is warm and dry.   Neurological:      Mental Status: She is alert.   Psychiatric:         Mood and Affect: Mood normal.         Behavior: Behavior normal.       ASSESSMENT / PLAN:  (G30.0,  F02.B3) Moderate early onset Alzheimer's dementia with mood disturbance (H)  (primary encounter diagnosis)  Comment: Condition stable at this time.  Patient reports no new concerns.  Will continue lorazepam, Buspar, trazodone, Namenda, and Seroquel doses at this time.    (F51.01) Primary insomnia  Comment: Patient reports restful sleep.  Denies waking up at night due to restless legs.  Continue current trazodone dosing.    (F33.1) Major depressive disorder, recurrent episode, moderate (HCC)  Comment: Expresses no new concerns.  Reports good mood most days.  Will continue Prozac, Seroquel and BuSpar, and lorazepam doses    (F41.1) JAIME (generalized anxiety disorder)  Comment: Condition stable.  Denies anxiety attack.  Continue lorazepam as ordered.      Orders:  No new orders at this visit.    Electronically signed by  SANDRO SINGER NP Student         [facility] GERIATRICS  ACUTE/EPISODIC VISIT    [facility] Medical Record Number:  [id number]  Place of Service where encounter took place:  [facility]) [[id number]]    Chief Complaint   Patient presents with     RECHECK       HPI:    Ingrid Powell is a 78 year old  (1945), who is being seen today for an episodic care visit.  HPI information obtained from: facility chart records, facility staff, patient report, and [facility] chart review.    Today's concern is:    Diagnoses         Codes  "Comments    Moderate early onset Alzheimer's dementia with mood disturbance (H)    -  Primary G30.0, F02.B3     Primary insomnia     F51.01     Major depressive disorder, recurrent episode, moderate (H)     F33.1     JAIME (generalized anxiety disorder)     F41.1           Visited with Ingrid in her room to follow-up on how she was doing generally.  She is coherent and able to statements.  Visit completed in patient's room while she was sitting in her recliner.  Patient states \" doing great with no complaints\".  She denies chest pain, shortness of breath, decreased appetite abdominal pain, headache, nausea, vomiting, fever, chills and sore throat.    Ingrid reports cutting down on her daily ice cream intake.  She reports eating 1 small dish of ice cream every day.  States that it was very difficult at the beginning but got a lot of encouragement from her sister and her daughter.    Patient was very cheerful and pleasant throughout the visit.  Walked with patient to the common area at the end of the visit    ALLERGIES:    Allergies   Allergen Reactions     Dilaudid [Hydromorphone Hcl] Visual Disturbance     Hallucinations     Fosamax [Alendronate Sodium] Rash            Norvasc [Amlodipine Besylate] Hives and Rash     Tegretol [Carbamazepine] Hives and Rash        MEDICATIONS:  Post Discharge Medication Reconciliation Status: patient was not discharged from an inpatient facility or TCU.     Current Outpatient Medications   Medication Sig Dispense Refill     acetaminophen (TYLENOL) 650 MG CR tablet 1300mg po every AM & PM and 1300mg po daily as needed 120 tablet 4     artificial tears (GENTEAL) 0.1-0.2-0.3 % ophthalmic solution INSTILL ONE DROP INTO BOTH EYES TWICE DAILY 15 mL 4     busPIRone (BUSPAR) 10 MG tablet TAKE 1 TABLET (10 MG) BY MOUTH 3 TIMES DAILY 90 tablet 11     cholecalciferol (VITAMIN D3) 25 mcg (1000 units) capsule Take 1 capsule by mouth daily       cyanocobalamin (VITAMIN B-12) 100 MCG tablet Take 1 " tablet (100 mcg) by mouth daily 30 tablet 11     ferrous sulfate (FEROSUL) 325 (65 Fe) MG tablet Take 1 tablet (325 mg) by mouth daily (with breakfast)       FLUoxetine (PROZAC) 10 MG capsule Take 1 capsule (10 mg) by mouth daily 30 capsule 4     fluticasone (FLONASE) 50 MCG/ACT nasal spray USE 2 SPRAYS INTO BOTH NOSTRILS ONCE DAILY 16 g 11     folic acid (FOLVITE) 1 MG tablet Take 1 tablet (1 mg) by mouth daily 30 tablet 11     furosemide (LASIX) 20 MG tablet Take 2 tablets (40 mg) by mouth 2 times daily 60 tablet 11     gabapentin (NEURONTIN) 600 MG tablet TAKE 1 TABLET (600 MG) BY MOUTH AT BEDTIME 31 tablet 11     hypromellose-dextran (ARTIFICAL TEARS) 0.1-0.3 % ophthalmic solution Place 1 drop into both eyes 2 times daily 15 mL 0     levothyroxine (SYNTHROID/LEVOTHROID) 125 MCG tablet Take 1 tablet (125 mcg) by mouth daily 30 tablet 4     LORazepam (ATIVAN) 0.5 MG tablet TAKE 1 TABLET BY MOUTH FOUR TIMES DAILY; TAKE 1 TABLET BY MOUTH ONCE DAILY IF NEEDED 120 tablet 3     memantine (NAMENDA) 5 MG tablet 5mg po every HS x7 day, 5mg po twice a day x7 days, 5mg in AM and 10mg in PM x7 days then 10mg po twice a day 70 tablet 4     Menthol, Topical Analgesic, (BIOFREEZE) 4 % GEL Dime size gel to elbows and neck at HS and then TID prn 150 mL 11     mineral oil-white petrolatum (EUCERIN/MINERIN) cream Apply topically to legs every day for dry skin 240 g 3     nitroGLYcerin (NITROSTAT) 0.4 MG sublingual tablet For chest pain place 1 tablet under the tongue every 5 minutes for 3 doses. If symptoms persist 5 minutes after 1st dose call 911. 6 tablet 4     nystatin (NYSTOP) 749994 UNIT/GM external powder Apply topically 2 times daily as needed (yeast infection) 60 g 0     oxyCODONE (ROXICODONE) 5 MG tablet Take 1 tablet (5 mg) by mouth at bedtime. May also take 1 tablet (5 mg) every 4 hours as needed for pain. 40 tablet 0     QUEtiapine (SEROQUEL) 25 MG tablet 12.5mg by mouth in AM and afternoon and 25mg po at bedtime 45  tablet 11     rOPINIRole (REQUIP) 0.25 MG tablet TAKE 1 TABLET (0.25MG) BY MOUTH EVERY MORNING; TAKE 3 TABLETS (0.75MG) BY MOUTH EACH NIGHT AT BEDTIME; TAKE 1 TABLET (0.25MG) BY MOUTH EVERY AFTERNOON 140 tablet 11     senna-docusate (SENOKOT-S/PERICOLACE) 8.6-50 MG tablet 1 tab by mouth on M.W.F       traZODone (DESYREL) 100 MG tablet TAKE 1 TABLET (100 MG) BY MOUTH AT BEDTIME 30 tablet 11     traZODone (DESYREL) 50 MG tablet Take 1 tablet (50 mg) by mouth nightly as needed for sleep (if not sleeping by 3am) 31 tablet 11     vitamin B-12 (CYANOCOBALAMIN) 250 MCG tablet Take 250 mcg by mouth daily         REVIEW OF SYSTEMS:  4 point ROS neg other than the symptoms noted above in the HPI.    PHYSICAL EXAM:  /65   Pulse 88   Temp 97  F (36.1  C)   Resp 14   Wt 97.6 kg (215 lb 3.2 oz)   LMP 06/15/1984 (Approximate)   SpO2 98%   BMI 36.94 kg/m    Physical Exam  Constitutional:       Appearance: Normal appearance.   Cardiovascular:      Rate and Rhythm: Normal rate and regular rhythm.      Heart sounds: Normal heart sounds.   Pulmonary:      Effort: Pulmonary effort is normal.      Breath sounds: Normal breath sounds.   Abdominal:      General: Bowel sounds are normal.      Palpations: Abdomen is soft.   Skin:     General: Skin is warm and dry.   Neurological:      Mental Status: She is alert.   Psychiatric:         Mood and Affect: Mood normal.         Behavior: Behavior normal.       ASSESSMENT / PLAN:  (I50.22)  Chronic systolic heart Failure  (R60.0)  Bilateral lower extremity edema  Comment:  does have legs in a dependent position often.  Legs are thick in nature but only recently has fluid been staying in lower legs.  Diuretic Lasix 40mg twice a day.  Recently seen by  [facility] and spoke about getting knee high TRENT stockings in which would need to measure and get her fitted for.  Will check with nursing about this.  Otherwise no new issues.  O2 at 1L/min for comfort and nervousness she will  not breath if she has the O2 off due to past experiences.    (G30.0,  F02.B3) Moderate early onset Alzheimer's dementia with mood disturbance (H)  (primary encounter diagnosis)  Comment: Condition stable at this time.  Patient reports no new concerns.  Will continue lorazepam, Buspar, trazodone, Namenda, and Seroquel doses at this time.    (F51.01) Primary insomnia  Comment: Patient reports restful sleep.  Denies waking up at night due to restless legs.  Continue current trazodone dosing.    (F33.1) Major depressive disorder, recurrent episode, moderate (HCC)  Comment: Expresses no new concerns.  Reports good mood most days.  Will continue Prozac, Seroquel and BuSpar, and lorazepam doses    (F41.1) JAIME (generalized anxiety disorder)  Comment: Condition stable.  Denies anxiety attack.  Continue lorazepam as ordered.      Orders:  No new orders at this visit.    Electronically signed by  BAILEY BOWMAN NP Student    I was present with the medical student/advanced practice registered nurse, Bailey Bowman NP Student, who participated in the service and in the documentation of this note and/or observation along side of this provider. I have verified the history and personally performed the physical exam and medical decision making. I agree with the assessment and plan of care as documented in the note.     Electronically signed by Elsa Jeffries RN, CNP            Sincerely,        NORA Balderas CNP

## 2024-02-28 NOTE — PROGRESS NOTES
[facility] Care Coordination Contact    No longer active with [facility] community case management effective 3/1/24.  Reason for community disenrollment: Change Care System/PCC to [facility].    Angela Reid RN, [facility]  [phone]

## 2024-04-01 PROBLEM — G30.0 MODERATE EARLY ONSET ALZHEIMER'S DEMENTIA WITH MOOD DISTURBANCE (H): Status: RESOLVED | Noted: 2024-01-01 | Resolved: 2024-01-01

## 2024-04-01 PROBLEM — F02.B3 MODERATE EARLY ONSET ALZHEIMER'S DEMENTIA WITH MOOD DISTURBANCE (H): Status: RESOLVED | Noted: 2024-01-01 | Resolved: 2024-01-01

## 2024-06-18 NOTE — MR AVS SNAPSHOT
After Visit Summary   9/13/2017    Ingrid Powell    MRN: [id number]           Patient Information     Date Of Birth          1945        Visit Information        Provider Department      9/13/2017 1:20 PM Tila Tyson APRN [facility]        Today's Diagnoses     Chronic idiopathic constipation    -  1    Diverticulitis of colon without hemorrhage        Abdominal pain, generalized        Acute bronchitis, unspecified organism          Care Instructions    Take Miralax daily    Take the Senekot 1 tablet in the evening    Schedule your appointment with [facility]    Schedule your CT scan at the [facility]          Follow-ups after your visit        Additional Services     GASTROENTEROLOGY ADULT REF CONSULT ONLY       Preferred Location: [facility] [phone]. [place]      Please be aware that coverage of these services is subject to the terms and limitations of your health insurance plan.  Call member services at your health plan with any benefit or coverage questions.  Any procedures must be performed at a [facility] facility OR coordinated by your clinic's referral office.    Please bring the following with you to your appointment:    (1) Any X-Rays, CTs or MRIs which have been performed.  Contact the facility where they were done to arrange for  prior to your scheduled appointment.    (2) List of current medications   (3) This referral request   (4) Any documents/labs given to you for this referral                  Your next 10 appointments already scheduled     Mar 07, 2018 10:20 AM CST   (Arrive by 10:05 AM)   CT SOFT TISSUE NECK W CONTRAST with UCCT1   [facility] ([facility])    [address]  1st Floor  [facility] [facility] [address] [phone]           Please bring any scans or X-rays taken at other hospitals, if similar tests were done. Also bring a list of your medicines, including  HUB OK TO RELAY AND SCHEDULE. LEFT VM FOR PATIENT TO SCHEDULE AN APPOINTMENT FOR A REFERRAL.   vitamins, minerals and over-the-counter drugs. It is safest to leave personal items at home.  Be sure to tell your doctor:   If you have any allergies.   If there s any chance you are pregnant.   If you are breastfeeding.   If you have any special needs.  You will have contrast for this exam. To prepare:   Do not eat or drink for 2 hours before your exam. If you need to take medicine, you may take it with small sips of water. (We may ask you to take liquid medicine as well.)   The day before your exam, drink extra fluids at least six 8-ounce glasses (unless your doctor tells you to restrict your fluids).  Patients over 70 or patients with diabetes or kidney problems:   If you haven t had a blood test (creatinine test) within the last 30 days, go to your clinic or Diagnostic Imaging Department for this test.  If you have diabetes:   If your kidney function is normal, continue taking your metformin (Avandamet, Glucophage, Glucovance, Metaglip) on the day of your exam.   If your kidney function is abnormal, wait 48 hours before restarting this medicine.  Please wear loose clothing, such as a sweat suit or jogging clothes. Avoid snaps, zippers and other metal. We may ask you to undress and put on a hospital gown.  If you have any questions, please call the Imaging Department where you will have your exam.            Mar 07, 2018 10:40 AM CST   (Arrive by 10:25 AM)   CT CHEST W CONTRAST with UCCT1   [facility] ([facility])    [address]  1st Floor  [place] [address] [phone]           Please bring any scans or X-rays taken at other hospitals, if similar tests were done. Also bring a list of your medicines, including vitamins, minerals and over-the-counter drugs. It is safest to leave personal items at home.  Be sure to tell your doctor:   If you have any allergies.   If there s any chance you are pregnant.   If you are breastfeeding.   If you have any  special needs.  You will have contrast for this exam. To prepare:   Do not eat or drink for 2 hours before your exam. If you need to take medicine, you may take it with small sips of water. (We may ask you to take liquid medicine as well.)   The day before your exam, drink extra fluids at least six 8-ounce glasses (unless your doctor tells you to restrict your fluids).  Patients over 70 or patients with diabetes or kidney problems:   If you haven t had a blood test (creatinine test) within the last 30 days, go to your clinic or Diagnostic Imaging Department for this test.  If you have diabetes:   If your kidney function is normal, continue taking your metformin (Avandamet, Glucophage, Glucovance, Metaglip) on the day of your exam.   If your kidney function is abnormal, wait 48 hours before restarting this medicine.  Please wear loose clothing, such as a sweat suit or jogging clothes. Avoid snaps, zippers and other metal. We may ask you to undress and put on a hospital gown.  If you have any questions, please call the Imaging Department where you will have your exam.            Mar 07, 2018 11:00 AM CST   Lab with  LAB   [facility] ([facility])    [address] [place] [address] [phone]            Mar 07, 2018  1:15 PM CST   (Arrive by 1:00 PM)   Return Visit with Tejinder Tracy MD   [facility] ([facility])    [address] [place] [id number] [phone]              Future tests that were ordered for you today     Open Future Orders        Priority Expected Expires Ordered    CT Abdomen Pelvis w Contrast Routine  9/13/2018 9/13/2017            Who to contact     If you have questions or need follow up information about today's clinic visit or your schedule please contact [facility] directly at [phone].  Normal or non-critical lab and imaging results will  "be communicated to you by Yoostayhart, letter or phone within 4 business days after the clinic has received the results. If you do not hear from us within 7 days, please contact the clinic through Inf[employer] or phone. If you have a critical or abnormal lab result, we will notify you by phone as soon as possible.  Submit refill requests through [employer] or call your pharmacy and they will forward the refill request to us. Please allow 3 business days for your refill to be completed.          Additional Information About Your Visit        Infoch[employer] Information     [employer] gives you secure access to your electronic health record. If you see a primary care provider, you can also send messages to your care team and make appointments. If you have questions, please call your primary care clinic.  If you do not have a primary care provider, please call [phone] and they will assist you.        Care EveryWhere ID     This is your Care EveryWhere ID. This could be used by other organizations to access your [facility] medical records  [id number]        Your Vitals Were     Pulse Temperature Height Pulse Oximetry BMI (Body Mass Index)       66 98.4  F (36.9  C) (Tympanic) 5' 5\" (1.651 m) 96% 27.89 kg/m2        Blood Pressure from Last 3 Encounters:   09/13/17 130/80   09/10/17 134/77   08/11/17 117/75    Weight from Last 3 Encounters:   09/13/17 167 lb 9.6 oz (76 kg)   08/01/17 166 lb 12.8 oz (75.7 kg)   07/19/17 169 lb (76.7 kg)              We Performed the Following     GASTROENTEROLOGY ADULT REF CONSULT ONLY          Today's Medication Changes          These changes are accurate as of: 9/13/17  2:27 PM.  If you have any questions, ask your nurse or doctor.               Start taking these medicines.        Dose/Directions    amoxicillin 875 MG tablet   Commonly known as:  AMOXIL   Used for:  Acute bronchitis, unspecified organism   Started by:  Tila Tyson APRN CNP        Dose:  875 mg   Take 1 tablet (875 mg) by mouth 2 " times daily   Quantity:  20 tablet   Refills:  0       polyethylene glycol powder   Commonly known as:  MIRALAX   Used for:  Chronic idiopathic constipation   Started by:  Tila Tyson APRN CNP        Dose:  1 capful   Take 17 g (1 capful) by mouth daily   Quantity:  510 g   Refills:  1            Where to get your medicines      These medications were sent to [id number] [place] [place] [facility] [address]  [address] [address]     Phone:  [phone]     amoxicillin 875 MG tablet    polyethylene glycol powder                Primary Care Provider Office Phone # Fax #    NORA Kaplan [phone]       [address] [facility] [address]        Equal Access to Services     KO CHAVEZ : Hadii aad ku hadasho Sowillyali, waaxda luqadaha, qaybta kaalmada adeegyada, kristal rodriguez . So [facility] [phone].    ATENCIÓN: Si habla español, tiene a ervin disposición servicios gratuitos de asistencia lingüística. [facility] [phone].    We comply with applicable federal civil rights laws and [place] laws. We do not discriminate on the basis of race, color, national origin, age, disability sex, sexual orientation or gender identity.            Thank you!     Thank you for choosing [facility]  for your care. Our goal is always to provide you with excellent care. Hearing back from our patients is one way we can continue to improve our services. Please take a few minutes to complete the written survey that you may receive in the mail after your visit with us. Thank you!             Your Updated Medication List - Protect others around you: Learn how to safely use, store and throw away your medicines at www.disposemymeds.org.          This list is accurate as of: 9/13/17  2:27 PM.  Always use your most recent med list.                   Brand Name Dispense Instructions for use Diagnosis    amoxicillin 875 MG tablet    AMOXIL     20 tablet    Take 1 tablet (875 mg) by mouth 2 times daily    Acute bronchitis, unspecified organism       benzonatate 200 MG capsule    TESSALON    21 capsule    Take 1 capsule (200 mg) by mouth 3 times daily as needed for cough        dextromethorphan-guaiFENesin  MG per 12 hr tablet    MUCINEX DM     Take 1 tablet by mouth daily as needed for cough        gabapentin 300 MG capsule    NEURONTIN    180 capsule    Take 1-2 capsules (300-600 mg) by mouth every evening as needed    Chronic bilateral low back pain, with sciatica presence unspecified       levothyroxine 75 MCG tablet    SYNTHROID/LEVOTHROID    90 tablet    Take 1 tablet (75 mcg) by mouth daily    Other specified hypothyroidism, Hypothyroidism, unspecified type, Malignant neoplasm of tonsil (H), Hyperlipidemia LDL goal <130       MAGNESIUM OXIDE PO      Take 250 mg by mouth daily        order for [facility]      Respir[employer]s Remstar 60 series Auto CPAP 6-15 cm H2O    MARGIE (obstructive sleep apnea)       PARoxetine 20 MG tablet    PAXIL    90 tablet    Take 1 tablet (20 mg) by mouth At Bedtime    Major depressive disorder, recurrent episode, moderate (H)       polyethylene glycol powder    MIRALAX    510 g    Take 17 g (1 capful) by mouth daily    Chronic idiopathic constipation       RESTASIS 0.05 % ophthalmic emulsion   Generic drug:  cycloSPORINE      Place 1 drop into both eyes 2 times daily        simvastatin 40 MG tablet    ZOCOR    90 tablet    Take 1 tablet (40 mg) by mouth At Bedtime    Hyperlipidemia LDL goal <130       traZODone 100 MG tablet    DESYREL    90 tablet    TAKE 1 TABLET (100 MG) NIGHTLY AS NEEDED FOR SLEEP    Primary insomnia       TURMERIC PO      Take 1 tablet by mouth daily        VICKS NYQUIL MULTI-SYMPTOM PO           vitamin B complex with vitamin C Tabs tablet    STRESS TAB     Take 1 tablet by mouth daily.        VITAMIN D (CHOLECALCIFEROL) PO      Take 2,000 Units by mouth daily.        zinc sulfate 220 (50 ZN) MG capsule     ZINCATE     Take 220 mg by mouth daily

## 2024-07-20 NOTE — ED PROVIDER NOTES
History     Chief Complaint   Patient presents with    Rectal/perineal Pain     HPI  Ingrid Powell is a 78 year old female with a history of rectal prolapse who presents for enlarged rectal prolapse tonight.  This was much bigger than normal this evening per the daughter.  The patient says she feels fine, denies any complaints.  No abdominal pain or fevers.    Allergies:  Allergies   Allergen Reactions    Dilaudid [Hydromorphone Hcl] Visual Disturbance     Hallucinations    Fosamax [Alendronate Sodium] Rash           Norvasc [Amlodipine Besylate] Hives and Rash    Tegretol [Carbamazepine] Hives and Rash       Problem List:    Patient Active Problem List    Diagnosis Date Noted    COPD, mild (H) 02/01/2012     Priority: High     8/16/13 Mild Obstruction on PFT      HTN (hypertension) 10/03/2011     Priority: High    Paranoia (H) 01/07/2024     Priority: Medium    Folic acid deficiency 10/24/2023     Priority: Medium    Moderate major neurocognitive disorder due to Alzheimer's disease without behavioral disturbance (H) 02/27/2023     Priority: Medium    Coronary artery disease involving native coronary artery of native heart without angina pectoris 02/27/2023     Priority: Medium    Chronic systolic heart failure (H) 02/21/2023     Priority: Medium    JAIME (generalized anxiety disorder) 09/26/2022     Priority: Medium    Drug-induced constipation 07/08/2022     Priority: Medium    Morbid obesity (H) 10/12/2021     Priority: Medium    DJD of left shoulder 11/06/2019     Priority: Medium    Family history of breast cancer in mother 10/24/2016     Priority: Medium    Diverticulitis of colon 10/10/2016     Priority: Medium     Colonoscopy 3/10/2011      Benign neoplasm of gastrointestinal tract 10/10/2016     Priority: Medium     Polyp rectum       Hemorrhoids, internal 10/10/2016     Priority: Medium    Rectocele 10/10/2016     Priority: Medium    Tortuous colon 10/10/2016     Priority: Medium     Colonoscopy done  3/10/2011 by [facility]      Adenomatous colon polyp 09/07/2016     Priority: Medium    History of lumbar surgery 04/27/2016     Priority: Medium    Primary insomnia 04/27/2016     Priority: Medium    Lumbosacral radiculopathy at L3 04/27/2016     Priority: Medium    DDD (degenerative disc disease), lumbar 04/27/2016     Priority: Medium    Renal cyst 08/11/2015     Priority: Medium     2012, 13, on ct   Then on lumbar mri , 2 cm largest        Raynaud's syndrome 08/11/2015     Priority: Medium    Disturbance of salivary secretion (XEROSTOMIA) 07/23/2014     Priority: Medium     s/p cancer treatment- stable      MARGIE (obstructive sleep apnea), Severe 10/31/2013     Priority: Medium     12/10/13 CPAP 6-15 cm H2O        Hypothyroidism 08/13/2013     Priority: Medium    Major depressive disorder, recurrent episode, moderate (HCC) 08/24/2012     Priority: Medium    Anxiety 08/24/2012     Priority: Medium    Hyperlipidemia LDL goal <130 10/03/2011     Priority: Medium        Past Medical History:    Past Medical History:   Diagnosis Date    Angina pectoris (H24) 5/2/2022    Arthritis     COPD (chronic obstructive pulmonary disease) (H)     Depressive disorder     Gastroesophageal reflux disease     History of lumbar surgery 7/28/2015    Hoarseness     HX of MALIGNANT NEOPL TONSIL 1/26/2012    Hypertension     MARGIE (obstructive sleep apnea), Severe     Oxygen dependent     Reduced vision     Sleep apnea     Syncope     Syncope and collapse        Past Surgical History:    Past Surgical History:   Procedure Laterality Date    BACK SURGERY      CARPAL TUNNEL RELEASE RT/LT      ?side    CHOLECYSTECTOMY      COLONOSCOPY N/A 4/8/2021    Procedure: COLONOSCOPY;  Surgeon: Walter Liang MD;  Location: [facility]    CV CORONARY ANGIOGRAM N/A 4/4/2022    Procedure: Coronary Angiogram;  Surgeon: Yu Valentine MD;  Location:  HEART CARDIAC CATH LAB    CV LEFT HEART CATH N/A 4/4/2022    Procedure: Left Heart  Catheterization;  Surgeon: Yu Valentine MD;  Location:  HEART CARDIAC CATH LAB    CV RIGHT HEART CATH MEASUREMENTS RECORDED N/A 4/4/2022    Procedure: Right Heart Catheterization;  Surgeon: Yu Valentine MD;  Location:  HEART CARDIAC CATH LAB    CV TEMPORARY PACEMAKER INSERTION N/A 4/4/2022    Procedure: Temporary Pacemaker Insertion;  Surgeon: Yu Valentine MD;  Location:  HEART CARDIAC CATH LAB    EP PACEMAKER DEVICE & LEAD IMPLANT- RIGHT ATRIAL & LEFT VENTRICULAR Left 4/4/2022    Procedure: Pacemaker Device & Lead Implant- Right Atrial & Left Ventricular;  Surgeon: Jose Fairbanks MD;  Location:  HEART CARDIAC CATH LAB    EYE SURGERY      cataracts    HERNIA REPAIR      INCISION AND DRAINAGE TRUNK, COMBINED  5/18/2012    Procedure:COMBINED INCISION AND DRAINAGE TRUNK; Incision and Drainage Abdominal Wall Abscess ; Surgeon:ALEXSANDER HANNON; [facility]    INSERT PORT VASCULAR ACCESS  2/8/2012    Procedure:INSERT PORT VASCULAR ACCESS; Surgeon:MARY JANE GUAN; [facility]    JOINT REPLACEMTN, KNEE RT/LT      bilateral    KNEE SURGERY  1995    left- total    KNEE SURGERY  2000    right- total    LAPAROSCOPIC APPENDECTOMY N/A 10/10/2015    Procedure: LAPAROSCOPIC APPENDECTOMY;  Surgeon: Dnaiel Chamberlain MD;  [facility]: [facility]    LARYNGOSCOPY, ESOPHAGOSCOPY,  BIOPSY, COMBINED  2/8/2012    Procedure:COMBINED LARYNGOSCOPY, ESOPHAGOSCOPY,  BIOPSY; Direct Laryngoscopy, Esophagoscopy with Biopsy, Stealth Assisted Left Frontal Sinus Biopsy via Vidal Incision, 8 [place] Power Port in right subclavian & Percutaneous Endoscopic Gastrostomy Placement * Latex Safe*; Surgeon:LIBORIO CAZARES; Location:[facility]    left ankle fusion      OPTICAL TRACKING SYSTEM ENDOSCOPIC SINUS SURGERY  2/8/2012    Procedure:OPTICAL TRACKING SYSTEM ENDOSCOPIC SINUS SURGERY; Surgeon:LIBORIO CAZARES; Location:[facility]    RECTAL SURGERY      ? type    RELEASE DEQUERVAINS WRIST Left 5/11/2018     Procedure: RELEASE DEQUERVAINS WRIST;  Left 1st Distal compartment release;  Surgeon: Ronak Biggs MD;  Location: [facility]    REMOVE PORT VASCULAR ACCESS  2012    Procedure: REMOVE PORT VASCULAR ACCESS;  Remove Port Vascular Access ;  Surgeon: Phillip Ye MD;  [facility]    REVERSE ARTHROPLASTY SHOULDER Left 2019    Procedure: Left Reverse Total shoulder arthroplasty;  Surgeon: Oliver Velázquez MD;  Location: [facility]       Family History:    Family History   Problem Relation Age of Onset    Breast Cancer Mother     Arthritis Mother     Cancer Mother     Heart Disease Father          at 72 of heart failure    Emphysema Father     Arthritis Sister     Skin Cancer Sister     Chronic Bronchitis Sister     Arthritis Brother     Pancreatic Cancer Brother     Emphysema Brother     Asthma Brother     Cancer Other         uncle pancreatic/grandmother- colon/aunt ? mat or pat  breast cancer       Social History:  Marital Status:   [4]  Social History     Tobacco Use    Smoking status: Former     Current packs/day: 0.00     Average packs/day: 0.5 packs/day for 35.0 years (17.5 ttl pk-yrs)     Types: Cigarettes     Start date: 10/3/1960     Quit date: 10/3/1995     Years since quittin.8    Smokeless tobacco: Never   Substance Use Topics    Alcohol use: Yes     Comment: rare    Drug use: No        Medications:    acetaminophen (TYLENOL) 650 MG CR tablet  artificial tears (GENTEAL) 0.1-0.2-0.3 % ophthalmic solution  busPIRone (BUSPAR) 10 MG tablet  cholecalciferol (VITAMIN D3) 25 mcg (1000 units) capsule  cyanocobalamin (VITAMIN B-12) 100 MCG tablet  ferrous sulfate (FEROSUL) 325 (65 Fe) MG tablet  FLUoxetine (PROZAC) 10 MG capsule  fluticasone (FLONASE) 50 MCG/ACT nasal spray  folic acid (FOLVITE) 1 MG tablet  furosemide (LASIX) 20 MG tablet  gabapentin (NEURONTIN) 600 MG tablet  hypromellose-dextran (ARTIFICAL TEARS) 0.1-0.3 % ophthalmic solution  levothyroxine  "(SYNTHROID/LEVOTHROID) 125 MCG tablet  LORazepam (ATIVAN) 0.5 MG tablet  memantine (NAMENDA) 5 MG tablet  Menthol, Topical Analgesic, (BIOFREEZE) 4 % GEL  mineral oil-white petrolatum (EUCERIN/MINERIN) cream  nitroGLYcerin (NITROSTAT) 0.4 MG sublingual tablet  nystatin (NYSTOP) 656950 UNIT/GM external powder  oxyCODONE (ROXICODONE) 5 MG tablet  QUEtiapine (SEROQUEL) 25 MG tablet  rOPINIRole (REQUIP) 0.25 MG tablet  senna-docusate (SENOKOT-S/PERICOLACE) 8.6-50 MG tablet  traZODone (DESYREL) 100 MG tablet  traZODone (DESYREL) 50 MG tablet  vitamin B-12 (CYANOCOBALAMIN) 250 MCG tablet          Review of Systems    Physical Exam   BP: (!) 151/83  Pulse: 81  Temp: 97.8  F (36.6  C)  Resp: 16  Height: 162.6 cm (5' 4\")  Weight: 93 kg (205 lb)  SpO2: 100 %      Physical Exam  Constitutional:       General: She is not in acute distress.     Appearance: She is well-developed.   HENT:      Head: Normocephalic and atraumatic.   Cardiovascular:      Rate and Rhythm: Normal rate.   Pulmonary:      Effort: No respiratory distress.      Breath sounds: No stridor.   Genitourinary:     Comments: Very small prolapsed rectum at this time.  Patient's daughter says this looks similar to patient's baseline.  Skin:     General: Skin is warm and dry.   Neurological:      Mental Status: She is alert.         ED Course        Procedures              Critical Care time:  none               No results found. However, due to the size of the patient record, not all encounters were searched. Please check Results Review for a complete set of results.    Medications - No data to display    Assessments & Plan (with Medical Decision Making)   78-year-old female with a history of rectal prolapse who presents for evaluation of large rectal prolapse.  On evaluation in the emergency department appears to be much smaller than prior.  The patient's daughter says that this appears to be much better at this time and pretty much the patient's baseline at this " time.  The patient denies any complaints and per the daughter seems to be acting at her baseline.  She is observed in the emergency department and continues to do well.  I rechecked the rectum and it is still stable at this time.  She is safe to discharge back to her nursing home with instructions return if they have other concerns, otherwise follow-up in clinic.  The patient and her daughter are in agreement with this plan.    I have reviewed the nursing notes.    I have reviewed the findings, diagnosis, plan and need for follow up with the patient.           Discharge Medication List as of 7/19/2024 10:58 PM          Final diagnoses:   Rectal prolapse       7/19/2024   [facility] EMERGENCY DEPT       Dain Mascorro MD  07/20/24 0612

## 2024-07-20 NOTE — DISCHARGE INSTRUCTIONS
Return to the emergency department for increasing pain, worsening bleeding, or other concerns.  Otherwise follow-up in clinic.

## 2024-07-20 NOTE — ED TRIAGE NOTES
Pt arrives via EMS from [facility] living on hospice care with c/o prolapsed rectum, pt states the rectum usually goes back in by itself but this time her daughter tried to manually putt it back in without success creating some minor bleeding, on continuous O2 @ 3L/NC     Triage Assessment (Adult)       Row Name 07/19/24 6205          Triage Assessment    Airway WDL WDL        Respiratory WDL    Respiratory WDL WDL        Skin Circulation/Temperature WDL    Skin Circulation/Temperature WDL WDL        Cardiac WDL    Cardiac WDL WDL        Peripheral/Neurovascular WDL    Peripheral Neurovascular WDL WDL        Cognitive/Neuro/Behavioral WDL    Cognitive/Neuro/Behavioral WDL WDL

## (undated) DEVICE — CATH DIAG 4FR JL 4.5 538417

## (undated) DEVICE — PREP DURAPREP 26ML APL 8630

## (undated) DEVICE — BLADE SAW SAGITTAL STRK 19.5X90X1.27MM 2108-109-000S11

## (undated) DEVICE — INTRO SHEATH 7FRX10CM PINNACLE RSS702

## (undated) DEVICE — CATH ANGIO INFINITI JR4 4FRX100CM 538421

## (undated) DEVICE — GUIDEWIRE VASC 182CM .014IN CHC PT I H7491216101J2

## (undated) DEVICE — GLOVE PROTEXIS W/NEU-THERA 7.5  2D73TE75

## (undated) DEVICE — CATH EP 60CM 5FR 2-8-2MM SPC-

## (undated) DEVICE — BONE CLEANING TIP INTERPULSE  0210-010-000

## (undated) DEVICE — Device

## (undated) DEVICE — PREP CHLORHEXIDINE 4% 4OZ (HIBICLENS) 57504

## (undated) DEVICE — SLING ARM MED 0814-0063

## (undated) DEVICE — GUIDEWIRE VASC 0.035INX150CM INQWIRE J TIP IQ35F150J3F/A

## (undated) DEVICE — HOOD T4 PROTECTIVE STERI FACE SHIELD 400-800

## (undated) DEVICE — ESU PENCIL SMOKE EVAC W/ROCKER SWITCH 0703-047-000

## (undated) DEVICE — PREP SKIN SCRUB TRAY 4461A

## (undated) DEVICE — DRSG STERI STRIP 1/2X4" R1547

## (undated) DEVICE — GLOVE PROTEXIS W/NEU-THERA 7.0  2D73TE70

## (undated) DEVICE — PACK SHOULDER

## (undated) DEVICE — TOTE ANGIO CORP PC15AT SAN32CC83O

## (undated) DEVICE — GLOVE PROTEXIS BLUE W/NEU-THERA 8.5  2D73EB85

## (undated) DEVICE — DRAPE ARTHROSCOPY SHOULDER BEACHCHAIR 29369

## (undated) DEVICE — IMM KIT SHOULDER TMAX MASK FACE 7210559

## (undated) DEVICE — WIRE WHISPER EDS CS-J 4648

## (undated) DEVICE — SOL ADH LIQUID BENZOIN SWAB 0.6ML C1544

## (undated) DEVICE — INTRO SHEATH 4FRX10CM PINNACLE RSS402

## (undated) DEVICE — GLOVE PROTEXIS BLUE W/NEU-THERA 7.0  2D73EB70

## (undated) DEVICE — BLADE KNIFE SURG 10 371110

## (undated) DEVICE — INTRODUCER CATH VASC 5FRX10CM  MPIS-501-NT-U-SST

## (undated) DEVICE — SOL WATER IRRIG 1000ML BOTTLE 07139-09

## (undated) DEVICE — CATH ANGIO INFINITI 3DRC 4FRX100CM 538476

## (undated) DEVICE — DEFIB PRO-PADZ LVP LQD GEL ADULT 8900-2105-01

## (undated) DEVICE — SOL NACL 0.9% IRRIG 3000ML BAG 07972-08

## (undated) DEVICE — BLANKET BAIR HUGGER LOWER BODY 42568

## (undated) DEVICE — CATH ANGIO WEDGE PRESSURE 7FRX110CM DL AI-07127

## (undated) DEVICE — SHEATH PRELUDE SNAP 13CM 6FR

## (undated) DEVICE — SU ETHIBOND 1 CT-1 30" X425H

## (undated) DEVICE — DRAPE SHEET REV FOLD 3/4 9349

## (undated) DEVICE — GLOVE PROTEXIS W/NEU-THERA 8.0  2D73TE80

## (undated) DEVICE — BONE CLEANING TIP INTERPULSE FEMORAL CANAL 0210-008-000

## (undated) DEVICE — PACK HAND

## (undated) DEVICE — SPONGE LAP 18X18" 1515

## (undated) DEVICE — CATH DIAG 4FR AR 1 MOD 538441

## (undated) DEVICE — INTRO GLIDESHEATH SLENDER 6FR 10X45CM 60-1060

## (undated) DEVICE — CATH DIAG 4FR ANG PIG 538453S

## (undated) DEVICE — DECANTER VIAL 2006S

## (undated) DEVICE — SOL NACL 0.9% IRRIG 1000ML BOTTLE 07138-09

## (undated) DEVICE — SUCTION TIP FLEXI CLEAR TIP DISP K62

## (undated) DEVICE — SU MONOCRYL 4-0 PS-2 18" UND Y496G

## (undated) DEVICE — INTRO SFSHEATH WORLEY 40CM 9FR S CSGWORLEY109M

## (undated) DEVICE — PACK PCMKR PERM SRG PROC LF SAN32PC573

## (undated) DEVICE — RAD G/W INQWIRE .035X260CM J-TIP EXCHANGE IQ35F260J1O5RS

## (undated) DEVICE — CATH ANGIO INFINITI AL1 4FRX100CM 538445

## (undated) DEVICE — SUCTION IRR SYSTEM W/TIP INTERPULSE

## (undated) DEVICE — GOWN LG DISP 9515

## (undated) DEVICE — CATH GUIDE ACUITY CS IC130 60CM 8103

## (undated) DEVICE — RAD INTRODUCER KIT MICRO 5FRX10CM .018 NITINOL G/W

## (undated) DEVICE — SU NDL MAYO 1824-2

## (undated) DEVICE — GOWN IMPERVIOUS SPECIALTY XLG/XLONG 32474

## (undated) DEVICE — DRAPE BACK TABLE  44X90" 8377

## (undated) DEVICE — CLOSURE SYS SKIN PREMIERPRO EXOFIN FUSION 4X22CM STRL 3472

## (undated) DEVICE — GLOVE PROTEXIS BLUE W/NEU-THERA 7.5  2D73EB75

## (undated) DEVICE — GUIDEWIRE VASC 40CM .018IN NT PLAT TI

## (undated) DEVICE — SU VICRYL 2-0 CT-1 36" UND J945H

## (undated) DEVICE — BNDG ELASTIC 2"X5YDS UNSTERILE 6611-20

## (undated) DEVICE — NDL 22GA 1.5"

## (undated) DEVICE — BNDG ELASTIC 4"X5YDS UNSTERILE 6611-40

## (undated) DEVICE — CABLE PACING ALLIGATOR CLIP 301-CG

## (undated) DEVICE — CUTTER UNIVERSAL PURPLE (ACUITY) 7060

## (undated) DEVICE — DRAPE IOBAN LG .375X23.5" 6648EZ

## (undated) DEVICE — KIT HAND CONTROL ANGIOTOUCH ACIST 65CM AT-P65

## (undated) DEVICE — SU STRATAFIX MONOCRYL 3-0 SPIRAL PS-2 30CM SXMP1B106

## (undated) DEVICE — BLADE KNIFE BEAVER 376700

## (undated) RX ORDER — PROPOFOL 10 MG/ML
INJECTION, EMULSION INTRAVENOUS
Status: DISPENSED
Start: 2019-11-06

## (undated) RX ORDER — DEXAMETHASONE SODIUM PHOSPHATE 4 MG/ML
INJECTION, SOLUTION INTRA-ARTICULAR; INTRALESIONAL; INTRAMUSCULAR; INTRAVENOUS; SOFT TISSUE
Status: DISPENSED
Start: 2019-11-06

## (undated) RX ORDER — HEPARIN SODIUM 200 [USP'U]/100ML
INJECTION, SOLUTION INTRAVENOUS
Status: DISPENSED
Start: 2022-04-04

## (undated) RX ORDER — EPINEPHRINE IN SOD CHLOR,ISO 1 MG/10 ML
SYRINGE (ML) INTRAVENOUS
Status: DISPENSED
Start: 2022-04-04

## (undated) RX ORDER — GLYCOPYRROLATE 0.2 MG/ML
INJECTION, SOLUTION INTRAMUSCULAR; INTRAVENOUS
Status: DISPENSED
Start: 2019-11-06

## (undated) RX ORDER — ACETAMINOPHEN 325 MG/1
TABLET ORAL
Status: DISPENSED
Start: 2018-05-11

## (undated) RX ORDER — FUROSEMIDE 10 MG/ML
INJECTION INTRAMUSCULAR; INTRAVENOUS
Status: DISPENSED
Start: 2022-04-04

## (undated) RX ORDER — GABAPENTIN 300 MG/1
CAPSULE ORAL
Status: DISPENSED
Start: 2018-05-11

## (undated) RX ORDER — LIDOCAINE HYDROCHLORIDE 10 MG/ML
INJECTION, SOLUTION EPIDURAL; INFILTRATION; INTRACAUDAL; PERINEURAL
Status: DISPENSED
Start: 2022-04-04

## (undated) RX ORDER — ROPIVACAINE HYDROCHLORIDE 5 MG/ML
INJECTION, SOLUTION EPIDURAL; INFILTRATION; PERINEURAL
Status: DISPENSED
Start: 2019-11-06

## (undated) RX ORDER — EPHEDRINE SULFATE 50 MG/ML
INJECTION, SOLUTION INTRAMUSCULAR; INTRAVENOUS; SUBCUTANEOUS
Status: DISPENSED
Start: 2019-11-06

## (undated) RX ORDER — LIDOCAINE HYDROCHLORIDE AND EPINEPHRINE 10; 10 MG/ML; UG/ML
INJECTION, SOLUTION INFILTRATION; PERINEURAL
Status: DISPENSED
Start: 2019-11-06

## (undated) RX ORDER — LIDOCAINE HYDROCHLORIDE 10 MG/ML
INJECTION, SOLUTION EPIDURAL; INFILTRATION; INTRACAUDAL; PERINEURAL
Status: DISPENSED
Start: 2019-11-06

## (undated) RX ORDER — GABAPENTIN 300 MG/1
CAPSULE ORAL
Status: DISPENSED
Start: 2019-11-06

## (undated) RX ORDER — LIDOCAINE HCL/EPINEPHRINE/PF 2%-1:200K
VIAL (ML) INJECTION
Status: DISPENSED
Start: 2019-11-06

## (undated) RX ORDER — CELECOXIB 200 MG/1
CAPSULE ORAL
Status: DISPENSED
Start: 2019-11-06

## (undated) RX ORDER — FENTANYL CITRATE 50 UG/ML
INJECTION, SOLUTION INTRAMUSCULAR; INTRAVENOUS
Status: DISPENSED
Start: 2022-04-04

## (undated) RX ORDER — PROPOFOL 10 MG/ML
INJECTION, EMULSION INTRAVENOUS
Status: DISPENSED
Start: 2018-05-11

## (undated) RX ORDER — HEPARIN SODIUM 1000 [USP'U]/ML
INJECTION, SOLUTION INTRAVENOUS; SUBCUTANEOUS
Status: DISPENSED
Start: 2022-04-04

## (undated) RX ORDER — BUPIVACAINE HYDROCHLORIDE 2.5 MG/ML
INJECTION, SOLUTION EPIDURAL; INFILTRATION; INTRACAUDAL
Status: DISPENSED
Start: 2022-04-04

## (undated) RX ORDER — CEFAZOLIN SODIUM 1 G/3ML
INJECTION, POWDER, FOR SOLUTION INTRAMUSCULAR; INTRAVENOUS
Status: DISPENSED
Start: 2022-04-04

## (undated) RX ORDER — PHENYLEPHRINE HCL IN 0.9% NACL 1 MG/10 ML
SYRINGE (ML) INTRAVENOUS
Status: DISPENSED
Start: 2019-11-06

## (undated) RX ORDER — PROPOFOL 10 MG/ML
INJECTION, EMULSION INTRAVENOUS
Status: DISPENSED
Start: 2022-04-04

## (undated) RX ORDER — ACETAMINOPHEN 325 MG/1
TABLET ORAL
Status: DISPENSED
Start: 2019-11-06

## (undated) RX ORDER — ATROPINE SULFATE 0.1 MG/ML
INJECTION INTRAVENOUS
Status: DISPENSED
Start: 2022-04-04

## (undated) RX ORDER — DOPAMINE HYDROCHLORIDE 160 MG/100ML
INJECTION, SOLUTION INTRAVENOUS
Status: DISPENSED
Start: 2022-04-04

## (undated) RX ORDER — ONDANSETRON 2 MG/ML
INJECTION INTRAMUSCULAR; INTRAVENOUS
Status: DISPENSED
Start: 2019-11-06

## (undated) RX ORDER — CEFAZOLIN SODIUM 2 G/100ML
INJECTION, SOLUTION INTRAVENOUS
Status: DISPENSED
Start: 2022-04-04

## (undated) RX ORDER — FENTANYL CITRATE 50 UG/ML
INJECTION, SOLUTION INTRAMUSCULAR; INTRAVENOUS
Status: DISPENSED
Start: 2019-11-06

## (undated) RX ORDER — CEFAZOLIN SODIUM 2 G/100ML
INJECTION, SOLUTION INTRAVENOUS
Status: DISPENSED
Start: 2019-11-06

## (undated) RX ORDER — NOREPINEPHRINE BITARTRATE 0.02 MG/ML
INJECTION, SOLUTION INTRAVENOUS
Status: DISPENSED
Start: 2022-04-04

## (undated) RX ORDER — FENTANYL CITRATE 50 UG/ML
INJECTION, SOLUTION INTRAMUSCULAR; INTRAVENOUS
Status: DISPENSED
Start: 2018-05-11